# Patient Record
Sex: FEMALE | Race: WHITE | Employment: UNEMPLOYED | URBAN - METROPOLITAN AREA
[De-identification: names, ages, dates, MRNs, and addresses within clinical notes are randomized per-mention and may not be internally consistent; named-entity substitution may affect disease eponyms.]

---

## 2021-10-18 PROBLEM — R07.9 ACUTE CHEST PAIN: Status: ACTIVE | Noted: 2021-01-01

## 2021-10-18 PROBLEM — R06.02 SHORTNESS OF BREATH: Status: ACTIVE | Noted: 2021-01-01

## 2021-10-18 PROBLEM — I95.9 HYPOTENSION: Status: ACTIVE | Noted: 2021-01-01

## 2021-10-18 PROBLEM — I50.9 NEW ONSET OF CONGESTIVE HEART FAILURE (HCC): Status: ACTIVE | Noted: 2021-01-01

## 2021-10-19 PROBLEM — K76.9 DISORDER OF LIVER: Status: ACTIVE | Noted: 2021-01-01

## 2021-10-19 PROBLEM — N84.2 VAGINAL POLYP: Status: ACTIVE | Noted: 2021-01-01

## 2021-10-19 PROBLEM — H52.10 MYOPIA: Status: ACTIVE | Noted: 2021-01-01

## 2021-10-19 PROBLEM — G47.33 OBSTRUCTIVE SLEEP APNEA SYNDROME: Status: RESOLVED | Noted: 2021-01-01 | Resolved: 2021-01-01

## 2021-10-19 PROBLEM — F32.A DEPRESSION: Status: RESOLVED | Noted: 2021-01-01 | Resolved: 2021-01-01

## 2021-10-19 PROBLEM — K76.9 DISORDER OF LIVER: Status: RESOLVED | Noted: 2021-01-01 | Resolved: 2021-01-01

## 2021-10-19 PROBLEM — H52.4 PRESBYOPIA: Status: ACTIVE | Noted: 2021-01-01

## 2021-10-19 PROBLEM — R60.1 ANASARCA: Status: ACTIVE | Noted: 2021-01-01

## 2021-10-19 PROBLEM — C44.310 BASAL CELL CARCINOMA (BCC) OF FACE: Status: ACTIVE | Noted: 2021-01-01

## 2021-10-19 PROBLEM — R74.01 HIGH ASPARTATE AMINOTRANSFERASE LEVEL: Status: ACTIVE | Noted: 2021-01-01

## 2021-10-19 PROBLEM — N84.2 VAGINAL POLYP: Status: RESOLVED | Noted: 2021-01-01 | Resolved: 2021-01-01

## 2021-10-19 PROBLEM — F41.9 ANXIETY: Status: RESOLVED | Noted: 2021-01-01 | Resolved: 2021-01-01

## 2021-10-19 PROBLEM — J30.9 ALLERGIC RHINITIS: Status: RESOLVED | Noted: 2021-01-01 | Resolved: 2021-01-01

## 2021-10-19 PROBLEM — D69.6 THROMBOCYTOPENIA (HCC): Status: ACTIVE | Noted: 2021-01-01

## 2021-10-19 PROBLEM — F41.9 ANXIETY: Status: ACTIVE | Noted: 2021-01-01

## 2021-10-19 PROBLEM — J30.9 ALLERGIC RHINITIS: Status: ACTIVE | Noted: 2021-01-01

## 2021-10-19 PROBLEM — R79.1 ELEVATED INR: Status: ACTIVE | Noted: 2021-01-01

## 2021-10-19 PROBLEM — I10 ESSENTIAL HYPERTENSION: Status: ACTIVE | Noted: 2021-01-01

## 2021-10-19 PROBLEM — R74.01 HIGH ASPARTATE AMINOTRANSFERASE LEVEL: Status: RESOLVED | Noted: 2021-01-01 | Resolved: 2021-01-01

## 2021-10-19 PROBLEM — D50.9 MICROCYTIC ANEMIA: Status: ACTIVE | Noted: 2021-01-01

## 2021-10-19 PROBLEM — H52.4 PRESBYOPIA: Status: RESOLVED | Noted: 2021-01-01 | Resolved: 2021-01-01

## 2021-10-19 PROBLEM — C44.310 BASAL CELL CARCINOMA (BCC) OF FACE: Status: RESOLVED | Noted: 2021-01-01 | Resolved: 2021-01-01

## 2021-10-19 PROBLEM — H52.209 ASTIGMATISM: Status: ACTIVE | Noted: 2021-01-01

## 2021-10-19 PROBLEM — D48.5 NEOPLASM OF UNCERTAIN BEHAVIOR OF SKIN: Status: RESOLVED | Noted: 2021-01-01 | Resolved: 2021-01-01

## 2021-10-19 PROBLEM — G47.33 OBSTRUCTIVE SLEEP APNEA SYNDROME: Status: ACTIVE | Noted: 2021-01-01

## 2021-10-19 PROBLEM — H52.10 MYOPIA: Status: RESOLVED | Noted: 2021-01-01 | Resolved: 2021-01-01

## 2021-10-19 PROBLEM — R74.01 HIGH ALANINE AMINOTRANSFERASE (ALT) LEVEL: Status: RESOLVED | Noted: 2021-01-01 | Resolved: 2021-01-01

## 2021-10-19 PROBLEM — D48.5 NEOPLASM OF UNCERTAIN BEHAVIOR OF SKIN: Status: ACTIVE | Noted: 2021-01-01

## 2021-10-19 PROBLEM — R74.01 HIGH ALANINE AMINOTRANSFERASE (ALT) LEVEL: Status: ACTIVE | Noted: 2021-01-01

## 2021-10-19 PROBLEM — H52.209 ASTIGMATISM: Status: RESOLVED | Noted: 2021-01-01 | Resolved: 2021-01-01

## 2021-10-19 PROBLEM — F32.A DEPRESSION: Status: ACTIVE | Noted: 2021-01-01

## 2021-11-06 PROBLEM — I85.00 ESOPHAGEAL VARICES (HCC): Status: ACTIVE | Noted: 2021-01-01

## 2021-11-06 PROBLEM — K92.2 ACUTE UPPER GI BLEED: Status: ACTIVE | Noted: 2021-01-01

## 2021-11-06 PROBLEM — K92.2 UGIB (UPPER GASTROINTESTINAL BLEED): Status: ACTIVE | Noted: 2021-01-01

## 2021-11-06 PROBLEM — D64.9 ANEMIA: Status: ACTIVE | Noted: 2021-01-01

## 2021-11-06 PROBLEM — R57.8 HEMORRHAGIC SHOCK (HCC): Status: ACTIVE | Noted: 2021-01-01

## 2021-11-10 PROBLEM — J96.01 ACUTE RESPIRATORY FAILURE WITH HYPOXIA (HCC): Status: ACTIVE | Noted: 2021-01-01

## 2021-11-10 PROBLEM — R00.1 SINUS BRADYCARDIA BY ELECTROCARDIOGRAM: Status: ACTIVE | Noted: 2021-01-01

## 2021-11-10 PROBLEM — D69.59 THROMBOCYTOPENIA CONCURRENT WITH AND DUE TO ALCOHOLISM (HCC): Status: ACTIVE | Noted: 2021-01-01

## 2021-11-10 PROBLEM — N17.9 AKI (ACUTE KIDNEY INJURY) (HCC): Status: ACTIVE | Noted: 2021-01-01

## 2021-11-10 PROBLEM — F10.20 THROMBOCYTOPENIA CONCURRENT WITH AND DUE TO ALCOHOLISM (HCC): Status: ACTIVE | Noted: 2021-01-01

## 2021-11-10 PROBLEM — E03.4 HYPOTHYROIDISM DUE TO ACQUIRED ATROPHY OF THYROID: Status: ACTIVE | Noted: 2021-01-01

## 2021-11-10 PROBLEM — D50.0 IRON DEFICIENCY ANEMIA DUE TO CHRONIC BLOOD LOSS: Chronic | Status: ACTIVE | Noted: 2021-01-01

## 2022-01-01 ENCOUNTER — DOCUMENTATION ONLY (OUTPATIENT)
Dept: HEMATOLOGY | Age: 62
End: 2022-01-01

## 2022-01-01 ENCOUNTER — HOSPITAL ENCOUNTER (INPATIENT)
Age: 62
LOS: 49 days | Discharge: HOSPICE | DRG: 432 | End: 2022-04-23
Attending: FAMILY MEDICINE | Admitting: HOSPITALIST
Payer: OTHER GOVERNMENT

## 2022-01-01 ENCOUNTER — TELEPHONE (OUTPATIENT)
Dept: HEMATOLOGY | Age: 62
End: 2022-01-01

## 2022-01-01 ENCOUNTER — APPOINTMENT (OUTPATIENT)
Dept: GENERAL RADIOLOGY | Age: 62
DRG: 432 | End: 2022-01-01
Attending: STUDENT IN AN ORGANIZED HEALTH CARE EDUCATION/TRAINING PROGRAM
Payer: OTHER GOVERNMENT

## 2022-01-01 ENCOUNTER — TRANSCRIBE ORDER (OUTPATIENT)
Dept: SCHEDULING | Age: 62
End: 2022-01-01

## 2022-01-01 ENCOUNTER — APPOINTMENT (OUTPATIENT)
Dept: GENERAL RADIOLOGY | Age: 62
DRG: 432 | End: 2022-01-01
Attending: INTERNAL MEDICINE
Payer: OTHER GOVERNMENT

## 2022-01-01 ENCOUNTER — APPOINTMENT (OUTPATIENT)
Dept: GENERAL RADIOLOGY | Age: 62
DRG: 432 | End: 2022-01-01
Attending: HOSPITALIST
Payer: OTHER GOVERNMENT

## 2022-01-01 ENCOUNTER — PATIENT OUTREACH (OUTPATIENT)
Dept: CASE MANAGEMENT | Age: 62
End: 2022-01-01

## 2022-01-01 ENCOUNTER — APPOINTMENT (OUTPATIENT)
Dept: ULTRASOUND IMAGING | Age: 62
DRG: 432 | End: 2022-01-01
Attending: HOSPITALIST
Payer: OTHER GOVERNMENT

## 2022-01-01 ENCOUNTER — HOSPITAL ENCOUNTER (OUTPATIENT)
Dept: PREADMISSION TESTING | Age: 62
Discharge: HOME | End: 2022-02-10
Payer: OTHER GOVERNMENT

## 2022-01-01 ENCOUNTER — OFFICE VISIT (OUTPATIENT)
Dept: HEMATOLOGY | Age: 62
End: 2022-01-01

## 2022-01-01 ENCOUNTER — APPOINTMENT (OUTPATIENT)
Dept: ULTRASOUND IMAGING | Age: 62
DRG: 432 | End: 2022-01-01
Attending: INTERNAL MEDICINE
Payer: OTHER GOVERNMENT

## 2022-01-01 ENCOUNTER — HOSPITAL ENCOUNTER (OUTPATIENT)
Dept: LAB | Age: 62
Discharge: HOME | End: 2022-01-25
Payer: OTHER GOVERNMENT

## 2022-01-01 ENCOUNTER — HOSPICE ADMISSION (OUTPATIENT)
Dept: HOSPICE | Facility: HOSPICE | Age: 62
End: 2022-01-01

## 2022-01-01 ENCOUNTER — HOSPITAL ENCOUNTER (OUTPATIENT)
Dept: MAMMOGRAPHY | Age: 62
Discharge: HOME | End: 2022-02-08
Attending: INTERNAL MEDICINE
Payer: OTHER GOVERNMENT

## 2022-01-01 VITALS
OXYGEN SATURATION: 97 % | BODY MASS INDEX: 21.12 KG/M2 | HEIGHT: 64 IN | RESPIRATION RATE: 18 BRPM | SYSTOLIC BLOOD PRESSURE: 87 MMHG | DIASTOLIC BLOOD PRESSURE: 47 MMHG | HEART RATE: 102 BPM | WEIGHT: 123.7 LBS | TEMPERATURE: 97.9 F

## 2022-01-01 VITALS
BODY MASS INDEX: 26.26 KG/M2 | SYSTOLIC BLOOD PRESSURE: 136 MMHG | HEIGHT: 64 IN | OXYGEN SATURATION: 96 % | DIASTOLIC BLOOD PRESSURE: 93 MMHG | TEMPERATURE: 97.7 F | HEART RATE: 96 BPM

## 2022-01-01 DIAGNOSIS — Z51.5 END OF LIFE CARE: ICD-10-CM

## 2022-01-01 DIAGNOSIS — K70.30 ALCOHOLIC CIRRHOSIS OF LIVER WITHOUT ASCITES (HCC): ICD-10-CM

## 2022-01-01 DIAGNOSIS — D69.6 THROMBOCYTOPENIA (HCC): ICD-10-CM

## 2022-01-01 DIAGNOSIS — K76.7 HEPATORENAL SYNDROME (HCC): ICD-10-CM

## 2022-01-01 DIAGNOSIS — L89.303 PRESSURE INJURY OF BUTTOCK, STAGE 3, UNSPECIFIED LATERALITY (HCC): ICD-10-CM

## 2022-01-01 DIAGNOSIS — K76.6 PORTAL HYPERTENSIVE GASTROPATHY (HCC): ICD-10-CM

## 2022-01-01 DIAGNOSIS — N18.4 STAGE 4 CHRONIC KIDNEY DISEASE (HCC): ICD-10-CM

## 2022-01-01 DIAGNOSIS — K76.82 HEPATIC ENCEPHALOPATHY: ICD-10-CM

## 2022-01-01 DIAGNOSIS — K70.31 ASCITES DUE TO ALCOHOLIC CIRRHOSIS (HCC): ICD-10-CM

## 2022-01-01 DIAGNOSIS — I85.10 SECONDARY ESOPHAGEAL VARICES WITHOUT BLEEDING (HCC): ICD-10-CM

## 2022-01-01 DIAGNOSIS — K31.89 PORTAL HYPERTENSIVE GASTROPATHY (HCC): ICD-10-CM

## 2022-01-01 DIAGNOSIS — K70.31 ALCOHOLIC CIRRHOSIS OF LIVER WITH ASCITES (HCC): Primary | ICD-10-CM

## 2022-01-01 DIAGNOSIS — R53.81 DEBILITY: ICD-10-CM

## 2022-01-01 DIAGNOSIS — R18.8 CIRRHOSIS OF LIVER WITH ASCITES, UNSPECIFIED HEPATIC CIRRHOSIS TYPE (HCC): Primary | ICD-10-CM

## 2022-01-01 DIAGNOSIS — K76.7 HEPATORENAL FAILURE (HCC): ICD-10-CM

## 2022-01-01 DIAGNOSIS — D64.9 ANEMIA, UNSPECIFIED TYPE: ICD-10-CM

## 2022-01-01 DIAGNOSIS — D50.0 IRON DEFICIENCY ANEMIA DUE TO CHRONIC BLOOD LOSS: Chronic | ICD-10-CM

## 2022-01-01 DIAGNOSIS — Z12.31 VISIT FOR SCREENING MAMMOGRAM: Primary | ICD-10-CM

## 2022-01-01 DIAGNOSIS — K92.2 UPPER GI BLEEDING: ICD-10-CM

## 2022-01-01 DIAGNOSIS — Z01.810 PRE-OPERATIVE CARDIOVASCULAR EXAMINATION: ICD-10-CM

## 2022-01-01 DIAGNOSIS — K70.31 ALCOHOLIC CIRRHOSIS OF LIVER WITH ASCITES (HCC): ICD-10-CM

## 2022-01-01 DIAGNOSIS — Z71.89 ADVANCE CARE PLANNING: ICD-10-CM

## 2022-01-01 DIAGNOSIS — K74.3 PRIMARY BILIARY CHOLANGITIS (HCC): ICD-10-CM

## 2022-01-01 DIAGNOSIS — N17.9 AKI (ACUTE KIDNEY INJURY) (HCC): ICD-10-CM

## 2022-01-01 DIAGNOSIS — E43 SEVERE PROTEIN-CALORIE MALNUTRITION (HCC): ICD-10-CM

## 2022-01-01 DIAGNOSIS — Z51.5 PALLIATIVE CARE BY SPECIALIST: ICD-10-CM

## 2022-01-01 DIAGNOSIS — N17.9 ACUTE RENAL FAILURE, UNSPECIFIED ACUTE RENAL FAILURE TYPE (HCC): ICD-10-CM

## 2022-01-01 DIAGNOSIS — Z71.89 GOALS OF CARE, COUNSELING/DISCUSSION: ICD-10-CM

## 2022-01-01 DIAGNOSIS — I85.11 SECONDARY ESOPHAGEAL VARICES WITH BLEEDING (HCC): ICD-10-CM

## 2022-01-01 DIAGNOSIS — R18.8 OTHER ASCITES: ICD-10-CM

## 2022-01-01 DIAGNOSIS — K74.3 HEPATIC CIRRHOSIS DUE TO PRIMARY BILIARY CHOLANGITIS (HCC): Primary | ICD-10-CM

## 2022-01-01 DIAGNOSIS — K74.60 CIRRHOSIS OF LIVER WITH ASCITES, UNSPECIFIED HEPATIC CIRRHOSIS TYPE (HCC): Primary | ICD-10-CM

## 2022-01-01 DIAGNOSIS — K74.5 BILIARY CIRRHOSIS (HCC): ICD-10-CM

## 2022-01-01 DIAGNOSIS — Z51.5 HOSPICE CARE: Primary | ICD-10-CM

## 2022-01-01 DIAGNOSIS — Z12.31 VISIT FOR SCREENING MAMMOGRAM: ICD-10-CM

## 2022-01-01 DIAGNOSIS — R54 FRAILTY: ICD-10-CM

## 2022-01-01 DIAGNOSIS — M62.59 ATROPHY OF MUSCLE OF MULTIPLE SITES: ICD-10-CM

## 2022-01-01 DIAGNOSIS — D68.9 COAGULOPATHY (HCC): ICD-10-CM

## 2022-01-01 LAB
A1AT SERPL-MCNC: 159 MG/DL (ref 101–187)
ABO + RH BLD: NORMAL
ACTIN IGG SERPL-ACNC: 20 UNITS (ref 0–19)
AFP L3 MFR SERPL: 10.8 % (ref 0–9.9)
AFP SERPL-MCNC: 4.3 NG/ML (ref 0–8)
ALBUMIN FLD-MCNC: 1 G/DL
ALBUMIN SERPL-MCNC: 2.7 G/DL (ref 3.4–5)
ALBUMIN SERPL-MCNC: 2.8 G/DL (ref 3.5–5)
ALBUMIN SERPL-MCNC: 3 G/DL (ref 3.5–5)
ALBUMIN SERPL-MCNC: 3 G/DL (ref 3.5–5)
ALBUMIN SERPL-MCNC: 3.1 G/DL (ref 3.5–5)
ALBUMIN SERPL-MCNC: 3.2 G/DL (ref 3.5–5)
ALBUMIN SERPL-MCNC: 3.3 G/DL (ref 3.5–5)
ALBUMIN SERPL-MCNC: 3.4 G/DL (ref 3.5–5)
ALBUMIN SERPL-MCNC: 3.5 G/DL (ref 3.5–5)
ALBUMIN SERPL-MCNC: 3.6 G/DL (ref 3.5–5)
ALBUMIN SERPL-MCNC: 3.8 G/DL (ref 3.5–5)
ALBUMIN SERPL-MCNC: 3.8 G/DL (ref 3.5–5)
ALBUMIN SERPL-MCNC: 3.9 G/DL (ref 3.5–5)
ALBUMIN SERPL-MCNC: 4 G/DL (ref 3.5–5)
ALBUMIN SERPL-MCNC: 4.2 G/DL (ref 3.5–5)
ALBUMIN SERPL-MCNC: 4.4 G/DL (ref 3.5–5)
ALBUMIN SERPL-MCNC: 4.6 G/DL (ref 3.5–5)
ALBUMIN/GLOB SERPL: 0.7 {RATIO} (ref 0.8–1.7)
ALBUMIN/GLOB SERPL: 1.5 {RATIO} (ref 1.1–2.2)
ALBUMIN/GLOB SERPL: 1.7 {RATIO} (ref 1.1–2.2)
ALBUMIN/GLOB SERPL: 1.8 {RATIO} (ref 1.1–2.2)
ALBUMIN/GLOB SERPL: 1.9 {RATIO} (ref 1.1–2.2)
ALBUMIN/GLOB SERPL: 1.9 {RATIO} (ref 1.1–2.2)
ALBUMIN/GLOB SERPL: 2.3 {RATIO} (ref 1.1–2.2)
ALBUMIN/GLOB SERPL: 2.8 {RATIO} (ref 1.1–2.2)
ALP SERPL-CCNC: 105 U/L (ref 45–117)
ALP SERPL-CCNC: 107 U/L (ref 45–117)
ALP SERPL-CCNC: 117 U/L (ref 45–117)
ALP SERPL-CCNC: 129 U/L (ref 45–117)
ALP SERPL-CCNC: 143 U/L (ref 45–117)
ALP SERPL-CCNC: 144 U/L (ref 45–117)
ALP SERPL-CCNC: 204 U/L (ref 45–117)
ALP SERPL-CCNC: 89 U/L (ref 45–117)
ALP SERPL-CCNC: 94 U/L (ref 45–117)
ALP SERPL-CCNC: 94 U/L (ref 45–117)
ALT SERPL-CCNC: 16 U/L (ref 12–78)
ALT SERPL-CCNC: 18 U/L (ref 12–78)
ALT SERPL-CCNC: 20 U/L (ref 12–78)
ALT SERPL-CCNC: 21 U/L (ref 12–78)
ALT SERPL-CCNC: 21 U/L (ref 12–78)
ALT SERPL-CCNC: 23 U/L (ref 12–78)
ALT SERPL-CCNC: 24 U/L (ref 12–78)
ALT SERPL-CCNC: 24 U/L (ref 12–78)
ALT SERPL-CCNC: 26 U/L (ref 12–78)
ALT SERPL-CCNC: 38 U/L (ref 13–56)
AMMONIA PLAS-SCNC: 28 UMOL/L
AMMONIA PLAS-SCNC: 43 UMOL/L
AMMONIA PLAS-SCNC: 43 UMOL/L
AMMONIA PLAS-SCNC: 44 UMOL/L
AMMONIA PLAS-SCNC: 46 UMOL/L
AMMONIA PLAS-SCNC: 57 UMOL/L
AMPHETAMINES SERPL QL SCN: NEGATIVE NG/ML
ANA TITR SER IF: NEGATIVE {TITER}
ANION GAP SERPL CALC-SCNC: 10 MMOL/L (ref 5–15)
ANION GAP SERPL CALC-SCNC: 11 MMOL/L (ref 5–15)
ANION GAP SERPL CALC-SCNC: 12 MMOL/L (ref 5–15)
ANION GAP SERPL CALC-SCNC: 13 MMOL/L (ref 5–15)
ANION GAP SERPL CALC-SCNC: 14 MMOL/L (ref 5–15)
ANION GAP SERPL CALC-SCNC: 15 MMOL/L (ref 5–15)
ANION GAP SERPL CALC-SCNC: 16 MMOL/L (ref 5–15)
ANION GAP SERPL CALC-SCNC: 17 MMOL/L (ref 5–15)
ANION GAP SERPL CALC-SCNC: 17 MMOL/L (ref 5–15)
ANION GAP SERPL CALC-SCNC: 18 MMOL/L (ref 5–15)
ANION GAP SERPL CALC-SCNC: 19 MMOL/L (ref 5–15)
ANION GAP SERPL CALC-SCNC: 19 MMOL/L (ref 5–15)
ANION GAP SERPL CALC-SCNC: 20 MMOL/L (ref 5–15)
ANION GAP SERPL CALC-SCNC: 20 MMOL/L (ref 5–15)
ANION GAP SERPL CALC-SCNC: 7 MMOL/L (ref 3–18)
ANION GAP SERPL CALC-SCNC: 8 MMOL/L (ref 5–15)
APPEARANCE FLD: ABNORMAL
APPEARANCE FLD: CLEAR
APPEARANCE UR: ABNORMAL
ARTERIAL PATENCY WRIST A: POSITIVE
AST SERPL-CCNC: 30 U/L (ref 15–37)
AST SERPL-CCNC: 32 U/L (ref 15–37)
AST SERPL-CCNC: 33 U/L (ref 15–37)
AST SERPL-CCNC: 35 U/L (ref 15–37)
AST SERPL-CCNC: 42 U/L (ref 15–37)
AST SERPL-CCNC: 43 U/L (ref 15–37)
AST SERPL-CCNC: 46 U/L (ref 15–37)
AST SERPL-CCNC: 46 U/L (ref 15–37)
AST SERPL-CCNC: 49 U/L (ref 15–37)
AST SERPL-CCNC: 80 U/L (ref 10–38)
BACTERIA SPEC CULT: ABNORMAL
BACTERIA SPEC CULT: NORMAL
BACTERIA SPEC CULT: NORMAL
BACTERIA URNS QL MICRO: ABNORMAL /HPF
BARBITURATES SERPL QL SCN: NEGATIVE UG/ML
BASE DEFICIT BLD-SCNC: 6.1 MMOL/L
BASOPHILS # BLD: 0 K/UL (ref 0–0.1)
BASOPHILS # BLD: 0.1 K/UL (ref 0–0.1)
BASOPHILS # BLD: 0.2 K/UL (ref 0–0.1)
BASOPHILS NFR BLD: 0 % (ref 0–1)
BASOPHILS NFR BLD: 1 % (ref 0–1)
BASOPHILS NFR BLD: 1 % (ref 0–2)
BASOPHILS NFR BLD: 2 % (ref 0–1)
BDY SITE: ABNORMAL
BILIRUB DIRECT SERPL-MCNC: 1.6 MG/DL (ref 0–0.2)
BILIRUB DIRECT SERPL-MCNC: 1.7 MG/DL (ref 0–0.2)
BILIRUB DIRECT SERPL-MCNC: 1.7 MG/DL (ref 0–0.2)
BILIRUB DIRECT SERPL-MCNC: 2.9 MG/DL (ref 0–0.2)
BILIRUB DIRECT SERPL-MCNC: 3 MG/DL (ref 0–0.2)
BILIRUB SERPL-MCNC: 3.1 MG/DL (ref 0.2–1)
BILIRUB SERPL-MCNC: 3.3 MG/DL (ref 0.2–1)
BILIRUB SERPL-MCNC: 3.9 MG/DL (ref 0.2–1)
BILIRUB SERPL-MCNC: 4.2 MG/DL (ref 0.2–1)
BILIRUB SERPL-MCNC: 4.4 MG/DL (ref 0.2–1)
BILIRUB SERPL-MCNC: 4.6 MG/DL (ref 0.2–1)
BILIRUB SERPL-MCNC: 5.2 MG/DL (ref 0.2–1)
BILIRUB SERPL-MCNC: 5.3 MG/DL (ref 0.2–1)
BILIRUB SERPL-MCNC: 5.4 MG/DL (ref 0.2–1)
BILIRUB SERPL-MCNC: 5.4 MG/DL (ref 0.2–1)
BILIRUB UR QL CFM: POSITIVE
BLD PROD TYP BPU: NORMAL
BLOOD GROUP ANTIBODIES SERPL: NORMAL
BPU ID: NORMAL
BUN SERPL-MCNC: 101 MG/DL (ref 6–20)
BUN SERPL-MCNC: 102 MG/DL (ref 6–20)
BUN SERPL-MCNC: 102 MG/DL (ref 6–20)
BUN SERPL-MCNC: 106 MG/DL (ref 6–20)
BUN SERPL-MCNC: 110 MG/DL (ref 6–20)
BUN SERPL-MCNC: 112 MG/DL (ref 6–20)
BUN SERPL-MCNC: 113 MG/DL (ref 6–20)
BUN SERPL-MCNC: 114 MG/DL (ref 6–20)
BUN SERPL-MCNC: 116 MG/DL (ref 6–20)
BUN SERPL-MCNC: 117 MG/DL (ref 6–20)
BUN SERPL-MCNC: 117 MG/DL (ref 6–20)
BUN SERPL-MCNC: 118 MG/DL (ref 6–20)
BUN SERPL-MCNC: 120 MG/DL (ref 6–20)
BUN SERPL-MCNC: 120 MG/DL (ref 6–20)
BUN SERPL-MCNC: 121 MG/DL (ref 6–20)
BUN SERPL-MCNC: 122 MG/DL (ref 6–20)
BUN SERPL-MCNC: 123 MG/DL (ref 6–20)
BUN SERPL-MCNC: 123 MG/DL (ref 6–20)
BUN SERPL-MCNC: 128 MG/DL (ref 6–20)
BUN SERPL-MCNC: 129 MG/DL (ref 6–20)
BUN SERPL-MCNC: 130 MG/DL (ref 6–20)
BUN SERPL-MCNC: 131 MG/DL (ref 6–20)
BUN SERPL-MCNC: 134 MG/DL (ref 6–20)
BUN SERPL-MCNC: 135 MG/DL (ref 6–20)
BUN SERPL-MCNC: 135 MG/DL (ref 6–20)
BUN SERPL-MCNC: 139 MG/DL (ref 6–20)
BUN SERPL-MCNC: 141 MG/DL (ref 6–20)
BUN SERPL-MCNC: 141 MG/DL (ref 6–20)
BUN SERPL-MCNC: 142 MG/DL (ref 6–20)
BUN SERPL-MCNC: 144 MG/DL (ref 6–20)
BUN SERPL-MCNC: 145 MG/DL (ref 6–20)
BUN SERPL-MCNC: 146 MG/DL (ref 6–20)
BUN SERPL-MCNC: 149 MG/DL (ref 6–20)
BUN SERPL-MCNC: 152 MG/DL (ref 6–20)
BUN SERPL-MCNC: 155 MG/DL (ref 6–20)
BUN SERPL-MCNC: 159 MG/DL (ref 6–20)
BUN SERPL-MCNC: 161 MG/DL (ref 6–20)
BUN SERPL-MCNC: 162 MG/DL (ref 6–20)
BUN SERPL-MCNC: 162 MG/DL (ref 6–20)
BUN SERPL-MCNC: 165 MG/DL (ref 6–20)
BUN SERPL-MCNC: 166 MG/DL (ref 6–20)
BUN SERPL-MCNC: 167 MG/DL (ref 6–20)
BUN SERPL-MCNC: 170 MG/DL (ref 6–20)
BUN SERPL-MCNC: 172 MG/DL (ref 6–20)
BUN SERPL-MCNC: 179 MG/DL (ref 6–20)
BUN SERPL-MCNC: 27 MG/DL (ref 7–18)
BUN SERPL-MCNC: 98 MG/DL (ref 6–20)
BUN/CREAT SERPL: 20 (ref 12–20)
BUN/CREAT SERPL: 21 (ref 12–20)
BUN/CREAT SERPL: 24 (ref 12–20)
BUN/CREAT SERPL: 24 (ref 12–20)
BUN/CREAT SERPL: 25 (ref 12–20)
BUN/CREAT SERPL: 26 (ref 12–20)
BUN/CREAT SERPL: 26 (ref 12–20)
BUN/CREAT SERPL: 27 (ref 12–20)
BUN/CREAT SERPL: 28 (ref 12–20)
BUN/CREAT SERPL: 29 (ref 12–20)
BUN/CREAT SERPL: 30 (ref 12–20)
BUN/CREAT SERPL: 31 (ref 12–20)
BUN/CREAT SERPL: 32 (ref 12–20)
BUN/CREAT SERPL: 33 (ref 12–20)
BUN/CREAT SERPL: 37 (ref 12–20)
BUN/CREAT SERPL: 39 (ref 12–20)
BUN/CREAT SERPL: 40 (ref 12–20)
BUN/CREAT SERPL: 40 (ref 12–20)
BUN/CREAT SERPL: 41 (ref 12–20)
BUN/CREAT SERPL: 42 (ref 12–20)
BUN/CREAT SERPL: 45 (ref 12–20)
BUN/CREAT SERPL: 46 (ref 12–20)
BUN/CREAT SERPL: 46 (ref 12–20)
BUN/CREAT SERPL: 47 (ref 12–20)
BUN/CREAT SERPL: 47 (ref 12–20)
BUN/CREAT SERPL: 48 (ref 12–20)
BUN/CREAT SERPL: 48 (ref 12–20)
BUN/CREAT SERPL: 50 (ref 12–20)
CALCIUM SERPL-MCNC: 7.7 MG/DL (ref 8.5–10.1)
CALCIUM SERPL-MCNC: 7.8 MG/DL (ref 8.5–10.1)
CALCIUM SERPL-MCNC: 8.2 MG/DL (ref 8.5–10.1)
CALCIUM SERPL-MCNC: 8.3 MG/DL (ref 8.5–10.1)
CALCIUM SERPL-MCNC: 8.4 MG/DL (ref 8.5–10.1)
CALCIUM SERPL-MCNC: 8.5 MG/DL (ref 8.5–10.1)
CALCIUM SERPL-MCNC: 8.6 MG/DL (ref 8.5–10.1)
CALCIUM SERPL-MCNC: 8.7 MG/DL (ref 8.5–10.1)
CALCIUM SERPL-MCNC: 8.8 MG/DL (ref 8.5–10.1)
CALCIUM SERPL-MCNC: 8.9 MG/DL (ref 8.5–10.1)
CALCIUM SERPL-MCNC: 9 MG/DL (ref 8.5–10.1)
CALCIUM SERPL-MCNC: 9 MG/DL (ref 8.5–10.1)
CALCIUM SERPL-MCNC: 9.1 MG/DL (ref 8.5–10.1)
CALCIUM SERPL-MCNC: 9.2 MG/DL (ref 8.5–10.1)
CALCIUM SERPL-MCNC: 9.3 MG/DL (ref 8.5–10.1)
CALCIUM SERPL-MCNC: 9.4 MG/DL (ref 8.5–10.1)
CALCIUM SERPL-MCNC: 9.7 MG/DL (ref 8.5–10.1)
CANNABINOIDS SERPL QL SCN: NEGATIVE NG/ML
CC UR VC: ABNORMAL
CHLORIDE SERPL-SCNC: 100 MMOL/L (ref 97–108)
CHLORIDE SERPL-SCNC: 101 MMOL/L (ref 97–108)
CHLORIDE SERPL-SCNC: 102 MMOL/L (ref 97–108)
CHLORIDE SERPL-SCNC: 102 MMOL/L (ref 97–108)
CHLORIDE SERPL-SCNC: 103 MMOL/L (ref 97–108)
CHLORIDE SERPL-SCNC: 104 MMOL/L (ref 100–111)
CHLORIDE SERPL-SCNC: 104 MMOL/L (ref 97–108)
CHLORIDE SERPL-SCNC: 105 MMOL/L (ref 97–108)
CHLORIDE SERPL-SCNC: 108 MMOL/L (ref 97–108)
CHLORIDE SERPL-SCNC: 110 MMOL/L (ref 97–108)
CHLORIDE SERPL-SCNC: 110 MMOL/L (ref 97–108)
CHLORIDE SERPL-SCNC: 82 MMOL/L (ref 97–108)
CHLORIDE SERPL-SCNC: 83 MMOL/L (ref 97–108)
CHLORIDE SERPL-SCNC: 83 MMOL/L (ref 97–108)
CHLORIDE SERPL-SCNC: 84 MMOL/L (ref 97–108)
CHLORIDE SERPL-SCNC: 85 MMOL/L (ref 97–108)
CHLORIDE SERPL-SCNC: 85 MMOL/L (ref 97–108)
CHLORIDE SERPL-SCNC: 87 MMOL/L (ref 97–108)
CHLORIDE SERPL-SCNC: 88 MMOL/L (ref 97–108)
CHLORIDE SERPL-SCNC: 90 MMOL/L (ref 97–108)
CHLORIDE SERPL-SCNC: 91 MMOL/L (ref 97–108)
CHLORIDE SERPL-SCNC: 92 MMOL/L (ref 97–108)
CHLORIDE SERPL-SCNC: 93 MMOL/L (ref 97–108)
CHLORIDE SERPL-SCNC: 94 MMOL/L (ref 97–108)
CHLORIDE SERPL-SCNC: 96 MMOL/L (ref 97–108)
CHLORIDE SERPL-SCNC: 97 MMOL/L (ref 97–108)
CHLORIDE SERPL-SCNC: 98 MMOL/L (ref 97–108)
CHLORIDE SERPL-SCNC: 99 MMOL/L (ref 97–108)
CHLORIDE UR-SCNC: <10 MMOL/L
CHLORIDE UR-SCNC: <10 MMOL/L
CHOLEST SERPL-MCNC: 122 MG/DL
CMV IGG SERPL IA-ACNC: <0.6 U/ML (ref 0–0.59)
CMV IGM SERPL IA-ACNC: <30 AU/ML (ref 0–29.9)
CO2 SERPL-SCNC: 13 MMOL/L (ref 21–32)
CO2 SERPL-SCNC: 15 MMOL/L (ref 21–32)
CO2 SERPL-SCNC: 16 MMOL/L (ref 21–32)
CO2 SERPL-SCNC: 16 MMOL/L (ref 21–32)
CO2 SERPL-SCNC: 17 MMOL/L (ref 21–32)
CO2 SERPL-SCNC: 17 MMOL/L (ref 21–32)
CO2 SERPL-SCNC: 18 MMOL/L (ref 21–32)
CO2 SERPL-SCNC: 19 MMOL/L (ref 21–32)
CO2 SERPL-SCNC: 20 MMOL/L (ref 21–32)
CO2 SERPL-SCNC: 21 MMOL/L (ref 21–32)
CO2 SERPL-SCNC: 22 MMOL/L (ref 21–32)
CO2 SERPL-SCNC: 22 MMOL/L (ref 21–32)
CO2 SERPL-SCNC: 23 MMOL/L (ref 21–32)
CO2 SERPL-SCNC: 24 MMOL/L (ref 21–32)
CO2 SERPL-SCNC: 25 MMOL/L (ref 21–32)
COCAINE+BZE SERPL QL SCN: NEGATIVE NG/ML
COLOR FLD: YELLOW
COLOR FLD: YELLOW
COLOR UR: ABNORMAL
COMMENT, HOLDF: NORMAL
COVID-19 RAPID TEST, COVR: DETECTED
CREAT SERPL-MCNC: 1.38 MG/DL (ref 0.6–1.3)
CREAT SERPL-MCNC: 3.04 MG/DL (ref 0.55–1.02)
CREAT SERPL-MCNC: 3.09 MG/DL (ref 0.55–1.02)
CREAT SERPL-MCNC: 3.31 MG/DL (ref 0.55–1.02)
CREAT SERPL-MCNC: 3.4 MG/DL (ref 0.55–1.02)
CREAT SERPL-MCNC: 3.4 MG/DL (ref 0.55–1.02)
CREAT SERPL-MCNC: 3.43 MG/DL (ref 0.55–1.02)
CREAT SERPL-MCNC: 3.43 MG/DL (ref 0.55–1.02)
CREAT SERPL-MCNC: 3.44 MG/DL (ref 0.55–1.02)
CREAT SERPL-MCNC: 3.48 MG/DL (ref 0.55–1.02)
CREAT SERPL-MCNC: 3.49 MG/DL (ref 0.55–1.02)
CREAT SERPL-MCNC: 3.53 MG/DL (ref 0.55–1.02)
CREAT SERPL-MCNC: 3.54 MG/DL (ref 0.55–1.02)
CREAT SERPL-MCNC: 3.56 MG/DL (ref 0.55–1.02)
CREAT SERPL-MCNC: 3.57 MG/DL (ref 0.55–1.02)
CREAT SERPL-MCNC: 3.57 MG/DL (ref 0.55–1.02)
CREAT SERPL-MCNC: 3.59 MG/DL (ref 0.55–1.02)
CREAT SERPL-MCNC: 3.61 MG/DL (ref 0.55–1.02)
CREAT SERPL-MCNC: 3.62 MG/DL (ref 0.55–1.02)
CREAT SERPL-MCNC: 3.84 MG/DL (ref 0.55–1.02)
CREAT SERPL-MCNC: 3.93 MG/DL (ref 0.55–1.02)
CREAT SERPL-MCNC: 3.95 MG/DL (ref 0.55–1.02)
CREAT SERPL-MCNC: 3.99 MG/DL (ref 0.55–1.02)
CREAT SERPL-MCNC: 3.99 MG/DL (ref 0.55–1.02)
CREAT SERPL-MCNC: 4 MG/DL (ref 0.55–1.02)
CREAT SERPL-MCNC: 4.04 MG/DL (ref 0.55–1.02)
CREAT SERPL-MCNC: 4.04 MG/DL (ref 0.55–1.02)
CREAT SERPL-MCNC: 4.08 MG/DL (ref 0.55–1.02)
CREAT SERPL-MCNC: 4.13 MG/DL (ref 0.55–1.02)
CREAT SERPL-MCNC: 4.15 MG/DL (ref 0.55–1.02)
CREAT SERPL-MCNC: 4.16 MG/DL (ref 0.55–1.02)
CREAT SERPL-MCNC: 4.21 MG/DL (ref 0.55–1.02)
CREAT SERPL-MCNC: 4.21 MG/DL (ref 0.55–1.02)
CREAT SERPL-MCNC: 4.25 MG/DL (ref 0.55–1.02)
CREAT SERPL-MCNC: 4.31 MG/DL (ref 0.55–1.02)
CREAT SERPL-MCNC: 4.32 MG/DL (ref 0.55–1.02)
CREAT SERPL-MCNC: 4.45 MG/DL (ref 0.55–1.02)
CREAT SERPL-MCNC: 4.5 MG/DL (ref 0.55–1.02)
CREAT SERPL-MCNC: 4.5 MG/DL (ref 0.55–1.02)
CREAT SERPL-MCNC: 4.54 MG/DL (ref 0.55–1.02)
CREAT SERPL-MCNC: 4.56 MG/DL (ref 0.55–1.02)
CREAT SERPL-MCNC: 4.58 MG/DL (ref 0.55–1.02)
CREAT SERPL-MCNC: 4.68 MG/DL (ref 0.55–1.02)
CREAT SERPL-MCNC: 4.73 MG/DL (ref 0.55–1.02)
CREAT SERPL-MCNC: 4.76 MG/DL (ref 0.55–1.02)
CREAT SERPL-MCNC: 4.76 MG/DL (ref 0.55–1.02)
CREAT SERPL-MCNC: 4.79 MG/DL (ref 0.55–1.02)
CREAT SERPL-MCNC: 4.8 MG/DL (ref 0.55–1.02)
CREAT SERPL-MCNC: 4.85 MG/DL (ref 0.55–1.02)
CREAT SERPL-MCNC: 4.9 MG/DL (ref 0.55–1.02)
CREAT SERPL-MCNC: 4.99 MG/DL (ref 0.55–1.02)
CREAT SERPL-MCNC: 5.01 MG/DL (ref 0.55–1.02)
CREAT SERPL-MCNC: 5.03 MG/DL (ref 0.55–1.02)
CREAT SERPL-MCNC: 5.06 MG/DL (ref 0.55–1.02)
CREAT SERPL-MCNC: 5.65 MG/DL (ref 0.55–1.02)
CREAT SERPL-MCNC: 6.71 MG/DL (ref 0.55–1.02)
CREAT UR-MCNC: 102 MG/DL
CREAT UR-MCNC: 110 MG/DL
CROSSMATCH RESULT,%XM: NORMAL
DIFFERENTIAL METHOD BLD: ABNORMAL
EBV NA IGG SER-ACNC: 267 U/ML (ref 0–17.9)
EBV VCA IGG SER-ACNC: >600 U/ML (ref 0–17.9)
EBV VCA IGM SER-ACNC: <36 U/ML (ref 0–35.9)
EOSINOPHIL # BLD: 0 K/UL (ref 0–0.4)
EOSINOPHIL # BLD: 0.2 K/UL (ref 0–0.4)
EOSINOPHIL # BLD: 0.4 K/UL (ref 0–0.4)
EOSINOPHIL # BLD: 0.5 K/UL (ref 0–0.4)
EOSINOPHIL # BLD: 0.7 K/UL (ref 0–0.4)
EOSINOPHIL NFR BLD: 0 % (ref 0–7)
EOSINOPHIL NFR BLD: 3 % (ref 0–5)
EOSINOPHIL NFR BLD: 4 % (ref 0–7)
EOSINOPHIL NFR BLD: 5 % (ref 0–7)
EOSINOPHIL NFR BLD: 5 % (ref 0–7)
EOSINOPHIL NFR BLD: 6 % (ref 0–7)
EPITH CASTS URNS QL MICRO: ABNORMAL /LPF
ERYTHROCYTE [DISTWIDTH] IN BLOOD BY AUTOMATED COUNT: 19 % (ref 11.6–14.5)
ERYTHROCYTE [DISTWIDTH] IN BLOOD BY AUTOMATED COUNT: 19.5 % (ref 11.5–14.5)
ERYTHROCYTE [DISTWIDTH] IN BLOOD BY AUTOMATED COUNT: 19.6 % (ref 11.5–14.5)
ERYTHROCYTE [DISTWIDTH] IN BLOOD BY AUTOMATED COUNT: 19.7 % (ref 11.5–14.5)
ERYTHROCYTE [DISTWIDTH] IN BLOOD BY AUTOMATED COUNT: 19.8 % (ref 11.5–14.5)
ERYTHROCYTE [DISTWIDTH] IN BLOOD BY AUTOMATED COUNT: 19.8 % (ref 11.5–14.5)
ERYTHROCYTE [DISTWIDTH] IN BLOOD BY AUTOMATED COUNT: 20.2 % (ref 11.5–14.5)
ERYTHROCYTE [DISTWIDTH] IN BLOOD BY AUTOMATED COUNT: 20.2 % (ref 11.5–14.5)
ERYTHROCYTE [DISTWIDTH] IN BLOOD BY AUTOMATED COUNT: 20.3 % (ref 11.5–14.5)
ERYTHROCYTE [DISTWIDTH] IN BLOOD BY AUTOMATED COUNT: 20.4 % (ref 11.5–14.5)
ERYTHROCYTE [DISTWIDTH] IN BLOOD BY AUTOMATED COUNT: 20.5 % (ref 11.5–14.5)
ERYTHROCYTE [DISTWIDTH] IN BLOOD BY AUTOMATED COUNT: 21 % (ref 11.5–14.5)
ERYTHROCYTE [DISTWIDTH] IN BLOOD BY AUTOMATED COUNT: 21.4 % (ref 11.5–14.5)
ERYTHROCYTE [DISTWIDTH] IN BLOOD BY AUTOMATED COUNT: 21.7 % (ref 11.5–14.5)
ERYTHROCYTE [DISTWIDTH] IN BLOOD BY AUTOMATED COUNT: 22 % (ref 11.5–14.5)
ERYTHROCYTE [DISTWIDTH] IN BLOOD BY AUTOMATED COUNT: 22.1 % (ref 11.5–14.5)
ERYTHROCYTE [DISTWIDTH] IN BLOOD BY AUTOMATED COUNT: 22.3 % (ref 11.5–14.5)
ERYTHROCYTE [DISTWIDTH] IN BLOOD BY AUTOMATED COUNT: 22.5 % (ref 11.5–14.5)
ERYTHROCYTE [DISTWIDTH] IN BLOOD BY AUTOMATED COUNT: 22.5 % (ref 11.5–14.5)
ERYTHROCYTE [DISTWIDTH] IN BLOOD BY AUTOMATED COUNT: 22.7 % (ref 11.5–14.5)
ERYTHROCYTE [DISTWIDTH] IN BLOOD BY AUTOMATED COUNT: 22.7 % (ref 11.5–14.5)
ERYTHROCYTE [DISTWIDTH] IN BLOOD BY AUTOMATED COUNT: 22.8 % (ref 11.5–14.5)
ERYTHROCYTE [DISTWIDTH] IN BLOOD BY AUTOMATED COUNT: 23.2 % (ref 11.5–14.5)
ERYTHROCYTE [DISTWIDTH] IN BLOOD BY AUTOMATED COUNT: 23.5 % (ref 11.5–14.5)
ERYTHROCYTE [DISTWIDTH] IN BLOOD BY AUTOMATED COUNT: 23.6 % (ref 11.5–14.5)
ERYTHROCYTE [DISTWIDTH] IN BLOOD BY AUTOMATED COUNT: 23.6 % (ref 11.5–14.5)
ERYTHROCYTE [DISTWIDTH] IN BLOOD BY AUTOMATED COUNT: 23.8 % (ref 11.5–14.5)
ERYTHROCYTE [DISTWIDTH] IN BLOOD BY AUTOMATED COUNT: 23.9 % (ref 11.5–14.5)
ERYTHROCYTE [DISTWIDTH] IN BLOOD BY AUTOMATED COUNT: 24 % (ref 11.5–14.5)
ERYTHROCYTE [DISTWIDTH] IN BLOOD BY AUTOMATED COUNT: 24.1 % (ref 11.5–14.5)
ERYTHROCYTE [DISTWIDTH] IN BLOOD BY AUTOMATED COUNT: 24.3 % (ref 11.5–14.5)
ERYTHROCYTE [DISTWIDTH] IN BLOOD BY AUTOMATED COUNT: 24.3 % (ref 11.5–14.5)
ERYTHROCYTE [DISTWIDTH] IN BLOOD BY AUTOMATED COUNT: 24.5 % (ref 11.5–14.5)
ERYTHROCYTE [DISTWIDTH] IN BLOOD BY AUTOMATED COUNT: 24.6 % (ref 11.5–14.5)
ERYTHROCYTE [DISTWIDTH] IN BLOOD BY AUTOMATED COUNT: 24.8 % (ref 11.5–14.5)
ERYTHROCYTE [DISTWIDTH] IN BLOOD BY AUTOMATED COUNT: 24.9 % (ref 11.5–14.5)
ERYTHROCYTE [DISTWIDTH] IN BLOOD BY AUTOMATED COUNT: 25 % (ref 11.5–14.5)
ERYTHROCYTE [DISTWIDTH] IN BLOOD BY AUTOMATED COUNT: 25 % (ref 11.5–14.5)
ERYTHROCYTE [DISTWIDTH] IN BLOOD BY AUTOMATED COUNT: 25.6 % (ref 11.5–14.5)
EST. AVERAGE GLUCOSE BLD GHB EST-MCNC: 91 MG/DL
ETHANOL SERPL-MCNC: <3 MG/DL (ref 0–3)
FERRITIN SERPL-MCNC: 106 NG/ML (ref 26–388)
FERRITIN SERPL-MCNC: 87 NG/ML (ref 8–388)
FLUAV RNA SPEC QL NAA+PROBE: NOT DETECTED
FLUBV RNA SPEC QL NAA+PROBE: NOT DETECTED
GAS FLOW.O2 O2 DELIVERY SYS: ABNORMAL L/MIN
GAS FLOW.O2 SETTING OXYMISER: 16 BPM
GLOBULIN SER CALC-MCNC: 1.4 G/DL (ref 2–4)
GLOBULIN SER CALC-MCNC: 1.4 G/DL (ref 2–4)
GLOBULIN SER CALC-MCNC: 1.8 G/DL (ref 2–4)
GLOBULIN SER CALC-MCNC: 1.9 G/DL (ref 2–4)
GLOBULIN SER CALC-MCNC: 2.1 G/DL (ref 2–4)
GLOBULIN SER CALC-MCNC: 2.1 G/DL (ref 2–4)
GLOBULIN SER CALC-MCNC: 2.2 G/DL (ref 2–4)
GLOBULIN SER CALC-MCNC: 3.8 G/DL (ref 2–4)
GLUCOSE BLD STRIP.AUTO-MCNC: 123 MG/DL (ref 65–117)
GLUCOSE BLD STRIP.AUTO-MCNC: 128 MG/DL (ref 65–117)
GLUCOSE BLD STRIP.AUTO-MCNC: 73 MG/DL (ref 65–117)
GLUCOSE SERPL-MCNC: 101 MG/DL (ref 65–100)
GLUCOSE SERPL-MCNC: 103 MG/DL (ref 65–100)
GLUCOSE SERPL-MCNC: 106 MG/DL (ref 65–100)
GLUCOSE SERPL-MCNC: 108 MG/DL (ref 65–100)
GLUCOSE SERPL-MCNC: 110 MG/DL (ref 65–100)
GLUCOSE SERPL-MCNC: 110 MG/DL (ref 65–100)
GLUCOSE SERPL-MCNC: 113 MG/DL (ref 65–100)
GLUCOSE SERPL-MCNC: 115 MG/DL (ref 65–100)
GLUCOSE SERPL-MCNC: 115 MG/DL (ref 65–100)
GLUCOSE SERPL-MCNC: 116 MG/DL (ref 65–100)
GLUCOSE SERPL-MCNC: 119 MG/DL (ref 65–100)
GLUCOSE SERPL-MCNC: 121 MG/DL (ref 65–100)
GLUCOSE SERPL-MCNC: 125 MG/DL (ref 65–100)
GLUCOSE SERPL-MCNC: 128 MG/DL (ref 65–100)
GLUCOSE SERPL-MCNC: 129 MG/DL (ref 65–100)
GLUCOSE SERPL-MCNC: 129 MG/DL (ref 65–100)
GLUCOSE SERPL-MCNC: 130 MG/DL (ref 65–100)
GLUCOSE SERPL-MCNC: 130 MG/DL (ref 65–100)
GLUCOSE SERPL-MCNC: 134 MG/DL (ref 65–100)
GLUCOSE SERPL-MCNC: 135 MG/DL (ref 65–100)
GLUCOSE SERPL-MCNC: 135 MG/DL (ref 65–100)
GLUCOSE SERPL-MCNC: 136 MG/DL (ref 65–100)
GLUCOSE SERPL-MCNC: 137 MG/DL (ref 65–100)
GLUCOSE SERPL-MCNC: 137 MG/DL (ref 65–100)
GLUCOSE SERPL-MCNC: 140 MG/DL (ref 65–100)
GLUCOSE SERPL-MCNC: 142 MG/DL (ref 65–100)
GLUCOSE SERPL-MCNC: 142 MG/DL (ref 65–100)
GLUCOSE SERPL-MCNC: 144 MG/DL (ref 65–100)
GLUCOSE SERPL-MCNC: 145 MG/DL (ref 65–100)
GLUCOSE SERPL-MCNC: 146 MG/DL (ref 65–100)
GLUCOSE SERPL-MCNC: 149 MG/DL (ref 65–100)
GLUCOSE SERPL-MCNC: 152 MG/DL (ref 65–100)
GLUCOSE SERPL-MCNC: 154 MG/DL (ref 65–100)
GLUCOSE SERPL-MCNC: 173 MG/DL (ref 65–100)
GLUCOSE SERPL-MCNC: 174 MG/DL (ref 65–100)
GLUCOSE SERPL-MCNC: 49 MG/DL (ref 65–100)
GLUCOSE SERPL-MCNC: 80 MG/DL (ref 65–100)
GLUCOSE SERPL-MCNC: 90 MG/DL (ref 65–100)
GLUCOSE SERPL-MCNC: 92 MG/DL (ref 65–100)
GLUCOSE SERPL-MCNC: 98 MG/DL (ref 65–100)
GLUCOSE SERPL-MCNC: 98 MG/DL (ref 65–100)
GLUCOSE SERPL-MCNC: 99 MG/DL (ref 65–100)
GLUCOSE SERPL-MCNC: 99 MG/DL (ref 65–100)
GLUCOSE SERPL-MCNC: 99 MG/DL (ref 74–99)
GLUCOSE UR STRIP.AUTO-MCNC: NEGATIVE MG/DL
GRAM STN SPEC: NORMAL
HAV AB SER QL IA: POSITIVE
HBA1C MFR BLD: 4.8 % (ref 4.2–5.6)
HBV CORE AB SERPL QL IA: NEGATIVE
HBV SURFACE AB SER QL IA: NEGATIVE
HBV SURFACE AB SERPL IA-ACNC: <3.1 MIU/ML
HBV SURFACE AG SER QL: <0.1 INDEX
HBV SURFACE AG SER QL: NEGATIVE
HCO3 BLD-SCNC: 19.3 MMOL/L (ref 22–26)
HCT VFR BLD AUTO: 19.7 % (ref 35–47)
HCT VFR BLD AUTO: 20.1 % (ref 35–47)
HCT VFR BLD AUTO: 20.2 % (ref 35–47)
HCT VFR BLD AUTO: 20.3 % (ref 35–47)
HCT VFR BLD AUTO: 21.2 % (ref 35–47)
HCT VFR BLD AUTO: 22.1 % (ref 35–47)
HCT VFR BLD AUTO: 22.5 % (ref 35–47)
HCT VFR BLD AUTO: 22.7 % (ref 35–47)
HCT VFR BLD AUTO: 22.8 % (ref 35–47)
HCT VFR BLD AUTO: 22.8 % (ref 35–47)
HCT VFR BLD AUTO: 22.9 % (ref 35–47)
HCT VFR BLD AUTO: 23 % (ref 35–47)
HCT VFR BLD AUTO: 23.6 % (ref 35–47)
HCT VFR BLD AUTO: 23.8 % (ref 35–47)
HCT VFR BLD AUTO: 23.8 % (ref 35–47)
HCT VFR BLD AUTO: 24 % (ref 35–47)
HCT VFR BLD AUTO: 24 % (ref 35–47)
HCT VFR BLD AUTO: 24.2 % (ref 35–47)
HCT VFR BLD AUTO: 24.4 % (ref 35–47)
HCT VFR BLD AUTO: 24.6 % (ref 35–47)
HCT VFR BLD AUTO: 24.8 % (ref 35–47)
HCT VFR BLD AUTO: 24.8 % (ref 35–47)
HCT VFR BLD AUTO: 25.2 % (ref 35–47)
HCT VFR BLD AUTO: 25.3 % (ref 35–47)
HCT VFR BLD AUTO: 25.4 % (ref 35–47)
HCT VFR BLD AUTO: 25.5 % (ref 35–47)
HCT VFR BLD AUTO: 25.6 % (ref 35–47)
HCT VFR BLD AUTO: 25.7 % (ref 35–47)
HCT VFR BLD AUTO: 25.8 % (ref 35–47)
HCT VFR BLD AUTO: 26.1 % (ref 35–47)
HCT VFR BLD AUTO: 26.2 % (ref 35–47)
HCT VFR BLD AUTO: 26.3 % (ref 35–47)
HCT VFR BLD AUTO: 26.8 % (ref 35–47)
HCT VFR BLD AUTO: 26.9 % (ref 35–47)
HCT VFR BLD AUTO: 27.4 % (ref 35–47)
HCT VFR BLD AUTO: 27.4 % (ref 35–47)
HCT VFR BLD AUTO: 27.6 % (ref 35–47)
HCT VFR BLD AUTO: 28 % (ref 35–47)
HCT VFR BLD AUTO: 28.4 % (ref 35–47)
HCT VFR BLD AUTO: 28.4 % (ref 35–47)
HCT VFR BLD AUTO: 29.1 % (ref 35–47)
HCT VFR BLD AUTO: 29.7 % (ref 35–47)
HCT VFR BLD AUTO: 31.3 % (ref 35–47)
HCT VFR BLD AUTO: 35.2 % (ref 35–45)
HCV AB S/CO SERPL IA: <0.1 S/CO RATIO (ref 0–0.9)
HCV AB SERPL QL IA: NORMAL
HDLC SERPL-MCNC: 40 MG/DL (ref 40–60)
HDLC SERPL: 3.1 {RATIO} (ref 0–5)
HEP BS AB COMMENT,HBSAC: ABNORMAL
HGB BLD-MCNC: 10.1 G/DL (ref 11.5–16)
HGB BLD-MCNC: 11.5 G/DL (ref 12–16)
HGB BLD-MCNC: 6.6 G/DL (ref 11.5–16)
HGB BLD-MCNC: 6.7 G/DL (ref 11.5–16)
HGB BLD-MCNC: 6.9 G/DL (ref 11.5–16)
HGB BLD-MCNC: 6.9 G/DL (ref 11.5–16)
HGB BLD-MCNC: 7.2 G/DL (ref 11.5–16)
HGB BLD-MCNC: 7.4 G/DL (ref 11.5–16)
HGB BLD-MCNC: 7.5 G/DL (ref 11.5–16)
HGB BLD-MCNC: 7.6 G/DL (ref 11.5–16)
HGB BLD-MCNC: 7.7 G/DL (ref 11.5–16)
HGB BLD-MCNC: 7.8 G/DL (ref 11.5–16)
HGB BLD-MCNC: 7.8 G/DL (ref 11.5–16)
HGB BLD-MCNC: 7.9 G/DL (ref 11.5–16)
HGB BLD-MCNC: 8 G/DL (ref 11.5–16)
HGB BLD-MCNC: 8.1 G/DL (ref 11.5–16)
HGB BLD-MCNC: 8.1 G/DL (ref 11.5–16)
HGB BLD-MCNC: 8.2 G/DL (ref 11.5–16)
HGB BLD-MCNC: 8.2 G/DL (ref 11.5–16)
HGB BLD-MCNC: 8.3 G/DL (ref 11.5–16)
HGB BLD-MCNC: 8.4 G/DL (ref 11.5–16)
HGB BLD-MCNC: 8.4 G/DL (ref 11.5–16)
HGB BLD-MCNC: 8.5 G/DL (ref 11.5–16)
HGB BLD-MCNC: 8.5 G/DL (ref 11.5–16)
HGB BLD-MCNC: 8.7 G/DL (ref 11.5–16)
HGB BLD-MCNC: 8.7 G/DL (ref 11.5–16)
HGB BLD-MCNC: 8.8 G/DL (ref 11.5–16)
HGB BLD-MCNC: 8.9 G/DL (ref 11.5–16)
HGB BLD-MCNC: 8.9 G/DL (ref 11.5–16)
HGB BLD-MCNC: 9 G/DL (ref 11.5–16)
HGB BLD-MCNC: 9.1 G/DL (ref 11.5–16)
HGB BLD-MCNC: 9.1 G/DL (ref 11.5–16)
HGB BLD-MCNC: 9.2 G/DL (ref 11.5–16)
HGB BLD-MCNC: 9.2 G/DL (ref 11.5–16)
HGB BLD-MCNC: 9.3 G/DL (ref 11.5–16)
HGB BLD-MCNC: 9.6 G/DL (ref 11.5–16)
HGB BLD-MCNC: 9.6 G/DL (ref 11.5–16)
HGB UR QL STRIP: ABNORMAL
HISTORY CHECKED?,CKHIST: NORMAL
HIV 1+2 AB+HIV1 P24 AG SERPL QL IA: NONREACTIVE
HIV12 RESULT COMMENT, HHIVC: NORMAL
HSV-2 IGG SUPPLEMENTAL TEST: POSITIVE
HSV1 IGG SER IA-ACNC: >62.2 INDEX (ref 0–0.9)
HSV2 IGG SER IA-ACNC: 3.7 INDEX (ref 0–0.9)
IMM GRANULOCYTES # BLD AUTO: 0 K/UL
IMM GRANULOCYTES # BLD AUTO: 0 K/UL
IMM GRANULOCYTES # BLD AUTO: 0 K/UL (ref 0–0.04)
IMM GRANULOCYTES # BLD AUTO: 0.1 K/UL (ref 0–0.04)
IMM GRANULOCYTES NFR BLD AUTO: 0 %
IMM GRANULOCYTES NFR BLD AUTO: 0 %
IMM GRANULOCYTES NFR BLD AUTO: 0 % (ref 0–0.5)
IMM GRANULOCYTES NFR BLD AUTO: 1 % (ref 0–0.5)
INR PPP: 1.5 (ref 0.8–1.2)
INR PPP: 1.6 (ref 0.9–1.1)
INR PPP: 1.8 (ref 0.9–1.1)
INR PPP: 1.9 (ref 0.9–1.1)
INR PPP: 2.3 (ref 0.9–1.1)
INTERPRETATION, 169995: ABNORMAL
IRON SATN MFR SERPL: 100 % (ref 20–50)
IRON SATN MFR SERPL: 27 % (ref 20–50)
IRON SATN MFR SERPL: 74 % (ref 20–50)
IRON SERPL-MCNC: 140 UG/DL (ref 35–150)
IRON SERPL-MCNC: 185 UG/DL (ref 50–175)
IRON SERPL-MCNC: 30 UG/DL (ref 35–150)
KETONES UR QL STRIP.AUTO: NEGATIVE MG/DL
LACTATE SERPL-SCNC: 3.3 MMOL/L (ref 0.4–2)
LDH FLD L TO P-CCNC: 63 U/L
LDLC SERPL CALC-MCNC: 67.2 MG/DL (ref 0–100)
LEUKOCYTE ESTERASE UR QL STRIP.AUTO: ABNORMAL
LIPASE SERPL-CCNC: 423 U/L (ref 73–393)
LIPID PROFILE,FLP: NORMAL
LYMPHOCYTES # BLD: 0.4 K/UL (ref 0.8–3.5)
LYMPHOCYTES # BLD: 0.6 K/UL (ref 0.8–3.5)
LYMPHOCYTES # BLD: 0.7 K/UL (ref 0.8–3.5)
LYMPHOCYTES # BLD: 0.8 K/UL (ref 0.8–3.5)
LYMPHOCYTES # BLD: 0.9 K/UL (ref 0.8–3.5)
LYMPHOCYTES # BLD: 1.4 K/UL (ref 0.8–3.5)
LYMPHOCYTES # BLD: 1.5 K/UL (ref 0.8–3.5)
LYMPHOCYTES # BLD: 1.8 K/UL (ref 0.9–3.6)
LYMPHOCYTES NFR BLD: 10 % (ref 12–49)
LYMPHOCYTES NFR BLD: 10 % (ref 12–49)
LYMPHOCYTES NFR BLD: 11 % (ref 12–49)
LYMPHOCYTES NFR BLD: 11 % (ref 12–49)
LYMPHOCYTES NFR BLD: 13 % (ref 12–49)
LYMPHOCYTES NFR BLD: 17 % (ref 12–49)
LYMPHOCYTES NFR BLD: 22 % (ref 21–52)
LYMPHOCYTES NFR BLD: 4 % (ref 12–49)
LYMPHOCYTES NFR BLD: 6 % (ref 12–49)
LYMPHOCYTES NFR BLD: 8 % (ref 12–49)
LYMPHOCYTES NFR BLD: 9 % (ref 12–49)
LYMPHOCYTES NFR FLD: 23 %
LYMPHOCYTES NFR FLD: 45 %
M TB IFN-G BLD-IMP: NEGATIVE
MAGNESIUM SERPL-MCNC: 1.3 MG/DL (ref 1.6–2.6)
MAGNESIUM SERPL-MCNC: 2.4 MG/DL (ref 1.6–2.4)
MAGNESIUM SERPL-MCNC: 2.6 MG/DL (ref 1.6–2.4)
MAGNESIUM SERPL-MCNC: 2.8 MG/DL (ref 1.6–2.4)
MAGNESIUM SERPL-MCNC: 2.8 MG/DL (ref 1.6–2.4)
MCH RBC QN AUTO: 28 PG (ref 24–34)
MCH RBC QN AUTO: 30.3 PG (ref 26–34)
MCH RBC QN AUTO: 30.4 PG (ref 26–34)
MCH RBC QN AUTO: 30.5 PG (ref 26–34)
MCH RBC QN AUTO: 30.6 PG (ref 26–34)
MCH RBC QN AUTO: 30.7 PG (ref 26–34)
MCH RBC QN AUTO: 30.8 PG (ref 26–34)
MCH RBC QN AUTO: 30.9 PG (ref 26–34)
MCH RBC QN AUTO: 31 PG (ref 26–34)
MCH RBC QN AUTO: 31.1 PG (ref 26–34)
MCH RBC QN AUTO: 31.2 PG (ref 26–34)
MCH RBC QN AUTO: 31.3 PG (ref 26–34)
MCH RBC QN AUTO: 31.4 PG (ref 26–34)
MCH RBC QN AUTO: 31.5 PG (ref 26–34)
MCH RBC QN AUTO: 31.6 PG (ref 26–34)
MCH RBC QN AUTO: 31.7 PG (ref 26–34)
MCH RBC QN AUTO: 31.8 PG (ref 26–34)
MCH RBC QN AUTO: 31.8 PG (ref 26–34)
MCH RBC QN AUTO: 31.9 PG (ref 26–34)
MCH RBC QN AUTO: 32.3 PG (ref 26–34)
MCHC RBC AUTO-ENTMCNC: 31.3 G/DL (ref 30–36.5)
MCHC RBC AUTO-ENTMCNC: 31.6 G/DL (ref 30–36.5)
MCHC RBC AUTO-ENTMCNC: 32.3 G/DL (ref 30–36.5)
MCHC RBC AUTO-ENTMCNC: 32.7 G/DL (ref 30–36.5)
MCHC RBC AUTO-ENTMCNC: 32.7 G/DL (ref 31–37)
MCHC RBC AUTO-ENTMCNC: 32.8 G/DL (ref 30–36.5)
MCHC RBC AUTO-ENTMCNC: 32.8 G/DL (ref 30–36.5)
MCHC RBC AUTO-ENTMCNC: 32.9 G/DL (ref 30–36.5)
MCHC RBC AUTO-ENTMCNC: 33 G/DL (ref 30–36.5)
MCHC RBC AUTO-ENTMCNC: 33.1 G/DL (ref 30–36.5)
MCHC RBC AUTO-ENTMCNC: 33.2 G/DL (ref 30–36.5)
MCHC RBC AUTO-ENTMCNC: 33.3 G/DL (ref 30–36.5)
MCHC RBC AUTO-ENTMCNC: 33.5 G/DL (ref 30–36.5)
MCHC RBC AUTO-ENTMCNC: 33.6 G/DL (ref 30–36.5)
MCHC RBC AUTO-ENTMCNC: 33.6 G/DL (ref 30–36.5)
MCHC RBC AUTO-ENTMCNC: 33.7 G/DL (ref 30–36.5)
MCHC RBC AUTO-ENTMCNC: 33.9 G/DL (ref 30–36.5)
MCHC RBC AUTO-ENTMCNC: 33.9 G/DL (ref 30–36.5)
MCHC RBC AUTO-ENTMCNC: 34 G/DL (ref 30–36.5)
MCHC RBC AUTO-ENTMCNC: 34 G/DL (ref 30–36.5)
MCHC RBC AUTO-ENTMCNC: 34.2 G/DL (ref 30–36.5)
MCHC RBC AUTO-ENTMCNC: 34.3 G/DL (ref 30–36.5)
MCHC RBC AUTO-ENTMCNC: 34.3 G/DL (ref 30–36.5)
MCHC RBC AUTO-ENTMCNC: 34.4 G/DL (ref 30–36.5)
MCHC RBC AUTO-ENTMCNC: 34.5 G/DL (ref 30–36.5)
MCHC RBC AUTO-ENTMCNC: 34.5 G/DL (ref 30–36.5)
MCV RBC AUTO: 85.9 FL (ref 78–100)
MCV RBC AUTO: 89.8 FL (ref 80–99)
MCV RBC AUTO: 90.6 FL (ref 80–99)
MCV RBC AUTO: 90.7 FL (ref 80–99)
MCV RBC AUTO: 90.8 FL (ref 80–99)
MCV RBC AUTO: 91.4 FL (ref 80–99)
MCV RBC AUTO: 91.8 FL (ref 80–99)
MCV RBC AUTO: 91.9 FL (ref 80–99)
MCV RBC AUTO: 92 FL (ref 80–99)
MCV RBC AUTO: 92.1 FL (ref 80–99)
MCV RBC AUTO: 92.3 FL (ref 80–99)
MCV RBC AUTO: 92.4 FL (ref 80–99)
MCV RBC AUTO: 92.6 FL (ref 80–99)
MCV RBC AUTO: 92.7 FL (ref 80–99)
MCV RBC AUTO: 93 FL (ref 80–99)
MCV RBC AUTO: 93 FL (ref 80–99)
MCV RBC AUTO: 93.4 FL (ref 80–99)
MCV RBC AUTO: 93.4 FL (ref 80–99)
MCV RBC AUTO: 93.7 FL (ref 80–99)
MCV RBC AUTO: 93.8 FL (ref 80–99)
MCV RBC AUTO: 93.8 FL (ref 80–99)
MCV RBC AUTO: 94.1 FL (ref 80–99)
MCV RBC AUTO: 94.2 FL (ref 80–99)
MCV RBC AUTO: 94.3 FL (ref 80–99)
MCV RBC AUTO: 94.3 FL (ref 80–99)
MCV RBC AUTO: 94.4 FL (ref 80–99)
MCV RBC AUTO: 94.7 FL (ref 80–99)
MCV RBC AUTO: 94.8 FL (ref 80–99)
MCV RBC AUTO: 94.9 FL (ref 80–99)
MCV RBC AUTO: 95 FL (ref 80–99)
MCV RBC AUTO: 95.1 FL (ref 80–99)
MCV RBC AUTO: 95.1 FL (ref 80–99)
MCV RBC AUTO: 95.2 FL (ref 80–99)
MCV RBC AUTO: 95.3 FL (ref 80–99)
MCV RBC AUTO: 95.4 FL (ref 80–99)
MCV RBC AUTO: 95.8 FL (ref 80–99)
MCV RBC AUTO: 97.3 FL (ref 80–99)
MCV RBC AUTO: 98.1 FL (ref 80–99)
MESOTHL CELL NFR FLD: 1 %
MITOCHONDRIA M2 IGG SER-ACNC: 28.8 UNITS (ref 0–20)
MONOCYTES # BLD: 0.2 K/UL (ref 0–1)
MONOCYTES # BLD: 0.4 K/UL (ref 0–1)
MONOCYTES # BLD: 0.5 K/UL (ref 0–1)
MONOCYTES # BLD: 0.6 K/UL (ref 0.05–1.2)
MONOCYTES # BLD: 0.6 K/UL (ref 0–1)
MONOCYTES # BLD: 0.7 K/UL (ref 0–1)
MONOCYTES # BLD: 0.8 K/UL (ref 0–1)
MONOCYTES # BLD: 0.9 K/UL (ref 0–1)
MONOCYTES # BLD: 1.2 K/UL (ref 0–1)
MONOCYTES # BLD: 1.2 K/UL (ref 0–1)
MONOCYTES # BLD: 1.3 K/UL (ref 0–1)
MONOCYTES NFR BLD: 10 % (ref 5–13)
MONOCYTES NFR BLD: 10 % (ref 5–13)
MONOCYTES NFR BLD: 11 % (ref 5–13)
MONOCYTES NFR BLD: 12 % (ref 5–13)
MONOCYTES NFR BLD: 15 % (ref 5–13)
MONOCYTES NFR BLD: 2 % (ref 5–13)
MONOCYTES NFR BLD: 4 % (ref 5–13)
MONOCYTES NFR BLD: 5 % (ref 5–13)
MONOCYTES NFR BLD: 5 % (ref 5–13)
MONOCYTES NFR BLD: 7 % (ref 3–10)
MONOCYTES NFR BLD: 7 % (ref 5–13)
MONOCYTES NFR BLD: 8 % (ref 5–13)
MONOCYTES NFR BLD: 9 % (ref 5–13)
MONOS+MACROS NFR FLD: 45 %
MONOS+MACROS NFR FLD: 66 %
NEUTROPHILS NFR FLD: 10 %
NEUTROPHILS NFR FLD: 10 %
NEUTS SEG # BLD: 10.9 K/UL (ref 1.8–8)
NEUTS SEG # BLD: 4.1 K/UL (ref 1.8–8)
NEUTS SEG # BLD: 4.1 K/UL (ref 1.8–8)
NEUTS SEG # BLD: 4.2 K/UL (ref 1.8–8)
NEUTS SEG # BLD: 5.3 K/UL (ref 1.8–8)
NEUTS SEG # BLD: 5.4 K/UL (ref 1.8–8)
NEUTS SEG # BLD: 5.6 K/UL (ref 1.8–8)
NEUTS SEG # BLD: 5.7 K/UL (ref 1.8–8)
NEUTS SEG # BLD: 5.9 K/UL (ref 1.8–8)
NEUTS SEG # BLD: 6 K/UL (ref 1.8–8)
NEUTS SEG # BLD: 6.1 K/UL (ref 1.8–8)
NEUTS SEG # BLD: 6.2 K/UL (ref 1.8–8)
NEUTS SEG # BLD: 6.5 K/UL (ref 1.8–8)
NEUTS SEG # BLD: 7 K/UL (ref 1.8–8)
NEUTS SEG # BLD: 7.4 K/UL (ref 1.8–8)
NEUTS SEG # BLD: 7.7 K/UL (ref 1.8–8)
NEUTS SEG # BLD: 7.9 K/UL (ref 1.8–8)
NEUTS SEG # BLD: 8.3 K/UL (ref 1.8–8)
NEUTS SEG # BLD: 8.4 K/UL (ref 1.8–8)
NEUTS SEG # BLD: 8.9 K/UL (ref 1.8–8)
NEUTS SEG # BLD: 8.9 K/UL (ref 1.8–8)
NEUTS SEG # BLD: 9 K/UL (ref 1.8–8)
NEUTS SEG # BLD: 9.2 K/UL (ref 1.8–8)
NEUTS SEG # BLD: 9.7 K/UL (ref 1.8–8)
NEUTS SEG NFR BLD: 67 % (ref 40–73)
NEUTS SEG NFR BLD: 68 % (ref 32–75)
NEUTS SEG NFR BLD: 74 % (ref 32–75)
NEUTS SEG NFR BLD: 75 % (ref 32–75)
NEUTS SEG NFR BLD: 76 % (ref 32–75)
NEUTS SEG NFR BLD: 77 % (ref 32–75)
NEUTS SEG NFR BLD: 77 % (ref 32–75)
NEUTS SEG NFR BLD: 78 % (ref 32–75)
NEUTS SEG NFR BLD: 79 % (ref 32–75)
NEUTS SEG NFR BLD: 80 % (ref 32–75)
NEUTS SEG NFR BLD: 81 % (ref 32–75)
NEUTS SEG NFR BLD: 82 % (ref 32–75)
NEUTS SEG NFR BLD: 83 % (ref 32–75)
NEUTS SEG NFR BLD: 84 % (ref 32–75)
NEUTS SEG NFR BLD: 86 % (ref 32–75)
NEUTS SEG NFR BLD: 88 % (ref 32–75)
NITRITE UR QL STRIP.AUTO: NEGATIVE
NRBC # BLD: 0 K/UL (ref 0–0.01)
NRBC # BLD: 0.02 K/UL (ref 0–0.01)
NRBC # BLD: 0.03 K/UL (ref 0–0.01)
NRBC BLD-RTO: 0 PER 100 WBC
NRBC BLD-RTO: 0.2 PER 100 WBC
NRBC BLD-RTO: 0.2 PER 100 WBC
NUC CELL # FLD: 33 /CU MM
NUC CELL # FLD: 57 /CU MM
O2/TOTAL GAS SETTING VFR VENT: 30 %
OPIATES SERPL QL SCN: NEGATIVE NG/ML
OXYCODONE, 790407: NEGATIVE NG/ML
PATH REV BLD -IMP: ABNORMAL
PAW @ MEAN EXP FLOW ON VENT: 7.9 CMH2O
PCO2 BLD: 37.1 MMHG (ref 35–45)
PCP SERPL QL SCN: NEGATIVE NG/ML
PEEP RESPIRATORY: 5 CMH2O
PH BLD: 7.33 [PH] (ref 7.35–7.45)
PH UR STRIP: 5.5 [PH] (ref 5–8)
PHOSPHATE SERPL-MCNC: 10.7 MG/DL (ref 2.6–4.7)
PHOSPHATE SERPL-MCNC: 2.8 MG/DL (ref 2.5–4.9)
PHOSPHATE SERPL-MCNC: 2.9 MG/DL (ref 2.6–4.7)
PHOSPHATE SERPL-MCNC: 3.7 MG/DL (ref 2.6–4.7)
PHOSPHATE SERPL-MCNC: 3.9 MG/DL (ref 2.6–4.7)
PHOSPHATE SERPL-MCNC: 4 MG/DL (ref 2.6–4.7)
PHOSPHATE SERPL-MCNC: 4.4 MG/DL (ref 2.6–4.7)
PHOSPHATE SERPL-MCNC: 5 MG/DL (ref 2.6–4.7)
PHOSPHATE SERPL-MCNC: 5.4 MG/DL (ref 2.6–4.7)
PHOSPHATE SERPL-MCNC: 5.4 MG/DL (ref 2.6–4.7)
PHOSPHATE SERPL-MCNC: 5.5 MG/DL (ref 2.6–4.7)
PHOSPHATE SERPL-MCNC: 5.7 MG/DL (ref 2.6–4.7)
PHOSPHATE SERPL-MCNC: 5.8 MG/DL (ref 2.6–4.7)
PHOSPHATE SERPL-MCNC: 5.9 MG/DL (ref 2.6–4.7)
PHOSPHATE SERPL-MCNC: 5.9 MG/DL (ref 2.6–4.7)
PHOSPHATE SERPL-MCNC: 6.2 MG/DL (ref 2.6–4.7)
PHOSPHATE SERPL-MCNC: 6.3 MG/DL (ref 2.6–4.7)
PHOSPHATE SERPL-MCNC: 6.4 MG/DL (ref 2.6–4.7)
PHOSPHATE SERPL-MCNC: 6.5 MG/DL (ref 2.6–4.7)
PHOSPHATE SERPL-MCNC: 6.6 MG/DL (ref 2.6–4.7)
PHOSPHATE SERPL-MCNC: 6.9 MG/DL (ref 2.6–4.7)
PHOSPHATE SERPL-MCNC: 7 MG/DL (ref 2.6–4.7)
PHOSPHATE SERPL-MCNC: 7 MG/DL (ref 2.6–4.7)
PHOSPHATE SERPL-MCNC: 7.1 MG/DL (ref 2.6–4.7)
PHOSPHATE SERPL-MCNC: 7.1 MG/DL (ref 2.6–4.7)
PHOSPHATE SERPL-MCNC: 7.8 MG/DL (ref 2.6–4.7)
PHOSPHATE SERPL-MCNC: 7.8 MG/DL (ref 2.6–4.7)
PHOSPHATE SERPL-MCNC: 9.2 MG/DL (ref 2.6–4.7)
PLATELET # BLD AUTO: 121 K/UL (ref 135–420)
PLATELET # BLD AUTO: 26 K/UL (ref 150–400)
PLATELET # BLD AUTO: 27 K/UL (ref 150–400)
PLATELET # BLD AUTO: 32 K/UL (ref 150–400)
PLATELET # BLD AUTO: 32 K/UL (ref 150–400)
PLATELET # BLD AUTO: 34 K/UL (ref 150–400)
PLATELET # BLD AUTO: 35 K/UL (ref 150–400)
PLATELET # BLD AUTO: 35 K/UL (ref 150–400)
PLATELET # BLD AUTO: 36 K/UL (ref 150–400)
PLATELET # BLD AUTO: 36 K/UL (ref 150–400)
PLATELET # BLD AUTO: 37 K/UL (ref 150–400)
PLATELET # BLD AUTO: 37 K/UL (ref 150–400)
PLATELET # BLD AUTO: 38 K/UL (ref 150–400)
PLATELET # BLD AUTO: 38 K/UL (ref 150–400)
PLATELET # BLD AUTO: 39 K/UL (ref 150–400)
PLATELET # BLD AUTO: 40 K/UL (ref 150–400)
PLATELET # BLD AUTO: 41 K/UL (ref 150–400)
PLATELET # BLD AUTO: 42 K/UL (ref 150–400)
PLATELET # BLD AUTO: 45 K/UL (ref 150–400)
PLATELET # BLD AUTO: 46 K/UL (ref 150–400)
PLATELET # BLD AUTO: 46 K/UL (ref 150–400)
PLATELET # BLD AUTO: 47 K/UL (ref 150–400)
PLATELET # BLD AUTO: 48 K/UL (ref 150–400)
PLATELET # BLD AUTO: 50 K/UL (ref 150–400)
PLATELET # BLD AUTO: 53 K/UL (ref 150–400)
PLATELET # BLD AUTO: 56 K/UL (ref 150–400)
PLATELET # BLD AUTO: 59 K/UL (ref 150–400)
PLATELET # BLD AUTO: 59 K/UL (ref 150–400)
PLATELET # BLD AUTO: 84 K/UL (ref 150–400)
PMV BLD AUTO: 10 FL (ref 8.9–12.9)
PMV BLD AUTO: 10.2 FL (ref 8.9–12.9)
PMV BLD AUTO: 10.3 FL (ref 8.9–12.9)
PMV BLD AUTO: 10.4 FL (ref 8.9–12.9)
PMV BLD AUTO: 10.5 FL (ref 8.9–12.9)
PMV BLD AUTO: 10.6 FL (ref 8.9–12.9)
PMV BLD AUTO: 10.7 FL (ref 8.9–12.9)
PMV BLD AUTO: 10.8 FL (ref 8.9–12.9)
PMV BLD AUTO: 10.8 FL (ref 9.2–11.8)
PMV BLD AUTO: 10.9 FL (ref 8.9–12.9)
PMV BLD AUTO: 11 FL (ref 8.9–12.9)
PMV BLD AUTO: 11 FL (ref 8.9–12.9)
PMV BLD AUTO: 11.4 FL (ref 8.9–12.9)
PMV BLD AUTO: 11.4 FL (ref 8.9–12.9)
PMV BLD AUTO: 11.5 FL (ref 8.9–12.9)
PMV BLD AUTO: 12.1 FL (ref 8.9–12.9)
PMV BLD AUTO: 12.1 FL (ref 8.9–12.9)
PMV BLD AUTO: 12.6 FL (ref 8.9–12.9)
PMV BLD AUTO: 9 FL (ref 8.9–12.9)
PMV BLD AUTO: 9.2 FL (ref 8.9–12.9)
PMV BLD AUTO: 9.7 FL (ref 8.9–12.9)
PMV BLD AUTO: 9.9 FL (ref 8.9–12.9)
PMV BLD AUTO: ABNORMAL FL (ref 8.9–12.9)
PO2 BLD: 133 MMHG (ref 80–100)
POTASSIUM SERPL-SCNC: 2.9 MMOL/L (ref 3.5–5.1)
POTASSIUM SERPL-SCNC: 3 MMOL/L (ref 3.5–5.1)
POTASSIUM SERPL-SCNC: 3.1 MMOL/L (ref 3.5–5.1)
POTASSIUM SERPL-SCNC: 3.2 MMOL/L (ref 3.5–5.1)
POTASSIUM SERPL-SCNC: 3.3 MMOL/L (ref 3.5–5.1)
POTASSIUM SERPL-SCNC: 3.4 MMOL/L (ref 3.5–5.1)
POTASSIUM SERPL-SCNC: 3.5 MMOL/L (ref 3.5–5.1)
POTASSIUM SERPL-SCNC: 3.6 MMOL/L (ref 3.5–5.1)
POTASSIUM SERPL-SCNC: 3.7 MMOL/L (ref 3.5–5.1)
POTASSIUM SERPL-SCNC: 3.8 MMOL/L (ref 3.5–5.1)
POTASSIUM SERPL-SCNC: 3.9 MMOL/L (ref 3.5–5.1)
POTASSIUM SERPL-SCNC: 4 MMOL/L (ref 3.5–5.1)
POTASSIUM SERPL-SCNC: 4 MMOL/L (ref 3.5–5.5)
POTASSIUM SERPL-SCNC: 4.1 MMOL/L (ref 3.5–5.1)
POTASSIUM SERPL-SCNC: 4.2 MMOL/L (ref 3.5–5.1)
POTASSIUM SERPL-SCNC: 4.3 MMOL/L (ref 3.5–5.1)
POTASSIUM SERPL-SCNC: 4.4 MMOL/L (ref 3.5–5.1)
POTASSIUM SERPL-SCNC: 4.7 MMOL/L (ref 3.5–5.1)
PROT FLD-MCNC: 1.5 G/DL
PROT SERPL-MCNC: 4.6 G/DL (ref 6.4–8.2)
PROT SERPL-MCNC: 4.8 G/DL (ref 6.4–8.2)
PROT SERPL-MCNC: 5.2 G/DL (ref 6.4–8.2)
PROT SERPL-MCNC: 5.2 G/DL (ref 6.4–8.2)
PROT SERPL-MCNC: 5.3 G/DL (ref 6.4–8.2)
PROT SERPL-MCNC: 5.4 G/DL (ref 6.4–8.2)
PROT SERPL-MCNC: 6.5 G/DL (ref 6.4–8.2)
PROT UR STRIP-MCNC: 100 MG/DL
PROT UR-MCNC: 105 MG/DL (ref 0–11.9)
PROT UR-MCNC: 89 MG/DL (ref 0–11.9)
PROT/CREAT UR-RTO: 0.9
PROT/CREAT UR-RTO: 1
PROTHROMBIN TIME: 16.7 SEC (ref 9–11.1)
PROTHROMBIN TIME: 17.1 SEC (ref 11.5–15.2)
PROTHROMBIN TIME: 18.4 SEC (ref 9–11.1)
PROTHROMBIN TIME: 18.6 SEC (ref 9–11.1)
PROTHROMBIN TIME: 18.8 SEC (ref 9–11.1)
PROTHROMBIN TIME: 19.1 SEC (ref 9–11.1)
PROTHROMBIN TIME: 23.2 SEC (ref 9–11.1)
QUANTIFERON CRITERIA, QFI1T: NORMAL
QUANTIFERON INCUBATION, QF1T: NORMAL
QUANTIFERON MITOGEN VALUE: 3.28 IU/ML
QUANTIFERON NIL VALUE: 0 IU/ML
QUANTIFERON TB1 AG: 0 IU/ML
QUANTIFERON TB2 AG: 0 IU/ML
RBC # BLD AUTO: 2.07 M/UL (ref 3.8–5.2)
RBC # BLD AUTO: 2.12 M/UL (ref 3.8–5.2)
RBC # BLD AUTO: 2.21 M/UL (ref 3.8–5.2)
RBC # BLD AUTO: 2.29 M/UL (ref 3.8–5.2)
RBC # BLD AUTO: 2.41 M/UL (ref 3.8–5.2)
RBC # BLD AUTO: 2.42 M/UL (ref 3.8–5.2)
RBC # BLD AUTO: 2.42 M/UL (ref 3.8–5.2)
RBC # BLD AUTO: 2.44 M/UL (ref 3.8–5.2)
RBC # BLD AUTO: 2.44 M/UL (ref 3.8–5.2)
RBC # BLD AUTO: 2.45 M/UL (ref 3.8–5.2)
RBC # BLD AUTO: 2.5 M/UL (ref 3.8–5.2)
RBC # BLD AUTO: 2.5 M/UL (ref 3.8–5.2)
RBC # BLD AUTO: 2.51 M/UL (ref 3.8–5.2)
RBC # BLD AUTO: 2.54 M/UL (ref 3.8–5.2)
RBC # BLD AUTO: 2.56 M/UL (ref 3.8–5.2)
RBC # BLD AUTO: 2.57 M/UL (ref 3.8–5.2)
RBC # BLD AUTO: 2.58 M/UL (ref 3.8–5.2)
RBC # BLD AUTO: 2.63 M/UL (ref 3.8–5.2)
RBC # BLD AUTO: 2.66 M/UL (ref 3.8–5.2)
RBC # BLD AUTO: 2.67 M/UL (ref 3.8–5.2)
RBC # BLD AUTO: 2.68 M/UL (ref 3.8–5.2)
RBC # BLD AUTO: 2.7 M/UL (ref 3.8–5.2)
RBC # BLD AUTO: 2.72 M/UL (ref 3.8–5.2)
RBC # BLD AUTO: 2.72 M/UL (ref 3.8–5.2)
RBC # BLD AUTO: 2.73 M/UL (ref 3.8–5.2)
RBC # BLD AUTO: 2.75 M/UL (ref 3.8–5.2)
RBC # BLD AUTO: 2.76 M/UL (ref 3.8–5.2)
RBC # BLD AUTO: 2.78 M/UL (ref 3.8–5.2)
RBC # BLD AUTO: 2.78 M/UL (ref 3.8–5.2)
RBC # BLD AUTO: 2.82 M/UL (ref 3.8–5.2)
RBC # BLD AUTO: 2.83 M/UL (ref 3.8–5.2)
RBC # BLD AUTO: 2.84 M/UL (ref 3.8–5.2)
RBC # BLD AUTO: 2.88 M/UL (ref 3.8–5.2)
RBC # BLD AUTO: 2.88 M/UL (ref 3.8–5.2)
RBC # BLD AUTO: 2.92 M/UL (ref 3.8–5.2)
RBC # BLD AUTO: 3.02 M/UL (ref 3.8–5.2)
RBC # BLD AUTO: 3.03 M/UL (ref 3.8–5.2)
RBC # BLD AUTO: 3.03 M/UL (ref 3.8–5.2)
RBC # BLD AUTO: 3.15 M/UL (ref 3.8–5.2)
RBC # BLD AUTO: 3.17 M/UL (ref 3.8–5.2)
RBC # BLD AUTO: 3.3 M/UL (ref 3.8–5.2)
RBC # BLD AUTO: 4.1 M/UL (ref 4.2–5.3)
RBC # FLD: 31 /CU MM
RBC # FLD: >100 /CU MM
RBC #/AREA URNS HPF: ABNORMAL /HPF (ref 0–5)
RBC MORPH BLD: ABNORMAL
SAMPLES BEING HELD,HOLD: NORMAL
SAO2 % BLD: 98.8 % (ref 92–97)
SARS-COV-2, COV2: DETECTED
SARS-COV-2, NAA: NOT DETECTED
SERVICE CMNT-IMP: ABNORMAL
SERVICE CMNT-IMP: NORMAL
SODIUM SERPL-SCNC: 120 MMOL/L (ref 136–145)
SODIUM SERPL-SCNC: 121 MMOL/L (ref 136–145)
SODIUM SERPL-SCNC: 122 MMOL/L (ref 136–145)
SODIUM SERPL-SCNC: 122 MMOL/L (ref 136–145)
SODIUM SERPL-SCNC: 123 MMOL/L (ref 136–145)
SODIUM SERPL-SCNC: 125 MMOL/L (ref 136–145)
SODIUM SERPL-SCNC: 125 MMOL/L (ref 136–145)
SODIUM SERPL-SCNC: 126 MMOL/L (ref 136–145)
SODIUM SERPL-SCNC: 126 MMOL/L (ref 136–145)
SODIUM SERPL-SCNC: 127 MMOL/L (ref 136–145)
SODIUM SERPL-SCNC: 128 MMOL/L (ref 136–145)
SODIUM SERPL-SCNC: 129 MMOL/L (ref 136–145)
SODIUM SERPL-SCNC: 130 MMOL/L (ref 136–145)
SODIUM SERPL-SCNC: 131 MMOL/L (ref 136–145)
SODIUM SERPL-SCNC: 132 MMOL/L (ref 136–145)
SODIUM SERPL-SCNC: 133 MMOL/L (ref 136–145)
SODIUM SERPL-SCNC: 134 MMOL/L (ref 136–145)
SODIUM SERPL-SCNC: 135 MMOL/L (ref 136–145)
SODIUM SERPL-SCNC: 135 MMOL/L (ref 136–145)
SODIUM SERPL-SCNC: 137 MMOL/L (ref 136–145)
SODIUM SERPL-SCNC: 138 MMOL/L (ref 136–145)
SODIUM SERPL-SCNC: 142 MMOL/L (ref 136–145)
SODIUM SERPL-SCNC: 142 MMOL/L (ref 136–145)
SODIUM UR-SCNC: 6 MMOL/L
SODIUM UR-SCNC: 9 MMOL/L
SOURCE, COVRS: ABNORMAL
SP GR UR REFRACTOMETRY: 1.01 (ref 1–1.03)
SPECIMEN EXP DATE BLD: NORMAL
SPECIMEN SOURCE FLD: ABNORMAL
SPECIMEN SOURCE FLD: ABNORMAL
SPECIMEN SOURCE FLD: NORMAL
SPECIMEN TYPE: ABNORMAL
STATUS OF UNIT,%ST: NORMAL
T PALLIDUM AB SER QL IA: NONREACTIVE
T3FREE SERPL-MCNC: 2.4 PG/ML (ref 2.18–3.98)
T4 FREE SERPL-MCNC: 1.2 NG/DL (ref 0.7–1.5)
TIBC SERPL-MCNC: 110 UG/DL (ref 250–450)
TIBC SERPL-MCNC: 140 UG/DL (ref 250–450)
TIBC SERPL-MCNC: 249 UG/DL (ref 250–450)
TOTAL CELLS COUNTED SPEC: 50
TRIGL SERPL-MCNC: 74 MG/DL (ref ?–150)
TSH SERPL DL<=0.05 MIU/L-ACNC: 1.58 UIU/ML (ref 0.36–3.74)
TSH SERPL DL<=0.05 MIU/L-ACNC: 8.46 UIU/ML (ref 0.36–3.74)
UA: UC IF INDICATED,UAUC: ABNORMAL
UNIT DIVISION, %UDIV: 0
UROBILINOGEN UR QL STRIP.AUTO: 0.2 EU/DL (ref 0.2–1)
VENTILATION MODE VENT: ABNORMAL
VLDLC SERPL CALC-MCNC: 14.8 MG/DL
VT SETTING VENT: 350 ML
VZV IGG SER IA-ACNC: 938 INDEX
WBC # BLD AUTO: 10.1 K/UL (ref 3.6–11)
WBC # BLD AUTO: 10.1 K/UL (ref 3.6–11)
WBC # BLD AUTO: 10.2 K/UL (ref 3.6–11)
WBC # BLD AUTO: 10.3 K/UL (ref 3.6–11)
WBC # BLD AUTO: 10.3 K/UL (ref 3.6–11)
WBC # BLD AUTO: 10.4 K/UL (ref 3.6–11)
WBC # BLD AUTO: 10.6 K/UL (ref 3.6–11)
WBC # BLD AUTO: 10.8 K/UL (ref 3.6–11)
WBC # BLD AUTO: 11 K/UL (ref 3.6–11)
WBC # BLD AUTO: 11 K/UL (ref 3.6–11)
WBC # BLD AUTO: 11.2 K/UL (ref 3.6–11)
WBC # BLD AUTO: 11.3 K/UL (ref 3.6–11)
WBC # BLD AUTO: 11.3 K/UL (ref 3.6–11)
WBC # BLD AUTO: 11.4 K/UL (ref 3.6–11)
WBC # BLD AUTO: 11.5 K/UL (ref 3.6–11)
WBC # BLD AUTO: 11.6 K/UL (ref 3.6–11)
WBC # BLD AUTO: 11.7 K/UL (ref 3.6–11)
WBC # BLD AUTO: 13.7 K/UL (ref 3.6–11)
WBC # BLD AUTO: 14.3 K/UL (ref 3.6–11)
WBC # BLD AUTO: 14.6 K/UL (ref 3.6–11)
WBC # BLD AUTO: 5.4 K/UL (ref 3.6–11)
WBC # BLD AUTO: 5.4 K/UL (ref 3.6–11)
WBC # BLD AUTO: 5.5 K/UL (ref 3.6–11)
WBC # BLD AUTO: 6.9 K/UL (ref 3.6–11)
WBC # BLD AUTO: 6.9 K/UL (ref 3.6–11)
WBC # BLD AUTO: 7.2 K/UL (ref 3.6–11)
WBC # BLD AUTO: 7.3 K/UL (ref 3.6–11)
WBC # BLD AUTO: 7.5 K/UL (ref 3.6–11)
WBC # BLD AUTO: 8 K/UL (ref 3.6–11)
WBC # BLD AUTO: 8.1 K/UL (ref 3.6–11)
WBC # BLD AUTO: 8.1 K/UL (ref 4.6–13.2)
WBC # BLD AUTO: 8.2 K/UL (ref 3.6–11)
WBC # BLD AUTO: 8.2 K/UL (ref 3.6–11)
WBC # BLD AUTO: 8.3 K/UL (ref 3.6–11)
WBC # BLD AUTO: 8.4 K/UL (ref 3.6–11)
WBC # BLD AUTO: 9.1 K/UL (ref 3.6–11)
WBC # BLD AUTO: 9.4 K/UL (ref 3.6–11)
WBC # BLD AUTO: 9.4 K/UL (ref 3.6–11)
WBC # BLD AUTO: 9.6 K/UL (ref 3.6–11)
WBC # BLD AUTO: 9.6 K/UL (ref 3.6–11)
WBC # BLD AUTO: 9.7 K/UL (ref 3.6–11)
WBC # BLD AUTO: 9.8 K/UL (ref 3.6–11)
WBC URNS QL MICRO: >100 /HPF (ref 0–4)
YEAST URNS QL MICRO: PRESENT

## 2022-01-01 PROCEDURE — 84156 ASSAY OF PROTEIN URINE: CPT

## 2022-01-01 PROCEDURE — 80069 RENAL FUNCTION PANEL: CPT

## 2022-01-01 PROCEDURE — 65660000000 HC RM CCU STEPDOWN

## 2022-01-01 PROCEDURE — 74011000250 HC RX REV CODE- 250: Performed by: HOSPITALIST

## 2022-01-01 PROCEDURE — 86696 HERPES SIMPLEX TYPE 2 TEST: CPT

## 2022-01-01 PROCEDURE — 74011636637 HC RX REV CODE- 636/637: Performed by: HOSPITALIST

## 2022-01-01 PROCEDURE — 97161 PT EVAL LOW COMPLEX 20 MIN: CPT

## 2022-01-01 PROCEDURE — C9113 INJ PANTOPRAZOLE SODIUM, VIA: HCPCS | Performed by: INTERNAL MEDICINE

## 2022-01-01 PROCEDURE — 51798 US URINE CAPACITY MEASURE: CPT

## 2022-01-01 PROCEDURE — 76700 US EXAM ABDOM COMPLETE: CPT

## 2022-01-01 PROCEDURE — 74011250637 HC RX REV CODE- 250/637: Performed by: HOSPITALIST

## 2022-01-01 PROCEDURE — 74011000250 HC RX REV CODE- 250: Performed by: INTERNAL MEDICINE

## 2022-01-01 PROCEDURE — 74011250637 HC RX REV CODE- 250/637: Performed by: INTERNAL MEDICINE

## 2022-01-01 PROCEDURE — 74011250636 HC RX REV CODE- 250/636: Performed by: INTERNAL MEDICINE

## 2022-01-01 PROCEDURE — 65270000029 HC RM PRIVATE

## 2022-01-01 PROCEDURE — 97535 SELF CARE MNGMENT TRAINING: CPT

## 2022-01-01 PROCEDURE — 85025 COMPLETE CBC W/AUTO DIFF WBC: CPT

## 2022-01-01 PROCEDURE — 36415 COLL VENOUS BLD VENIPUNCTURE: CPT

## 2022-01-01 PROCEDURE — 83735 ASSAY OF MAGNESIUM: CPT

## 2022-01-01 PROCEDURE — 86695 HERPES SIMPLEX TYPE 1 TEST: CPT

## 2022-01-01 PROCEDURE — P9016 RBC LEUKOCYTES REDUCED: HCPCS

## 2022-01-01 PROCEDURE — 80048 BASIC METABOLIC PNL TOTAL CA: CPT

## 2022-01-01 PROCEDURE — 80076 HEPATIC FUNCTION PANEL: CPT

## 2022-01-01 PROCEDURE — 99232 SBSQ HOSP IP/OBS MODERATE 35: CPT | Performed by: INTERNAL MEDICINE

## 2022-01-01 PROCEDURE — 84300 ASSAY OF URINE SODIUM: CPT

## 2022-01-01 PROCEDURE — 31500 INSERT EMERGENCY AIRWAY: CPT

## 2022-01-01 PROCEDURE — 97530 THERAPEUTIC ACTIVITIES: CPT

## 2022-01-01 PROCEDURE — 74011250636 HC RX REV CODE- 250/636: Performed by: NURSE PRACTITIONER

## 2022-01-01 PROCEDURE — P9045 ALBUMIN (HUMAN), 5%, 250 ML: HCPCS | Performed by: INTERNAL MEDICINE

## 2022-01-01 PROCEDURE — 86923 COMPATIBILITY TEST ELECTRIC: CPT

## 2022-01-01 PROCEDURE — 85610 PROTHROMBIN TIME: CPT

## 2022-01-01 PROCEDURE — 5A1935Z RESPIRATORY VENTILATION, LESS THAN 24 CONSECUTIVE HOURS: ICD-10-PCS | Performed by: NURSE PRACTITIONER

## 2022-01-01 PROCEDURE — 77030040831 HC BAG URINE DRNG MDII -A

## 2022-01-01 PROCEDURE — 80053 COMPREHEN METABOLIC PANEL: CPT

## 2022-01-01 PROCEDURE — 2709999900 HC NON-CHARGEABLE SUPPLY

## 2022-01-01 PROCEDURE — 86038 ANTINUCLEAR ANTIBODIES: CPT

## 2022-01-01 PROCEDURE — 74011250636 HC RX REV CODE- 250/636: Performed by: HOSPITALIST

## 2022-01-01 PROCEDURE — 82140 ASSAY OF AMMONIA: CPT

## 2022-01-01 PROCEDURE — 99233 SBSQ HOSP IP/OBS HIGH 50: CPT | Performed by: INTERNAL MEDICINE

## 2022-01-01 PROCEDURE — 43235 EGD DIAGNOSTIC BRUSH WASH: CPT | Performed by: INTERNAL MEDICINE

## 2022-01-01 PROCEDURE — 83036 HEMOGLOBIN GLYCOSYLATED A1C: CPT

## 2022-01-01 PROCEDURE — 85018 HEMOGLOBIN: CPT

## 2022-01-01 PROCEDURE — 65610000006 HC RM INTENSIVE CARE

## 2022-01-01 PROCEDURE — 85027 COMPLETE CBC AUTOMATED: CPT

## 2022-01-01 PROCEDURE — 94760 N-INVAS EAR/PLS OXIMETRY 1: CPT

## 2022-01-01 PROCEDURE — 84100 ASSAY OF PHOSPHORUS: CPT

## 2022-01-01 PROCEDURE — 86708 HEPATITIS A ANTIBODY: CPT

## 2022-01-01 PROCEDURE — 86704 HEP B CORE ANTIBODY TOTAL: CPT

## 2022-01-01 PROCEDURE — 82436 ASSAY OF URINE CHLORIDE: CPT

## 2022-01-01 PROCEDURE — 82803 BLOOD GASES ANY COMBINATION: CPT

## 2022-01-01 PROCEDURE — P9047 ALBUMIN (HUMAN), 25%, 50ML: HCPCS | Performed by: NURSE PRACTITIONER

## 2022-01-01 PROCEDURE — P9047 ALBUMIN (HUMAN), 25%, 50ML: HCPCS | Performed by: HOSPITALIST

## 2022-01-01 PROCEDURE — 81001 URINALYSIS AUTO W/SCOPE: CPT

## 2022-01-01 PROCEDURE — 77010033678 HC OXYGEN DAILY

## 2022-01-01 PROCEDURE — 30233N1 TRANSFUSION OF NONAUTOLOGOUS RED BLOOD CELLS INTO PERIPHERAL VEIN, PERCUTANEOUS APPROACH: ICD-10-PCS | Performed by: NURSE PRACTITIONER

## 2022-01-01 PROCEDURE — 97164 PT RE-EVAL EST PLAN CARE: CPT

## 2022-01-01 PROCEDURE — 86900 BLOOD TYPING SEROLOGIC ABO: CPT

## 2022-01-01 PROCEDURE — 0BH17EZ INSERTION OF ENDOTRACHEAL AIRWAY INTO TRACHEA, VIA NATURAL OR ARTIFICIAL OPENING: ICD-10-PCS | Performed by: NURSE PRACTITIONER

## 2022-01-01 PROCEDURE — 36430 TRANSFUSION BLD/BLD COMPNT: CPT

## 2022-01-01 PROCEDURE — P9047 ALBUMIN (HUMAN), 25%, 50ML: HCPCS | Performed by: INTERNAL MEDICINE

## 2022-01-01 PROCEDURE — 74011250637 HC RX REV CODE- 250/637: Performed by: FAMILY MEDICINE

## 2022-01-01 PROCEDURE — 83540 ASSAY OF IRON: CPT

## 2022-01-01 PROCEDURE — 84481 FREE ASSAY (FT-3): CPT

## 2022-01-01 PROCEDURE — 84157 ASSAY OF PROTEIN OTHER: CPT

## 2022-01-01 PROCEDURE — 97168 OT RE-EVAL EST PLAN CARE: CPT

## 2022-01-01 PROCEDURE — 99223 1ST HOSP IP/OBS HIGH 75: CPT | Performed by: PHYSICAL MEDICINE & REHABILITATION

## 2022-01-01 PROCEDURE — 80307 DRUG TEST PRSMV CHEM ANLYZR: CPT

## 2022-01-01 PROCEDURE — 77030019905 HC CATH URETH INTMIT MDII -A

## 2022-01-01 PROCEDURE — 77030018798 HC PMP KT ENTRL FED COVD -A

## 2022-01-01 PROCEDURE — 74011000250 HC RX REV CODE- 250

## 2022-01-01 PROCEDURE — 77030005513 HC CATH URETH FOL11 MDII -B

## 2022-01-01 PROCEDURE — 89050 BODY FLUID CELL COUNT: CPT

## 2022-01-01 PROCEDURE — 77030037877 HC DRSG MEPILEX >48IN BORD MOLN -A

## 2022-01-01 PROCEDURE — 87205 SMEAR GRAM STAIN: CPT

## 2022-01-01 PROCEDURE — 74011000258 HC RX REV CODE- 258: Performed by: INTERNAL MEDICINE

## 2022-01-01 PROCEDURE — 0W9G30Z DRAINAGE OF PERITONEAL CAVITY WITH DRAINAGE DEVICE, PERCUTANEOUS APPROACH: ICD-10-PCS | Performed by: RADIOLOGY

## 2022-01-01 PROCEDURE — 94003 VENT MGMT INPAT SUBQ DAY: CPT

## 2022-01-01 PROCEDURE — 86480 TB TEST CELL IMMUN MEASURE: CPT

## 2022-01-01 PROCEDURE — 87389 HIV-1 AG W/HIV-1&-2 AB AG IA: CPT

## 2022-01-01 PROCEDURE — 74011000250 HC RX REV CODE- 250: Performed by: NURSE PRACTITIONER

## 2022-01-01 PROCEDURE — 74011636637 HC RX REV CODE- 636/637: Performed by: INTERNAL MEDICINE

## 2022-01-01 PROCEDURE — 74018 RADEX ABDOMEN 1 VIEW: CPT

## 2022-01-01 PROCEDURE — 97165 OT EVAL LOW COMPLEX 30 MIN: CPT

## 2022-01-01 PROCEDURE — 65270000032 HC RM SEMIPRIVATE

## 2022-01-01 PROCEDURE — 86706 HEP B SURFACE ANTIBODY: CPT

## 2022-01-01 PROCEDURE — U0003 INFECTIOUS AGENT DETECTION BY NUCLEIC ACID (DNA OR RNA); SEVERE ACUTE RESPIRATORY SYNDROME CORONAVIRUS 2 (SARS-COV-2) (CORONAVIRUS DISEASE [COVID-19]), AMPLIFIED PROBE TECHNIQUE, MAKING USE OF HIGH THROUGHPUT TECHNOLOGIES AS DESCRIBED BY CMS-2020-01-R: HCPCS

## 2022-01-01 PROCEDURE — 71045 X-RAY EXAM CHEST 1 VIEW: CPT

## 2022-01-01 PROCEDURE — 77030002986 HC SUT PROL J&J -A

## 2022-01-01 PROCEDURE — 74011636637 HC RX REV CODE- 636/637: Performed by: NURSE PRACTITIONER

## 2022-01-01 PROCEDURE — 82042 OTHER SOURCE ALBUMIN QUAN EA: CPT

## 2022-01-01 PROCEDURE — 87086 URINE CULTURE/COLONY COUNT: CPT

## 2022-01-01 PROCEDURE — 76040000019: Performed by: INTERNAL MEDICINE

## 2022-01-01 PROCEDURE — 86015 ACTIN ANTIBODY EACH: CPT

## 2022-01-01 PROCEDURE — 87015 SPECIMEN INFECT AGNT CONCNTJ: CPT

## 2022-01-01 PROCEDURE — 99223 1ST HOSP IP/OBS HIGH 75: CPT | Performed by: INTERNAL MEDICINE

## 2022-01-01 PROCEDURE — 77030038269 HC DRN EXT URIN PURWCK BARD -A

## 2022-01-01 PROCEDURE — 84439 ASSAY OF FREE THYROXINE: CPT

## 2022-01-01 PROCEDURE — 86803 HEPATITIS C AB TEST: CPT

## 2022-01-01 PROCEDURE — 76705 ECHO EXAM OF ABDOMEN: CPT

## 2022-01-01 PROCEDURE — 49083 ABD PARACENTESIS W/IMAGING: CPT

## 2022-01-01 PROCEDURE — 74011250636 HC RX REV CODE- 250/636

## 2022-01-01 PROCEDURE — 0DJ08ZZ INSPECTION OF UPPER INTESTINAL TRACT, VIA NATURAL OR ARTIFICIAL OPENING ENDOSCOPIC: ICD-10-PCS | Performed by: INTERNAL MEDICINE

## 2022-01-01 PROCEDURE — 87106 FUNGI IDENTIFICATION YEAST: CPT

## 2022-01-01 PROCEDURE — 77030040922 HC BLNKT HYPOTHRM STRY -A

## 2022-01-01 PROCEDURE — 83615 LACTATE (LD) (LDH) ENZYME: CPT

## 2022-01-01 PROCEDURE — 86381 MITOCHONDRIAL ANTIBODY EACH: CPT

## 2022-01-01 PROCEDURE — 36600 WITHDRAWAL OF ARTERIAL BLOOD: CPT

## 2022-01-01 PROCEDURE — 94002 VENT MGMT INPAT INIT DAY: CPT

## 2022-01-01 PROCEDURE — 92610 EVALUATE SWALLOWING FUNCTION: CPT

## 2022-01-01 PROCEDURE — 86664 EPSTEIN-BARR NUCLEAR ANTIGEN: CPT

## 2022-01-01 PROCEDURE — 83605 ASSAY OF LACTIC ACID: CPT

## 2022-01-01 PROCEDURE — 83690 ASSAY OF LIPASE: CPT

## 2022-01-01 PROCEDURE — 84443 ASSAY THYROID STIM HORMONE: CPT

## 2022-01-01 PROCEDURE — 87635 SARS-COV-2 COVID-19 AMP PRB: CPT

## 2022-01-01 PROCEDURE — 82077 ASSAY SPEC XCP UR&BREATH IA: CPT

## 2022-01-01 PROCEDURE — 80061 LIPID PANEL: CPT

## 2022-01-01 PROCEDURE — 3E0G76Z INTRODUCTION OF NUTRITIONAL SUBSTANCE INTO UPPER GI, VIA NATURAL OR ARTIFICIAL OPENING: ICD-10-PCS | Performed by: INTERNAL MEDICINE

## 2022-01-01 PROCEDURE — 87636 SARSCOV2 & INF A&B AMP PRB: CPT

## 2022-01-01 PROCEDURE — 97110 THERAPEUTIC EXERCISES: CPT

## 2022-01-01 PROCEDURE — 86780 TREPONEMA PALLIDUM: CPT

## 2022-01-01 PROCEDURE — 99205 OFFICE O/P NEW HI 60 MIN: CPT | Performed by: INTERNAL MEDICINE

## 2022-01-01 PROCEDURE — 82728 ASSAY OF FERRITIN: CPT

## 2022-01-01 PROCEDURE — 86644 CMV ANTIBODY: CPT

## 2022-01-01 PROCEDURE — 82107 ALPHA-FETOPROTEIN L3: CPT

## 2022-01-01 PROCEDURE — 87340 HEPATITIS B SURFACE AG IA: CPT

## 2022-01-01 PROCEDURE — 77030018870 HC TY PARCNT BD -B

## 2022-01-01 PROCEDURE — 77063 BREAST TOMOSYNTHESIS BI: CPT

## 2022-01-01 PROCEDURE — 77067 SCR MAMMO BI INCL CAD: CPT

## 2022-01-01 PROCEDURE — 2709999900 HC NON-CHARGEABLE SUPPLY: Performed by: INTERNAL MEDICINE

## 2022-01-01 PROCEDURE — 74011000250 HC RX REV CODE- 250: Performed by: PHYSICIAN ASSISTANT

## 2022-01-01 PROCEDURE — 82103 ALPHA-1-ANTITRYPSIN TOTAL: CPT

## 2022-01-01 PROCEDURE — 97530 THERAPEUTIC ACTIVITIES: CPT | Performed by: PHYSICAL THERAPIST

## 2022-01-01 PROCEDURE — 82962 GLUCOSE BLOOD TEST: CPT

## 2022-01-01 PROCEDURE — 86787 VARICELLA-ZOSTER ANTIBODY: CPT

## 2022-01-01 RX ORDER — ONDANSETRON 4 MG/1
4 TABLET, ORALLY DISINTEGRATING ORAL
Status: DISCONTINUED | OUTPATIENT
Start: 2022-01-01 | End: 2022-01-01 | Stop reason: HOSPADM

## 2022-01-01 RX ORDER — KETAMINE HYDROCHLORIDE 10 MG/ML
75 INJECTION, SOLUTION INTRAMUSCULAR; INTRAVENOUS ONCE
Status: COMPLETED | OUTPATIENT
Start: 2022-01-01 | End: 2022-01-01

## 2022-01-01 RX ORDER — OCTREOTIDE ACETATE 50 UG/ML
50 INJECTION, SOLUTION INTRAVENOUS; SUBCUTANEOUS ONCE
Status: COMPLETED | OUTPATIENT
Start: 2022-01-01 | End: 2022-01-01

## 2022-01-01 RX ORDER — DEXTROMETHORPHAN/PSEUDOEPHED 2.5-7.5/.8
1.2 DROPS ORAL
Status: DISCONTINUED | OUTPATIENT
Start: 2022-01-01 | End: 2022-01-01 | Stop reason: HOSPADM

## 2022-01-01 RX ORDER — SODIUM CHLORIDE 9 MG/ML
50 INJECTION, SOLUTION INTRAVENOUS AS NEEDED
Status: DISPENSED | OUTPATIENT
Start: 2022-01-01 | End: 2022-01-01

## 2022-01-01 RX ORDER — MIDAZOLAM HYDROCHLORIDE 1 MG/ML
1 INJECTION, SOLUTION INTRAMUSCULAR; INTRAVENOUS AS NEEDED
Status: ACTIVE | OUTPATIENT
Start: 2022-01-01 | End: 2022-01-01

## 2022-01-01 RX ORDER — SODIUM CHLORIDE 0.9 % (FLUSH) 0.9 %
5-40 SYRINGE (ML) INJECTION EVERY 8 HOURS
Status: DISCONTINUED | OUTPATIENT
Start: 2022-01-01 | End: 2022-01-01

## 2022-01-01 RX ORDER — PROPOFOL 10 MG/ML
INJECTION, EMULSION INTRAVENOUS
Status: COMPLETED
Start: 2022-01-01 | End: 2022-01-01

## 2022-01-01 RX ORDER — PROMETHAZINE HYDROCHLORIDE 25 MG/1
12.5 TABLET ORAL
Status: DISCONTINUED | OUTPATIENT
Start: 2022-01-01 | End: 2022-01-01 | Stop reason: HOSPADM

## 2022-01-01 RX ORDER — LANOLIN ALCOHOL/MO/W.PET/CERES
1 CREAM (GRAM) TOPICAL
Status: DISCONTINUED | OUTPATIENT
Start: 2022-01-01 | End: 2022-01-01

## 2022-01-01 RX ORDER — BALSAM PERU/CASTOR OIL
OINTMENT (GRAM) TOPICAL 2 TIMES DAILY
Status: DISCONTINUED | OUTPATIENT
Start: 2022-01-01 | End: 2022-01-01 | Stop reason: HOSPADM

## 2022-01-01 RX ORDER — ALBUMIN HUMAN 50 G/1000ML
25 SOLUTION INTRAVENOUS EVERY 6 HOURS
Status: COMPLETED | OUTPATIENT
Start: 2022-01-01 | End: 2022-01-01

## 2022-01-01 RX ORDER — POTASSIUM CHLORIDE 750 MG/1
20 CAPSULE, EXTENDED RELEASE ORAL 2 TIMES DAILY
COMMUNITY
End: 2022-01-01 | Stop reason: ALTCHOICE

## 2022-01-01 RX ORDER — SODIUM BICARBONATE 650 MG/1
650 TABLET ORAL 3 TIMES DAILY
Status: DISCONTINUED | OUTPATIENT
Start: 2022-01-01 | End: 2022-01-01 | Stop reason: HOSPADM

## 2022-01-01 RX ORDER — ALBUMIN HUMAN 250 G/1000ML
25 SOLUTION INTRAVENOUS EVERY 6 HOURS
Status: COMPLETED | OUTPATIENT
Start: 2022-01-01 | End: 2022-01-01

## 2022-01-01 RX ORDER — HYDROMORPHONE HYDROCHLORIDE 1 MG/ML
0.2 INJECTION, SOLUTION INTRAMUSCULAR; INTRAVENOUS; SUBCUTANEOUS
Status: DISCONTINUED | OUTPATIENT
Start: 2022-01-01 | End: 2022-01-01

## 2022-01-01 RX ORDER — POTASSIUM CHLORIDE 7.45 MG/ML
10 INJECTION INTRAVENOUS
Status: COMPLETED | OUTPATIENT
Start: 2022-01-01 | End: 2022-01-01

## 2022-01-01 RX ORDER — LIDOCAINE HYDROCHLORIDE 10 MG/ML
10 INJECTION, SOLUTION EPIDURAL; INFILTRATION; INTRACAUDAL; PERINEURAL
Status: COMPLETED | OUTPATIENT
Start: 2022-01-01 | End: 2022-01-01

## 2022-01-01 RX ORDER — SODIUM CHLORIDE 9 MG/ML
50 INJECTION, SOLUTION INTRAVENOUS CONTINUOUS
Status: DISPENSED | OUTPATIENT
Start: 2022-01-01 | End: 2022-01-01

## 2022-01-01 RX ORDER — ALBUMIN HUMAN 250 G/1000ML
25 SOLUTION INTRAVENOUS EVERY 6 HOURS
Status: DISCONTINUED | OUTPATIENT
Start: 2022-01-01 | End: 2022-01-01

## 2022-01-01 RX ORDER — LANOLIN ALCOHOL/MO/W.PET/CERES
100 CREAM (GRAM) TOPICAL DAILY
Status: DISCONTINUED | OUTPATIENT
Start: 2022-01-01 | End: 2022-01-01 | Stop reason: HOSPADM

## 2022-01-01 RX ORDER — SODIUM CHLORIDE 9 MG/ML
75 INJECTION, SOLUTION INTRAVENOUS CONTINUOUS
Status: DISPENSED | OUTPATIENT
Start: 2022-01-01 | End: 2022-01-01

## 2022-01-01 RX ORDER — NYSTATIN 100000 U/G
CREAM TOPICAL 3 TIMES DAILY
Status: DISCONTINUED | OUTPATIENT
Start: 2022-01-01 | End: 2022-01-01 | Stop reason: HOSPADM

## 2022-01-01 RX ORDER — ACETAMINOPHEN 325 MG/1
650 TABLET ORAL
Status: DISCONTINUED | OUTPATIENT
Start: 2022-01-01 | End: 2022-01-01 | Stop reason: HOSPADM

## 2022-01-01 RX ORDER — NALOXONE HYDROCHLORIDE 0.4 MG/ML
0.1 INJECTION, SOLUTION INTRAMUSCULAR; INTRAVENOUS; SUBCUTANEOUS AS NEEDED
Status: DISCONTINUED | OUTPATIENT
Start: 2022-01-01 | End: 2022-01-01 | Stop reason: HOSPADM

## 2022-01-01 RX ORDER — MIDAZOLAM HYDROCHLORIDE 1 MG/ML
5-10 INJECTION, SOLUTION INTRAMUSCULAR; INTRAVENOUS
Status: ACTIVE | OUTPATIENT
Start: 2022-01-01 | End: 2022-01-01

## 2022-01-01 RX ORDER — HYDROMORPHONE HYDROCHLORIDE 1 MG/ML
0.2 INJECTION, SOLUTION INTRAMUSCULAR; INTRAVENOUS; SUBCUTANEOUS
Status: DISCONTINUED | OUTPATIENT
Start: 2022-01-01 | End: 2022-01-01 | Stop reason: HOSPADM

## 2022-01-01 RX ORDER — SODIUM CHLORIDE 9 MG/ML
250 INJECTION, SOLUTION INTRAVENOUS AS NEEDED
Status: DISCONTINUED | OUTPATIENT
Start: 2022-01-01 | End: 2022-01-01

## 2022-01-01 RX ORDER — LIDOCAINE HYDROCHLORIDE 10 MG/ML
INJECTION INFILTRATION; PERINEURAL
Status: COMPLETED
Start: 2022-01-01 | End: 2022-01-01

## 2022-01-01 RX ORDER — ONDANSETRON 2 MG/ML
4 INJECTION INTRAMUSCULAR; INTRAVENOUS
Status: DISCONTINUED | OUTPATIENT
Start: 2022-01-01 | End: 2022-01-01 | Stop reason: HOSPADM

## 2022-01-01 RX ORDER — PANTOPRAZOLE SODIUM 40 MG/10ML
80 INJECTION, POWDER, LYOPHILIZED, FOR SOLUTION INTRAVENOUS ONCE
Status: COMPLETED | OUTPATIENT
Start: 2022-01-01 | End: 2022-01-01

## 2022-01-01 RX ORDER — ROCURONIUM BROMIDE 10 MG/ML
100 INJECTION, SOLUTION INTRAVENOUS
Status: COMPLETED | OUTPATIENT
Start: 2022-01-01 | End: 2022-01-01

## 2022-01-01 RX ORDER — SODIUM CHLORIDE 9 MG/ML
50 INJECTION, SOLUTION INTRAVENOUS CONTINUOUS
Status: DISCONTINUED | OUTPATIENT
Start: 2022-01-01 | End: 2022-01-01

## 2022-01-01 RX ORDER — HYDROMORPHONE HYDROCHLORIDE 1 MG/ML
0.2 INJECTION, SOLUTION INTRAMUSCULAR; INTRAVENOUS; SUBCUTANEOUS ONCE
Status: COMPLETED | OUTPATIENT
Start: 2022-01-01 | End: 2022-01-01

## 2022-01-01 RX ORDER — ATROPINE SULFATE 0.1 MG/ML
0.5 INJECTION INTRAVENOUS
Status: ACTIVE | OUTPATIENT
Start: 2022-01-01 | End: 2022-01-01

## 2022-01-01 RX ORDER — PROPOFOL 10 MG/ML
0-50 VIAL (ML) INTRAVENOUS
Status: DISCONTINUED | OUTPATIENT
Start: 2022-01-01 | End: 2022-01-01

## 2022-01-01 RX ORDER — ACETAMINOPHEN 650 MG/1
650 SUPPOSITORY RECTAL
Status: DISCONTINUED | OUTPATIENT
Start: 2022-01-01 | End: 2022-01-01 | Stop reason: HOSPADM

## 2022-01-01 RX ORDER — FENTANYL CITRATE 50 UG/ML
50-100 INJECTION, SOLUTION INTRAMUSCULAR; INTRAVENOUS AS NEEDED
Status: ACTIVE | OUTPATIENT
Start: 2022-01-01 | End: 2022-01-01

## 2022-01-01 RX ORDER — SODIUM BICARBONATE 42 MG/ML
2 INJECTION, SOLUTION INTRAVENOUS
Status: ACTIVE | OUTPATIENT
Start: 2022-01-01 | End: 2022-01-01

## 2022-01-01 RX ORDER — 3% SODIUM CHLORIDE 3 G/100ML
30 INJECTION, SOLUTION INTRAVENOUS CONTINUOUS
Status: DISCONTINUED | OUTPATIENT
Start: 2022-01-01 | End: 2022-01-01 | Stop reason: ALTCHOICE

## 2022-01-01 RX ORDER — PROPOFOL 10 MG/ML
0-50 INJECTION, EMULSION INTRAVENOUS
Status: DISCONTINUED | OUTPATIENT
Start: 2022-01-01 | End: 2022-01-01

## 2022-01-01 RX ORDER — URSODIOL 500 MG/1
500 TABLET, FILM COATED ORAL 2 TIMES DAILY
Qty: 60 TABLET | Refills: 3 | Status: SHIPPED | OUTPATIENT
Start: 2022-01-01

## 2022-01-01 RX ORDER — ONDANSETRON 8 MG/1
4 TABLET, ORALLY DISINTEGRATING ORAL
Qty: 45 TABLET | Refills: 0 | Status: SHIPPED | OUTPATIENT
Start: 2022-01-01 | End: 2022-01-01

## 2022-01-01 RX ORDER — SPIRONOLACTONE 100 MG/1
100 TABLET, FILM COATED ORAL DAILY
COMMUNITY
End: 2022-01-01 | Stop reason: SDUPTHER

## 2022-01-01 RX ORDER — SERTRALINE HYDROCHLORIDE 50 MG/1
100 TABLET, FILM COATED ORAL DAILY
Status: DISCONTINUED | OUTPATIENT
Start: 2022-01-01 | End: 2022-01-01 | Stop reason: HOSPADM

## 2022-01-01 RX ORDER — POTASSIUM CHLORIDE 750 MG/1
40 TABLET, FILM COATED, EXTENDED RELEASE ORAL DAILY
Status: ACTIVE | OUTPATIENT
Start: 2022-01-01 | End: 2022-01-01

## 2022-01-01 RX ORDER — FENTANYL CITRATE 50 UG/ML
50-200 INJECTION, SOLUTION INTRAMUSCULAR; INTRAVENOUS
Status: ACTIVE | OUTPATIENT
Start: 2022-01-01 | End: 2022-01-01

## 2022-01-01 RX ORDER — SODIUM CHLORIDE 0.9 % (FLUSH) 0.9 %
5-40 SYRINGE (ML) INJECTION AS NEEDED
Status: DISCONTINUED | OUTPATIENT
Start: 2022-01-01 | End: 2022-01-01

## 2022-01-01 RX ORDER — MORPHINE SULFATE 20 MG/ML
5 SOLUTION ORAL
Qty: 30 ML | Refills: 0 | Status: SHIPPED | OUTPATIENT
Start: 2022-01-01 | End: 2022-04-28

## 2022-01-01 RX ORDER — BUMETANIDE 1 MG/1
TABLET ORAL 2 TIMES DAILY
COMMUNITY
End: 2022-01-01 | Stop reason: SDUPTHER

## 2022-01-01 RX ORDER — ENOXAPARIN SODIUM 100 MG/ML
40 INJECTION SUBCUTANEOUS DAILY
Status: CANCELLED | OUTPATIENT
Start: 2022-01-01

## 2022-01-01 RX ORDER — SODIUM CHLORIDE 0.9 % (FLUSH) 0.9 %
20 SYRINGE (ML) INJECTION ONCE
Status: COMPLETED | OUTPATIENT
Start: 2022-01-01 | End: 2022-01-01

## 2022-01-01 RX ORDER — LEVOTHYROXINE SODIUM 50 UG/1
50 TABLET ORAL
Status: DISCONTINUED | OUTPATIENT
Start: 2022-01-01 | End: 2022-01-01 | Stop reason: HOSPADM

## 2022-01-01 RX ORDER — FLUMAZENIL 0.1 MG/ML
0.2 INJECTION INTRAVENOUS
Status: ACTIVE | OUTPATIENT
Start: 2022-01-01 | End: 2022-01-01

## 2022-01-01 RX ORDER — OCTREOTIDE ACETATE 100 UG/ML
100 INJECTION, SOLUTION INTRAVENOUS; SUBCUTANEOUS 3 TIMES DAILY
Status: DISCONTINUED | OUTPATIENT
Start: 2022-01-01 | End: 2022-01-01 | Stop reason: HOSPADM

## 2022-01-01 RX ORDER — PHENYLEPHRINE HCL IN 0.9% NACL 0.4MG/10ML
100-400 SYRINGE (ML) INTRAVENOUS ONCE
Status: DISPENSED | OUTPATIENT
Start: 2022-01-01 | End: 2022-01-01

## 2022-01-01 RX ORDER — FOLIC ACID 1 MG/1
1 TABLET ORAL DAILY
Status: DISCONTINUED | OUTPATIENT
Start: 2022-01-01 | End: 2022-01-01 | Stop reason: HOSPADM

## 2022-01-01 RX ORDER — MIDODRINE HYDROCHLORIDE 5 MG/1
15 TABLET ORAL
Status: DISCONTINUED | OUTPATIENT
Start: 2022-01-01 | End: 2022-01-01 | Stop reason: HOSPADM

## 2022-01-01 RX ORDER — SODIUM CHLORIDE 9 MG/ML
75 INJECTION, SOLUTION INTRAVENOUS CONTINUOUS
Status: DISCONTINUED | OUTPATIENT
Start: 2022-01-01 | End: 2022-01-01

## 2022-01-01 RX ORDER — MORPHINE SULFATE 20 MG/ML
10 SOLUTION ORAL
Qty: 30 ML | Refills: 0 | Status: SHIPPED
Start: 2022-01-01 | End: 2022-01-01 | Stop reason: SDUPTHER

## 2022-01-01 RX ORDER — EPINEPHRINE 0.1 MG/ML
1 INJECTION INTRACARDIAC; INTRAVENOUS
Status: ACTIVE | OUTPATIENT
Start: 2022-01-01 | End: 2022-01-01

## 2022-01-01 RX ORDER — FENTANYL CITRATE 50 UG/ML
50 INJECTION, SOLUTION INTRAMUSCULAR; INTRAVENOUS ONCE
Status: COMPLETED | OUTPATIENT
Start: 2022-01-01 | End: 2022-01-01

## 2022-01-01 RX ORDER — SODIUM BICARBONATE 650 MG/1
325 TABLET ORAL 2 TIMES DAILY
Status: DISCONTINUED | OUTPATIENT
Start: 2022-01-01 | End: 2022-01-01

## 2022-01-01 RX ORDER — LEVOTHYROXINE SODIUM 50 UG/1
TABLET ORAL
Qty: 90 TABLET | Refills: 3 | Status: SHIPPED | OUTPATIENT
Start: 2022-01-01

## 2022-01-01 RX ORDER — NALOXONE HYDROCHLORIDE 0.4 MG/ML
0.4 INJECTION, SOLUTION INTRAMUSCULAR; INTRAVENOUS; SUBCUTANEOUS
Status: ACTIVE | OUTPATIENT
Start: 2022-01-01 | End: 2022-01-01

## 2022-01-01 RX ORDER — POTASSIUM CHLORIDE 750 MG/1
40 TABLET, FILM COATED, EXTENDED RELEASE ORAL
Status: COMPLETED | OUTPATIENT
Start: 2022-01-01 | End: 2022-01-01

## 2022-01-01 RX ORDER — PANTOPRAZOLE SODIUM 40 MG/1
40 TABLET, DELAYED RELEASE ORAL DAILY
Status: DISCONTINUED | OUTPATIENT
Start: 2022-01-01 | End: 2022-01-01

## 2022-01-01 RX ORDER — URSODIOL 500 MG/1
500 TABLET, FILM COATED ORAL EVERY 12 HOURS
Status: DISCONTINUED | OUTPATIENT
Start: 2022-01-01 | End: 2022-01-01 | Stop reason: HOSPADM

## 2022-01-01 RX ORDER — SODIUM CHLORIDE 9 MG/ML
100 INJECTION, SOLUTION INTRAVENOUS CONTINUOUS
Status: DISPENSED | OUTPATIENT
Start: 2022-01-01 | End: 2022-01-01

## 2022-01-01 RX ORDER — LEVOTHYROXINE SODIUM 50 UG/1
TABLET ORAL
Qty: 90 TABLET | Refills: 3 | Status: ON HOLD | OUTPATIENT
Start: 2022-01-01 | End: 2022-01-01

## 2022-01-01 RX ORDER — SODIUM BICARBONATE 650 MG/1
650 TABLET ORAL 2 TIMES DAILY
Status: DISCONTINUED | OUTPATIENT
Start: 2022-01-01 | End: 2022-01-01

## 2022-01-01 RX ORDER — POTASSIUM CHLORIDE 750 MG/1
40 TABLET, FILM COATED, EXTENDED RELEASE ORAL EVERY 6 HOURS
Status: COMPLETED | OUTPATIENT
Start: 2022-01-01 | End: 2022-01-01

## 2022-01-01 RX ORDER — POLYETHYLENE GLYCOL 3350 17 G/17G
17 POWDER, FOR SOLUTION ORAL DAILY PRN
Status: DISCONTINUED | OUTPATIENT
Start: 2022-01-01 | End: 2022-01-01 | Stop reason: HOSPADM

## 2022-01-01 RX ORDER — MIDODRINE HYDROCHLORIDE 10 MG/1
10 TABLET ORAL
Qty: 90 TABLET | Refills: 0
Start: 2022-01-01 | End: 2022-01-01

## 2022-01-01 RX ADMIN — FOLIC ACID 1 MG: 1 TABLET ORAL at 08:29

## 2022-01-01 RX ADMIN — HYDROMORPHONE HYDROCHLORIDE 0.2 MG: 1 INJECTION, SOLUTION INTRAMUSCULAR; INTRAVENOUS; SUBCUTANEOUS at 10:48

## 2022-01-01 RX ADMIN — Medication 100 MG: at 09:23

## 2022-01-01 RX ADMIN — Medication 100 MG: at 09:37

## 2022-01-01 RX ADMIN — NYSTATIN: 100000 CREAM TOPICAL at 21:05

## 2022-01-01 RX ADMIN — SERTRALINE 100 MG: 50 TABLET, FILM COATED ORAL at 09:40

## 2022-01-01 RX ADMIN — FOLIC ACID 1 MG: 1 TABLET ORAL at 08:37

## 2022-01-01 RX ADMIN — FOLIC ACID 1 MG: 1 TABLET ORAL at 10:00

## 2022-01-01 RX ADMIN — OCTREOTIDE ACETATE 100 MCG: 100 INJECTION, SOLUTION INTRAVENOUS; SUBCUTANEOUS at 22:47

## 2022-01-01 RX ADMIN — SODIUM BICARBONATE 650 MG: 650 TABLET ORAL at 21:03

## 2022-01-01 RX ADMIN — RIFAXIMIN 550 MG: 550 TABLET ORAL at 13:40

## 2022-01-01 RX ADMIN — URSODIOL 500 MG: 500 TABLET, FILM COATED ORAL at 21:43

## 2022-01-01 RX ADMIN — ALBUMIN (HUMAN) 25 G: 0.25 INJECTION, SOLUTION INTRAVENOUS at 13:00

## 2022-01-01 RX ADMIN — NYSTATIN: 100000 CREAM TOPICAL at 17:59

## 2022-01-01 RX ADMIN — ALBUMIN (HUMAN) 25 G: 12.5 INJECTION, SOLUTION INTRAVENOUS at 01:00

## 2022-01-01 RX ADMIN — Medication: at 08:12

## 2022-01-01 RX ADMIN — PANTOPRAZOLE SODIUM 40 MG: 40 TABLET, DELAYED RELEASE ORAL at 08:58

## 2022-01-01 RX ADMIN — SODIUM CHLORIDE, PRESERVATIVE FREE 10 ML: 5 INJECTION INTRAVENOUS at 17:05

## 2022-01-01 RX ADMIN — MIDODRINE HYDROCHLORIDE 15 MG: 5 TABLET ORAL at 14:58

## 2022-01-01 RX ADMIN — Medication: at 12:08

## 2022-01-01 RX ADMIN — FOLIC ACID 1 MG: 1 TABLET ORAL at 10:06

## 2022-01-01 RX ADMIN — LEVOTHYROXINE SODIUM 50 MCG: 0.05 TABLET ORAL at 06:06

## 2022-01-01 RX ADMIN — Medication 5 ML: at 17:36

## 2022-01-01 RX ADMIN — SODIUM CHLORIDE, PRESERVATIVE FREE 10 ML: 5 INJECTION INTRAVENOUS at 22:00

## 2022-01-01 RX ADMIN — MIDODRINE HYDROCHLORIDE 15 MG: 5 TABLET ORAL at 09:23

## 2022-01-01 RX ADMIN — Medication 5 ML: at 17:17

## 2022-01-01 RX ADMIN — RIFAXIMIN 550 MG: 550 TABLET ORAL at 17:56

## 2022-01-01 RX ADMIN — SODIUM BICARBONATE 650 MG: 650 TABLET ORAL at 09:37

## 2022-01-01 RX ADMIN — POTASSIUM BICARBONATE 40 MEQ: 782 TABLET, EFFERVESCENT ORAL at 11:39

## 2022-01-01 RX ADMIN — OCTREOTIDE ACETATE 100 MCG: 100 INJECTION, SOLUTION INTRAVENOUS; SUBCUTANEOUS at 17:54

## 2022-01-01 RX ADMIN — Medication: at 18:08

## 2022-01-01 RX ADMIN — OCTREOTIDE ACETATE 100 MCG: 100 INJECTION, SOLUTION INTRAVENOUS; SUBCUTANEOUS at 10:20

## 2022-01-01 RX ADMIN — IRON SUCROSE 200 MG: 20 INJECTION, SOLUTION INTRAVENOUS at 18:58

## 2022-01-01 RX ADMIN — URSODIOL 500 MG: 500 TABLET, FILM COATED ORAL at 09:38

## 2022-01-01 RX ADMIN — SODIUM CHLORIDE, PRESERVATIVE FREE 10 ML: 5 INJECTION INTRAVENOUS at 21:05

## 2022-01-01 RX ADMIN — NYSTATIN: 100000 CREAM TOPICAL at 21:44

## 2022-01-01 RX ADMIN — ACETAMINOPHEN 650 MG: 325 TABLET ORAL at 12:26

## 2022-01-01 RX ADMIN — OCTREOTIDE ACETATE 100 MCG: 100 INJECTION, SOLUTION INTRAVENOUS; SUBCUTANEOUS at 16:49

## 2022-01-01 RX ADMIN — FOLIC ACID 1 MG: 1 TABLET ORAL at 08:54

## 2022-01-01 RX ADMIN — LACTULOSE 30 ML: 20 SOLUTION ORAL at 07:17

## 2022-01-01 RX ADMIN — ALBUMIN (HUMAN) 25 G: 0.25 INJECTION, SOLUTION INTRAVENOUS at 17:20

## 2022-01-01 RX ADMIN — EPOETIN ALFA-EPBX 10000 UNITS: 10000 INJECTION, SOLUTION INTRAVENOUS; SUBCUTANEOUS at 20:56

## 2022-01-01 RX ADMIN — Medication: at 17:55

## 2022-01-01 RX ADMIN — LACTULOSE 30 ML: 20 SOLUTION ORAL at 13:42

## 2022-01-01 RX ADMIN — Medication 5 ML: at 17:47

## 2022-01-01 RX ADMIN — Medication 5 ML: at 17:59

## 2022-01-01 RX ADMIN — SODIUM BICARBONATE 650 MG: 650 TABLET ORAL at 21:44

## 2022-01-01 RX ADMIN — LEVOTHYROXINE SODIUM 50 MCG: 0.05 TABLET ORAL at 06:26

## 2022-01-01 RX ADMIN — NYSTATIN: 100000 CREAM TOPICAL at 08:38

## 2022-01-01 RX ADMIN — OCTREOTIDE ACETATE 100 MCG: 100 INJECTION, SOLUTION INTRAVENOUS; SUBCUTANEOUS at 08:38

## 2022-01-01 RX ADMIN — Medication 5 ML: at 11:06

## 2022-01-01 RX ADMIN — OCTREOTIDE ACETATE 100 MCG: 100 INJECTION, SOLUTION INTRAVENOUS; SUBCUTANEOUS at 17:06

## 2022-01-01 RX ADMIN — EPOETIN ALFA-EPBX 10000 UNITS: 10000 INJECTION, SOLUTION INTRAVENOUS; SUBCUTANEOUS at 22:26

## 2022-01-01 RX ADMIN — URSODIOL 500 MG: 500 TABLET, FILM COATED ORAL at 09:15

## 2022-01-01 RX ADMIN — MIDODRINE HYDROCHLORIDE 15 MG: 5 TABLET ORAL at 18:15

## 2022-01-01 RX ADMIN — URSODIOL 500 MG: 500 TABLET, FILM COATED ORAL at 21:18

## 2022-01-01 RX ADMIN — HYDROMORPHONE HYDROCHLORIDE 0.2 MG: 1 INJECTION, SOLUTION INTRAMUSCULAR; INTRAVENOUS; SUBCUTANEOUS at 15:55

## 2022-01-01 RX ADMIN — MIDODRINE HYDROCHLORIDE 15 MG: 5 TABLET ORAL at 18:16

## 2022-01-01 RX ADMIN — Medication 100 MG: at 13:41

## 2022-01-01 RX ADMIN — OCTREOTIDE ACETATE 100 MCG: 100 INJECTION, SOLUTION INTRAVENOUS; SUBCUTANEOUS at 21:33

## 2022-01-01 RX ADMIN — MIDODRINE HYDROCHLORIDE 15 MG: 5 TABLET ORAL at 11:23

## 2022-01-01 RX ADMIN — SODIUM CHLORIDE, PRESERVATIVE FREE 10 ML: 5 INJECTION INTRAVENOUS at 05:51

## 2022-01-01 RX ADMIN — SODIUM CHLORIDE, PRESERVATIVE FREE 40 MG: 5 INJECTION INTRAVENOUS at 09:00

## 2022-01-01 RX ADMIN — LACTULOSE 30 ML: 20 SOLUTION ORAL at 12:41

## 2022-01-01 RX ADMIN — Medication 5 ML: at 07:02

## 2022-01-01 RX ADMIN — Medication: at 10:09

## 2022-01-01 RX ADMIN — SERTRALINE 100 MG: 50 TABLET, FILM COATED ORAL at 09:35

## 2022-01-01 RX ADMIN — Medication 5 ML: at 17:24

## 2022-01-01 RX ADMIN — LACTULOSE 30 ML: 20 SOLUTION ORAL at 23:25

## 2022-01-01 RX ADMIN — Medication 1 TABLET: at 09:00

## 2022-01-01 RX ADMIN — SODIUM CHLORIDE, PRESERVATIVE FREE 10 ML: 5 INJECTION INTRAVENOUS at 17:21

## 2022-01-01 RX ADMIN — MIDODRINE HYDROCHLORIDE 15 MG: 5 TABLET ORAL at 07:16

## 2022-01-01 RX ADMIN — LIDOCAINE HYDROCHLORIDE 10 ML: 10 INJECTION, SOLUTION INFILTRATION; PERINEURAL at 15:55

## 2022-01-01 RX ADMIN — URSODIOL 500 MG: 500 TABLET, FILM COATED ORAL at 11:38

## 2022-01-01 RX ADMIN — Medication: at 18:00

## 2022-01-01 RX ADMIN — SODIUM CHLORIDE, PRESERVATIVE FREE 10 ML: 5 INJECTION INTRAVENOUS at 18:01

## 2022-01-01 RX ADMIN — ALBUMIN (HUMAN) 25 G: 0.25 INJECTION, SOLUTION INTRAVENOUS at 18:19

## 2022-01-01 RX ADMIN — Medication 1 TABLET: at 09:12

## 2022-01-01 RX ADMIN — Medication 5 ML: at 17:34

## 2022-01-01 RX ADMIN — OCTREOTIDE ACETATE 100 MCG: 100 INJECTION, SOLUTION INTRAVENOUS; SUBCUTANEOUS at 17:02

## 2022-01-01 RX ADMIN — SODIUM BICARBONATE 650 MG: 650 TABLET ORAL at 21:24

## 2022-01-01 RX ADMIN — Medication 100 MG: at 08:37

## 2022-01-01 RX ADMIN — SODIUM CHLORIDE, PRESERVATIVE FREE 40 MG: 5 INJECTION INTRAVENOUS at 08:43

## 2022-01-01 RX ADMIN — Medication 1 TABLET: at 09:36

## 2022-01-01 RX ADMIN — SODIUM CHLORIDE, PRESERVATIVE FREE 40 MG: 5 INJECTION INTRAVENOUS at 21:04

## 2022-01-01 RX ADMIN — SODIUM CHLORIDE 75 ML/HR: 9 INJECTION, SOLUTION INTRAVENOUS at 19:37

## 2022-01-01 RX ADMIN — MIDODRINE HYDROCHLORIDE 15 MG: 5 TABLET ORAL at 16:13

## 2022-01-01 RX ADMIN — URSODIOL 500 MG: 500 TABLET, FILM COATED ORAL at 23:23

## 2022-01-01 RX ADMIN — FOLIC ACID 1 MG: 1 TABLET ORAL at 08:12

## 2022-01-01 RX ADMIN — SODIUM BICARBONATE 650 MG: 650 TABLET ORAL at 21:35

## 2022-01-01 RX ADMIN — Medication: at 09:40

## 2022-01-01 RX ADMIN — MIDODRINE HYDROCHLORIDE 15 MG: 5 TABLET ORAL at 07:24

## 2022-01-01 RX ADMIN — NYSTATIN: 100000 CREAM TOPICAL at 21:40

## 2022-01-01 RX ADMIN — Medication: at 17:54

## 2022-01-01 RX ADMIN — MIDODRINE HYDROCHLORIDE 15 MG: 5 TABLET ORAL at 09:08

## 2022-01-01 RX ADMIN — Medication: at 08:38

## 2022-01-01 RX ADMIN — FOLIC ACID 1 MG: 1 TABLET ORAL at 09:34

## 2022-01-01 RX ADMIN — OCTREOTIDE ACETATE 100 MCG: 100 INJECTION, SOLUTION INTRAVENOUS; SUBCUTANEOUS at 20:40

## 2022-01-01 RX ADMIN — Medication: at 09:00

## 2022-01-01 RX ADMIN — LEVOTHYROXINE SODIUM 50 MCG: 0.05 TABLET ORAL at 05:51

## 2022-01-01 RX ADMIN — SODIUM CHLORIDE, PRESERVATIVE FREE 10 ML: 5 INJECTION INTRAVENOUS at 05:12

## 2022-01-01 RX ADMIN — FOLIC ACID 1 MG: 1 TABLET ORAL at 09:36

## 2022-01-01 RX ADMIN — Medication 1 TABLET: at 09:34

## 2022-01-01 RX ADMIN — Medication: at 09:15

## 2022-01-01 RX ADMIN — SODIUM BICARBONATE 650 MG: 650 TABLET ORAL at 10:51

## 2022-01-01 RX ADMIN — Medication: at 08:45

## 2022-01-01 RX ADMIN — ALBUMIN (HUMAN) 25 G: 0.25 INJECTION, SOLUTION INTRAVENOUS at 23:22

## 2022-01-01 RX ADMIN — LACTULOSE 30 ML: 20 SOLUTION ORAL at 23:41

## 2022-01-01 RX ADMIN — SODIUM BICARBONATE 650 MG: 650 TABLET ORAL at 20:56

## 2022-01-01 RX ADMIN — NYSTATIN: 100000 CREAM TOPICAL at 21:37

## 2022-01-01 RX ADMIN — LACTULOSE 30 ML: 20 SOLUTION ORAL at 06:09

## 2022-01-01 RX ADMIN — Medication 1 TABLET: at 09:57

## 2022-01-01 RX ADMIN — Medication: at 09:35

## 2022-01-01 RX ADMIN — RIFAXIMIN 550 MG: 550 TABLET ORAL at 17:36

## 2022-01-01 RX ADMIN — RIFAXIMIN 550 MG: 550 TABLET ORAL at 09:41

## 2022-01-01 RX ADMIN — SODIUM CHLORIDE, PRESERVATIVE FREE 5 ML: 5 INJECTION INTRAVENOUS at 14:00

## 2022-01-01 RX ADMIN — ROCURONIUM BROMIDE 60 MG: 10 INJECTION INTRAVENOUS at 21:55

## 2022-01-01 RX ADMIN — SODIUM CHLORIDE, PRESERVATIVE FREE 5 ML: 5 INJECTION INTRAVENOUS at 06:00

## 2022-01-01 RX ADMIN — URSODIOL 500 MG: 500 TABLET, FILM COATED ORAL at 09:00

## 2022-01-01 RX ADMIN — NYSTATIN: 100000 CREAM TOPICAL at 09:46

## 2022-01-01 RX ADMIN — EPOETIN ALFA-EPBX 10000 UNITS: 10000 INJECTION, SOLUTION INTRAVENOUS; SUBCUTANEOUS at 20:48

## 2022-01-01 RX ADMIN — OCTREOTIDE ACETATE 100 MCG: 100 INJECTION, SOLUTION INTRAVENOUS; SUBCUTANEOUS at 16:20

## 2022-01-01 RX ADMIN — SODIUM BICARBONATE 650 MG: 650 TABLET ORAL at 17:04

## 2022-01-01 RX ADMIN — OCTREOTIDE ACETATE 100 MCG: 100 INJECTION, SOLUTION INTRAVENOUS; SUBCUTANEOUS at 23:15

## 2022-01-01 RX ADMIN — NYSTATIN: 100000 CREAM TOPICAL at 21:26

## 2022-01-01 RX ADMIN — SODIUM CHLORIDE, PRESERVATIVE FREE 10 ML: 5 INJECTION INTRAVENOUS at 21:27

## 2022-01-01 RX ADMIN — SODIUM CHLORIDE, PRESERVATIVE FREE 10 ML: 5 INJECTION INTRAVENOUS at 06:53

## 2022-01-01 RX ADMIN — FOLIC ACID 1 MG: 1 TABLET ORAL at 09:00

## 2022-01-01 RX ADMIN — POTASSIUM CHLORIDE 10 MEQ: 7.46 INJECTION, SOLUTION INTRAVENOUS at 13:44

## 2022-01-01 RX ADMIN — Medication: at 17:51

## 2022-01-01 RX ADMIN — POTASSIUM CHLORIDE 10 MEQ: 7.46 INJECTION, SOLUTION INTRAVENOUS at 11:00

## 2022-01-01 RX ADMIN — ALBUMIN (HUMAN) 25 G: 0.25 INJECTION, SOLUTION INTRAVENOUS at 03:38

## 2022-01-01 RX ADMIN — SODIUM CHLORIDE, PRESERVATIVE FREE 40 MG: 5 INJECTION INTRAVENOUS at 20:56

## 2022-01-01 RX ADMIN — LACTULOSE 30 ML: 20 SOLUTION ORAL at 13:20

## 2022-01-01 RX ADMIN — SODIUM BICARBONATE 650 MG: 650 TABLET ORAL at 18:05

## 2022-01-01 RX ADMIN — Medication 1 TABLET: at 10:51

## 2022-01-01 RX ADMIN — SODIUM CHLORIDE, PRESERVATIVE FREE 10 ML: 5 INJECTION INTRAVENOUS at 12:50

## 2022-01-01 RX ADMIN — NYSTATIN: 100000 CREAM TOPICAL at 16:27

## 2022-01-01 RX ADMIN — SERTRALINE 100 MG: 50 TABLET, FILM COATED ORAL at 09:56

## 2022-01-01 RX ADMIN — URSODIOL 500 MG: 500 TABLET, FILM COATED ORAL at 08:14

## 2022-01-01 RX ADMIN — SODIUM BICARBONATE 325 MG: 650 TABLET ORAL at 09:09

## 2022-01-01 RX ADMIN — MIDODRINE HYDROCHLORIDE 15 MG: 5 TABLET ORAL at 17:50

## 2022-01-01 RX ADMIN — Medication 100 MG: at 09:32

## 2022-01-01 RX ADMIN — MIDODRINE HYDROCHLORIDE 15 MG: 5 TABLET ORAL at 13:28

## 2022-01-01 RX ADMIN — Medication 5 ML: at 16:12

## 2022-01-01 RX ADMIN — Medication 1 TABLET: at 09:56

## 2022-01-01 RX ADMIN — Medication 1 TABLET: at 08:43

## 2022-01-01 RX ADMIN — FENTANYL CITRATE 50 MCG: 50 INJECTION, SOLUTION INTRAMUSCULAR; INTRAVENOUS at 21:45

## 2022-01-01 RX ADMIN — MIDODRINE HYDROCHLORIDE 15 MG: 5 TABLET ORAL at 12:14

## 2022-01-01 RX ADMIN — SODIUM CHLORIDE, PRESERVATIVE FREE 10 ML: 5 INJECTION INTRAVENOUS at 06:00

## 2022-01-01 RX ADMIN — Medication: at 17:59

## 2022-01-01 RX ADMIN — PANTOPRAZOLE SODIUM 40 MG: 40 TABLET, DELAYED RELEASE ORAL at 09:57

## 2022-01-01 RX ADMIN — Medication 100 MG: at 09:36

## 2022-01-01 RX ADMIN — POTASSIUM CHLORIDE 40 MEQ: 750 TABLET, EXTENDED RELEASE ORAL at 09:50

## 2022-01-01 RX ADMIN — SODIUM CHLORIDE 50 ML/HR: 9 INJECTION, SOLUTION INTRAVENOUS at 13:02

## 2022-01-01 RX ADMIN — OCTREOTIDE ACETATE 100 MCG: 100 INJECTION, SOLUTION INTRAVENOUS; SUBCUTANEOUS at 21:15

## 2022-01-01 RX ADMIN — Medication: at 18:02

## 2022-01-01 RX ADMIN — Medication 5 ML: at 11:26

## 2022-01-01 RX ADMIN — SODIUM BICARBONATE 650 MG: 650 TABLET ORAL at 17:33

## 2022-01-01 RX ADMIN — MIDODRINE HYDROCHLORIDE 15 MG: 5 TABLET ORAL at 16:20

## 2022-01-01 RX ADMIN — HYDROMORPHONE HYDROCHLORIDE 0.2 MG: 1 INJECTION, SOLUTION INTRAMUSCULAR; INTRAVENOUS; SUBCUTANEOUS at 17:51

## 2022-01-01 RX ADMIN — SODIUM CHLORIDE, PRESERVATIVE FREE 10 ML: 5 INJECTION INTRAVENOUS at 14:00

## 2022-01-01 RX ADMIN — NYSTATIN: 100000 CREAM TOPICAL at 09:45

## 2022-01-01 RX ADMIN — FOLIC ACID 1 MG: 1 TABLET ORAL at 09:06

## 2022-01-01 RX ADMIN — SODIUM CHLORIDE 100 ML/HR: 9 INJECTION, SOLUTION INTRAVENOUS at 12:17

## 2022-01-01 RX ADMIN — Medication: at 17:06

## 2022-01-01 RX ADMIN — URSODIOL 500 MG: 500 TABLET, FILM COATED ORAL at 00:00

## 2022-01-01 RX ADMIN — MIDODRINE HYDROCHLORIDE 15 MG: 5 TABLET ORAL at 09:36

## 2022-01-01 RX ADMIN — Medication 100 MG: at 11:39

## 2022-01-01 RX ADMIN — LEVOTHYROXINE SODIUM 50 MCG: 0.05 TABLET ORAL at 06:37

## 2022-01-01 RX ADMIN — Medication: at 09:57

## 2022-01-01 RX ADMIN — SODIUM BICARBONATE 650 MG: 650 TABLET ORAL at 17:46

## 2022-01-01 RX ADMIN — OCTREOTIDE ACETATE 100 MCG: 100 INJECTION, SOLUTION INTRAVENOUS; SUBCUTANEOUS at 15:52

## 2022-01-01 RX ADMIN — SODIUM CHLORIDE, PRESERVATIVE FREE 40 MG: 5 INJECTION INTRAVENOUS at 09:15

## 2022-01-01 RX ADMIN — Medication 5 ML: at 06:53

## 2022-01-01 RX ADMIN — RIFAXIMIN 550 MG: 550 TABLET ORAL at 18:05

## 2022-01-01 RX ADMIN — MIDODRINE HYDROCHLORIDE 15 MG: 5 TABLET ORAL at 09:05

## 2022-01-01 RX ADMIN — ALBUMIN (HUMAN) 25 G: 0.25 INJECTION, SOLUTION INTRAVENOUS at 15:17

## 2022-01-01 RX ADMIN — Medication: at 09:16

## 2022-01-01 RX ADMIN — ALBUMIN (HUMAN) 25 G: 0.25 INJECTION, SOLUTION INTRAVENOUS at 05:00

## 2022-01-01 RX ADMIN — RIFAXIMIN 550 MG: 550 TABLET ORAL at 10:51

## 2022-01-01 RX ADMIN — MIDODRINE HYDROCHLORIDE 15 MG: 5 TABLET ORAL at 12:26

## 2022-01-01 RX ADMIN — SODIUM CHLORIDE, PRESERVATIVE FREE 1 G: 5 INJECTION INTRAVENOUS at 19:14

## 2022-01-01 RX ADMIN — OCTREOTIDE ACETATE 100 MCG: 100 INJECTION, SOLUTION INTRAVENOUS; SUBCUTANEOUS at 10:11

## 2022-01-01 RX ADMIN — Medication: at 08:32

## 2022-01-01 RX ADMIN — SODIUM BICARBONATE 650 MG: 650 TABLET ORAL at 10:49

## 2022-01-01 RX ADMIN — URSODIOL 500 MG: 500 TABLET, FILM COATED ORAL at 21:04

## 2022-01-01 RX ADMIN — URSODIOL 500 MG: 500 TABLET, FILM COATED ORAL at 20:45

## 2022-01-01 RX ADMIN — POTASSIUM CHLORIDE 10 MEQ: 7.46 INJECTION, SOLUTION INTRAVENOUS at 09:34

## 2022-01-01 RX ADMIN — MIDODRINE HYDROCHLORIDE 15 MG: 5 TABLET ORAL at 12:36

## 2022-01-01 RX ADMIN — Medication: at 20:45

## 2022-01-01 RX ADMIN — ACETAMINOPHEN 650 MG: 325 TABLET ORAL at 06:19

## 2022-01-01 RX ADMIN — OCTREOTIDE ACETATE 100 MCG: 100 INJECTION, SOLUTION INTRAVENOUS; SUBCUTANEOUS at 09:20

## 2022-01-01 RX ADMIN — SERTRALINE 100 MG: 50 TABLET, FILM COATED ORAL at 09:00

## 2022-01-01 RX ADMIN — OCTREOTIDE ACETATE 100 MCG: 100 INJECTION, SOLUTION INTRAVENOUS; SUBCUTANEOUS at 10:38

## 2022-01-01 RX ADMIN — SODIUM CHLORIDE, PRESERVATIVE FREE 40 MG: 5 INJECTION INTRAVENOUS at 21:05

## 2022-01-01 RX ADMIN — Medication: at 17:23

## 2022-01-01 RX ADMIN — MIDODRINE HYDROCHLORIDE 15 MG: 5 TABLET ORAL at 17:06

## 2022-01-01 RX ADMIN — Medication: at 10:15

## 2022-01-01 RX ADMIN — SERTRALINE 100 MG: 50 TABLET, FILM COATED ORAL at 08:44

## 2022-01-01 RX ADMIN — IRON SUCROSE 200 MG: 20 INJECTION, SOLUTION INTRAVENOUS at 17:50

## 2022-01-01 RX ADMIN — SERTRALINE 100 MG: 50 TABLET, FILM COATED ORAL at 08:06

## 2022-01-01 RX ADMIN — SODIUM CHLORIDE, PRESERVATIVE FREE 10 ML: 5 INJECTION INTRAVENOUS at 09:00

## 2022-01-01 RX ADMIN — SERTRALINE 100 MG: 50 TABLET, FILM COATED ORAL at 09:05

## 2022-01-01 RX ADMIN — OCTREOTIDE ACETATE 100 MCG: 100 INJECTION, SOLUTION INTRAVENOUS; SUBCUTANEOUS at 22:33

## 2022-01-01 RX ADMIN — NYSTATIN: 100000 CREAM TOPICAL at 17:36

## 2022-01-01 RX ADMIN — MIDODRINE HYDROCHLORIDE 15 MG: 5 TABLET ORAL at 09:32

## 2022-01-01 RX ADMIN — MIDODRINE HYDROCHLORIDE 15 MG: 5 TABLET ORAL at 18:09

## 2022-01-01 RX ADMIN — Medication 5 ML: at 06:25

## 2022-01-01 RX ADMIN — ONDANSETRON 4 MG: 2 INJECTION INTRAMUSCULAR; INTRAVENOUS at 09:15

## 2022-01-01 RX ADMIN — NYSTATIN: 100000 CREAM TOPICAL at 17:27

## 2022-01-01 RX ADMIN — Medication 100 MG: at 10:51

## 2022-01-01 RX ADMIN — MIDODRINE HYDROCHLORIDE 15 MG: 5 TABLET ORAL at 17:58

## 2022-01-01 RX ADMIN — MIDODRINE HYDROCHLORIDE 15 MG: 5 TABLET ORAL at 06:37

## 2022-01-01 RX ADMIN — SODIUM CHLORIDE 75 ML/HR: 9 INJECTION, SOLUTION INTRAVENOUS at 15:17

## 2022-01-01 RX ADMIN — URSODIOL 500 MG: 500 TABLET, FILM COATED ORAL at 20:48

## 2022-01-01 RX ADMIN — FOLIC ACID 1 MG: 1 TABLET ORAL at 09:23

## 2022-01-01 RX ADMIN — OCTREOTIDE ACETATE 100 MCG: 100 INJECTION, SOLUTION INTRAVENOUS; SUBCUTANEOUS at 08:55

## 2022-01-01 RX ADMIN — RIFAXIMIN 550 MG: 550 TABLET ORAL at 17:29

## 2022-01-01 RX ADMIN — MIDODRINE HYDROCHLORIDE 15 MG: 5 TABLET ORAL at 12:06

## 2022-01-01 RX ADMIN — SODIUM CHLORIDE, PRESERVATIVE FREE 10 ML: 5 INJECTION INTRAVENOUS at 22:05

## 2022-01-01 RX ADMIN — RIFAXIMIN 550 MG: 550 TABLET ORAL at 09:57

## 2022-01-01 RX ADMIN — SODIUM CHLORIDE, PRESERVATIVE FREE 40 MG: 5 INJECTION INTRAVENOUS at 09:05

## 2022-01-01 RX ADMIN — MIDODRINE HYDROCHLORIDE 15 MG: 5 TABLET ORAL at 17:11

## 2022-01-01 RX ADMIN — PANTOPRAZOLE SODIUM 40 MG: 40 TABLET, DELAYED RELEASE ORAL at 09:33

## 2022-01-01 RX ADMIN — Medication: at 18:12

## 2022-01-01 RX ADMIN — PANTOPRAZOLE SODIUM 40 MG: 40 TABLET, DELAYED RELEASE ORAL at 09:41

## 2022-01-01 RX ADMIN — SODIUM CHLORIDE, PRESERVATIVE FREE 10 ML: 5 INJECTION INTRAVENOUS at 09:56

## 2022-01-01 RX ADMIN — Medication 100 MG: at 08:06

## 2022-01-01 RX ADMIN — MIDODRINE HYDROCHLORIDE 15 MG: 5 TABLET ORAL at 11:18

## 2022-01-01 RX ADMIN — SODIUM CHLORIDE, PRESERVATIVE FREE 40 MG: 5 INJECTION INTRAVENOUS at 17:33

## 2022-01-01 RX ADMIN — OCTREOTIDE ACETATE 100 MCG: 100 INJECTION, SOLUTION INTRAVENOUS; SUBCUTANEOUS at 09:05

## 2022-01-01 RX ADMIN — NYSTATIN: 100000 CREAM TOPICAL at 11:29

## 2022-01-01 RX ADMIN — Medication 100 MG: at 09:15

## 2022-01-01 RX ADMIN — SODIUM CHLORIDE, PRESERVATIVE FREE 10 ML: 5 INJECTION INTRAVENOUS at 08:56

## 2022-01-01 RX ADMIN — SERTRALINE 100 MG: 50 TABLET, FILM COATED ORAL at 09:13

## 2022-01-01 RX ADMIN — Medication: at 18:50

## 2022-01-01 RX ADMIN — LEVOTHYROXINE SODIUM 50 MCG: 0.05 TABLET ORAL at 07:28

## 2022-01-01 RX ADMIN — Medication: at 09:33

## 2022-01-01 RX ADMIN — Medication: at 17:27

## 2022-01-01 RX ADMIN — SODIUM CHLORIDE, PRESERVATIVE FREE 10 ML: 5 INJECTION INTRAVENOUS at 21:03

## 2022-01-01 RX ADMIN — OCTREOTIDE ACETATE 100 MCG: 100 INJECTION, SOLUTION INTRAVENOUS; SUBCUTANEOUS at 23:02

## 2022-01-01 RX ADMIN — Medication 1 TABLET: at 12:42

## 2022-01-01 RX ADMIN — OCTREOTIDE ACETATE 100 MCG: 100 INJECTION, SOLUTION INTRAVENOUS; SUBCUTANEOUS at 21:27

## 2022-01-01 RX ADMIN — RIFAXIMIN 550 MG: 550 TABLET ORAL at 17:03

## 2022-01-01 RX ADMIN — LACTULOSE 30 ML: 20 SOLUTION ORAL at 23:45

## 2022-01-01 RX ADMIN — SODIUM CHLORIDE, PRESERVATIVE FREE 40 MG: 5 INJECTION INTRAVENOUS at 21:25

## 2022-01-01 RX ADMIN — LEVOTHYROXINE SODIUM 50 MCG: 0.05 TABLET ORAL at 07:21

## 2022-01-01 RX ADMIN — OCTREOTIDE ACETATE 100 MCG: 100 INJECTION, SOLUTION INTRAVENOUS; SUBCUTANEOUS at 10:45

## 2022-01-01 RX ADMIN — Medication 5 ML: at 12:06

## 2022-01-01 RX ADMIN — NYSTATIN: 100000 CREAM TOPICAL at 17:04

## 2022-01-01 RX ADMIN — MIDODRINE HYDROCHLORIDE 15 MG: 5 TABLET ORAL at 12:35

## 2022-01-01 RX ADMIN — OCTREOTIDE ACETATE 100 MCG: 100 INJECTION, SOLUTION INTRAVENOUS; SUBCUTANEOUS at 10:15

## 2022-01-01 RX ADMIN — PANTOPRAZOLE SODIUM 40 MG: 40 TABLET, DELAYED RELEASE ORAL at 13:19

## 2022-01-01 RX ADMIN — RIFAXIMIN 550 MG: 550 TABLET ORAL at 17:54

## 2022-01-01 RX ADMIN — OCTREOTIDE ACETATE 100 MCG: 100 INJECTION, SOLUTION INTRAVENOUS; SUBCUTANEOUS at 18:01

## 2022-01-01 RX ADMIN — SERTRALINE 100 MG: 50 TABLET, FILM COATED ORAL at 09:08

## 2022-01-01 RX ADMIN — OCTREOTIDE ACETATE 100 MCG: 100 INJECTION, SOLUTION INTRAVENOUS; SUBCUTANEOUS at 16:39

## 2022-01-01 RX ADMIN — EPOETIN ALFA-EPBX 10000 UNITS: 10000 INJECTION, SOLUTION INTRAVENOUS; SUBCUTANEOUS at 19:39

## 2022-01-01 RX ADMIN — RIFAXIMIN 550 MG: 550 TABLET ORAL at 17:33

## 2022-01-01 RX ADMIN — Medication 100 MG: at 08:44

## 2022-01-01 RX ADMIN — Medication 100 MG: at 10:52

## 2022-01-01 RX ADMIN — Medication: at 17:36

## 2022-01-01 RX ADMIN — Medication: at 09:32

## 2022-01-01 RX ADMIN — OCTREOTIDE ACETATE 100 MCG: 100 INJECTION, SOLUTION INTRAVENOUS; SUBCUTANEOUS at 08:12

## 2022-01-01 RX ADMIN — ALBUMIN (HUMAN) 25 G: 0.25 INJECTION, SOLUTION INTRAVENOUS at 12:59

## 2022-01-01 RX ADMIN — OCTREOTIDE ACETATE 50 MCG: 50 INJECTION, SOLUTION INTRAVENOUS; SUBCUTANEOUS at 10:37

## 2022-01-01 RX ADMIN — OCTREOTIDE ACETATE 100 MCG: 100 INJECTION, SOLUTION INTRAVENOUS; SUBCUTANEOUS at 21:04

## 2022-01-01 RX ADMIN — ALBUMIN (HUMAN) 25 G: 0.25 INJECTION, SOLUTION INTRAVENOUS at 13:25

## 2022-01-01 RX ADMIN — SODIUM CHLORIDE, PRESERVATIVE FREE 10 ML: 5 INJECTION INTRAVENOUS at 07:32

## 2022-01-01 RX ADMIN — MIDODRINE HYDROCHLORIDE 15 MG: 5 TABLET ORAL at 16:38

## 2022-01-01 RX ADMIN — Medication 5 ML: at 06:52

## 2022-01-01 RX ADMIN — OCTREOTIDE ACETATE 100 MCG: 100 INJECTION, SOLUTION INTRAVENOUS; SUBCUTANEOUS at 17:22

## 2022-01-01 RX ADMIN — SODIUM CHLORIDE, PRESERVATIVE FREE 10 ML: 5 INJECTION INTRAVENOUS at 06:54

## 2022-01-01 RX ADMIN — URSODIOL 500 MG: 500 TABLET, FILM COATED ORAL at 09:10

## 2022-01-01 RX ADMIN — IRON SUCROSE 200 MG: 20 INJECTION, SOLUTION INTRAVENOUS at 05:51

## 2022-01-01 RX ADMIN — MIDODRINE HYDROCHLORIDE 15 MG: 5 TABLET ORAL at 11:39

## 2022-01-01 RX ADMIN — Medication 5 ML: at 06:07

## 2022-01-01 RX ADMIN — OCTREOTIDE ACETATE 100 MCG: 100 INJECTION, SOLUTION INTRAVENOUS; SUBCUTANEOUS at 20:56

## 2022-01-01 RX ADMIN — SODIUM CHLORIDE, PRESERVATIVE FREE 40 MG: 5 INJECTION INTRAVENOUS at 09:32

## 2022-01-01 RX ADMIN — SODIUM CHLORIDE 75 ML/HR: 9 INJECTION, SOLUTION INTRAVENOUS at 08:33

## 2022-01-01 RX ADMIN — Medication 1 TABLET: at 08:11

## 2022-01-01 RX ADMIN — Medication 1 TABLET: at 08:10

## 2022-01-01 RX ADMIN — FERROUS SULFATE TAB 325 MG (65 MG ELEMENTAL FE) 325 MG: 325 (65 FE) TAB at 10:00

## 2022-01-01 RX ADMIN — SODIUM CHLORIDE, PRESERVATIVE FREE 10 ML: 5 INJECTION INTRAVENOUS at 05:17

## 2022-01-01 RX ADMIN — URSODIOL 500 MG: 500 TABLET, FILM COATED ORAL at 19:41

## 2022-01-01 RX ADMIN — Medication: at 08:07

## 2022-01-01 RX ADMIN — SODIUM CHLORIDE, PRESERVATIVE FREE 10 ML: 5 INJECTION INTRAVENOUS at 06:06

## 2022-01-01 RX ADMIN — SODIUM BICARBONATE 650 MG: 650 TABLET ORAL at 09:04

## 2022-01-01 RX ADMIN — MIDODRINE HYDROCHLORIDE 15 MG: 5 TABLET ORAL at 17:36

## 2022-01-01 RX ADMIN — Medication: at 10:26

## 2022-01-01 RX ADMIN — SODIUM CHLORIDE, PRESERVATIVE FREE 10 ML: 5 INJECTION INTRAVENOUS at 17:42

## 2022-01-01 RX ADMIN — RIFAXIMIN 550 MG: 550 TABLET ORAL at 11:39

## 2022-01-01 RX ADMIN — MIDODRINE HYDROCHLORIDE 15 MG: 5 TABLET ORAL at 17:46

## 2022-01-01 RX ADMIN — URSODIOL 500 MG: 500 TABLET, FILM COATED ORAL at 09:34

## 2022-01-01 RX ADMIN — MIDODRINE HYDROCHLORIDE 15 MG: 5 TABLET ORAL at 08:58

## 2022-01-01 RX ADMIN — SODIUM CHLORIDE, PRESERVATIVE FREE 40 MG: 5 INJECTION INTRAVENOUS at 10:09

## 2022-01-01 RX ADMIN — POTASSIUM CHLORIDE 10 MEQ: 7.46 INJECTION, SOLUTION INTRAVENOUS at 18:00

## 2022-01-01 RX ADMIN — OCTREOTIDE ACETATE 100 MCG: 100 INJECTION, SOLUTION INTRAVENOUS; SUBCUTANEOUS at 11:19

## 2022-01-01 RX ADMIN — SODIUM BICARBONATE 325 MG: 650 TABLET ORAL at 08:54

## 2022-01-01 RX ADMIN — NYSTATIN: 100000 CREAM TOPICAL at 17:05

## 2022-01-01 RX ADMIN — OCTREOTIDE ACETATE 100 MCG: 100 INJECTION, SOLUTION INTRAVENOUS; SUBCUTANEOUS at 10:25

## 2022-01-01 RX ADMIN — Medication 5 ML: at 06:37

## 2022-01-01 RX ADMIN — Medication 5 ML: at 05:50

## 2022-01-01 RX ADMIN — OCTREOTIDE ACETATE 100 MCG: 100 INJECTION, SOLUTION INTRAVENOUS; SUBCUTANEOUS at 09:35

## 2022-01-01 RX ADMIN — FOLIC ACID 1 MG: 1 TABLET ORAL at 12:42

## 2022-01-01 RX ADMIN — OCTREOTIDE ACETATE 100 MCG: 100 INJECTION, SOLUTION INTRAVENOUS; SUBCUTANEOUS at 17:35

## 2022-01-01 RX ADMIN — POTASSIUM CHLORIDE 10 MEQ: 7.46 INJECTION, SOLUTION INTRAVENOUS at 16:01

## 2022-01-01 RX ADMIN — Medication: at 17:02

## 2022-01-01 RX ADMIN — LACTULOSE 30 ML: 20 SOLUTION ORAL at 17:37

## 2022-01-01 RX ADMIN — OCTREOTIDE ACETATE 100 MCG: 100 INJECTION, SOLUTION INTRAVENOUS; SUBCUTANEOUS at 13:42

## 2022-01-01 RX ADMIN — HYDROMORPHONE HYDROCHLORIDE 0.2 MG: 1 INJECTION, SOLUTION INTRAMUSCULAR; INTRAVENOUS; SUBCUTANEOUS at 14:03

## 2022-01-01 RX ADMIN — OCTREOTIDE ACETATE 100 MCG: 100 INJECTION, SOLUTION INTRAVENOUS; SUBCUTANEOUS at 21:12

## 2022-01-01 RX ADMIN — OCTREOTIDE ACETATE 100 MCG: 100 INJECTION, SOLUTION INTRAVENOUS; SUBCUTANEOUS at 11:40

## 2022-01-01 RX ADMIN — OCTREOTIDE ACETATE 100 MCG: 100 INJECTION, SOLUTION INTRAVENOUS; SUBCUTANEOUS at 08:14

## 2022-01-01 RX ADMIN — RIFAXIMIN 550 MG: 550 TABLET ORAL at 08:06

## 2022-01-01 RX ADMIN — Medication 5 ML: at 12:08

## 2022-01-01 RX ADMIN — SODIUM CHLORIDE, PRESERVATIVE FREE 10 ML: 5 INJECTION INTRAVENOUS at 21:25

## 2022-01-01 RX ADMIN — Medication 5 ML: at 17:30

## 2022-01-01 RX ADMIN — SODIUM BICARBONATE 650 MG: 650 TABLET ORAL at 17:11

## 2022-01-01 RX ADMIN — EPOETIN ALFA-EPBX 10000 UNITS: 10000 INJECTION, SOLUTION INTRAVENOUS; SUBCUTANEOUS at 22:02

## 2022-01-01 RX ADMIN — MIDODRINE HYDROCHLORIDE 15 MG: 5 TABLET ORAL at 07:15

## 2022-01-01 RX ADMIN — MIDODRINE HYDROCHLORIDE 15 MG: 5 TABLET ORAL at 12:58

## 2022-01-01 RX ADMIN — FOLIC ACID 1 MG: 1 TABLET ORAL at 10:51

## 2022-01-01 RX ADMIN — ONDANSETRON 4 MG: 2 INJECTION INTRAMUSCULAR; INTRAVENOUS at 17:19

## 2022-01-01 RX ADMIN — SODIUM BICARBONATE 325 MG: 650 TABLET ORAL at 17:54

## 2022-01-01 RX ADMIN — FOLIC ACID 1 MG: 1 TABLET ORAL at 09:56

## 2022-01-01 RX ADMIN — MIDODRINE HYDROCHLORIDE 15 MG: 5 TABLET ORAL at 11:06

## 2022-01-01 RX ADMIN — Medication: at 08:54

## 2022-01-01 RX ADMIN — Medication 5 ML: at 17:04

## 2022-01-01 RX ADMIN — HYDROMORPHONE HYDROCHLORIDE 0.2 MG: 1 INJECTION, SOLUTION INTRAMUSCULAR; INTRAVENOUS; SUBCUTANEOUS at 05:56

## 2022-01-01 RX ADMIN — SODIUM BICARBONATE 650 MG: 650 TABLET ORAL at 21:14

## 2022-01-01 RX ADMIN — LACTULOSE 30 ML: 20 SOLUTION ORAL at 12:50

## 2022-01-01 RX ADMIN — NYSTATIN: 100000 CREAM TOPICAL at 12:08

## 2022-01-01 RX ADMIN — LEVOTHYROXINE SODIUM 50 MCG: 0.05 TABLET ORAL at 06:52

## 2022-01-01 RX ADMIN — LEVOTHYROXINE SODIUM 50 MCG: 0.05 TABLET ORAL at 05:56

## 2022-01-01 RX ADMIN — Medication 100 MG: at 09:40

## 2022-01-01 RX ADMIN — SODIUM CHLORIDE, PRESERVATIVE FREE 10 ML: 5 INJECTION INTRAVENOUS at 22:41

## 2022-01-01 RX ADMIN — RIFAXIMIN 550 MG: 550 TABLET ORAL at 18:16

## 2022-01-01 RX ADMIN — MIDODRINE HYDROCHLORIDE 15 MG: 5 TABLET ORAL at 17:16

## 2022-01-01 RX ADMIN — MIDODRINE HYDROCHLORIDE 15 MG: 5 TABLET ORAL at 11:40

## 2022-01-01 RX ADMIN — KETAMINE HYDROCHLORIDE 110 MG: 10 INJECTION, SOLUTION INTRAMUSCULAR; INTRAVENOUS at 21:45

## 2022-01-01 RX ADMIN — MIDODRINE HYDROCHLORIDE 15 MG: 5 TABLET ORAL at 07:35

## 2022-01-01 RX ADMIN — SODIUM CHLORIDE, PRESERVATIVE FREE 40 MG: 5 INJECTION INTRAVENOUS at 21:03

## 2022-01-01 RX ADMIN — SODIUM BICARBONATE 325 MG: 650 TABLET ORAL at 18:01

## 2022-01-01 RX ADMIN — SODIUM CHLORIDE, PRESERVATIVE FREE 40 MG: 5 INJECTION INTRAVENOUS at 08:11

## 2022-01-01 RX ADMIN — LACTULOSE 30 ML: 20 SOLUTION ORAL at 00:32

## 2022-01-01 RX ADMIN — RIFAXIMIN 550 MG: 550 TABLET ORAL at 17:35

## 2022-01-01 RX ADMIN — SODIUM CHLORIDE, PRESERVATIVE FREE 10 ML: 5 INJECTION INTRAVENOUS at 23:26

## 2022-01-01 RX ADMIN — ALBUMIN (HUMAN) 25 G: 0.25 INJECTION, SOLUTION INTRAVENOUS at 20:15

## 2022-01-01 RX ADMIN — Medication 1 TABLET: at 09:32

## 2022-01-01 RX ADMIN — LACTULOSE 30 ML: 20 SOLUTION ORAL at 17:54

## 2022-01-01 RX ADMIN — LACTULOSE 30 ML: 20 SOLUTION ORAL at 07:16

## 2022-01-01 RX ADMIN — URSODIOL 500 MG: 500 TABLET, FILM COATED ORAL at 10:07

## 2022-01-01 RX ADMIN — MIDODRINE HYDROCHLORIDE 15 MG: 5 TABLET ORAL at 16:24

## 2022-01-01 RX ADMIN — FOLIC ACID 1 MG: 1 TABLET ORAL at 09:44

## 2022-01-01 RX ADMIN — RIFAXIMIN 550 MG: 550 TABLET ORAL at 08:11

## 2022-01-01 RX ADMIN — Medication 100 MG: at 09:57

## 2022-01-01 RX ADMIN — FOLIC ACID 1 MG: 1 TABLET ORAL at 09:32

## 2022-01-01 RX ADMIN — FOLIC ACID 1 MG: 1 TABLET ORAL at 09:40

## 2022-01-01 RX ADMIN — Medication: at 08:00

## 2022-01-01 RX ADMIN — Medication 5 ML: at 12:36

## 2022-01-01 RX ADMIN — LACTULOSE 30 ML: 20 SOLUTION ORAL at 06:06

## 2022-01-01 RX ADMIN — NYSTATIN: 100000 CREAM TOPICAL at 21:25

## 2022-01-01 RX ADMIN — NYSTATIN: 100000 CREAM TOPICAL at 16:20

## 2022-01-01 RX ADMIN — Medication 5 ML: at 06:50

## 2022-01-01 RX ADMIN — ALBUMIN (HUMAN) 25 G: 0.25 INJECTION, SOLUTION INTRAVENOUS at 20:45

## 2022-01-01 RX ADMIN — PANTOPRAZOLE SODIUM 40 MG: 40 TABLET, DELAYED RELEASE ORAL at 13:40

## 2022-01-01 RX ADMIN — NYSTATIN: 100000 CREAM TOPICAL at 16:38

## 2022-01-01 RX ADMIN — SODIUM CHLORIDE, PRESERVATIVE FREE 10 ML: 5 INJECTION INTRAVENOUS at 16:10

## 2022-01-01 RX ADMIN — NYSTATIN: 100000 CREAM TOPICAL at 20:49

## 2022-01-01 RX ADMIN — MIDODRINE HYDROCHLORIDE 15 MG: 5 TABLET ORAL at 16:26

## 2022-01-01 RX ADMIN — LACTULOSE 30 ML: 20 SOLUTION ORAL at 00:54

## 2022-01-01 RX ADMIN — Medication: at 17:16

## 2022-01-01 RX ADMIN — Medication 100 MG: at 08:10

## 2022-01-01 RX ADMIN — POTASSIUM CHLORIDE 10 MEQ: 7.46 INJECTION, SOLUTION INTRAVENOUS at 09:33

## 2022-01-01 RX ADMIN — SODIUM BICARBONATE 325 MG: 650 TABLET ORAL at 11:38

## 2022-01-01 RX ADMIN — ALBUMIN (HUMAN) 25 G: 0.25 INJECTION, SOLUTION INTRAVENOUS at 08:00

## 2022-01-01 RX ADMIN — SODIUM CHLORIDE, PRESERVATIVE FREE 10 ML: 5 INJECTION INTRAVENOUS at 22:12

## 2022-01-01 RX ADMIN — URSODIOL 500 MG: 500 TABLET, FILM COATED ORAL at 21:15

## 2022-01-01 RX ADMIN — POTASSIUM CHLORIDE 40 MEQ: 750 TABLET, EXTENDED RELEASE ORAL at 12:58

## 2022-01-01 RX ADMIN — MIDODRINE HYDROCHLORIDE 15 MG: 5 TABLET ORAL at 13:20

## 2022-01-01 RX ADMIN — SODIUM CHLORIDE, PRESERVATIVE FREE 10 ML: 5 INJECTION INTRAVENOUS at 14:04

## 2022-01-01 RX ADMIN — LACTULOSE 30 ML: 20 SOLUTION ORAL at 18:11

## 2022-01-01 RX ADMIN — ALBUMIN (HUMAN) 25 G: 0.25 INJECTION, SOLUTION INTRAVENOUS at 02:50

## 2022-01-01 RX ADMIN — Medication: at 09:23

## 2022-01-01 RX ADMIN — SODIUM CHLORIDE, PRESERVATIVE FREE 40 MG: 5 INJECTION INTRAVENOUS at 08:55

## 2022-01-01 RX ADMIN — NYSTATIN: 100000 CREAM TOPICAL at 21:09

## 2022-01-01 RX ADMIN — SODIUM CHLORIDE, PRESERVATIVE FREE 10 ML: 5 INJECTION INTRAVENOUS at 21:19

## 2022-01-01 RX ADMIN — Medication 100 MG: at 09:00

## 2022-01-01 RX ADMIN — URSODIOL 500 MG: 500 TABLET, FILM COATED ORAL at 09:25

## 2022-01-01 RX ADMIN — RIFAXIMIN 550 MG: 550 TABLET ORAL at 08:37

## 2022-01-01 RX ADMIN — LACTULOSE 30 ML: 20 SOLUTION ORAL at 08:06

## 2022-01-01 RX ADMIN — Medication: at 17:26

## 2022-01-01 RX ADMIN — SODIUM CHLORIDE, PRESERVATIVE FREE 10 ML: 5 INJECTION INTRAVENOUS at 18:06

## 2022-01-01 RX ADMIN — OCTREOTIDE ACETATE 100 MCG: 100 INJECTION, SOLUTION INTRAVENOUS; SUBCUTANEOUS at 15:56

## 2022-01-01 RX ADMIN — SERTRALINE 100 MG: 50 TABLET, FILM COATED ORAL at 09:46

## 2022-01-01 RX ADMIN — Medication: at 19:04

## 2022-01-01 RX ADMIN — FOLIC ACID 1 MG: 1 TABLET ORAL at 09:05

## 2022-01-01 RX ADMIN — Medication 1 TABLET: at 10:07

## 2022-01-01 RX ADMIN — LEVOTHYROXINE SODIUM 50 MCG: 0.05 TABLET ORAL at 05:17

## 2022-01-01 RX ADMIN — FOLIC ACID 1 MG: 1 TABLET ORAL at 09:09

## 2022-01-01 RX ADMIN — RIFAXIMIN 550 MG: 550 TABLET ORAL at 08:58

## 2022-01-01 RX ADMIN — SERTRALINE 100 MG: 50 TABLET, FILM COATED ORAL at 08:54

## 2022-01-01 RX ADMIN — SERTRALINE 100 MG: 50 TABLET, FILM COATED ORAL at 10:51

## 2022-01-01 RX ADMIN — Medication: at 08:59

## 2022-01-01 RX ADMIN — SODIUM CHLORIDE, PRESERVATIVE FREE 10 ML: 5 INJECTION INTRAVENOUS at 23:10

## 2022-01-01 RX ADMIN — Medication: at 09:10

## 2022-01-01 RX ADMIN — NYSTATIN: 100000 CREAM TOPICAL at 09:37

## 2022-01-01 RX ADMIN — NYSTATIN: 100000 CREAM TOPICAL at 21:04

## 2022-01-01 RX ADMIN — SODIUM BICARBONATE: 84 INJECTION, SOLUTION INTRAVENOUS at 14:29

## 2022-01-01 RX ADMIN — ONDANSETRON 4 MG: 2 INJECTION INTRAMUSCULAR; INTRAVENOUS at 20:56

## 2022-01-01 RX ADMIN — OCTREOTIDE ACETATE 100 MCG: 100 INJECTION, SOLUTION INTRAVENOUS; SUBCUTANEOUS at 21:17

## 2022-01-01 RX ADMIN — Medication: at 17:50

## 2022-01-01 RX ADMIN — Medication: at 08:11

## 2022-01-01 RX ADMIN — LEVOTHYROXINE SODIUM 50 MCG: 0.05 TABLET ORAL at 07:17

## 2022-01-01 RX ADMIN — LACTULOSE 30 ML: 20 SOLUTION ORAL at 17:00

## 2022-01-01 RX ADMIN — MIDODRINE HYDROCHLORIDE 15 MG: 5 TABLET ORAL at 08:10

## 2022-01-01 RX ADMIN — OCTREOTIDE ACETATE 100 MCG: 100 INJECTION, SOLUTION INTRAVENOUS; SUBCUTANEOUS at 17:36

## 2022-01-01 RX ADMIN — Medication 5 ML: at 11:30

## 2022-01-01 RX ADMIN — FOLIC ACID 1 MG: 1 TABLET ORAL at 08:06

## 2022-01-01 RX ADMIN — ALBUMIN (HUMAN) 25 G: 0.25 INJECTION, SOLUTION INTRAVENOUS at 17:11

## 2022-01-01 RX ADMIN — MIDODRINE HYDROCHLORIDE 15 MG: 5 TABLET ORAL at 06:05

## 2022-01-01 RX ADMIN — URSODIOL 500 MG: 500 TABLET, FILM COATED ORAL at 09:39

## 2022-01-01 RX ADMIN — OCTREOTIDE ACETATE 100 MCG: 100 INJECTION, SOLUTION INTRAVENOUS; SUBCUTANEOUS at 09:34

## 2022-01-01 RX ADMIN — MIDODRINE HYDROCHLORIDE 15 MG: 5 TABLET ORAL at 08:11

## 2022-01-01 RX ADMIN — Medication: at 18:01

## 2022-01-01 RX ADMIN — SODIUM CHLORIDE, PRESERVATIVE FREE 10 ML: 5 INJECTION INTRAVENOUS at 15:18

## 2022-01-01 RX ADMIN — SODIUM CHLORIDE, PRESERVATIVE FREE 40 MG: 5 INJECTION INTRAVENOUS at 08:28

## 2022-01-01 RX ADMIN — SODIUM CHLORIDE, PRESERVATIVE FREE 10 ML: 5 INJECTION INTRAVENOUS at 20:42

## 2022-01-01 RX ADMIN — RIFAXIMIN 550 MG: 550 TABLET ORAL at 17:11

## 2022-01-01 RX ADMIN — POTASSIUM CHLORIDE 10 MEQ: 7.46 INJECTION, SOLUTION INTRAVENOUS at 20:10

## 2022-01-01 RX ADMIN — MIDODRINE HYDROCHLORIDE 15 MG: 5 TABLET ORAL at 12:37

## 2022-01-01 RX ADMIN — Medication: at 09:38

## 2022-01-01 RX ADMIN — OCTREOTIDE ACETATE 100 MCG: 100 INJECTION, SOLUTION INTRAVENOUS; SUBCUTANEOUS at 09:54

## 2022-01-01 RX ADMIN — OCTREOTIDE ACETATE 100 MCG: 100 INJECTION, SOLUTION INTRAVENOUS; SUBCUTANEOUS at 22:11

## 2022-01-01 RX ADMIN — RIFAXIMIN 550 MG: 550 TABLET ORAL at 09:37

## 2022-01-01 RX ADMIN — Medication 1 TABLET: at 11:30

## 2022-01-01 RX ADMIN — SODIUM BICARBONATE 650 MG: 650 TABLET ORAL at 22:10

## 2022-01-01 RX ADMIN — MIDODRINE HYDROCHLORIDE 15 MG: 5 TABLET ORAL at 08:37

## 2022-01-01 RX ADMIN — SODIUM CHLORIDE, PRESERVATIVE FREE 10 ML: 5 INJECTION INTRAVENOUS at 17:19

## 2022-01-01 RX ADMIN — SODIUM CHLORIDE, PRESERVATIVE FREE 10 ML: 5 INJECTION INTRAVENOUS at 16:25

## 2022-01-01 RX ADMIN — OCTREOTIDE ACETATE 100 MCG: 100 INJECTION, SOLUTION INTRAVENOUS; SUBCUTANEOUS at 10:28

## 2022-01-01 RX ADMIN — MIDODRINE HYDROCHLORIDE 15 MG: 5 TABLET ORAL at 12:12

## 2022-01-01 RX ADMIN — LACTULOSE 30 ML: 20 SOLUTION ORAL at 08:54

## 2022-01-01 RX ADMIN — OCTREOTIDE ACETATE 100 MCG: 100 INJECTION, SOLUTION INTRAVENOUS; SUBCUTANEOUS at 16:18

## 2022-01-01 RX ADMIN — LIDOCAINE HYDROCHLORIDE 10 ML: 10 INJECTION, SOLUTION EPIDURAL; INFILTRATION; INTRACAUDAL; PERINEURAL at 10:00

## 2022-01-01 RX ADMIN — URSODIOL 500 MG: 500 TABLET, FILM COATED ORAL at 20:41

## 2022-01-01 RX ADMIN — MIDODRINE HYDROCHLORIDE 15 MG: 5 TABLET ORAL at 12:07

## 2022-01-01 RX ADMIN — FOLIC ACID 1 MG: 1 TABLET ORAL at 09:12

## 2022-01-01 RX ADMIN — SERTRALINE 100 MG: 50 TABLET, FILM COATED ORAL at 08:10

## 2022-01-01 RX ADMIN — ONDANSETRON 4 MG: 2 INJECTION INTRAMUSCULAR; INTRAVENOUS at 17:51

## 2022-01-01 RX ADMIN — Medication 100 MG: at 09:44

## 2022-01-01 RX ADMIN — ALBUMIN (HUMAN) 25 G: 0.25 INJECTION, SOLUTION INTRAVENOUS at 18:51

## 2022-01-01 RX ADMIN — URSODIOL 500 MG: 500 TABLET, FILM COATED ORAL at 22:33

## 2022-01-01 RX ADMIN — SODIUM CHLORIDE, PRESERVATIVE FREE 40 MG: 5 INJECTION INTRAVENOUS at 19:37

## 2022-01-01 RX ADMIN — Medication 100 MG: at 10:03

## 2022-01-01 RX ADMIN — LACTULOSE 30 ML: 20 SOLUTION ORAL at 11:18

## 2022-01-01 RX ADMIN — MIDODRINE HYDROCHLORIDE 15 MG: 5 TABLET ORAL at 11:42

## 2022-01-01 RX ADMIN — FOLIC ACID 1 MG: 1 TABLET ORAL at 10:03

## 2022-01-01 RX ADMIN — SODIUM CHLORIDE, PRESERVATIVE FREE 40 MG: 5 INJECTION INTRAVENOUS at 08:12

## 2022-01-01 RX ADMIN — Medication 20 MCG/KG/MIN: at 21:54

## 2022-01-01 RX ADMIN — Medication 100 MG: at 09:05

## 2022-01-01 RX ADMIN — MIDODRINE HYDROCHLORIDE 15 MG: 5 TABLET ORAL at 12:41

## 2022-01-01 RX ADMIN — OCTREOTIDE ACETATE 100 MCG: 100 INJECTION, SOLUTION INTRAVENOUS; SUBCUTANEOUS at 09:45

## 2022-01-01 RX ADMIN — RIFAXIMIN 550 MG: 550 TABLET ORAL at 17:22

## 2022-01-01 RX ADMIN — ALBUMIN (HUMAN) 25 G: 0.25 INJECTION, SOLUTION INTRAVENOUS at 23:15

## 2022-01-01 RX ADMIN — MIDODRINE HYDROCHLORIDE 15 MG: 5 TABLET ORAL at 17:33

## 2022-01-01 RX ADMIN — ALBUMIN (HUMAN) 25 G: 0.25 INJECTION, SOLUTION INTRAVENOUS at 17:19

## 2022-01-01 RX ADMIN — NYSTATIN: 100000 CREAM TOPICAL at 21:35

## 2022-01-01 RX ADMIN — RIFAXIMIN 550 MG: 550 TABLET ORAL at 08:29

## 2022-01-01 RX ADMIN — MIDODRINE HYDROCHLORIDE 15 MG: 5 TABLET ORAL at 13:00

## 2022-01-01 RX ADMIN — MIDODRINE HYDROCHLORIDE 15 MG: 5 TABLET ORAL at 08:05

## 2022-01-01 RX ADMIN — Medication 1 TABLET: at 09:44

## 2022-01-01 RX ADMIN — SODIUM CHLORIDE, PRESERVATIVE FREE 10 ML: 5 INJECTION INTRAVENOUS at 05:42

## 2022-01-01 RX ADMIN — Medication: at 19:09

## 2022-01-01 RX ADMIN — Medication: at 08:39

## 2022-01-01 RX ADMIN — LEVOTHYROXINE SODIUM 50 MCG: 0.05 TABLET ORAL at 07:14

## 2022-01-01 RX ADMIN — Medication: at 17:34

## 2022-01-01 RX ADMIN — Medication: at 18:46

## 2022-01-01 RX ADMIN — ALBUMIN (HUMAN) 25 G: 12.5 INJECTION, SOLUTION INTRAVENOUS at 19:09

## 2022-01-01 RX ADMIN — NYSTATIN: 100000 CREAM TOPICAL at 20:59

## 2022-01-01 RX ADMIN — SERTRALINE 100 MG: 50 TABLET, FILM COATED ORAL at 09:44

## 2022-01-01 RX ADMIN — Medication 5 ML: at 11:23

## 2022-01-01 RX ADMIN — SODIUM CHLORIDE, PRESERVATIVE FREE 10 ML: 5 INJECTION INTRAVENOUS at 17:00

## 2022-01-01 RX ADMIN — SODIUM CHLORIDE, PRESERVATIVE FREE 40 MG: 5 INJECTION INTRAVENOUS at 20:15

## 2022-01-01 RX ADMIN — LACTULOSE 30 ML: 20 SOLUTION ORAL at 19:08

## 2022-01-01 RX ADMIN — Medication 5 ML: at 05:43

## 2022-01-01 RX ADMIN — SERTRALINE 100 MG: 50 TABLET, FILM COATED ORAL at 10:03

## 2022-01-01 RX ADMIN — SODIUM BICARBONATE 325 MG: 650 TABLET ORAL at 17:52

## 2022-01-01 RX ADMIN — MIDODRINE HYDROCHLORIDE 15 MG: 5 TABLET ORAL at 17:05

## 2022-01-01 RX ADMIN — URSODIOL 500 MG: 500 TABLET, FILM COATED ORAL at 23:09

## 2022-01-01 RX ADMIN — MIDODRINE HYDROCHLORIDE 15 MG: 5 TABLET ORAL at 16:48

## 2022-01-01 RX ADMIN — SERTRALINE 100 MG: 50 TABLET, FILM COATED ORAL at 09:34

## 2022-01-01 RX ADMIN — ALBUMIN (HUMAN) 25 G: 0.25 INJECTION, SOLUTION INTRAVENOUS at 06:09

## 2022-01-01 RX ADMIN — OCTREOTIDE ACETATE 100 MCG: 100 INJECTION, SOLUTION INTRAVENOUS; SUBCUTANEOUS at 21:03

## 2022-01-01 RX ADMIN — NYSTATIN: 100000 CREAM TOPICAL at 18:06

## 2022-01-01 RX ADMIN — PANTOPRAZOLE SODIUM 40 MG: 40 TABLET, DELAYED RELEASE ORAL at 09:23

## 2022-01-01 RX ADMIN — SODIUM BICARBONATE 650 MG: 650 TABLET ORAL at 09:34

## 2022-01-01 RX ADMIN — Medication 1 TABLET: at 09:23

## 2022-01-01 RX ADMIN — Medication: at 12:43

## 2022-01-01 RX ADMIN — SODIUM CHLORIDE, PRESERVATIVE FREE 40 MG: 5 INJECTION INTRAVENOUS at 22:10

## 2022-01-01 RX ADMIN — RIFAXIMIN 550 MG: 550 TABLET ORAL at 10:49

## 2022-01-01 RX ADMIN — OCTREOTIDE ACETATE 100 MCG: 100 INJECTION, SOLUTION INTRAVENOUS; SUBCUTANEOUS at 16:28

## 2022-01-01 RX ADMIN — LEVOTHYROXINE SODIUM 50 MCG: 0.05 TABLET ORAL at 06:49

## 2022-01-01 RX ADMIN — MIDODRINE HYDROCHLORIDE 15 MG: 5 TABLET ORAL at 17:54

## 2022-01-01 RX ADMIN — ALBUMIN (HUMAN) 25 G: 0.25 INJECTION, SOLUTION INTRAVENOUS at 01:22

## 2022-01-01 RX ADMIN — SODIUM BICARBONATE 650 MG: 650 TABLET ORAL at 09:57

## 2022-01-01 RX ADMIN — NYSTATIN: 100000 CREAM TOPICAL at 09:11

## 2022-01-01 RX ADMIN — Medication 100 MG: at 09:08

## 2022-01-01 RX ADMIN — Medication 5 ML: at 12:11

## 2022-01-01 RX ADMIN — RIFAXIMIN 550 MG: 550 TABLET ORAL at 09:08

## 2022-01-01 RX ADMIN — URSODIOL 500 MG: 500 TABLET, FILM COATED ORAL at 22:25

## 2022-01-01 RX ADMIN — Medication: at 11:41

## 2022-01-01 RX ADMIN — SODIUM BICARBONATE 650 MG: 650 TABLET ORAL at 21:08

## 2022-01-01 RX ADMIN — URSODIOL 500 MG: 500 TABLET, FILM COATED ORAL at 10:08

## 2022-01-01 RX ADMIN — Medication 5 ML: at 07:18

## 2022-01-01 RX ADMIN — SODIUM CHLORIDE, PRESERVATIVE FREE 10 ML: 5 INJECTION INTRAVENOUS at 18:44

## 2022-01-01 RX ADMIN — ALBUMIN (HUMAN) 25 G: 0.25 INJECTION, SOLUTION INTRAVENOUS at 17:05

## 2022-01-01 RX ADMIN — NYSTATIN: 100000 CREAM TOPICAL at 21:02

## 2022-01-01 RX ADMIN — OCTREOTIDE ACETATE 100 MCG: 100 INJECTION, SOLUTION INTRAVENOUS; SUBCUTANEOUS at 12:14

## 2022-01-01 RX ADMIN — ALBUMIN (HUMAN) 25 G: 0.25 INJECTION, SOLUTION INTRAVENOUS at 07:25

## 2022-01-01 RX ADMIN — RIFAXIMIN 550 MG: 550 TABLET ORAL at 09:05

## 2022-01-01 RX ADMIN — LEVOTHYROXINE SODIUM 50 MCG: 0.05 TABLET ORAL at 06:24

## 2022-01-01 RX ADMIN — Medication 5 ML: at 11:49

## 2022-01-01 RX ADMIN — OCTREOTIDE ACETATE 100 MCG: 100 INJECTION, SOLUTION INTRAVENOUS; SUBCUTANEOUS at 15:45

## 2022-01-01 RX ADMIN — SERTRALINE 100 MG: 50 TABLET, FILM COATED ORAL at 09:33

## 2022-01-01 RX ADMIN — PANTOPRAZOLE SODIUM 40 MG: 40 TABLET, DELAYED RELEASE ORAL at 10:03

## 2022-01-01 RX ADMIN — OCTREOTIDE ACETATE 100 MCG: 100 INJECTION, SOLUTION INTRAVENOUS; SUBCUTANEOUS at 21:22

## 2022-01-01 RX ADMIN — SODIUM CHLORIDE, PRESERVATIVE FREE 10 ML: 5 INJECTION INTRAVENOUS at 17:22

## 2022-01-01 RX ADMIN — LEVOTHYROXINE SODIUM 50 MCG: 0.05 TABLET ORAL at 05:42

## 2022-01-01 RX ADMIN — LACTULOSE 30 ML: 20 SOLUTION ORAL at 11:38

## 2022-01-01 RX ADMIN — Medication: at 08:56

## 2022-01-01 RX ADMIN — OCTREOTIDE ACETATE 100 MCG: 100 INJECTION, SOLUTION INTRAVENOUS; SUBCUTANEOUS at 08:47

## 2022-01-01 RX ADMIN — SODIUM BICARBONATE 650 MG: 650 TABLET ORAL at 17:35

## 2022-01-01 RX ADMIN — Medication: at 17:04

## 2022-01-01 RX ADMIN — Medication: at 10:00

## 2022-01-01 RX ADMIN — SODIUM CHLORIDE, PRESERVATIVE FREE 10 ML: 5 INJECTION INTRAVENOUS at 07:01

## 2022-01-01 RX ADMIN — OCTREOTIDE ACETATE 100 MCG: 100 INJECTION, SOLUTION INTRAVENOUS; SUBCUTANEOUS at 23:39

## 2022-01-01 RX ADMIN — OCTREOTIDE ACETATE 100 MCG: 100 INJECTION, SOLUTION INTRAVENOUS; SUBCUTANEOUS at 21:26

## 2022-01-01 RX ADMIN — RIFAXIMIN 550 MG: 550 TABLET ORAL at 20:43

## 2022-01-01 RX ADMIN — MIDODRINE HYDROCHLORIDE 15 MG: 5 TABLET ORAL at 08:40

## 2022-01-01 RX ADMIN — SERTRALINE 100 MG: 50 TABLET, FILM COATED ORAL at 09:37

## 2022-01-01 RX ADMIN — RIFAXIMIN 550 MG: 550 TABLET ORAL at 09:13

## 2022-01-01 RX ADMIN — LACTULOSE 30 ML: 20 SOLUTION ORAL at 08:43

## 2022-01-01 RX ADMIN — LACTULOSE 30 ML: 20 SOLUTION ORAL at 15:54

## 2022-01-01 RX ADMIN — SODIUM CHLORIDE, PRESERVATIVE FREE 40 MG: 5 INJECTION INTRAVENOUS at 09:35

## 2022-01-01 RX ADMIN — Medication: at 18:45

## 2022-01-01 RX ADMIN — SERTRALINE 100 MG: 50 TABLET, FILM COATED ORAL at 12:42

## 2022-01-01 RX ADMIN — RIFAXIMIN 550 MG: 550 TABLET ORAL at 17:50

## 2022-01-01 RX ADMIN — SODIUM CHLORIDE, PRESERVATIVE FREE 40 MG: 5 INJECTION INTRAVENOUS at 22:02

## 2022-01-01 RX ADMIN — NYSTATIN: 100000 CREAM TOPICAL at 15:09

## 2022-01-01 RX ADMIN — MIDODRINE HYDROCHLORIDE 15 MG: 5 TABLET ORAL at 17:02

## 2022-01-01 RX ADMIN — ONDANSETRON 4 MG: 2 INJECTION INTRAMUSCULAR; INTRAVENOUS at 14:45

## 2022-01-01 RX ADMIN — LACTULOSE 30 ML: 20 SOLUTION ORAL at 00:00

## 2022-01-01 RX ADMIN — SODIUM BICARBONATE 325 MG: 650 TABLET ORAL at 08:06

## 2022-01-01 RX ADMIN — POTASSIUM CHLORIDE 10 MEQ: 7.46 INJECTION, SOLUTION INTRAVENOUS at 12:15

## 2022-01-01 RX ADMIN — RIFAXIMIN 550 MG: 550 TABLET ORAL at 09:23

## 2022-01-01 RX ADMIN — RIFAXIMIN 550 MG: 550 TABLET ORAL at 08:43

## 2022-01-01 RX ADMIN — SODIUM BICARBONATE 325 MG: 650 TABLET ORAL at 17:05

## 2022-01-01 RX ADMIN — Medication: at 09:47

## 2022-01-01 RX ADMIN — OCTREOTIDE ACETATE 100 MCG: 100 INJECTION, SOLUTION INTRAVENOUS; SUBCUTANEOUS at 09:17

## 2022-01-01 RX ADMIN — OCTREOTIDE ACETATE 100 MCG: 100 INJECTION, SOLUTION INTRAVENOUS; SUBCUTANEOUS at 10:09

## 2022-01-01 RX ADMIN — RIFAXIMIN 550 MG: 550 TABLET ORAL at 08:12

## 2022-01-01 RX ADMIN — IRON SUCROSE 200 MG: 20 INJECTION, SOLUTION INTRAVENOUS at 07:18

## 2022-01-01 RX ADMIN — ACETAMINOPHEN 650 MG: 325 TABLET ORAL at 14:22

## 2022-01-01 RX ADMIN — SODIUM BICARBONATE 650 MG: 650 TABLET ORAL at 16:28

## 2022-01-01 RX ADMIN — OCTREOTIDE ACETATE 100 MCG: 100 INJECTION, SOLUTION INTRAVENOUS; SUBCUTANEOUS at 09:44

## 2022-01-01 RX ADMIN — FOLIC ACID 1 MG: 1 TABLET ORAL at 08:11

## 2022-01-01 RX ADMIN — OCTREOTIDE ACETATE 100 MCG: 100 INJECTION, SOLUTION INTRAVENOUS; SUBCUTANEOUS at 22:02

## 2022-01-01 RX ADMIN — SERTRALINE 100 MG: 50 TABLET, FILM COATED ORAL at 09:15

## 2022-01-01 RX ADMIN — OCTREOTIDE ACETATE 100 MCG: 100 INJECTION, SOLUTION INTRAVENOUS; SUBCUTANEOUS at 17:09

## 2022-01-01 RX ADMIN — URSODIOL 500 MG: 500 TABLET, FILM COATED ORAL at 22:02

## 2022-01-01 RX ADMIN — OCTREOTIDE ACETATE 100 MCG: 100 INJECTION, SOLUTION INTRAVENOUS; SUBCUTANEOUS at 08:11

## 2022-01-01 RX ADMIN — PANTOPRAZOLE SODIUM 40 MG: 40 TABLET, DELAYED RELEASE ORAL at 11:21

## 2022-01-01 RX ADMIN — POTASSIUM CHLORIDE 10 MEQ: 7.46 INJECTION, SOLUTION INTRAVENOUS at 12:43

## 2022-01-01 RX ADMIN — SODIUM BICARBONATE 650 MG: 650 TABLET ORAL at 08:29

## 2022-01-01 RX ADMIN — OCTREOTIDE ACETATE 100 MCG: 100 INJECTION, SOLUTION INTRAVENOUS; SUBCUTANEOUS at 17:44

## 2022-01-01 RX ADMIN — MIDODRINE HYDROCHLORIDE 15 MG: 5 TABLET ORAL at 11:25

## 2022-01-01 RX ADMIN — LEVOTHYROXINE SODIUM 50 MCG: 0.05 TABLET ORAL at 06:58

## 2022-01-01 RX ADMIN — Medication: at 08:43

## 2022-01-01 RX ADMIN — URSODIOL 500 MG: 500 TABLET, FILM COATED ORAL at 21:14

## 2022-01-01 RX ADMIN — SODIUM CHLORIDE, PRESERVATIVE FREE 10 ML: 5 INJECTION INTRAVENOUS at 13:41

## 2022-01-01 RX ADMIN — SODIUM CHLORIDE, PRESERVATIVE FREE 40 MG: 5 INJECTION INTRAVENOUS at 20:47

## 2022-01-01 RX ADMIN — SODIUM CHLORIDE, PRESERVATIVE FREE 40 MG: 5 INJECTION INTRAVENOUS at 08:06

## 2022-01-01 RX ADMIN — URSODIOL 500 MG: 500 TABLET, FILM COATED ORAL at 21:02

## 2022-01-01 RX ADMIN — SODIUM CHLORIDE, PRESERVATIVE FREE 20 ML: 5 INJECTION INTRAVENOUS at 10:38

## 2022-01-01 RX ADMIN — SODIUM BICARBONATE 650 MG: 650 TABLET ORAL at 20:46

## 2022-01-01 RX ADMIN — MIDODRINE HYDROCHLORIDE 15 MG: 5 TABLET ORAL at 17:28

## 2022-01-01 RX ADMIN — RIFAXIMIN 550 MG: 550 TABLET ORAL at 18:01

## 2022-01-01 RX ADMIN — SODIUM CHLORIDE, PRESERVATIVE FREE 10 ML: 5 INJECTION INTRAVENOUS at 19:11

## 2022-01-01 RX ADMIN — Medication 100 MG: at 09:34

## 2022-01-01 RX ADMIN — OCTREOTIDE ACETATE 100 MCG: 100 INJECTION, SOLUTION INTRAVENOUS; SUBCUTANEOUS at 09:16

## 2022-01-01 RX ADMIN — NYSTATIN: 100000 CREAM TOPICAL at 17:23

## 2022-01-01 RX ADMIN — RIFAXIMIN 550 MG: 550 TABLET ORAL at 18:00

## 2022-01-01 RX ADMIN — MIDODRINE HYDROCHLORIDE 15 MG: 5 TABLET ORAL at 07:00

## 2022-01-01 RX ADMIN — SODIUM BICARBONATE 650 MG: 650 TABLET ORAL at 21:27

## 2022-01-01 RX ADMIN — SERTRALINE 100 MG: 50 TABLET, FILM COATED ORAL at 09:57

## 2022-01-01 RX ADMIN — SODIUM CHLORIDE, PRESERVATIVE FREE 10 ML: 5 INJECTION INTRAVENOUS at 05:56

## 2022-01-01 RX ADMIN — SODIUM BICARBONATE: 84 INJECTION, SOLUTION INTRAVENOUS at 12:00

## 2022-01-01 RX ADMIN — Medication 100 MG: at 10:00

## 2022-01-01 RX ADMIN — PANTOPRAZOLE SODIUM 40 MG: 40 TABLET, DELAYED RELEASE ORAL at 10:00

## 2022-01-01 RX ADMIN — MIDODRINE HYDROCHLORIDE 15 MG: 5 TABLET ORAL at 09:56

## 2022-01-01 RX ADMIN — Medication: at 09:24

## 2022-01-01 RX ADMIN — Medication 5 ML: at 12:58

## 2022-01-01 RX ADMIN — NYSTATIN: 100000 CREAM TOPICAL at 17:24

## 2022-01-01 RX ADMIN — Medication 1 TABLET: at 08:55

## 2022-01-01 RX ADMIN — Medication: at 20:07

## 2022-01-01 RX ADMIN — ALBUMIN (HUMAN) 25 G: 0.25 INJECTION, SOLUTION INTRAVENOUS at 02:00

## 2022-01-01 RX ADMIN — SODIUM CHLORIDE, PRESERVATIVE FREE 40 MG: 5 INJECTION INTRAVENOUS at 09:56

## 2022-01-01 RX ADMIN — MIDODRINE HYDROCHLORIDE 15 MG: 5 TABLET ORAL at 12:56

## 2022-01-01 RX ADMIN — Medication 5 ML: at 12:22

## 2022-01-01 RX ADMIN — Medication: at 09:25

## 2022-01-01 RX ADMIN — LEVOTHYROXINE SODIUM 50 MCG: 0.05 TABLET ORAL at 06:25

## 2022-01-01 RX ADMIN — Medication 5 ML: at 07:14

## 2022-01-01 RX ADMIN — MIDODRINE HYDROCHLORIDE 15 MG: 5 TABLET ORAL at 12:40

## 2022-01-01 RX ADMIN — Medication 100 MG: at 09:12

## 2022-01-01 RX ADMIN — LACTULOSE 30 ML: 20 SOLUTION ORAL at 23:29

## 2022-01-01 RX ADMIN — ONDANSETRON 4 MG: 2 INJECTION INTRAMUSCULAR; INTRAVENOUS at 23:31

## 2022-01-01 RX ADMIN — SODIUM BICARBONATE: 84 INJECTION, SOLUTION INTRAVENOUS at 02:32

## 2022-01-01 RX ADMIN — Medication: at 11:00

## 2022-01-01 RX ADMIN — SODIUM CHLORIDE, PRESERVATIVE FREE 10 ML: 5 INJECTION INTRAVENOUS at 22:03

## 2022-01-01 RX ADMIN — RIFAXIMIN 550 MG: 550 TABLET ORAL at 17:04

## 2022-01-01 RX ADMIN — SODIUM CHLORIDE, PRESERVATIVE FREE 10 ML: 5 INJECTION INTRAVENOUS at 15:11

## 2022-01-01 RX ADMIN — NYSTATIN: 100000 CREAM TOPICAL at 09:15

## 2022-01-01 RX ADMIN — ALBUMIN (HUMAN) 25 G: 0.25 INJECTION, SOLUTION INTRAVENOUS at 17:50

## 2022-01-01 RX ADMIN — URSODIOL 500 MG: 500 TABLET, FILM COATED ORAL at 20:47

## 2022-01-01 RX ADMIN — Medication 1 TABLET: at 09:05

## 2022-01-01 RX ADMIN — SODIUM CHLORIDE, PRESERVATIVE FREE 10 ML: 5 INJECTION INTRAVENOUS at 20:56

## 2022-01-01 RX ADMIN — Medication: at 09:58

## 2022-01-01 RX ADMIN — SODIUM CHLORIDE, PRESERVATIVE FREE 10 ML: 5 INJECTION INTRAVENOUS at 20:50

## 2022-01-01 RX ADMIN — MIDODRINE HYDROCHLORIDE 15 MG: 5 TABLET ORAL at 13:42

## 2022-01-01 RX ADMIN — ALBUMIN (HUMAN) 25 G: 0.25 INJECTION, SOLUTION INTRAVENOUS at 06:24

## 2022-01-01 RX ADMIN — OCTREOTIDE ACETATE 100 MCG: 100 INJECTION, SOLUTION INTRAVENOUS; SUBCUTANEOUS at 09:43

## 2022-01-01 RX ADMIN — MIDODRINE HYDROCHLORIDE 15 MG: 5 TABLET ORAL at 09:12

## 2022-01-01 RX ADMIN — SODIUM CHLORIDE, PRESERVATIVE FREE 40 MG: 5 INJECTION INTRAVENOUS at 20:40

## 2022-01-01 RX ADMIN — MIDODRINE HYDROCHLORIDE 15 MG: 5 TABLET ORAL at 13:07

## 2022-01-01 RX ADMIN — NYSTATIN: 100000 CREAM TOPICAL at 17:34

## 2022-01-01 RX ADMIN — SERTRALINE 100 MG: 50 TABLET, FILM COATED ORAL at 08:11

## 2022-01-01 RX ADMIN — SODIUM CHLORIDE, PRESERVATIVE FREE 10 ML: 5 INJECTION INTRAVENOUS at 07:18

## 2022-01-01 RX ADMIN — SODIUM CHLORIDE, PRESERVATIVE FREE 10 ML: 5 INJECTION INTRAVENOUS at 17:23

## 2022-01-01 RX ADMIN — SODIUM CHLORIDE, PRESERVATIVE FREE 10 ML: 5 INJECTION INTRAVENOUS at 16:00

## 2022-01-01 RX ADMIN — MIDODRINE HYDROCHLORIDE 15 MG: 5 TABLET ORAL at 08:13

## 2022-01-01 RX ADMIN — POTASSIUM CHLORIDE 10 MEQ: 7.46 INJECTION, SOLUTION INTRAVENOUS at 12:00

## 2022-01-01 RX ADMIN — LACTULOSE 30 ML: 20 SOLUTION ORAL at 17:29

## 2022-01-01 RX ADMIN — MIDODRINE HYDROCHLORIDE 15 MG: 5 TABLET ORAL at 17:21

## 2022-01-01 RX ADMIN — Medication 5 ML: at 13:26

## 2022-01-01 RX ADMIN — SODIUM CHLORIDE, PRESERVATIVE FREE 40 MG: 5 INJECTION INTRAVENOUS at 21:34

## 2022-01-01 RX ADMIN — NYSTATIN: 100000 CREAM TOPICAL at 08:30

## 2022-01-01 RX ADMIN — URSODIOL 500 MG: 500 TABLET, FILM COATED ORAL at 08:15

## 2022-01-01 RX ADMIN — OCTREOTIDE ACETATE 100 MCG: 100 INJECTION, SOLUTION INTRAVENOUS; SUBCUTANEOUS at 22:00

## 2022-01-01 RX ADMIN — SERTRALINE 100 MG: 50 TABLET, FILM COATED ORAL at 08:37

## 2022-01-01 RX ADMIN — OCTREOTIDE ACETATE 100 MCG: 100 INJECTION, SOLUTION INTRAVENOUS; SUBCUTANEOUS at 18:05

## 2022-01-01 RX ADMIN — SODIUM CHLORIDE, PRESERVATIVE FREE 10 ML: 5 INJECTION INTRAVENOUS at 21:02

## 2022-01-01 RX ADMIN — Medication: at 19:16

## 2022-01-01 RX ADMIN — Medication 5 ML: at 11:42

## 2022-01-01 RX ADMIN — Medication: at 06:12

## 2022-01-01 RX ADMIN — SERTRALINE 100 MG: 50 TABLET, FILM COATED ORAL at 11:21

## 2022-01-01 RX ADMIN — Medication: at 08:30

## 2022-01-01 RX ADMIN — OCTREOTIDE ACETATE 100 MCG: 100 INJECTION, SOLUTION INTRAVENOUS; SUBCUTANEOUS at 17:05

## 2022-01-01 RX ADMIN — MIDODRINE HYDROCHLORIDE 15 MG: 5 TABLET ORAL at 09:57

## 2022-01-01 RX ADMIN — OCTREOTIDE ACETATE 100 MCG: 100 INJECTION, SOLUTION INTRAVENOUS; SUBCUTANEOUS at 18:12

## 2022-01-01 RX ADMIN — Medication: at 17:58

## 2022-01-01 RX ADMIN — Medication: at 14:47

## 2022-01-01 RX ADMIN — SODIUM CHLORIDE, PRESERVATIVE FREE 10 ML: 5 INJECTION INTRAVENOUS at 06:26

## 2022-01-01 RX ADMIN — RIFAXIMIN 550 MG: 550 TABLET ORAL at 09:56

## 2022-01-01 RX ADMIN — MIDODRINE HYDROCHLORIDE 15 MG: 5 TABLET ORAL at 06:48

## 2022-01-01 RX ADMIN — ALBUMIN (HUMAN) 25 G: 0.25 INJECTION, SOLUTION INTRAVENOUS at 11:23

## 2022-01-01 RX ADMIN — SERTRALINE 100 MG: 50 TABLET, FILM COATED ORAL at 09:32

## 2022-01-01 RX ADMIN — Medication: at 09:02

## 2022-01-01 RX ADMIN — SERTRALINE 100 MG: 50 TABLET, FILM COATED ORAL at 11:41

## 2022-01-01 RX ADMIN — NYSTATIN: 100000 CREAM TOPICAL at 08:12

## 2022-01-01 RX ADMIN — Medication 5 ML: at 09:15

## 2022-01-01 RX ADMIN — RIFAXIMIN 550 MG: 550 TABLET ORAL at 17:52

## 2022-01-01 RX ADMIN — OCTREOTIDE ACETATE 100 MCG: 100 INJECTION, SOLUTION INTRAVENOUS; SUBCUTANEOUS at 11:24

## 2022-01-01 RX ADMIN — FOLIC ACID 1 MG: 1 TABLET ORAL at 09:57

## 2022-01-01 RX ADMIN — OCTREOTIDE ACETATE 100 MCG: 100 INJECTION, SOLUTION INTRAVENOUS; SUBCUTANEOUS at 20:47

## 2022-01-01 RX ADMIN — Medication 100 MG: at 08:58

## 2022-01-01 RX ADMIN — PANTOPRAZOLE SODIUM 40 MG: 40 TABLET, DELAYED RELEASE ORAL at 12:42

## 2022-01-01 RX ADMIN — SODIUM CHLORIDE, PRESERVATIVE FREE 40 MG: 5 INJECTION INTRAVENOUS at 09:09

## 2022-01-01 RX ADMIN — Medication: at 18:05

## 2022-01-01 RX ADMIN — MIDODRINE HYDROCHLORIDE 15 MG: 5 TABLET ORAL at 09:17

## 2022-01-01 RX ADMIN — URSODIOL 500 MG: 500 TABLET, FILM COATED ORAL at 09:55

## 2022-01-01 RX ADMIN — LACTULOSE 30 ML: 20 SOLUTION ORAL at 06:02

## 2022-01-01 RX ADMIN — ALBUMIN (HUMAN) 25 G: 0.25 INJECTION, SOLUTION INTRAVENOUS at 00:37

## 2022-01-01 RX ADMIN — LACTULOSE 30 ML: 20 SOLUTION ORAL at 11:30

## 2022-01-01 RX ADMIN — OCTREOTIDE ACETATE 100 MCG: 100 INJECTION, SOLUTION INTRAVENOUS; SUBCUTANEOUS at 21:43

## 2022-01-01 RX ADMIN — LEVOTHYROXINE SODIUM 50 MCG: 0.05 TABLET ORAL at 06:10

## 2022-01-01 RX ADMIN — URSODIOL 500 MG: 500 TABLET, FILM COATED ORAL at 09:22

## 2022-01-01 RX ADMIN — SODIUM CHLORIDE, PRESERVATIVE FREE 40 MG: 5 INJECTION INTRAVENOUS at 20:58

## 2022-01-01 RX ADMIN — MIDODRINE HYDROCHLORIDE 15 MG: 5 TABLET ORAL at 17:29

## 2022-01-01 RX ADMIN — OCTREOTIDE ACETATE 100 MCG: 100 INJECTION, SOLUTION INTRAVENOUS; SUBCUTANEOUS at 17:12

## 2022-01-01 RX ADMIN — LACTULOSE 30 ML: 20 SOLUTION ORAL at 00:28

## 2022-01-01 RX ADMIN — MIDODRINE HYDROCHLORIDE 15 MG: 5 TABLET ORAL at 08:55

## 2022-01-01 RX ADMIN — SERTRALINE 100 MG: 50 TABLET, FILM COATED ORAL at 08:58

## 2022-01-01 RX ADMIN — Medication: at 19:06

## 2022-01-01 RX ADMIN — Medication: at 18:21

## 2022-01-01 RX ADMIN — FOLIC ACID 1 MG: 1 TABLET ORAL at 09:37

## 2022-01-01 RX ADMIN — OCTREOTIDE ACETATE 50 MCG/HR: 500 INJECTION, SOLUTION INTRAVENOUS; SUBCUTANEOUS at 10:56

## 2022-01-01 RX ADMIN — EPOETIN ALFA-EPBX 10000 UNITS: 10000 INJECTION, SOLUTION INTRAVENOUS; SUBCUTANEOUS at 21:14

## 2022-01-01 RX ADMIN — URSODIOL 500 MG: 500 TABLET, FILM COATED ORAL at 10:50

## 2022-01-01 RX ADMIN — MIDODRINE HYDROCHLORIDE 15 MG: 5 TABLET ORAL at 13:55

## 2022-01-01 RX ADMIN — SODIUM BICARBONATE 325 MG: 650 TABLET ORAL at 17:36

## 2022-01-01 RX ADMIN — Medication 5 ML: at 16:31

## 2022-01-01 RX ADMIN — MIDODRINE HYDROCHLORIDE 15 MG: 5 TABLET ORAL at 13:01

## 2022-01-01 RX ADMIN — SODIUM BICARBONATE 325 MG: 650 TABLET ORAL at 08:13

## 2022-01-01 RX ADMIN — Medication: at 17:43

## 2022-01-01 RX ADMIN — OCTREOTIDE ACETATE 100 MCG: 100 INJECTION, SOLUTION INTRAVENOUS; SUBCUTANEOUS at 17:11

## 2022-01-01 RX ADMIN — RIFAXIMIN 550 MG: 550 TABLET ORAL at 09:33

## 2022-01-01 RX ADMIN — SODIUM CHLORIDE, PRESERVATIVE FREE 10 ML: 5 INJECTION INTRAVENOUS at 21:06

## 2022-01-01 RX ADMIN — MIDODRINE HYDROCHLORIDE 15 MG: 5 TABLET ORAL at 08:44

## 2022-01-01 RX ADMIN — Medication: at 19:12

## 2022-01-01 RX ADMIN — SODIUM BICARBONATE 650 MG: 650 TABLET ORAL at 16:38

## 2022-01-01 RX ADMIN — Medication: at 10:06

## 2022-01-01 RX ADMIN — ONDANSETRON 4 MG: 2 INJECTION INTRAMUSCULAR; INTRAVENOUS at 17:38

## 2022-01-01 RX ADMIN — URSODIOL 500 MG: 500 TABLET, FILM COATED ORAL at 13:41

## 2022-01-01 RX ADMIN — ALBUMIN (HUMAN) 25 G: 0.25 INJECTION, SOLUTION INTRAVENOUS at 07:05

## 2022-01-01 RX ADMIN — ALBUMIN (HUMAN) 25 G: 0.25 INJECTION, SOLUTION INTRAVENOUS at 11:46

## 2022-01-01 RX ADMIN — SODIUM CHLORIDE, PRESERVATIVE FREE 10 ML: 5 INJECTION INTRAVENOUS at 21:17

## 2022-01-01 RX ADMIN — SODIUM CHLORIDE, PRESERVATIVE FREE 10 ML: 5 INJECTION INTRAVENOUS at 20:49

## 2022-01-01 RX ADMIN — NYSTATIN: 100000 CREAM TOPICAL at 23:39

## 2022-01-01 RX ADMIN — Medication 5 ML: at 12:35

## 2022-01-01 RX ADMIN — MIDODRINE HYDROCHLORIDE 15 MG: 5 TABLET ORAL at 13:25

## 2022-01-01 RX ADMIN — SODIUM CHLORIDE, PRESERVATIVE FREE 10 ML: 5 INJECTION INTRAVENOUS at 06:52

## 2022-01-01 RX ADMIN — Medication: at 18:20

## 2022-01-01 RX ADMIN — SERTRALINE 100 MG: 50 TABLET, FILM COATED ORAL at 10:06

## 2022-01-01 RX ADMIN — SODIUM CHLORIDE, PRESERVATIVE FREE 40 MG: 5 INJECTION INTRAVENOUS at 11:41

## 2022-01-01 RX ADMIN — SODIUM CHLORIDE, PRESERVATIVE FREE 10 ML: 5 INJECTION INTRAVENOUS at 21:37

## 2022-01-01 RX ADMIN — Medication 100 MG: at 09:56

## 2022-01-01 RX ADMIN — SODIUM BICARBONATE 650 MG: 650 TABLET ORAL at 08:37

## 2022-01-01 RX ADMIN — SODIUM BICARBONATE 325 MG: 650 TABLET ORAL at 08:43

## 2022-01-01 RX ADMIN — URSODIOL 500 MG: 500 TABLET, FILM COATED ORAL at 23:26

## 2022-01-01 RX ADMIN — MIDODRINE HYDROCHLORIDE 15 MG: 5 TABLET ORAL at 07:21

## 2022-01-01 RX ADMIN — PROPOFOL 20 MCG/KG/MIN: 10 INJECTION, EMULSION INTRAVENOUS at 21:54

## 2022-01-01 RX ADMIN — URSODIOL 500 MG: 500 TABLET, FILM COATED ORAL at 10:00

## 2022-01-01 RX ADMIN — RIFAXIMIN 550 MG: 550 TABLET ORAL at 08:55

## 2022-01-01 RX ADMIN — Medication 5 ML: at 16:24

## 2022-01-01 RX ADMIN — SODIUM BICARBONATE 325 MG: 650 TABLET ORAL at 17:50

## 2022-01-01 RX ADMIN — ONDANSETRON 4 MG: 2 INJECTION INTRAMUSCULAR; INTRAVENOUS at 15:14

## 2022-01-01 RX ADMIN — Medication 100 MG: at 08:13

## 2022-01-01 RX ADMIN — MIDODRINE HYDROCHLORIDE 15 MG: 5 TABLET ORAL at 07:18

## 2022-01-01 RX ADMIN — Medication 1 TABLET: at 09:08

## 2022-01-01 RX ADMIN — Medication 5 ML: at 06:44

## 2022-01-01 RX ADMIN — OCTREOTIDE ACETATE 100 MCG: 100 INJECTION, SOLUTION INTRAVENOUS; SUBCUTANEOUS at 17:28

## 2022-01-01 RX ADMIN — NYSTATIN: 100000 CREAM TOPICAL at 18:21

## 2022-01-01 RX ADMIN — Medication: at 09:59

## 2022-01-01 RX ADMIN — EPOETIN ALFA-EPBX 10000 UNITS: 10000 INJECTION, SOLUTION INTRAVENOUS; SUBCUTANEOUS at 21:32

## 2022-01-01 RX ADMIN — URSODIOL 500 MG: 500 TABLET, FILM COATED ORAL at 21:00

## 2022-01-01 RX ADMIN — Medication: at 11:37

## 2022-01-01 RX ADMIN — EPOETIN ALFA-EPBX 10000 UNITS: 10000 INJECTION, SOLUTION INTRAVENOUS; SUBCUTANEOUS at 20:58

## 2022-01-01 RX ADMIN — LEVOTHYROXINE SODIUM 50 MCG: 0.05 TABLET ORAL at 06:07

## 2022-01-01 RX ADMIN — SERTRALINE 100 MG: 50 TABLET, FILM COATED ORAL at 09:23

## 2022-01-01 RX ADMIN — NYSTATIN: 100000 CREAM TOPICAL at 20:40

## 2022-01-01 RX ADMIN — MIDODRINE HYDROCHLORIDE 15 MG: 5 TABLET ORAL at 12:50

## 2022-01-01 RX ADMIN — OCTREOTIDE ACETATE 100 MCG: 100 INJECTION, SOLUTION INTRAVENOUS; SUBCUTANEOUS at 17:21

## 2022-01-01 RX ADMIN — OCTREOTIDE ACETATE 100 MCG: 100 INJECTION, SOLUTION INTRAVENOUS; SUBCUTANEOUS at 17:34

## 2022-01-01 RX ADMIN — URSODIOL 500 MG: 500 TABLET, FILM COATED ORAL at 08:37

## 2022-01-01 RX ADMIN — Medication 100 MG: at 10:49

## 2022-01-01 RX ADMIN — SODIUM CHLORIDE, PRESERVATIVE FREE 10 ML: 5 INJECTION INTRAVENOUS at 06:13

## 2022-01-01 RX ADMIN — NYSTATIN: 100000 CREAM TOPICAL at 21:15

## 2022-01-01 RX ADMIN — SODIUM CHLORIDE, PRESERVATIVE FREE 10 ML: 5 INJECTION INTRAVENOUS at 06:48

## 2022-01-01 RX ADMIN — URSODIOL 500 MG: 500 TABLET, FILM COATED ORAL at 08:56

## 2022-01-01 RX ADMIN — RIFAXIMIN 550 MG: 550 TABLET ORAL at 18:09

## 2022-01-01 RX ADMIN — SODIUM CHLORIDE, PRESERVATIVE FREE 10 ML: 5 INJECTION INTRAVENOUS at 21:04

## 2022-01-01 RX ADMIN — NYSTATIN: 100000 CREAM TOPICAL at 09:02

## 2022-01-01 RX ADMIN — URSODIOL 500 MG: 500 TABLET, FILM COATED ORAL at 21:35

## 2022-01-01 RX ADMIN — Medication: at 11:42

## 2022-01-01 RX ADMIN — Medication: at 19:13

## 2022-01-01 RX ADMIN — SODIUM CHLORIDE, PRESERVATIVE FREE 40 MG: 5 INJECTION INTRAVENOUS at 13:04

## 2022-01-01 RX ADMIN — LEVOTHYROXINE SODIUM 50 MCG: 0.05 TABLET ORAL at 06:35

## 2022-01-01 RX ADMIN — SODIUM BICARBONATE 650 MG: 650 TABLET ORAL at 09:00

## 2022-01-01 RX ADMIN — RIFAXIMIN 550 MG: 550 TABLET ORAL at 17:24

## 2022-01-01 RX ADMIN — SODIUM BICARBONATE 650 MG: 650 TABLET ORAL at 09:44

## 2022-01-01 RX ADMIN — SODIUM BICARBONATE 650 MG: 650 TABLET ORAL at 08:11

## 2022-01-01 RX ADMIN — Medication 5 ML: at 18:06

## 2022-01-01 RX ADMIN — Medication 5 ML: at 16:37

## 2022-01-01 RX ADMIN — SODIUM BICARBONATE 650 MG: 650 TABLET ORAL at 23:41

## 2022-01-01 RX ADMIN — SERTRALINE 100 MG: 50 TABLET, FILM COATED ORAL at 08:29

## 2022-01-01 RX ADMIN — SERTRALINE 100 MG: 50 TABLET, FILM COATED ORAL at 13:40

## 2022-01-01 RX ADMIN — SODIUM CHLORIDE, PRESERVATIVE FREE 10 ML: 5 INJECTION INTRAVENOUS at 07:28

## 2022-01-01 RX ADMIN — Medication 1 TABLET: at 08:29

## 2022-01-01 RX ADMIN — RIFAXIMIN 550 MG: 550 TABLET ORAL at 10:06

## 2022-01-01 RX ADMIN — MIDODRINE HYDROCHLORIDE 15 MG: 5 TABLET ORAL at 13:39

## 2022-01-01 RX ADMIN — PANTOPRAZOLE SODIUM 40 MG: 40 TABLET, DELAYED RELEASE ORAL at 08:37

## 2022-01-01 RX ADMIN — SODIUM CHLORIDE, PRESERVATIVE FREE 10 ML: 5 INJECTION INTRAVENOUS at 00:46

## 2022-01-01 RX ADMIN — SODIUM BICARBONATE 650 MG: 650 TABLET ORAL at 09:13

## 2022-01-01 RX ADMIN — OCTREOTIDE ACETATE 100 MCG: 100 INJECTION, SOLUTION INTRAVENOUS; SUBCUTANEOUS at 09:38

## 2022-01-01 RX ADMIN — LACTULOSE 30 ML: 20 SOLUTION ORAL at 17:38

## 2022-01-01 RX ADMIN — MIDODRINE HYDROCHLORIDE 15 MG: 5 TABLET ORAL at 13:11

## 2022-01-01 RX ADMIN — SODIUM BICARBONATE 650 MG: 650 TABLET ORAL at 17:22

## 2022-01-01 RX ADMIN — URSODIOL 500 MG: 500 TABLET, FILM COATED ORAL at 09:23

## 2022-01-01 RX ADMIN — ALBUMIN (HUMAN) 25 G: 0.25 INJECTION, SOLUTION INTRAVENOUS at 02:31

## 2022-01-01 RX ADMIN — Medication 5 ML: at 17:08

## 2022-01-01 RX ADMIN — Medication 5 ML: at 08:13

## 2022-01-01 RX ADMIN — MIDODRINE HYDROCHLORIDE 15 MG: 5 TABLET ORAL at 18:34

## 2022-01-01 RX ADMIN — SODIUM BICARBONATE 650 MG: 650 TABLET ORAL at 09:32

## 2022-01-01 RX ADMIN — Medication 5 ML: at 06:57

## 2022-01-01 RX ADMIN — IRON SUCROSE 200 MG: 20 INJECTION, SOLUTION INTRAVENOUS at 17:06

## 2022-01-01 RX ADMIN — Medication 1 TABLET: at 11:39

## 2022-01-01 RX ADMIN — SODIUM BICARBONATE 325 MG: 650 TABLET ORAL at 09:15

## 2022-01-01 RX ADMIN — OCTREOTIDE ACETATE 100 MCG: 100 INJECTION, SOLUTION INTRAVENOUS; SUBCUTANEOUS at 21:01

## 2022-01-01 RX ADMIN — SODIUM CHLORIDE, PRESERVATIVE FREE 10 ML: 5 INJECTION INTRAVENOUS at 16:24

## 2022-01-01 RX ADMIN — RIFAXIMIN 550 MG: 550 TABLET ORAL at 09:34

## 2022-01-01 RX ADMIN — OCTREOTIDE ACETATE 100 MCG: 100 INJECTION, SOLUTION INTRAVENOUS; SUBCUTANEOUS at 09:36

## 2022-01-01 RX ADMIN — SODIUM CHLORIDE 100 ML/HR: 9 INJECTION, SOLUTION INTRAVENOUS at 08:56

## 2022-01-01 RX ADMIN — SODIUM CHLORIDE, PRESERVATIVE FREE 10 ML: 5 INJECTION INTRAVENOUS at 17:36

## 2022-01-01 RX ADMIN — SODIUM CHLORIDE, PRESERVATIVE FREE 40 MG: 5 INJECTION INTRAVENOUS at 21:12

## 2022-01-01 RX ADMIN — OCTREOTIDE ACETATE 100 MCG: 100 INJECTION, SOLUTION INTRAVENOUS; SUBCUTANEOUS at 08:30

## 2022-01-01 RX ADMIN — Medication 100 MG: at 09:33

## 2022-01-01 RX ADMIN — URSODIOL 500 MG: 500 TABLET, FILM COATED ORAL at 09:58

## 2022-01-01 RX ADMIN — SODIUM BICARBONATE 650 MG: 650 TABLET ORAL at 21:01

## 2022-01-01 RX ADMIN — Medication: at 20:09

## 2022-01-01 RX ADMIN — ALBUMIN (HUMAN) 25 G: 0.25 INJECTION, SOLUTION INTRAVENOUS at 16:01

## 2022-01-01 RX ADMIN — SODIUM CHLORIDE, PRESERVATIVE FREE 40 MG: 5 INJECTION INTRAVENOUS at 21:06

## 2022-01-01 RX ADMIN — MIDODRINE HYDROCHLORIDE 15 MG: 5 TABLET ORAL at 07:05

## 2022-01-01 RX ADMIN — Medication: at 17:24

## 2022-01-01 RX ADMIN — SODIUM BICARBONATE 650 MG: 650 TABLET ORAL at 08:10

## 2022-01-01 RX ADMIN — ALBUMIN (HUMAN) 25 G: 0.25 INJECTION, SOLUTION INTRAVENOUS at 07:12

## 2022-01-01 RX ADMIN — LACTULOSE 30 ML: 20 SOLUTION ORAL at 06:24

## 2022-01-01 RX ADMIN — SODIUM CHLORIDE, PRESERVATIVE FREE 10 ML: 5 INJECTION INTRAVENOUS at 13:21

## 2022-01-01 RX ADMIN — LEVOTHYROXINE SODIUM 50 MCG: 0.05 TABLET ORAL at 06:41

## 2022-01-01 RX ADMIN — MIDODRINE HYDROCHLORIDE 15 MG: 5 TABLET ORAL at 20:41

## 2022-01-01 RX ADMIN — EPOETIN ALFA-EPBX 10000 UNITS: 10000 INJECTION, SOLUTION INTRAVENOUS; SUBCUTANEOUS at 21:26

## 2022-01-01 RX ADMIN — SODIUM CHLORIDE, PRESERVATIVE FREE 40 MG: 5 INJECTION INTRAVENOUS at 21:14

## 2022-01-01 RX ADMIN — LEVOTHYROXINE SODIUM 50 MCG: 0.05 TABLET ORAL at 06:08

## 2022-01-01 RX ADMIN — SODIUM CHLORIDE, PRESERVATIVE FREE 10 ML: 5 INJECTION INTRAVENOUS at 15:35

## 2022-01-01 RX ADMIN — SODIUM CHLORIDE, PRESERVATIVE FREE 10 ML: 5 INJECTION INTRAVENOUS at 21:57

## 2022-01-01 RX ADMIN — Medication: at 17:37

## 2022-01-01 RX ADMIN — PANTOPRAZOLE SODIUM 40 MG: 40 TABLET, DELAYED RELEASE ORAL at 09:56

## 2022-01-01 RX ADMIN — Medication 5 ML: at 17:02

## 2022-01-01 RX ADMIN — FOLIC ACID 1 MG: 1 TABLET ORAL at 11:39

## 2022-01-01 RX ADMIN — URSODIOL 500 MG: 500 TABLET, FILM COATED ORAL at 10:11

## 2022-01-01 RX ADMIN — SODIUM BICARBONATE: 84 INJECTION, SOLUTION INTRAVENOUS at 22:17

## 2022-01-01 RX ADMIN — SERTRALINE 100 MG: 50 TABLET, FILM COATED ORAL at 10:00

## 2022-01-01 RX ADMIN — MIDODRINE HYDROCHLORIDE 15 MG: 5 TABLET ORAL at 07:07

## 2022-01-01 RX ADMIN — LACTULOSE 30 ML: 20 SOLUTION ORAL at 13:55

## 2022-01-01 RX ADMIN — Medication 1 TABLET: at 09:14

## 2022-01-01 RX ADMIN — SODIUM CHLORIDE, PRESERVATIVE FREE 10 ML: 5 INJECTION INTRAVENOUS at 18:07

## 2022-01-01 RX ADMIN — URSODIOL 500 MG: 500 TABLET, FILM COATED ORAL at 21:10

## 2022-01-01 RX ADMIN — OCTREOTIDE ACETATE 100 MCG: 100 INJECTION, SOLUTION INTRAVENOUS; SUBCUTANEOUS at 16:25

## 2022-01-01 RX ADMIN — Medication 5 ML: at 06:35

## 2022-01-01 RX ADMIN — LACTULOSE 30 ML: 20 SOLUTION ORAL at 23:59

## 2022-01-01 RX ADMIN — SODIUM CHLORIDE, PRESERVATIVE FREE 10 ML: 5 INJECTION INTRAVENOUS at 06:22

## 2022-01-01 RX ADMIN — LACTULOSE 30 ML: 20 SOLUTION ORAL at 17:23

## 2022-01-01 RX ADMIN — ALBUMIN (HUMAN) 25 G: 0.25 INJECTION, SOLUTION INTRAVENOUS at 06:08

## 2022-01-01 RX ADMIN — SODIUM BICARBONATE 650 MG: 650 TABLET ORAL at 21:41

## 2022-01-01 RX ADMIN — SODIUM CHLORIDE, PRESERVATIVE FREE 40 MG: 5 INJECTION INTRAVENOUS at 21:00

## 2022-01-01 RX ADMIN — ACETAMINOPHEN 650 MG: 325 TABLET ORAL at 16:46

## 2022-01-01 RX ADMIN — OCTREOTIDE ACETATE 100 MCG: 100 INJECTION, SOLUTION INTRAVENOUS; SUBCUTANEOUS at 21:35

## 2022-01-01 RX ADMIN — Medication 1 TABLET: at 08:37

## 2022-01-01 RX ADMIN — LEVOTHYROXINE SODIUM 50 MCG: 0.05 TABLET ORAL at 05:50

## 2022-01-01 RX ADMIN — MIDODRINE HYDROCHLORIDE 15 MG: 5 TABLET ORAL at 06:22

## 2022-01-01 RX ADMIN — MIDODRINE HYDROCHLORIDE 15 MG: 5 TABLET ORAL at 17:04

## 2022-01-01 RX ADMIN — FOLIC ACID 1 MG: 1 TABLET ORAL at 09:33

## 2022-01-01 RX ADMIN — SODIUM CHLORIDE, PRESERVATIVE FREE 40 MG: 5 INJECTION INTRAVENOUS at 21:08

## 2022-01-01 RX ADMIN — MIDODRINE HYDROCHLORIDE 15 MG: 5 TABLET ORAL at 12:21

## 2022-01-01 RX ADMIN — SERTRALINE 100 MG: 50 TABLET, FILM COATED ORAL at 08:12

## 2022-01-01 RX ADMIN — Medication 1 TABLET: at 09:37

## 2022-01-01 RX ADMIN — SODIUM CHLORIDE, PRESERVATIVE FREE 10 ML: 5 INJECTION INTRAVENOUS at 07:17

## 2022-01-01 RX ADMIN — POTASSIUM CHLORIDE 40 MEQ: 750 TABLET, EXTENDED RELEASE ORAL at 17:46

## 2022-01-01 RX ADMIN — OCTREOTIDE ACETATE 100 MCG: 100 INJECTION, SOLUTION INTRAVENOUS; SUBCUTANEOUS at 17:29

## 2022-01-01 RX ADMIN — ALBUMIN (HUMAN) 25 G: 0.25 INJECTION, SOLUTION INTRAVENOUS at 00:40

## 2022-01-01 RX ADMIN — POTASSIUM BICARBONATE 20 MEQ: 782 TABLET, EFFERVESCENT ORAL at 09:32

## 2022-01-01 RX ADMIN — SODIUM CHLORIDE, PRESERVATIVE FREE 40 MG: 5 INJECTION INTRAVENOUS at 09:14

## 2022-01-01 RX ADMIN — MIDODRINE HYDROCHLORIDE 15 MG: 5 TABLET ORAL at 11:22

## 2022-01-01 RX ADMIN — Medication: at 17:53

## 2022-01-01 RX ADMIN — ONDANSETRON 4 MG: 2 INJECTION INTRAMUSCULAR; INTRAVENOUS at 14:09

## 2022-01-01 RX ADMIN — RIFAXIMIN 550 MG: 550 TABLET ORAL at 12:43

## 2022-01-01 RX ADMIN — NYSTATIN: 100000 CREAM TOPICAL at 09:23

## 2022-01-01 RX ADMIN — SODIUM CHLORIDE, PRESERVATIVE FREE 40 MG: 5 INJECTION INTRAVENOUS at 21:24

## 2022-01-01 RX ADMIN — RIFAXIMIN 550 MG: 550 TABLET ORAL at 09:15

## 2022-01-01 RX ADMIN — FOLIC ACID 1 MG: 1 TABLET ORAL at 09:15

## 2022-01-01 RX ADMIN — SODIUM CHLORIDE, PRESERVATIVE FREE 40 MG: 5 INJECTION INTRAVENOUS at 22:33

## 2022-01-01 RX ADMIN — URSODIOL 500 MG: 500 TABLET, FILM COATED ORAL at 09:01

## 2022-01-01 RX ADMIN — SODIUM CHLORIDE, PRESERVATIVE FREE 40 MG: 5 INJECTION INTRAVENOUS at 09:34

## 2022-01-01 RX ADMIN — PANTOPRAZOLE SODIUM 40 MG: 40 TABLET, DELAYED RELEASE ORAL at 10:07

## 2022-01-01 RX ADMIN — OCTREOTIDE ACETATE 100 MCG: 100 INJECTION, SOLUTION INTRAVENOUS; SUBCUTANEOUS at 21:51

## 2022-01-01 RX ADMIN — RIFAXIMIN 550 MG: 550 TABLET ORAL at 09:36

## 2022-01-01 RX ADMIN — Medication: at 09:44

## 2022-01-01 RX ADMIN — EPOETIN ALFA-EPBX 10000 UNITS: 10000 INJECTION, SOLUTION INTRAVENOUS; SUBCUTANEOUS at 21:03

## 2022-01-01 RX ADMIN — LEVOTHYROXINE SODIUM 50 MCG: 0.05 TABLET ORAL at 08:32

## 2022-01-01 RX ADMIN — LEVOTHYROXINE SODIUM 50 MCG: 0.05 TABLET ORAL at 06:02

## 2022-01-01 RX ADMIN — MIDODRINE HYDROCHLORIDE 15 MG: 5 TABLET ORAL at 12:08

## 2022-01-01 RX ADMIN — Medication: at 18:41

## 2022-01-01 RX ADMIN — Medication 5 ML: at 16:00

## 2022-01-01 RX ADMIN — ONDANSETRON 4 MG: 2 INJECTION INTRAMUSCULAR; INTRAVENOUS at 21:03

## 2022-01-01 RX ADMIN — SODIUM CHLORIDE, PRESERVATIVE FREE 10 ML: 5 INJECTION INTRAVENOUS at 22:42

## 2022-01-01 RX ADMIN — LEVOTHYROXINE SODIUM 50 MCG: 0.05 TABLET ORAL at 06:22

## 2022-01-01 RX ADMIN — SODIUM BICARBONATE: 84 INJECTION, SOLUTION INTRAVENOUS at 11:06

## 2022-01-01 RX ADMIN — LIDOCAINE HYDROCHLORIDE 10 ML: 10 INJECTION, SOLUTION EPIDURAL; INFILTRATION; INTRACAUDAL; PERINEURAL at 15:00

## 2022-01-01 RX ADMIN — SODIUM CHLORIDE, PRESERVATIVE FREE 10 ML: 5 INJECTION INTRAVENOUS at 06:08

## 2022-01-01 RX ADMIN — URSODIOL 500 MG: 500 TABLET, FILM COATED ORAL at 20:56

## 2022-01-01 RX ADMIN — Medication 100 MG: at 08:29

## 2022-01-01 RX ADMIN — Medication 5 ML: at 13:29

## 2022-01-01 RX ADMIN — ONDANSETRON 4 MG: 2 INJECTION INTRAMUSCULAR; INTRAVENOUS at 04:25

## 2022-01-01 RX ADMIN — OCTREOTIDE ACETATE 100 MCG: 100 INJECTION, SOLUTION INTRAVENOUS; SUBCUTANEOUS at 12:16

## 2022-01-01 RX ADMIN — Medication: at 09:14

## 2022-01-01 RX ADMIN — SODIUM CHLORIDE 500 ML: 9 INJECTION, SOLUTION INTRAVENOUS at 10:15

## 2022-01-01 RX ADMIN — OCTREOTIDE ACETATE 100 MCG: 100 INJECTION, SOLUTION INTRAVENOUS; SUBCUTANEOUS at 09:46

## 2022-01-01 RX ADMIN — FOLIC ACID 1 MG: 1 TABLET ORAL at 08:58

## 2022-01-01 RX ADMIN — Medication: at 10:49

## 2022-01-01 RX ADMIN — ALBUMIN (HUMAN) 25 G: 0.25 INJECTION, SOLUTION INTRAVENOUS at 10:57

## 2022-01-01 RX ADMIN — SODIUM BICARBONATE 650 MG: 650 TABLET ORAL at 11:25

## 2022-01-01 RX ADMIN — OCTREOTIDE ACETATE 100 MCG: 100 INJECTION, SOLUTION INTRAVENOUS; SUBCUTANEOUS at 17:51

## 2022-01-01 RX ADMIN — OCTREOTIDE ACETATE 100 MCG: 100 INJECTION, SOLUTION INTRAVENOUS; SUBCUTANEOUS at 17:50

## 2022-01-01 RX ADMIN — OCTREOTIDE ACETATE 100 MCG: 100 INJECTION, SOLUTION INTRAVENOUS; SUBCUTANEOUS at 21:18

## 2022-01-01 RX ADMIN — SODIUM CHLORIDE, PRESERVATIVE FREE 10 ML: 5 INJECTION INTRAVENOUS at 10:11

## 2022-01-01 RX ADMIN — SODIUM CHLORIDE, PRESERVATIVE FREE 40 MG: 5 INJECTION INTRAVENOUS at 21:35

## 2022-01-01 RX ADMIN — RIFAXIMIN 550 MG: 550 TABLET ORAL at 17:46

## 2022-01-01 RX ADMIN — NYSTATIN: 100000 CREAM TOPICAL at 09:34

## 2022-01-01 RX ADMIN — Medication: at 19:10

## 2022-01-01 RX ADMIN — LEVOTHYROXINE SODIUM 50 MCG: 0.05 TABLET ORAL at 06:19

## 2022-01-01 RX ADMIN — Medication 5 ML: at 17:35

## 2022-01-01 RX ADMIN — NYSTATIN: 100000 CREAM TOPICAL at 22:11

## 2022-01-01 RX ADMIN — SODIUM CHLORIDE, PRESERVATIVE FREE 10 ML: 5 INJECTION INTRAVENOUS at 21:36

## 2022-01-01 RX ADMIN — EPOETIN ALFA-EPBX 10000 UNITS: 10000 INJECTION, SOLUTION INTRAVENOUS; SUBCUTANEOUS at 21:00

## 2022-01-01 RX ADMIN — OCTREOTIDE ACETATE 100 MCG: 100 INJECTION, SOLUTION INTRAVENOUS; SUBCUTANEOUS at 18:42

## 2022-01-01 RX ADMIN — Medication 100 MG: at 08:55

## 2022-01-01 RX ADMIN — Medication 5 ML: at 06:08

## 2022-01-01 RX ADMIN — HYDROMORPHONE HYDROCHLORIDE 0.2 MG: 1 INJECTION, SOLUTION INTRAMUSCULAR; INTRAVENOUS; SUBCUTANEOUS at 15:14

## 2022-01-01 RX ADMIN — NYSTATIN: 100000 CREAM TOPICAL at 10:50

## 2022-01-01 RX ADMIN — NYSTATIN: 100000 CREAM TOPICAL at 10:14

## 2022-01-01 RX ADMIN — Medication 100 MG: at 10:07

## 2022-01-01 RX ADMIN — SODIUM CHLORIDE, PRESERVATIVE FREE 10 ML: 5 INJECTION INTRAVENOUS at 21:08

## 2022-01-01 RX ADMIN — SERTRALINE 100 MG: 50 TABLET, FILM COATED ORAL at 12:26

## 2022-01-01 RX ADMIN — FOLIC ACID 1 MG: 1 TABLET ORAL at 08:43

## 2022-01-01 RX ADMIN — MIDODRINE HYDROCHLORIDE 15 MG: 5 TABLET ORAL at 17:23

## 2022-01-01 RX ADMIN — Medication: at 17:47

## 2022-01-01 RX ADMIN — NYSTATIN: 100000 CREAM TOPICAL at 17:47

## 2022-01-01 RX ADMIN — SODIUM BICARBONATE 325 MG: 650 TABLET ORAL at 17:55

## 2022-01-01 RX ADMIN — ALBUMIN (HUMAN) 25 G: 0.25 INJECTION, SOLUTION INTRAVENOUS at 10:00

## 2022-01-01 RX ADMIN — OCTREOTIDE ACETATE 100 MCG: 100 INJECTION, SOLUTION INTRAVENOUS; SUBCUTANEOUS at 17:42

## 2022-01-01 RX ADMIN — SODIUM CHLORIDE, PRESERVATIVE FREE 10 ML: 5 INJECTION INTRAVENOUS at 06:37

## 2022-01-01 RX ADMIN — Medication: at 11:26

## 2022-01-01 RX ADMIN — Medication: at 17:31

## 2022-01-01 RX ADMIN — RIFAXIMIN 550 MG: 550 TABLET ORAL at 17:02

## 2022-01-01 RX ADMIN — Medication 100 MG: at 08:11

## 2022-01-01 RX ADMIN — SODIUM BICARBONATE 650 MG: 650 TABLET ORAL at 20:40

## 2022-01-01 RX ADMIN — PANTOPRAZOLE SODIUM 40 MG: 40 TABLET, DELAYED RELEASE ORAL at 11:14

## 2022-01-01 RX ADMIN — LEVOTHYROXINE SODIUM 50 MCG: 0.05 TABLET ORAL at 06:09

## 2022-01-01 RX ADMIN — Medication: at 20:14

## 2022-01-01 RX ADMIN — Medication 5 ML: at 12:12

## 2022-01-01 RX ADMIN — Medication: at 20:10

## 2022-01-01 RX ADMIN — FOLIC ACID 1 MG: 1 TABLET ORAL at 11:25

## 2022-01-01 RX ADMIN — LACTULOSE 30 ML: 20 SOLUTION ORAL at 07:38

## 2022-01-01 RX ADMIN — Medication 1 TABLET: at 08:06

## 2022-01-01 RX ADMIN — Medication: at 09:36

## 2022-01-01 RX ADMIN — NYSTATIN: 100000 CREAM TOPICAL at 09:40

## 2022-01-01 RX ADMIN — OCTREOTIDE ACETATE 100 MCG: 100 INJECTION, SOLUTION INTRAVENOUS; SUBCUTANEOUS at 16:37

## 2022-01-01 RX ADMIN — Medication 5 ML: at 13:07

## 2022-01-01 RX ADMIN — Medication 5 ML: at 12:00

## 2022-01-01 RX ADMIN — EPOETIN ALFA-EPBX 10000 UNITS: 10000 INJECTION, SOLUTION INTRAVENOUS; SUBCUTANEOUS at 21:04

## 2022-01-01 RX ADMIN — Medication 5 ML: at 06:23

## 2022-01-01 RX ADMIN — Medication 5 ML: at 17:23

## 2022-01-01 RX ADMIN — Medication: at 18:49

## 2022-01-01 RX ADMIN — Medication 5 ML: at 11:40

## 2022-01-01 RX ADMIN — RIFAXIMIN 550 MG: 550 TABLET ORAL at 09:00

## 2022-01-01 RX ADMIN — FOLIC ACID 1 MG: 1 TABLET ORAL at 10:49

## 2022-01-01 RX ADMIN — PANTOPRAZOLE SODIUM 80 MG: 40 INJECTION, POWDER, FOR SOLUTION INTRAVENOUS at 10:35

## 2022-01-01 RX ADMIN — RIFAXIMIN 550 MG: 550 TABLET ORAL at 17:05

## 2022-01-01 RX ADMIN — ONDANSETRON 4 MG: 2 INJECTION INTRAMUSCULAR; INTRAVENOUS at 18:13

## 2022-01-01 RX ADMIN — OCTREOTIDE ACETATE 100 MCG: 100 INJECTION, SOLUTION INTRAVENOUS; SUBCUTANEOUS at 17:19

## 2022-01-01 RX ADMIN — MIDODRINE HYDROCHLORIDE 15 MG: 5 TABLET ORAL at 18:05

## 2022-01-01 RX ADMIN — OCTREOTIDE ACETATE 50 MCG/HR: 500 INJECTION, SOLUTION INTRAVENOUS; SUBCUTANEOUS at 22:40

## 2022-01-01 RX ADMIN — MIDODRINE HYDROCHLORIDE 15 MG: 5 TABLET ORAL at 13:03

## 2022-01-01 RX ADMIN — OCTREOTIDE ACETATE 100 MCG: 100 INJECTION, SOLUTION INTRAVENOUS; SUBCUTANEOUS at 16:24

## 2022-01-01 RX ADMIN — SODIUM BICARBONATE 650 MG: 650 TABLET ORAL at 17:02

## 2022-01-01 RX ADMIN — Medication 1 TABLET: at 08:12

## 2022-01-01 RX ADMIN — NYSTATIN: 100000 CREAM TOPICAL at 17:20

## 2022-01-01 RX ADMIN — EPOETIN ALFA-EPBX 10000 UNITS: 10000 INJECTION, SOLUTION INTRAVENOUS; SUBCUTANEOUS at 21:44

## 2022-01-01 RX ADMIN — OCTREOTIDE ACETATE 100 MCG: 100 INJECTION, SOLUTION INTRAVENOUS; SUBCUTANEOUS at 22:06

## 2022-01-01 RX ADMIN — OCTREOTIDE ACETATE 100 MCG: 100 INJECTION, SOLUTION INTRAVENOUS; SUBCUTANEOUS at 09:08

## 2022-01-25 PROBLEM — R18.8 ASCITES: Status: ACTIVE | Noted: 2022-01-01

## 2022-01-25 PROBLEM — K92.2 UGIB (UPPER GASTROINTESTINAL BLEED): Status: RESOLVED | Noted: 2021-01-01 | Resolved: 2022-01-01

## 2022-01-25 PROBLEM — K70.31 ASCITES DUE TO ALCOHOLIC CIRRHOSIS (HCC): Status: ACTIVE | Noted: 2022-01-01

## 2022-01-25 NOTE — PROGRESS NOTES
[facility] [place], [place], [address], [place], [place]      ISAAC Ball, ACNP-BC     Michelle Lopez, [facility]   DEANNA RussellP-JAKE Allison, [facility]       Namita Montemayor [facility] 136    at [address]    217 [facility], [address] [place] [address], [facility] 22. [phone]    FAX: [address] [place] [address] Avenue    [address], [address]    [phone]    FAX: [phone]       Patient Care Team:  Nicolas Richter MD as PCP - General (Family Medicine)      Problem List  Date Reviewed: 2/8/2022          Codes Class Noted    Iron deficiency anemia due to chronic blood loss (Chronic) ICD-10-CM: D50.0  ICD-9-CM: 280.0  11/10/2021    Overview Signed 11/10/2021  3:00 PM by Farzaneh Olmos MD     10/20/21 F3 26 TIBC 312  8% sat             Hypothyroidism ICD-10-CM: E03.9  ICD-9-CM: 244.9  11/10/2021    Overview Signed 11/10/2021  3:52 PM by Farzaneh Olmos MD     10/20/21 TSH 5.27             Cirrhosis ([facility] ICD-10-CM: K74.60  ICD-9-CM: 571.5  2/8/2022        Ascites ICD-10-CM: R18.8  ICD-9-CM: 789.59  1/25/2022        Esophageal varices ([facility] [address] ICD-10-CM: I85.00  ICD-9-CM: 456.1  11/6/2021    Overview Signed 11/14/2021  8:02 AM by Farzaneh Olmos MD     MELD 22, DF 34             Anxiety ICD-10-CM: F41.9  ICD-9-CM: 300.00  10/19/2021        Basal cell carcinoma (BCC) of face ICD-10-CM: C44.310  ICD-9-CM: 173.31  10/19/2021        Depression ICD-10-CM: F32. A  ICD-9-CM: 318  10/19/2021        Anasarca ICD-10-CM: R60.1  ICD-9-CM: 782.3  10/19/2021        Thrombocytopenia (HCC) ICD-10-CM: D69.6  ICD-9-CM: 287.5  10/19/2021        Essential hypertension ICD-10-CM: I10  ICD-9-CM: 401.9  10/18/2021    Overview Signed 10/19/2021  1:34 AM by ISAAC Langston dose of Toprolol to BID for better BP control. Follow up in three months with LIYAH Gallegos for bp check. The clinicians listed above have asked me to see Angel Lopez in consultation regarding management of cirrhosis that is presumed secondary to alcohol. All medical records sent by the referring physicians were reviewed including imaging studies     The patient is a 65 y.o.  female who was found to have chronic liver disease in 2020. The patient was found to have cirrhosis in 7/2021 when she developed ascites. She had variceal bleeding in 11/2021    An assessment of liver fibrosis with biopsy or elastography has not been performed. Serologic evaluation for markers of chronic liver disease has either not been performed or the results are not available. The patient has developed the following complications of cirrhosis:   esophageal varices,   variceal bleeding,   ascites,   edema,   hepatic encephalopathy,     The patient has the following symptoms which could be attributed to the liver disorder:    fatigue,   diffuse abdominal pain,   nausea,   vomiting,   problems concentrating,   swelling of the abdomen,   swelling of the lower extremities,     The patient is not experiencing the following symptoms which are commonly seen in this liver disorder:   hematemesis,   hematochezia. The patient has limitations in functional activities which can be attributed to the liver disease         ASSESSMENT AND PLAN:  Cirrhosis  The diagnosis of cirrhosis is based upon imaging, laboratory studies, complications of cirrhosis. Cirrhosis is secondary to [facility]. Not alcohol as she was initially told. Serologic testing for causes of chronic liver disease was ordered. Results were positive for AMA, ASMA    The CTP is 9. Child class B. The MELD score is 21. The patient has a MELD score greater than 15 and has developed complications of cirrhosis.   Will initiate liver transplant evaluation testing. We have discussed the liver transplant process, how patients are listed for liver transplant, the MELD system and various liver transplant centers. The patient would like to be seen at [facility] [address], [facility]    Primary Biliary Cholangitis  The diagnosis is based upon serology and an elevation in ALP. A liver biopsy has not been performed. Have performed laboratory testing to monitor liver function and degree of liver injury. This included BMP, hepatic panel, CBC with platelet count, INR. AST is elevated. ALT is normal.  ALP is elevated. Total bilirubin is elevated. Serum albumin is depressed. INR is prolonged. The platelet count is depressed. The patient will be started on ANTONIETA at a dose of 1000 mg every day. Vitamin malabsorption  Patients with PBC do not efficiently absorb fat soluble vitamins A,D,E, K. It has been recommended that the patient take multivitamins with excess fat soluble vitamins. Breast cancer screeing   Women with PBC have an increased risk of breast cancer and should undergo regular mammography screenings. This will be part of liver transplant evaluation testing. Ascites   Ascites developed for the first time in 7/2021. Ascites is is refractory to the current doses of diuretics because of kidney disease and Hyponatremia. Will continue the current dose of diuretics at step 1. Will reduce Bumex to every day. The patient was counseled regarding the need to maintain sodium restriction and the types of foods containing high amounts of sodium to be avoided. Lower extremity edema  Edema is refractory to the current doses of diuretics because of kidney disease and Hyponatremia. Will continue the current dose of diuretics at step 1. Screening for Esophageal varices   The patient has not had an EGD to screen for varices.   Will schedule for EGD to assess for varices     Hepatic encephalopathy   Overt HE has not developed to date. There is no reason for treatment with lactulose of xifaxan  There is no need to restrict dietary protein at this time. Thrombocytopenia   This is secondary to cirrhosis. There is no evidence of overt bleeding. No treatment is required. The platelet count is adequate for the patient to undergo procedures without the need for platelet transfusion or platelet growth factors. Anemia   This is due to multifactorial causes including portal hypertension with chronic GI blood loss    The most recent FE studies were from 1/2022. The serum ferritin is 87  The FE saturation is 74  FE panel is within the normal range but this is low a patient with chronic liver disease and cirrhosis   Will administer intravenous iron. Will schedule for EGD to assess for UGI blood loss. Screening for Hepatocellular Carcinoma  HCC screening has not been not been performed   Will perform dynamic MRI     Treatment of other medical problems in patients with chronic liver disease  There are no contraindications for the patient to take most medications that are necessary for treatment of other medical issues. The patient has cirrhrosis and should avoid taking NSAIDs which are associated with a higher rate of developing GREG. The patient can take any medications utilized for treatment of DM, statins to treat hypercholesterolemia  Normal doses of acetaminophen, as recommended on the label of the bottle, are not hepatotoxic except in the setting of daily alcohol use, even in patients with cirrhosis and can be utilized for pain. Counseling for alcohol in patients with chronic liver disease  The patient was counseled regarding alcohol consumption and the effect of alcohol on chronic liver disease. The patient has not consumed alcohol since 5/2021. Osteoporosis  The risk of osteoporosis is increased in patients with cirrhosis. DEXA bone density to assess for osteoporosis has not been performed. This will be scheduled as part of liver transplant evaluation. Vaccinations   Vaccination for viral hepatitis A is not needed. The patient has serologic evidence of prior exposure or vaccination with immunity. Routine vaccinations against other bacterial and viral agents can be performed as indicated. Annual flu vaccination should be administered if indicated. ALLERGIES  Allergies   Allergen Reactions    Morphine Other (comments)     Severe HA. ALL narcotics! !!       MEDICATIONS  Current Outpatient Medications   Medication Sig    potassium chloride SA (MICRO-K) 10 mEq capsule Take 20 mEq by mouth two (2) times a day.  midodrine (PROAMATINE) 10 mg tablet Take 1 Tablet by mouth three (3) times daily (with meals) for 30 days.  rifAXIMin (XIFAXAN) 550 mg tablet Take 1 Tablet by mouth two (2) times a day for 30 days. Indications: impaired brain function due to liver disease    levothyroxine (SYNTHROID) 50 mcg tablet TAKE 1 TABLET BY MOUTH DAILY BEFORE AND BREAKFAST    ascorbic acid, vitamin C, (VITAMIN C) 250 mg tablet Take 1 Tablet by mouth two (2) times a day. Take with iron pill    bumetanide (BUMEX) 1 mg tablet Take 1 Tablet by mouth two (2) times a day. Indications: accumulation of fluid caused by cirrhosis of the liver    ferrous gluconate 324 mg (38 mg iron) tablet Take 1 Tablet by mouth two (2) times a day.  folic acid (FOLVITE) 1 mg tablet Take 1 Tablet by mouth daily.  magnesium oxide (MAG-OX) 400 mg tablet Take 1 Tablet by mouth daily.  sertraline (Zoloft) 100 mg tablet Take 1 Tablet by mouth daily.  thiamine HCL (B-1) 100 mg tablet Take 1 Tablet by mouth daily.  spironolactone (ALDACTONE) 100 mg tablet Take 1 Tablet by mouth daily. Indications: accumulation of fluid caused by cirrhosis of the liver     No current facility-administered medications for this visit.        SYSTEM REVIEW NOT RELATED TO LIVER DISEASE OR REVIEWED ABOVE:  Constitution systems: Negative for fever, chills, weight gain, weight loss. Eyes: Negative for visual changes. ENT: Negative for sore throat, painful swallowing. Respiratory: Negative for cough, hemoptysis, SOB. Cardiology: Negative for chest pain, palpitations. GI:  She believes she has seen worms in her stool. : Negative for urinary frequency, dysuria, hematuria, nocturia. Skin: Negative for rash. Hematology: Negative for easy bruising, blood clots. Musculo-skelatal: Negative for back pain, muscle pain, weakness. Neurologic: Negative for headaches, dizziness, vertigo, memory problems not related to HE. Psychology: Negative for anxiety, depression. FAMILY HISTORY:  The father  of COVID at age 80 years. The mother Has/had the following chronic disease(s): None. There is no family history of liver disease. SOCIAL HISTORY:  The patient is . The patient has 2 children,   The patient stopped using tobacco products in . The patient consumes 2-3 alcoholic beverages per day over many years. The patient has been abstinent from alcohol since 2021. The patient does not work outside the home. PHYSICAL EXAMINATION:  Visit Vitals  BP (!) 136/93   Pulse 96   Temp 97.7 °F (36.5 °C) (Tympanic)   Ht 5' 4\" (1.626 m)   SpO2 96%   BMI 26.26 kg/m²     General: No acute distress. Eyes: Sclera anicteric. ENT: No oral lesions. Thyroid normal.  Nodes: No adenopathy. Skin: No spider angiomata. No jaundice. No palmar erythema. Respiratory: Lungs clear to auscultation. Cardiovascular: Regular heart rate. No murmurs. No JVD. Abdomen: Soft non-tender. Liver size normal to percussion/palpation. Spleen not palpable. No obvious ascites. Extremities: No edema. No muscle wasting. No gross arthritic changes. Neurologic: Alert and oriented. Cranial nerves grossly intact. No asterixis.     LABORATORY STUDIES:  Liver [place] of [address] Units 2021   WBC 4.6 - 13.2 K/uL 8.1 8.2   ANC 1.8 - 8.0 K/UL 5.4    HGB 12.0 - 16.0 g/dL 11.5 (L) 8.5 (L)    - 420 K/uL 121 (L) 89 (L)   INR 0.8 - 1.2   1.5 (H)    AST 10 - 38 U/L 80 (H) 74 (H)   ALT 13 - 56 U/L 38 33   Alk Phos 45 - 117 U/L 204 (H) 162 (H)   Bili, Total 0.2 - 1.0 MG/DL 3.9 (H) 2.1 (H)   Bili, Direct 0.0 - 0.2 MG/DL 1.7 (H)    Albumin 3.4 - 5.0 g/dL 2.7 (L) 2.3 (L)   BUN 7.0 - 18 MG/DL 27 (H) 17   Creat 0.6 - 1.3 MG/DL 1.38 (H) 1.4 (H)   Na 136 - 145 mmol/L 134 (L) 133 (L)   K 3.5 - 5.5 mmol/L 4.0 3.6   Cl 100 - 111 mmol/L 104 102   CO2 21 - 32 mmol/L 23 24   Glucose 74 - 99 mg/dL 99 112 (H)   Magnesium 1.6 - 2.6 mg/dL 1.3 (L)      Cancer Screening Latest Ref Rng & Units 1/25/2022   AFP, Serum 0.0 - 8.0 ng/mL 4.3   AFP-L3% 0.0 - 9.9 % 10.8 (H)     SEROLOGIES:  Serologies Latest Ref Rng & Units 1/25/2022   Hep A Ab, Total Negative   Positive (A)   Hep B Surface Ag <1.00 Index <0.10   Hep B Surface Ag Interp NEG   Negative   Hep B Core Ab, Total Negative   Negative   Hep B Surface Ab >10.0 mIU/mL <3.10 (L)   Hep B Surface Ab Interp POS   Negative (A)   Hep C Ab 0.0 - 0.9 s/co ratio <0.1   Ferritin 8 - 388 NG/ML 87   Iron % Saturation 20 - 50 % 74 (H)   ZAINAB, IFA  Negative   ASMCA 0 - 19 Units 20 (H)   M2 Ab 0.0 - 20.0 Units 28.8 (H)   Alpha-1 antitrypsin level 101 - 187 mg/dL 159     LIVER HISTOLOGY:  Not available or performed    ENDOSCOPIC PROCEDURES:  Not available or performed    RADIOLOGY:  10/2021. CT scan abdomen with IV contrast.  Changes consistent with cirrhosis. No liver mass lesions. No dilated bile ducts. Moderate ascites. OTHER TESTING:  Not available or performed    FOLLOW-UP:  All of the issues listed above in the Assessment and Plan were discussed with the patient. All questions were answered. The patient expressed a clear understanding of the above. [address] [id number] in 4 weeks to review all data and determine the treatment plan.       Lizbet Jefferson MD  [facility] [address] [facility] [address]  4 [facility] [address], [address] [facility]  Yisel Friday, [address]  12 [facility]

## 2022-01-25 NOTE — Clinical Note
2/8/2022    Patient: Cristóbal Cordoba   YOB: 1960   Date of Visit: 1/25/2022     Sunil Vega MD  [address] [place] [id number]  Via Fax: [phone]    Dear Sunil Vega MD,      Thank you for referring Ms. Shashi Gore to [address] [facility] [facility] for evaluation. My notes for this consultation are attached. If you have questions, please do not hesitate to call me. I look forward to following your patient along with you.       Sincerely,    Castillo Calixto MD

## 2022-01-26 NOTE — TELEPHONE ENCOUNTER
Francisco Dianas, patients son, [phone] to inform him of pts INR: 1.5 and PT: 17.1. Ambrosio verbally confirmed understanding and will  place these numbers on patients dental form.

## 2022-02-03 NOTE — TELEPHONE ENCOUNTER
Calling about insurance being accepted at [facility]. She needs a colonoscopy and needs to get this all set up for her. If you can give a call back to schedule for her, she does not need a referral. Thank you.

## 2022-02-03 NOTE — TELEPHONE ENCOUNTER
Scheduled GI appointment and gave son the details:     Date: 2/10/2022  Time: 1:45 pm  Location: [facility] (Dr. Myranda Connolly)

## 2022-02-03 NOTE — TELEPHONE ENCOUNTER
Returned call. Scheduled appointment for the patient and relayed details to her son. Faxed labs to [phone] - I told Heidy Ferguson I couldn't fax the last office note because it's not closed.

## 2022-02-03 NOTE — TELEPHONE ENCOUNTER
Spoke with patient's son, Lulu Calero, and reviewed testing required for liver transplant evaluation. I sent him an encrypted e-mail with detailed information. Son reports patient is scheduled for a pap smear on 2/7/2022 at [facility]). She's also scheduled to see the dentist on 2/9/2022. I asked him to have the dental form faxed to our office and/or bring it with him to the next appointment with Dr. Enmanuel Rivers. The patient has never had a colonoscopy and needs referral to GI. I told son I'd ask Dr. Enmanuel Rivers who he'd like to refer patient to and call him back with an update.

## 2022-02-08 PROBLEM — E03.9 HYPOTHYROIDISM: Status: ACTIVE | Noted: 2021-01-01

## 2022-02-08 PROBLEM — K74.60 CIRRHOSIS (HCC): Status: ACTIVE | Noted: 2022-01-01

## 2022-02-08 PROBLEM — R07.9 ACUTE CHEST PAIN: Status: RESOLVED | Noted: 2021-01-01 | Resolved: 2022-01-01

## 2022-02-08 PROBLEM — J96.01 ACUTE RESPIRATORY FAILURE WITH HYPOXIA (HCC): Status: RESOLVED | Noted: 2021-01-01 | Resolved: 2022-01-01

## 2022-02-08 PROBLEM — N17.9 AKI (ACUTE KIDNEY INJURY) (HCC): Status: RESOLVED | Noted: 2021-01-01 | Resolved: 2022-01-01

## 2022-02-08 PROBLEM — K74.3 PRIMARY BILIARY CHOLANGITIS (HCC): Status: ACTIVE | Noted: 2022-01-01

## 2022-02-11 PROBLEM — R79.89 INCREASED AMMONIA LEVEL: Status: ACTIVE | Noted: 2022-01-01

## 2022-02-11 PROBLEM — N17.9 ACUTE RENAL FAILURE (ARF) (HCC): Status: ACTIVE | Noted: 2022-01-01

## 2022-02-11 PROBLEM — K76.82 HEPATIC ENCEPHALOPATHY: Status: ACTIVE | Noted: 2022-01-01

## 2022-02-11 PROBLEM — R41.82 ALTERED MENTAL STATUS, UNSPECIFIED: Status: ACTIVE | Noted: 2022-01-01

## 2022-02-11 PROBLEM — E87.5 ACUTE HYPERKALEMIA: Status: ACTIVE | Noted: 2022-01-01

## 2022-02-11 NOTE — TELEPHONE ENCOUNTER
Patient had pre-op colonoscopy visit yesterday. Dr. Kusum Ricketts doesn't feel patient can tolerate colonoscopy prep. GI office will arrange for patient to have a cologuard instead, per Dr. Luis Fernando Morrissey conversation with Dr. Kusum Ricketts. Patient son, Janelle Campa, left a message letting us know patient is hospitalized at [facility].     Return call placed to Janelle Camap. He said patient was scheduled for an OP paracentesis today. When he went to wake her up, she was lethargic, confused, and he couldn't get her out of bed. He called EMS and she was transported to the hospital. Son reports ammonia level is elevated and he thinks she'll be hospitalized for a couple days. I told son I'd cancel the OP EGD scheduled with Dr. Jamey Dawn on Monday. If there's no indication to perform while hospitalized, we'll reschedule procedure with Dr. Jamey Dawn. I asked him to call me when she's discharged and we'll decide if she should keep the follow up appointment with Dr. Jamey Dawn on 2/14/2022, or reschedule that appointment too. Dr. Jamey Dawn was notified of patient's hospitalization.

## 2022-03-04 NOTE — TELEPHONE ENCOUNTER
Received VM from Enrrique, patient's son, this morning. He wanted to let me know that he gave my phone number to the GI NP and she'd be reaching out. I called Enrrique and told him I've been in contact with the NP. We discussed plan to have patient transferred to [facility] in [place]. I told him Dr. Esdras Pena needs to see and evaluate patient, to determine likelihood of her being considered for liver transplant, now or in the future.  Son verbalized understanding

## 2022-03-04 NOTE — TELEPHONE ENCOUNTER
Received call from [facility], NP regarding prolonged hospitalization and liver transplant candidacy. Patient has been in the hospital for 21 days and completed the majority of liver transplant evaluation testing. Her creatinine is up to 5 and renal is considering starting dialysis. Care team is asking for Dr. Nydia Lambert guidance in determining next steps. Reviewed chart records with Dr. Gabriela Sanchez. He recommended transfer to [facility]. Patient's MELD score is 32 but he's concerned she may not be a transplant candidate due to debility and malnutrition. I called Susan to relay Dr. Nydia Lambert recommendation. I left her a message and am waiting for a call back. Update:    Spoke with Susan and reviewed Dr. Nydia Lambert concerns and recommendation. She will initiate transfer. I provided her with [facility] [employer] number ([phone]). I sent Dr. Gabriela Sanchez a message to let him know.

## 2022-03-05 NOTE — ROUTINE PROCESS
Patient was brought to the unit by transport team via stretcher. She was moved over to the bed and at that time noted that she was sitting in stool, unknown for how long. I hooked the patient up to tele, her vitals were stable. When cleaning her up she was noted to have severe skin irritation from where she had been sitting in the stool. Her bottom was bright red and had a raised red rash. She was noted to have multiple wounds to her buttocks, sacrum, groin, arms, heels and back in varying degrees. The patient was given a CHG bath, steve care done, and gown and linen changed. I placed mepilex dressings on her sacrum and spine. Her steve was noted to have put out some dark jessica cloudy urine which was foul smelling. The patient then received a bed assignment on the 5th floor. Report was call to the 5th floor nurse. Transport came to get the patient. All vitals were stable and son was at the bedside.

## 2022-03-05 NOTE — PROGRESS NOTES
1011: TRANSFER - IN REPORT:    Verbal report received from [facility]) on Sheron Lejessie  being received from [address] [place] [address](unit) for routine progression of care      Report consisted of patients Situation, Background, Assessment and   Recommendations(SBAR). Information from the following report(s) SBAR, Intake/Output, MAR, Med Rec Status and Cardiac Rhythm nsr was reviewed with the receiving nurse. Opportunity for questions and clarification was provided. Assessment completed upon patients arrival to unit and care assumed.

## 2022-03-05 NOTE — H&P
History and Physical   HOSPITALIST    Primary Care Provider: Caprice Eisenmenger, MD    Subjective:     Davied Severs is a 65 y.o. female with history of liver cirrhosis with portal hypertension,recently diagnosed primary biliary cholangitis, recurrent ascites requiring paracentesis approximately every 7 to 10 days who presents here transferred from [address] [place] [address] secondary to decompensated liver cirrhosis, worsening renal insufficiency, progressive weakness currently undergoing work-up for possible liver transplant. The patient of note initially presented to [address] [place] [address] on 2/11/2022 secondary to hepatic encephalopathy and progressive weakness. Patient on admission was found at that time to have an ammonia level of 111 and did subsequently require paracentesis in the ED. The patient has had a prolonged and complicated hospital course since that time and has undergone multiple paracentesis including 2/11 with 11.8 L removal, 2/18 with 9/2 L removed, and 2/28 with 9.2 L removed. The patient also did have episodes of transient hypotension requiring a brief ICU admission for pressor support and has periodically received albumin with daily midodrine use. She was followed both by GI and nephrology services. Patient did undergo an EGD on 2/17 noted for distal esophagitis and portal hypertensive gastropathy, no evidence of varices. According to the son Laureen Garza at bedside, the patient does have a history of esophageal varices back in November 2021 status post banding at that time. Per GI at outlying facility, patient was deemed not to be a candidate for colonoscopy and recommended Cologuard. Part of her liver transplant evaluation at [facility] also included an MRCP was done on 2/18 which was noted for cirrhosis, portal hypertension, and large ascites. She has also undergone a cardiac stress test which was negative, EF of 69%.   Patient has been maintained on lactulose, rifaximin, octreotide, and Protonix at the [facility]. Patient has had progressive worsening of her renal function at the [facility] with creatinine peak at 5.2 on 3/3 and associated metabolic acidosis requiring management with bicarb drip that was maintained as of this morning at [facility] THE [facility]. Per medical records, there was concern that patient may be moving towards initiation of possible hemodialysis. According to son, patient does not have any previous establish care with nephrology and was not aware of any kidney issues prior to current hospitalization. Patient also was treated with with a 7-day course of antibiotics for E. coli UTI, which were completed as of . The patient had has been seen by Dr. Bisi Ramsey hepatologist for an initial visit in January to start the process of liver transplant work-up. Given lack of progression of patient's care prolonged hospitalization at [facility], request was made for patient to be transferred to [facility] to continue further liver transplant work-up and accepted by Dr. Bisi Ramsey for transfer under the hospitalist service. Patient of note is currently lethargic and poorhistorian therefore history is obtained via discussion with her son Eliana Mcnamara at the bedside and review of medical records. Review of Systems:  Further 10 point review of systems is benign. As per HPI, otherwise limited given patient's current lethargic state     Past Medical History:   Diagnosis Date    Anxiety 10/19/2021    Basal cell carcinoma (BCC) of face 10/19/2021    Chronic kidney disease     \"end stage liver disease\" per son    Cirrhosis of liver not due to alcohol ([facility] [address]     Depression 10/19/2021      Past Surgical History:   Procedure Laterality Date    HX  SECTION      x2    HX ORTHOPAEDIC Right     x2     Prior to Admission medications    Medication Sig Start Date End Date Taking?  Authorizing Provider   midodrine (PROAMATINE) 5 mg tablet Take 3 Tablets by mouth three (3) times daily (with meals) for 30 days. 3/4/22 4/3/22  Francia Ron MD   rifAXIMin (XIFAXAN) 550 mg tablet Take 1 Tablet by mouth two (2) times a day for 30 days. Indications: impaired brain function due to liver disease 3/4/22 4/3/22  Francia Ron MD   lactulose (CHRONULAC) 10 gram/15 mL solution Take 30 mL by mouth every six (6) hours. 3/4/22   Francia Ron MD   octreotide (SANDOSTATIN) 100 mcg/mL injection 1 mL by IntraVENous route three (3) times daily. 3/4/22   Farncia Ron MD   pantoprazole (PROTONIX) 40 mg tablet Take 1 Tablet by mouth daily for 30 days. 3/5/22 4/4/22  Francia Ron MD   potassium chloride (KAON 10%) 20 mEq/15 mL solution Take 15 mL by mouth two (2) times daily (with meals) for 4 doses. 3/4/22 3/6/22  Francia Ron MD   sodium bicarbonate infusion Patient is currently on bicarb drip on dextrose 3/4/22   Francia Ron MD   levothyroxine (SYNTHROID) 50 mcg tablet TAKE 1 TABLET BY MOUTH DAILY BEFORE AND BREAKFAST 2/23/22   Tiffanie Dow MD   ursodioL (ANTONIETA Forte) 500 mg tablet Take 1 Tablet by mouth two (2) times a day. 2/8/22   Tiffanie Dow MD   ondansetron (ZOFRAN ODT) 8 mg disintegrating tablet Take 0.5 Tablets by mouth every eight (8) hours as needed for Nausea or Nausea or Vomiting for up to 30 days. 2/8/22 3/10/22  Tiffanie Dow MD   ferrous gluconate 324 mg (38 mg iron) tablet Take 1 Tablet by mouth two (2) times a day. 1/25/22   Tiffanie Dow MD   folic acid (FOLVITE) 1 mg tablet Take 1 Tablet by mouth daily. 1/25/22   Tiffanie Dow MD   magnesium oxide (MAG-OX) 400 mg tablet Take 1 Tablet by mouth daily. 1/25/22   Tiffanie Dow MD   sertraline (Zoloft) 100 mg tablet Take 1 Tablet by mouth daily. 1/25/22   Tiffanie Dow MD   thiamine HCL (B-1) 100 mg tablet Take 1 Tablet by mouth daily.  1/25/22   Tiffanie Dow MD     Allergies   Allergen Reactions    Morphine Other (comments) Severe HA. ALL narcotics!!! Family History   Problem Relation Age of Onset    Melanoma Brother         SOCIAL HISTORY:  Patient resides at Home. Has 2 sons, Bam Denson currently present at bedside  Patient ambulates with self. Smoking history: never  Alcohol history: denies    Objective:       Physical Exam:   Visit Vitals  BP (!) 110/56 (BP 1 Location: Right arm, BP Patient Position: At rest;Lying;Lying right side)   Pulse 74   Temp 97.8 °F (36.6 °C)   Resp 16   SpO2 100%     General appearance: fatigued, cooperative, no distress, appears stated age, patient does awaken but mumbles before falling asleep. Cachectic  Head: Normocephalic, without obvious abnormality, atraumatic  Eyes: conjunctivae/corneas clear. PERRL, EOM's intact. Fundi benign  Lungs: diminished breath sounds diffusely  Heart: regular rate and rhythm, S1, S2 normal, no murmur, click, rub or gallop  Abdomen: Soft, nontender. Very distended. No guarding  Extremities: extremities normal, atraumatic, no cyanosis or edema. Noted resting tremors in bilateral upper extremities  Skin: Mildly jaundiced. Noted ecchymosis right upper extremity. Bilateral heel pressure ulcers  Neurologic: Patient is lethargic but does awaken and minimally follows commands before falling asleep. Unable to adequately assess due to lethargy    ECG: No EKG available at this time    Data Review: All diagnostic labs and studies have been reviewed. Assessment:     Active Problems:    Cirrhosis ([address] (2/8/2022)        Plan:     Decompensated liver cirrhosis, with portal hypertension. Felt to be secondary toPBC  -Transferred from [facility] with prolonged hospitalization.   Transferred for further work-up for liver transplant eval  -EGD on 2/17 [place] noted for distal esophagitis and portal hypertensive gastropathy, no evidence of varices.    -history of esophageal varices back in November 2021 status post banding at that time.    -Per GI at [facility] facility, patient was deemed not to be a candidate for colonoscopy and recommended Cologuard.    -s/p MRCP was done on 2/18 which was noted for cirrhosis, portal hypertension, and large ascites. -s/p cardiac stress test which was negative for ischemia, EF of 69%  -Hepatology c/s Dr. Dianelys Jason. Notified by [facility] with recommendation to transfer here for further work-up. Briefly D/W Dr. Dianelys Jason today for formal consult request.  Naldo Host to continue with GI meds including octreotide for now and will reassess with further recommendations tomorrow  -Continue lactulose, rifaximin, octreotide, Protonix p.o.  -Closely monitor stool output frequency  -Continue ursodiol    History of hepatic encephalopathy with refractory ascites  -multiple paracentesis done at [facility] including 2/11 with 11.8 L removal, 2/18 with 9/2 L removed, and 2/28 with 9.2 L removed. -Currently with distended abdomen. Order abdominal US. Anticipate likely need for paracentesis within the next couple days    GREG with metabolic acidosis. -Per medical records, concern for ATN or possible hepatorenal syndrome. Concern also raised the patient may need eventual HD. At this time, bicarb and K are stable  -Patient was being maintained on bicarb drip up until this morning at [facility]. Hold off for now as current bicarb is improved at 20  -Cr 4.7, peaked at 5.2 on 3/3  - NS IV fluids ordered,gentle given recurrent ascites  -Renal consult on call. Per son, no prior establishment with nephrology outpatient  -Avoidance of nephrotoxic agents    Transaminitis/hyperbilirubinemia/coagulopathy/thrombocytopenia  -Secondary to decompensated liver cirrhosis. Monitor with repeat labs in a.m. check NH3    Chronic normocytic anemia  -Possible anemia of CKD  -H&H stable. No active bleeding. Continue to monitor    Hyponatremia  -Suspect secondary to underlying liver cirrhosis. Monitor for now with repeat labs in a.m.     Recent E. coli UTI  -Treated with Vanco and Zosyn then transitioned to Rocephin, completed 7-day antibiotic course as of 2/17  -Currently with Beckford and cloudy urine.  -Replace Beckford with new catheter  -Check UA, urine culture  -Currently afebrile and improved leukocytosis. Hold off on antibiotics for now until urine studies back    Hypothyroid  -Continue levothyroxine    Generalized weakness/chronic wounds. pta  -PT, OT consult  -Wound care consulted    DVT prophylaxis SCDs for now.   No pharmacological prophylaxis due to thrombocytopenia  Code full      Signed By: Jaylan Caban MD     March 5, 2022

## 2022-03-06 NOTE — PROGRESS NOTES
Pt drank 20ml from the 30ml vial of lactulose. Pt stated she was done after that amount. RN reeducated on importance of lactulose, pt asked appropriate questions and appeared to understand. Pt still declined to drink more at this time.

## 2022-03-06 NOTE — PROGRESS NOTES
Bedside shift change report given to Devan Ellis RN (oncoming nurse) by Gamal Farooq RN (offgoing nurse). Report included the following information SBAR, Kardex, Intake/Output, MAR and Recent Results.

## 2022-03-06 NOTE — CONSULTS
[address] hospitals, MD, [address], [place], [place]      Yani Singer, ISAAC Metz, [facility]     Michelle Lopez, [facility]   Colin [place], MANISHA Moralez, [facility]       Namita Montemayor [address]    at [address], [facility] Katlyn Pizano  22.    [phone]    FAX: [address] [place] [address], [address]    [phone]    FAX: [phone]       HEPATOLOGY CONSULT NOTE  The patient is well known to me from a previous visit to my office at THE [facility] in [facility]. She is a 64 yo  female who was found to have chronic liver disease in 2020.       The patient was found to have cirrhosis in 7/2021 when she developed ascites.     She had variceal bleeding in 11/2021     Serologic evaluation for markers of chronic liver disease was positive for AMA     The patient has developed the following complications of cirrhosis: esophageal varices, variceal bleeding, ascites, edema,   hepatic encephalopathy,     I saw the patient for the first time in 1/2022. She had CTP score 9 and MELD score 21 at that time. We started liver transplant evaluation testing. She developed confusion and could not be aroused and was hospitalized 3 weeks ago at [facility] in 2/202. We were finally contacted about this hospitalization a few days ago and we made arrangements to transfer her to [facility]. She now has far more advanced liver and kidney disease than when I last saw her about 4-6 weeks ago. Labs are significant for   WBC 10.6, HB 7.8 gms, PLT 50, Sna 128, Scr 4.85 mg, TBILI 5.2 mg, INR 1.6, Sammonia 28,     Imaging of the liver with Ultrasound shows cirrhosis, and large amount of ascites.     On exam she has severe muslce wasting, severe ecchymosis on arms, and is so frail she cannot stand. Mental status is clear.       ASSESSMENT AND PLAN:  Cirrhosis  The diagnosis of cirrhosis is based upon imaging, laboratory studies, complications of cirrhosis. Cirrhosis is secondary to [facility]. Not alcohol as she was initially told.     Serologic testing was positive for AMA, ASMA     The CTP is 10. Child class C. The MELD score is 34.     Based upon the MELD and CTP scores the patient has a mortality of about 50% within the next 90 days until we turn her fraily and weakness around. Right now is is too weak to survive liver transplantation. She needs aggressive nutritional support and both she and her son are in favor of this with the hopes her situation will improve and she could survive liver transplantation.       Primary Biliary Cholangitis  The diagnosis is based upon serology and an elevation in ALP and positive AMA. A liver biopsy has not been performed. PBC is being treated with ANTONIETA 500 mg BID. Malnutrition  The patient has severe protein-calorie malnutrtion and muscle wasting. This is due to advanced liver disease and refractory ascites. The patient a more calories than she can possible eat to get into positive nutritional balance. Without aggressive nutritional support she will not survive. She is alert, oriented and understands the need for NG dobhoff feeding tube placement. Both she and her son agree with this. Place Dobhoff feeding tube today. Start tube feeds. Formula to be managed by nutritioinal service. Frailty/muscle wasting  The patient is very frail and cannot sit or stand on her own. She needs aggressive support by OT/PT. She is very motivated to survive and willing to do whatever excercies she needs to get stronger.       If she does not improve her muscle wasting and body strength she will never survive the liver transplant process.     Vitamin malabsorption  Patients with PBC do not efficiently absorb fat soluble vitamins A,D,E, K. She needs multi-vitamins containing fat soluble vitamins A, E, D, K. Chronic kidney disease-HRS  This is probably from cirrhosis and use of diuretics. The serum creatinine is 4.7 mg  He is on IV midodrine and SQ octreotide buut this has not really helped. Can stop SQ octreottide because of coagulopathy, ecchymosis. Continue midodrine  Will give IV albumin for a few days to see if this helps. NSAIDs should not be utilized as this may worsen CKD.     Ascites   Ascites developed for the first time in 7/2021. Ascites is is refractory to the current doses of diuretics because of kidney disease and Hyponatremia. No diuretics. Hyponatremia  This is secondary cirrhosis and diuretics  This appears to be stable in the 128 range. No diuretics. Will give IV albumin 25 gm Q6H for a few days to see if this helps     Lower extremity edema  Edema is refractory to the current doses of diuretics because of kidney disease and Hyponatremia. Will continue the current dose of diuretics at step 1.     Screening for Esophageal varices   The patient has esophageal varices with prior bleeding. Varices were banded in 11/2021 and 12/2021. The last EGD to assess for varices was performed in 12/2021. No repeat EGD needed at this time.     Hepatic encephalopathy   Overt HE is well controlled on lactulose and xifaxan. Serum ammonia is low and in the 40s.      Coagulopathy  INR is in the 1.6 range and stable. Thrombocytopenia   This is secondary to cirrhosis. There is no evidence of overt bleeding. No treatment is required.   The platelet count is adequate for the patient to undergo procedures without the need for platelet transfusion or platelet growth factors.     Anemia   This is due to multifactorial causes including portal hypertension with chronic GI blood loss and bone marrow suppression due to severe malnutrition.     The most recent FE studies were from 2022. The serum ferritin is 87  The FE saturation is 74  FE panel is within the normal range but this is low a patient with chronic liver disease and cirrhosis   Will administer intravenous iron. No EGD needed at this time. Thrombocytopenia   This is secondary to cirrhosis. There is no evidence of overt bleeding. No treatment is required. The platelet count is adequate for the patient to undergo procedures without the need for platelet transfusion or platelet growth factors. Screening for Hepatocellular Carcinoma  [facility] [address] screening was performed in 2022 and does not suggest [address]         SYSTEM REVIEW:  Constitution systems: Severe weakness. Eyes: Negative for visual changes. ENT: Negative for sore throat, painful swallowing. Respiratory: Negative for cough, hemoptysis, SOB. Cardiology: Negative for chest pain, palpitations. GI:  Fluid in abdomen. : Negative for urinary frequency, dysuria, hematuria, nocturia. Skin: Black and blue all over arms. Hematology: Negative for easy bruising, blood clots. Musculo-skelatal: Negative for back pain, muscle pain, weakness. Neurologic: Negative for headaches, dizziness, vertigo, memory problems not related to HE. Psychology: Negative for anxiety, depression. FAMILY HISTORY:  The father  of COVID at age 80 years. The mother Has/had the following chronic disease(s): None. There is no family history of liver disease.       SOCIAL HISTORY:  The patient is . The patient has 2 children,   The patient stopped using tobacco products in . The patient consumes 2-3 alcoholic beverages per day over many years. The patient has been abstinent from alcohol since 2021. The patient does not work outside the home.         PHYSICAL EXAMINATION:  VS: per nursing note  General:  Ill appearing  Eyes:  Sclera icteric. ENT:  No oral lesions. Thyroid normal.  Nodes:  No adenopathy. Skin:  Spider angiomata.   Severe ecchymosis all over arms. Respiratory:  Lungs clear to auscultation. Cardiovascular:  Regular heart rate. Abdomen:  Soft non-tender, Distended with obvious ascites. Extremities:  No lower extremity edema. Severe muscle wasting of upper and lower extremities. Neurologic:  Alert and oriented. Cranial nerves grossly intact. No asterixis. LABORATORY:  Results for Malathi Macias (MRN [id number]) as of 3/6/2022 14:35   Ref. Range 3/5/2022 08:08 3/6/2022 02:22   WBC Latest Ref Range: 3.6 - 11.0 K/uL 12.2 (H) 10.6   HGB Latest Ref Range: 11.5 - 16.0 g/dL 8.6 (L) 7.8 (L)   PLATELET Latest Ref Range: 150 - 400 K/uL 60 (L) 50 (L)   INR Latest Ref Range: 0.9 - 1.1    1.6 (H)   Sodium Latest Ref Range: 136 - 145 mmol/L 129 (L) 128 (L)   Potassium Latest Ref Range: 3.5 - 5.1 mmol/L 4.1 4.3   Chloride Latest Ref Range: 97 - 108 mmol/L 97 (L) 97   CO2 Latest Ref Range: 21 - 32 mmol/L 20 (L) 21   Glucose Latest Ref Range: 65 - 100 mg/dL 119 (H) 98   BUN Latest Ref Range: 6 - 20 MG/DL 98 (H) 98 (H)   Creatinine Latest Ref Range: 0.55 - 1.02 MG/DL 4.7 (H) 4.85 (H)   Bilirubin, total Latest Ref Range: 0.2 - 1.0 MG/DL 5.8 (H) 5.2 (H)   Albumin Latest Ref Range: 3.5 - 5.0 g/dL 2.7 (L) 3.2 (L)   ALT Latest Ref Range: 12 - 78 U/L 23 23   AST Latest Ref Range: 15 - 37 U/L 49 (H) 46 (H)   Alk. phosphatase Latest Ref Range: 45 - 117 U/L 140 (H) 143 (H)   Ammonia Latest Ref Range: <32 UMOL/L  28     RADIOLOGY:  Ultrasound of liver. Echogenic consistent with cirrhosis. No liver mass lesions. No dilated bile ducts. Moderate ascites.         Chaz Robles MD  [facility] [address] of [address] [place] [address] A, [address] [place] [address] 22.  [phone]  [address] [facility]

## 2022-03-06 NOTE — PROGRESS NOTES
[facility]                                                                                          Hospitalist Progress Note  Bibi Bravo MD  Answering service: [phone] OR 36 from in house phone        Date of Service:  3/6/2022  NAME:  Mariel Alvarez  :  1960  MRN:  [id number]      Admission Summary:   Copied form admit: \" Mariel Alvarez is a 65 y.o. female with history of liver cirrhosis with portal hypertension,recently diagnosed primary biliary cholangitis, recurrent ascites requiring paracentesis approximately every 7 to 10 days who presents here transferred from [address] [place] [address] secondary to decompensated liver cirrhosis, worsening renal insufficiency, progressive weakness currently undergoing work-up for possible liver transplant. The patient of note initially presented to [address] [place] [address] on 2022 secondary to hepatic encephalopathy and progressive weakness. Patient on admission was found at that time to have an ammonia level of 111 and did subsequently require paracentesis in the ED. The patient has had a prolonged and complicated hospital course since that time and has undergone multiple paracentesis including  with 11.8 L removal,  with 9/2 L removed, and  with 9.2 L removed. The patient also did have episodes of transient hypotension requiring a brief ICU admission for pressor support and has periodically received albumin with daily midodrine use. She was followed both by GI and nephrology services. Patient did undergo an EGD on  noted for distal esophagitis and portal hypertensive gastropathy, no evidence of varices. According to the son Jun Eddy at bedside, the patient does have a history of esophageal varices back in 2021 status post banding at that time. Per GI at outlying facility, patient was deemed not to be a candidate for colonoscopy and recommended Cologuard.   Part of her liver transplant evaluation at [facility] also included an MRCP was done on 2/18 which was noted for cirrhosis, portal hypertension, and large ascites. She has also undergone a cardiac stress test which was negative, EF of 69%. Patient has been maintained on lactulose, rifaximin, octreotide, and Protonix at the [facility]. Patient has had progressive worsening of her renal function at the [facility] with creatinine peak at 5.2 on 3/3 and associated metabolic acidosis requiring management with bicarb drip that was maintained as of this morning at [facility]. Per medical records, there was concern that patient may be moving towards initiation of possible hemodialysis. According to son, patient does not have any previous establish care with nephrology and was not aware of any kidney issues prior to current hospitalization. Patient also was treated with with a 7-day course of antibiotics for E. coli UTI, which were completed as of 2/17. The patient had has been seen by Dr. Jerod Goss hepatologist for an initial visit in January to start the process of liver transplant work-up. Given lack of progression of patient's care prolonged hospitalization at [facility], request was made for patient to be transferred to [facility] to continue further liver transplant work-up and accepted by Dr. Jerod Goss for transfer under the hospitalist service. Patient of note is currently lethargic and poorhistorian therefore history is obtained via discussion with her son Oswaldo Oh at the bedside and review of medical records.  \"    Interval history / Subjective:     3/6/2022 :    Pleasant, in some denial, very jovial as an escape mechanism   Lives alone   Mother from [place] visits   Pt to be seen by hepatology soon  [address] as one would expect mod/large ascites collection    Pt notes some abd discomfort and some sob but other than ascites benign chest/abd exam    Pt w generalized muscle wasting   Girtha Kos as below        Assessment & Plan:    Decompensated liver cirrhosis, with portal hypertension. Felt to be secondary toPBC  -Transferred from [facility] with prolonged hospitalization. Transferred for further work-up for liver transplant eval  -EGD on 2/17 [place] noted for distal esophagitis and portal hypertensive gastropathy, no evidence of varices.    -history of esophageal varices back in November 2021 status post banding at that time.    -Per GI at outlying facility, patient was deemed not to be a candidate for colonoscopy and recommended Cologuard.    -s/p MRCP was done on 2/18 which was noted for cirrhosis, portal hypertension, and large ascites. -s/p cardiac stress test which was negative for ischemia, EF of 69%  -Hepatology c/s Dr. George Veronica. Notified by [facility] with recommendation to transfer here for further work-up. Briefly D/W Dr. George Veronica today for formal consult request.  Mcgee Spurling to continue with GI meds including octreotide for now and will reassess with further recommendations tomorrow  -Continue lactulose, rifaximin, octreotide, Protonix p.o.  -Closely monitor stool output frequency  -Continue ursodiol  3/6:   Pleasant, in some denial, very jovial as an escape mechanism   Lives alone   Mother from [place] visits   Pt to be seen by hepatology soon  [address] as one would expect mod/large ascites collection    Pt notes some abd discomfort and some sob but other than ascites benign chest/abd exam    Pt w generalized muscle wasting      History of hepatic encephalopathy with refractory ascites  -multiple paracentesis done at [facility] including 2/11 with 11.8 L removal, 2/18 with 9/2 L removed, and 2/28 with 9.2 L removed. -Currently with distended abdomen. Order abdominal US. Anticipate likely need for paracentesis within the next couple days     GREG with metabolic acidosis. -Per medical records, concern for ATN or possible hepatorenal syndrome. Concern also raised the patient may need eventual HD.   At this time, bicarb and K are stable  -Patient was being maintained on bicarb drip up until this morning at [facility]. Hold off for now as current bicarb is improved at 20  -Cr 4.7, peaked at 5.2 on 3/3  - NS IV fluids ordered,gentle given recurrent ascites  -Renal consult on call. Per son, no prior establishment with nephrology outpatient  -Avoidance of nephrotoxic agents  - flat S Cr curve at 4. 8 range 3/6/2022 ( Jan 2022 1.34)       Transaminitis/hyperbilirubinemia/coagulopathy/thrombocytopenia  -Secondary to decompensated liver cirrhosis. Monitor with repeat labs in a.m. check NH3  Lab Results   Component Value Date/Time    ALT (SGPT) 23 03/06/2022 02:22 AM    AST (SGOT) 46 (H) 03/06/2022 02:22 AM    Alk. phosphatase 143 (H) 03/06/2022 02:22 AM    Bilirubin, direct 3.8 (H) 03/05/2022 08:08 AM    Bilirubin, total 5.2 (H) 03/06/2022 02:22 AM    3/6 ammonia 28      Chronic normocytic anemia  -Possible anemia of CKD  -H&H stable. No active bleeding. Continue to monitor  Lab Results   Component Value Date/Time    Iron 96 02/14/2022 01:27 AM    TIBC 123 (L) 02/14/2022 01:27 AM    Iron % saturation 78 (H) 02/14/2022 01:27 AM    Ferritin 87 01/25/2022 09:50 AM      Lab Results   Component Value Date/Time    HGB 7.8 (L) 03/06/2022 02:22 AM         Hyponatremia  -Suspect secondary to underlying liver cirrhosis. Monitor for now with repeat labs in a.m.  - 128 on 3/6 am lab     Recent E. coli UTI  -Treated with Vanco and Zosyn then transitioned to Rocephin, completed 7-day antibiotic course as of 2/17  -Currently with Beckford and cloudy urine.  -Replace Beckford with new catheter 3/5   -Check UA, urine culture  -Currently afebrile and improved leukocytosis.   Hold off on antibiotics for now until urine studies back  Lab Results   Component Value Date/Time    WBC 10.6 03/06/2022 02:22 AM      Temp Readings from Last 1 Encounters:   03/06/22 97.8 °F (36.6 °C)         Hypothyroid  -Continue levothyroxine     Lab Results   Component Value Date/Time    TSH 5.470 (H) 02/11/2022 09:25 AM    Triiodothyronine (T3), free 2.4 01/25/2022 09:50 AM    T4, Free 1.2 01/25/2022 09:50 AM    Free T4 1.14 02/11/2022 09:25 AM          Generalized weakness/chronic wounds. pta  -PT, OT consult  -Wound care consulted     DVT prophylaxis SCDs for now. No pharmacological prophylaxis due to thrombocytopenia  Code full     Hospital Problems  Date Reviewed: 2/17/2022          Codes Class Noted POA    Cirrhosis [facility]) ICD-10-CM: K74.60  ICD-9-CM: 571.5  2/8/2022 Unknown                  After personally interviewing pt at bedside the following is noted on 3/6/2022 :    Review of Systems   Constitutional:        Pt is in denial , chooses to joke over discuss her issues,    Respiratory: Positive for shortness of breath. Cardiovascular: Negative for chest pain. Gastrointestinal: Positive for abdominal pain. Negative for nausea and vomiting. All other systems reviewed and are negative. I had a face to face encounter with patient on 3/6/2022 at bedside for the following physical exam:     PHYSICAL EXAM:    Visit Vitals  BP (!) 93/43   Pulse 75   Temp 97.8 °F (36.6 °C)   Resp 18   SpO2 95%          Physical Exam  Constitutional:       General: She is not in acute distress. Appearance: She is ill-appearing. She is not toxic-appearing or diaphoretic. Comments: Under wt, muscle wasting    HENT:      Head: Normocephalic and atraumatic. Right Ear: External ear normal.      Left Ear: External ear normal.      Nose: Nose normal.      Mouth/Throat:      Mouth: Mucous membranes are dry. Pharynx: Oropharynx is clear. Eyes:      General: Scleral icterus present. Right eye: No discharge. Left eye: No discharge. Cardiovascular:      Rate and Rhythm: Normal rate and regular rhythm. Heart sounds: Normal heart sounds. Pulmonary:      Effort: Pulmonary effort is normal.      Breath sounds: Normal breath sounds.    Abdominal: General: There is distension. Tenderness: There is no abdominal tenderness. There is no guarding. Musculoskeletal:         General: No deformity. Cervical back: Normal range of motion and neck supple. Skin:     General: Skin is warm and dry. Coloration: Skin is jaundiced. Neurological:      General: No focal deficit present. Mental Status: She is alert and oriented to person, place, and time. Mental status is at baseline. Psychiatric:         Mood and Affect: Mood normal.         Behavior: Behavior normal.                     Data Review:    Review and/or order of clinical lab test      Labs:     Recent Labs     03/06/22 0222 03/05/22  0808   WBC 10.6 12.2*   HGB 7.8* 8.6*   HCT 22.8* 24.8*   PLT 50* 60*     Recent Labs     03/06/22 0222 03/05/22  0808 03/04/22  1512 03/03/22  1637 03/03/22  1637   * 129* 128*   < > 130*   K 4.3 4.1 3.7   < > 3.2*   CL 97 97* 99   < > 100   CO2 21 20* 16*   < > 16*   BUN 98* 98* 98*   < > 107*   CREA 4.85* 4.7* 4.9*   < > 5.2*   GLU 98 119* 132*   < > 106   CA 9.0 8.8 8.8   < > 9.5   MG  --  2.4  --   --  2.7*   PHOS  --  3.8  --   --  5.5*    < > = values in this interval not displayed. Recent Labs     03/06/22 0222 03/05/22  0808 03/04/22  1512   ALT 23 23 22   * 140* 148*   TBILI 5.2* 5.8* 5.9*   TP 5.4* 5.2* 5.6*   ALB 3.2* 2.7*  2.7* 3.1*   GLOB 2.2  --   --      Recent Labs     03/06/22 0222 03/04/22  1512   INR 1.6* 1.6*   PTP 16.7* 19.1*      No results for input(s): FE, TIBC, PSAT, FERR in the last 72 hours. Lab Results   Component Value Date/Time    Folate 38.3 (H) 10/20/2021 07:53 AM    RBC FOLATE 878 02/14/2022 01:27 AM      No results for input(s): PH, PCO2, PO2 in the last 72 hours. No results for input(s): CPK, CKNDX, TROIQ in the last 72 hours.     No lab exists for component: CPKMB  Lab Results   Component Value Date/Time    Cholesterol, total 122 01/25/2022 09:50 AM    HDL Cholesterol 40 01/25/2022 09:50 AM    LDL, calculated 67.2 01/25/2022 09:50 AM    Triglyceride 74 01/25/2022 09:50 AM    CHOL/HDL Ratio 3.1 01/25/2022 09:50 AM     Lab Results   Component Value Date/Time    Glucose (POC) 114 (H) 02/23/2022 05:42 PM    Glucose (POC) 127 (H) 02/23/2022 12:04 PM    Glucose (POC) 122 (H) 02/23/2022 08:18 AM    Glucose (POC) 109 (H) 02/22/2022 09:19 PM    Glucose (POC) 133 (H) 02/22/2022 07:28 AM     Lab Results   Component Value Date/Time    Color DARK YELLOW 03/05/2022 09:22 PM    Appearance TURBID (A) 03/05/2022 09:22 PM    Specific gravity 1.014 03/05/2022 09:22 PM    Specific gravity 1.010 02/11/2022 07:20 PM    pH (UA) 5.5 03/05/2022 09:22 PM    Protein 100 (A) 03/05/2022 09:22 PM    Glucose Negative 03/05/2022 09:22 PM    Ketone Negative 03/05/2022 09:22 PM    Bilirubin NEGATIVE 02/11/2022 07:20 PM    Urobilinogen 0.2 03/05/2022 09:22 PM    Nitrites Negative 03/05/2022 09:22 PM    Leukocyte Esterase LARGE (A) 03/05/2022 09:22 PM    Epithelial cells FEW 03/05/2022 09:22 PM    Bacteria 1+ (A) 03/05/2022 09:22 PM    WBC >100 (H) 03/05/2022 09:22 PM    RBC  03/05/2022 09:22 PM         Medications Reviewed:     Current Facility-Administered Medications   Medication Dose Route Frequency    balsam peru-castor oiL (VENELEX) ointment   Topical BID    zinc oxide-cod liver oil (DESITIN) 40 % paste   Topical PRN    sodium chloride (NS) flush 5-40 mL  5-40 mL IntraVENous Q8H    sodium chloride (NS) flush 5-40 mL  5-40 mL IntraVENous PRN    acetaminophen (TYLENOL) tablet 650 mg  650 mg Oral Q6H PRN    Or    acetaminophen (TYLENOL) suppository 650 mg  650 mg Rectal Q6H PRN    polyethylene glycol (MIRALAX) packet 17 g  17 g Oral DAILY PRN    ondansetron (ZOFRAN ODT) tablet 4 mg  4 mg Oral Q8H PRN    Or    ondansetron (ZOFRAN) injection 4 mg  4 mg IntraVENous B9A PRN    folic acid (FOLVITE) tablet 1 mg  1 mg Oral DAILY    lactulose (CHRONULAC) 10 gram/15 mL solution 30 mL  30 mL Oral Q6H    levothyroxine (SYNTHROID) tablet 50 mcg  50 mcg Oral 6am    midodrine (PROAMATINE) tablet 15 mg  15 mg Oral TID WITH MEALS    octreotide (SANDOSTATIN) injection 100 mcg  100 mcg IntraVENous TID    pantoprazole (PROTONIX) tablet 40 mg  40 mg Oral DAILY    rifAXIMin (XIFAXAN) tablet 550 mg  550 mg Oral BID    sertraline (ZOLOFT) tablet 100 mg  100 mg Oral DAILY    thiamine HCL (B-1) tablet 100 mg  100 mg Oral DAILY    ursodioL (ACTIGALL) tablet 500 mg  500 mg Oral Q12H    glucagon (GLUCAGEN) injection 1 mg  1 mg IntraMUSCular PRN    naloxone (NARCAN) injection 0.1 mg  0.1 mg IntraVENous PRN    promethazine (PHENERGAN) 6.25 mg in 0.9% sodium chloride 50 mL IVPB  6.25 mg IntraVENous Q6H PRN    ferrous sulfate tablet 325 mg  1 Tablet Oral DAILY WITH BREAKFAST    albumin human 25% (BUMINATE) solution 25 g  25 g IntraVENous Q6H     ______________________________________________________________________  EXPECTED LENGTH OF STAY: - - -  ACTUAL LENGTH OF STAY:          1                 Gadiel Ware MD

## 2022-03-06 NOTE — PROGRESS NOTES
Nephrology consult called to [address] [facility] on call Dr. Olivia Felder.

## 2022-03-06 NOTE — PROGRESS NOTES
Problem: Mobility Impaired (Adult and Pediatric)  Goal: *Acute Goals and Plan of Care (Insert Text)  Description: FUNCTIONAL STATUS PRIOR TO ADMISSION: Patient appears to be an inconsistent historian despite being oriented x 4. Originally stated she primarily stays in bed-later she reported ambulating a few times a day and spending most of her day in a recliner. She consistently reports that son assists her \"with everything\" but does not give more details on what exactly or how much assistance. HOME SUPPORT PRIOR TO ADMISSION: The patient lived with son and her mother per report. Unclear how much assistance she required. Physical Therapy Goals  Initiated 3/6/2022  1. Patient will move from supine to sit and sit to supine , scoot up and down, and roll side to side in bed with minimal assistance/contact guard assist within 7 day(s). 2.  Patient will transfer from bed to chair and chair to bed with moderate assistance  using the least restrictive device within 7 day(s). 3.  Patient will perform sit to stand with moderate assistance  within 7 day(s). 4.  Patient will ambulate with moderate assistance  for 20 feet with the least restrictive device within 7 day(s). 5.  Patient will ascend/descend 2 stairs with 2 handrail(s) with moderate assistance  within 7 day(s). Outcome: Progressing Towards Goal     PHYSICAL THERAPY EVALUATION  Patient: Jose Manuel Rojas (69 y.o. female)  Date: 3/6/2022  Primary Diagnosis: Cirrhosis ([facility] [K74.60]        Precautions:   Fall    ASSESSMENT  Based on the objective data described below, the patient presents with generalized weakness, impaired sitting balance with increased L lean, decreased activity tolerance, and mobility below suspected baseline following admission for cirrhosis. Noted patient with hypotension earlier today although at time of session VSS. Agreeable to bed level evaluation only this session. Required total A for repositioning in bed.   Patient with increased L lean in supported sitting-making no efforts to correct to midline. Able to complete some LE exercises as described below. Patient also noted to have bloody nose-RN aware. Recommend SNF. Current Level of Function Impacting Discharge (mobility/balance): total A    Functional Outcome Measure: The patient scored 25/100 on the Barthel Index outcome measure which is indicative of high level of dependence on caregivers for ADL's and mobility. Other factors to consider for discharge:      Patient will benefit from skilled therapy intervention to address the above noted impairments. PLAN :  Recommendations and Planned Interventions: bed mobility training, transfer training, gait training, therapeutic exercises, neuromuscular re-education, patient and family training/education, and therapeutic activities      Frequency/Duration: Patient will be followed by physical therapy:  3 times a week to address goals. Recommendation for discharge: (in order for the patient to meet his/her long term goals)  Therapy up to 5 days/week in SNF setting    This discharge recommendation:  Has not yet been discussed the attending provider and/or case management    IF patient discharges home will need the following DME: to be determined (TBD)         SUBJECTIVE:   Patient stated I guess maybe a walk twice a day, but I also spend my day in bed.     OBJECTIVE DATA SUMMARY:   HISTORY:    Past Medical History:   Diagnosis Date    Anxiety 10/19/2021    Basal cell carcinoma (BCC) of face 10/19/2021    Chronic kidney disease     \"end stage liver disease\" per son    Cirrhosis of liver not due to alcohol ([facility]     Depression 10/19/2021     Past Surgical History:   Procedure Laterality Date    HX  SECTION      x2    HX ORTHOPAEDIC Right     x2       Personal factors and/or comorbidities impacting plan of care:     Home Situation  Home Environment: Private residence  # Steps to Enter: 2  Rails to Enter: Yes  Hand Rails : Bilateral  One/Two Story Residence: Two story, live on 1st floor  Living Alone: No  Support Systems: Child(joe)  Patient Expects to be Discharged to[de-identified] Home  Current DME Used/Available at Home: Juan Paling, rolling,Wheelchair,Commode, bedside    EXAMINATION/PRESENTATION/DECISION MAKING:   Critical Behavior:  Neurologic State: Alert  Orientation Level: Oriented X4  Cognition: Follows commands     Hearing:     Skin:    Edema:   Range Of Motion:  AROM: Generally decreased, functional           PROM: Generally decreased, functional           Strength:    Strength: Generally decreased, functional                    Tone & Sensation:   Tone: Normal              Sensation: Intact               Coordination:  Coordination: Generally decreased, functional  Vision:      Functional Mobility:  Bed Mobility:           Scooting: Total assistance  Transfers:                             Balance:   Sitting: Impaired; With support  Sitting - Static: Fair (occasional)  Sitting - Dynamic: Poor (constant support); Fair (occasional)  Ambulation/Gait Training:                                                         Stairs: Therapeutic Exercises: Ankle pumps, heel slides    Functional Measure:  Barthel Index:    Bathin  Bladder: 0  Bowels: 5  Groomin  Dressin  Feeding: 10  Mobility: 0  Stairs: 0  Toilet Use: 0  Transfer (Bed to Chair and Back): 5  Total: 25/100       The Barthel ADL Index: Guidelines  1. The index should be used as a record of what a patient does, not as a record of what a patient could do. 2. The main aim is to establish degree of independence from any help, physical or verbal, however minor and for whatever reason. 3. The need for supervision renders the patient not independent. 4. A patient's performance should be established using the best available evidence.  Asking the patient, friends/relatives and nurses are the usual sources, but direct observation and common sense are also important. However direct testing is not needed. 5. Usually the patient's performance over the preceding 24-48 hours is important, but occasionally longer periods will be relevant. 6. Middle categories imply that the patient supplies over 50 per cent of the effort. 7. Use of aids to be independent is allowed. Score Interpretation (from [address] [facility])    Independent   60-79 Minimally independent   40-59 Partially dependent   20-39 Very dependent   <20 Totally dependent     -Jesus Roa., Barthel, DKianW. (1965). Functional evaluation: the Barthel Index. [address] ([address] (1997). The Barthel activities of daily living index: self-reporting versus actual performance in the old (> or = 75 years). Journal [address] 45(7), 14 [facility], J.KianKianF, Fabian Courtney., Alanna Noe. (1999). Measuring the change in disability after inpatient rehabilitation; comparison of the responsiveness of the Barthel Index and Functional Alvin Measure. Journal of Neurology, Neurosurgery, and Psychiatry, 66(4), 668-713. Yee Elizabeth, N.J.A, JONEL Almodovar, & Rei Snow, M.A. (2004) Assessment of post-stroke quality of life in cost-effectiveness studies: The usefulness of the Barthel Index and the EuroQoL-5D. Quality of Life Research, 13, 911-06       Activity Tolerance:   Fair    After treatment patient left in no apparent distress:   Supine in bed and Call bell within reach    COMMUNICATION/EDUCATION:   The patients plan of care was discussed with: Registered nurse. Fall prevention education was provided and the patient/caregiver indicated understanding., Patient/family have participated as able in goal setting and plan of care. , and Patient/family agree to work toward stated goals and plan of care.     Thank you for this referral.  Walt Harkins, PT   Time Calculation: 22 mins

## 2022-03-06 NOTE — PROGRESS NOTES
TRANSFER - IN REPORT:    Verbal report received from Leonila(name) on [address]  being received from 4W(unit) for routine progression of care      Report consisted of patients Situation, Background, Assessment and   Recommendations(SBAR). Information from the following report(s) SBAR was reviewed with the receiving nurse. Opportunity for questions and clarification was provided. Assessment completed upon patients arrival to unit and care assumed.

## 2022-03-06 NOTE — PROGRESS NOTES
[facility]   [address] [facility], [facility] [address], [facility]  Phone: [phone]   [phone]       Nephrology Progress Note  Ozzie Dunlap     1960     [id number]  Date of Admission : 3/5/2022  03/06/22    CC:  Follow up for greg    Assessment and Plan   1AKI  -Serum creatinine 4.7 --4.8 today, up from baseline of 1.4 in December of last year  -Likely hepatorenal syndrome given history of decompensated cirrhosis, also likely component of GREG with episodes of hypotension at transferring hospital requiring ICU admission and pressors  -No urgent indication for RRT at this time, potassium stable, mild metabolic acidosis, not profoundly volume overloaded or requiring respiratory support  -Abdominal ultrasound complete pending  -Maintain Beckford catheter, strict I&O  -FeNa 0.2% on 3/1/22. Repeat UA, urine chemistries suggest HRS   -Continue HRS treatment, octreotide and midodrine. + albumin   -continue albumin 25 g every 6 hours  -Renally dose medications, avoid nephrotoxins  -Trend renal indices  -making more urine and hopefully cr will improve     Metabolic acidosis  -Secondary to above, improved serum bicarb 20 today  -Previously on bicarb drip, may need to be started on oral bicarb supplements  -BMP in the morning     Hyponatremia  -Secondary to liver cirrhosis  -Monitor with a.m. labs, check urine lites     Decompensated liver cirrhosis  -With portal hypertension, primary biliary cholangitis  -Requiring multiple paracenteses, most recently 2/28/2022 with 9.2 L removed  -Hepatology following     Hypothyroidism     Care Plan discussed with:  Patient,      Interval History:  Seen in noon. Doing better. making more urine . D/c son at bed side     Review of Systems: A comprehensive review of systems was negative except for that written in the HPI.     Current Medications:   Current Facility-Administered Medications   Medication Dose Route Frequency    balsam peru-castor oiL (VENELEX) ointment   Topical BID    zinc oxide-cod liver oil (DESITIN) 40 % paste   Topical PRN    sodium chloride (NS) flush 5-40 mL  5-40 mL IntraVENous Q8H    sodium chloride (NS) flush 5-40 mL  5-40 mL IntraVENous PRN    acetaminophen (TYLENOL) tablet 650 mg  650 mg Oral Q6H PRN    Or    acetaminophen (TYLENOL) suppository 650 mg  650 mg Rectal Q6H PRN    polyethylene glycol (MIRALAX) packet 17 g  17 g Oral DAILY PRN    ondansetron (ZOFRAN ODT) tablet 4 mg  4 mg Oral Q8H PRN    Or    ondansetron (ZOFRAN) injection 4 mg  4 mg IntraVENous Q8X PRN    folic acid (FOLVITE) tablet 1 mg  1 mg Oral DAILY    lactulose (CHRONULAC) 10 gram/15 mL solution 30 mL  30 mL Oral Q6H    levothyroxine (SYNTHROID) tablet 50 mcg  50 mcg Oral 6am    midodrine (PROAMATINE) tablet 15 mg  15 mg Oral TID WITH MEALS    octreotide (SANDOSTATIN) injection 100 mcg  100 mcg IntraVENous TID    pantoprazole (PROTONIX) tablet 40 mg  40 mg Oral DAILY    rifAXIMin (XIFAXAN) tablet 550 mg  550 mg Oral BID    sertraline (ZOLOFT) tablet 100 mg  100 mg Oral DAILY    thiamine HCL (B-1) tablet 100 mg  100 mg Oral DAILY    ursodioL (ACTIGALL) tablet 500 mg  500 mg Oral Q12H    glucagon (GLUCAGEN) injection 1 mg  1 mg IntraMUSCular PRN    naloxone (NARCAN) injection 0.1 mg  0.1 mg IntraVENous PRN    promethazine (PHENERGAN) 6.25 mg in 0.9% sodium chloride 50 mL IVPB  6.25 mg IntraVENous Q6H PRN    ferrous sulfate tablet 325 mg  1 Tablet Oral DAILY WITH BREAKFAST    albumin human 25% (BUMINATE) solution 25 g  25 g IntraVENous Q6H      Allergies   Allergen Reactions    Morphine Other (comments)     Severe HA. ALL narcotics!!!       Objective:  Vitals:    Vitals:    03/06/22 0900 03/06/22 1200 03/06/22 1400 03/06/22 1459   BP: (!) 114/54   (!) 91/52   Pulse: 76 76 71 69   Resp:    18   Temp:    98.5 °F (36.9 °C)   SpO2:    98%     Intake and Output:  No intake/output data recorded.   03/04 1901 - 03/06 0700  In: 150 [P.O.:150]  Out: 300 [Urine:300]    Physical Examination:  Pt intubated    Yes / No  General: NAD,Conversant   Neck:  Supple, no mass  Resp:  Lungs CTA B/L, no wheezing , normal respiratory effort  CV:  RRR,  no murmur or rub,no  LE edema  GI:  Soft, NT, + Bowel sounds, no hepatosplenomegaly  Neurologic:  Non focal  Psych:             AAO x 3 appropriate affect   Skin:  No Rash  :  Beckford     [x]    High complexity decision making was performed  [x]    Patient is at high-risk of decompensation with multiple organ involvement    Lab Data Personally Reviewed: I have reviewed all the pertinent labs, microbiology data and radiology studies during assessment.     Recent Labs     03/06/22 0222 03/05/22  0808 03/04/22  1512 03/03/22  1637   * 129* 128* 130*   K 4.3 4.1 3.7 3.2*   CL 97 97* 99 100   CO2 21 20* 16* 16*   GLU 98 119* 132* 106   BUN 98* 98* 98* 107*   CREA 4.85* 4.7* 4.9* 5.2*   CA 9.0 8.8 8.8 9.5   MG  --  2.4  --  2.7*   PHOS  --  3.8  --  5.5*   ALB 3.2* 2.7*  2.7* 3.1* 3.2*   ALT 23 23 22  --    INR 1.6*  --  1.6*  --      Recent Labs     03/06/22 0222 03/05/22  0808 03/04/22  1005   WBC 10.6 12.2* 15.6*   HGB 7.8* 8.6* 9.0*   HCT 22.8* 24.8* 26.0*   PLT 50* 60* 76*     No results found for: SDES  No results found for: CULT  Recent Results (from the past 24 hour(s))   URINALYSIS W/ REFLEX CULTURE    Collection Time: 03/05/22  9:22 PM    Specimen: Miscellaneous sample; Urine    Urine specimen   Result Value Ref Range    Color DARK YELLOW      Appearance TURBID (A) CLEAR      Specific gravity 1.014 1.003 - 1.030      pH (UA) 5.5 5.0 - 8.0      Protein 100 (A) NEG mg/dL    Glucose Negative NEG mg/dL    Ketone Negative NEG mg/dL    Blood LARGE (A) NEG      Urobilinogen 0.2 0.2 - 1.0 EU/dL    Nitrites Negative NEG      Leukocyte Esterase LARGE (A) NEG      WBC >100 (H) 0 - 4 /hpf    RBC  0 - 5 /hpf    Epithelial cells FEW FEW /lpf    Bacteria 1+ (A) NEG /hpf    UA:UC IF INDICATED URINE CULTURE ORDERED (A) CNI Yeast PRESENT (A) NEG     SODIUM, UR, RANDOM    Collection Time: 03/05/22  9:22 PM   Result Value Ref Range    Sodium,urine random 9 MMOL/L   PROTEIN/CREATININE RATIO, URINE    Collection Time: 03/05/22  9:22 PM   Result Value Ref Range    Protein, urine random 105 (H) 0.0 - 11.9 mg/dL    Creatinine, urine 110.00 mg/dL    Protein/Creat. urine Ratio 1.0     CHLORIDE, URINE RANDOM    Collection Time: 03/05/22  9:22 PM   Result Value Ref Range    Chloride,urine random <10 MMOL/L   BILIRUBIN, CONFIRM    Collection Time: 03/05/22  9:22 PM   Result Value Ref Range    Bilirubin UA, confirm Positive (A) NEG     METABOLIC PANEL, COMPREHENSIVE    Collection Time: 03/06/22  2:22 AM   Result Value Ref Range    Sodium 128 (L) 136 - 145 mmol/L    Potassium 4.3 3.5 - 5.1 mmol/L    Chloride 97 97 - 108 mmol/L    CO2 21 21 - 32 mmol/L    Anion gap 10 5 - 15 mmol/L    Glucose 98 65 - 100 mg/dL    BUN 98 (H) 6 - 20 MG/DL    Creatinine 4.85 (H) 0.55 - 1.02 MG/DL    BUN/Creatinine ratio 20 12 - 20      GFR est AA 11 (L) >60 ml/min/1.73m2    GFR est non-AA 9 (L) >60 ml/min/1.73m2    Calcium 9.0 8.5 - 10.1 MG/DL    Bilirubin, total 5.2 (H) 0.2 - 1.0 MG/DL    ALT (SGPT) 23 12 - 78 U/L    AST (SGOT) 46 (H) 15 - 37 U/L    Alk.  phosphatase 143 (H) 45 - 117 U/L    Protein, total 5.4 (L) 6.4 - 8.2 g/dL    Albumin 3.2 (L) 3.5 - 5.0 g/dL    Globulin 2.2 2.0 - 4.0 g/dL    A-G Ratio 1.5 1.1 - 2.2     CBC W/O DIFF    Collection Time: 03/06/22  2:22 AM   Result Value Ref Range    WBC 10.6 3.6 - 11.0 K/uL    RBC 2.54 (L) 3.80 - 5.20 M/uL    HGB 7.8 (L) 11.5 - 16.0 g/dL    HCT 22.8 (L) 35.0 - 47.0 %    MCV 89.8 80.0 - 99.0 FL    MCH 30.7 26.0 - 34.0 PG    MCHC 34.2 30.0 - 36.5 g/dL    RDW 23.5 (H) 11.5 - 14.5 %    PLATELET 50 (L) 131 - 400 K/uL    MPV 10.0 8.9 - 12.9 FL    NRBC 0.0 0  WBC    ABSOLUTE NRBC 0.00 0.00 - 0.01 K/uL   AMMONIA    Collection Time: 03/06/22  2:22 AM   Result Value Ref Range    Ammonia 28 <32 UMOL/L   PROTHROMBIN TIME + INR    Collection Time: 03/06/22  2:22 AM   Result Value Ref Range    INR 1.6 (H) 0.9 - 1.1      Prothrombin time 16.7 (H) 9.0 - 11.1 sec   CHLORIDE, URINE RANDOM    Collection Time: 03/06/22  2:58 AM   Result Value Ref Range    Chloride,urine random <10 MMOL/L   PROTEIN/CREATININE RATIO, URINE    Collection Time: 03/06/22  2:58 AM   Result Value Ref Range    Protein, urine random 89 (H) 0.0 - 11.9 mg/dL    Creatinine, urine 102.00 mg/dL    Protein/Creat. urine Ratio 0.9     SODIUM, UR, RANDOM    Collection Time: 03/06/22  2:58 AM   Result Value Ref Range    Sodium,urine random 6 MMOL/L           Total time spent with patient:  33  min. Care Plan discussed with:  Patient     Family      RN      Consulting Physician [facility],         I have reviewed the flowsheets. Chart and Pertinent Notes have been reviewed. No change in PMH ,family and social history from Consult note.       Jonathan Sheldon MD

## 2022-03-06 NOTE — CONSULTS
NEPHROLOGY CONSULT NOTE     Patient: Jessica Pham MRN: [id number]  PCP: Jeanette Chou MD   :     1960  Age:   65 y.o. Sex:  female      Referring physician: Newton Batista MD  Reason for consultation: 65 y.o. female with [facility]rhosis ([facility] [L54.82] complicated by GREG  Admission Date: 3/5/2022  2:14 PM  LOS: 0 days      ASSESSMENT and PLAN :   GREG  -Serum creatinine 4.7 today, up from baseline of 1.4 in December of last year  -Likely hepatorenal syndrome given history of decompensated cirrhosis, also likely component of GREG with episodes of hypotension at transferring hospital requiring ICU admission and pressors  -No urgent indication for RRT at this time, potassium stable, mild metabolic acidosis, not profoundly volume overloaded or requiring respiratory support  -Abdominal ultrasound complete pending for tomorrow. -Maintain Beckford catheter, strict I&O  -FeNa 0.2% on 3/1/22. Repeat UA, urine chemistries ordered now and in AM  -Continue HRS treatment, octreotide and midodrine.  -Start albumin 25 g every 6 hours  -Renally dose medications, avoid nephrotoxins  -Trend renal indices  -Ultimately, patient is nearing need for hemodialysis which is difficult to recommend without liver transplant. Hepatology will be following patient at [facility]    Metabolic acidosis  -Secondary to above, improved serum bicarb 20 today  -Previously on bicarb drip, may need to be started on oral bicarb supplements  -BMP in the morning    Hyponatremia  -Secondary to liver cirrhosis  -Monitor with a.m. labs, check urine lites    Decompensated liver cirrhosis  -With portal hypertension, primary biliary cholangitis  -Requiring multiple paracenteses, most recently 2022 with 9.2 L removed  -Hepatology following    Hypothyroidism    Care Plan discussed with:  Patient, nurse, Dr Korina Angel (Nephrology Attending)        Thank you for consulting [facility] in the care of your patient.       Subjective:   HPI: Gaby Weaver is a 65 y.o.  female with past medical history of liver cirrhosis, portal hypertension, primary biliary cholangitis, ascites, cirrhosis who has been transferred to [facility] from [facility] for further evaluation of decompensated liver cirrhosis and worsening renal insufficiency. Patient initially presented to [facility] 2/11/2022 with complaints of hepatic encephalopathy, progressive weakness. On admission, patient found to have ammonia level of 111 and ascites. Underwent several paracenteses, most recently 2/28 with 9.2 L removed. During admission, patient had several episodes of hypotension requiring ICU admission and pressor support. Over the past several days, renal function had initially improved but now is worsening. Nephrology at [facility] noting it will be difficult to start dialysis without liver transplant being pursued. Patient has been transferred to [facility] to be seen by Dr. Say Aleman for liver transplant evaluation. Nephrology is consulted for management of GREG  -Creatinine 4.7 this morning. 4.5 on initial admission 2/11/2022, peaked at 5.2 on 3/3/2022. Baseline appears to be 1.4 in December of last year. -Denies prior renal care outside of this most recent admission at [facility].  -Denies family history of CKD or ESRD on HD  -Transferring nephrology team suspected hepatorenal syndrome versus GREG. Currently on midodrine and octreotide.  -Beckford catheter is in place, denies history of dysuria, hematuria, urinary retention, foamy urine. No history of FERGUSON  -Contributing history of former tobacco use, cirrhosis.   Denies history of hypertension, diabetes, NSAID use, herbal supplement use    Past Medical History:   Diagnosis Date    Anxiety 10/19/2021    Basal cell carcinoma (BCC) of face 10/19/2021    Chronic kidney disease     \"end stage liver disease\" per son    Cirrhosis of liver not due to alcohol ([facility]     Depression 10/19/2021        Past Surgical History:   Procedure Laterality Date    HX  SECTION      x2    HX ORTHOPAEDIC Right     x2       Allergies   Allergen Reactions    Morphine Other (comments)     Severe HA. ALL narcotics!!! Social Hx:    reports that she quit smoking about 25 years ago. She has never used smokeless tobacco. She reports previous alcohol use. She reports that she does not use drugs. Family History   Problem Relation Age of Onset    Melanoma Brother        Review of Systems   Constitutional: Positive for malaise/fatigue. Negative for chills and fever. HENT: Negative for congestion and sore throat. Respiratory: Negative for cough, shortness of breath and wheezing. Cardiovascular: Negative for chest pain, palpitations and leg swelling. Gastrointestinal: Negative for abdominal pain, blood in stool, constipation, diarrhea, heartburn, melena, nausea and vomiting. Genitourinary: Negative for dysuria, flank pain, frequency, hematuria and urgency. Neurological: Positive for weakness. Negative for dizziness and headaches. Objective:      I&O's:  No intake/output data recorded. Visit Vitals  /65   Pulse 76   Temp 97.5 °F (36.4 °C)   Resp 20   SpO2 98%       Physical Exam  Vitals and nursing note reviewed. Constitutional:       General: She is not in acute distress. Appearance: She is well-developed. She is not diaphoretic. HENT:      Head: Normocephalic. Eyes:      General: Scleral icterus present. Extraocular Movements: Extraocular movements intact. Cardiovascular:      Rate and Rhythm: Normal rate and regular rhythm. Pulses: Normal pulses. Heart sounds: Normal heart sounds. No murmur heard. No friction rub. No gallop. Pulmonary:      Effort: Pulmonary effort is normal. No respiratory distress. Breath sounds: Normal breath sounds. No wheezing or rales. Abdominal:      General: There is distension. Musculoskeletal:      Right lower leg: No edema. Left lower leg: No edema. Skin:     General: Skin is warm and dry. Capillary Refill: Capillary refill takes less than 2 seconds. Coloration: Skin is jaundiced. Skin is not pale. Findings: Bruising present. No erythema or rash. Neurological:      Mental Status: She is alert and oriented to person, place, and time.          Laboratory Results:    Recent Labs     03/05/22  0808 03/04/22  1512 03/03/22  1637   * 128* 130*   K 4.1 3.7 3.2*   CL 97* 99 100   CO2 20* 16* 16*   * 132* 106   BUN 98* 98* 107*   CREA 4.7* 4.9* 5.2*   CA 8.8 8.8 9.5   MG 2.4  --  2.7*   PHOS 3.8  --  5.5*   ALB 2.7*  2.7* 3.1* 3.2*   ALT 23 22  --    INR  --  1.6*  --      Recent Labs     03/05/22  0808 03/04/22  1005   WBC 12.2* 15.6*   HGB 8.6* 9.0*   HCT 24.8* 26.0*   PLT 60* 76*     Lab Results   Component Value Date/Time    Color PALE YELLOW (A) 02/11/2022 07:20 PM    Appearance CLOUDY (A) 02/11/2022 07:20 PM    Specific gravity 1.010 02/11/2022 07:20 PM    pH (UA) 5.5 02/11/2022 07:20 PM    Protein NEGATIVE 02/11/2022 07:20 PM    Glucose NEGATIVE 02/11/2022 07:20 PM    Ketone NEGATIVE 02/11/2022 07:20 PM    Bilirubin NEGATIVE 02/11/2022 07:20 PM    Urobilinogen 0.2 02/11/2022 07:20 PM    Nitrites NEGATIVE 02/11/2022 07:20 PM    Leukocyte Esterase LARGE (A) 02/11/2022 07:20 PM    Bacteria 4+ 02/11/2022 07:20 PM    WBC TNTC 02/11/2022 07:20 PM    RBC 5-9 02/11/2022 07:20 PM     Recent Results (from the past 24 hour(s))   CBC WITH AUTOMATED DIFF    Collection Time: 03/05/22  8:08 AM   Result Value Ref Range    WBC 12.2 (H) 4.0 - 11.0 1000/mm3    RBC 2.80 (L) 3.60 - 5.20 M/uL    HGB 8.6 (L) 13.0 - 17.2 gm/dl    HCT 24.8 (L) 37.0 - 50.0 %    MCV 88.6 80.0 - 98.0 fL    MCH 30.7 25.4 - 34.6 pg    MCHC 34.7 30.0 - 36.0 gm/dl    PLATELET 60 (L) 998 - 450 1000/mm3    MPV 9.3 6.0 - 10.0 fL    RDW-SD 74.2 (H) 36.4 - 46.3      NRBC 0 0 - 0      IMMATURE GRANULOCYTES 1.3 0.0 - 3.0 %    NEUTROPHILS 73.5 (H) 34 - 64 %    LYMPHOCYTES 12.5 (L) 28 - 48 %    MONOCYTES 7.6 1 - 13 %    EOSINOPHILS 4.7 0 - 5 %    BASOPHILS 0.4 0 - 3 %   MAGNESIUM    Collection Time: 03/05/22  8:08 AM   Result Value Ref Range    Magnesium 2.4 1.6 - 2.6 mg/dl   RENAL FUNCTION PANEL    Collection Time: 03/05/22  8:08 AM   Result Value Ref Range    Sodium 129 (L) 136 - 145 mEq/L    Potassium 4.1 3.5 - 5.1 mEq/L    Chloride 97 (L) 98 - 107 mEq/L    CO2 20 (L) 21 - 32 mEq/L    Glucose 119 (H) 74 - 106 mg/dl    BUN 98 (H) 7 - 25 mg/dl    Creatinine 4.7 (H) 0.6 - 1.3 mg/dl    GFR est AA 12.0      GFR est non-AA 10      Calcium 8.8 8.5 - 10.1 mg/dl    Albumin 2.7 (L) 3.4 - 5.0 gm/dl    Phosphorus 3.8 2.5 - 4.9 mg/dl    Anion gap 12 5 - 15 mmol/L   HEPATIC FUNCTION PANEL    Collection Time: 03/05/22  8:08 AM   Result Value Ref Range    AST (SGOT) 49 (H) 15 - 37 U/L    ALT (SGPT) 23 12 - 78 U/L    Alk. phosphatase 140 (H) 45 - 117 U/L    Bilirubin, total 5.8 (H) 0.2 - 1.0 mg/dl    Protein, total 5.2 (L) 6.4 - 8.2 gm/dl    Albumin 2.7 (L) 3.4 - 5.0 gm/dl    Bilirubin, direct 3.8 (H) 0.0 - 0.2 mg/dl       I have reviewed the following all pertinent labs, microbiology data, radiology imaging, and home medications for my assessment     We will follow patient. Please dont hesitate to call with any questions.     Jen Gray NP, [facility]      [address]  Phone: [phone]     [phone]   [facility]  [address]  [address]  [place], [address]  Phone: [phone]     [phone]   [facility]  [address] [place], [address] [facility],[address]  [place], [address] [place] [address]  Phone: [phone]     [phone]   JUAN HUDSON [address], [address]  Phone: [phone]     [phone]

## 2022-03-07 NOTE — ROUTINE PROCESS
Bedside and Verbal shift change report given to Isiah Fuller RN (oncoming nurse) by LYNNETTE Woodson (offgoing nurse). Report included the following information SBAR, Kardex, Intake/Output, MAR and Recent Results.

## 2022-03-07 NOTE — PROGRESS NOTES
[facility]   [address] [facility], [facility] [address], [facility]  Phone: [phone]   [phone]       Nephrology Progress Note  Sanjeev Noe     1960     [id number]  Date of Admission : 3/5/2022  03/07/22    CC:  Follow up for wander    Assessment and Plan   1AKI  -Serum creatinine 4.7 --4.8 today, up from baseline of 1.4 in December of last year  -Likely hepatorenal syndrome given history of decompensated cirrhosis, also likely component of WANDER with episodes of hypotension at transferring hospital requiring ICU admission and pressors  - U/S neg for obstruction  -Maintain Beckford catheter, strict I&O  -FeNa 0.2% on 3/1/22. Repeat UA, urine chemistries suggest HRS   -Continue HRS treatment, octreotide and midodrine. + albumin   - daily labs  - no urgent need for RRt at this time     Metabolic acidosis  - improving    Hyponatremia  -Secondary to liver cirrhosis  - Na stable     Decompensated liver cirrhosis  -With portal hypertension, primary biliary cholangitis  -Requiring multiple paracenteses, most recently 2/28/2022 with 9.2 L removed  -Hepatology following     Hypothyroidism     Interval History:  Seen and examined. Feeling ok. DHT in place. Cr essentially unchanged. On albumin, midodrine. Refusing midodrine. Getting PRBCs now and about to start TF. No cp, sob reported    Review of Systems: A comprehensive review of systems was negative except for that written in the HPI.     Current Medications:   Current Facility-Administered Medications   Medication Dose Route Frequency    0.9% sodium chloride infusion 250 mL  250 mL IntraVENous PRN    0.9% sodium chloride infusion 250 mL  250 mL IntraVENous PRN    iron sucrose (VENOFER) 200 mg in 0.9% sodium chloride 100 mL IVPB  200 mg IntraVENous Q24H    balsam peru-castor oiL (VENELEX) ointment   Topical BID    zinc oxide-cod liver oil (DESITIN) 40 % paste   Topical PRN    sodium chloride (NS) flush 5-40 mL  5-40 mL IntraVENous Q8H  sodium chloride (NS) flush 5-40 mL  5-40 mL IntraVENous PRN    acetaminophen (TYLENOL) tablet 650 mg  650 mg Oral Q6H PRN    Or    acetaminophen (TYLENOL) suppository 650 mg  650 mg Rectal Q6H PRN    polyethylene glycol (MIRALAX) packet 17 g  17 g Oral DAILY PRN    ondansetron (ZOFRAN ODT) tablet 4 mg  4 mg Oral Q8H PRN    Or    ondansetron (ZOFRAN) injection 4 mg  4 mg IntraVENous O8J PRN    folic acid (FOLVITE) tablet 1 mg  1 mg Oral DAILY    lactulose (CHRONULAC) 10 gram/15 mL solution 30 mL  30 mL Oral Q6H    levothyroxine (SYNTHROID) tablet 50 mcg  50 mcg Oral 6am    midodrine (PROAMATINE) tablet 15 mg  15 mg Oral TID WITH MEALS    octreotide (SANDOSTATIN) injection 100 mcg  100 mcg IntraVENous TID    pantoprazole (PROTONIX) tablet 40 mg  40 mg Oral DAILY    rifAXIMin (XIFAXAN) tablet 550 mg  550 mg Oral BID    sertraline (ZOLOFT) tablet 100 mg  100 mg Oral DAILY    thiamine HCL (B-1) tablet 100 mg  100 mg Oral DAILY    ursodioL (ACTIGALL) tablet 500 mg  500 mg Oral Q12H    glucagon (GLUCAGEN) injection 1 mg  1 mg IntraMUSCular PRN    naloxone (NARCAN) injection 0.1 mg  0.1 mg IntraVENous PRN    promethazine (PHENERGAN) 6.25 mg in 0.9% sodium chloride 50 mL IVPB  6.25 mg IntraVENous Q6H PRN      Allergies   Allergen Reactions    Morphine Other (comments)     Severe HA. ALL narcotics!!!       Objective:  Vitals:    Vitals:    03/07/22 0712 03/07/22 0950 03/07/22 1145 03/07/22 1210   BP: (!) 103/40  (!) 111/50 (!) 115/53   Pulse: 77  78 74   Resp: 18  18 18   Temp: 98.5 °F (36.9 °C)  97.8 °F (36.6 °C) 97.8 °F (36.6 °C)   SpO2: 97%  99% 98%   Height:  5' 4\" (1.626 m)       Intake and Output:  No intake/output data recorded.   03/05 1901 - 03/07 0700  In: 150 [P.O.:150]  Out: 65 [Urine:725]    Physical Examination:  Pt intubated    Yes / No  General: NAD,Conversant   Neck:  Supple, no mass  Resp:  Lungs CTA B/L, no wheezing , normal respiratory effort  CV:  RRR,  no murmur or rub,no  LE edema  GI:  Soft, NT, + Bowel sounds, no hepatosplenomegaly  Neurologic:  Non focal  Psych:             AAO x 3 appropriate affect   Skin:  No Rash  :  Beckford     [x]    High complexity decision making was performed  [x]    Patient is at high-risk of decompensation with multiple organ involvement    Lab Data Personally Reviewed: I have reviewed all the pertinent labs, microbiology data and radiology studies during assessment. Recent Labs     03/07/22 0422 03/06/22 0222 03/05/22  0808 03/04/22  1512   * 128* 129* 128*   K 3.6 4.3 4.1 3.7   CL 97 97 97* 99   CO2 21 21 20* 16*   GLU 99 98 119* 132*   * 98* 98* 98*   CREA 5.06* 4.85* 4.7* 4.9*   CA 9.1 9.0 8.8 8.8   MG  --   --  2.4  --    PHOS  --   --  3.8  --    ALB 3.9 3.2* 2.7*  2.7* 3.1*   ALT 16 23 23 22   INR  --  1.6*  --  1.6*     Recent Labs     03/07/22 0422 03/06/22 0222 03/05/22  0808   WBC 8.3 10.6 12.2*   HGB 6.9* 7.8* 8.6*   HCT 20.2* 22.8* 24.8*   PLT 40* 50* 60*     No results found for: SDES  Lab Results   Component Value Date/Time    Culture result: YEAST IDENTIFICATION TO FOLLOW (A) 03/05/2022 09:22 PM     Recent Results (from the past 24 hour(s))   METABOLIC PANEL, COMPREHENSIVE    Collection Time: 03/07/22  4:22 AM   Result Value Ref Range    Sodium 128 (L) 136 - 145 mmol/L    Potassium 3.6 3.5 - 5.1 mmol/L    Chloride 97 97 - 108 mmol/L    CO2 21 21 - 32 mmol/L    Anion gap 10 5 - 15 mmol/L    Glucose 99 65 - 100 mg/dL     (H) 6 - 20 MG/DL    Creatinine 5.06 (H) 0.55 - 1.02 MG/DL    BUN/Creatinine ratio 21 (H) 12 - 20      GFR est AA 11 (L) >60 ml/min/1.73m2    GFR est non-AA 9 (L) >60 ml/min/1.73m2    Calcium 9.1 8.5 - 10.1 MG/DL    Bilirubin, total 4.6 (H) 0.2 - 1.0 MG/DL    ALT (SGPT) 16 12 - 78 U/L    AST (SGOT) 32 15 - 37 U/L    Alk.  phosphatase 129 (H) 45 - 117 U/L    Protein, total 5.3 (L) 6.4 - 8.2 g/dL    Albumin 3.9 3.5 - 5.0 g/dL    Globulin 1.4 (L) 2.0 - 4.0 g/dL    A-G Ratio 2.8 (H) 1.1 - 2.2     IRON PROFILE    Collection Time: 03/07/22  4:22 AM   Result Value Ref Range    Iron 140 35 - 150 ug/dL    TIBC 140 (L) 250 - 450 ug/dL    Iron % saturation 100 (H) 20 - 50 %   CBC WITH AUTOMATED DIFF    Collection Time: 03/07/22  4:22 AM   Result Value Ref Range    WBC 8.3 3.6 - 11.0 K/uL    RBC 2.21 (L) 3.80 - 5.20 M/uL    HGB 6.9 (L) 11.5 - 16.0 g/dL    HCT 20.2 (L) 35.0 - 47.0 %    MCV 91.4 80.0 - 99.0 FL    MCH 31.2 26.0 - 34.0 PG    MCHC 34.2 30.0 - 36.5 g/dL    RDW 23.6 (H) 11.5 - 14.5 %    PLATELET 40 (LL) 151 - 400 K/uL    MPV 9.2 8.9 - 12.9 FL    NRBC 0.0 0  WBC    ABSOLUTE NRBC 0.00 0.00 - 0.01 K/uL    NEUTROPHILS 68 32 - 75 %    LYMPHOCYTES 17 12 - 49 %    MONOCYTES 7 5 - 13 %    EOSINOPHILS 6 0 - 7 %    BASOPHILS 1 0 - 1 %    IMMATURE GRANULOCYTES 1 (H) 0.0 - 0.5 %    ABS. NEUTROPHILS 5.6 1.8 - 8.0 K/UL    ABS. LYMPHOCYTES 1.4 0.8 - 3.5 K/UL    ABS. MONOCYTES 0.6 0.0 - 1.0 K/UL    ABS. EOSINOPHILS 0.5 (H) 0.0 - 0.4 K/UL    ABS. BASOPHILS 0.1 0.0 - 0.1 K/UL    ABS. IMM. GRANS. 0.1 (H) 0.00 - 0.04 K/UL    DF SMEAR SCANNED      RBC COMMENTS ANISOCYTOSIS  2+        RBC COMMENTS OVALOCYTES  1+        RBC COMMENTS AIRAM CELLS  1+        RBC COMMENTS SCHISTOCYTES  1+        RBC COMMENTS TARGET CELLS  PRESENT       AMMONIA    Collection Time: 03/07/22  4:22 AM   Result Value Ref Range    Ammonia 44 (H) <32 UMOL/L   RBC, ALLOCATE    Collection Time: 03/07/22  5:15 AM   Result Value Ref Range    HISTORY CHECKED? Historical check performed    TYPE & SCREEN    Collection Time: 03/07/22  5:49 AM   Result Value Ref Range    Crossmatch Expiration 03/10/2022,2359     ABO/Rh(D) O NEGATIVE     Antibody screen NEG     Unit number [id number]     Blood component type RC LR     Unit division 00     Status of unit ISSUED     Crossmatch result Compatible            Total time spent with patient:  33  min.                                Care Plan discussed with:  Patient     Family      RN      Consulting Physician /Specialist        I have reviewed the flowsheets. Chart and Pertinent Notes have been reviewed. No change in PMH ,family and social history from Consult note.       Glory Fontenot MD

## 2022-03-07 NOTE — PROGRESS NOTES
Bedside shift change report given to Maureen Saleem (oncoming nurse) by Genesis Morfin RN (offgoing nurse). Report included the following information SBAR, Kardex, Intake/Output, MAR and Recent Results.

## 2022-03-07 NOTE — PROGRESS NOTES
Occupational Therapy  3/7/2022     Orders received and chart reviewed in preparation for OT evaluation. Spoke with nursing. Pt with HGB level at 6.4 and to receive a unit of blood. Will follow up for eval as able.       Thank you,   Douglas Root OTR/L

## 2022-03-07 NOTE — WOUND CARE
WOCN Note:     New consult for multiple wounds. Chart shows:  Admitted 3/5/22 for cirrhosis    Assessment:   Appropriately conversational and family (mother & son) at bedside. Reports belly pain but tolerates an assisted turn onto left side. Wearing briefs. Surface: betsey gel mattress  Yellow sclera     Bilateral heels intact and with blanching erythema. Heels offloaded with pillows. 1. POA skin tear to left wrist  1 x 1 x 0.1 cm  100% bleeding & red    Tx: applied petrolatum and foam dressing     2. POA central back deep tissue injury  5 x 5 x 0 cm  100% non-blanching purple  No induration or skin sliding   Tx: venelex and foam dressing applied    3. POA 2 skip areas to sacrum and right of sacrum; stage 3 pressure injuries  Each = 1.3 x 1 x 0.1 cm  Pink with yellow glaze  Widespread red rash to periwound, buttocks, perineum, labia, and groin consistent with candidiasis. Wound Recommendations:    Buttocks & groin: antifungal cream twice daily  Skin tear: clean with saline, apply petrolatum and foam cover dressing. Change every 3 days. Heels and central back: venelex twice daily    PI Prevention:  Turn/reposition approximately every 2 hours  Offload heels with heels hanging off end of pillow at all times while in bed. Dr. Carlos Ceja aware of wounds.      Transition of Care: Plan to follow weekly and as needed while admitted to hospital.     Nan Razo, JENNAN, RN, [facility]  Certified Wound, Ostomy, Continence Nurse  office [phone]  Available via [address] [place] [address]

## 2022-03-07 NOTE — PROGRESS NOTES
[facility] [facility]  Hospitalist Group                                                                                          Hospitalist Progress Note  Maggy Jennings MD  Answering service: [phone] OR [phone] from in house phone        Date of Service:  3/7/2022  NAME:  Glenna Merino  :  1960  MRN:  [id number]      Admission Summary:   Copied form admit: \" Glenna Merino is a 65 y.o. female with history of liver cirrhosis with portal hypertension,recently diagnosed primary biliary cholangitis, recurrent ascites requiring paracentesis approximately every 7 to 10 days who presents here transferred from [address] [place] [address] secondary to decompensated liver cirrhosis, worsening renal insufficiency, progressive weakness currently undergoing work-up for possible liver transplant. The patient of note initially presented to [address] [place] [address] on 2022 secondary to hepatic encephalopathy and progressive weakness. Patient on admission was found at that time to have an ammonia level of 111 and did subsequently require paracentesis in the ED. The patient has had a prolonged and complicated hospital course since that time and has undergone multiple paracentesis including  with 11.8 L removal,  with 9/2 L removed, and  with 9.2 L removed. The patient also did have episodes of transient hypotension requiring a brief ICU admission for pressor support and has periodically received albumin with daily midodrine use. She was followed both by GI and nephrology services. Patient did undergo an EGD on  noted for distal esophagitis and portal hypertensive gastropathy, no evidence of varices. According to the son Marjorie Wise at bedside, the patient does have a history of esophageal varices back in 2021 status post banding at that time. Per GI at outlying facility, patient was deemed not to be a candidate for colonoscopy and recommended Cologuard.   Part of her liver transplant evaluation at [facility] also included an MRCP was done on 2/18 which was noted for cirrhosis, portal hypertension, and large ascites. She has also undergone a cardiac stress test which was negative, EF of 69%. Patient has been maintained on lactulose, rifaximin, octreotide, and Protonix at the [facility]. Patient has had progressive worsening of her renal function at the [facility] facility with creatinine peak at 5.2 on 3/3 and associated metabolic acidosis requiring management with bicarb drip that was maintained as of this morning at [facility]. Per medical records, there was concern that patient may be moving towards initiation of possible hemodialysis. According to son, patient does not have any previous establish care with nephrology and was not aware of any kidney issues prior to current hospitalization. Patient also was treated with with a 7-day course of antibiotics for E. coli UTI, which were completed as of 2/17. The patient had has been seen by Dr. Laurie Liu hepatologist for an initial visit in January to start the process of liver transplant work-up. Given lack of progression of patient's care prolonged hospitalization at [facility], request was made for patient to be transferred to [facility] to continue further liver transplant work-up and accepted by Dr. Laurie Liu for transfer under the hospitalist service. Patient of note is currently lethargic and poorhistorian therefore history is obtained via discussion with her son Ashley Julio at the bedside and review of medical records. \"    Interval history / Subjective:     3/7/2022 :    NGt in place   Nutrition to write orders   Allow diet as well po   Case discussed w hepatology   Chalo James:     Discussed w son/pt, both wish DNR , status adjusted on CC        Assessment & Plan:        Decompensated liver cirrhosis, with portal hypertension. Felt to be secondary toPBC  -Transferred from [facility] with prolonged hospitalization.   Transferred for further work-up for liver transplant eval  -EGD on 2/17 [place] noted for distal esophagitis and portal hypertensive gastropathy, no evidence of varices.    -history of esophageal varices back in November 2021 status post banding at that time.    -Per GI at outlying facility, patient was deemed not to be a candidate for colonoscopy and recommended Cologuard.    -s/p MRCP was done on 2/18 which was noted for cirrhosis, portal hypertension, and large ascites. -s/p cardiac stress test which was negative for ischemia, EF of 69%  -Hepatology c/s Dr. Flor Willson. Notified by [facility] with recommendation to transfer here for further work-up. Briefly D/W Dr. Flor Willson today for formal consult request.  Gordan Snellen to continue with GI meds including octreotide for now and will reassess with further recommendations tomorrow  -Continue lactulose, rifaximin, octreotide, Protonix p.o.  -Closely monitor stool output frequency  -Continue ursodiol  3/6:   Pleasant, in some denial, very jovial as an escape mechanism   Lives alone   Mother from [place] visits   Pt to be seen by hepatology soon  [address] abd as one would expect mod/large ascites collection    Pt notes some abd discomfort and some sob but other than ascites benign chest/abd exam    Pt w generalized muscle wasting   3/7:   NGt in place   Nutrition to write orders   Allow diet as well po   Case discussed w hepatology      History of hepatic encephalopathy with refractory ascites  -multiple paracentesis done at [facility] including 2/11 with 11.8 L removal, 2/18 with 9/2 L removed, and 2/28 with 9.2 L removed. -Currently with distended abdomen. Order abdominal US. Anticipate likely need for paracentesis within the next couple days  - daily re-eval      GREG with metabolic acidosis. -Per medical records, concern for ATN or possible hepatorenal syndrome. Concern also raised the patient may need eventual HD.   At this time, bicarb and K are stable  -Patient was being maintained on bicarb drip up until this morning at [facility]. Hold off for now as current bicarb is improved at 20  -Cr 4.7, peaked at 5.2 on 3/3  - NS IV fluids ordered,gentle given recurrent ascites  -Renal consult on call. Per son, no prior establishment with nephrology outpatient  -Avoidance of nephrotoxic agents  - flat S Cr curve at 4. 8 range 3/7/2022 ( Jan 2022 1.34)    Lab Results   Component Value Date/Time    Creatinine 5.06 (H) 03/07/2022 04:22 AM   - Per nephrology( 3/5)   \"-Ultimately, patient is nearing need for hemodialysis which is difficult to recommend without liver transplant. Hepatology will be following patient at [facility]"    Transaminitis/hyperbilirubinemia/coagulopathy/thrombocytopenia  -Secondary to decompensated liver cirrhosis. Monitor with repeat labs in a.m. check NH3  Lab Results   Component Value Date/Time    ALT (SGPT) 16 03/07/2022 04:22 AM    AST (SGOT) 32 03/07/2022 04:22 AM    Alk. phosphatase 129 (H) 03/07/2022 04:22 AM    Bilirubin, direct 3.8 (H) 03/05/2022 08:08 AM    Bilirubin, total 4.6 (H) 03/07/2022 04:22 AM    3/6 ammonia 28      Chronic normocytic anemia  -Possible anemia of CKD  -H&H stable. No active bleeding. Continue to monitor  Lab Results   Component Value Date/Time    Iron 140 03/07/2022 04:22 AM    TIBC 140 (L) 03/07/2022 04:22 AM    Iron % saturation 100 (H) 03/07/2022 04:22 AM    Ferritin 87 01/25/2022 09:50 AM      Lab Results   Component Value Date/Time    HGB 6.9 (L) 03/07/2022 04:22 AM    transfuse 1 pack RBC's 3/7      Hyponatremia  -Suspect secondary to underlying liver cirrhosis. Monitor for now with repeat labs in a.m.  - 128 on 3/6 am lab     Recent E. coli UTI  -Treated with Vanco and Zosyn then transitioned to Rocephin, completed 7-day antibiotic course as of 2/17  -Currently with Beckford and cloudy urine.  -Replace Beckford with new catheter 3/5   -Check UA, urine culture  -Currently afebrile and improved leukocytosis.   Hold off on antibiotics for now until urine studies back  Lab Results   Component Value Date/Time    WBC 8.3 03/07/2022 04:22 AM      Temp Readings from Last 1 Encounters:   03/07/22 98.5 °F (36.9 °C)         Hypothyroid  -Continue levothyroxine     Lab Results   Component Value Date/Time    TSH 5.470 (H) 02/11/2022 09:25 AM    Triiodothyronine (T3), free 2.4 01/25/2022 09:50 AM    T4, Free 1.2 01/25/2022 09:50 AM    Free T4 1.14 02/11/2022 09:25 AM          Generalized weakness/chronic wounds. pta  -PT, OT consult  -Wound care consulted     DVT prophylaxis SCDs for now. No pharmacological prophylaxis due to thrombocytopenia  DNR      Hospital Problems  Date Reviewed: 2/17/2022          Codes Class Noted POA    Cirrhosis [facility]) ICD-10-CM: K74.60  ICD-9-CM: 571.5  2/8/2022 Unknown                  After personally interviewing pt at bedside the following is noted on 3/7/2022 :    Review of Systems   Constitutional:        Pt is in denial , chooses to joke over discuss her issues,    Respiratory: Positive for shortness of breath. Cardiovascular: Negative for chest pain. Gastrointestinal: Positive for abdominal pain. Negative for nausea and vomiting. All other systems reviewed and are negative. I had a face to face encounter with patient on 3/7/2022 at bedside for the following physical exam:     PHYSICAL EXAM:    Visit Vitals  BP (!) 103/40   Pulse 77   Temp 98.5 °F (36.9 °C)   Resp 18   Ht 5' 4\" (1.626 m)   SpO2 97%   BMI 25.73 kg/m²          Physical Exam  Constitutional:       General: She is not in acute distress. Appearance: She is ill-appearing. She is not toxic-appearing or diaphoretic. Comments: Under wt, muscle wasting    HENT:      Head: Normocephalic and atraumatic. Right Ear: External ear normal.      Left Ear: External ear normal.      Nose: Nose normal.      Mouth/Throat:      Mouth: Mucous membranes are dry. Pharynx: Oropharynx is clear. Eyes:      General: Scleral icterus present.          Right eye: No discharge. Left eye: No discharge. Cardiovascular:      Rate and Rhythm: Normal rate and regular rhythm. Heart sounds: Normal heart sounds. Pulmonary:      Effort: Pulmonary effort is normal.      Breath sounds: Normal breath sounds. Abdominal:      General: There is distension. Tenderness: There is no abdominal tenderness. There is no guarding. Comments: Ng tube in place    Musculoskeletal:         General: No deformity. Cervical back: Normal range of motion and neck supple. Skin:     General: Skin is warm and dry. Coloration: Skin is jaundiced. Neurological:      General: No focal deficit present. Mental Status: She is alert and oriented to person, place, and time. Mental status is at baseline. Psychiatric:         Mood and Affect: Mood normal.         Behavior: Behavior normal.                     Data Review:    Review and/or order of clinical lab test      Labs:     Recent Labs     03/07/22 0422 03/06/22 0222   WBC 8.3 10.6   HGB 6.9* 7.8*   HCT 20.2* 22.8*   PLT 40* 50*     Recent Labs     03/07/22 0422 03/06/22 0222 03/05/22  0808   * 128* 129*   K 3.6 4.3 4.1   CL 97 97 97*   CO2 21 21 20*   * 98* 98*   CREA 5.06* 4.85* 4.7*   GLU 99 98 119*   CA 9.1 9.0 8.8   MG  --   --  2.4   PHOS  --   --  3.8     Recent Labs     03/07/22 0422 03/06/22 0222 03/05/22  0808   ALT 16 23 23   * 143* 140*   TBILI 4.6* 5.2* 5.8*   TP 5.3* 5.4* 5.2*   ALB 3.9 3.2* 2.7*  2.7*   GLOB 1.4* 2.2  --      Recent Labs     03/06/22 0222 03/04/22  1512   INR 1.6* 1.6*   PTP 16.7* 19.1*      Recent Labs     03/07/22 0422   TIBC 140*   PSAT 100*      Lab Results   Component Value Date/Time    Folate 38.3 (H) 10/20/2021 07:53 AM    RBC FOLATE 878 02/14/2022 01:27 AM      No results for input(s): PH, PCO2, PO2 in the last 72 hours. No results for input(s): CPK, CKNDX, TROIQ in the last 72 hours.     No lab exists for component: CPKMB  Lab Results Component Value Date/Time    Cholesterol, total 122 01/25/2022 09:50 AM    HDL Cholesterol 40 01/25/2022 09:50 AM    LDL, calculated 67.2 01/25/2022 09:50 AM    Triglyceride 74 01/25/2022 09:50 AM    CHOL/HDL Ratio 3.1 01/25/2022 09:50 AM     Lab Results   Component Value Date/Time    Glucose (POC) 114 (H) 02/23/2022 05:42 PM    Glucose (POC) 127 (H) 02/23/2022 12:04 PM    Glucose (POC) 122 (H) 02/23/2022 08:18 AM    Glucose (POC) 109 (H) 02/22/2022 09:19 PM    Glucose (POC) 133 (H) 02/22/2022 07:28 AM     Lab Results   Component Value Date/Time    Color DARK YELLOW 03/05/2022 09:22 PM    Appearance TURBID (A) 03/05/2022 09:22 PM    Specific gravity 1.014 03/05/2022 09:22 PM    Specific gravity 1.010 02/11/2022 07:20 PM    pH (UA) 5.5 03/05/2022 09:22 PM    Protein 100 (A) 03/05/2022 09:22 PM    Glucose Negative 03/05/2022 09:22 PM    Ketone Negative 03/05/2022 09:22 PM    Bilirubin NEGATIVE 02/11/2022 07:20 PM    Urobilinogen 0.2 03/05/2022 09:22 PM    Nitrites Negative 03/05/2022 09:22 PM    Leukocyte Esterase LARGE (A) 03/05/2022 09:22 PM    Epithelial cells FEW 03/05/2022 09:22 PM    Bacteria 1+ (A) 03/05/2022 09:22 PM    WBC >100 (H) 03/05/2022 09:22 PM    RBC  03/05/2022 09:22 PM         Medications Reviewed:     Current Facility-Administered Medications   Medication Dose Route Frequency    0.9% sodium chloride infusion 250 mL  250 mL IntraVENous PRN    iron sucrose (VENOFER) 200 mg in 0.9% sodium chloride 100 mL IVPB  200 mg IntraVENous Q24H    balsam peru-castor oiL (VENELEX) ointment   Topical BID    zinc oxide-cod liver oil (DESITIN) 40 % paste   Topical PRN    sodium chloride (NS) flush 5-40 mL  5-40 mL IntraVENous Q8H    sodium chloride (NS) flush 5-40 mL  5-40 mL IntraVENous PRN    acetaminophen (TYLENOL) tablet 650 mg  650 mg Oral Q6H PRN    Or    acetaminophen (TYLENOL) suppository 650 mg  650 mg Rectal Q6H PRN    polyethylene glycol (MIRALAX) packet 17 g  17 g Oral DAILY PRN    ondansetron (ZOFRAN ODT) tablet 4 mg  4 mg Oral Q8H PRN    Or    ondansetron (ZOFRAN) injection 4 mg  4 mg IntraVENous A2J PRN    folic acid (FOLVITE) tablet 1 mg  1 mg Oral DAILY    lactulose (CHRONULAC) 10 gram/15 mL solution 30 mL  30 mL Oral Q6H    levothyroxine (SYNTHROID) tablet 50 mcg  50 mcg Oral 6am    midodrine (PROAMATINE) tablet 15 mg  15 mg Oral TID WITH MEALS    octreotide (SANDOSTATIN) injection 100 mcg  100 mcg IntraVENous TID    pantoprazole (PROTONIX) tablet 40 mg  40 mg Oral DAILY    rifAXIMin (XIFAXAN) tablet 550 mg  550 mg Oral BID    sertraline (ZOLOFT) tablet 100 mg  100 mg Oral DAILY    thiamine HCL (B-1) tablet 100 mg  100 mg Oral DAILY    ursodioL (ACTIGALL) tablet 500 mg  500 mg Oral Q12H    glucagon (GLUCAGEN) injection 1 mg  1 mg IntraMUSCular PRN    naloxone (NARCAN) injection 0.1 mg  0.1 mg IntraVENous PRN    promethazine (PHENERGAN) 6.25 mg in 0.9% sodium chloride 50 mL IVPB  6.25 mg IntraVENous Q6H PRN     ______________________________________________________________________  EXPECTED LENGTH OF STAY: - - -  ACTUAL LENGTH OF STAY:          2                 Natasha Ibarra MD

## 2022-03-07 NOTE — PROGRESS NOTES
Physical Therapy    PT treatment deferred due to pt pending blood transfusion. Will con't to follow.     Tracy Vyas, PT, MPT

## 2022-03-07 NOTE — PROGRESS NOTES
RUR:  26% High    SELMA: TBD; OT/PT evals. Pending; awaiting disposition recommendations. Family versus BLS transport once medically stable. Follow-up with PCP/specialist.    Primary Contact: Forrest Ko, [phone] or genesis Rowan, [phone]    Care Management Interventions  PCP Verified by CM: Yes (Dr. Casey Ramos, unable to recall last visit )  Mode of Transport at Discharge: Other (see comment) (Likely family )  Transition of Care Consult (CM Consult): Discharge Planning  Physical Therapy Consult: Yes  Occupational Therapy Consult: Yes  Speech Therapy Consult: No  Support Systems: Spouse/Significant Other,Parent(s)  Confirm Follow Up Transport: Family  Discharge Location  Patient Expects to be Discharged to[de-identified] Home with family assistance    Reason for Admission:  Cirrhosis                    RUR Score:   26% High                  Plan for utilizing home health:    TBD; OT/PT evals pending. PCP: First and Last name:  Yariel Ross MD     Name of Practice:    Are you a current patient: Yes/No: Yes   Approximate date of last visit: Unable to recall   Can you participate in a virtual visit with your PCP: Yes                    Current Advanced Directive/Advance Care Plan: Full Code    Healthcare Decision Maker:   Click here to complete [address] including selection of the Healthcare Decision Maker Relationship (ie \"Primary\")             Primary Decision MakerGinger Thelma Spouse - [phone]    Primary Decision Maker: Stacy Paige - Son - [phone]                  Transition of Care Plan:    TBD     CM met wit patient at bedside to introduce self and explain role. Patient lives with her mother in a 2 story home with 3 steps to enter. Patient has about 12 steps to enter 2nd floor. Per patient, she does not access the 2nd floor; she has 1st floor living set up. Patient reported her mother provided assistance with ADL care due to increased weakness.  Mother is responsible for IADL's. Patient was ambulating with the use of a FWW and reported \"walking very slow\". Patient also owns a wheelchair. CM verified patient's PCP, insurance and demographics. Preferred pharmacy is [facility] on [address] in [place] with no barriers obtaining needed prescriptions. CM to continue to follow as needed.     Kirill Martinez, MSW   [phone]

## 2022-03-07 NOTE — CONSULTS
Comprehensive Nutrition Assessment    Type and Reason for Visit: Initial,Consult    Nutrition Recommendations/Plan:      1. RD to order TF via DHT:  Jevity 1.5 at goal rate of 50 ml/hr + 1 pack ProSource added daily + 130 ml h20 flush q6h     2. Recommend 2 gm Na+ diet order     3. RD will order Ensure Compact BID    4. Recommend ordering daily MVI      Nutrition Assessment:    64 yo female admitted for cirrhosis, AMS. PMhx: Cirrhosis with ascites s/p paracentesis every week, CKD, esophageal varices s/p banding. Weight hx in EMR indicates 17% loss over last 5 months which is significant for time frame. Pt slightly confused, RN states she is A&O x 2 at baseline. Pt states she was eating well PTA and that her mom prepares her meals (unsure accuracy of this). RD notes from recent admission to different hospital indicates very poor to fair PO intakes. Had multiple ONS ordered. MD ordered pt to be NPO with DHT placed. Consult received for TF order. Spoke with RN about ordering PO diet along with TF. Will add ONS as well and monitor intakes. Will order Jevity 1.5 at goal rate of 50 ml/hr + 1 pack ProSource added daily + 130 ml h20 flush q6h to provide 1100 ml, 1710 kcals (100%), 85 gm protein (89%), 836 + 780 = 1616 ml water.     Labs: Na+ 128, Bun/Cr elevated, NH3 44      Malnutrition Assessment:  Malnutrition Status:  Severe malnutrition    Context:  Chronic illness     Findings of the 6 clinical characteristics of malnutrition:   Energy Intake:  7 - 75% or less est energy requirements for 1 month or longer  Weight Loss:  7.00 - Greater than 10% over 6 months     Body Fat Loss:  7 - Severe body fat loss, Triceps   Muscle Mass Loss:  7 - Severe muscle mass loss, Thigh (quadriceps)  Fluid Accumulation:  1 - Mild, Ascites,Extremities   Strength:  Not performed       Nutritionally Significant Medications: folic acid, lactulose, thiamine    Estimated Daily Nutrient Needs:  Energy (kcal): 1600 (MSJ x AF 1.3); Weight Used for Energy Requirements: Current  Protein (g):  (1.4-1.5 gm/kg); Weight Used for Protein Requirements: Current  Fluid (ml/day): 1600; Method Used for Fluid Requirements: 1 ml/kcal    Nutrition Related Findings:       BM: loose 3/5  Edema: ascites, 1+ BLE, trace BUE  Wounds:  Deep tissue injury (DTI bilateral heels; excoriation and rash to buttock)       Current Nutrition Therapies:   Diet: NPO  Supplements: none  Additional Caloric Sources: none    Meal intake: No data found. Supplement Intake: No data found. Anthropometric Measures:  · Height:  5' 4\" (162.6 cm)  · Current Body Wt:  68 kg (149 lb 14.6 oz)   · Admission Body Wt:       · Usual Body Wt:        · Ideal Body Wt:  120:  124.9 %   · Adjusted Body Weight:   ; Weight Adjustment for: No adjustment   · Adjusted BMI:       · BMI Categories:  Overweight (BMI 25.0-29. 9)     Wt Readings from Last 10 Encounters:   02/17/22 68 kg (149 lb 14.6 oz)   12/13/21 69.4 kg (153 lb)   10/19/21 82 kg (180 lb 12.4 oz)   10/14/17 81.6 kg (180 lb)       Nutrition Diagnosis:   · Inadequate oral intake related to inadequate protein-energy intake as evidenced by NPO or clear liquid status due to medical condition    · Unintended weight loss related to inadequate protein-energy intake as evidenced by weight loss greater than or equal to 10% in 6 months      Nutrition Interventions:   Food and/or Nutrient Delivery: Start tube feeding  Nutrition Education and Counseling: No recommendations at this time  Coordination of Nutrition Care: Continue to monitor while inpatient    Goals:  Meet > 75% EEN's with EN while NPO within next 5-7 days       Nutrition Monitoring and Evaluation:   Behavioral-Environmental Outcomes: None identified  Food/Nutrient Intake Outcomes: Enteral nutrition intake/tolerance  Physical Signs/Symptoms Outcomes: Biochemical data,GI status,Weight    Discharge Planning:     Too soon to determine     Oral Achilles, RD  Contact via [employer]

## 2022-03-08 NOTE — PROGRESS NOTES
Spoke with Nephrology this morning about oliguria. No new orders. Patient was NPO until today and has had very little oral intake since I began tube feeds this evening. Will continue to monitor Beckford output.

## 2022-03-08 NOTE — PROGRESS NOTES
RUR:  25% High     SELMA: SNF recommendation. BLS transport once medically stable. Follow-up with PCP/specialist.     Primary Contact: Chela Palomino, [phone]    1:00PM - CM reviewed chart. Per review and ID rounds, tube feedings started yesterday, 3/7. CM noted SNF discharge disposition. CM met with patient and son at bedside to discuss preferences. Patient was sleeping soundly upon entering room. CM and patient's son discussed SNF recommendation. Per son, he expressed the conversation held with Dr. Linda Null reported that patient should remain in hospital for aggressive nutritional treatment and therapy for 2 weeks, with the hopes of becoming a candidate for a liver transplant at [facility]. CM sent [employer] message to Dr. Linda Null to confirm and obtain recommendations. 1:50PM - CM received confirmation from Dr. Linda Null that patient's son is correct that patient will remain for aggressive nuturional support with the hope she will turn turn around malnutrition and become a liver transplant candidate. Discharge pending medical progress and final recommendations. CM to continue to follow as needed.      Aliya Seymour, YIFAN   [phone]

## 2022-03-08 NOTE — PROGRESS NOTES
[address] [facility], [place], [address], [facility], [place]      ISAAC Thomas, [facility]     Michelle CALLOWAY John, [facility]   MANISHA Montalvo, [facility]       [email or web address] 136    at [address], [address] Katlyn Pizano  22. [phone]    FAX: [address] [place] [address] Avenue    [address], [address]    [phone]    FAX: [phone]       HEPATOLOGY PROGRESS NOTE  The patient is well known to me from a previous visit to my office at [facility] in [address] [place] [address]. She is a 64 yo  female who was found to have chronic liver disease in 2020.       The patient was found to have cirrhosis in 7/2021 when she developed ascites.     She had variceal bleeding in 11/2021     Serologic evaluation for markers of chronic liver disease was positive for AMA     The patient has developed the following complications of cirrhosis: esophageal varices, variceal bleeding, ascites, edema,   hepatic encephalopathy,     I saw the patient for the first time in 1/2022. She had CTP score 9 and MELD score 21 at that time. We started liver transplant evaluation testing. She developed confusion and could not be aroused and was hospitalized 3 weeks ago at [facility] in 2/202. We were finally contacted about this hospitalization a few days ago and we made arrangements to transfer her to [facility]. She now has far more advanced liver and kidney disease than when I last saw her about 4-6 weeks ago. Labs are significant for   WBC 10.6, HB 7.8 gms, PLT 50, Sna 128, Scr 4.85 mg, TBILI 5.2 mg, INR 1.6, Sammonia 28,     Imaging of the liver with Ultrasound shows cirrhosis, and large amount of ascites.     On exam she has severe muslce wasting, severe ecchymosis on arms, and is so frail she cannot stand. Mental status is clear. Hospital Course:  Dobhoff feeding tube was inserted and she has started tube feedings. She has started OT/PT    Hepatology Plan:  2 weeks of tube feeds to improve nutritional balance   Intensice OT/PT to improve strength and ambulation  If she works hard and achives these goals she will become acceptable candidate for liver transplant       ASSESSMENT AND PLAN:  Cirrhosis  The diagnosis of cirrhosis is based upon imaging, laboratory studies, complications of cirrhosis. Cirrhosis is secondary to [facility]. Not alcohol as she was initially told.     Serologic testing was positive for AMA, ASMA     The CTP is 10. Child class C. The MELD score is 34.     Based upon the MELD and CTP scores the patient has a mortality of about 50% within the next 90 days until we turn her fraily and weakness around. Right now is is too weak to survive liver transplantation. She needs aggressive nutritional support and both she and her son are in favor of this with the hopes her situation will improve and she could survive liver transplantation.       Primary Biliary Cholangitis  The diagnosis is based upon serology and an elevation in ALP and positive AMA. A liver biopsy has not been performed. PBC is being treated with ANTONIETA 500 mg BID. Malnutrition  The patient has severe protein-calorie malnutrtion and muscle wasting. This is due to advanced liver disease and refractory ascites. The patient a more calories than she can possible eat to get into positive nutritional balance. Without aggressive nutritional support she will not survive. She is alert, oriented and understands the need for NG dobhoff feeding tube placement. Both she and her son agree with this. Place Dobhoff feeding tube today. Start tube feeds. Formula to be managed by nutritioinal service.     Frailty/muscle wasting  The patient is very frail and cannot sit or stand on her own. She needs aggressive support by OT/PT. She is very motivated to survive and willing to do whatever excercies she needs to get stronger. If she does not improve her muscle wasting and body strength she will never survive the liver transplant process.     Vitamin malabsorption  Patients with PBC do not efficiently absorb fat soluble vitamins A,D,E, K. She needs multi-vitamins containing fat soluble vitamins A, E, D, K. Chronic kidney disease-HRS  This is probably from cirrhosis and use of diuretics. The serum creatinine is 4.7 mg  He is on IV midodrine and SQ octreotide buut this has not really helped. Can stop SQ octreottide because of coagulopathy, ecchymosis. Continue midodrine  Will give IV albumin for a few days to see if this helps. NSAIDs should not be utilized as this may worsen CKD.     Ascites   Ascites developed for the first time in 7/2021. Ascites is is refractory to the current doses of diuretics because of kidney disease and Hyponatremia. No diuretics. Hyponatremia  This is secondary cirrhosis and diuretics  This appears to be stable in the 128 range. No diuretics. Will give IV albumin 25 gm Q6H for a few days to see if this helps     Lower extremity edema  Edema is refractory to the current doses of diuretics because of kidney disease and Hyponatremia. Will continue the current dose of diuretics at step 1.     Screening for Esophageal varices   The patient has esophageal varices with prior bleeding. Varices were banded in 11/2021 and 12/2021. The last EGD to assess for varices was performed in 12/2021. No repeat EGD needed at this time.     Hepatic encephalopathy   Overt HE is well controlled on lactulose and xifaxan. Serum ammonia is low and in the 40s.      Coagulopathy  INR is in the 1.6 range and stable. Thrombocytopenia   This is secondary to cirrhosis. There is no evidence of overt bleeding. No treatment is required.   The platelet count is adequate for the patient to undergo procedures without the need for platelet transfusion or platelet growth factors.     Anemia   This is due to multifactorial causes including portal hypertension with chronic GI blood loss and bone marrow suppression due to severe malnutrition.     The most recent FE studies were from 1/2022. The serum ferritin is 87  The FE saturation is 74  FE panel is within the normal range but this is low a patient with chronic liver disease and cirrhosis   Will administer intravenous iron. No EGD needed at this time. Thrombocytopenia   This is secondary to cirrhosis. There is no evidence of overt bleeding. No treatment is required. The platelet count is adequate for the patient to undergo procedures without the need for platelet transfusion or platelet growth factors. PHYSICAL EXAMINATION:  VS: per nursing note  General:  Ill appearing  Eyes:  Sclera icteric. ENT:  No oral lesions. Thyroid normal.  Nodes:  No adenopathy. Skin:  Spider angiomata. Severe ecchymosis all over arms. Respiratory:  Lungs clear to auscultation. Cardiovascular:  Regular heart rate. Abdomen:  Soft non-tender, Distended with obvious ascites. Extremities:  No lower extremity edema. Severe muscle wasting of upper and lower extremities. Neurologic:  Alert and oriented. Cranial nerves grossly intact. No asterixis. LABORATORY:  Results for Lorenzo Powell (MRN [id number]) as of 3/8/2022 05:25   Ref.  Range 3/5/2022 08:08 3/6/2022 02:22 3/7/2022 04:22   WBC Latest Ref Range: 3.6 - 11.0 K/uL 12.2 (H) 10.6 8.3   HGB Latest Ref Range: 11.5 - 16.0 g/dL 8.6 (L) 7.8 (L) 6.9 (L)   PLATELET Latest Ref Range: 150 - 400 K/uL 60 (L) 50 (L) 40 (LL)   INR Latest Ref Range: 0.9 - 1.1    1.6 (H)    Sodium Latest Ref Range: 136 - 145 mmol/L 129 (L) 128 (L) 128 (L)   Potassium Latest Ref Range: 3.5 - 5.1 mmol/L 4.1 4.3 3.6   Chloride Latest Ref Range: 97 - 108 mmol/L 97 (L) 97 97   CO2 Latest Ref Range: 21 - 32 mmol/L 20 (L) 21 21   Glucose Latest Ref Range: 65 - 100 mg/dL 119 (H) 98 99   BUN Latest Ref Range: 6 - 20 MG/DL 98 (H) 98 (H) 106 (H)   Creatinine Latest Ref Range: 0.55 - 1.02 MG/DL 4.7 (H) 4.85 (H) 5.06 (H)   Bilirubin, total Latest Ref Range: 0.2 - 1.0 MG/DL 5.8 (H) 5.2 (H) 4.6 (H)   Albumin Latest Ref Range: 3.5 - 5.0 g/dL 2.7 (L) 3.2 (L) 3.9   ALT Latest Ref Range: 12 - 78 U/L 23 23 16   AST Latest Ref Range: 15 - 37 U/L 49 (H) 46 (H) 32   Alk. phosphatase Latest Ref Range: 45 - 117 U/L 140 (H) 143 (H) 129 (H)   Ammonia, plasma Latest Ref Range: <32 UMOL/L  28 44 (H)     RADIOLOGY:  Ultrasound of liver. Echogenic consistent with cirrhosis. No liver mass lesions. No dilated bile ducts. Moderate ascites.         MD Francisco [facility] [address], [address] [place] [address].  [phone]  [address] [facility]

## 2022-03-08 NOTE — PROGRESS NOTES
Spiritual Care Partner Volunteer visited patient in [address] [place] [address] on 3.8.22    Documented by Igor Romero, Staff , on 3.8.22  Please call LUMA [phone]) to page  if needed

## 2022-03-08 NOTE — PROGRESS NOTES
Physician Progress Note      Zabrina Bhakta  [facility] #:                  [id number]  :                       1960  ADMIT DATE:       3/5/2022 2:14 PM  DISCH DATE:  RESPONDING  PROVIDER #:        Ernestine Cardoza MD        QUERY TEXT:    Stage of Chronic Kidney Disease: Please provide further specificity, if known. Clinical indicators include: scr, chronic kidney disease, creatinine, bun  Options provided:  -- Chronic kidney disease stage 1  -- Chronic kidney disease stage 2  -- Chronic kidney disease stage 3  -- Chronic kidney disease stage 3a  -- Chronic kidney disease stage 3b  -- Chronic kidney disease stage 4  -- Chronic kidney disease stage 5  -- Chronic kidney disease stage 5, requiring dialysis  -- End stage renal disease  -- Other - I will add my own diagnosis  -- Disagree - Not applicable / Not valid  -- Disagree - Clinically Unable to determine / Unknown        PROVIDER RESPONSE TEXT:    The patient has chronic kidney disease stage 4.       Electronically signed by:  Ernestine Cardoza MD 3/8/2022 10:25 AM

## 2022-03-08 NOTE — PROGRESS NOTES
[facility] Adult  Hospitalist Group                                                                                          Hospitalist Progress Note  Arline Henry MD  Answering service: [phone] OR [phone] from in house phone        Date of Service:  3/8/2022  NAME:  Tenzin Jeffery  :  1960  MRN:  [id number]      Admission Summary:   Copied form admit: \" Tenzin Jeffery is a 65 y.o. female with history of liver cirrhosis with portal hypertension,recently diagnosed primary biliary cholangitis, recurrent ascites requiring paracentesis approximately every 7 to 10 days who presents here transferred from [address] [place] [address] secondary to decompensated liver cirrhosis, worsening renal insufficiency, progressive weakness currently undergoing work-up for possible liver transplant. The patient of note initially presented to [address] [place] [address] on 2022 secondary to hepatic encephalopathy and progressive weakness. Patient on admission was found at that time to have an ammonia level of 111 and did subsequently require paracentesis in the ED. The patient has had a prolonged and complicated hospital course since that time and has undergone multiple paracentesis including  with 11.8 L removal,  with 9/2 L removed, and  with 9.2 L removed. The patient also did have episodes of transient hypotension requiring a brief ICU admission for pressor support and has periodically received albumin with daily midodrine use. She was followed both by GI and nephrology services. Patient did undergo an EGD on  noted for distal esophagitis and portal hypertensive gastropathy, no evidence of varices. According to the son Arlet Covington at bedside, the patient does have a history of esophageal varices back in 2021 status post banding at that time. Per GI at outlying facility, patient was deemed not to be a candidate for colonoscopy and recommended Cologuard.   Part of her liver transplant evaluation at [facility] also included an MRCP was done on 2/18 which was noted for cirrhosis, portal hypertension, and large ascites. She has also undergone a cardiac stress test which was negative, EF of 69%. Patient has been maintained on lactulose, rifaximin, octreotide, and Protonix at the [facility]. Patient has had progressive worsening of her renal function at the [facility] facility with creatinine peak at 5.2 on 3/3 and associated metabolic acidosis requiring management with bicarb drip that was maintained as of this morning at [facility]. Per medical records, there was concern that patient may be moving towards initiation of possible hemodialysis. According to son, patient does not have any previous establish care with nephrology and was not aware of any kidney issues prior to current hospitalization. Patient also was treated with with a 7-day course of antibiotics for E. coli UTI, which were completed as of 2/17. The patient had has been seen by Dr. Sheela Grewal hepatologist for an initial visit in January to start the process of liver transplant work-up. Given lack of progression of patient's care prolonged hospitalization at [facility], request was made for patient to be transferred to [facility] to continue further liver transplant work-up and accepted by Dr. Sheela Grewal for transfer under the hospitalist service. Patient of note is currently lethargic and poorhistorian therefore history is obtained via discussion with her son Enrrique at the bedside and review of medical records. \"    Interval history / Subjective:     3/8/2022 :    NGt in place   Nutrition to write orders   Allow diet as well po   Case discussed w hepatology   [facility]:     Discussed w son/pt, both wish DNR , status adjusted on CC        Assessment & Plan:        Decompensated liver cirrhosis, with portal hypertension. Felt to be secondary toPBC  -Transferred from [facility] with prolonged hospitalization.   Transferred for further work-up for liver transplant eval  -EGD on 2/17 [place] noted for distal esophagitis and portal hypertensive gastropathy, no evidence of varices.    -history of esophageal varices back in November 2021 status post banding at that time.    -Per GI at outlying facility, patient was deemed not to be a candidate for colonoscopy and recommended Cologuard.    -s/p MRCP was done on 2/18 which was noted for cirrhosis, portal hypertension, and large ascites. -s/p cardiac stress test which was negative for ischemia, EF of 69%  -Hepatology c/s Dr. Jayce Santos. Notified by [facility] with recommendation to transfer here for further work-up. Briefly D/W Dr. Jayce Santos today for formal consult request.  Jaimeon Lennox to continue with GI meds including octreotide for now and will reassess with further recommendations tomorrow  -Continue lactulose, rifaximin, octreotide, Protonix p.o.  -Closely monitor stool output frequency  -Continue ursodiol  3/6:   Pleasant, in some denial, very jovial as an escape mechanism   Lives alone   Mother from [place] visits   Pt to be seen by hepatology soon  [address] abd as one would expect mod/large ascites collection    Pt notes some abd discomfort and some sob but other than ascites benign chest/abd exam    Pt w generalized muscle wasting   3/7:   NGt in place   Nutrition to write orders   Allow diet as well po   Case discussed w hepatology      History of hepatic encephalopathy with refractory ascites  -multiple paracentesis done at [facility] including 2/11 with 11.8 L removal, 2/18 with 9/2 L removed, and 2/28 with 9.2 L removed. -Currently with distended abdomen. Order abdominal US. Anticipate likely need for paracentesis within the next couple days  - daily re-eval      GREG with metabolic acidosis. -Per medical records, concern for ATN or possible hepatorenal syndrome. Concern also raised the patient may need eventual HD.   At this time, bicarb and K are stable  -Patient was being maintained on bicarb drip up until this morning at [facility]. Hold off for now as current bicarb is improved at 20  -Cr 4.7, peaked at 5.2 on 3/3  - NS IV fluids ordered,gentle given recurrent ascites  -Renal consult on call. Per son, no prior establishment with nephrology outpatient  -Avoidance of nephrotoxic agents  - flat S Cr curve at 4. 8 range 3/8/2022 ( Jan 2022 1.34)    Lab Results   Component Value Date/Time    Creatinine 4.99 (H) 03/08/2022 05:17 AM   - Per nephrology( 3/5)   \"-Ultimately, patient is nearing need for hemodialysis which is difficult to recommend without liver transplant. Hepatology will be following patient at [facility]"    Transaminitis/hyperbilirubinemia/coagulopathy/thrombocytopenia  -Secondary to decompensated liver cirrhosis. Monitor with repeat labs in a.m. check NH3  Lab Results   Component Value Date/Time    ALT (SGPT) 16 03/07/2022 04:22 AM    AST (SGOT) 32 03/07/2022 04:22 AM    Alk. phosphatase 129 (H) 03/07/2022 04:22 AM    Bilirubin, direct 3.8 (H) 03/05/2022 08:08 AM    Bilirubin, total 4.6 (H) 03/07/2022 04:22 AM    3/6 ammonia 28      Chronic normocytic anemia  -Possible anemia of CKD  -H&H stable. No active bleeding. Continue to monitor  Lab Results   Component Value Date/Time    Iron 140 03/07/2022 04:22 AM    TIBC 140 (L) 03/07/2022 04:22 AM    Iron % saturation 100 (H) 03/07/2022 04:22 AM    Ferritin 87 01/25/2022 09:50 AM      Lab Results   Component Value Date/Time    HGB 6.9 (L) 03/07/2022 04:22 AM    transfuse 1 pack RBC's 3/7      Hyponatremia  -Suspect secondary to underlying liver cirrhosis. Monitor for now with repeat labs in a.m.  - 128 on 3/6 am lab     Recent E. coli UTI  -Treated with Vanco and Zosyn then transitioned to Rocephin, completed 7-day antibiotic course as of 2/17  -Currently with Beckford and cloudy urine.  -Replace Beckford with new catheter 3/5   -Check UA, urine culture  -Currently afebrile and improved leukocytosis.   Hold off on antibiotics for now until urine studies back  Lab Results   Component Value Date/Time    WBC 8.3 03/07/2022 04:22 AM      Temp Readings from Last 1 Encounters:   03/08/22 97.7 °F (36.5 °C)         Hypothyroid  -Continue levothyroxine     Lab Results   Component Value Date/Time    TSH 5.470 (H) 02/11/2022 09:25 AM    Triiodothyronine (T3), free 2.4 01/25/2022 09:50 AM    T4, Free 1.2 01/25/2022 09:50 AM    Free T4 1.14 02/11/2022 09:25 AM          Generalized weakness/chronic wounds. pta  -PT, OT consult  -Wound care consulted     DVT prophylaxis SCDs for now. No pharmacological prophylaxis due to thrombocytopenia  DNR      Hospital Problems  Date Reviewed: 2/17/2022          Codes Class Noted POA    Cirrhosis ([facility] [address] ICD-10-CM: K74.60  ICD-9-CM: 571.5  2/8/2022 Unknown                  After personally interviewing pt at bedside the following is noted on 3/8/2022 :    Review of Systems   Reason unable to perform ROS: pt not wanting to discuss her medical condition, but pleasenat and else minimal conversation    Constitutional: Positive for malaise/fatigue. I had a face to face encounter with patient on 3/8/2022 at bedside for the following physical exam:     PHYSICAL EXAM:    Visit Vitals  BP (!) 113/57   Pulse 74   Temp 97.7 °F (36.5 °C)   Resp 18   Ht 5' 4\" (1.626 m)   Wt 60 kg (132 lb 4.4 oz)   SpO2 97%   BMI 22.71 kg/m²          Physical Exam  Constitutional:       General: She is not in acute distress. Appearance: She is ill-appearing. She is not toxic-appearing or diaphoretic. Comments: Pleasant very sicklly, mal nurished cirrhotic w abd fullness, marked muscle wasting. Is markedly chronically physcially depleted as per above w jaundice skin, easily fatigues with any extremity use    HENT:      Head: Normocephalic and atraumatic. Right Ear: External ear normal.      Left Ear: External ear normal.      Mouth/Throat:      Mouth: Mucous membranes are dry. Pharynx: Oropharynx is clear.    Eyes: General: Scleral icterus present. Cardiovascular:      Rate and Rhythm: Normal rate and regular rhythm. Pulmonary:      Effort: Pulmonary effort is normal.      Breath sounds: Normal breath sounds. Comments: Diminished but not in resp distress. Abdominal:      General: There is distension. Palpations: Abdomen is soft. Comments: Non tender ascitic collection    Skin:     Coloration: Skin is jaundiced and pale. Findings: Bruising present. Neurological:      General: No focal deficit present. Mental Status: She is alert. Mental status is at baseline. Psychiatric:         Mood and Affect: Mood normal.         Behavior: Behavior normal.                     Data Review:    Review and/or order of clinical lab test      Labs:     Recent Labs     03/07/22 0422 03/06/22 0222   WBC 8.3 10.6   HGB 6.9* 7.8*   HCT 20.2* 22.8*   PLT 40* 50*     Recent Labs     03/08/22 0517 03/07/22 0422 03/06/22 0222   * 128* 128*   K 3.4* 3.6 4.3   CL 98 97 97   CO2 19* 21 21   * 106* 98*   CREA 4.99* 5.06* 4.85*   * 99 98   CA 9.3 9.1 9.0   PHOS 3.9  --   --      Recent Labs     03/08/22  0517 03/07/22 0422 03/06/22 0222   ALT  --  16 23   AP  --  129* 143*   TBILI  --  4.6* 5.2*   TP  --  5.3* 5.4*   ALB 3.8 3.9 3.2*   GLOB  --  1.4* 2.2     Recent Labs     03/06/22 0222   INR 1.6*   PTP 16.7*      Recent Labs     03/07/22 0422   TIBC 140*   PSAT 100*      Lab Results   Component Value Date/Time    Folate 38.3 (H) 10/20/2021 07:53 AM    RBC FOLATE 878 02/14/2022 01:27 AM      No results for input(s): PH, PCO2, PO2 in the last 72 hours. No results for input(s): CPK, CKNDX, TROIQ in the last 72 hours.     No lab exists for component: CPKMB  Lab Results   Component Value Date/Time    Cholesterol, total 122 01/25/2022 09:50 AM    HDL Cholesterol 40 01/25/2022 09:50 AM    LDL, calculated 67.2 01/25/2022 09:50 AM    Triglyceride 74 01/25/2022 09:50 AM    CHOL/HDL Ratio 3.1 01/25/2022 09:50 AM     Lab Results   Component Value Date/Time    Glucose (POC) 114 (H) 02/23/2022 05:42 PM    Glucose (POC) 127 (H) 02/23/2022 12:04 PM    Glucose (POC) 122 (H) 02/23/2022 08:18 AM    Glucose (POC) 109 (H) 02/22/2022 09:19 PM    Glucose (POC) 133 (H) 02/22/2022 07:28 AM     Lab Results   Component Value Date/Time    Color DARK YELLOW 03/05/2022 09:22 PM    Appearance TURBID (A) 03/05/2022 09:22 PM    Specific gravity 1.014 03/05/2022 09:22 PM    Specific gravity 1.010 02/11/2022 07:20 PM    pH (UA) 5.5 03/05/2022 09:22 PM    Protein 100 (A) 03/05/2022 09:22 PM    Glucose Negative 03/05/2022 09:22 PM    Ketone Negative 03/05/2022 09:22 PM    Bilirubin NEGATIVE 02/11/2022 07:20 PM    Urobilinogen 0.2 03/05/2022 09:22 PM    Nitrites Negative 03/05/2022 09:22 PM    Leukocyte Esterase LARGE (A) 03/05/2022 09:22 PM    Epithelial cells FEW 03/05/2022 09:22 PM    Bacteria 1+ (A) 03/05/2022 09:22 PM    WBC >100 (H) 03/05/2022 09:22 PM    RBC  03/05/2022 09:22 PM         Medications Reviewed:     Current Facility-Administered Medications   Medication Dose Route Frequency    0.9% sodium chloride infusion  75 mL/hr IntraVENous CONTINUOUS    0.9% sodium chloride infusion 250 mL  250 mL IntraVENous PRN    0.9% sodium chloride infusion 250 mL  250 mL IntraVENous PRN    albumin human 25% (BUMINATE) solution 25 g  25 g IntraVENous Q6H    miconazole (MICONTIN) 2 % ointment   Topical BID    iron sucrose (VENOFER) 200 mg in 0.9% sodium chloride 100 mL IVPB  200 mg IntraVENous Q24H    balsam peru-castor oiL (VENELEX) ointment   Topical BID    zinc oxide-cod liver oil (DESITIN) 40 % paste   Topical PRN    sodium chloride (NS) flush 5-40 mL  5-40 mL IntraVENous Q8H    sodium chloride (NS) flush 5-40 mL  5-40 mL IntraVENous PRN    acetaminophen (TYLENOL) tablet 650 mg  650 mg Oral Q6H PRN    Or    acetaminophen (TYLENOL) suppository 650 mg  650 mg Rectal Q6H PRN    polyethylene glycol (MIRALAX) packet 17 g  17 g Oral DAILY PRN    ondansetron (ZOFRAN ODT) tablet 4 mg  4 mg Oral Q8H PRN    Or    ondansetron (ZOFRAN) injection 4 mg  4 mg IntraVENous K0T PRN    folic acid (FOLVITE) tablet 1 mg  1 mg Oral DAILY    lactulose (CHRONULAC) 10 gram/15 mL solution 30 mL  30 mL Oral Q6H    levothyroxine (SYNTHROID) tablet 50 mcg  50 mcg Oral 6am    midodrine (PROAMATINE) tablet 15 mg  15 mg Oral TID WITH MEALS    octreotide (SANDOSTATIN) injection 100 mcg  100 mcg IntraVENous TID    pantoprazole (PROTONIX) tablet 40 mg  40 mg Oral DAILY    rifAXIMin (XIFAXAN) tablet 550 mg  550 mg Oral BID    sertraline (ZOLOFT) tablet 100 mg  100 mg Oral DAILY    thiamine HCL (B-1) tablet 100 mg  100 mg Oral DAILY    ursodioL (ACTIGALL) tablet 500 mg (Patient Supplied)  500 mg Oral Q12H    glucagon (GLUCAGEN) injection 1 mg  1 mg IntraMUSCular PRN    naloxone (NARCAN) injection 0.1 mg  0.1 mg IntraVENous PRN    promethazine (PHENERGAN) 6.25 mg in 0.9% sodium chloride 50 mL IVPB  6.25 mg IntraVENous Q6H PRN     ______________________________________________________________________  EXPECTED LENGTH OF STAY: 4d 16h  ACTUAL LENGTH OF STAY:          3                 Alma Romo MD

## 2022-03-08 NOTE — PROGRESS NOTES
Problem: Self Care Deficits Care Plan (Adult)  Goal: *Acute Goals and Plan of Care (Insert Text)  Description: FUNCTIONAL STATUS PRIOR TO ADMISSION: Pt a poor historian. Spoke with pt's son Katherine Velarde) who reports patient was modified independent using a rolling walker for functional mobility. Family provided SBA/CGA with functional mobility and sit<>stand, but was able to bath, dress, and toilet with Mod I. Son reports pt tangential at times during conversation, but aware and able to be redirected. HOME SUPPORT: The patient lived with mother who provided assistance as needed and son lives nearby. Occupational Therapy Goals  Initiated 3/8/2022  1. Patient will perform grooming with minimal assistance/contact guard assist within 7 day(s). 2.  Patient will perform bathing from anterior neck to thigh with minimal assistance/contact guard assist within 7 day(s). 3.  Patient will perform upper body dressing with minimal assistance/contact guard assist within 7 day(s). 4.  Patient will perform toilet transfers with moderate assistance  within 7 day(s). 5.  Patient will perform all aspects of toileting with moderate assistance  within 7 day(s). 6.  Patient will participate in upper extremity therapeutic exercise/activities with minimal assistance/contact guard assist for 10 minutes within 7 day(s). 7.  Patient will utilize energy conservation techniques during functional activities with verbal cues within 7 day(s). Outcome: Not Met     OCCUPATIONAL THERAPY EVALUATION  Patient: Glenna Merino (17 y.o. female)  Date: 3/8/2022  Primary Diagnosis: Cirrhosis ([facility] [K74.60]       Precautions:  Fall    ASSESSMENT  Based on the objective data described below, the patient presents with generalized weakness, impaired functional mobility, and cognitive impairments following admission for cirrhosis.  Pt cleared for therapy by nursing and received supine in bed with care technician in room for hygiene and brief management. Pt demo'd ability to roll to L and R with Min A, extra time, and verbal cues. Pt A&Ox4, however, reports her birthday is [birth year] and that her listed birthday is not correct. Confirmed with son that correct birthday is [birth year] and that pt has been perseverating on incorrect birthday. Pt with good BUE ROM and demo'd ability to self-feed with spoon. Currently recommend SNF at discharge as pt is functioning below her baseline. Current Level of Function Impacting Discharge (ADLs/self-care): Max A for LB ADLs, Min-Mod A for UB ADLs, Intermittent confusion     Functional Outcome Measure: The patient scored Total: 20/100 on the Barthel Index outcome measure which is indicative of being dependent in basic self-care. Other factors to consider for discharge: Mother is primary caretaker      Patient will benefit from skilled therapy intervention to address the above noted impairments. PLAN :  Recommendations and Planned Interventions: self care training, functional mobility training, therapeutic exercise, balance training, therapeutic activities, cognitive retraining, endurance activities, patient education, home safety training and family training/education    Frequency/Duration: Patient will be followed by occupational therapy 3 times a week to address goals. Recommendation for discharge: (in order for the patient to meet his/her long term goals)  Therapy up to 5 days/week in SNF setting    This discharge recommendation:  Has not yet been discussed the attending provider and/or case management    IF patient discharges home will need the following DME: patient owns DME required for discharge       SUBJECTIVE:   Patient stated I don't know why it's listed like that (her birthday in her chart).     OBJECTIVE DATA SUMMARY:   HISTORY:   Past Medical History:   Diagnosis Date    Anxiety 10/19/2021    Basal cell carcinoma (BCC) of face 10/19/2021    Chronic kidney disease     \"end stage liver disease\" per son    Cirrhosis of liver not due to alcohol ([facility]     Depression 10/19/2021     Past Surgical History:   Procedure Laterality Date    HX  SECTION      x2    HX ORTHOPAEDIC Right     x2       Expanded or extensive additional review of patient history:     Home Situation  Home Environment: Private residence  # Steps to Enter: 2  Rails to Enter: Yes  Hand Rails : Bilateral  One/Two Story Residence: Two story  Living Alone: No  Support Systems: Parent(s),Child(joe)  Patient Expects to be Discharged to[de-identified] Skilled nursing facility  Current DME Used/Available at Home: [address], bedside  Tub or Shower Type: Shower    Hand dominance: Right    EXAMINATION OF PERFORMANCE DEFICITS:  Cognitive/Behavioral Status:  Neurologic State: Alert  Orientation Level: Oriented to person;Oriented to place;Oriented to situation;Oriented to time (Knows name, states wrong birthday)  Cognition: Follows commands  Perception: Appears intact  Perseveration:  (Perseveration on birthday-states )  Safety/Judgement: Decreased insight into deficits    Skin: bruising BUE     Edema: none noted     Hearing: Auditory  Auditory Impairment: None    Vision/Perceptual:       Acuity: Within Defined Limits         Range of Motion:    AROM: Generally decreased, functional  PROM: Generally decreased, functional        Strength:    Strength: Generally decreased, functional        Coordination:  Coordination: Generally decreased, functional  Fine Motor Skills-Upper: Left Intact; Right Intact    Gross Motor Skills-Upper: Left Intact; Right Intact    Tone & Sensation:    Tone: Normal  Sensation: Intact     Balance:   Session at bed level     Functional Mobility and Transfers for ADLs:  Bed Mobility:  Rolling: Minimum assistance (extra time)  Scooting: Maximum assistance    ADL Assessment:  Feeding: Stand-by assistance    Oral Facial Hygiene/Grooming:  Moderate assistance    Bathing: Maximum assistance    Upper Body Dressing: Moderate assistance    Lower Body Dressing: Maximum assistance    Toileting: Maximum assistance        ADL Intervention and task modifications:   Cognitive Retraining  Safety/Judgement: Decreased insight into deficits       Functional Measure:    Barthel Index:  Bathin  Bladder: 0  Bowels: 5  Groomin  Dressin  Feedin  Mobility: 0  Stairs: 0  Toilet Use: 0  Transfer (Bed to Chair and Back): 5  Total: 20/100      The Barthel ADL Index: Guidelines  1. The index should be used as a record of what a patient does, not as a record of what a patient could do. 2. The main aim is to establish degree of independence from any help, physical or verbal, however minor and for whatever reason. 3. The need for supervision renders the patient not independent. 4. A patient's performance should be established using the best available evidence. Asking the patient, friends/relatives and nurses are the usual sources, but direct observation and common sense are also important. However direct testing is not needed. 5. Usually the patient's performance over the preceding 24-48 hours is important, but occasionally longer periods will be relevant. 6. Middle categories imply that the patient supplies over 50 per cent of the effort. 7. Use of aids to be independent is allowed. Score Interpretation (from [address] [address])    Independent   60-79 Minimally independent   40-59 Partially dependent   20-39 Very dependent   <20 Totally dependent     -Jesus Roa., Barthel, D.W. (1965). Functional evaluation: the Barthel Index. [facility] ([address] (). The Barthel activities of daily living index: self-reporting versus actual performance in the old (> or = 75 years). Journal of [address] [place] [address] 45(7), [facility], J.J.M.F, Sandra Dillon., Luis Alfredo Rubio. (1999).  Measuring the change in disability after inpatient rehabilitation; comparison of the responsiveness of the Barthel Index and Functional Winifred Measure. Journal of Neurology, Neurosurgery, and Psychiatry, 66(4), 171-887. PHANI Tai.BULL, JONEL Almodovar, & Sylwia Weiner M.A. (2004) Assessment of post-stroke quality of life in cost-effectiveness studies: The usefulness of the Barthel Index and the EuroQoL-5D. Quality of Life Research, 15, 204-67     Occupational Therapy Evaluation Charge Determination   History Examination Decision-Making   MEDIUM Complexity : Expanded review of history including physical, cognitive and psychosocial  history  MEDIUM Complexity : 3-5 performance deficits relating to physical, cognitive , or psychosocial skils that result in activity limitations and / or participation restrictions MEDIUM Complexity : Patient may present with comorbidities that affect occupational performnce. Miniml to moderate modification of tasks or assistance (eg, physical or verbal ) with assesment(s) is necessary to enable patient to complete evaluation       Based on the above components, the patient evaluation is determined to be of the following complexity level: MEDIUM  Pain Rating:  None reported    Activity Tolerance:   Poor    After treatment patient left in no apparent distress:    Supine in bed, Call bell within reach and Side rails x 3    COMMUNICATION/EDUCATION:   The patients plan of care was discussed with: Registered nurse. Home safety education was provided and the patient/caregiver indicated understanding. This patients plan of care is appropriate for delegation to [facility].     Thank you for this referral.  Estella Tsai OT  Time Calculation: 25 mins

## 2022-03-08 NOTE — PROGRESS NOTES
Physician Progress Note      Chava Yang  CSN #:                  [id number]  :                       1960  ADMIT DATE:       3/5/2022 2:14 PM  [address] [place] DATE:  RESPONDING  PROVIDER #:        Dalila Malik MD          QUERY TEXT:    Dear attending,    Per the 3/7/22 wound care notes, the patient was noted to have a POA central back deep tissue injury If possible, please document in progress notes and discharge summary the location and present on admission status of the pressure injury:      The medical record reflects the following:  Risk Factors:at OSH since   Clinical Indicators: 3/7- Per wound care- POA central back deep tissue injury  5 x 5 x 0 cm  100% non-blanching purple  No induration or skin sliding  Treatment: wound care consult, venelex and foam dressing  Skin Care & &Pressure Prevention:  Minimize layers of linen/pads under patient to optimize support surface. Turn/reposition approximately every 2 hours and offload heels. Manage incontinence / promote continence      Stage 1:  Non-blanchable erythema of intact skin  Stage 2:  Abrasion, Blister, Partial-thickness skin loss, with exposed dermis  Stage 3:  Full-thickness skin loss with damage or necrosis of subcutaneous tissue  Stage 4:  Full-thickness skin & soft tissue loss through to underlying muscle, tendon or bone  Unstageable: Obscured full-thickness skin & tissue loss    Thank you,  Monica Flores RN, BSN  Clinical documentation Improvement  [phone]  Options provided:  -- POA central back deep tissue injury  -- central back deep tissue injury, not POA  -- Other - I will add my own diagnosis  -- Disagree - Not applicable / Not valid  -- Disagree - Clinically unable to determine / Unknown  -- Refer to Clinical Documentation Reviewer    PROVIDER RESPONSE TEXT:    This patient has a POA central back deep tissue injury.     Query created by: Yolande Bright on 3/8/2022 8:37 AM      QUERY TEXT:    Dear attending,    Per the 3/7/22 wound care notes, the patient was noted to have documentation of POA 2 skip areas to sacrum and right of sacrum; stage 3 pressure injuries If possible, please document in progress notes and discharge summary the location and present on admission status of the pressure injury:      The medical record reflects the following:  Risk Factors:at OSH since 2/21  Clinical Indicators: 3/7- Per wound care- POA 2 skip areas to sacrum and right of sacrum; stage 3 pressure injuries  Each = 1.3 x 1 x 0.1 cm  Pink with yellow glaze  Widespread red rash to periwound, buttocks, perineum, labia, and groin consistent with candidiasis. Treatment: wound care consult, antifungal cream twice daily  Skin Care & &Pressure Prevention:  Minimize layers of linen/pads under patient to optimize support surface. Turn/reposition approximately every 2 hours and offload heels. Manage incontinence / promote continence      Stage 1:  Non-blanchable erythema of intact skin  Stage 2:  Abrasion, Blister, Partial-thickness skin loss, with exposed dermis  Stage 3:  Full-thickness skin loss with damage or necrosis of subcutaneous tissue  Stage 4:  Full-thickness skin & soft tissue loss through to underlying muscle, tendon or bone  Unstageable: Obscured full-thickness skin & tissue loss    Thank you,  Mary Kennedy RN, BSN  Clinical documentation Improvement  [phone]  Options provided:  -- POA stage 3 pressure injuries to sacrum and right of sacrum  -- POA stage 3 pressure injuries to sacrum and right of sacrum, not poa  -- Other - I will add my own diagnosis  -- Disagree - Not applicable / Not valid  -- Disagree - Clinically unable to determine / Unknown  -- Refer to Clinical Documentation Reviewer    PROVIDER RESPONSE TEXT:    This patient has a POA stage 3 pressure injuries to sacrum and right of sacrum.     Query created by: Michelle Holloway on 3/8/2022 8:40 AM      Electronically signed by:  Arjun Mosquera MD 3/8/2022 10:35 AM

## 2022-03-08 NOTE — PROGRESS NOTES
[facility]   [address], [address], [facility]  Phone: [phone]   [phone]       Nephrology Progress Note  Nael Cox     1960     [id number]  Date of Admission : 3/5/2022  03/08/22    CC:  Follow up for wander    Assessment and Plan   WANDER:  - suspect HRS  - U/S neg for obstruction  - continue HRS treatment, octreotide and midodrine. + albumin   - add IV NS today  - daily labs  - no urgent need for RRT at this time     Metabolic acidosis  - improving    Hyponatremia  -Secondary to liver cirrhosis  - Na stable    Severe anemia:  - 1 unit of PRBCs yesterday  - repeat CBC pending     Decompensated liver cirrhosis  -With portal hypertension, primary biliary cholangitis  -Requiring multiple paracenteses, most recently 2/28/2022 with 9.2 L removed  -Hepatology following     Hypothyroidism     Interval History:  Seen and examined. Pulling at [facility]. Getting TF now. Cr unchanged. UOP 250cc this AM.  Improving since yesterday. No cp, sob. Review of Systems: A comprehensive review of systems was negative except for that written in the HPI.     Current Medications:   Current Facility-Administered Medications   Medication Dose Route Frequency    0.9% sodium chloride infusion 250 mL  250 mL IntraVENous PRN    0.9% sodium chloride infusion 250 mL  250 mL IntraVENous PRN    albumin human 25% (BUMINATE) solution 25 g  25 g IntraVENous Q6H    miconazole (MICONTIN) 2 % ointment   Topical BID    iron sucrose (VENOFER) 200 mg in 0.9% sodium chloride 100 mL IVPB  200 mg IntraVENous Q24H    balsam peru-castor oiL (VENELEX) ointment   Topical BID    zinc oxide-cod liver oil (DESITIN) 40 % paste   Topical PRN    sodium chloride (NS) flush 5-40 mL  5-40 mL IntraVENous Q8H    sodium chloride (NS) flush 5-40 mL  5-40 mL IntraVENous PRN    acetaminophen (TYLENOL) tablet 650 mg  650 mg Oral Q6H PRN    Or    acetaminophen (TYLENOL) suppository 650 mg  650 mg Rectal Q6H PRN    polyethylene glycol (MIRALAX) packet 17 g  17 g Oral DAILY PRN    ondansetron (ZOFRAN ODT) tablet 4 mg  4 mg Oral Q8H PRN    Or    ondansetron (ZOFRAN) injection 4 mg  4 mg IntraVENous K7Q PRN    folic acid (FOLVITE) tablet 1 mg  1 mg Oral DAILY    lactulose (CHRONULAC) 10 gram/15 mL solution 30 mL  30 mL Oral Q6H    levothyroxine (SYNTHROID) tablet 50 mcg  50 mcg Oral 6am    midodrine (PROAMATINE) tablet 15 mg  15 mg Oral TID WITH MEALS    octreotide (SANDOSTATIN) injection 100 mcg  100 mcg IntraVENous TID    pantoprazole (PROTONIX) tablet 40 mg  40 mg Oral DAILY    rifAXIMin (XIFAXAN) tablet 550 mg  550 mg Oral BID    sertraline (ZOLOFT) tablet 100 mg  100 mg Oral DAILY    thiamine HCL (B-1) tablet 100 mg  100 mg Oral DAILY    ursodioL (ACTIGALL) tablet 500 mg (Patient Supplied)  500 mg Oral Q12H    glucagon (GLUCAGEN) injection 1 mg  1 mg IntraMUSCular PRN    naloxone (NARCAN) injection 0.1 mg  0.1 mg IntraVENous PRN    promethazine (PHENERGAN) 6.25 mg in 0.9% sodium chloride 50 mL IVPB  6.25 mg IntraVENous Q6H PRN      Allergies   Allergen Reactions    Morphine Other (comments)     Severe HA. ALL narcotics!!!       Objective:  Vitals:    Vitals:    03/08/22 0405 03/08/22 0508 03/08/22 0602 03/08/22 0715   BP:    (!) 105/45   Pulse: 75  69 73   Resp:    18   Temp:    97.8 °F (36.6 °C)   SpO2:    96%   Weight:  60 kg (132 lb 4.4 oz)     Height:         Intake and Output:  03/08 0701 - 03/08 1900  In: -   Out: 250 [Urine:250]  03/06 1901 - 03/08 0700  In: 459.6   Out: 685 [Urine:685]    Physical Examination:  General: NAD,chronically ill appearing  HEENT:           + scleral icterus.  DHT in place  Neck:  Supple, no mass  Resp:  Lungs CTA B/L, no wheezing , normal respiratory effort  CV:  RRR,  no murmur or rub,no  LE edema  GI:  Soft, NT, + Bowel sounds, no hepatosplenomegaly  Neurologic:  Non focal  Skin:  No Rash  :  Beckford     [x]    High complexity decision making was performed  [x]    Patient is at high-risk of decompensation with multiple organ involvement    Lab Data Personally Reviewed: I have reviewed all the pertinent labs, microbiology data and radiology studies during assessment. Recent Labs     03/08/22 0517 03/07/22 0422 03/06/22 0222   * 128* 128*   K 3.4* 3.6 4.3   CL 98 97 97   CO2 19* 21 21   * 99 98   * 106* 98*   CREA 4.99* 5.06* 4.85*   CA 9.3 9.1 9.0   PHOS 3.9  --   --    ALB 3.8 3.9 3.2*   ALT  --  16 23   INR  --   --  1.6*     Recent Labs     03/07/22 0422 03/06/22 0222   WBC 8.3 10.6   HGB 6.9* 7.8*   HCT 20.2* 22.8*   PLT 40* 50*     No results found for: SDES  Lab Results   Component Value Date/Time    Culture result: CANDIDA ALBICANS (A) 03/05/2022 09:22 PM     Recent Results (from the past 24 hour(s))   RENAL FUNCTION PANEL    Collection Time: 03/08/22  5:17 AM   Result Value Ref Range    Sodium 128 (L) 136 - 145 mmol/L    Potassium 3.4 (L) 3.5 - 5.1 mmol/L    Chloride 98 97 - 108 mmol/L    CO2 19 (L) 21 - 32 mmol/L    Anion gap 11 5 - 15 mmol/L    Glucose 135 (H) 65 - 100 mg/dL     (H) 6 - 20 MG/DL    Creatinine 4.99 (H) 0.55 - 1.02 MG/DL    BUN/Creatinine ratio 20 12 - 20      GFR est AA 11 (L) >60 ml/min/1.73m2    GFR est non-AA 9 (L) >60 ml/min/1.73m2    Calcium 9.3 8.5 - 10.1 MG/DL    Phosphorus 3.9 2.6 - 4.7 MG/DL    Albumin 3.8 3.5 - 5.0 g/dL   SAMPLES BEING HELD    Collection Time: 03/08/22  5:17 AM   Result Value Ref Range    SAMPLES BEING HELD 1lav     COMMENT        Add-on orders for these samples will be processed based on acceptable specimen integrity and analyte stability, which may vary by analyte. Total time spent with patient:  33  min. Care Plan discussed with:  Patient     Family      RN      Consulting Physician [address],         I have reviewed the flowsheets. Chart and Pertinent Notes have been reviewed. No change in PMH ,family and social history from Consult note.       Nila Rosenbaum Liyah Coley MD

## 2022-03-08 NOTE — PROGRESS NOTES
Physical Therapy  3/8/2022    Chart reviewed. Noted Hgb of 6.9 g/dL this morning. Will defer and follow up at later time as able and appropriate.      Jesika Means, PTA

## 2022-03-08 NOTE — PROGRESS NOTES
Pt is doing ok. Spirits were better this morning but pt started to get a little frustrated because she states its too many pills. Son at the bedside and was able to make patient understand why the medications are necessary. Encouraged pt to eat. Pt did not touch tray but did have two chocolate ice creams.

## 2022-03-08 NOTE — PROGRESS NOTES
Bedside shift change report given to [address] (oncoming nurse) by Tank Huerta RN (offgoing nurse). Report included the following information SBAR, Kardex, Intake/Output, MAR and Recent Results.

## 2022-03-09 NOTE — PROGRESS NOTES
Problem: Self Care Deficits Care Plan (Adult)  Goal: *Acute Goals and Plan of Care (Insert Text)  Description: FUNCTIONAL STATUS PRIOR TO ADMISSION: Pt a poor historian. Spoke with pt's son Richarda Dubin) who reports patient was modified independent using a rolling walker for functional mobility. Family provided SBA/CGA with functional mobility and sit<>stand, but was able to bath, dress, and toilet with Mod I. Son reports pt tangential at times during conversation, but aware and able to be redirected. HOME SUPPORT: The patient lived with mother who provided assistance as needed and son lives nearby. Occupational Therapy Goals  Re-assessed to increase frequency of services on 3/9/2022 due to significant improvement in patient's performance . All goals remain appropriate  Initiated 3/8/2022  1. Patient will perform grooming with minimal assistance/contact guard assist within 7 day(s). 2.  Patient will perform bathing from anterior neck to thigh with minimal assistance/contact guard assist within 7 day(s). 3.  Patient will perform upper body dressing with minimal assistance/contact guard assist within 7 day(s). 4.  Patient will perform toilet transfers with moderate assistance  within 7 day(s). 5.  Patient will perform all aspects of toileting with moderate assistance  within 7 day(s). 6.  Patient will participate in upper extremity therapeutic exercise/activities with minimal assistance/contact guard assist for 10 minutes within 7 day(s). 7.  Patient will utilize energy conservation techniques during functional activities with verbal cues within 7 day(s). Outcome: Progressing Towards Goal   OCCUPATIONAL THERAPY TREATMENT/WEEKLY RE-ASSESSMENT  Patient: Dalila Bautista (9 y.o. female)  Date: 3/9/2022  Diagnosis: Cirrhosis ([facility] [address] [K74.60] <principal problem not specified>       Precautions: Fall  Chart, occupational therapy assessment, plan of care, and goals were reviewed.     ASSESSMENT  Patient continues with skilled OT services and is progressing towards goals. Re-assessed today to increase frequency of services due to significant improvement in patient's performance. Patient was alert, followed commands (though with increased cues), demonstrated good balance at EOB, was able to stand (x2) and tale small side steps with Max assist. Patient had symptoms of orthostatic hypotension though BP unable to be obtained. Rehab services (SNF vs IPR - working toward 3 hr tolerance) recommended to maximize patient ADL/mobility independence at discharge      Current Level of Function Impacting Discharge (ADLs): generally mod to max assist for UB ADL, max to total assist for LB ADL and toileting. Max A for transfers    Other factors to consider for discharge: strong family support. Son present during today's session          PLAN :  Goals have been updated based on progression since last assessment. Patient continues to benefit from skilled intervention to address the above impairments. Continue to follow patient 5 times a week to address goals. Recommend with staff: active ADL participation     Recommend next OT session: EOB ADL (bathing, grooming), sit to stand transfer training/standing tolerance (assess BP). 2 person assist for transfer to chair     Recommendation for discharge: (in order for the patient to meet his/her long term goals)  Therapy 3 hours per day 5-7 days per week vs SNF- working toward 3 hr tolerance    This discharge recommendation:  Has been made in collaboration with the attending provider and/or case management    IF patient discharges home will need the following DME: TBD- owns RW       SUBJECTIVE:   Patient pleasant and cooperative    OBJECTIVE DATA SUMMARY:   Cognitive/Behavioral Status:  Neurologic State: Alert  Orientation Level: Oriented X4 (periods of confusion)  Cognition: Follows commands             Functional Mobility and Transfers for ADLs:  Bed Mobility:  Rolling:  Moderate assistance; Adaptive equipment;Assist x1  Supine to Sit: Moderate assistance; Adaptive equipment; Additional time;Assist x1;Bed Modified  Sit to Supine: Moderate assistance; Adaptive equipment; Additional time;Assist x1  Scooting: Minimum assistance    EOB activity- good static balance noted. Sitting tolerance >5 min   Transfers:             Balance:  Sitting: Impaired; Without support  Sitting - Static: Good (unsupported)  Sitting - Dynamic: Fair (occasional)  Standing: Impaired; Without support  Standing - Static: Constant support; Fair  Standing - Dynamic : Constant support;Poor  Sit to stand x 2- no AD used, close transfer with knee blocking ready tough no buckling noted. Small side steps toward R side- 4-5 steps taken. Initially required assistance to weightshift to allow for lateral foot movement, then patient able to initiate on her own    ADL Intervention:        Lower Body [address]: Total assistance (dependent)    Toileting  Toileting Assistance: Total assistance(dependent)         Pain:  Abdominal discomfort reported- significant distension noted    Activity Tolerance:   Fair and signs and symptoms of orthostatic hypotension    After treatment patient left in no apparent distress:   Supine in bed, Call bell within reach, Bed / chair alarm activated, Caregiver / family present, and Side rails x 3    COMMUNICATION/COLLABORATION:   The patients plan of care was discussed with: Registered nurse.      Idris Moon OT  Time Calculation: 39 mins

## 2022-03-09 NOTE — PROGRESS NOTES
[facility], [place], [address] [address], [facility], [place]      Fletcher Kym, ISAAC Wyman, DICKP-SHAILA Lopez, [employer]   MANISHA Law [employer]       [address]    at [address], [address] Katlyn Pizano  22.    [phone]    FAX: [address] [place] [address] Avenue    [address], [address]    [phone]    FAX: [phone]       HEPATOLOGY PROGRESS NOTE  The patient is well known to me from a previous visit to my office at [facility] in [facility]. She is a 66 yo  female who was found to have chronic liver disease in 2020.       The patient was found to have cirrhosis in 7/2021 when she developed ascites.     She had variceal bleeding in 11/2021     Serologic evaluation for markers of chronic liver disease was positive for AMA     The patient has developed the following complications of cirrhosis: esophageal varices, variceal bleeding, ascites, edema,   hepatic encephalopathy,     I saw the patient for the first time in 1/2022. She had CTP score 9 and MELD score 21 at that time. We started liver transplant evaluation testing. She developed confusion and could not be aroused and was hospitalized 3 weeks ago at [facility] in 2/202. We were finally contacted about this hospitalization a few days ago and we made arrangements to transfer her to [facility] [place]. She now has far more advanced liver and kidney disease than when I last saw her about 4-6 weeks ago. Labs are significant for   WBC 10.6, HB 7.8 gms, PLT 50, Sna 128, Scr 4.85 mg, TBILI 5.2 mg, INR 1.6, Sammonia 28,     Imaging of the liver with Ultrasound shows cirrhosis, and large amount of ascites.     On exam she has severe muslce wasting, severe ecchymosis on arms, and is so frail she cannot stand. Mental status is clear. Hospital Course:  IS getting tube feeds through Dobhoff feeding tube. Tolerating this well. Working with OT/PT to improve muscle strength  Liver function stable  She has CKD-HRS, to make urine and does not need dialysis. Scr stable in the 5s. Hepatology Plan:  2 weeks of tube feeds to improve nutritional balance   Intensice OT/PT to improve strength and ambulation  If she works hard and achives these goals she will become acceptable candidate for liver and kidney transplant       ASSESSMENT AND PLAN:  Cirrhosis  The diagnosis of cirrhosis is based upon imaging, laboratory studies, complications of cirrhosis. Cirrhosis is secondary to [facility]. Not alcohol as she was initially told.     Serologic testing was positive for AMA, ASMA     The CTP is 10. Child class C. The MELD score is 33.     Based upon the MELD and CTP scores the patient has a mortality of about 50% within the next 90 days until we turn her fraily and weakness around. Right now is is too weak to survive liver transplantation. She needs aggressive nutritional support and both she and her son are in favor of this with the hopes her situation will improve and she could survive liver transplantation.       Primary Biliary Cholangitis  The diagnosis is based upon serology and an elevation in ALP and positive AMA. A liver biopsy has not been performed. PBC is being treated with ANTONIETA 500 mg BID. 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 She is still making urine and does not need dialysis. Continue midodrine, octreotide injections   Serum albumin up to 4.4 gm. Will need to stop IV albumin before she gets fluid overloaded    Malnutrition  The patient has severe protein-calorie malnutrtion and muscle wasting. This is due to advanced liver disease and refractory ascites.      The patient a more calories than she can possible eat to get into positive nutritional balance. Without aggressive nutritional support she will not survive. She is alert, oriented and understands the need for NG dobhoff feeding tube placement. Both she and her son agree with this. Getting tube feeds via Dobhoff feeding tube. Tolerating this well. Formula to be managed by nutritioinal service. Frailty/muscle wasting  The patient is very frail and cannot sit or stand on her own. She is getting aggressive OT/PT. She is very motivated to survive and willing to do whatever excercies she needs to get stronger. If she does not improve her muscle wasting and body strength she will never survive the liver transplant process.     Vitamin malabsorption  Patients with PBC do not efficiently absorb fat soluble vitamins A,D,E, K. She needs multi-vitamins containing fat soluble vitamins A, E, D, K. Ascites   Ascites developed for the first time in 7/2021. Ascites is is refractory to the current doses of diuretics because of kidney disease and Hyponatremia. No diuretics. Hyponatremia  This is secondary cirrhosis and diuretics  This appears to be stable in the 128 range. No diuretics. Will give IV albumin 25 gm Q6H for a few days to see if this helps     Lower extremity edema  Edema is refractory to the current doses of diuretics because of kidney disease and Hyponatremia. Will continue the current dose of diuretics at step 1.     Screening for Esophageal varices   The patient has esophageal varices with prior bleeding. Varices were banded in 11/2021 and 12/2021. The last EGD to assess for varices was performed in 12/2021. No repeat EGD needed at this time.     Hepatic encephalopathy   Overt HE is well controlled on lactulose and xifaxan. Serum ammonia is low and in the 40s.      Coagulopathy  INR is in the 1.6 range and stable. Thrombocytopenia   This is secondary to cirrhosis. There is no evidence of overt bleeding. No treatment is required.   The platelet count is adequate for the patient to undergo procedures without the need for platelet transfusion or platelet growth factors.     Anemia   This is due to multifactorial causes including portal hypertension with chronic GI blood loss and bone marrow suppression due to severe malnutrition.     The most recent FE studies were from 1/2022. The serum ferritin is 87  The FE saturation is 74  FE panel is within the normal range but this is low a patient with chronic liver disease and cirrhosis   Will administer intravenous iron. No EGD needed at this time. Thrombocytopenia   This is secondary to cirrhosis. There is no evidence of overt bleeding. No treatment is required. The platelet count is adequate for the patient to undergo procedures without the need for platelet transfusion or platelet growth factors. PHYSICAL EXAMINATION:  VS: per nursing note  General:  Ill appearing  Eyes:  Sclera icteric. ENT:  No oral lesions. Thyroid normal.  Nodes:  No adenopathy. Skin:  Spider angiomata. Severe ecchymosis all over arms. Respiratory:  Lungs clear to auscultation. Cardiovascular:  Regular heart rate. Abdomen:  Soft non-tender, Distended with obvious ascites. Extremities:  No lower extremity edema. Severe muscle wasting of upper and lower extremities. Neurologic:  Alert and oriented. Cranial nerves grossly intact. No asterixis. LABORATORY:  Results for Shania Clay (MRN [id number]) as of 3/9/2022 08:15   Ref.  Range 3/7/2022 04:22 3/8/2022 05:17 3/9/2022 05:11   WBC Latest Ref Range: 3.6 - 11.0 K/uL 8.3  10.8   HGB Latest Ref Range: 11.5 - 16.0 g/dL 6.9 (L)  8.9 (L)   PLATELET Latest Ref Range: 150 - 400 K/uL 40 (LL)  47 (LL)   Sodium Latest Ref Range: 136 - 145 mmol/L 128 (L) 128 (L) 129 (L)   Potassium Latest Ref Range: 3.5 - 5.1 mmol/L 3.6 3.4 (L) 3.7   Chloride Latest Ref Range: 97 - 108 mmol/L 97 98 99   CO2 Latest Ref Range: 21 - 32 mmol/L 21 19 (L) 19 (L)   Glucose Latest Ref Range: 65 - 100 mg/dL 99 135 (H) 129 (H)   BUN Latest Ref Range: 6 - 20 MG/ (H) 102 (H) 102 (H)   Creatinine Latest Ref Range: 0.55 - 1.02 MG/DL 5.06 (H) 4.99 (H) 5.01 (H)   Bilirubin, total Latest Ref Range: 0.2 - 1.0 MG/DL 4.6 (H)     Albumin Latest Ref Range: 3.5 - 5.0 g/dL 3.9 3.8 4.4   ALT Latest Ref Range: 12 - 78 U/L 16     AST Latest Ref Range: 15 - 37 U/L 32     Alk. phosphatase Latest Ref Range: 45 - 117 U/L 129 (H)     Ammonia, plasma Latest Ref Range: <32 UMOL/L 44 (H)       RADIOLOGY:  Ultrasound of liver. Echogenic consistent with cirrhosis. No liver mass lesions. No dilated bile ducts. Moderate ascites.         Charu Jones MD  [facility] [address], [address] [place] [address] 22.  [phone]  [address] [place] [address]

## 2022-03-09 NOTE — PROGRESS NOTES
[address] [facility]  Hospitalist Group                                                                                          Hospitalist Progress Note  Maicol Wisdom MD  Answering service: [phone] from in house phone        Date of Service:  3/9/2022  NAME:  Dalila Bautista  :  1960  MRN:  [id number]      Admission Summary:   Copied form admit: \" Dalila Bautista is a 65 y.o. female with history of liver cirrhosis with portal hypertension,recently diagnosed primary biliary cholangitis, recurrent ascites requiring paracentesis approximately every 7 to 10 days who presents here transferred from [address] [place] [address] secondary to decompensated liver cirrhosis, worsening renal insufficiency, progressive weakness currently undergoing work-up for possible liver transplant. The patient of note initially presented to [address] [place] [address] on 2022 secondary to hepatic encephalopathy and progressive weakness. Patient on admission was found at that time to have an ammonia level of 111 and did subsequently require paracentesis in the ED. The patient has had a prolonged and complicated hospital course since that time and has undergone multiple paracentesis including  with 11.8 L removal,  with 9/2 L removed, and  with 9.2 L removed. The patient also did have episodes of transient hypotension requiring a brief ICU admission for pressor support and has periodically received albumin with daily midodrine use. She was followed both by GI and nephrology services. Patient did undergo an EGD on  noted for distal esophagitis and portal hypertensive gastropathy, no evidence of varices. According to the son Harjeet Torres at bedside, the patient does have a history of esophageal varices back in 2021 status post banding at that time. Per GI at outlying facility, patient was deemed not to be a candidate for colonoscopy and recommended Cologuard.   Part of her liver transplant evaluation at [facility] also included an MRCP was done on 2/18 which was noted for cirrhosis, portal hypertension, and large ascites. She has also undergone a cardiac stress test which was negative, EF of 69%. Patient has been maintained on lactulose, rifaximin, octreotide, and Protonix at the [facility]. Patient has had progressive worsening of her renal function at the [facility] facility with creatinine peak at 5.2 on 3/3 and associated metabolic acidosis requiring management with bicarb drip that was maintained as of this morning at [facility]. Per medical records, there was concern that patient may be moving towards initiation of possible hemodialysis. According to son, patient does not have any previous establish care with nephrology and was not aware of any kidney issues prior to current hospitalization. Patient also was treated with with a 7-day course of antibiotics for E. coli UTI, which were completed as of 2/17. The patient had has been seen by Dr. Zoe Christian hepatologist for an initial visit in January to start the process of liver transplant work-up. Given lack of progression of patient's care prolonged hospitalization at [facility], request was made for patient to be transferred to [facility] to continue further liver transplant work-up and accepted by Dr. Zoe Christian for transfer under the hospitalist service. Patient of note is currently lethargic and poorhistorian therefore history is obtained via discussion with her son Rhaul Oliver at the bedside and review of medical records. \"    Interval history / Subjective:     3/9/2022 :   · Tearfull  · A little confused  · Out look grim   · This writer favors Hospice directly ; Assessment & Plan:        Decompensated liver cirrhosis, with portal hypertension. Felt to be secondary toPBC  -Transferred from [facility] with prolonged hospitalization.   Transferred for further work-up for liver transplant eval  -EGD on 2/17 [place] noted for distal esophagitis and portal hypertensive gastropathy, no evidence of varices.    -history of esophageal varices back in November 2021 status post banding at that time.    -Per GI at outlying facility, patient was deemed not to be a candidate for colonoscopy and recommended Cologuard.    -s/p MRCP was done on 2/18 which was noted for cirrhosis, portal hypertension, and large ascites. -s/p cardiac stress test which was negative for ischemia, EF of 69%  -Hepatology c/s Dr. Layo Carroll. Notified by [facility] with recommendation to transfer here for further work-up. Briefly D/W Dr. Layo Carroll today for formal consult request.  Fayne Finger to continue with GI meds including octreotide for now and will reassess with further recommendations tomorrow  -Continue lactulose, rifaximin, octreotide, Protonix p.o.  -Closely monitor stool output frequency  -Continue ursodiol  3/6:   Pleasant, in some denial, very jovial as an escape mechanism   Lives alone   Mother from [place] visits   Pt to be seen by hepatology soon  [address] abd as one would expect mod/large ascites collection    Pt notes some abd discomfort and some sob but other than ascites benign chest/abd exam    Pt w generalized muscle wasting   3/7:   NGt in place   Nutrition to write orders   Allow diet as well po   Case discussed w hepatology   3/9  · Tearfull  · A little confused  · Out look grim   · This writer favors Hospice directly ;       History of hepatic encephalopathy with refractory ascites  -multiple paracentesis done at [facility] including 2/11 with 11.8 L removal, 2/18 with 9/2 L removed, and 2/28 with 9.2 L removed. -Currently with distended abdomen. Order abdominal US. Anticipate likely need for paracentesis within the next couple days  - daily re-eval   - as above      GREG with metabolic acidosis. -Per medical records, concern for ATN or possible hepatorenal syndrome. Concern also raised the patient may need eventual HD.   At this time, bicarb and K are stable  -Patient was being maintained on bicarb drip up until this morning at [facility]. Hold off for now as current bicarb is improved at 20  -Cr 4.7, peaked at 5.2 on 3/3  - NS IV fluids ordered,gentle given recurrent ascites  -Renal consult on call. Per son, no prior establishment with nephrology outpatient  -Avoidance of nephrotoxic agents  - flat S Cr curve at 4. 8 range 3/9/2022 ( Jan 2022 1.34)    Lab Results   Component Value Date/Time    Creatinine 5.01 (H) 03/09/2022 05:11 AM   - Per nephrology( 3/5)   \"-Ultimately, patient is nearing need for hemodialysis which is difficult to recommend without liver transplant. Hepatology will be following patient at [facility]"    Transaminitis/hyperbilirubinemia/coagulopathy/thrombocytopenia  -Secondary to decompensated liver cirrhosis. Monitor with repeat labs in a.m. check NH3  Lab Results   Component Value Date/Time    ALT (SGPT) 16 03/07/2022 04:22 AM    AST (SGOT) 32 03/07/2022 04:22 AM    Alk. phosphatase 129 (H) 03/07/2022 04:22 AM    Bilirubin, direct 3.8 (H) 03/05/2022 08:08 AM    Bilirubin, total 4.6 (H) 03/07/2022 04:22 AM    3/6 ammonia 28 - f/u as indicated - was 44 on 3/7      Chronic normocytic anemia  -Possible anemia of CKD  -H&H stable. No active bleeding. Continue to monitor  Lab Results   Component Value Date/Time    Iron 140 03/07/2022 04:22 AM    TIBC 140 (L) 03/07/2022 04:22 AM    Iron % saturation 100 (H) 03/07/2022 04:22 AM    Ferritin 87 01/25/2022 09:50 AM      Lab Results   Component Value Date/Time    HGB 8.9 (L) 03/09/2022 05:11 AM    transfuse 1 pack RBC's 3/7      Hyponatremia  -Suspect secondary to underlying liver cirrhosis.   Monitor for now with repeat labs in a.m.  - 128 on 3/6 am lab; 129 on 3/9      Recent E. coli UTI  -Treated with Vanco and Zosyn then transitioned to Rocephin, completed 7-day antibiotic course as of 2/17  -Currently with Beckford and cloudy urine.  -Replace Beckford with new catheter 3/5   -Check UA, urine culture  -Currently afebrile and improved leukocytosis. Hold off on antibiotics for now until urine studies back  Lab Results   Component Value Date/Time    WBC 10.8 03/09/2022 05:11 AM      Temp Readings from Last 1 Encounters:   03/09/22 98.1 °F (36.7 °C)         Hypothyroid  -Continue levothyroxine     Lab Results   Component Value Date/Time    TSH 5.470 (H) 02/11/2022 09:25 AM    Triiodothyronine (T3), free 2.4 01/25/2022 09:50 AM    T4, Free 1.2 01/25/2022 09:50 AM    Free T4 1.14 02/11/2022 09:25 AM          Generalized weakness/chronic wounds. pta  -PT, OT consult  -Wound care consulted     DVT prophylaxis SCDs for now. No pharmacological prophylaxis due to thrombocytopenia  DNR      Hospital Problems  Date Reviewed: 2/17/2022          Codes Class Noted POA    Cirrhosis ([facility] [address] ICD-10-CM: K74.60  ICD-9-CM: 571.5  2/8/2022 Unknown                  After personally interviewing pt at bedside the following is noted on 3/9/2022 :    Review of Systems   Reason unable to perform ROS: pt not wanting to discuss her medical condition, but pleasenat and else minimal conversation    Constitutional: Positive for malaise/fatigue. I had a face to face encounter with patient on 3/9/2022 at bedside for the following physical exam:     PHYSICAL EXAM:    Visit Vitals  BP (!) 113/56   Pulse 75   Temp 98.1 °F (36.7 °C)   Resp 16   Ht 5' 4\" (1.626 m)   Wt 60 kg (132 lb 4.4 oz)   SpO2 97%   BMI 22.71 kg/m²          Physical Exam  Constitutional:       General: She is not in acute distress. Appearance: She is ill-appearing. She is not toxic-appearing or diaphoretic. Comments: Pleasant very sicklly, mal nurished cirrhotic w abd fullness, marked muscle wasting. Is markedly chronically physcially depleted as per above w jaundice skin, easily fatigues with any extremity use    HENT:      Head: Normocephalic and atraumatic.       Right Ear: External ear normal.      Left Ear: External ear normal.      Mouth/Throat: Mouth: Mucous membranes are dry. Pharynx: Oropharynx is clear. Eyes:      General: Scleral icterus present. Cardiovascular:      Rate and Rhythm: Normal rate and regular rhythm. Pulmonary:      Effort: Pulmonary effort is normal.      Breath sounds: Normal breath sounds. Comments: Diminished but not in resp distress. Abdominal:      General: There is distension. Palpations: Abdomen is soft. Comments: Non tender ascitic collection    Skin:     Coloration: Skin is jaundiced and pale. Findings: Bruising present. Neurological:      General: No focal deficit present. Mental Status: She is alert. Mental status is at baseline. Psychiatric:         Behavior: Behavior normal.      Comments: Tearfull,                      Data Review:    Review and/or order of clinical lab test      Labs:     Recent Labs     03/09/22 0511 03/07/22 0422   WBC 10.8 8.3   HGB 8.9* 6.9*   HCT 25.8* 20.2*   PLT 47* 40*     Recent Labs     03/09/22 0511 03/08/22 0517 03/07/22 0422   * 128* 128*   K 3.7 3.4* 3.6   CL 99 98 97   CO2 19* 19* 21   * 102* 106*   CREA 5.01* 4.99* 5.06*   * 135* 99   CA 9.1 9.3 9.1   PHOS 3.7 3.9  --      Recent Labs     03/09/22 0511 03/08/22 0517 03/07/22 0422   ALT  --   --  16   AP  --   --  129*   TBILI  --   --  4.6*   TP  --   --  5.3*   ALB 4.4 3.8 3.9   GLOB  --   --  1.4*     No results for input(s): INR, PTP, APTT, INREXT, INREXT in the last 72 hours. Recent Labs     03/07/22 0422   TIBC 140*   PSAT 100*      Lab Results   Component Value Date/Time    Folate 38.3 (H) 10/20/2021 07:53 AM    RBC FOLATE 878 02/14/2022 01:27 AM      No results for input(s): PH, PCO2, PO2 in the last 72 hours. No results for input(s): CPK, CKNDX, TROIQ in the last 72 hours.     No lab exists for component: CPKMB  Lab Results   Component Value Date/Time    Cholesterol, total 122 01/25/2022 09:50 AM    HDL Cholesterol 40 01/25/2022 09:50 AM    LDL, calculated 67.2 01/25/2022 09:50 AM    Triglyceride 74 01/25/2022 09:50 AM    CHOL/HDL Ratio 3.1 01/25/2022 09:50 AM     Lab Results   Component Value Date/Time    Glucose (POC) 114 (H) 02/23/2022 05:42 PM    Glucose (POC) 127 (H) 02/23/2022 12:04 PM    Glucose (POC) 122 (H) 02/23/2022 08:18 AM    Glucose (POC) 109 (H) 02/22/2022 09:19 PM    Glucose (POC) 133 (H) 02/22/2022 07:28 AM     Lab Results   Component Value Date/Time    Color DARK YELLOW 03/05/2022 09:22 PM    Appearance TURBID (A) 03/05/2022 09:22 PM    Specific gravity 1.014 03/05/2022 09:22 PM    Specific gravity 1.010 02/11/2022 07:20 PM    pH (UA) 5.5 03/05/2022 09:22 PM    Protein 100 (A) 03/05/2022 09:22 PM    Glucose Negative 03/05/2022 09:22 PM    Ketone Negative 03/05/2022 09:22 PM    Bilirubin NEGATIVE 02/11/2022 07:20 PM    Urobilinogen 0.2 03/05/2022 09:22 PM    Nitrites Negative 03/05/2022 09:22 PM    Leukocyte Esterase LARGE (A) 03/05/2022 09:22 PM    Epithelial cells FEW 03/05/2022 09:22 PM    Bacteria 1+ (A) 03/05/2022 09:22 PM    WBC >100 (H) 03/05/2022 09:22 PM    RBC  03/05/2022 09:22 PM         Medications Reviewed:     Current Facility-Administered Medications   Medication Dose Route Frequency    0.9% sodium chloride infusion  75 mL/hr IntraVENous CONTINUOUS    0.9% sodium chloride infusion 250 mL  250 mL IntraVENous PRN    0.9% sodium chloride infusion 250 mL  250 mL IntraVENous PRN    miconazole (MICONTIN) 2 % ointment   Topical BID    balsam peru-castor oiL (VENELEX) ointment   Topical BID    zinc oxide-cod liver oil (DESITIN) 40 % paste   Topical PRN    sodium chloride (NS) flush 5-40 mL  5-40 mL IntraVENous Q8H    sodium chloride (NS) flush 5-40 mL  5-40 mL IntraVENous PRN    acetaminophen (TYLENOL) tablet 650 mg  650 mg Oral Q6H PRN    Or    acetaminophen (TYLENOL) suppository 650 mg  650 mg Rectal Q6H PRN    polyethylene glycol (MIRALAX) packet 17 g  17 g Oral DAILY PRN    ondansetron (ZOFRAN ODT) tablet 4 mg 4 mg Oral Q8H PRN    Or    ondansetron (ZOFRAN) injection 4 mg  4 mg IntraVENous B4C PRN    folic acid (FOLVITE) tablet 1 mg  1 mg Oral DAILY    lactulose (CHRONULAC) 10 gram/15 mL solution 30 mL  30 mL Oral Q6H    levothyroxine (SYNTHROID) tablet 50 mcg  50 mcg Oral 6am    midodrine (PROAMATINE) tablet 15 mg  15 mg Oral TID WITH MEALS    octreotide (SANDOSTATIN) injection 100 mcg  100 mcg IntraVENous TID    pantoprazole (PROTONIX) tablet 40 mg  40 mg Oral DAILY    rifAXIMin (XIFAXAN) tablet 550 mg  550 mg Oral BID    sertraline (ZOLOFT) tablet 100 mg  100 mg Oral DAILY    thiamine HCL (B-1) tablet 100 mg  100 mg Oral DAILY    ursodioL (ACTIGALL) tablet 500 mg (Patient Supplied)  500 mg Oral Q12H    glucagon (GLUCAGEN) injection 1 mg  1 mg IntraMUSCular PRN    naloxone (NARCAN) injection 0.1 mg  0.1 mg IntraVENous PRN    promethazine (PHENERGAN) 6.25 mg in 0.9% sodium chloride 50 mL IVPB  6.25 mg IntraVENous Q6H PRN     ______________________________________________________________________  EXPECTED LENGTH OF STAY: 4d 16h  ACTUAL LENGTH OF STAY:          4                 Bibi Bravo MD

## 2022-03-09 NOTE — PROGRESS NOTES
[facility]   [address] [facility]  Phone: [phone]   [phone]       Nephrology Progress Note  Paige Camacho     1960     [id number]  Date of Admission : 3/5/2022  03/09/22    CC:  Follow up for greg    Assessment and Plan   GREG:  - suspect HRS  - U/S neg for obstruction  - UOP improving, Cr stable  - cont albumin, IVF, midodrine and octreotide  - daily labs  - no urgent need for RRT     Metabolic acidosis  - improving    Hyponatremia  -Secondary to liver cirrhosis  - Na stable    Severe anemia:  - 1 unit of PRBCs yesterday  - repeat CBC pending     Decompensated liver cirrhosis  -With portal hypertension, primary biliary cholangitis  -Requiring multiple paracenteses, most recently 2/28/2022 with 9.2 L removed  -Hepatology following     Hypothyroidism     Interval History:  Seen and examined. Appears to be feeling better. Bothered by her DHT. Cr unchanged, UOP 1.1 L over the past 24 hrs. Denies cp or sob. .      Review of Systems: A comprehensive review of systems was negative except for that written in the HPI.     Current Medications:   Current Facility-Administered Medications   Medication Dose Route Frequency    0.9% sodium chloride infusion  75 mL/hr IntraVENous CONTINUOUS    0.9% sodium chloride infusion 250 mL  250 mL IntraVENous PRN    0.9% sodium chloride infusion 250 mL  250 mL IntraVENous PRN    miconazole (MICONTIN) 2 % ointment   Topical BID    balsam peru-castor oiL (VENELEX) ointment   Topical BID    zinc oxide-cod liver oil (DESITIN) 40 % paste   Topical PRN    sodium chloride (NS) flush 5-40 mL  5-40 mL IntraVENous Q8H    sodium chloride (NS) flush 5-40 mL  5-40 mL IntraVENous PRN    acetaminophen (TYLENOL) tablet 650 mg  650 mg Oral Q6H PRN    Or    acetaminophen (TYLENOL) suppository 650 mg  650 mg Rectal Q6H PRN    polyethylene glycol (MIRALAX) packet 17 g  17 g Oral DAILY PRN    ondansetron (ZOFRAN ODT) tablet 4 mg  4 mg Oral Q8H PRN    Or    ondansetron (ZOFRAN) injection 4 mg  4 mg IntraVENous Y7H PRN    folic acid (FOLVITE) tablet 1 mg  1 mg Oral DAILY    lactulose (CHRONULAC) 10 gram/15 mL solution 30 mL  30 mL Oral Q6H    levothyroxine (SYNTHROID) tablet 50 mcg  50 mcg Oral 6am    midodrine (PROAMATINE) tablet 15 mg  15 mg Oral TID WITH MEALS    octreotide (SANDOSTATIN) injection 100 mcg  100 mcg IntraVENous TID    pantoprazole (PROTONIX) tablet 40 mg  40 mg Oral DAILY    rifAXIMin (XIFAXAN) tablet 550 mg  550 mg Oral BID    sertraline (ZOLOFT) tablet 100 mg  100 mg Oral DAILY    thiamine HCL (B-1) tablet 100 mg  100 mg Oral DAILY    ursodioL (ACTIGALL) tablet 500 mg (Patient Supplied)  500 mg Oral Q12H    glucagon (GLUCAGEN) injection 1 mg  1 mg IntraMUSCular PRN    naloxone (NARCAN) injection 0.1 mg  0.1 mg IntraVENous PRN    promethazine (PHENERGAN) 6.25 mg in 0.9% sodium chloride 50 mL IVPB  6.25 mg IntraVENous Q6H PRN      Allergies   Allergen Reactions    Morphine Other (comments)     Severe HA. ALL narcotics!!!       Objective:  Vitals:    Vitals:    03/09/22 0200 03/09/22 0400 03/09/22 0600 03/09/22 0735   BP:    (!) 113/56   Pulse: 86 77 69 75   Resp:    16   Temp:    98.1 °F (36.7 °C)   SpO2:    97%   Weight:       Height:         Intake and Output:  No intake/output data recorded. 03/07 1901 - 03/09 0700  In: 2110   Out: 1100 [Urine:1100]    Physical Examination:  General: NAD,chronically ill appearing  HEENT:           + scleral icterus.  DHT in place  Neck:  Supple, no mass  Resp:  Lungs CTA B/L, no wheezing , normal respiratory effort  CV:  RRR,  no murmur or rub,no  LE edema  GI:  Soft, NT, + Bowel sounds, no hepatosplenomegaly  Neurologic:  Non focal  Skin:  No Rash  :  Beckford     [x]    High complexity decision making was performed  [x]    Patient is at high-risk of decompensation with multiple organ involvement    Lab Data Personally Reviewed: I have reviewed all the pertinent labs, microbiology data and radiology studies during assessment. Recent Labs     03/09/22  0511 03/08/22  0517 03/07/22 0422   * 128* 128*   K 3.7 3.4* 3.6   CL 99 98 97   CO2 19* 19* 21   * 135* 99   * 102* 106*   CREA 5.01* 4.99* 5.06*   CA 9.1 9.3 9.1   PHOS 3.7 3.9  --    ALB 4.4 3.8 3.9   ALT  --   --  16     Recent Labs     03/09/22 0511 03/07/22 0422   WBC 10.8 8.3   HGB 8.9* 6.9*   HCT 25.8* 20.2*   PLT 47* 40*     No results found for: SDES  Lab Results   Component Value Date/Time    Culture result: CANDIDA ALBICANS (A) 03/05/2022 09:22 PM     Recent Results (from the past 24 hour(s))   RENAL FUNCTION PANEL    Collection Time: 03/09/22  5:11 AM   Result Value Ref Range    Sodium 129 (L) 136 - 145 mmol/L    Potassium 3.7 3.5 - 5.1 mmol/L    Chloride 99 97 - 108 mmol/L    CO2 19 (L) 21 - 32 mmol/L    Anion gap 11 5 - 15 mmol/L    Glucose 129 (H) 65 - 100 mg/dL     (H) 6 - 20 MG/DL    Creatinine 5.01 (H) 0.55 - 1.02 MG/DL    BUN/Creatinine ratio 20 12 - 20      GFR est AA 11 (L) >60 ml/min/1.73m2    GFR est non-AA 9 (L) >60 ml/min/1.73m2    Calcium 9.1 8.5 - 10.1 MG/DL    Phosphorus 3.7 2.6 - 4.7 MG/DL    Albumin 4.4 3.5 - 5.0 g/dL   CBC W/O DIFF    Collection Time: 03/09/22  5:11 AM   Result Value Ref Range    WBC 10.8 3.6 - 11.0 K/uL    RBC 2.84 (L) 3.80 - 5.20 M/uL    HGB 8.9 (L) 11.5 - 16.0 g/dL    HCT 25.8 (L) 35.0 - 47.0 %    MCV 90.8 80.0 - 99.0 FL    MCH 31.3 26.0 - 34.0 PG    MCHC 34.5 30.0 - 36.5 g/dL    RDW 22.8 (H) 11.5 - 14.5 %    PLATELET 47 (LL) 604 - 400 K/uL    NRBC 0.0 0  WBC    ABSOLUTE NRBC 0.00 0.00 - 0.01 K/uL           Total time spent with patient:  33  min. Care Plan discussed with:  Patient     Family      RN      Consulting Physician [facility],         I have reviewed the flowsheets. Chart and Pertinent Notes have been reviewed. No change in PMH ,family and social history from Consult note.       Harmeet Pak, MD

## 2022-03-10 NOTE — PROGRESS NOTES
RUR:  25% High     SELMA: TBD. BLS transport once medically stable. Follow-up with PCP/specialist.     Primary Contact: SonMata, [phone]     2:00PM - CM reviewed chart. Per review and ID rounds, patient is not medically stable for discharge. At this time, patient will remain in hospital for aggressive nutritional support; patient currently receiving tube feeds through [facility] feeding tube. Patient continues to work with PT & OT. Patient and son are hopeful that with nutritional treatment and therapy she may become a candidate for a liver and kidney transplant at [facility]. Discharge pending medical progress and final recommendations. CM to continue to follow as needed.     YIFAN Leach   [phone]

## 2022-03-10 NOTE — PROGRESS NOTES
Problem: Mobility Impaired (Adult and Pediatric)  Goal: *Acute Goals and Plan of Care (Insert Text)  Description: FUNCTIONAL STATUS PRIOR TO ADMISSION: Patient appears to be an inconsistent historian despite being oriented x 4. Originally stated she primarily stays in bed-later she reported ambulating a few times a day and spending most of her day in a recliner. She consistently reports that son assists her \"with everything\" but does not give more details on what exactly or how much assistance. HOME SUPPORT PRIOR TO ADMISSION: The patient lived with son and her mother per report. Unclear how much assistance she required. Physical Therapy Goals  Initiated 3/6/2022  1. Patient will move from supine to sit and sit to supine , scoot up and down, and roll side to side in bed with minimal assistance/contact guard assist within 7 day(s). 2.  Patient will transfer from bed to chair and chair to bed with moderate assistance  using the least restrictive device within 7 day(s). 3.  Patient will perform sit to stand with moderate assistance  within 7 day(s). 4.  Patient will ambulate with moderate assistance  for 20 feet with the least restrictive device within 7 day(s). 5.  Patient will ascend/descend 2 stairs with 2 handrail(s) with moderate assistance  within 7 day(s). Outcome: Progressing Towards Goal    PHYSICAL THERAPY TREATMENT  Patient: Gaby Weaver (82 y.o. female)  Date: 3/10/2022  Diagnosis: [facility] [K74.60] <principal problem not specified>       Precautions: Fall  Chart, physical therapy assessment, plan of care and goals were reviewed. ASSESSMENT  Patient continues with skilled PT services and is slowly progressing towards goals. Pt received supine in bed, willing to work with therapy. Pt presents with some confusion, not able to recall  year, pt only willing to perform LE ex in bed this session reporting that she had just sat EOB with someone prior to this session.  Pt able to tolerate LE ex with VC for form and no reports of pain with any of the ex. Attempted to encourage pt for EOB activity with continued decline. Pt completed session supine in bed, with call bell within reach. Rn notified of session. Current Level of Function Impacting Discharge (mobility/balance): N/A    Other factors to consider for discharge: pressure injury, and fall risk         PLAN :  Patient continues to benefit from skilled intervention to address the above impairments. Continue treatment per established plan of care. to address goals. Recommendation for discharge: (in order for the patient to meet his/her long term goals)  Therapy up to 5 days/week in SNF setting    This discharge recommendation:  Has not yet been discussed the attending provider and/or case management    IF patient discharges home will need the following DME: to be determined (TBD)       SUBJECTIVE:   Patient stated I am from [place].     OBJECTIVE DATA SUMMARY:   Critical Behavior:  Neurologic State: Alert  Orientation Level: Oriented to situation,Oriented to place,Oriented to person,Disoriented to time  Cognition: Follows commands  Safety/Judgement: Awareness of environment  Therapeutic Exercises:        Supine LE ex- AP, knee press, heel slides, SLR x10 ea    Pain Rating:  Reports stomach pain with EOB activity    Activity Tolerance:   Fair and Poor    After treatment patient left in no apparent distress:   Supine in bed and Call bell within reach    COMMUNICATION/COLLABORATION:   The patients plan of care was discussed with: Registered nurse.      Mahsa Hill PTA   Time Calculation: 30 mins

## 2022-03-10 NOTE — PROGRESS NOTES
[facility]   [address], [address], [facility]  Phone: [phone]   [phone]       Nephrology Progress Note  Dalila Bautista     1960     [id number]  Date of Admission : 3/5/2022  03/10/22    CC:  Follow up for wander    Assessment and Plan   WANDER:  - suspect HRS  - U/S neg for obstruction  - Cr improved some today  - cont IVF, midodrine and octreotide  - daily labs  - no urgent need for RRT - would not be a candidate for this unless she was able to be transplanted      Metabolic acidosis  - improving    Hypokalemia:  - IV repletion ordered    Hyponatremia  -Secondary to liver cirrhosis  - Na stable    Severe anemia:  - 1 unit of PRBCs 3/8  - hgb stable     Decompensated liver cirrhosis  -With portal hypertension, primary biliary cholangitis  -Requiring multiple paracenteses, most recently 2/28/2022 with 9.2 L removed  -Hepatology following     Hypothyroidism        Prognosis grim unless pt able to be transplanted. Pt DNR now. May need to involve palliative care. Interval History:  Seen and examined. Resting in bed today. Cr better today. Remains oliguric.  K low. Tired. Denies cp or sob     Review of Systems: A comprehensive review of systems was negative except for that written in the HPI.     Current Medications:   Current Facility-Administered Medications   Medication Dose Route Frequency    potassium chloride SR (KLOR-CON 10) tablet 40 mEq  40 mEq Oral DAILY    0.9% sodium chloride infusion  75 mL/hr IntraVENous CONTINUOUS    0.9% sodium chloride infusion 250 mL  250 mL IntraVENous PRN    0.9% sodium chloride infusion 250 mL  250 mL IntraVENous PRN    miconazole (MICONTIN) 2 % ointment   Topical BID    balsam peru-castor oiL (VENELEX) ointment   Topical BID    zinc oxide-cod liver oil (DESITIN) 40 % paste   Topical PRN    sodium chloride (NS) flush 5-40 mL  5-40 mL IntraVENous Q8H    sodium chloride (NS) flush 5-40 mL  5-40 mL IntraVENous PRN  acetaminophen (TYLENOL) tablet 650 mg  650 mg Oral Q6H PRN    Or    acetaminophen (TYLENOL) suppository 650 mg  650 mg Rectal Q6H PRN    polyethylene glycol (MIRALAX) packet 17 g  17 g Oral DAILY PRN    ondansetron (ZOFRAN ODT) tablet 4 mg  4 mg Oral Q8H PRN    Or    ondansetron (ZOFRAN) injection 4 mg  4 mg IntraVENous R3H PRN    folic acid (FOLVITE) tablet 1 mg  1 mg Oral DAILY    lactulose (CHRONULAC) 10 gram/15 mL solution 30 mL  30 mL Oral Q6H    levothyroxine (SYNTHROID) tablet 50 mcg  50 mcg Oral 6am    midodrine (PROAMATINE) tablet 15 mg  15 mg Oral TID WITH MEALS    octreotide (SANDOSTATIN) injection 100 mcg  100 mcg IntraVENous TID    pantoprazole (PROTONIX) tablet 40 mg  40 mg Oral DAILY    rifAXIMin (XIFAXAN) tablet 550 mg  550 mg Oral BID    sertraline (ZOLOFT) tablet 100 mg  100 mg Oral DAILY    thiamine HCL (B-1) tablet 100 mg  100 mg Oral DAILY    ursodioL (ACTIGALL) tablet 500 mg (Patient Supplied)  500 mg Oral Q12H    glucagon (GLUCAGEN) injection 1 mg  1 mg IntraMUSCular PRN    naloxone (NARCAN) injection 0.1 mg  0.1 mg IntraVENous PRN    promethazine (PHENERGAN) 6.25 mg in 0.9% sodium chloride 50 mL IVPB  6.25 mg IntraVENous Q6H PRN      Allergies   Allergen Reactions    Morphine Other (comments)     Severe HA. ALL narcotics!!!       Objective:  Vitals:    Vitals:    03/10/22 0600 03/10/22 0800 03/10/22 0801 03/10/22 1000   BP:   (!) 105/45    Pulse: 65 66 68 63   Resp:   16    Temp:   97.4 °F (36.3 °C)    SpO2:   99%    Weight:       Height:         Intake and Output:  No intake/output data recorded. 03/08 1901 - 03/10 0700  In: 3200   Out: 1100 [Urine:1100]    Physical Examination:  General: NAD,chronically ill appearing  HEENT:           + scleral icterus.  DHT in place  Neck:  Supple, no mass  Resp:  Lungs CTA B/L, no wheezing , normal respiratory effort  CV:  RRR,  no murmur or rub,no  LE edema  GI:  Soft, NT, + Bowel sounds, no hepatosplenomegaly  Neurologic:  Non focal  Skin:  No Rash  :  Beckford     [x]    High complexity decision making was performed  [x]    Patient is at high-risk of decompensation with multiple organ involvement    Lab Data Personally Reviewed: I have reviewed all the pertinent labs, microbiology data and radiology studies during assessment. Recent Labs     03/10/22  0615 03/09/22  0511 03/08/22  0517   * 129* 128*   K 3.0* 3.7 3.4*    99 98   CO2 20* 19* 19*   * 129* 135*   * 102* 102*   CREA 4.21* 5.01* 4.99*   CA 8.3* 9.1 9.3   PHOS 2.9 3.7 3.9   ALB 3.6 4.4 3.8     Recent Labs     03/09/22 0511   WBC 10.8   HGB 8.9*   HCT 25.8*   PLT 47*     No results found for: SDES  Lab Results   Component Value Date/Time    Culture result: CANDIDA ALBICANS (A) 03/05/2022 09:22 PM     Recent Results (from the past 24 hour(s))   RENAL FUNCTION PANEL    Collection Time: 03/10/22  6:15 AM   Result Value Ref Range    Sodium 133 (L) 136 - 145 mmol/L    Potassium 3.0 (L) 3.5 - 5.1 mmol/L    Chloride 103 97 - 108 mmol/L    CO2 20 (L) 21 - 32 mmol/L    Anion gap 10 5 - 15 mmol/L    Glucose 137 (H) 65 - 100 mg/dL     (H) 6 - 20 MG/DL    Creatinine 4.21 (H) 0.55 - 1.02 MG/DL    BUN/Creatinine ratio 24 (H) 12 - 20      GFR est AA 13 (L) >60 ml/min/1.73m2    GFR est non-AA 11 (L) >60 ml/min/1.73m2    Calcium 8.3 (L) 8.5 - 10.1 MG/DL    Phosphorus 2.9 2.6 - 4.7 MG/DL    Albumin 3.6 3.5 - 5.0 g/dL   SAMPLES BEING HELD    Collection Time: 03/10/22  6:15 AM   Result Value Ref Range    SAMPLES BEING HELD 1lav     COMMENT        Add-on orders for these samples will be processed based on acceptable specimen integrity and analyte stability, which may vary by analyte. Total time spent with patient:  33  min. Care Plan discussed with:  Patient     Family      RN      Consulting Physician [id number] [facility],         I have reviewed the flowsheets. Chart and Pertinent Notes have been reviewed.    No change in PMH ,family and social history from Consult note.       Harmeet Pak MD

## 2022-03-10 NOTE — PROGRESS NOTES
Problem: Self Care Deficits Care Plan (Adult)  Goal: *Acute Goals and Plan of Care (Insert Text)  Description: FUNCTIONAL STATUS PRIOR TO ADMISSION: Pt a poor historian. Spoke with pt's son Jony Mena) who reports patient was modified independent using a rolling walker for functional mobility. Family provided SBA/CGA with functional mobility and sit<>stand, but was able to bath, dress, and toilet with Mod I. Son reports pt tangential at times during conversation, but aware and able to be redirected. HOME SUPPORT: The patient lived with mother who provided assistance as needed and son lives nearby. Occupational Therapy Goals  Re-assessed to increase frequency of services on 3/9/2022 due to significant improvement in patient's performance . All goals remain appropriate  Initiated 3/8/2022  1. Patient will perform grooming with minimal assistance/contact guard assist within 7 day(s). 2.  Patient will perform bathing from anterior neck to thigh with minimal assistance/contact guard assist within 7 day(s). 3.  Patient will perform upper body dressing with minimal assistance/contact guard assist within 7 day(s). 4.  Patient will perform toilet transfers with moderate assistance  within 7 day(s). 5.  Patient will perform all aspects of toileting with moderate assistance  within 7 day(s). 6.  Patient will participate in upper extremity therapeutic exercise/activities with minimal assistance/contact guard assist for 10 minutes within 7 day(s). 7.  Patient will utilize energy conservation techniques during functional activities with verbal cues within 7 day(s). Outcome: Progressing Towards Goal    OCCUPATIONAL THERAPY TREATMENT  Patient: Ramin Godinez (15 y.o. female)  Date: 3/10/2022  Diagn[facility]: [facility] [K74.60] <principal problem not specified>       Precautions: Fall  Chart, occupational therapy assessment, plan of care, and goals were reviewed.     ASSESSMENT  Patient continues with skilled OT services and is progressing towards goals. Pt noted with good seated balance at EOB, as well as, progressive activity tolerance, functionally evidenced by completing EOB oral hygiene with Min A and no noted LOB or c/o pain. Pt with high c/o fatigue and required extended time for task completion. Pt continues to benefit from skilled OT to address functional deficits during acute hospitalization, with pt working towards tolerating 3 hours of therapy and reporting therapist currently believing pt would benefit from SNF rehab upon discharge. Current Level of Function Impacting Discharge (ADLs): Max A overall ADL completion    Other factors to consider for discharge: Low activity tolerance         PLAN :  Patient continues to benefit from skilled intervention to address the above impairments. Continue treatment per established plan of care to address goals. Recommend with staff: Frequent positional changes, bed level ADL engagement    Recommend next OT session: POC progression    Recommendation for discharge: (in order for the patient to meet his/her long term goals)  Therapy up to 5 days/week in SNF setting (currently working towards tolerating 3 hours of therapy a day)    This discharge recommendation:  Has been made in collaboration with the attending provider and/or case management    IF patient discharges home will need the following DME: TBD       SUBJECTIVE:   Patient stated I just so tired.     OBJECTIVE DATA SUMMARY:   Cognitive/Behavioral Status:  Neurologic State: Alert  Orientation Level: Oriented to situation;Oriented to place;Oriented to person;Disoriented to time  Cognition: Follows commands  Perception: Appears intact  Perseveration: No perseveration noted  Safety/Judgement: Awareness of environment    Functional Mobility and Transfers for ADLs:  Bed Mobility:  Supine to Sit: Moderate assistance;Bed Modified; Additional time  Sit to Supine: Moderate assistance; Additional time; Bed Modified    Balance:  Sitting: Impaired; With support  Sitting - Static: Good (unsupported)  Sitting - Dynamic: Fair (occasional)    ADL Intervention:  Grooming  Grooming Assistance: Minimum assistance  Position Performed: Seated edge of bed  Brushing Teeth: Minimum assistance    Cognitive Retraining  Safety/Judgement: Awareness of environment    Pain:  No c/o pain    Activity Tolerance:   Fair, Poor, and requires frequent rest breaks    After treatment patient left in no apparent distress:   Supine in bed, Call bell within reach, and Side rails x 3    COMMUNICATION/COLLABORATION:   The patients plan of care was discussed with: Physical therapy assistant, Registered nurse, and Case management.      Naty Mckee OT  Time Calculation: 26 mins

## 2022-03-10 NOTE — PROGRESS NOTES
[address] [facility] Adult  Hospitalist Group                                                                                          Hospitalist Progress Note  Star Matthews MD  Answering service: [phone] or [phone] from in house phone        Date of Service:  3/10/2022  NAME:  Sheron Zaraet  :  1960  MRN:  [id number]      Admission Summary:   Copied form admit: \" Sheron Zarate is a 65 y.o. female with history of liver cirrhosis with portal hypertension,recently diagnosed primary biliary cholangitis, recurrent ascites requiring paracentesis approximately every 7 to 10 days who presents here transferred from [address] [place] [address] secondary to decompensated liver cirrhosis, worsening renal insufficiency, progressive weakness currently undergoing work-up for possible liver transplant. The patient of note initially presented to [address] [place] [address] on 2022 secondary to hepatic encephalopathy and progressive weakness. Patient on admission was found at that time to have an ammonia level of 111 and did subsequently require paracentesis in the ED. The patient has had a prolonged and complicated hospital course since that time and has undergone multiple paracentesis including  with 11.8 L removal,  with 9/2 L removed, and  with 9.2 L removed. The patient also did have episodes of transient hypotension requiring a brief ICU admission for pressor support and has periodically received albumin with daily midodrine use. She was followed both by GI and nephrology services. Patient did undergo an EGD on  noted for distal esophagitis and portal hypertensive gastropathy, no evidence of varices. According to the son Emiliano Sesay at bedside, the patient does have a history of esophageal varices back in 2021 status post banding at that time. Per GI at outlying facility, patient was deemed not to be a candidate for colonoscopy and recommended Cologuard.   Part of her liver transplant evaluation at [facility] also included an MRCP was done on 2/18 which was noted for cirrhosis, portal hypertension, and large ascites. She has also undergone a cardiac stress test which was negative, EF of 69%. Patient has been maintained on lactulose, rifaximin, octreotide, and Protonix at the [facility]. Patient has had progressive worsening of her renal function at the [facility] facility with creatinine peak at 5.2 on 3/3 and associated metabolic acidosis requiring management with bicarb drip that was maintained as of this morning at [facility]. Per medical records, there was concern that patient may be moving towards initiation of possible hemodialysis. According to son, patient does not have any previous establish care with nephrology and was not aware of any kidney issues prior to current hospitalization. Patient also was treated with with a 7-day course of antibiotics for E. coli UTI, which were completed as of 2/17. The patient had has been seen by Dr. Pratik Vargas hepatologist for an initial visit in January to start the process of liver transplant work-up. Given lack of progression of patient's care prolonged hospitalization at [facility], request was made for patient to be transferred to [facility] to continue further liver transplant work-up and accepted by Dr. Pratik Vargas for transfer under the hospitalist service. Patient of note is currently lethargic and poorhistorian therefore history is obtained via discussion with her son Ese Murrieta at the bedside and review of medical records. \"    Interval history / Subjective:     3/10/2022 :   · Appears very weak,opens eyes and answers with single words. Denied pain or other complaints. Assessment & Plan:      Decompensated liver cirrhosis, with portal hypertension. Felt to be secondary to [facility]  Transaminitis/hyperbilirubinemia/coagulopathy/thrombocytopenia  -Transferred from [facility] with prolonged hospitalization.   Transferred for further work-up for liver transplant eval  -EGD on 2/17 [place] noted for distal esophagitis and portal hypertensive gastropathy, no evidence of varices.    -history of esophageal varices back in November 2021 status post banding at that time.    -Per GI at outlying facility, patient was deemed not to be a candidate for colonoscopy and recommended Cologuard.    -s/p MRCP on 2/18 which was noted for cirrhosis, portal hypertension, and large ascites. -s/p cardiac stress test which was negative for ischemia, EF of 69%  -Dr Salvatore Swanson following  -Continue lactulose, rifaximin, octreotide, Protonix p.o.  -Closely monitor stool output frequency  -Continue ursodiol    History of hepatic encephalopathy with refractory ascites  -multiple paracentesis done at [facility] including 2/11 with 11.8 L removal, 2/18 with 9/2 L removed, and 2/28 with 9.2 L removed. -Currently with distended abdomen  -Moderate large ascites on US 3/6. Ascites said to be refractoyr to diuretics,HRS limiting use of diuretics        GREG with metabolic acidosis.  -Likely due to HRS  -creatinine improving  -Nephrology following,recommendaitons appreciated.           Chronic normocytic anemia  -Possible anemia of CKD  -S/P one unit prbc on 3/7     Hyponatremia  -Suspect secondary to underlying liver cirrhosis.   -Na stable 128-130     Recent E. coli UTI  -Treated with Vanco and Zosyn then transitioned to Rocephin, completed 7-day antibiotic course as of 2/17  -Replace Beckford with new catheter 3/5 . Urine culture on 3/5 grew Candida albicans 75,000 CFU. -     Hypothyroid  -Continue levothyroxine    Severe malnutrition  --Tube feeding via NGT. Lines/catheters/drainage  --Bcekford catheter in place, replaced on 3/5  --NGT in place          Generalized weakness/chronic wounds. pta  -PT, OT consult  -Wound care consulted     DVT prophylaxis SCDs for now.   No pharmacological prophylaxis due to thrombocytopenia  DNR   Prognosis: .S. [facility] MEDICAL [facility] Problems  Date Reviewed: 2/17/2022          Codes Class Noted POA    Cirrhosis ([facility] [address] ICD-10-CM: K74.60  ICD-9-CM: 571.5  2/8/2022 Unknown                      Data Review:    Review and/or order of clinical lab test    GENERAL:  Very weak, jaundiced. Interactive, not in distress. HEENT:  Deeply icteric. NGT in place. NECK: Supple, trachea midline, no adenopathy, no thyromegally or tenderness, no carotid bruit and no JVD. LUNGS:   Vesicular breath sounds bilaterally, no added sounds. HEART:   S1 and S2 well heard,RRR,  no murmur, click, rub or gallop. ABDOMEB:   Distended, nontender, normoactive. Beckford catheter draining deep yellow (jaundice) urine  EXTREMETIES: No peripheral edema. PULSES: 2+ and symmetric all extremities. SKIN: Scattered ecchymosis, a large 1 on her upper chest  NEUROLOGY: Awake, lethargic but arousable and communicative. Nonfocal exam.      Labs:     Recent Labs     03/09/22  0511   WBC 10.8   HGB 8.9*   HCT 25.8*   PLT 47*     Recent Labs     03/10/22  0615 03/09/22  0511 03/08/22  0517   * 129* 128*   K 3.0* 3.7 3.4*    99 98   CO2 20* 19* 19*   * 102* 102*   CREA 4.21* 5.01* 4.99*   * 129* 135*   CA 8.3* 9.1 9.3   PHOS 2.9 3.7 3.9     Recent Labs     03/10/22  0615 03/09/22  0511 03/08/22  0517   ALB 3.6 4.4 3.8     No results for input(s): INR, PTP, APTT, INREXT, INREXT in the last 72 hours. No results for input(s): FE, TIBC, PSAT, FERR in the last 72 hours. Lab Results   Component Value Date/Time    Folate 38.3 (H) 10/20/2021 07:53 AM    RBC FOLATE 878 02/14/2022 01:27 AM      No results for input(s): PH, PCO2, PO2 in the last 72 hours. No results for input(s): CPK, CKNDX, TROIQ in the last 72 hours.     No lab exists for component: CPKMB  Lab Results   Component Value Date/Time    Cholesterol, total 122 01/25/2022 09:50 AM    HDL Cholesterol 40 01/25/2022 09:50 AM    LDL, calculated 67.2 01/25/2022 09:50 AM    Triglyceride 74 01/25/2022 09:50 AM    CHOL/HDL Ratio 3.1 01/25/2022 09:50 AM     Lab Results   Component Value Date/Time    Glucose (POC) 114 (H) 02/23/2022 05:42 PM    Glucose (POC) 127 (H) 02/23/2022 12:04 PM    Glucose (POC) 122 (H) 02/23/2022 08:18 AM    Glucose (POC) 109 (H) 02/22/2022 09:19 PM    Glucose (POC) 133 (H) 02/22/2022 07:28 AM     Lab Results   Component Value Date/Time    Color DARK YELLOW 03/05/2022 09:22 PM    Appearance TURBID (A) 03/05/2022 09:22 PM    Specific gravity 1.014 03/05/2022 09:22 PM    Specific gravity 1.010 02/11/2022 07:20 PM    pH (UA) 5.5 03/05/2022 09:22 PM    Protein 100 (A) 03/05/2022 09:22 PM    Glucose Negative 03/05/2022 09:22 PM    Ketone Negative 03/05/2022 09:22 PM    Bilirubin NEGATIVE 02/11/2022 07:20 PM    Urobilinogen 0.2 03/05/2022 09:22 PM    Nitrites Negative 03/05/2022 09:22 PM    Leukocyte Esterase LARGE (A) 03/05/2022 09:22 PM    Epithelial cells FEW 03/05/2022 09:22 PM    Bacteria 1+ (A) 03/05/2022 09:22 PM    WBC >100 (H) 03/05/2022 09:22 PM    RBC  03/05/2022 09:22 PM         Medications Reviewed:     Current Facility-Administered Medications   Medication Dose Route Frequency    potassium chloride SR (KLOR-CON 10) tablet 40 mEq  40 mEq Oral DAILY    potassium chloride 10 mEq in 100 ml IVPB  10 mEq IntraVENous Q1H    0.9% sodium chloride infusion  75 mL/hr IntraVENous CONTINUOUS    0.9% sodium chloride infusion 250 mL  250 mL IntraVENous PRN    0.9% sodium chloride infusion 250 mL  250 mL IntraVENous PRN    miconazole (MICONTIN) 2 % ointment   Topical BID    balsam peru-castor oiL (VENELEX) ointment   Topical BID    zinc oxide-cod liver oil (DESITIN) 40 % paste   Topical PRN    sodium chloride (NS) flush 5-40 mL  5-40 mL IntraVENous Q8H    sodium chloride (NS) flush 5-40 mL  5-40 mL IntraVENous PRN    acetaminophen (TYLENOL) tablet 650 mg  650 mg Oral Q6H PRN    Or    acetaminophen (TYLENOL) suppository 650 mg  650 mg Rectal Q6H PRN    polyethylene glycol (MIRALAX) packet 17 g  17 g Oral DAILY PRN    ondansetron (ZOFRAN ODT) tablet 4 mg  4 mg Oral Q8H PRN    Or    ondansetron (ZOFRAN) injection 4 mg  4 mg IntraVENous V2O PRN    folic acid (FOLVITE) tablet 1 mg  1 mg Oral DAILY    lactulose (CHRONULAC) 10 gram/15 mL solution 30 mL  30 mL Oral Q6H    levothyroxine (SYNTHROID) tablet 50 mcg  50 mcg Oral 6am    midodrine (PROAMATINE) tablet 15 mg  15 mg Oral TID WITH MEALS    octreotide (SANDOSTATIN) injection 100 mcg  100 mcg IntraVENous TID    pantoprazole (PROTONIX) tablet 40 mg  40 mg Oral DAILY    rifAXIMin (XIFAXAN) tablet 550 mg  550 mg Oral BID    sertraline (ZOLOFT) tablet 100 mg  100 mg Oral DAILY    thiamine HCL (B-1) tablet 100 mg  100 mg Oral DAILY    ursodioL (ACTIGALL) tablet 500 mg (Patient Supplied)  500 mg Oral Q12H    glucagon (GLUCAGEN) injection 1 mg  1 mg IntraMUSCular PRN    naloxone (NARCAN) injection 0.1 mg  0.1 mg IntraVENous PRN    promethazine (PHENERGAN) 6.25 mg in 0.9% sodium chloride 50 mL IVPB  6.25 mg IntraVENous Q6H PRN     ______________________________________________________________________  EXPECTED LENGTH OF STAY: 4d 16h  ACTUAL LENGTH OF STAY:          5                 Micky Gonzalez MD

## 2022-03-11 NOTE — PROGRESS NOTES
[address] [facility] Adult  Hospitalist Group                                                                                          Hospitalist Progress Note  Layo Orta MD  Answering service: [phone] from in house phone        Date of Service:  3/11/2022  NAME:  Jose Manuel Rojas  :  1960  MRN:  [id number]      Admission Summary:   Copied form admit: \" Jose Manuel Rojas is a 65 y.o. female with history of liver cirrhosis with portal hypertension,recently diagnosed primary biliary cholangitis, recurrent ascites requiring paracentesis approximately every 7 to 10 days who presents here transferred from [address] [place] [address] secondary to decompensated liver cirrhosis, worsening renal insufficiency, progressive weakness currently undergoing work-up for possible liver transplant. The patient of note initially presented to [address] [place] [address] on 2022 secondary to hepatic encephalopathy and progressive weakness. Patient on admission was found at that time to have an ammonia level of 111 and did subsequently require paracentesis in the ED. The patient has had a prolonged and complicated hospital course since that time and has undergone multiple paracentesis including  with 11.8 L removal,  with 9/2 L removed, and  with 9.2 L removed. The patient also did have episodes of transient hypotension requiring a brief ICU admission for pressor support and has periodically received albumin with daily midodrine use. She was followed both by GI and nephrology services. Patient did undergo an EGD on  noted for distal esophagitis and portal hypertensive gastropathy, no evidence of varices. According to the son Ese Glenny at bedside, the patient does have a history of esophageal varices back in 2021 status post banding at that time. Per GI at outlying facility, patient was deemed not to be a candidate for colonoscopy and recommended Cologuard.   Part of her liver transplant evaluation at [facility] also included an MRCP was done on 2/18 which was noted for cirrhosis, portal hypertension, and large ascites. She has also undergone a cardiac stress test which was negative, EF of 69%. Patient has been maintained on lactulose, rifaximin, octreotide, and Protonix at the [facility]. Patient has had progressive worsening of her renal function at the [facility] with creatinine peak at 5.2 on 3/3 and associated metabolic acidosis requiring management with bicarb drip that was maintained as of this morning at [facility]. Per medical records, there was concern that patient may be moving towards initiation of possible hemodialysis. According to son, patient does not have any previous establish care with nephrology and was not aware of any kidney issues prior to current hospitalization. Patient also was treated with with a 7-day course of antibiotics for E. coli UTI, which were completed as of 2/17. The patient had has been seen by Dr. Pratik Vargas hepatologist for an initial visit in January to start the process of liver transplant work-up. Given lack of progression of patient's care prolonged hospitalization at [facility], request was made for patient to be transferred to [facility] to continue further liver transplant work-up and accepted by Dr. Pratik Vargas for transfer under the hospitalist service. Patient of note is currently lethargic and poorhistorian therefore history is obtained via discussion with her son Ese Murrieta at the bedside and review of medical records. \"    Interval history / Subjective:     3/11/2022 :   · Much more awake and alert this afternoon compared to how she looked yesterday morning. Her son Ese Murrieta present in the room. He hopes she gets strong enough to get to [facility] for transplant. Assessment & Plan:      Decompensated liver cirrhosis, with portal hypertension.   Felt to be secondary to PBC  Transaminitis/hyperbilirubinemia/coagulopathy/thrombocytopenia  -Transferred from [address] [place] [address] with prolonged hospitalization. Transferred for further work-up for liver transplant eval  -EGD on 2/17 [place] noted for distal esophagitis and portal hypertensive gastropathy, no evidence of varices.    -history of esophageal varices back in November 2021 status post banding at that time.    -Per GI at outlying facility, patient was deemed not to be a candidate for colonoscopy and recommended Cologuard.    -s/p MRCP on 2/18 which was noted for cirrhosis, portal hypertension, and large ascites. -s/p cardiac stress test which was negative for ischemia, EF of 69%  -Dr Monet Denton following  -Continue lactulose, rifaximin, octreotide, Protonix p.o.  -Closely monitor stool output frequency  -Continue ursodiol    History of hepatic encephalopathy with refractory ascites  -multiple paracentesis done at [facility] including 2/11 with 11.8 L removal, 2/18 with 9/2 L removed, and 2/28 with 9.2 L removed. -Currently with distended abdomen  -Moderate large ascites on US 3/6. Ascites said to be refractoyr to diuretics,HRS limiting use of diuretics        GREG with metabolic acidosis. Creatinine worsened from 0.9 on 11/26/2021 up to 4-5 now  -Likely due to HRS  -creatinine improving  -Nephrology following,recommendaitons appreciated.           Chronic normocytic anemia  -Possible anemia of CKD  -S/P one unit prbc on 3/7     Hyponatremia  -Suspect secondary to underlying liver cirrhosis.   -Na stable 128-130     Recent E. coli UTI  -Treated with Vanco and Zosyn then transitioned to Rocephin, completed 7-day antibiotic course as of 2/17  -Replace Beckford with new catheter 3/5 . Urine culture on 3/5 grew Candida albicans 75,000 CFU. -     Hypothyroid  -Continue levothyroxine    Severe malnutrition  --Supplemented with tube feeding via NGT.  --On regular diet.     Lines/catheters/drainage  --Beckford catheter in place, replaced on 3/5  --NGT in place          Generalized weakness/chronic wounds. pta  -PT, OT consult  -Wound care consulted     DVT prophylaxis SCDs for now. No pharmacological prophylaxis due to thrombocytopenia  DNR   Prognosis: Guarded  Her son, Oswaldo Oh updated at the bedside, 3/11     Hospital Problems  Date Reviewed: 2/17/2022          Codes Class Noted POA    Cirrhosis [facility]) ICD-10-CM: K74.60  ICD-9-CM: 571.5  2/8/2022 Unknown                      Data Review:    Review and/or order of clinical lab test    GENERAL:  Chronically sick looking, alert and awake, not in distress. HEENT:  Deeply icteric. NGT in place. NECK: Supple, trachea midline, no adenopathy, no thyromegally or tenderness, no carotid bruit and no JVD. LUNGS:   Vesicular breath sounds bilaterally, no added sounds. HEART:   S1 and S2 well heard,RRR,  no murmur, click, rub or gallop. ABDOMEB:   Distended, nontender, normoactive. Beckford catheter draining deep yellow (jaundice) urine  EXTREMETIES: No peripheral edema. PULSES: 2+ and symmetric all extremities. SKIN: Scattered ecchymosis, a large 1 on her upper chest  NEUROLOGY: Awake, lethargic but arousable and communicative. Nonfocal exam.      Labs:     Recent Labs     03/11/22  0331 03/09/22  0511   WBC 11.3* 10.8   HGB 8.4* 8.9*   HCT 25.2* 25.8*   PLT 46* 47*     Recent Labs     03/11/22  0331 03/10/22  0615 03/09/22  0511   * 133* 129*   K 3.2* 3.0* 3.7    103 99   CO2 19* 20* 19*   * 101* 102*   CREA 4.50* 4.21* 5.01*   * 137* 129*   CA 9.0 8.3* 9.1   PHOS  --  2.9 3.7     Recent Labs     03/10/22  0615 03/09/22  0511   ALB 3.6 4.4     No results for input(s): INR, PTP, APTT, INREXT, INREXT in the last 72 hours. No results for input(s): FE, TIBC, PSAT, FERR in the last 72 hours. Lab Results   Component Value Date/Time    Folate 38.3 (H) 10/20/2021 07:53 AM    RBC FOLATE 878 02/14/2022 01:27 AM      No results for input(s): PH, PCO2, PO2 in the last 72 hours.   No results for input(s): CPK, CKNDX, TROIQ in the last 72 hours.     No lab exists for component: CPKMB  Lab Results   Component Value Date/Time    Cholesterol, total 122 01/25/2022 09:50 AM    HDL Cholesterol 40 01/25/2022 09:50 AM    LDL, calculated 67.2 01/25/2022 09:50 AM    Triglyceride 74 01/25/2022 09:50 AM    CHOL/HDL Ratio 3.1 01/25/2022 09:50 AM     Lab Results   Component Value Date/Time    Glucose (POC) 114 (H) 02/23/2022 05:42 PM    Glucose (POC) 127 (H) 02/23/2022 12:04 PM    Glucose (POC) 122 (H) 02/23/2022 08:18 AM    Glucose (POC) 109 (H) 02/22/2022 09:19 PM    Glucose (POC) 133 (H) 02/22/2022 07:28 AM     Lab Results   Component Value Date/Time    Color DARK YELLOW 03/05/2022 09:22 PM    Appearance TURBID (A) 03/05/2022 09:22 PM    Specific gravity 1.014 03/05/2022 09:22 PM    Specific gravity 1.010 02/11/2022 07:20 PM    pH (UA) 5.5 03/05/2022 09:22 PM    Protein 100 (A) 03/05/2022 09:22 PM    Glucose Negative 03/05/2022 09:22 PM    Ketone Negative 03/05/2022 09:22 PM    Bilirubin NEGATIVE 02/11/2022 07:20 PM    Urobilinogen 0.2 03/05/2022 09:22 PM    Nitrites Negative 03/05/2022 09:22 PM    Leukocyte Esterase LARGE (A) 03/05/2022 09:22 PM    Epithelial cells FEW 03/05/2022 09:22 PM    Bacteria 1+ (A) 03/05/2022 09:22 PM    WBC >100 (H) 03/05/2022 09:22 PM    RBC  03/05/2022 09:22 PM         Medications Reviewed:     Current Facility-Administered Medications   Medication Dose Route Frequency    potassium chloride SR (KLOR-CON 10) tablet 40 mEq  40 mEq Oral DAILY    0.9% sodium chloride infusion  75 mL/hr IntraVENous CONTINUOUS    0.9% sodium chloride infusion 250 mL  250 mL IntraVENous PRN    0.9% sodium chloride infusion 250 mL  250 mL IntraVENous PRN    miconazole (MICONTIN) 2 % ointment   Topical BID    balsam peru-castor oiL (VENELEX) ointment   Topical BID    zinc oxide-cod liver oil (DESITIN) 40 % paste   Topical PRN    sodium chloride (NS) flush 5-40 mL  5-40 mL IntraVENous Q8H    sodium chloride (NS) flush 5-40 mL  5-40 mL IntraVENous PRN    acetaminophen (TYLENOL) tablet 650 mg  650 mg Oral Q6H PRN    Or    acetaminophen (TYLENOL) suppository 650 mg  650 mg Rectal Q6H PRN    polyethylene glycol (MIRALAX) packet 17 g  17 g Oral DAILY PRN    ondansetron (ZOFRAN ODT) tablet 4 mg  4 mg Oral Q8H PRN    Or    ondansetron (ZOFRAN) injection 4 mg  4 mg IntraVENous X9R PRN    folic acid (FOLVITE) tablet 1 mg  1 mg Oral DAILY    lactulose (CHRONULAC) 10 gram/15 mL solution 30 mL  30 mL Oral Q6H    levothyroxine (SYNTHROID) tablet 50 mcg  50 mcg Oral 6am    midodrine (PROAMATINE) tablet 15 mg  15 mg Oral TID WITH MEALS    octreotide (SANDOSTATIN) injection 100 mcg  100 mcg IntraVENous TID    pantoprazole (PROTONIX) tablet 40 mg  40 mg Oral DAILY    rifAXIMin (XIFAXAN) tablet 550 mg  550 mg Oral BID    sertraline (ZOLOFT) tablet 100 mg  100 mg Oral DAILY    thiamine HCL (B-1) tablet 100 mg  100 mg Oral DAILY    ursodioL (ACTIGALL) tablet 500 mg (Patient Supplied)  500 mg Oral Q12H    glucagon (GLUCAGEN) injection 1 mg  1 mg IntraMUSCular PRN    naloxone (NARCAN) injection 0.1 mg  0.1 mg IntraVENous PRN    promethazine (PHENERGAN) 6.25 mg in 0.9% sodium chloride 50 mL IVPB  6.25 mg IntraVENous Q6H PRN     ______________________________________________________________________  EXPECTED LENGTH OF STAY: 4d 16h  ACTUAL LENGTH OF STAY:          6                 Vito Starr MD

## 2022-03-11 NOTE — PROGRESS NOTES
Bedside shift change report given to LYNNETTE Sheridan (oncoming nurse) by Parveen Bourgeois RN (offgoing nurse). Report included the following information SBAR, Kardex and Intake/Output.

## 2022-03-11 NOTE — PROGRESS NOTES
Physician Progress Note      Chava Yang  CSN #:                  [id number]  :                       1960  ADMIT DATE:       3/5/2022 2:14 PM  [address] [place] DATE:  RESPONDING  PROVIDER #:        Hanane Mcclure MD          QUERY TEXT:    Dear Attending physician,    There is documentation of both GREG and CKD 4 in the medical record. The patient does not meet KDIGO criteria for GREG  during this hospital stay if the patient has CKD stage 4 as well. If possible, please confirm in the progress notes if you were evaluating and /or treating any of the following: The medical record reflects the following:  Risk Factors: CKD 4  Clinical Indicators: 3/5-Cr 4.7, 3/6-Cr 4.85, 3/7-Cr 5.06, 3/8- Cr 4.99  3/5-Per nephrology-GREG  Serum creatinine 4.7 today, up from baseline of 1.4 in December of last year  Likely hepatorenal syndrome given history of decompensated cirrhosis, also likely component of GREG with episodes of hypotension at transferring hospital requiring ICU admission and pressors  3/8-per nephrology-GREG:  suspect HRS  U/S neg for obstruction  continue HRS treatment, octreotide and midodrine. + albumin  3/8-Per PN-Patient has had progressive worsening of her renal function at the outlying facility with creatinine peak at 5.2 on 3/3 and associated metabolic acidosis requiring management with bicarb drip that was maintained as of this morning at [facility] THE [facility]. Per medical records, there was concern that patient may be moving towards initiation of possible hemodialysis. According to son, patient does not have any previous establish care with nephrology and was not aware of any kidney issues prior to current hospitalization  GREG with metabolic acidosis. Cr 4.7, peaked at 5.2 on 3/3  flat S Cr curve at 4. 8 range 3/6/2022 ( 2022 1.34)  3/8-Per PN-The patient has chronic kidney disease stage 4.   Treatment: Monitor labwork, vital signs, imaging, I&O, nephrology consult, IV NS @ 75 cc/hour    Thank you,  Marge Lopez RN, BSN  Clinical documentation Improvement  [phone]  Options provided:  -- Acute kidney injury as evidenced by, Please document evidence.   -- Acute kidney injury ruled out after study, CKD 4 only  -- Acute kidney injury  on CKD 4  -- Other - I will add my own diagnosis  -- Disagree - Not applicable / Not valid  -- Disagree - Clinically unable to determine / Unknown  -- Refer to Clinical Documentation Reviewer    PROVIDER RESPONSE TEXT:    This patient has an acute kidney injury as evidenced by Worsening of creatinine from normal(0.9) in 11/2021- to upto 4-5 this admission    Query created by: Michele Padilla on 3/9/2022 6:12 AM      Electronically signed by:  Dia Rogel MD 3/11/2022 7:27 AM

## 2022-03-11 NOTE — PROGRESS NOTES
[facility]   [address], [facility] [address], [facility]  Phone: [phone]   [phone]       Nephrology Progress Note  Cata Boland     1960     [id number]  Date of Admission : 3/5/2022  03/11/22    CC:  Follow up for greg    Assessment and Plan   GREG:  - likely 2/2 HRS  - U/S neg for obstruction  - Cr essentially unchanged since arrival  - cont midodrine + octreotide  - d/c fluids   - daily labs while here  - not HD candidate unless she is able to get a liver tx     Metabolic acidosis  - improving    Hypokalemia:  - IV repletion ordered    Hyponatremia  -Secondary to liver cirrhosis  - Na stable    Severe anemia:  - 1 unit of PRBCs 3/8  - hgb stable     Decompensated liver cirrhosis  -With portal hypertension, primary biliary cholangitis  -Requiring multiple paracenteses, most recently 2/28/2022 with 9.2 L removed  -Hepatology following     Hypothyroidism        Prognosis grim unless pt able to be transplanted. Pt DNR now. May need to involve palliative care. Interval History:  Seen and examined. Resting in bed today. Cr unchanged. UOP ok. Denies cp or sob. More abd distention. Review of Systems: A comprehensive review of systems was negative except for that written in the HPI.     Current Medications:   Current Facility-Administered Medications   Medication Dose Route Frequency    potassium chloride SR (KLOR-CON 10) tablet 40 mEq  40 mEq Oral DAILY    0.9% sodium chloride infusion  75 mL/hr IntraVENous CONTINUOUS    0.9% sodium chloride infusion 250 mL  250 mL IntraVENous PRN    0.9% sodium chloride infusion 250 mL  250 mL IntraVENous PRN    miconazole (MICONTIN) 2 % ointment   Topical BID    balsam peru-castor oiL (VENELEX) ointment   Topical BID    zinc oxide-cod liver oil (DESITIN) 40 % paste   Topical PRN    sodium chloride (NS) flush 5-40 mL  5-40 mL IntraVENous Q8H    sodium chloride (NS) flush 5-40 mL  5-40 mL IntraVENous PRN    acetaminophen (TYLENOL) tablet 650 mg  650 mg Oral Q6H PRN    Or    acetaminophen (TYLENOL) suppository 650 mg  650 mg Rectal Q6H PRN    polyethylene glycol (MIRALAX) packet 17 g  17 g Oral DAILY PRN    ondansetron (ZOFRAN ODT) tablet 4 mg  4 mg Oral Q8H PRN    Or    ondansetron (ZOFRAN) injection 4 mg  4 mg IntraVENous J1U PRN    folic acid (FOLVITE) tablet 1 mg  1 mg Oral DAILY    lactulose (CHRONULAC) 10 gram/15 mL solution 30 mL  30 mL Oral Q6H    levothyroxine (SYNTHROID) tablet 50 mcg  50 mcg Oral 6am    midodrine (PROAMATINE) tablet 15 mg  15 mg Oral TID WITH MEALS    octreotide (SANDOSTATIN) injection 100 mcg  100 mcg IntraVENous TID    pantoprazole (PROTONIX) tablet 40 mg  40 mg Oral DAILY    rifAXIMin (XIFAXAN) tablet 550 mg  550 mg Oral BID    sertraline (ZOLOFT) tablet 100 mg  100 mg Oral DAILY    thiamine HCL (B-1) tablet 100 mg  100 mg Oral DAILY    ursodioL (ACTIGALL) tablet 500 mg (Patient Supplied)  500 mg Oral Q12H    glucagon (GLUCAGEN) injection 1 mg  1 mg IntraMUSCular PRN    naloxone (NARCAN) injection 0.1 mg  0.1 mg IntraVENous PRN    promethazine (PHENERGAN) 6.25 mg in 0.9% sodium chloride 50 mL IVPB  6.25 mg IntraVENous Q6H PRN      Allergies   Allergen Reactions    Morphine Other (comments)     Severe HA. ALL narcotics!!!       Objective:  Vitals:    Vitals:    03/11/22 0311 03/11/22 0400 03/11/22 0600 03/11/22 0752   BP: (!) 102/39   (!) 97/49   Pulse: 90 94 80 82   Resp: 18   18   Temp: 98.8 °F (37.1 °C)   98.7 °F (37.1 °C)   SpO2: 96%   96%   Weight:       Height:         Intake and Output:  No intake/output data recorded. 03/09 1901 - 03/11 0700  In: 690   Out: 275 [Urine:275]    Physical Examination:  General: NAD,chronically ill appearing  HEENT:           + scleral icterus.  DHT in place  Neck:  Supple, no mass  Resp:  Lungs CTA B/L, no wheezing , normal respiratory effort  CV:  RRR,  no murmur or rub,no  LE edema  GI:  Soft, NT, + Bowel sounds, no hepatosplenomegaly  Neurologic:  Non focal  Skin:  No Rash  :  Beckford     [x]    High complexity decision making was performed  [x]    Patient is at high-risk of decompensation with multiple organ involvement    Lab Data Personally Reviewed: I have reviewed all the pertinent labs, microbiology data and radiology studies during assessment.     Recent Labs     03/11/22  0331 03/10/22  0615 03/09/22  0511   * 133* 129*   K 3.2* 3.0* 3.7    103 99   CO2 19* 20* 19*   * 137* 129*   * 101* 102*   CREA 4.50* 4.21* 5.01*   CA 9.0 8.3* 9.1   PHOS  --  2.9 3.7   ALB  --  3.6 4.4     Recent Labs     03/11/22 0331 03/09/22  0511   WBC 11.3* 10.8   HGB 8.4* 8.9*   HCT 25.2* 25.8*   PLT 46* 47*     No results found for: [facility]  Lab Results   Component Value Date/Time    Culture result: CANDIDA ALBICANS (A) 03/05/2022 09:22 PM     Recent Results (from the past 24 hour(s))   METABOLIC PANEL, BASIC    Collection Time: 03/11/22  3:31 AM   Result Value Ref Range    Sodium 133 (L) 136 - 145 mmol/L    Potassium 3.2 (L) 3.5 - 5.1 mmol/L    Chloride 103 97 - 108 mmol/L    CO2 19 (L) 21 - 32 mmol/L    Anion gap 11 5 - 15 mmol/L    Glucose 140 (H) 65 - 100 mg/dL     (H) 6 - 20 MG/DL    Creatinine 4.50 (H) 0.55 - 1.02 MG/DL    BUN/Creatinine ratio 25 (H) 12 - 20      GFR est AA 12 (L) >60 ml/min/1.73m2    GFR est non-AA 10 (L) >60 ml/min/1.73m2    Calcium 9.0 8.5 - 10.1 MG/DL   CBC W/O DIFF    Collection Time: 03/11/22  3:31 AM   Result Value Ref Range    WBC 11.3 (H) 3.6 - 11.0 K/uL    RBC 2.73 (L) 3.80 - 5.20 M/uL    HGB 8.4 (L) 11.5 - 16.0 g/dL    HCT 25.2 (L) 35.0 - 47.0 %    MCV 92.3 80.0 - 99.0 FL    MCH 30.8 26.0 - 34.0 PG    MCHC 33.3 30.0 - 36.5 g/dL    RDW 23.2 (H) 11.5 - 14.5 %    PLATELET 46 (LL) 418 - 400 K/uL    MPV 10.3 8.9 - 12.9 FL    NRBC 0.0 0  WBC    ABSOLUTE NRBC 0.00 0.00 - 0.01 K/uL                                       Care Plan discussed with:  Patient     Family      RN Consulting Physician [facility]        I have reviewed the flowsheets. Chart and Pertinent Notes have been reviewed. No change in PMH ,family and social history from Consult note.       Robin Quintana MD

## 2022-03-11 NOTE — PROGRESS NOTES
Physical Therapy  3/11/2022    Chart reviewed. Attempted to see pt this afternoon, however pt prefers to rest this afternoon. Encouraged pt to continue to perform LE exercises 3x/day (x10 each/as tolerated) throughout weekend. PT to follow up after weekend.      Jesika Means, PTA

## 2022-03-12 NOTE — PROGRESS NOTES
[address] [facility]  Hospitalist Group                                                                                          Hospitalist Progress Note  Irene Samaniego MD  Answering service: [phone] or [phone] from in house phone        Date of Service:  3/12/2022  NAME:  Dayne Marquez  :  1960  MRN:  [id number]      Admission Summary:   Copied form admit: \" Dayne Marquez is a 65 y.o. female with history of liver cirrhosis with portal hypertension,recently diagnosed primary biliary cholangitis, recurrent ascites requiring paracentesis approximately every 7 to 10 days who presents here transferred from [address] [place] [address] secondary to decompensated liver cirrhosis, worsening renal insufficiency, progressive weakness currently undergoing work-up for possible liver transplant. The patient of note initially presented to [address] [place] [address] on 2022 secondary to hepatic encephalopathy and progressive weakness. Patient on admission was found at that time to have an ammonia level of 111 and did subsequently require paracentesis in the ED. The patient has had a prolonged and complicated hospital course since that time and has undergone multiple paracentesis including  with 11.8 L removal,  with 9/2 L removed, and  with 9.2 L removed. The patient also did have episodes of transient hypotension requiring a brief ICU admission for pressor support and has periodically received albumin with daily midodrine use. She was followed both by GI and nephrology services. Patient did undergo an EGD on  noted for distal esophagitis and portal hypertensive gastropathy, no evidence of varices. According to the son Florence Beaver at bedside, the patient does have a history of esophageal varices back in 2021 status post banding at that time. Per GI at outlying facility, patient was deemed not to be a candidate for colonoscopy and recommended Cologuard.   Part of her liver transplant evaluation at [facility] also included an MRCP was done on 2/18 which was noted for cirrhosis, portal hypertension, and large ascites. She has also undergone a cardiac stress test which was negative, EF of 69%. Patient has been maintained on lactulose, rifaximin, octreotide, and Protonix at the [facility]. Patient has had progressive worsening of her renal function at the [facility] with creatinine peak at 5.2 on 3/3 and associated metabolic acidosis requiring management with bicarb drip that was maintained as of this morning at [facility]. Per medical records, there was concern that patient may be moving towards initiation of possible hemodialysis. According to son, patient does not have any previous establish care with nephrology and was not aware of any kidney issues prior to current hospitalization. Patient also was treated with with a 7-day course of antibiotics for E. coli UTI, which were completed as of 2/17. The patient had has been seen by Dr. Arnulfo Oneal hepatologist for an initial visit in January to start the process of liver transplant work-up. Given lack of progression of patient's care prolonged hospitalization at [facility], request was made for patient to be transferred to [facility] to continue further liver transplant work-up and accepted by Dr. Arnulfo Oneal for transfer under the hospitalist service. Patient of note is currently lethargic and poorhistorian therefore history is obtained via discussion with her son Adenike Sampson at the bedside and review of medical records. \"    Interval history / Subjective:     3/12/2022 :   · She is alert. Noted increasing abdominal distention. Her son present in the room. Assessment & Plan:      Decompensated liver cirrhosis, with portal hypertension. Felt to be secondary to [facility]  Transaminitis/hyperbilirubinemia/coagulopathy/thrombocytopenia  -Transferred from [facility] with prolonged hospitalization.   Transferred for further work-up for liver transplant eval  -EGD on 2/17 [place] noted for distal esophagitis and portal hypertensive gastropathy, no evidence of varices.    -history of esophageal varices back in November 2021 status post banding at that time.    -Per GI at outlying facility, patient was deemed not to be a candidate for colonoscopy and recommended Cologuard.    -s/p MRCP on 2/18 which was noted for cirrhosis, portal hypertension, and large ascites. -s/p cardiac stress test which was negative for ischemia, EF of 69%  -Dr Jose Segundo following  -Continue lactulose, rifaximin, octreotide, Protonix p.o.  -Closely monitor stool output frequency  -Continue ursodiol    History of hepatic encephalopathy with refractory ascites  -multiple paracentesis done at [facility] THE [facility] including 2/11 with 11.8 L removal, 2/18 with 9/2 L removed, and 2/28 with 9.2 L removed. -Currently with distended abdomen  -Moderate large ascites on US 3/6. Ascites said to be refractoyr to diuretics,HRS limiting use of diuretics  -Increasing abdominal distention, request placed for therapeutic US guided paracentesis        GREG with metabolic acidosis. Creatinine worsened from 0.9 on 11/26/2021 up to 4-5 now  -Likely due to HRS  -creatinine improving  -Nephrology following,recommendaitons appreciated.           Chronic normocytic anemia  -Possible anemia of CKD  -S/P one unit prbc on 3/7     Hyponatremia  -Suspect secondary to underlying liver cirrhosis.   -Na stable 128-130     Recent E. coli UTI  -Treated with Vanco and Zosyn then transitioned to Rocephin, completed 7-day antibiotic course as of 2/17  -Replace Beckford with new catheter 3/5 . Urine culture on 3/5 grew Candida albicans 75,000 CFU. -     Hypothyroid  -Continue levothyroxine    Severe malnutrition  --Supplemented with tube feeding via NGT.  --On regular diet. Lines/catheters/drainage  --Beckford catheter in place, replaced on 3/5  --NGT in place          Generalized weakness/chronic wounds. pta  -PT, OT consult  -Wound care consulted     DVT prophylaxis SCDs for now. No pharmacological prophylaxis due to thrombocytopenia  DNR   Prognosis: Guarded  Her son, Nirmal Edge updated at the bedside, 3/11     Hospital Problems  Date Reviewed: 2/17/2022          Codes Class Noted POA    Cirrhosis [facility]) ICD-10-CM: K74.60  ICD-9-CM: 571.5  2/8/2022 Unknown                      Data Review:    Review and/or order of clinical lab test    GENERAL:  Chronically sick looking, alert and awake, not in distress. HEENT:  Deeply icteric. NGT in place. NECK: Supple, trachea midline, no adenopathy, no thyromegally or tenderness, no carotid bruit and no JVD. LUNGS:   Vesicular breath sounds bilaterally, no added sounds. HEART:   S1 and S2 well heard,RRR,  no murmur, click, rub or gallop. ABDOMEB:   Distended, nontender, normoactive. Beckford catheter draining deep yellow (jaundice) urine  EXTREMETIES: No peripheral edema. PULSES: 2+ and symmetric all extremities. SKIN: Scattered ecchymosis, a large 1 on her upper chest  NEUROLOGY: Awake, lethargic but arousable and communicative. Nonfocal exam.      Labs:     Recent Labs     03/12/22  0100 03/11/22  0331   WBC 11.7* 11.3*   HGB 8.3* 8.4*   HCT 25.3* 25.2*   PLT 48* 46*     Recent Labs     03/12/22  0100 03/11/22  0331 03/10/22  0615   * 133* 133*   K 3.8 3.2* 3.0*    103 103   CO2 19* 19* 20*   * 112* 101*   CREA 4.58* 4.50* 4.21*   * 140* 137*   CA 8.7 9.0 8.3*   PHOS  --   --  2.9     Recent Labs     03/10/22  0615   ALB 3.6     No results for input(s): INR, PTP, APTT, INREXT, INREXT in the last 72 hours. No results for input(s): FE, TIBC, PSAT, FERR in the last 72 hours. Lab Results   Component Value Date/Time    Folate 38.3 (H) 10/20/2021 07:53 AM    RBC FOLATE 878 02/14/2022 01:27 AM      No results for input(s): PH, PCO2, PO2 in the last 72 hours. No results for input(s): CPK, CKNDX, TROIQ in the last 72 hours.     No lab exists for component: CPKMB  Lab Results   Component Value Date/Time    Cholesterol, total 122 01/25/2022 09:50 AM    HDL Cholesterol 40 01/25/2022 09:50 AM    LDL, calculated 67.2 01/25/2022 09:50 AM    Triglyceride 74 01/25/2022 09:50 AM    CHOL/HDL Ratio 3.1 01/25/2022 09:50 AM     Lab Results   Component Value Date/Time    Glucose (POC) 114 (H) 02/23/2022 05:42 PM    Glucose (POC) 127 (H) 02/23/2022 12:04 PM    Glucose (POC) 122 (H) 02/23/2022 08:18 AM    Glucose (POC) 109 (H) 02/22/2022 09:19 PM    Glucose (POC) 133 (H) 02/22/2022 07:28 AM     Lab Results   Component Value Date/Time    Color DARK YELLOW 03/05/2022 09:22 PM    Appearance TURBID (A) 03/05/2022 09:22 PM    Specific gravity 1.014 03/05/2022 09:22 PM    Specific gravity 1.010 02/11/2022 07:20 PM    pH (UA) 5.5 03/05/2022 09:22 PM    Protein 100 (A) 03/05/2022 09:22 PM    Glucose Negative 03/05/2022 09:22 PM    Ketone Negative 03/05/2022 09:22 PM    Bilirubin NEGATIVE 02/11/2022 07:20 PM    Urobilinogen 0.2 03/05/2022 09:22 PM    Nitrites Negative 03/05/2022 09:22 PM    Leukocyte Esterase LARGE (A) 03/05/2022 09:22 PM    Epithelial cells FEW 03/05/2022 09:22 PM    Bacteria 1+ (A) 03/05/2022 09:22 PM    WBC >100 (H) 03/05/2022 09:22 PM    RBC  03/05/2022 09:22 PM         Medications Reviewed:     Current Facility-Administered Medications   Medication Dose Route Frequency    0.9% sodium chloride infusion 250 mL  250 mL IntraVENous PRN    0.9% sodium chloride infusion 250 mL  250 mL IntraVENous PRN    miconazole (MICONTIN) 2 % ointment   Topical BID    balsam peru-castor oiL (VENELEX) ointment   Topical BID    zinc oxide-cod liver oil (DESITIN) 40 % paste   Topical PRN    sodium chloride (NS) flush 5-40 mL  5-40 mL IntraVENous Q8H    sodium chloride (NS) flush 5-40 mL  5-40 mL IntraVENous PRN    acetaminophen (TYLENOL) tablet 650 mg  650 mg Oral Q6H PRN    Or    acetaminophen (TYLENOL) suppository 650 mg  650 mg Rectal Q6H PRN    polyethylene glycol (MIRALAX) packet 17 g  17 g Oral DAILY PRN    ondansetron (ZOFRAN ODT) tablet 4 mg  4 mg Oral Q8H PRN    Or    ondansetron (ZOFRAN) injection 4 mg  4 mg IntraVENous F0C PRN    folic acid (FOLVITE) tablet 1 mg  1 mg Oral DAILY    lactulose (CHRONULAC) 10 gram/15 mL solution 30 mL  30 mL Oral Q6H    levothyroxine (SYNTHROID) tablet 50 mcg  50 mcg Oral 6am    midodrine (PROAMATINE) tablet 15 mg  15 mg Oral TID WITH MEALS    octreotide (SANDOSTATIN) injection 100 mcg  100 mcg IntraVENous TID    pantoprazole (PROTONIX) tablet 40 mg  40 mg Oral DAILY    rifAXIMin (XIFAXAN) tablet 550 mg  550 mg Oral BID    sertraline (ZOLOFT) tablet 100 mg  100 mg Oral DAILY    thiamine HCL (B-1) tablet 100 mg  100 mg Oral DAILY    ursodioL (ACTIGALL) tablet 500 mg (Patient Supplied)  500 mg Oral Q12H    glucagon (GLUCAGEN) injection 1 mg  1 mg IntraMUSCular PRN    naloxone (NARCAN) injection 0.1 mg  0.1 mg IntraVENous PRN    promethazine (PHENERGAN) 6.25 mg in 0.9% sodium chloride 50 mL IVPB  6.25 mg IntraVENous Q6H PRN     ______________________________________________________________________  EXPECTED LENGTH OF STAY: 4d 16h  ACTUAL LENGTH OF STAY:          7                 Erin Quintero MD

## 2022-03-12 NOTE — PROGRESS NOTES
Patient had issues swallowing, RN crushed pills and put in apple sauce. Patient spit out meds and apple sauce on all 3 attempts to feed.

## 2022-03-12 NOTE — PROGRESS NOTES
Bedside and Verbal shift change report given to Ignacio (oncoming nurse) by Patricia Guzman (offgoing nurse). Report included the following information SBAR, Kardex, Intake/Output, MAR and Recent Results.

## 2022-03-12 NOTE — PROGRESS NOTES
Pt given AM meds via [place]. Bedside shift change report given to Katerine Hugo RN (oncoming nurse) by Georgina Gray RN (offgoing nurse). Report included the following information SBAR, Kardex and Intake/Output.

## 2022-03-12 NOTE — PROGRESS NOTES
Renal function stable  Cont present care  Will f/u on Monday  Call with any issues over the weekend      Cm Blair MD  [facility]   [address] [address]  Phone - [phone]   Fax - [phone]  www. [facility].[employer]

## 2022-03-12 NOTE — PROGRESS NOTES
Pt demonstrates strange pocketing behavior with food and drink, and seems to have difficulty swallowing. Pt denies difficulty swallowing but often has to spit up tea or other liquids drank.

## 2022-03-13 NOTE — PROGRESS NOTES
[address] [facility] Adult  Hospitalist Group                                                                                          Hospitalist Progress Note  Eddie Swanson MD  Answering service: [phone] from in house phone        Date of Service:  3/13/2022  NAME:  Fe Fuller  :  1960  MRN:  [id number]      Admission Summary:   Copied form admit: \" Fe Fuller is a 65 y.o. female with history of liver cirrhosis with portal hypertension,recently diagnosed primary biliary cholangitis, recurrent ascites requiring paracentesis approximately every 7 to 10 days who presents here transferred from [address] [place] [address] secondary to decompensated liver cirrhosis, worsening renal insufficiency, progressive weakness currently undergoing work-up for possible liver transplant. The patient of note initially presented to [address] [place] [address] on 2022 secondary to hepatic encephalopathy and progressive weakness. Patient on admission was found at that time to have an ammonia level of 111 and did subsequently require paracentesis in the ED. The patient has had a prolonged and complicated hospital course since that time and has undergone multiple paracentesis including  with 11.8 L removal,  with 9/2 L removed, and  with 9.2 L removed. The patient also did have episodes of transient hypotension requiring a brief ICU admission for pressor support and has periodically received albumin with daily midodrine use. She was followed both by GI and nephrology services. Patient did undergo an EGD on  noted for distal esophagitis and portal hypertensive gastropathy, no evidence of varices. According to the son [address] at bedside, the patient does have a history of esophageal varices back in 2021 status post banding at that time. Per GI at outlying facility, patient was deemed not to be a candidate for colonoscopy and recommended Cologuard.   Part of her liver transplant evaluation at [facility] also included an MRCP was done on 2/18 which was noted for cirrhosis, portal hypertension, and large ascites. She has also undergone a cardiac stress test which was negative, EF of 69%. Patient has been maintained on lactulose, rifaximin, octreotide, and Protonix at the [facility]. Patient has had progressive worsening of her renal function at the [facility] with creatinine peak at 5.2 on 3/3 and associated metabolic acidosis requiring management with bicarb drip that was maintained as of this morning at [facility]. Per medical records, there was concern that patient may be moving towards initiation of possible hemodialysis. According to son, patient does not have any previous establish care with nephrology and was not aware of any kidney issues prior to current hospitalization. Patient also was treated with with a 7-day course of antibiotics for E. coli UTI, which were completed as of 2/17. The patient had has been seen by Dr. Lakshmi Camacho hepatologist for an initial visit in January to start the process of liver transplant work-up. Given lack of progression of patient's care prolonged hospitalization at [facility], request was made for patient to be transferred to [facility] to continue further liver transplant work-up and accepted by Dr. Lakshmi Camacho for transfer under the hospitalist service. Patient of note is currently lethargic and poorhistorian therefore history is obtained via discussion with her son Priscila Benjamin at the bedside and review of medical records. \"    Interval history / Subjective:     3/13/2022 :   · She appears weak and tired. She did not air any complaints. Assessment & Plan:      Decompensated liver cirrhosis, with portal hypertension. Felt to be secondary to [facility]  Transaminitis/hyperbilirubinemia/coagulopathy/thrombocytopenia  -Transferred from [facility] with prolonged hospitalization.   Transferred for further work-up for liver transplant eval  -EGD on 2/17 [place] noted for distal esophagitis and portal hypertensive gastropathy, no evidence of varices.    -history of esophageal varices back in November 2021 status post banding at that time.    -Per GI at outlying facility, patient was deemed not to be a candidate for colonoscopy and recommended Cologuard.    -s/p MRCP on 2/18 which was noted for cirrhosis, portal hypertension, and large ascites. -s/p cardiac stress test which was negative for ischemia, EF of 69%  -Dr Toribio Blend following  -Continue lactulose, rifaximin, octreotide, Protonix p.o.  -Closely monitor stool output frequency  -Continue ursodiol    History of hepatic encephalopathy with refractory ascites  -multiple paracentesis done at [facility] including 2/11 with 11.8 L removal, 2/18 with 9/2 L removed, and 2/28 with 9.2 L removed. -Currently with distended abdomen  -Moderate large ascites on US 3/6. Ascites said to be refractoyr to diuretics,HRS limiting use of diuretics  -Increasing abdominal distention, paracentesis performed and peritoneal drainage catheter left in place, 3/12        GREG with metabolic acidosis. Creatinine worsened from 0.9 on 11/26/2021 up to 4-5 now  -Likely due to HRS  -creatinine improving  -Nephrology following,recommendaitons appreciated.           Chronic normocytic anemia  -Possible anemia of CKD  -S/P one unit prbc on 3/7     Hyponatremia  -Suspect secondary to underlying liver cirrhosis.   -Na stable 128-130     Recent E. coli UTI  -Treated with Vanco and Zosyn then transitioned to Rocephin, completed 7-day antibiotic course as of 2/17  -Replace Beckford with new catheter 3/5 . Urine culture on 3/5 grew Candida albicans 75,000 CFU. -     Hypothyroid  -Continue levothyroxine    Severe malnutrition  --Supplemented with tube feeding via NGT.  --On regular diet. Lines/catheters/drainage  --Beckford catheter in place, replaced on 3/5  --NGT in place          Generalized weakness/chronic wounds. pta  -PT, OT consult  -Wound care consulted     DVT prophylaxis SCDs for now. No pharmacological prophylaxis due to thrombocytopenia  DNR   Prognosis: Guarded  Her son, Oswaldo Oh updated at the bedside, 3/11     Hospital Problems  Date Reviewed: 2/17/2022          Codes Class Noted POA    Cirrhosis [facility]) ICD-10-CM: K74.60  ICD-9-CM: 571.5  2/8/2022 Unknown                      Data Review:    Review and/or order of clinical lab test    GENERAL:  Chronically sick looking, alert and awake, not in distress. HEENT:  Deeply icteric. NGT in place. NECK: Supple, trachea midline, no adenopathy, no thyromegally or tenderness, no carotid bruit and no JVD. LUNGS:   Vesicular breath sounds bilaterally, no added sounds. HEART:   S1 and S2 well heard,RRR,  no murmur, click, rub or gallop. ABDOMEB:   Distended, nontender, normoactive. Status post paracentesis  Beckford catheter draining deep yellow (jaundice) urine  EXTREMETIES: No peripheral edema. PULSES: 2+ and symmetric all extremities. SKIN: Scattered ecchymosis, a large 1 on her upper chest  NEUROLOGY: Awake, lethargic but arousable and communicative. Nonfocal exam.      Labs:     Recent Labs     03/13/22  0416 03/12/22  0100   WBC 11.3* 11.7*   HGB 8.3* 8.3*   HCT 24.6* 25.3*   PLT 48* 48*     Recent Labs     03/13/22  0416 03/12/22  0100 03/11/22  0331   * 133* 133*   K 3.8 3.8 3.2*    104 103   CO2 20* 19* 19*   * 113* 112*   CREA 4.56* 4.58* 4.50*   * 113* 140*   CA 8.8 8.7 9.0     No results for input(s): ALT, AP, TBIL, TBILI, TP, ALB, GLOB, GGT, AML, LPSE in the last 72 hours. No lab exists for component: SGOT, GPT, AMYP, HLPSE  No results for input(s): INR, PTP, APTT, INREXT, INREXT in the last 72 hours. No results for input(s): FE, TIBC, PSAT, FERR in the last 72 hours.    Lab Results   Component Value Date/Time    Folate 38.3 (H) 10/20/2021 07:53 AM    RBC FOLATE 878 02/14/2022 01:27 AM      No results for input(s): PH, PCO2, PO2 in the last 72 hours. No results for input(s): CPK, CKNDX, TROIQ in the last 72 hours.     No lab exists for component: CPKMB  Lab Results   Component Value Date/Time    Cholesterol, total 122 01/25/2022 09:50 AM    HDL Cholesterol 40 01/25/2022 09:50 AM    LDL, calculated 67.2 01/25/2022 09:50 AM    Triglyceride 74 01/25/2022 09:50 AM    CHOL/HDL Ratio 3.1 01/25/2022 09:50 AM     Lab Results   Component Value Date/Time    Glucose (POC) 114 (H) 02/23/2022 05:42 PM    Glucose (POC) 127 (H) 02/23/2022 12:04 PM    Glucose (POC) 122 (H) 02/23/2022 08:18 AM    Glucose (POC) 109 (H) 02/22/2022 09:19 PM    Glucose (POC) 133 (H) 02/22/2022 07:28 AM     Lab Results   Component Value Date/Time    Color DARK YELLOW 03/05/2022 09:22 PM    Appearance TURBID (A) 03/05/2022 09:22 PM    Specific gravity 1.014 03/05/2022 09:22 PM    Specific gravity 1.010 02/11/2022 07:20 PM    pH (UA) 5.5 03/05/2022 09:22 PM    Protein 100 (A) 03/05/2022 09:22 PM    Glucose Negative 03/05/2022 09:22 PM    Ketone Negative 03/05/2022 09:22 PM    Bilirubin NEGATIVE 02/11/2022 07:20 PM    Urobilinogen 0.2 03/05/2022 09:22 PM    Nitrites Negative 03/05/2022 09:22 PM    Leukocyte Esterase LARGE (A) 03/05/2022 09:22 PM    Epithelial cells FEW 03/05/2022 09:22 PM    Bacteria 1+ (A) 03/05/2022 09:22 PM    WBC >100 (H) 03/05/2022 09:22 PM    RBC  03/05/2022 09:22 PM         Medications Reviewed:     Current Facility-Administered Medications   Medication Dose Route Frequency    0.9% sodium chloride infusion 250 mL  250 mL IntraVENous PRN    0.9% sodium chloride infusion 250 mL  250 mL IntraVENous PRN    miconazole (MICONTIN) 2 % ointment   Topical BID    balsam peru-castor oiL (VENELEX) ointment   Topical BID    zinc oxide-cod liver oil (DESITIN) 40 % paste   Topical PRN    sodium chloride (NS) flush 5-40 mL  5-40 mL IntraVENous Q8H    sodium chloride (NS) flush 5-40 mL  5-40 mL IntraVENous PRN    acetaminophen (TYLENOL) tablet 650 mg  650 mg Oral Q6H PRN Or    acetaminophen (TYLENOL) suppository 650 mg  650 mg Rectal Q6H PRN    polyethylene glycol (MIRALAX) packet 17 g  17 g Oral DAILY PRN    ondansetron (ZOFRAN ODT) tablet 4 mg  4 mg Oral Q8H PRN    Or    ondansetron (ZOFRAN) injection 4 mg  4 mg IntraVENous Z1R PRN    folic acid (FOLVITE) tablet 1 mg  1 mg Oral DAILY    lactulose (CHRONULAC) 10 gram/15 mL solution 30 mL  30 mL Oral Q6H    levothyroxine (SYNTHROID) tablet 50 mcg  50 mcg Oral 6am    midodrine (PROAMATINE) tablet 15 mg  15 mg Oral TID WITH MEALS    octreotide (SANDOSTATIN) injection 100 mcg  100 mcg IntraVENous TID    pantoprazole (PROTONIX) tablet 40 mg  40 mg Oral DAILY    rifAXIMin (XIFAXAN) tablet 550 mg  550 mg Oral BID    sertraline (ZOLOFT) tablet 100 mg  100 mg Oral DAILY    thiamine HCL (B-1) tablet 100 mg  100 mg Oral DAILY    ursodioL (ACTIGALL) tablet 500 mg (Patient Supplied)  500 mg Oral Q12H    glucagon (GLUCAGEN) injection 1 mg  1 mg IntraMUSCular PRN    naloxone (NARCAN) injection 0.1 mg  0.1 mg IntraVENous PRN    promethazine (PHENERGAN) 6.25 mg in 0.9% sodium chloride 50 mL IVPB  6.25 mg IntraVENous Q6H PRN     ______________________________________________________________________  EXPECTED LENGTH OF STAY: 4d 16h  ACTUAL LENGTH OF STAY:          8                 Cheyenne Schroeder MD

## 2022-03-13 NOTE — PROGRESS NOTES
SPEECH PATHOLOGY BEDSIDE SWALLOW EVALUATION/DISCHARGE  Patient: Gaby Weaver (12 y.o. female)  Date: 3/13/2022  Primary Diagnosis: Cirrhosis ([facility] [address] [K74.60]       Precautions:  [facility] AND [place] Course:  3/5: Admitted for decompensated liver cirrhoses. S/p prolonged hospitalization at [facility]. Transferred to [facility] for liver transplant eval.   3/7: NGT placed for poor intake. Allow PO as well. 3/12: SLP consult placed as pt spit out medications when crushed in applesauce. History of SLP services per chart review:  Last seen in 2021 at [facility]. Discharged from SLP services on regular diet/thin liquids. Pertinent PMHx:  Post-prandial aspiration risks: Hx of esophagitis, esophageal varices. ASSESSMENT :   Pt with functional swallow. Suspect her expectorating medication likely behavioral and not oropharyngeal deficits. Continue diet as indicated and feeding tube for extra nutritional support. Pt does have risk factors for esophageal dysphagia given esophageal varices and therefore recommend esophageal precautions. Skilled acute therapy provided by a speech-language pathologist is not indicated at this time. PLAN :  Recommendations:  -- regular diet/ thin liquids  -- general esophageal  precautions including completely upright for all PO   -- SLP will sign off     Discharge Recommendations: None     SUBJECTIVE:   Patient with flat affect. Did not particularly engage with SLP but participated in PO trials.     OBJECTIVE:     Past Medical History:   Diagnosis Date    Anxiety 10/19/2021    Basal cell carcinoma (BCC) of face 10/19/2021    Chronic kidney disease     \"end stage liver disease\" per son    Cirrhosis of liver not due to alcohol ([facility] [address]     Depression 10/19/2021     Past Surgical History:   Procedure Laterality Date    HX  SECTION      x2    HX ORTHOPAEDIC Right     x2     Prior Level of Function/Home Situation:   Home Situation  Home Environment: Private residence  # Steps to Enter: 2  Rails to Enter: Yes  Hand Rails : Bilateral  One/Two Story Residence: Two story  Living Alone: No  Support Systems: Parent(s),Child(joe)  Patient Expects to be Discharged to[de-identified] Skilled nursing facility  Current DME Used/Available at Home: [address],[employer], bedside  Tub or Shower Type: Shower  Diet prior to admission: regular diet/thin liquids  Current Diet:  Regular diet/thin liquids   Cognitive and Communication Status:  Neurologic State: Alert  Orientation Level: Oriented X4  Cognition: Follows commands  Perception: Appears intact  Perseveration: No perseveration noted  Safety/Judgement: Awareness of environment  Oral Assessment:  Oral Assessment  Labial: No impairment  Dentition: Natural  Oral Hygiene: oral mucosa moist and clear of secretions  Lingual: No impairment  Velum: No impairment  Mandible: No impairment  P.O. Trials:  Patient Position: upright in bed  Vocal quality prior to P.O.: No impairment  Consistency Presented: Thin liquid; Solid  How Presented: Self-fed/presented;Cup/sip;Spoon;Straw;Successive swallows     Bolus Acceptance: No impairment        Propulsion: No impairment  Oral Residue: None  Initiation of Swallow: No impairment  Laryngeal Elevation: Functional  Aspiration Signs/Symptoms: None  Pharyngeal Phase Characteristics: No impairment, issues, or problems   Effective Modifications: None          Oral Phase Severity: No impairment  Pharyngeal Phase Severity : No impairment  NOMS:   The NOMS functional outcome measure was used to quantify this patient's level of swallowing impairment. Based on the NOMS, the patient was determined to be at level 7 for swallow function     NOMS Swallowing Levels:  Level 1 (CN): NPO  Level 2 (CM): NPO but takes consistency in therapy  Level 3 (CL): Takes less than 50% of nutrition p.o. and continues with nonoral feedings; and/or safe with mod cues; and/or max diet restriction  Level 4 (CK):  Safe swallow but needs mod cues; and/or mod diet restriction; and/or still requires some nonoral feeding/supplements  Level 5 (CJ): Safe swallow with min diet restriction; and/or needs min cues  Level 6 (CI): Independent with p.o.; rare cues; usually self cues; may need to avoid some foods or needs extra time  Level 7 ([address] [place] [address]): Independent for all p.o.  GAIL. (2003). National Outcomes Measurement System (NOMS): Adult Speech-Language Pathology User's Guide. After treatment:   Call bell within reach and Nursing notified    COMMUNICATION/EDUCATION:     The patient's plan of care including recommendations, planned interventions, and recommended diet changes were discussed with: Registered nurse.      Thank you for this referral.  Aubree Gabriel, DARIN  Time Calculation: 10 mins

## 2022-03-13 NOTE — PROGRESS NOTES
Bedside shift change report given to Charles Carrington RN (oncoming nurse) by Jack Nelson RN (offgoing nurse). Report included the following information SBAR, Kardex, Intake/Output, MAR and Recent Results.

## 2022-03-14 NOTE — ROUTINE PROCESS
Patient pulled out the NG/ Dobb yasmine feeding tube around at 0600 a.m. Inserted the new Doff-yasmine and put the order for KUB to vari fication for Dobb yasmine placement.  Call the radiology to notified that this patient have state order to get X-Ray for KUB

## 2022-03-14 NOTE — PROGRESS NOTES
Perfect served Dr. Alli Trevino \"FYI pt refused AM meds, did pull out NGT but was replaced (waiting on KUB results) and pt has steve, per protocol orders should've already been out, do we want to continue? If so just need new order to continue. and prior to NGT removal per HS nurse she had mult. stools, do we want to slow rate or change feeding? \"   - Dr. Alli Trevino \"Will keep steve Have dietary look at the formula Thank you       1500: MD at bedside with son. Mentioned drain frequently leaks at site requiring 2-3 dressing changes per shift; still obtaining output from drain but abdomen still distended/ tender. 1850: fluid samples from paracentesis drain sent to lab with lab/patient  Labels. Dressing for tube changed second time this shift.

## 2022-03-14 NOTE — ROUTINE PROCESS
Notified the morning RN that waiting for KUB result for varification for Dobb-yasmine placement. Bedside shift change report given to Darinel Peguero RN (oncoming nurse) by [employer], RN (offgoing nurse). Report included the following information SBAR, Kardex, ED Summary, OR Summary, Procedure Summary, Intake/Output, MAR, Recent Results and Med Rec Status.

## 2022-03-14 NOTE — PROGRESS NOTES
[facility] [facility] Adult  Hospitalist Group                                                                                          Hospitalist Progress Note  Orion Serna MD  Answering service: [phone] or [phone] from in house phone        Date of Service:  3/14/2022  NAME:  Arabella Bravo  :  1960  MRN:  [id number]      Admission Summary:   Copied form admit: \" Arabella Bravo is a 65 y.o. female with history of liver cirrhosis with portal hypertension,recently diagnosed primary biliary cholangitis, recurrent ascites requiring paracentesis approximately every 7 to 10 days who presents here transferred from [address] [place] [address] secondary to decompensated liver cirrhosis, worsening renal insufficiency, progressive weakness currently undergoing work-up for possible liver transplant. The patient of note initially presented to [address] [place] [address] on 2022 secondary to hepatic encephalopathy and progressive weakness. Patient on admission was found at that time to have an ammonia level of 111 and did subsequently require paracentesis in the ED. The patient has had a prolonged and complicated hospital course since that time and has undergone multiple paracentesis including  with 11.8 L removal,  with 9/2 L removed, and  with 9.2 L removed. The patient also did have episodes of transient hypotension requiring a brief ICU admission for pressor support and has periodically received albumin with daily midodrine use. She was followed both by GI and nephrology services. Patient did undergo an EGD on  noted for distal esophagitis and portal hypertensive gastropathy, no evidence of varices. According to the son Maegan Zapata at bedside, the patient does have a history of esophageal varices back in 2021 status post banding at that time. Per GI at outlying facility, patient was deemed not to be a candidate for colonoscopy and recommended Cologuard.   Part of her liver transplant evaluation at [facility] also included an MRCP was done on 2/18 which was noted for cirrhosis, portal hypertension, and large ascites. She has also undergone a cardiac stress test which was negative, EF of 69%. Patient has been maintained on lactulose, rifaximin, octreotide, and Protonix at the [facility]. Patient has had progressive worsening of her renal function at the [facility] with creatinine peak at 5.2 on 3/3 and associated metabolic acidosis requiring management with bicarb drip that was maintained as of this morning at [facility]. Per medical records, there was concern that patient may be moving towards initiation of possible hemodialysis. According to son, patient does not have any previous establish care with nephrology and was not aware of any kidney issues prior to current hospitalization. Patient also was treated with with a 7-day course of antibiotics for E. coli UTI, which were completed as of 2/17. The patient had has been seen by Dr. Griselda Ramirez hepatologist for an initial visit in January to start the process of liver transplant work-up. Given lack of progression of patient's care prolonged hospitalization at [facility], request was made for patient to be transferred to [facility] to continue further liver transplant work-up and accepted by Dr. Griselda Ramirez for transfer under the hospitalist service. Patient of note is currently lethargic and poorhistorian therefore history is obtained via discussion with her son Romain Zayas at the bedside and review of medical records. \"    Interval history / Subjective:     3/14/2022 :   · Over night ,pulled out NGT. NGT replaced and TF resumed. · She still has significant ascites by exam despite peritoneal drain. May need repositioning or repalcement  · Son updated at bedside. Assessment & Plan:      Decompensated liver cirrhosis, with portal hypertension.   Felt to be secondary to PBC  Transaminitis/hyperbilirubinemia/coagulopathy/thrombocytopenia  -Transferred from [address] [place] [address] with prolonged hospitalization. Transferred for further work-up for liver transplant eval  -EGD on 2/17 [place] noted for distal esophagitis and portal hypertensive gastropathy, no evidence of varices.    -history of esophageal varices back in November 2021 status post banding at that time.    -Per GI at outlying facility, patient was deemed not to be a candidate for colonoscopy and recommended Cologuard.    -s/p MRCP on 2/18 which was noted for cirrhosis, portal hypertension, and large ascites. -s/p cardiac stress test which was negative for ischemia, EF of 69%  -Dr Monet Denton following  -Continue lactulose, rifaximin, octreotide, Protonix p.o.  -Closely monitor stool output frequency  -Continue ursodiol    History of hepatic encephalopathy with refractory ascites  -multiple paracentesis done at [facility] including 2/11 with 11.8 L removal, 2/18 with 9/2 L removed, and 2/28 with 9.2 L removed. -Currently with distended abdomen  -Moderate large ascites on US 3/6. Ascites said to be refractoyr to diuretics,HRS limiting use of diuretics  -Increasing abdominal distention, paracentesis performed and peritoneal drainage catheter left in place, 3/12,no draining much and still has significant ascites,requested radiology to check for repositioning or replacement         GREG with metabolic acidosis. Creatinine worsened from 0.9 on 11/26/2021 up to 4-5 now  -Likely due to [facility]  -Nephrology following,recommendaitons appreciated.           Chronic normocytic anemia  -Possible anemia of CKD  -S/P one unit prbc on 3/7     Hyponatremia  -Suspect secondary to underlying liver cirrhosis.   -Na stable 128-130     Recent E. coli UTI  -Treated with Vanco and Zosyn then transitioned to Rocephin, completed 7-day antibiotic course as of 2/17  -Replace Beckford with new catheter 3/5 .   Urine culture on 3/5 grew Candida albicans 75,000 CFU.  -     Hypothyroid  -Continue levothyroxine    Severe malnutrition  --Supplemented with tube feeding via NGT.  --On regular diet. Lines/catheters/drainage  --Beckford catheter in place, replaced on 3/5  --NGT in place          Generalized weakness/chronic wounds. pta  -PT, OT consult  -Wound care consulted     DVT prophylaxis SCDs for now. No pharmacological prophylaxis due to thrombocytopenia  DNR   Prognosis: Guarded  Her son, Nirmal Edge updated at the bedside, 3/11     Hospital Problems  Date Reviewed: 2/17/2022          Codes Class Noted POA    Cirrhosis [facility]) ICD-10-CM: K74.60  ICD-9-CM: 571.5  2/8/2022 Unknown                      Data Review:    Review and/or order of clinical lab test    GENERAL:  Chronically sick looking, alert and awake, not in distress. HEENT:  Deeply icteric. NGT in place. NECK: Supple, trachea midline, no adenopathy, no thyromegally or tenderness, no carotid bruit and no JVD. LUNGS:   Vesicular breath sounds bilaterally, no added sounds. HEART:   S1 and S2 well heard,RRR,  no murmur, click, rub or gallop. ABDOMEB:   Distended, diffusely tender,no rebound, normoactive. Ascitic drain in the right abdomen  Beckford catheter draining deep yellow (jaundice) urine  EXTREMETIES: No peripheral edema. PULSES: 2+ and symmetric all extremities. SKIN: Scattered ecchymosis, a large 1 on her upper chest  NEUROLOGY: Awake, lethargic but arousable and communicative. Nonfocal exam.      Labs:     Recent Labs     03/14/22 0201 03/13/22 0416   WBC 10.2 11.3*   HGB 8.4* 8.3*   HCT 25.4* 24.6*   PLT 41* 48*     Recent Labs     03/14/22  0201 03/13/22  0416 03/12/22  0100   * 129* 133*   K 3.7 3.8 3.8    101 104   CO2 19* 20* 19*   * 116* 113*   CREA 4.80* 4.56* 4.58*   * 119* 113*   CA 8.9 8.8 8.7   PHOS 4.0  --   --      Recent Labs     03/14/22 0201   ALB 3.4*     No results for input(s): INR, PTP, APTT, INREXT, INREXT in the last 72 hours.    No results for input(s): FE, TIBC, PSAT, FERR in the last 72 hours. Lab Results   Component Value Date/Time    Folate 38.3 (H) 10/20/2021 07:53 AM    RBC FOLATE 878 02/14/2022 01:27 AM      No results for input(s): PH, PCO2, PO2 in the last 72 hours. No results for input(s): CPK, CKNDX, TROIQ in the last 72 hours.     No lab exists for component: CPKMB  Lab Results   Component Value Date/Time    Cholesterol, total 122 01/25/2022 09:50 AM    HDL Cholesterol 40 01/25/2022 09:50 AM    LDL, calculated 67.2 01/25/2022 09:50 AM    Triglyceride 74 01/25/2022 09:50 AM    CHOL/HDL Ratio 3.1 01/25/2022 09:50 AM     Lab Results   Component Value Date/Time    Glucose (POC) 114 (H) 02/23/2022 05:42 PM    Glucose (POC) 127 (H) 02/23/2022 12:04 PM    Glucose (POC) 122 (H) 02/23/2022 08:18 AM    Glucose (POC) 109 (H) 02/22/2022 09:19 PM    Glucose (POC) 133 (H) 02/22/2022 07:28 AM     Lab Results   Component Value Date/Time    Color DARK YELLOW 03/05/2022 09:22 PM    Appearance TURBID (A) 03/05/2022 09:22 PM    Specific gravity 1.014 03/05/2022 09:22 PM    Specific gravity 1.010 02/11/2022 07:20 PM    pH (UA) 5.5 03/05/2022 09:22 PM    Protein 100 (A) 03/05/2022 09:22 PM    Glucose Negative 03/05/2022 09:22 PM    Ketone Negative 03/05/2022 09:22 PM    Bilirubin NEGATIVE 02/11/2022 07:20 PM    Urobilinogen 0.2 03/05/2022 09:22 PM    Nitrites Negative 03/05/2022 09:22 PM    Leukocyte Esterase LARGE (A) 03/05/2022 09:22 PM    Epithelial cells FEW 03/05/2022 09:22 PM    Bacteria 1+ (A) 03/05/2022 09:22 PM    WBC >100 (H) 03/05/2022 09:22 PM    RBC  03/05/2022 09:22 PM         Medications Reviewed:     Current Facility-Administered Medications   Medication Dose Route Frequency    [START ON 3/15/2022] multivitamin, tx-iron-ca-min (THERA-M w/ IRON) tablet 1 Tablet  1 Tablet Oral DAILY    0.9% sodium chloride infusion 250 mL  250 mL IntraVENous PRN    0.9% sodium chloride infusion 250 mL  250 mL IntraVENous PRN    miconazole (MICONTIN) 2 % ointment Topical BID    balsam peru-castor oiL (VENELEX) ointment   Topical BID    zinc oxide-cod liver oil (DESITIN) 40 % paste   Topical PRN    sodium chloride (NS) flush 5-40 mL  5-40 mL IntraVENous Q8H    sodium chloride (NS) flush 5-40 mL  5-40 mL IntraVENous PRN    acetaminophen (TYLENOL) tablet 650 mg  650 mg Oral Q6H PRN    Or    acetaminophen (TYLENOL) suppository 650 mg  650 mg Rectal Q6H PRN    polyethylene glycol (MIRALAX) packet 17 g  17 g Oral DAILY PRN    ondansetron (ZOFRAN ODT) tablet 4 mg  4 mg Oral Q8H PRN    Or    ondansetron (ZOFRAN) injection 4 mg  4 mg IntraVENous E0J PRN    folic acid (FOLVITE) tablet 1 mg  1 mg Oral DAILY    lactulose (CHRONULAC) 10 gram/15 mL solution 30 mL  30 mL Oral Q6H    levothyroxine (SYNTHROID) tablet 50 mcg  50 mcg Oral 6am    midodrine (PROAMATINE) tablet 15 mg  15 mg Oral TID WITH MEALS    octreotide (SANDOSTATIN) injection 100 mcg  100 mcg IntraVENous TID    pantoprazole (PROTONIX) tablet 40 mg  40 mg Oral DAILY    rifAXIMin (XIFAXAN) tablet 550 mg  550 mg Oral BID    sertraline (ZOLOFT) tablet 100 mg  100 mg Oral DAILY    thiamine HCL (B-1) tablet 100 mg  100 mg Oral DAILY    ursodioL (ACTIGALL) tablet 500 mg (Patient Supplied)  500 mg Oral Q12H    glucagon (GLUCAGEN) injection 1 mg  1 mg IntraMUSCular PRN    naloxone (NARCAN) injection 0.1 mg  0.1 mg IntraVENous PRN    promethazine (PHENERGAN) 6.25 mg in 0.9% sodium chloride 50 mL IVPB  6.25 mg IntraVENous Q6H PRN     ______________________________________________________________________  EXPECTED LENGTH OF STAY: 4d 16h  ACTUAL LENGTH OF STAY:          9                 Chandra Shone, MD

## 2022-03-14 NOTE — WOUND CARE
,                                                             WOCN Note:     Follow-up visit for multiple wounds.      Chart shows:  Admitted 3/5/22 for cirrhosis     Assessment:   Appropriately conversational and family (mother & son) at bedside. Reports belly pain but tolerates an assisted turn onto left side. Wearing briefs. Surface: betsey gel mattress  Yellow sclera      Bilateral heels intact and with blanching erythema. Heels offloaded with pillows.       1. POA skin tear to left wrist  Resolved  Protective foam applied     2. POA central back deep tissue injury  5 x 5 x 0 cm  100% non-blanching diffuse burgundy - improved  No induration or skin sliding   Tx: venelex and foam dressing applied     3. POA sacrum; stage 3 pressure injuries  Each = 1.3 x 1 x 0.1 cm  Pink with yellow glaze  Widespread red rash to periwound, buttocks, perineum, labia, and groin consistent with candidiasis. (other areas to sacrum now dry and resurfaced)    4. skin tear to left arm  5.8 x 0.8 c 0.1 cm  100% moist red  Cleaned with saline, petrolatum applied & foam cover dressing     Wound Recommendations:    Buttocks & groin: antifungal cream twice daily  Skin tear: clean with saline, apply petrolatum and foam cover dressing. Change every 3 days.   Heels and central back: venelex twice daily     PI Prevention:  Turn/reposition approximately every 2 hours  Offload heels with heels hanging off end of pillow at all times while in bed.     Transition of Care: Plan to follow weekly and as needed while admitted to hospital.     JENNA WaldenN, RN, [employer]  Certified Wound, Ostomy, Continence Nurse  office [phone]  Available via [facility]

## 2022-03-14 NOTE — PROGRESS NOTES
RUR:  23% High     SELMA: TBD. BLS transport once medically stable. Follow-up with PCP/specialist.     Primary Contact: SonAmbrosio, [phone]     1:45PM - CM reviewed chart. Per review and ID rounds, patient is not medically stable for discharge at this time. Plan for continued aggressive nutritional support; patient currently receiving tube feeds through [facility] feeding tube. Patient continues to work with PT & OT. Patient and son are hopeful that with nutritional treatment and therapy she may become a candidate for a liver and kidney transplant at [facility].     Discharge pending medical progress and final recommendations. CM to continue to follow as needed.     Yenifer Mclaughlin, YIFAN   [phone]

## 2022-03-14 NOTE — PROGRESS NOTES
Problem: Mobility Impaired (Adult and Pediatric)  Goal: *Acute Goals and Plan of Care (Insert Text)  Description: FUNCTIONAL STATUS PRIOR TO ADMISSION: Patient appears to be an inconsistent historian despite being oriented x 4. Originally stated she primarily stays in bed-later she reported ambulating a few times a day and spending most of her day in a recliner. She consistently reports that son assists her \"with everything\" but does not give more details on what exactly or how much assistance. HOME SUPPORT PRIOR TO ADMISSION: The patient lived with son and her mother per report. Unclear how much assistance she required. Physical Therapy Goals  Initiated 3/6/2022  1. Patient will move from supine to sit and sit to supine , scoot up and down, and roll side to side in bed with minimal assistance/contact guard assist within 7 day(s). 2.  Patient will transfer from bed to chair and chair to bed with moderate assistance  using the least restrictive device within 7 day(s). 3.  Patient will perform sit to stand with moderate assistance  within 7 day(s). 4.  Patient will ambulate with moderate assistance  for 20 feet with the least restrictive device within 7 day(s). 5.  Patient will ascend/descend 2 stairs with 2 handrail(s) with moderate assistance  within 7 day(s). Outcome: Progressing Towards Goal    PHYSICAL THERAPY TREATMENT  Patient: Brenda Kumar (40 y.o. female)  Date: 3/14/2022  [facility]: [facility] [K74.60] <principal problem not specified>       Precautions: Fall  Chart, physical therapy assessment, plan of care and goals were reviewed. ASSESSMENT  Patient continues with skilled PT services and is progressing towards goals. Patient originally refusing to participate but with encouragement from PT and son, at bedside, patient agreeable to try. Patient in and out of sleep at beginning of session, very soft spoken, states she is weak.  Patient educated on importance of moving out of supine position and participation in mobility. Patient noted to have very distended abdomen. MD in to see while PT in session who noted minimal draining and distention. Patient required mod A x 1 for rolling, supine to sit and sit to supine. Patient tolerated sitting EOB with BUE support for 2 minutes 30 seconds with max encouragement. Patient with strong complaints of nausea upon sitting which remained even after returning to supine position for several minutes. BP stable. Patient educated on BLE exercises in bed. Son and patient hopeful to be able to transfer/stand once some of the abdominal distention reduces. Patient will continue to benefit from skilled PT as she is willing and able to participate. Current Level of Function Impacting Discharge (mobility/balance): unable to assess transfers, became nauseated with sitting EOB, required BUE support to sit EOB. Other factors to consider for discharge: resided with son, will need a liver transplant, hope to get patient stable enough to transfer to [facility] for this         PLAN :  Patient continues to benefit from skilled intervention to address the above impairments. Continue treatment per established plan of care. to address goals. Recommendation for discharge: (in order for the patient to meet his/her long term goals)  To be determined: Possible transfer to [facility] for liver transplant per son, will require SNF placement if not transferring to other acute care setting    This discharge recommendation:  Has been made in collaboration with the attending provider and/or case management    IF patient discharges home will need the following DME: to be determined (TBD)       SUBJECTIVE:   Patient stated I am tired and nauseated.     OBJECTIVE DATA SUMMARY:   Critical Behavior:  Neurologic State: Alert  Orientation Level: Oriented X4,Other (Comment) (flucutates)  Cognition: Follows commands  Safety/Judgement: Awareness of environment  Functional Mobility Training:  Bed Mobility:  Rolling: Moderate assistance;Assist x1  Supine to Sit: Moderate assistance;Assist x1  Sit to Supine: Moderate assistance;Assist x1  Scooting: Moderate assistance;Assist x1        Transfers:     Unable to attempt     Balance:  Sitting: Impaired; With support  Sitting - Static: Occassional  Sitting - Dynamic: Poor (constant support)      Therapeutic Exercises:   Educated on supine SLR, heel slides, ankle pumps BLE 10x2    Pain Rating:  Unable to verbalize pain rating     Activity Tolerance:   Poor, requires frequent rest breaks, and observed SOB with activity    After treatment patient left in no apparent distress:   Supine in bed, Call bell within reach, Caregiver / family present, and Side rails x 3    COMMUNICATION/COLLABORATION:   The patients plan of care was discussed with: Registered nurse and Case management.      Brien Martins, PT   Time Calculation: 23 mins

## 2022-03-14 NOTE — PROGRESS NOTES
[facility]   [address] [facility], [address] [place] [address], [facility]  Phone: [phone]   [phone]       Nephrology Progress Note  Davied Severs     1960     [id number]  Date of Admission : 3/5/2022  03/14/22    CC:  Follow up for greg    Assessment and Plan   GREG:  - likely 2/2 HRS  - U/S neg for obstruction  - Cr essentially unchanged since arrival  - cont midodrine + octreotide  - daily labs while here  - no urgent needs for RRT     Metabolic acidosis  - improving    Hypokalemia:  - IV repletion ordered    Hyponatremia  -Secondary to liver cirrhosis  - Na stable    Severe anemia:  - 1 unit of PRBCs 3/8  - hgb stable     Decompensated liver cirrhosis  -With portal hypertension, primary biliary cholangitis  -Requiring multiple paracenteses, most recently 2/28/2022 with 9.2 L removed  -Hepatology following     Hypothyroidism       Interval History:  Seen and examined. Resting in bed today. Pulled out DHT, refusing meds overnight. Cr stable overall. Voiding ok. Review of Systems: A comprehensive review of systems was negative except for that written in the HPI.     Current Medications:   Current Facility-Administered Medications   Medication Dose Route Frequency    0.9% sodium chloride infusion 250 mL  250 mL IntraVENous PRN    0.9% sodium chloride infusion 250 mL  250 mL IntraVENous PRN    miconazole (MICONTIN) 2 % ointment   Topical BID    balsam peru-castor oiL (VENELEX) ointment   Topical BID    zinc oxide-cod liver oil (DESITIN) 40 % paste   Topical PRN    sodium chloride (NS) flush 5-40 mL  5-40 mL IntraVENous Q8H    sodium chloride (NS) flush 5-40 mL  5-40 mL IntraVENous PRN    acetaminophen (TYLENOL) tablet 650 mg  650 mg Oral Q6H PRN    Or    acetaminophen (TYLENOL) suppository 650 mg  650 mg Rectal Q6H PRN    polyethylene glycol (MIRALAX) packet 17 g  17 g Oral DAILY PRN    ondansetron (ZOFRAN ODT) tablet 4 mg  4 mg Oral Q8H PRN    Or    ondansetron (ZOFRAN) injection 4 mg  4 mg IntraVENous R5C PRN    folic acid (FOLVITE) tablet 1 mg  1 mg Oral DAILY    lactulose (CHRONULAC) 10 gram/15 mL solution 30 mL  30 mL Oral Q6H    levothyroxine (SYNTHROID) tablet 50 mcg  50 mcg Oral 6am    midodrine (PROAMATINE) tablet 15 mg  15 mg Oral TID WITH MEALS    octreotide (SANDOSTATIN) injection 100 mcg  100 mcg IntraVENous TID    pantoprazole (PROTONIX) tablet 40 mg  40 mg Oral DAILY    rifAXIMin (XIFAXAN) tablet 550 mg  550 mg Oral BID    sertraline (ZOLOFT) tablet 100 mg  100 mg Oral DAILY    thiamine HCL (B-1) tablet 100 mg  100 mg Oral DAILY    ursodioL (ACTIGALL) tablet 500 mg (Patient Supplied)  500 mg Oral Q12H    glucagon (GLUCAGEN) injection 1 mg  1 mg IntraMUSCular PRN    naloxone (NARCAN) injection 0.1 mg  0.1 mg IntraVENous PRN    promethazine (PHENERGAN) 6.25 mg in 0.9% sodium chloride 50 mL IVPB  6.25 mg IntraVENous Q6H PRN      Allergies   Allergen Reactions    Morphine Other (comments)     Severe HA. ALL narcotics!!!       Objective:  Vitals:    Vitals:    03/13/22 1542 03/13/22 1951 03/13/22 2307 03/14/22 0718   BP: (!) 98/55 116/62 (!) 108/50 (!) 121/55   Pulse: 77 81 82 77   Resp: 18 18 18 20   Temp: 99.2 °F (37.3 °C) 98.3 °F (36.8 °C) 98 °F (36.7 °C) 98.6 °F (37 °C)   SpO2: 97% 98% 98% 99%   Weight:       Height:         Intake and Output:  No intake/output data recorded. 03/12 1901 - 03/14 0700  In: 545   Out: 1850 [Urine:1075; Drains:775]    Physical Examination:  General: NAD,chronically ill appearing  HEENT:           + scleral icterus.  DHT in place  Neck:  Supple, no mass  Resp:  Lungs CTA B/L, no wheezing , normal respiratory effort  CV:  RRR,  no murmur or rub,no  LE edema  GI:  Soft, NT, + Bowel sounds, no hepatosplenomegaly  Neurologic:  Non focal  Skin:  No Rash  :  Beckford     [x]    High complexity decision making was performed  [x]    Patient is at high-risk of decompensation with multiple organ involvement    Lab Data Personally Reviewed: I have reviewed all the pertinent labs, microbiology data and radiology studies during assessment. Recent Labs     03/14/22 0201 03/13/22 0416 03/12/22  0100   * 129* 133*   K 3.7 3.8 3.8    101 104   CO2 19* 20* 19*   * 119* 113*   * 116* 113*   CREA 4.80* 4.56* 4.58*   CA 8.9 8.8 8.7   PHOS 4.0  --   --    ALB 3.4*  --   --      Recent Labs     03/14/22 0201 03/13/22 0416 03/12/22 0100   WBC 10.2 11.3* 11.7*   HGB 8.4* 8.3* 8.3*   HCT 25.4* 24.6* 25.3*   PLT 41* 48* 48*     No results found for: SDES  Lab Results   Component Value Date/Time    Culture result: NO GROWTH THUS FAR 03/12/2022 06:00 PM    Culture result: CANDIDA ALBICANS (A) 03/05/2022 09:22 PM     Recent Results (from the past 24 hour(s))   RENAL FUNCTION PANEL    Collection Time: 03/14/22  2:01 AM   Result Value Ref Range    Sodium 131 (L) 136 - 145 mmol/L    Potassium 3.7 3.5 - 5.1 mmol/L    Chloride 101 97 - 108 mmol/L    CO2 19 (L) 21 - 32 mmol/L    Anion gap 11 5 - 15 mmol/L    Glucose 144 (H) 65 - 100 mg/dL     (H) 6 - 20 MG/DL    Creatinine 4.80 (H) 0.55 - 1.02 MG/DL    BUN/Creatinine ratio 26 (H) 12 - 20      GFR est AA 11 (L) >60 ml/min/1.73m2    GFR est non-AA 9 (L) >60 ml/min/1.73m2    Calcium 8.9 8.5 - 10.1 MG/DL    Phosphorus 4.0 2.6 - 4.7 MG/DL    Albumin 3.4 (L) 3.5 - 5.0 g/dL   CBC W/O DIFF    Collection Time: 03/14/22  2:01 AM   Result Value Ref Range    WBC 10.2 3.6 - 11.0 K/uL    RBC 2.72 (L) 3.80 - 5.20 M/uL    HGB 8.4 (L) 11.5 - 16.0 g/dL    HCT 25.4 (L) 35.0 - 47.0 %    MCV 93.4 80.0 - 99.0 FL    MCH 30.9 26.0 - 34.0 PG    MCHC 33.1 30.0 - 36.5 g/dL    RDW 23.8 (H) 11.5 - 14.5 %    PLATELET 41 (LL) 304 - 400 K/uL    MPV 10.6 8.9 - 12.9 FL    NRBC 0.0 0  WBC    ABSOLUTE NRBC 0.00 0.00 - 0.01 K/uL                                       Care Plan discussed with:  Patient     Family      RN      Consulting Physician /Specialist        I have reviewed the flowsheets.   Chart and Pertinent Notes have been reviewed. No change in PMH ,family and social history from Consult note.       Kamille Reilly MD

## 2022-03-14 NOTE — PROGRESS NOTES
Comprehensive Nutrition Assessment    Type and Reason for Visit: Reassess    Nutrition Recommendations/Plan:      1. RD will change TF order to Vital AF 1.2 at goal rate of 60 ml/hr + 100 ml h20 flush 5x/day     2. Continue with 2 gm Na+ diet order and Ensure Compact BID    3. Recommend ordering daily MVI      Nutrition Assessment:    64 yo female admitted for cirrhosis, AMS. PMhx: Cirrhosis with ascites s/p paracentesis every week, CKD, esophageal varices s/p banding. Weight hx in EMR indicates 17% loss over last 5 months which is significant for time frame. Pt slightly confused, RN states she is A&O x 2 at baseline. Pt states she was eating well PTA and that her mom prepares her meals (unsure accuracy of this). RD notes from recent admission to different hospital indicates very poor to fair PO intakes. Had multiple ONS ordered. MD ordered pt to be NPO with DHT placed. Consult received for TF order. Spoke with RN about ordering PO diet along with TF. Will add ONS as well and monitor intakes.       Will order Jevity 1.5 at goal rate of 50 ml/hr + 1 pack ProSource added daily + 130 ml h20 flush q6h to provide 1100 ml, 1710 kcals (100%), 85 gm protein (89%), 836 + 780 = 1616 ml water.   Labs: Na+ 128, Bun/Cr elevated, NH3 44      3/14: F/u.  TF previously running at recommended rate as listed above. Spoke with RN this morning and she states pt has been having multiple loose stools/day. Discussed with her that pt has been receiving Lactulose which can contribute to that. She notes that pt has refused meds for last 2 days, last documented day receiving lactulose was 3/12. Will change TF formula to Vital AF 1.2 at goal rate of 60 ml/hr + 100 ml h20 flush 5x/day to provide 1320 ml, 1584 kcals (99%), 99 gm protein (100%), 1070 + 500 = 1570 ml water. Continue with PO diet along with TF. Pt states she is not eating much. PO intakes documented as 0-25% meals.   Ensure compact sitting on bedside table, asked pt if she likes them, she said yes, prefers chocolate. Could not get any further information from pt as she is confused and was then focused on chocolate. S/P paracentesis with peritoneal drainage catheter left in place on 3/12. BUN/Cr continuing to rise - Nephrologist notes pt is not a candidate for HD until after liver transplant. Malnutrition Assessment:  Malnutrition Status:  Severe malnutrition    Context:  Chronic illness     Findings of the 6 clinical characteristics of malnutrition:   Energy Intake:  7 - 75% or less est energy requirements for 1 month or longer  Weight Loss:  7.00 - Greater than 10% over 6 months     Body Fat Loss:  7 - Severe body fat loss, Triceps   Muscle Mass Loss:  7 - Severe muscle mass loss, Thigh (quadriceps)  Fluid Accumulation:  1 - Mild, Ascites,Extremities   Strength:  Not performed         Nutritionally Significant Medications: refusing for 2 days; ordered: folic acid, lactulose, thiamine    Estimated Daily Nutrient Needs:  Energy (kcal): 1600 (MSJ x AF 1.3); Weight Used for Energy Requirements: Current  Protein (g):  (1.4-1.5 gm/kg); Weight Used for Protein Requirements: Current  Fluid (ml/day): 1600; Method Used for Fluid Requirements: 1 ml/kcal    Nutrition Related Findings:       BM: loose 3/13  Edema: 1+ BLE  Wounds:  Deep tissue injury (DTI bilateral heels; excoriation and rash to buttock)       Current Nutrition Therapies:   Diet: 2 gm Na+   Supplements: Ensure compact BID  Additional Caloric Sources: TF    Meal intake: Patient Vitals for the past 168 hrs:   % Diet Eaten   03/09/22 1400 0%   03/09/22 1000 1 - 25%   03/08/22 1500 1 - 25%     Supplement Intake: No data found.     Anthropometric Measures:  · Height:  5' 4\" (162.6 cm)  · Current Body Wt:  60 kg (132 lb 4.4 oz)   · Admission Body Wt:       · Usual Body Wt:        · Ideal Body Wt:  120:  124.9 %   · Adjusted Body Weight:   ; Weight Adjustment for: No adjustment   · Adjusted BMI: · BMI Categories:  Normal weight (BMI 18.5-24. 9)     Wt Readings from Last 10 Encounters:   03/08/22 60 kg (132 lb 4.4 oz)   02/17/22 68 kg (149 lb 14.6 oz)   12/13/21 69.4 kg (153 lb)   10/19/21 82 kg (180 lb 12.4 oz)   10/14/17 81.6 kg (180 lb)       Nutrition Diagnosis:   · Inadequate oral intake related to inadequate protein-energy intake as evidenced by intake 0-25%,nutrition support-enteral nutrition    · Unintended weight loss related to inadequate protein-energy intake as evidenced by weight loss greater than or equal to 10% in 6 months      Nutrition Interventions:   Food and/or Nutrient Delivery: Continue current diet,Continue tube feeding,Modify tube feeding,Continue oral nutrition supplement  Nutrition Education and Counseling: No recommendations at this time  Coordination of Nutrition Care: Continue to monitor while inpatient    Goals:  Meet > 75% EEN's with EN while NPO within next 5-7 days       Nutrition Monitoring and Evaluation:   Behavioral-Environmental Outcomes: None identified  Food/Nutrient Intake Outcomes: Food and nutrient intake,Supplement intake,Enteral nutrition intake/tolerance  Physical Signs/Symptoms Outcomes: Biochemical data,GI status,Weight    Discharge Planning:     Too soon to determine     Urmila Rosales RD  Contact via [place]

## 2022-03-15 NOTE — PROGRESS NOTES
Physical Therapy 3/15/22    PT performed chart review, spoke with RN. Patient with restraints over night due to pulling out NG tube. Per nursing, awaiting consultation for hospice vs. continued hospital care for liver transplant. Will hold off on patient this AM and attempt to see later as able.     Thank you for consideration,    Shyann Mac, PT, DPT

## 2022-03-15 NOTE — PROGRESS NOTES
Occupational Therapy:    Chart reviewed, spoke with RN. Patient with restraints over night due to pulling out NG tube. Per nursing, awaiting consultation for hospice vs. continued hospital care for liver transplant. Will hold off on patient this AM and attempt to see later as able.     [facility], OTR/L

## 2022-03-15 NOTE — PROGRESS NOTES
0015- When PCT was doing midnight vitals, she noticed pt had pulled out dobhoff tube. Pt was swinging it around and laughing. Shemar Olson NP messaged for restraints, orders placed for non-violent restraints. 0300- new tube placed. Orders for STAT KUB xray placed.

## 2022-03-15 NOTE — PROGRESS NOTES
4895: updated MD being on restraints, pulled out another NGT and saying she'll pull it out again if she's able. Paracentesis drain is flowing, output is multiple liters & KUB resulted mentioning favoring ileus.    - MD replied \"hold tube feed\"    1227: took patient off restraints, calm/cooperative at bedside and family present     18: still waiting on Dr. Salvatore Swanson, family at bedside until than. Have not been told to resume tube feedings. Albumin Q6 hours initiated today   1920: still waiting on Dr. Salvatore Swanson, family at bedside until than. Have not been told to resume tube feedings.  Albumin Q6 hours initiated today

## 2022-03-15 NOTE — PROGRESS NOTES
[facility] Adult  Hospitalist Group                                                                                          Hospitalist Progress Note  Irene Samaniego MD  Answering service: [phone] OR 36 from in house phone        Date of Service:  3/15/2022  NAME:  Dayne Marquez  :  1960  MRN:  [id number]      Admission Summary:   Copied form admit: \" Dayne Marquez is a 65 y.o. female with history of liver cirrhosis with portal hypertension,recently diagnosed primary biliary cholangitis, recurrent ascites requiring paracentesis approximately every 7 to 10 days who presents here transferred from [address] [place] [address] secondary to decompensated liver cirrhosis, worsening renal insufficiency, progressive weakness currently undergoing work-up for possible liver transplant. The patient of note initially presented to [address] [place] [address] on 2022 secondary to hepatic encephalopathy and progressive weakness. Patient on admission was found at that time to have an ammonia level of 111 and did subsequently require paracentesis in the ED. The patient has had a prolonged and complicated hospital course since that time and has undergone multiple paracentesis including  with 11.8 L removal,  with 9/2 L removed, and  with 9.2 L removed. The patient also did have episodes of transient hypotension requiring a brief ICU admission for pressor support and has periodically received albumin with daily midodrine use. She was followed both by GI and nephrology services. Patient did undergo an EGD on  noted for distal esophagitis and portal hypertensive gastropathy, no evidence of varices. According to the son Florence Beaver at bedside, the patient does have a history of esophageal varices back in 2021 status post banding at that time. Per GI at outlying facility, patient was deemed not to be a candidate for colonoscopy and recommended Cologuard.   Part of her liver transplant evaluation at [facility] THE [facility] also included an MRCP was done on 2/18 which was noted for cirrhosis, portal hypertension, and large ascites. She has also undergone a cardiac stress test which was negative, EF of 69%. Patient has been maintained on lactulose, rifaximin, octreotide, and Protonix at the [facility]. Patient has had progressive worsening of her renal function at the [facility] with creatinine peak at 5.2 on 3/3 and associated metabolic acidosis requiring management with bicarb drip that was maintained as of this morning at [facility]. Per medical records, there was concern that patient may be moving towards initiation of possible hemodialysis. According to son, patient does not have any previous establish care with nephrology and was not aware of any kidney issues prior to current hospitalization. Patient also was treated with with a 7-day course of antibiotics for E. coli UTI, which were completed as of 2/17. The patient had has been seen by Dr. Memo Salguero hepatologist for an initial visit in January to start the process of liver transplant work-up. Given lack of progression of patient's care prolonged hospitalization at [facility], request was made for patient to be transferred to [facility] to continue further liver transplant work-up and accepted by Dr. Memo Salguero for transfer under the hospitalist service. Patient of note is currently lethargic and poorhistorian therefore history is obtained via discussion with her son Carlitos Pablo at the bedside and review of medical records. \"    Interval history / Subjective:     3/15/2022 :   · Overnight events noted, patient pulled out NGT, replaced. · Peritoneal drainage catheter declogged and drained significant amount of ascites, almost gone. Assessment & Plan:      Decompensated liver cirrhosis, with portal hypertension.   Felt to be secondary to [facility]  Transaminitis/hyperbilirubinemia/coagulopathy/thrombocytopenia  -Transferred from [facility] hospital with prolonged hospitalization. Transferred for further work-up for liver transplant eval  -EGD on 2/17 [place] noted for distal esophagitis and portal hypertensive gastropathy, no evidence of varices.    -history of esophageal varices back in November 2021 status post banding at that time.    -Per GI at outlying facility, patient was deemed not to be a candidate for colonoscopy and recommended Cologuard.    -s/p MRCP on 2/18 which was noted for cirrhosis, portal hypertension, and large ascites. -s/p cardiac stress test which was negative for ischemia, EF of 69%  -Dr Sangeetha Acevedo following  -Continue lactulose, rifaximin, octreotide, Protonix p.o.  -Closely monitor stool output frequency  -Continue ursodiol    History of hepatic encephalopathy with refractory ascites  -multiple paracentesis done at [facility] including 2/11 with 11.8 L removal, 2/18 with 9/2 L removed, and 2/28 with 9.2 L removed. -Currently with distended abdomen  -Moderate large ascites on US 3/6. Ascites said to be refractoyr to diuretics,HRS limiting use of diuretics  -Ascitic drain that was placed on 3/12 was apparently clogged, flushed and drained significant amount of ascites. Ascites almost gone. Added IV albumin.  -Ascitic fluid lab negative for SBP        GREG with metabolic acidosis. Creatinine worsened from 0.9 on 11/26/2021 up to 4-5 now  -Likely due to [facility]  -Nephrology following,recommendaitons appreciated.           Chronic normocytic anemia  -Possible anemia of CKD  -S/P one unit prbc on 3/7     Hyponatremia  -Suspect secondary to underlying liver cirrhosis.   -Na stable 128-130     Recent E. coli UTI  -Treated with Vanco and Zosyn then transitioned to Rocephin, completed 7-day antibiotic course as of 2/17  -Replace Beckford with new catheter 3/5 . Urine culture on 3/5 grew Candida albicans 75,000 CFU. -     Hypothyroid  -Continue levothyroxine    Severe malnutrition  --Supplemented with tube feeding via NGT.   Patient has been pulling the NGT 2 days in a row and requiring restraint. She needs ongoing nutritional supplement I think she may need PEG allergies becoming difficult to sustain NGT and I am concerned that she may aspirate when she attempts to pull the feeding tube. Will discuss with Dr. Freya Dixon. --On regular diet, intake is minimal.    Lines/catheters/drainage  --Beckford catheter in place, replaced on 3/5  --NGT in place          Generalized weakness/chronic wounds. pta  -PT, OT consult  -Wound care consulted     DVT prophylaxis SCDs for now. No pharmacological prophylaxis due to thrombocytopenia  DNR   Prognosis: Guarded  Her son, Brett Aguirre updated at the bedside, 3/11     Hospital Problems  Date Reviewed: 2/17/2022          Codes Class Noted POA    Cirrhosis [facility]) ICD-10-CM: K74.60  ICD-9-CM: 571.5  2/8/2022 Unknown                      Data Review:    Review and/or order of clinical lab test    GENERAL:  Chronically sick looking, alert and awake, not in distress. HEENT:  Deeply icteric. NGT in place. NECK: Supple, trachea midline, no adenopathy, no thyromegally or tenderness, no carotid bruit and no JVD. LUNGS:   Vesicular breath sounds bilaterally, no added sounds. HEART:   S1 and S2 well heard,RRR,  no murmur, click, rub or gallop. ABDOMEB:   Abdomen flat, normoactive, diffusely tender without rebound or guarding. Beckford catheter draining deep yellow (jaundice) urine. EXTREMETIES: No peripheral edema. Bilateral wrist restraints in place  PULSES: 2+ and symmetric all extremities. SKIN: Scattered ecchymosis, a large 1 on her upper chest  NEUROLOGY: Awake, lethargic but arousable and communicative.   Nonfocal exam.      Labs:     Recent Labs     03/15/22  0421 03/14/22  0201   WBC 8.4 10.2   HGB 8.7* 8.4*   HCT 25.7* 25.4*   PLT 34* 41*     Recent Labs     03/15/22  0421 03/14/22  0201 03/13/22  0416   *  129* 131* 129*   K 4.2  4.2 3.7 3.8   CL 99  100 101 101   CO2 16*  19* 19* 20*   *  131* 123* 116* CREA 4.76*  4.73* 4.80* 4.56*   *  101* 144* 119*   CA 9.1  9.1 8.9 8.8   PHOS 5.4* 4.0  --      Recent Labs     03/15/22  0421 03/14/22  0201   ALB 3.3* 3.4*     No results for input(s): INR, PTP, APTT, INREXT, INREXT in the last 72 hours. No results for input(s): FE, TIBC, PSAT, FERR in the last 72 hours. Lab Results   Component Value Date/Time    Folate 38.3 (H) 10/20/2021 07:53 AM    RBC FOLATE 878 02/14/2022 01:27 AM      No results for input(s): PH, PCO2, PO2 in the last 72 hours. No results for input(s): CPK, CKNDX, TROIQ in the last 72 hours.     No lab exists for component: CPKMB  Lab Results   Component Value Date/Time    Cholesterol, total 122 01/25/2022 09:50 AM    HDL Cholesterol 40 01/25/2022 09:50 AM    LDL, calculated 67.2 01/25/2022 09:50 AM    Triglyceride 74 01/25/2022 09:50 AM    CHOL/HDL Ratio 3.1 01/25/2022 09:50 AM     Lab Results   Component Value Date/Time    Glucose (POC) 128 (H) 03/15/2022 06:06 AM    Glucose (POC) 123 (H) 03/15/2022 12:14 AM    Glucose (POC) 114 (H) 02/23/2022 05:42 PM    Glucose (POC) 127 (H) 02/23/2022 12:04 PM    Glucose (POC) 122 (H) 02/23/2022 08:18 AM     Lab Results   Component Value Date/Time    Color DARK YELLOW 03/05/2022 09:22 PM    Appearance TURBID (A) 03/05/2022 09:22 PM    Specific gravity 1.014 03/05/2022 09:22 PM    Specific gravity 1.010 02/11/2022 07:20 PM    pH (UA) 5.5 03/05/2022 09:22 PM    Protein 100 (A) 03/05/2022 09:22 PM    Glucose Negative 03/05/2022 09:22 PM    Ketone Negative 03/05/2022 09:22 PM    Bilirubin NEGATIVE 02/11/2022 07:20 PM    Urobilinogen 0.2 03/05/2022 09:22 PM    Nitrites Negative 03/05/2022 09:22 PM    Leukocyte Esterase LARGE (A) 03/05/2022 09:22 PM    Epithelial cells FEW 03/05/2022 09:22 PM    Bacteria 1+ (A) 03/05/2022 09:22 PM    WBC >100 (H) 03/05/2022 09:22 PM    RBC  03/05/2022 09:22 PM         Medications Reviewed:     Current Facility-Administered Medications   Medication Dose Route Frequency  albumin human 25% (BUMINATE) solution 25 g  25 g IntraVENous Q6H    multivitamin, tx-iron-ca-min (THERA-M w/ IRON) tablet 1 Tablet  1 Tablet Oral DAILY    0.9% sodium chloride infusion 250 mL  250 mL IntraVENous PRN    0.9% sodium chloride infusion 250 mL  250 mL IntraVENous PRN    miconazole (MICONTIN) 2 % ointment   Topical BID    balsam peru-castor oiL (VENELEX) ointment   Topical BID    zinc oxide-cod liver oil (DESITIN) 40 % paste   Topical PRN    sodium chloride (NS) flush 5-40 mL  5-40 mL IntraVENous Q8H    sodium chloride (NS) flush 5-40 mL  5-40 mL IntraVENous PRN    acetaminophen (TYLENOL) tablet 650 mg  650 mg Oral Q6H PRN    Or    acetaminophen (TYLENOL) suppository 650 mg  650 mg Rectal Q6H PRN    polyethylene glycol (MIRALAX) packet 17 g  17 g Oral DAILY PRN    ondansetron (ZOFRAN ODT) tablet 4 mg  4 mg Oral Q8H PRN    Or    ondansetron (ZOFRAN) injection 4 mg  4 mg IntraVENous V7D PRN    folic acid (FOLVITE) tablet 1 mg  1 mg Oral DAILY    lactulose (CHRONULAC) 10 gram/15 mL solution 30 mL  30 mL Oral Q6H    levothyroxine (SYNTHROID) tablet 50 mcg  50 mcg Oral 6am    midodrine (PROAMATINE) tablet 15 mg  15 mg Oral TID WITH MEALS    octreotide (SANDOSTATIN) injection 100 mcg  100 mcg IntraVENous TID    pantoprazole (PROTONIX) tablet 40 mg  40 mg Oral DAILY    rifAXIMin (XIFAXAN) tablet 550 mg  550 mg Oral BID    sertraline (ZOLOFT) tablet 100 mg  100 mg Oral DAILY    thiamine HCL (B-1) tablet 100 mg  100 mg Oral DAILY    ursodioL (ACTIGALL) tablet 500 mg (Patient Supplied)  500 mg Oral Q12H    glucagon (GLUCAGEN) injection 1 mg  1 mg IntraMUSCular PRN    naloxone (NARCAN) injection 0.1 mg  0.1 mg IntraVENous PRN    promethazine (PHENERGAN) 6.25 mg in 0.9% sodium chloride 50 mL IVPB  6.25 mg IntraVENous Q6H PRN     ______________________________________________________________________  EXPECTED LENGTH OF STAY: 4d 16h  ACTUAL LENGTH OF STAY:          10 Steven Blocker, MD

## 2022-03-15 NOTE — PROGRESS NOTES
[facility]   [address] [facility], [address] [place] [address], [facility]  Phone: [phone]   [phone]       Nephrology Progress Note  Kostas Martins     1960     [id number]  Date of Admission : 3/5/2022  03/15/22    CC:  Follow up for greg    Assessment and Plan   GREG:  - likely 2/2 HRS  - U/S neg for obstruction  - Cr essentially unchanged since arrival  - cont midodrine + octreotide  - add back albumin today  - daily labs  - keep steve for now     Metabolic acidosis  - start oral bicarb    Hyponatremia  -Secondary to liver cirrhosis  - Na stable    Severe anemia:  - 1 unit of PRBCs 3/8  - hgb stable     Decompensated liver cirrhosis  -With portal hypertension, primary biliary cholangitis  -Requiring multiple paracenteses, most recently 2/28/2022 with 9.2 L removed  -Hepatology following    Ascites:  - drainage slowed down today, had sig amout of output the past 48 hrs  - drain to be pulled today     Hypothyroidism       Interval History:  Seen and examined. Resting in bed today. No ascites on bedside U/S. No drainage from peritoneal catheter. Denies cp or sob. C/o soreness around paracentesis cath site. Review of Systems: A comprehensive review of systems was negative except for that written in the HPI.     Current Medications:   Current Facility-Administered Medications   Medication Dose Route Frequency    albumin human 25% (BUMINATE) solution 25 g  25 g IntraVENous Q6H    multivitamin, tx-iron-ca-min (THERA-M w/ IRON) tablet 1 Tablet  1 Tablet Oral DAILY    0.9% sodium chloride infusion 250 mL  250 mL IntraVENous PRN    0.9% sodium chloride infusion 250 mL  250 mL IntraVENous PRN    miconazole (MICONTIN) 2 % ointment   Topical BID    balsam peru-castor oiL (VENELEX) ointment   Topical BID    zinc oxide-cod liver oil (DESITIN) 40 % paste   Topical PRN    sodium chloride (NS) flush 5-40 mL  5-40 mL IntraVENous Q8H    sodium chloride (NS) flush 5-40 mL  5-40 mL IntraVENous PRN    acetaminophen (TYLENOL) tablet 650 mg  650 mg Oral Q6H PRN    Or    acetaminophen (TYLENOL) suppository 650 mg  650 mg Rectal Q6H PRN    polyethylene glycol (MIRALAX) packet 17 g  17 g Oral DAILY PRN    ondansetron (ZOFRAN ODT) tablet 4 mg  4 mg Oral Q8H PRN    Or    ondansetron (ZOFRAN) injection 4 mg  4 mg IntraVENous K4Q PRN    folic acid (FOLVITE) tablet 1 mg  1 mg Oral DAILY    lactulose (CHRONULAC) 10 gram/15 mL solution 30 mL  30 mL Oral Q6H    levothyroxine (SYNTHROID) tablet 50 mcg  50 mcg Oral 6am    midodrine (PROAMATINE) tablet 15 mg  15 mg Oral TID WITH MEALS    octreotide (SANDOSTATIN) injection 100 mcg  100 mcg IntraVENous TID    pantoprazole (PROTONIX) tablet 40 mg  40 mg Oral DAILY    rifAXIMin (XIFAXAN) tablet 550 mg  550 mg Oral BID    sertraline (ZOLOFT) tablet 100 mg  100 mg Oral DAILY    thiamine HCL (B-1) tablet 100 mg  100 mg Oral DAILY    ursodioL (ACTIGALL) tablet 500 mg (Patient Supplied)  500 mg Oral Q12H    glucagon (GLUCAGEN) injection 1 mg  1 mg IntraMUSCular PRN    naloxone (NARCAN) injection 0.1 mg  0.1 mg IntraVENous PRN    promethazine (PHENERGAN) 6.25 mg in 0.9% sodium chloride 50 mL IVPB  6.25 mg IntraVENous Q6H PRN      Allergies   Allergen Reactions    Morphine Other (comments)     Severe HA. ALL narcotics!!!       Objective:  Vitals:    Vitals:    03/14/22 1526 03/14/22 2045 03/15/22 0028 03/15/22 0700   BP: (!) 115/48 (!) 103/45 (!) 112/45 (!) 108/53   Pulse: 73 80 78 73   Resp: 20 18 18 20   Temp: 97.8 °F (36.6 °C) 98.3 °F (36.8 °C) 98.4 °F (36.9 °C) 98.4 °F (36.9 °C)   SpO2: 99% 98% 98% 98%   Weight:       Height:         Intake and Output:  03/15 0701 - 03/15 1900  In: -   Out: 4200 [Urine:350; Drains:3850]  03/13 1901 - 03/15 0700  In: 565 [P.O.:50]  Out: 8675 [Urine:1000; Drains:7675]    Physical Examination:  General: NAD,chronically ill appearing  HEENT:           + scleral icterus.  DHT in place  Neck:  Supple, no mass  Resp:  Lungs CTA B/L, no wheezing , normal respiratory effort  CV:  RRR,  no murmur or rub,no  LE edema  GI:  Soft, NT, + Bowel sounds, no hepatosplenomegaly  Neurologic:  Non focal  Skin:  No Rash  :  Beckford     [x]    High complexity decision making was performed  [x]    Patient is at high-risk of decompensation with multiple organ involvement    Lab Data Personally Reviewed: I have reviewed all the pertinent labs, microbiology data and radiology studies during assessment. Recent Labs     03/15/22  0421 03/14/22  0201 03/13/22  0416   *  129* 131* 129*   K 4.2  4.2 3.7 3.8   CL 99  100 101 101   CO2 16*  19* 19* 20*   *  101* 144* 119*   *  131* 123* 116*   CREA 4.76*  4.73* 4.80* 4.56*   CA 9.1  9.1 8.9 8.8   PHOS 5.4* 4.0  --    ALB 3.3* 3.4*  --      Recent Labs     03/15/22  0421 03/14/22  0201 03/13/22  0416   WBC 8.4 10.2 11.3*   HGB 8.7* 8.4* 8.3*   HCT 25.7* 25.4* 24.6*   PLT 34* 41* 48*     No results found for: SDES  Lab Results   Component Value Date/Time    Culture result: PENDING 03/14/2022 06:48 PM    Culture result: NO GROWTH THUS FAR 03/12/2022 06:00 PM    Culture result: CANDIDA ALBICANS (A) 03/05/2022 09:22 PM     Recent Results (from the past 24 hour(s))   ALBUMIN, FLUID    Collection Time: 03/14/22  6:48 PM   Result Value Ref Range    Fluid Type: ASCITIC FLUID       Albumin, body fld. 1.0 g/dL   PROTEIN TOTAL, FLUID    Collection Time: 03/14/22  6:48 PM   Result Value Ref Range    Fluid Type: ASCITIC FLUID       Protein total, body fld. 1.5 g/dL   LDH, BODY FLUID    Collection Time: 03/14/22  6:48 PM   Result Value Ref Range    Fluid Type: ASCITIC FLUID       LD, body fld.  63 U/L   CELL COUNT, BODY FLUID    Collection Time: 03/14/22  6:48 PM   Result Value Ref Range    BODY FLUID TYPE ASCITIC FLUID       FLUID COLOR YELLOW      FLUID APPEARANCE CLEAR      FLUID RBC CT. 31 (H) 0 /cu mm    FLUID NUCLEATED CELLS 33 /cu mm    FLD NEUTROPHILS 10 (A) NRRE %    FLD LYMPHS 23 (A) NRRE % FLD MONO/MACROPHAGES 66 (A) NRRE %    FLUID MESOTHELIAL 1 (A) NRRE %   CULTURE, BODY FLUID W GRAM STAIN    Collection Time: 03/14/22  6:48 PM    Specimen: Ascitic Fluid   Result Value Ref Range    Special Requests: CULTURE OF PLEURAL FLUID AFTER THOROCENTESIS     GRAM STAIN RARE WBCS SEEN      GRAM STAIN NO ORGANISMS SEEN      Culture result: PENDING    GLUCOSE, POC    Collection Time: 03/15/22 12:14 AM   Result Value Ref Range    Glucose (POC) 123 (H) 65 - 117 mg/dL    Performed by Larisa Gonzalez    RENAL FUNCTION PANEL    Collection Time: 03/15/22  4:21 AM   Result Value Ref Range    Sodium 129 (L) 136 - 145 mmol/L    Potassium 4.2 3.5 - 5.1 mmol/L    Chloride 99 97 - 108 mmol/L    CO2 16 (L) 21 - 32 mmol/L    Anion gap 14 5 - 15 mmol/L    Glucose 103 (H) 65 - 100 mg/dL     (H) 6 - 20 MG/DL    Creatinine 4.76 (H) 0.55 - 1.02 MG/DL    BUN/Creatinine ratio 26 (H) 12 - 20      GFR est AA 11 (L) >60 ml/min/1.73m2    GFR est non-AA 9 (L) >60 ml/min/1.73m2    Calcium 9.1 8.5 - 10.1 MG/DL    Phosphorus 5.4 (H) 2.6 - 4.7 MG/DL    Albumin 3.3 (L) 3.5 - 5.0 g/dL   METABOLIC PANEL, BASIC    Collection Time: 03/15/22  4:21 AM   Result Value Ref Range    Sodium 129 (L) 136 - 145 mmol/L    Potassium 4.2 3.5 - 5.1 mmol/L    Chloride 100 97 - 108 mmol/L    CO2 19 (L) 21 - 32 mmol/L    Anion gap 10 5 - 15 mmol/L    Glucose 101 (H) 65 - 100 mg/dL     (H) 6 - 20 MG/DL    Creatinine 4.73 (H) 0.55 - 1.02 MG/DL    BUN/Creatinine ratio 28 (H) 12 - 20      GFR est AA 11 (L) >60 ml/min/1.73m2    GFR est non-AA 9 (L) >60 ml/min/1.73m2    Calcium 9.1 8.5 - 10.1 MG/DL   CBC W/O DIFF    Collection Time: 03/15/22  4:21 AM   Result Value Ref Range    WBC 8.4 3.6 - 11.0 K/uL    RBC 2.78 (L) 3.80 - 5.20 M/uL    HGB 8.7 (L) 11.5 - 16.0 g/dL    HCT 25.7 (L) 35.0 - 47.0 %    MCV 92.4 80.0 - 99.0 FL    MCH 31.3 26.0 - 34.0 PG    MCHC 33.9 30.0 - 36.5 g/dL    RDW 23.9 (H) 11.5 - 14.5 %    PLATELET 34 (LL) 863 - 400 K/uL    MPV 9.0 8.9 - 12.9 FL    NRBC 0.0 0  WBC    ABSOLUTE NRBC 0.00 0.00 - 0.01 K/uL   GLUCOSE, POC    Collection Time: 03/15/22  6:06 AM   Result Value Ref Range    Glucose (POC) 128 (H) 65 - 117 mg/dL    Performed by Akash 68 discussed with:  Patient     Family      RN      Consulting Physician Schuyler Jose I have reviewed the flowsheets. Chart and Pertinent Notes have been reviewed. No change in PMH ,family and social history from Consult note.       Judy Zhang MD

## 2022-03-16 NOTE — PROGRESS NOTES
Problem: Mobility Impaired (Adult and Pediatric)  Goal: *Acute Goals and Plan of Care (Insert Text)  Description: FUNCTIONAL STATUS PRIOR TO ADMISSION: Patient appears to be an inconsistent historian despite being oriented x 4. Originally stated she primarily stays in bed-later she reported ambulating a few times a day and spending most of her day in a recliner. She consistently reports that son assists her \"with everything\" but does not give more details on what exactly or how much assistance. HOME SUPPORT PRIOR TO ADMISSION: The patient lived with son and her mother per report. Unclear how much assistance she required. Physical Therapy Goals  Initiated 3/6/2022  1. Patient will move from supine to sit and sit to supine , scoot up and down, and roll side to side in bed with minimal assistance/contact guard assist within 7 day(s). 2.  Patient will transfer from bed to chair and chair to bed with moderate assistance  using the least restrictive device within 7 day(s). 3.  Patient will perform sit to stand with moderate assistance  within 7 day(s). 4.  Patient will ambulate with moderate assistance  for 20 feet with the least restrictive device within 7 day(s). 5.  Patient will ascend/descend 2 stairs with 2 handrail(s) with moderate assistance  within 7 day(s). Outcome: Progressing Towards Goal     PHYSICAL THERAPY TREATMENT  Patient: Yuly Rodriguez (53 y.o. female)  Date: 3/16/2022  Diagnosis: [facility] ([facility] [K74.60] <principal problem not specified>       Precautions: Fall  Chart, physical therapy assessment, plan of care and goals were reviewed. ASSESSMENT  Patient continues with skilled PT services and is progressing towards goals. Patient originally refused to participate however mother was in room and encouraged her and she agreed to sit EOB. Patient required mod A x 1 for rolling, mod A x 1 for supine to sit.  Sat EOB for 2 minutes then patient began to complain of light headedness and nausea. BP actually improved with sitting, no orthostatic response noted. Patient began to attempt to lay back down. Patient required max A sit to supine. Patient answers yes or no questions with nods and shaking head. Does not engage in much conversation. Unable to state whether she wants to participate in further therapy or not. Last night patient had significant bloody nose with unclear etiology. Per family and chart, will meet with MD this evening. Unclear discharge plan at this time due to very little progress with therapy and complicated medical stay. Current Level of Function Impacting Discharge (mobility/balance): has refused to attempt transfers this week. Sitting balance occasional statically, poor dynamically. Other factors to consider for discharge: lives with son         PLAN :  Patient continues to benefit from skilled intervention to address the above impairments. Continue treatment per established plan of care. to address goals. Recommendation for discharge: (in order for the patient to meet his/her long term goals)  To be determined: Goal to transfer to another acute care facility for liver transplant    This discharge recommendation:  A follow-up discussion with the attending provider and/or case management is planned    IF patient discharges home will need the following DME: to be determined (TBD)       SUBJECTIVE:   Patient stated I am light headed.     OBJECTIVE DATA SUMMARY:   Critical Behavior:  Neurologic State: Confused,Alert  Orientation Level: Disoriented to situation (makes comments inappropriate for situation)  Cognition: Follows commands  Safety/Judgement: Awareness of environment  Functional Mobility Training:  Bed Mobility:  Rolling: Minimum assistance;Assist x1  Supine to Sit: Moderate assistance;Assist x1  Sit to Supine: Maximum assistance;Assist x1  Scooting: Maximum assistance;Assist x1        Transfers:     Unable to assess, patient refused to stand Balance:  Sitting: Impaired; With support  Sitting - Static: Occassional;Supported sitting  Sitting - Dynamic: Poor (constant support)  Ambulation/Gait Training:     N/a      Activity Tolerance:   Poor and requires frequent rest breaks    After treatment patient left in no apparent distress:   Supine in bed, Call bell within reach, Caregiver / family present, and Side rails x 3    COMMUNICATION/COLLABORATION:   The patients plan of care was discussed with: Occupational therapist and Registered nurse.      Chuck Verdugo, PT   Time Calculation: 23 mins

## 2022-03-16 NOTE — PROGRESS NOTES
RUR:  23% High     SELMA: TBD. BLS transport once medically stable. Follow-up with PCP/specialist.     Primary Contact: Son, Ambrosio Peters, [phone]     1:45PM - CM reviewed chart. Per review and ID rounds, patient is not medically stable for discharge at this time. Plan for continued aggressive nutritional support; patient currently receiving tube feeds through [facility] feeding tube. Patient continues to work with PT & OT. Patient and son are hopeful that with nutritional treatment and therapy she may become a candidate for a liver and kidney transplant at [facility]. Dr. Arpita Morel to have discussion with family this evening, 3/16 to discuss plan of care. Discharge pending medical progress and final recommendations.  CM to continue to follow as needed.     Peter Bentley, YIFAN   [phone]

## 2022-03-16 NOTE — PROGRESS NOTES
[facility]   [address] [facility], [address] [place] [address] [address], [facility] [place]  Phone: [phone]   [phone]       Nephrology Progress Note  Ramin Godinez     1960     [id number]  Date of Admission : 3/5/2022  03/16/22    CC:  Follow up for greg    Assessment and Plan   GREG:  - likely 2/2 HRS  - U/S neg for obstruction  - Cr essentially unchanged since arrival  - cont midodrine + octreotide  - continue albumin infusions  - daily labs  - keep steve for now     Metabolic acidosis  - start oral bicarb    Hyponatremia  -Secondary to liver cirrhosis  - Na stable    Severe anemia:  - 1 unit of PRBCs 3/8  - hgb stable     Decompensated liver cirrhosis  -With portal hypertension, primary biliary cholangitis  -Requiring multiple paracenteses, most recently 2/28/2022 with 9.2 L removed  -Hepatology following    Ascites:  - drainage slowed down today, had sig amout of output the past 48 hrs  - drain to be pulled today     Hypothyroidism    Malnutrition:  - getting TF via DHT     Interval History:  Seen and examined. Resting in bed today. Tired, weak. Labs unchanged,  Na down some. No cp or sob reported. Review of Systems: A comprehensive review of systems was negative except for that written in the HPI.     Current Medications:   Current Facility-Administered Medications   Medication Dose Route Frequency    albumin human 25% (BUMINATE) solution 25 g  25 g IntraVENous Q6H    multivitamin, tx-iron-ca-min (THERA-M w/ IRON) tablet 1 Tablet  1 Tablet Oral DAILY    0.9% sodium chloride infusion 250 mL  250 mL IntraVENous PRN    0.9% sodium chloride infusion 250 mL  250 mL IntraVENous PRN    miconazole (MICONTIN) 2 % ointment   Topical BID    balsam peru-castor oiL (VENELEX) ointment   Topical BID    zinc oxide-cod liver oil (DESITIN) 40 % paste   Topical PRN    sodium chloride (NS) flush 5-40 mL  5-40 mL IntraVENous Q8H    sodium chloride (NS) flush 5-40 mL  5-40 mL IntraVENous PRN    acetaminophen (TYLENOL) tablet 650 mg  650 mg Oral Q6H PRN    Or    acetaminophen (TYLENOL) suppository 650 mg  650 mg Rectal Q6H PRN    polyethylene glycol (MIRALAX) packet 17 g  17 g Oral DAILY PRN    ondansetron (ZOFRAN ODT) tablet 4 mg  4 mg Oral Q8H PRN    Or    ondansetron (ZOFRAN) injection 4 mg  4 mg IntraVENous F6J PRN    folic acid (FOLVITE) tablet 1 mg  1 mg Oral DAILY    lactulose (CHRONULAC) 10 gram/15 mL solution 30 mL  30 mL Oral Q6H    levothyroxine (SYNTHROID) tablet 50 mcg  50 mcg Oral 6am    midodrine (PROAMATINE) tablet 15 mg  15 mg Oral TID WITH MEALS    octreotide (SANDOSTATIN) injection 100 mcg  100 mcg IntraVENous TID    pantoprazole (PROTONIX) tablet 40 mg  40 mg Oral DAILY    rifAXIMin (XIFAXAN) tablet 550 mg  550 mg Oral BID    sertraline (ZOLOFT) tablet 100 mg  100 mg Oral DAILY    thiamine HCL (B-1) tablet 100 mg  100 mg Oral DAILY    ursodioL (ACTIGALL) tablet 500 mg (Patient Supplied)  500 mg Oral Q12H    glucagon (GLUCAGEN) injection 1 mg  1 mg IntraMUSCular PRN    naloxone (NARCAN) injection 0.1 mg  0.1 mg IntraVENous PRN    promethazine (PHENERGAN) 6.25 mg in 0.9% sodium chloride 50 mL IVPB  6.25 mg IntraVENous Q6H PRN      Allergies   Allergen Reactions    Morphine Other (comments)     Severe HA. ALL narcotics!!!       Objective:  Vitals:    Vitals:    03/15/22 2158 03/15/22 2352 03/16/22 0007 03/16/22 0806   BP: (!) 105/40 (!) 100/39 (!) 100/39 (!) 83/50   Pulse:   76 75   Resp:   18 18   Temp:   98.3 °F (36.8 °C) 97.8 °F (36.6 °C)   SpO2:   97% 98%   Weight:       Height:         Intake and Output:  03/16 0701 - 03/16 1900  In: -   Out: 250 [Urine:250]  03/14 1901 - 03/16 0700  In: 175 [P.O.:175]  Out: 48470 [Urine:500; Drains:74698]    Physical Examination:  General: NAD,chronically ill appearing  HEENT:           + scleral icterus.  DHT in place  Neck:  Supple, no mass  Resp:  Lungs CTA B/L, no wheezing , normal respiratory effort  CV:  RRR,  no murmur or rub,no  LE edema  GI:  Soft, NT, + Bowel sounds, no hepatosplenomegaly  Neurologic:  Non focal  Skin:  No Rash  :  Beckford     [x]    High complexity decision making was performed  [x]    Patient is at high-risk of decompensation with multiple organ involvement    Lab Data Personally Reviewed: I have reviewed all the pertinent labs, microbiology data and radiology studies during assessment.     Recent Labs     03/16/22  0642 03/15/22  0421 03/14/22  0201   * 129*  129* 131*   K 4.2 4.2  4.2 3.7   CL 99 99  100 101   CO2 18* 16*  19* 19*   * 103*  101* 144*   * 122*  131* 123*   CREA 4.76* 4.76*  4.73* 4.80*   CA 8.9 9.1  9.1 8.9   PHOS 6.5* 5.4* 4.0   ALB 3.3* 3.3* 3.4*     Recent Labs     03/16/22  0642 03/15/22  0421 03/14/22  0201   WBC 8.0 8.4 10.2   HGB 7.6* 8.7* 8.4*   HCT 22.7* 25.7* 25.4*   PLT 42* 34* 41*     No results found for: SDES  Lab Results   Component Value Date/Time    Culture result: NO GROWTH THUS FAR 03/14/2022 06:48 PM    Culture result: NO GROWTH THUS FAR 03/12/2022 06:00 PM    Culture result: CANDIDA ALBICANS (A) 03/05/2022 09:22 PM     Recent Results (from the past 24 hour(s))   RENAL FUNCTION PANEL    Collection Time: 03/16/22  6:42 AM   Result Value Ref Range    Sodium 127 (L) 136 - 145 mmol/L    Potassium 4.2 3.5 - 5.1 mmol/L    Chloride 99 97 - 108 mmol/L    CO2 18 (L) 21 - 32 mmol/L    Anion gap 10 5 - 15 mmol/L    Glucose 134 (H) 65 - 100 mg/dL     (H) 6 - 20 MG/DL    Creatinine 4.76 (H) 0.55 - 1.02 MG/DL    BUN/Creatinine ratio 28 (H) 12 - 20      GFR est AA 11 (L) >60 ml/min/1.73m2    GFR est non-AA 9 (L) >60 ml/min/1.73m2    Calcium 8.9 8.5 - 10.1 MG/DL    Phosphorus 6.5 (H) 2.6 - 4.7 MG/DL    Albumin 3.3 (L) 3.5 - 5.0 g/dL   CBC W/O DIFF    Collection Time: 03/16/22  6:42 AM   Result Value Ref Range    WBC 8.0 3.6 - 11.0 K/uL    RBC 2.44 (L) 3.80 - 5.20 M/uL    HGB 7.6 (L) 11.5 - 16.0 g/dL    HCT 22.7 (L) 35.0 - 47.0 %    MCV 93.0 80.0 - 99.0 FL MCH 31.1 26.0 - 34.0 PG    MCHC 33.5 30.0 - 36.5 g/dL    RDW 23.9 (H) 11.5 - 14.5 %    PLATELET 42 (LL) 523 - 400 K/uL    MPV 10.8 8.9 - 12.9 FL    NRBC 0.0 0  WBC    ABSOLUTE NRBC 0.00 0.00 - 0.01 K/uL                                       Care Plan discussed with:  Patient     Family      RN      Consulting Physician [facility],         I have reviewed the flowsheets. Chart and Pertinent Notes have been reviewed. No change in PMH ,family and social history from Consult note.       Cherelle Tavares MD

## 2022-03-16 NOTE — PROGRESS NOTES
Physical Therapy 3/16/22    Chart reviewed, patient last BP 83/50 at rest. Not appropriate for treatment at this time. Will attempt to see later as able.     Thank you for consideration,    Chioma Manzanares, PT, DPT

## 2022-03-16 NOTE — PROGRESS NOTES
Messaged Dr. Dannie Colunga via [employer]: Good evening. Just wanted to check in and see if you were coming today to round on Ms. Peters. Her family members are at bedside and were hoping to talk to you today. If you don't plan to come, please let me know, as they would like to try and have a phone conversation with you so that they are able to proceed with a plan tomorrow. Feel free to call our unit phone at any time, [phone]. Thank you! He replied: Sorry I did not see your earlier message. Tell them I will be seeing her about 5pm tomorrow evening and can talk to them then. 0937- RN attempted to obtain labs from pt and she was seen covered in dried blood on her face, chest, and in her hair. It is unclear if the blood is a result of her picking around her nose or epistaxis. This RN and PCT attempted to get pt cleaned up but she was wincing and cold.

## 2022-03-16 NOTE — PROGRESS NOTES
Occupational Therapy    Completed chart review and patient hypotensive with morning vitals. Chart review indicates nose bleeding overnight. Will hold for OT and continue to follow as appropriate.       Percemonika Dominguez, OT

## 2022-03-16 NOTE — PROGRESS NOTES
[facility] [facility]                                                                                          Hospitalist Progress Note  Marcie Jj MD  Answering service: [phone] OR 36 from in house phone        Date of Service:  3/16/2022  NAME:  Emanuel Cornejo  :  1960  MRN:  [id number]      Admission Summary:   Copied form admit: \" Emanuel Cornejo is a 65 y.o. female with history of liver cirrhosis with portal hypertension,recently diagnosed primary biliary cholangitis, recurrent ascites requiring paracentesis approximately every 7 to 10 days who presents here transferred from [address] [place] [address] secondary to decompensated liver cirrhosis, worsening renal insufficiency, progressive weakness currently undergoing work-up for possible liver transplant. The patient of note initially presented to [address] [place] [address] on 2022 secondary to hepatic encephalopathy and progressive weakness. Patient on admission was found at that time to have an ammonia level of 111 and did subsequently require paracentesis in the ED. The patient has had a prolonged and complicated hospital course since that time and has undergone multiple paracentesis including  with 11.8 L removal,  with 9/2 L removed, and  with 9.2 L removed. The patient also did have episodes of transient hypotension requiring a brief ICU admission for pressor support and has periodically received albumin with daily midodrine use. She was followed both by GI and nephrology services. Patient did undergo an EGD on  noted for distal esophagitis and portal hypertensive gastropathy, no evidence of varices. According to the son Analisa Kennedy at bedside, the patient does have a history of esophageal varices back in 2021 status post banding at that time. Per GI at outlying facility, patient was deemed not to be a candidate for colonoscopy and recommended Cologuard.   Part of her liver transplant evaluation at [facility] THE [facility] also included an MRCP was done on 2/18 which was noted for cirrhosis, portal hypertension, and large ascites. She has also undergone a cardiac stress test which was negative, EF of 69%. Patient has been maintained on lactulose, rifaximin, octreotide, and Protonix at the [facility]. Patient has had progressive worsening of her renal function at the [facility] with creatinine peak at 5.2 on 3/3 and associated metabolic acidosis requiring management with bicarb drip that was maintained as of this morning at [facility] THE [facility]. Per medical records, there was concern that patient may be moving towards initiation of possible hemodialysis. According to son, patient does not have any previous establish care with nephrology and was not aware of any kidney issues prior to current hospitalization. Patient also was treated with with a 7-day course of antibiotics for E. coli UTI, which were completed as of 2/17. The patient had has been seen by Dr. Sande Hatchet hepatologist for an initial visit in January to start the process of liver transplant work-up. Given lack of progression of patient's care prolonged hospitalization at [facility], request was made for patient to be transferred to [facility] to continue further liver transplant work-up and accepted by Dr. Sande Hatchet for transfer under the hospitalist service. Patient of note is currently lethargic and poorhistorian therefore history is obtained via discussion with her son Maegan Zapata at the bedside and review of medical records. \"    Interval history / Subjective:     3/16/2022 :   · Her mother at the bedside feeding when I saw her early this afternoon. Patient alert. She had episode of epistaxis early this morning which was stopped, there is some dried blood on the right nostril. Assessment & Plan:      Decompensated liver cirrhosis, with portal hypertension.   Felt to be secondary to PBC  Transaminitis/hyperbilirubinemia/coagulopathy/thrombocytopenia  -Transferred from [address] [place] [address] with prolonged hospitalization. Transferred for further work-up for liver transplant eval  -EGD on 2/17 [place] noted for distal esophagitis and portal hypertensive gastropathy, no evidence of varices.    -history of esophageal varices back in November 2021 status post banding at that time.    -Per GI at outlying facility, patient was deemed not to be a candidate for colonoscopy and recommended Cologuard.    -s/p MRCP on 2/18 which was noted for cirrhosis, portal hypertension, and large ascites. -s/p cardiac stress test which was negative for ischemia, EF of 69%  -Dr Nika Nascimento following  -Continue lactulose, rifaximin, octreotide, Protonix p.o.  -Closely monitor stool output frequency  -Continue ursodiol    History of hepatic encephalopathy with refractory ascites  -multiple paracentesis done at [facility] THE [facility] including 2/11 with 11.8 L removal, 2/18 with 9/2 L removed, and 2/28 with 9.2 L removed. -Currently with distended abdomen  -Moderate large ascites on US 3/6. Ascites said to be refractoyr to diuretics,HRS limiting use of diuretics  -Ascitic drain that was placed on 3/12 was apparently clogged, flushed and drained significant amount of ascites. Ascites almost gone. Added IV albumin.  -Ascitic fluid lab negative for SBP        GREG with metabolic acidosis. Creatinine worsened from 0.9 on 11/26/2021 up to 4-5 now  -Likely due to [facility]  -Nephrology following,recommendaitons appreciated.           Chronic normocytic anemia  -Possible anemia of CKD  -S/P one unit prbc on 3/7     Hyponatremia  -Suspect secondary to underlying liver cirrhosis.   -Na stable 128-130     Recent E. coli UTI  -Treated with Vanco and Zosyn then transitioned to Rocephin, completed 7-day antibiotic course as of 2/17  -Replace Beckford with new catheter 3/5 . Urine culture on 3/5 grew Candida albicans 75,000 CFU.   -     Hypothyroid  -Continue levothyroxine    Severe malnutrition  --Supplemented with tube feeding via NGT. Patient has been pulling the NGT 2 days in a row and requiring restraint. She needs ongoing nutritional supplement I think she may need PEG allergies becoming difficult to sustain NGT and I am concerned that she may aspirate when she attempts to pull the feeding tube. Discussed with Dr. Memo Salguero. --On regular diet, intake is minimal.    Lines/catheters/drainage  --Beckford catheter in place, replaced on 3/5  --NGT in place          Generalized weakness/chronic wounds. pta  -PT, OT consult  -Wound care consulted         DVT prophylaxis SCDs for now. No pharmacological prophylaxis due to thrombocytopenia  DNR   Prognosis: Guarded  Her son, Carlitos Pablo updated at the bedside, 3/11 and her mother on 3/16     Hospital Problems  Date Reviewed: 2/17/2022          Codes Class Noted POA    Cirrhosis [facility]) ICD-10-CM: K74.60  ICD-9-CM: 571.5  2/8/2022 Unknown                      Data Review:    Review and/or order of clinical lab test    GENERAL:  Chronically sick looking, alert and awake, not in distress. HEENT:  Deeply icteric. NGT in place. Dried blood on the right nostril, no active bleeding  NECK: Supple, trachea midline, no adenopathy, no thyromegally or tenderness, no carotid bruit and no JVD. LUNGS:   Vesicular breath sounds bilaterally, no added sounds. HEART:   S1 and S2 well heard,RRR,  no murmur, click, rub or gallop. ABDOMEB:   Abdomen flat, normoactive, diffusely tender without rebound or guarding. Beckford catheter draining deep yellow (jaundice) urine. EXTREMETIES: No peripheral edema. Bilateral wrist restraints in place  PULSES: 2+ and symmetric all extremities. SKIN: Scattered ecchymosis, a large 1 on her upper chest  NEUROLOGY: Awake, alert today.  Nonfocal exam.      Labs:     Recent Labs     03/16/22  0642 03/15/22  0421   WBC 8.0 8.4   HGB 7.6* 8.7*   HCT 22.7* 25.7*   PLT 42* 34*     Recent Labs     03/16/22  0785 03/15/22  0421 03/14/22  0201   * 129*  129* 131*   K 4.2 4.2  4.2 3.7   CL 99 99  100 101   CO2 18* 16*  19* 19*   * 122*  131* 123*   CREA 4.76* 4.76*  4.73* 4.80*   * 103*  101* 144*   CA 8.9 9.1  9.1 8.9   PHOS 6.5* 5.4* 4.0     Recent Labs     03/16/22  0642 03/15/22  0421 03/14/22  0201   ALB 3.3* 3.3* 3.4*     No results for input(s): INR, PTP, APTT, INREXT, INREXT in the last 72 hours. No results for input(s): FE, TIBC, PSAT, FERR in the last 72 hours. Lab Results   Component Value Date/Time    Folate 38.3 (H) 10/20/2021 07:53 AM    RBC FOLATE 878 02/14/2022 01:27 AM      No results for input(s): PH, PCO2, PO2 in the last 72 hours. No results for input(s): CPK, CKNDX, TROIQ in the last 72 hours.     No lab exists for component: CPKMB  Lab Results   Component Value Date/Time    Cholesterol, total 122 01/25/2022 09:50 AM    HDL Cholesterol 40 01/25/2022 09:50 AM    LDL, calculated 67.2 01/25/2022 09:50 AM    Triglyceride 74 01/25/2022 09:50 AM    CHOL/HDL Ratio 3.1 01/25/2022 09:50 AM     Lab Results   Component Value Date/Time    Glucose (POC) 128 (H) 03/15/2022 06:06 AM    Glucose (POC) 123 (H) 03/15/2022 12:14 AM    Glucose (POC) 114 (H) 02/23/2022 05:42 PM    Glucose (POC) 127 (H) 02/23/2022 12:04 PM    Glucose (POC) 122 (H) 02/23/2022 08:18 AM     Lab Results   Component Value Date/Time    Color DARK YELLOW 03/05/2022 09:22 PM    Appearance TURBID (A) 03/05/2022 09:22 PM    Specific gravity 1.014 03/05/2022 09:22 PM    Specific gravity 1.010 02/11/2022 07:20 PM    pH (UA) 5.5 03/05/2022 09:22 PM    Protein 100 (A) 03/05/2022 09:22 PM    Glucose Negative 03/05/2022 09:22 PM    Ketone Negative 03/05/2022 09:22 PM    Bilirubin NEGATIVE 02/11/2022 07:20 PM    Urobilinogen 0.2 03/05/2022 09:22 PM    Nitrites Negative 03/05/2022 09:22 PM    Leukocyte Esterase LARGE (A) 03/05/2022 09:22 PM    Epithelial cells FEW 03/05/2022 09:22 PM    Bacteria 1+ (A) 03/05/2022 09:22 PM    WBC >100 (H) 03/05/2022 09:22 PM    RBC  03/05/2022 09:22 PM         Medications Reviewed:     Current Facility-Administered Medications   Medication Dose Route Frequency    sodium bicarbonate tablet 650 mg  650 mg Oral BID    albumin human 25% (BUMINATE) solution 25 g  25 g IntraVENous Q6H    multivitamin, tx-iron-ca-min (THERA-M w/ IRON) tablet 1 Tablet  1 Tablet Oral DAILY    0.9% sodium chloride infusion 250 mL  250 mL IntraVENous PRN    0.9% sodium chloride infusion 250 mL  250 mL IntraVENous PRN    miconazole (MICONTIN) 2 % ointment   Topical BID    balsam peru-castor oiL (VENELEX) ointment   Topical BID    zinc oxide-cod liver oil (DESITIN) 40 % paste   Topical PRN    sodium chloride (NS) flush 5-40 mL  5-40 mL IntraVENous Q8H    sodium chloride (NS) flush 5-40 mL  5-40 mL IntraVENous PRN    acetaminophen (TYLENOL) tablet 650 mg  650 mg Oral Q6H PRN    Or    acetaminophen (TYLENOL) suppository 650 mg  650 mg Rectal Q6H PRN    polyethylene glycol (MIRALAX) packet 17 g  17 g Oral DAILY PRN    ondansetron (ZOFRAN ODT) tablet 4 mg  4 mg Oral Q8H PRN    Or    ondansetron (ZOFRAN) injection 4 mg  4 mg IntraVENous U5P PRN    folic acid (FOLVITE) tablet 1 mg  1 mg Oral DAILY    lactulose (CHRONULAC) 10 gram/15 mL solution 30 mL  30 mL Oral Q6H    levothyroxine (SYNTHROID) tablet 50 mcg  50 mcg Oral 6am    midodrine (PROAMATINE) tablet 15 mg  15 mg Oral TID WITH MEALS    octreotide (SANDOSTATIN) injection 100 mcg  100 mcg IntraVENous TID    pantoprazole (PROTONIX) tablet 40 mg  40 mg Oral DAILY    rifAXIMin (XIFAXAN) tablet 550 mg  550 mg Oral BID    sertraline (ZOLOFT) tablet 100 mg  100 mg Oral DAILY    thiamine HCL (B-1) tablet 100 mg  100 mg Oral DAILY    ursodioL (ACTIGALL) tablet 500 mg (Patient Supplied)  500 mg Oral Q12H    glucagon (GLUCAGEN) injection 1 mg  1 mg IntraMUSCular PRN    naloxone (NARCAN) injection 0.1 mg  0.1 mg IntraVENous PRN    promethazine (PHENERGAN) 6.25 mg in 0.9% sodium chloride 50 mL IVPB  6.25 mg IntraVENous Q6H PRN     ______________________________________________________________________  EXPECTED LENGTH OF STAY: 4d 16h  ACTUAL LENGTH OF STAY:          11                 Eric Lee MD

## 2022-03-16 NOTE — PROGRESS NOTES
[address] \[facility]\"", MD, [address], [place], [place]      ISAAC Garza [facility]     Michelle Lopez, [facility]   MANISHA Conroy, [facility]       Namita Sarkar [address]    at [address], [facility] Katlyn Pizano  22.    [phone]    FAX: [address] [place] [address]    at [address], [address]    [phone]    FAX: [phone]       HEPATOLOGY PROGRESS NOTE  The patient is well known to me from a previous visit to my office at [facility]. She is a 64 yo  female who was found to have chronic liver disease in 2020.       The patient was found to have cirrhosis in 7/2021 when she developed ascites.     She had variceal bleeding in 11/2021     Serologic evaluation for markers of chronic liver disease was positive for AMA     The patient has developed the following complications of cirrhosis: esophageal varices, variceal bleeding, ascites, edema,   hepatic encephalopathy,     I saw the patient for the first time in 1/2022. She had CTP score 9 and MELD score 21 at that time. We started liver transplant evaluation testing. She developed confusion and could not be aroused and was hospitalized 3 weeks ago at [facility] in 2/202. We were finally contacted about this hospitalization a few days ago and we made arrangements to transfer her to [facility]. She now has far more advanced liver and kidney disease than when I last saw her about 4-6 weeks ago. Labs are significant for   WBC 10.6, HB 7.8 gms, PLT 50, Sna 128, Scr 4.85 mg, TBILI 5.2 mg, INR 1.6, Sammonia 28,     Hospital Course:  She is getting tube feeds through Dobhoff feeding tube.     She says she accidentally pulled tube out and was agreeable to have this replaced. She was restrained and was not able to participate in OT/PT today. Liver function stable  She has CKD-HRS, is making urine and does not need dialysis. Scr stable in the 5s. Decubitus wounds healing slowly. No new wounds. Hepatology Plan:  I can have discussion with family Wednesday evening about 7PM.  I will be at my other office all day. Otherwise I can do this Thursday at [address] issue to to try to improve and became strong enough for LT vs hospice care.     ASSESSMENT AND PLAN:  Cirrhosis  The diagnosis of cirrhosis is based upon imaging, laboratory studies, complications of cirrhosis. Cirrhosis is secondary to [facility]. Not alcohol as she was initially told.     Serologic testing was positive for AMA, ASMA     The CTP is 10. Child class C. The MELD score is 33.     Based upon the MELD and CTP scores the patient has a mortality of about 50% within the next 90 days until we turn her fraily and weakness around. Right now is is too weak to survive liver transplantation. She needs aggressive nutritional support and both she and her son are in favor of this with the hopes her situation will improve and she could survive liver transplantation.       Primary Biliary Cholangitis  The diagnosis is based upon serology and an elevation in ALP and positive AMA. A liver biopsy has not been performed. PBC is being treated with ANTONIETA 500 mg BID. 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 She is still making urine and does not need dialysis. Continue midodrine, octreotide injections   Serum albumin up to 4.4 gm. Will need to stop IV albumin before she gets fluid overloaded    Malnutrition  The patient has severe protein-calorie malnutrtion and muscle wasting. This is due to advanced liver disease and refractory ascites. The patient a more calories than she can possible eat to get into positive nutritional balance.   Without aggressive nutritional support she will not survive. She is alert, oriented and understands the need for NG dobhoff feeding tube placement. Both she and her son agree with this. Getting tube feeds via Dobhoff feeding tube. Tolerating this well. Formula to be managed by nutritioinal service. Frailty/muscle wasting  The patient is very frail and cannot sit or stand on her own. She is getting aggressive OT/PT. She is very motivated to survive and willing to do whatever excercies she needs to get stronger. If she does not improve her muscle wasting and body strength she will never survive the liver transplant process. Skin wounds  Large 5 x 5 cm area of tissue injury withtout ulceration on her back that is improving with wound care  Stage 3 pressure injury 1.3 x 1 cm pn sacrum  Rash consistent with candida     Vitamin malabsorption  Patients with PBC do not efficiently absorb fat soluble vitamins A,D,E, K. She needs multi-vitamins containing fat soluble vitamins A, E, D, K. Ascites   Ascites developed for the first time in 7/2021. Ascites is is refractory to the current doses of diuretics because of kidney disease and Hyponatremia. No diuretics. Hyponatremia  This is secondary cirrhosis and diuretics  This appears to be stable in the 128 range. No diuretics. Will give IV albumin 25 gm Q6H for a few days to see if this helps     Lower extremity edema  Edema is refractory to the current doses of diuretics because of kidney disease and Hyponatremia. Will continue the current dose of diuretics at step 1.     Screening for Esophageal varices   The patient has esophageal varices with prior bleeding. Varices were banded in 11/2021 and 12/2021. The last EGD to assess for varices was performed in 12/2021. No repeat EGD needed at this time.     Hepatic encephalopathy   Overt HE is well controlled on lactulose and xifaxan.     Serum ammonia is low and in the 40s.      Coagulopathy  INR is in the 1.6 range and stable. Thrombocytopenia   This is secondary to cirrhosis. There is no evidence of overt bleeding. No treatment is required. The platelet count is adequate for the patient to undergo procedures without the need for platelet transfusion or platelet growth factors.     Anemia   This is due to multifactorial causes including portal hypertension with chronic GI blood loss and bone marrow suppression due to severe malnutrition.     The most recent FE studies were from 1/2022. The serum ferritin is 87  The FE saturation is 74  FE panel is within the normal range but this is low a patient with chronic liver disease and cirrhosis   Will administer intravenous iron. No EGD needed at this time. Thrombocytopenia   This is secondary to cirrhosis. There is no evidence of overt bleeding. No treatment is required. The platelet count is adequate for the patient to undergo procedures without the need for platelet transfusion or platelet growth factors. PHYSICAL EXAMINATION:  VS: per nursing note  General:  Ill appearing  Eyes:  Sclera icteric. ENT:  No oral lesions. Thyroid normal.  Nodes:  No adenopathy. Skin:  Spider angiomata. Severe ecchymosis all over arms. Respiratory:  Lungs clear to auscultation. Cardiovascular:  Regular heart rate. Abdomen:  Soft non-tender, Distended with obvious ascites. Extremities:  No lower extremity edema. Severe muscle wasting of upper and lower extremities. Neurologic:  Alert and oriented. Cranial nerves grossly intact. No asterixis. LABORATORY:  Results for Chelsea Baldwin (MRN [id number]) as of 3/16/2022 06:52   Ref.  Range 3/13/2022 04:16 3/14/2022 02:01 3/15/2022 04:21   WBC Latest Ref Range: 3.6 - 11.0 K/uL 11.3 (H) 10.2 8.4   HGB Latest Ref Range: 11.5 - 16.0 g/dL 8.3 (L) 8.4 (L) 8.7 (L)   PLATELET Latest Ref Range: 150 - 400 K/uL 48 (LL) 41 (LL) 34 (LL)   Sodium Latest Ref Range: 136 - 145 mmol/L 129 (L) 131 (L) 129 (L)   Potassium Latest Ref Range: 3.5 - 5.1 mmol/L 3.8 3.7 4.2   Chloride Latest Ref Range: 97 - 108 mmol/L 101 101 100   CO2 Latest Ref Range: 21 - 32 mmol/L 20 (L) 19 (L) 19 (L)   Glucose Latest Ref Range: 65 - 100 mg/dL 119 (H) 144 (H) 101 (H)   BUN Latest Ref Range: 6 - 20 MG/ (H) 123 (H) 131 (H)   Creatinine Latest Ref Range: 0.55 - 1.02 MG/DL 4.56 (H) 4.80 (H) 4.73 (H)   Albumin Latest Ref Range: 3.5 - 5.0 g/dL  3.4 (L)      RADIOLOGY:  Ultrasound of liver. Echogenic consistent with cirrhosis. No liver mass lesions. No dilated bile ducts. Moderate ascites.         Gurjit Julio MD  [facility] [address], [address] [place] [address].  [phone]  [address] [place] [address]

## 2022-03-17 NOTE — PROGRESS NOTES
[facility]   [address] [facility], [address] [facility] [address], [facility]  Phone: [phone]   [phone]       Nephrology Progress Note  Dalila Bautista     1960     [id number]  Date of Admission : 3/5/2022  03/17/22    CC:  Follow up for greg    Assessment and Plan   GREG:  - likely 2/2 HRS  - U/S neg for obstruction  - Cr essentially unchanged since arrival  - cont midodrine + octreotide  - continue albumin infusions  - give 1 L of IVF + bcb  - daily labs  - keep steve for now     Metabolic acidosis  - unable to take pills  - bicarb drip  X 1 L    Hyponatremia  -Secondary to liver cirrhosis  - Na stable    Severe anemia:  - 1 unit of PRBCs 3/8  - hgb stable     Decompensated liver cirrhosis  -With portal hypertension, primary biliary cholangitis  -Requiring multiple paracenteses, most recently 2/28/2022 with 9.2 L removed  -Hepatology following     Hypothyroidism    Malnutrition:  - getting TF via DHT     Interval History:  Seen and examined. Resting in bed today. Appears to be feeling better today. Family meeting with Dr. Tahir Yañez yesterday. Plans to continue current care. Review of Systems: A comprehensive review of systems was negative except for that written in the HPI.     Current Medications:   Current Facility-Administered Medications   Medication Dose Route Frequency    sodium bicarbonate tablet 650 mg  650 mg Oral BID    albumin human 25% (BUMINATE) solution 25 g  25 g IntraVENous Q6H    multivitamin, tx-iron-ca-min (THERA-M w/ IRON) tablet 1 Tablet  1 Tablet Oral DAILY    0.9% sodium chloride infusion 250 mL  250 mL IntraVENous PRN    0.9% sodium chloride infusion 250 mL  250 mL IntraVENous PRN    miconazole (MICONTIN) 2 % ointment   Topical BID    balsam peru-castor oiL (VENELEX) ointment   Topical BID    zinc oxide-cod liver oil (DESITIN) 40 % paste   Topical PRN    sodium chloride (NS) flush 5-40 mL  5-40 mL IntraVENous Q8H    sodium chloride (NS) flush 5-40 mL 5-40 mL IntraVENous PRN    acetaminophen (TYLENOL) tablet 650 mg  650 mg Oral Q6H PRN    Or    acetaminophen (TYLENOL) suppository 650 mg  650 mg Rectal Q6H PRN    polyethylene glycol (MIRALAX) packet 17 g  17 g Oral DAILY PRN    ondansetron (ZOFRAN ODT) tablet 4 mg  4 mg Oral Q8H PRN    Or    ondansetron (ZOFRAN) injection 4 mg  4 mg IntraVENous J7H PRN    folic acid (FOLVITE) tablet 1 mg  1 mg Oral DAILY    lactulose (CHRONULAC) 10 gram/15 mL solution 30 mL  30 mL Oral Q6H    levothyroxine (SYNTHROID) tablet 50 mcg  50 mcg Oral 6am    midodrine (PROAMATINE) tablet 15 mg  15 mg Oral TID WITH MEALS    octreotide (SANDOSTATIN) injection 100 mcg  100 mcg IntraVENous TID    pantoprazole (PROTONIX) tablet 40 mg  40 mg Oral DAILY    rifAXIMin (XIFAXAN) tablet 550 mg  550 mg Oral BID    sertraline (ZOLOFT) tablet 100 mg  100 mg Oral DAILY    thiamine HCL (B-1) tablet 100 mg  100 mg Oral DAILY    ursodioL (ACTIGALL) tablet 500 mg (Patient Supplied)  500 mg Oral Q12H    glucagon (GLUCAGEN) injection 1 mg  1 mg IntraMUSCular PRN    naloxone (NARCAN) injection 0.1 mg  0.1 mg IntraVENous PRN    promethazine (PHENERGAN) 6.25 mg in 0.9% sodium chloride 50 mL IVPB  6.25 mg IntraVENous Q6H PRN      Allergies   Allergen Reactions    Morphine Other (comments)     Severe HA. ALL narcotics!!!       Objective:  Vitals:    Vitals:    03/16/22 2046 03/17/22 0438 03/17/22 0900 03/17/22 1100   BP: (!) 102/47 (!) 105/51 (!) 109/46 (!) 94/57   Pulse: 76 75 71    Resp: 18 16 18    Temp: 98.2 °F (36.8 °C) 98 °F (36.7 °C) 98.1 °F (36.7 °C)    SpO2: 97% 98% 98%    Weight:       Height:         Intake and Output:  No intake/output data recorded. 03/15 1901 - 03/17 0700  In: 250 [P.O.:250]  Out: 550 [Urine:550]    Physical Examination:  General: NAD,chronically ill appearing  HEENT:           + scleral icterus.  DHT in place  Neck:  Supple, no mass  Resp:  Lungs CTA B/L, no wheezing , normal respiratory effort  CV:  RRR,  no murmur or rub,no  LE edema  GI:  Soft, NT, + Bowel sounds, no hepatosplenomegaly  Neurologic:  Non focal  Skin:  No Rash  :  Beckford     [x]    High complexity decision making was performed  [x]    Patient is at high-risk of decompensation with multiple organ involvement    Lab Data Personally Reviewed: I have reviewed all the pertinent labs, microbiology data and radiology studies during assessment.     Recent Labs     03/17/22 0454 03/16/22 0642 03/15/22  0421   *  129* 127* 129*  129*   K 3.9  3.9 4.2 4.2  4.2   CL 99  99 99 99  100   CO2 15*  16* 18* 16*  19*   *  110* 134* 103*  101*   *  155* 135* 122*  131*   CREA 4.90*  4.79* 4.76* 4.76*  4.73*   CA 9.2  9.2 8.9 9.1  9.1   PHOS 7.8* 6.5* 5.4*   ALB 3.9 3.3* 3.3*     Recent Labs     03/17/22 0454 03/16/22 0642 03/15/22  0421   WBC 9.4 8.0 8.4   HGB 7.2* 7.6* 8.7*   HCT 21.2* 22.7* 25.7*   PLT 39* 42* 34*     No results found for: SDES  Lab Results   Component Value Date/Time    Culture result: NO GROWTH THUS FAR 03/14/2022 06:48 PM    Culture result: NO GROWTH 4 DAYS 03/12/2022 06:00 PM    Culture result: CANDIDA ALBICANS (A) 03/05/2022 09:22 PM     Recent Results (from the past 24 hour(s))   RENAL FUNCTION PANEL    Collection Time: 03/17/22  4:54 AM   Result Value Ref Range    Sodium 129 (L) 136 - 145 mmol/L    Potassium 3.9 3.5 - 5.1 mmol/L    Chloride 99 97 - 108 mmol/L    CO2 15 (LL) 21 - 32 mmol/L    Anion gap 15 5 - 15 mmol/L    Glucose 113 (H) 65 - 100 mg/dL     (H) 6 - 20 MG/DL    Creatinine 4.90 (H) 0.55 - 1.02 MG/DL    BUN/Creatinine ratio 30 (H) 12 - 20      GFR est AA 11 (L) >60 ml/min/1.73m2    GFR est non-AA 9 (L) >60 ml/min/1.73m2    Calcium 9.2 8.5 - 10.1 MG/DL    Phosphorus 7.8 (H) 2.6 - 4.7 MG/DL    Albumin 3.9 3.5 - 5.0 g/dL   METABOLIC PANEL, BASIC    Collection Time: 03/17/22  4:54 AM   Result Value Ref Range    Sodium 129 (L) 136 - 145 mmol/L    Potassium 3.9 3.5 - 5.1 mmol/L    Chloride 99 97 - 108 mmol/L    CO2 16 (L) 21 - 32 mmol/L    Anion gap 14 5 - 15 mmol/L    Glucose 110 (H) 65 - 100 mg/dL     (H) 6 - 20 MG/DL    Creatinine 4.79 (H) 0.55 - 1.02 MG/DL    BUN/Creatinine ratio 32 (H) 12 - 20      GFR est AA 11 (L) >60 ml/min/1.73m2    GFR est non-AA 9 (L) >60 ml/min/1.73m2    Calcium 9.2 8.5 - 10.1 MG/DL   CBC W/O DIFF    Collection Time: 03/17/22  4:54 AM   Result Value Ref Range    WBC 9.4 3.6 - 11.0 K/uL    RBC 2.29 (L) 3.80 - 5.20 M/uL    HGB 7.2 (L) 11.5 - 16.0 g/dL    HCT 21.2 (L) 35.0 - 47.0 %    MCV 92.6 80.0 - 99.0 FL    MCH 31.4 26.0 - 34.0 PG    MCHC 34.0 30.0 - 36.5 g/dL    RDW 24.1 (H) 11.5 - 14.5 %    PLATELET 39 (LL) 341 - 400 K/uL    MPV 11.0 8.9 - 12.9 FL    NRBC 0.0 0  WBC    ABSOLUTE NRBC 0.00 0.00 - 0.01 K/uL                                       Care Plan discussed with:  Patient     Family      RN      Consulting Physician [facility],         I have reviewed the flowsheets. Chart and Pertinent Notes have been reviewed. No change in PMH ,family and social history from Consult note.       Francia Damico MD

## 2022-03-17 NOTE — ADT AUTH CERT NOTES
Comments  Comment            Patient Demographics    Patient Name   Karla Rush   [id number] Legal Sex   Female    1960 Address   [place] [address] Phone   [phone] (Home)   [phone] (Mobile) *Preferred*     Patient Demographics    Patient Name   Karla Rush   [id number] Legal Sex   Female    1960 [address]   [facility] [address]   [phone] (Home)   [phone] (Mobile) *Preferred*   CSN:   [id number]     Admit Date: Admit Time Room Bed   Mar 5, 2022  2:14  [12914] 51 [30903]       Attending Providers    Provider Pager From To   Pato Sandhu MD  22   Eliceo Lopez MD  22   Tori Hirsch MD  03/06/22 03/10/22   Sravani Cordova MD  03/10/22 03/17/22   Milton Dougherty, Dyllan Portillo MD  22      Emergency Contact(s)    Name Relation Home Work HanoverSofía mendez Son [phone]   Cortez Peters Spouse [phone]   Wang Haas [phone]     Utilization Reviews         Liver Disease Complications - Care Day 13 (3/17/2022) by Gavi Bell       Review Entered Review Status   3/17/2022 15:40 Completed      Criteria Review      Care Day: 13 Care Date: 3/17/2022 Level of Care: Inpatient Floor    Guideline Day 2    Level Of Care    (X) Floor    3/17/2022 15:28:56 EDT by [place] Sable      IP FLOOR    Clinical Status    (X) * Hypoxemia absent    3/17/2022 15:37:47 EDT by Gavi Bell      RR 16-18, 98% RA    (X) * Hemodynamic stability    3/17/2022 15:37:47 EDT by Gavi Bell      HR 71-75, BP 94//46    ( ) * Ascites absent, stable, or improved    3/17/2022 15:40:38 EDT by Gavi Bell      Abdomen flat, normoactive, diffusely tender without rebound or guarding.    (X) * Mental status at baseline or improved    3/17/2022 15:37:58 EDT by Gavi Bell      Awake, alert today.  Nonfocal exam Activity    (X) Activity as tolerated    3/17/2022 15:38:18 EDT by Carlos Joe      activity as tolerated w/assist    Routes    ( ) * Clear liquid diet    3/17/2022 15:38:34 EDT by Carlos Joe      adult tube feeding    (X) IV fluids and medication    3/17/2022 15:39:12 EDT by Carlos Joe      albumin human 25g iv q6hr, lactulose 30ml po q6hr, midodrine 15 mg po tid, octreotide 100 mcg iv tid, protonix 40 mg po qd, thiamine 100 mg po qd    Interventions    (X) WBC    3/17/2022 15:38:34 EDT by Carlos Joe      WBC: 9.4    Medications    ( ) Possible diuretics and antibiotics    3/17/2022 15:39:19 EDT by Carlos Joe      xifaxan 550 mg po bid    * Milestone   Additional Notes   3/17/22   IP MEDICAL      ATTENDING NOTE   Interval history / Subjective:   3/17/2022 : Patient seen for follow-up of decompensated liver cirrhosis and hepatic encephalopathy.  Patient seen and examined earlier this morning by me. Rosendo Mccormack is responsive and not encephalopathic at this time, though she is slower to respond.  No acute complaints at this time, however she does report that she has been bleeding from the nose.  No blood at this time.       GENERAL:      Chronically sick looking, alert and awake, not in distress. HEENT:           Deeply icteric.  NGT in place. No active bleeding   NECK: Supple, trachea midline, no adenopathy, no thyromegally or tenderness, no carotid bruit and no JVD. LUNGS:           Vesicular breath sounds bilaterally, no added sounds. HEART:            S1 and S2 well heard,RRR,  no murmur, click, rub or gallop. ABDOMEB:     Abdomen flat, normoactive, diffusely tender without rebound or guarding. Beckford catheter draining deep yellow (jaundice) urine. EXTREMETIES: No peripheral edema.  Bilateral wrist restraints in place   PULSES:         2+ and symmetric all extremities. SKIN:   Scattered ecchymosis, a large 1 on her upper chest   NEUROLOGY: Awake, alert today.  Nonfocal exam. Assessment & Plan:   Decompensated liver cirrhosis, with portal hypertension.  Felt to be secondary to [facility]   Transaminitis/hyperbilirubinemia/coagulopathy/thrombocytopenia   -Transferred from [address] [place] [address] with prolonged hospitalization.  Transferred for further work-up for liver transplant eval   -EGD on 2/17 [place] noted for distal esophagitis and portal hypertensive gastropathy, no evidence of varices.     -history of esophageal varices back in November 2021 status post banding at that time.     -Per GI at outlying facility, patient was deemed not to be a candidate for colonoscopy and recommended Cologuard.     -s/p MRCP on 2/18 which was noted for cirrhosis, portal hypertension, and large ascites.    -s/p cardiac stress test which was negative for ischemia, EF of 69%   -Dr Ed Lim following   -Continue lactulose, rifaximin, octreotide, Protonix p.o.   -Closely monitor stool output frequency   -Continue ursodiol   Continue current management at this time       History of hepatic encephalopathy with refractory ascites   -multiple paracentesis done at [facility] including 2/11 with 11.8 L removal, 2/18 with 9/2 L removed, and 2/28 with 9.2 L removed. -Currently with distended abdomen   -Moderate large ascites on US 3/6. Ascites said to be refractoyr to diuretics,HRS limiting use of diuretics   -Ascitic drain that was placed on 3/12 was apparently clogged, flushed and drained significant amount of ascites.  Ascites almost gone.  Added IV albumin.   -Ascitic fluid lab negative for SBP   Improved       GREG with metabolic acidosis. Creatinine worsened from 0.9 on 11/26/2021 up to 4-5 now   -[facility]   -Nephrology following,recommendaitons appreciated.       Chronic normocytic anemia   -Possible anemia of CKD   Patient's hemoglobin came back 6.9 today.  I have ordered 1 unit of packed red blood cells to be transfused.  Repeat H&H posttransfusion.    Continue to monitor daily       Hyponatremia -Suspect secondary to underlying liver cirrhosis.    -Na stable 128-130       Recent E. coli UTI   -Treated with Vanco and Zosyn then transitioned to Rocephin, completed 7-day antibiotic course as of 2/17   -Replace Beckford with new catheter 3/5 .  Urine culture on 3/5 grew Candida albicans 75,000 CFU. Hypothyroid   -Continue levothyroxine       Severe malnutrition   --Supplemented with tube feeding via NGT.  Patient has been pulling the NGT 2 days in a row and requiring restraint.  She needs ongoing nutritional supplement I think she may need PEG allergies becoming difficult to sustain NGT and I am concerned that she may aspirate when she attempts to pull the feeding tube.  Discussed with Dr. Irvin Shoemaker. --On regular diet, intake is minimal.       Lines/catheters/drainage   --Beckford catheter in place, replaced on 3/5   --NGT in place       Generalized weakness/chronic wounds. pta   -PT, OT consult   -Wound care consulted         PHYSICAL THERAPY NOTE   progressing towards goals. Patient more willing to participate today with less encouragement required by PT.  Patient in supine in room without family visiting. Patient required min A x for rolling, max A supine to sit. Sitting EOB patient with mild c/o of lightheadedness however no orthostatic BP noted. Patient able to sit EOB for 3 minutes. Patient eventually agreeable to attempt sit to stand transfer. Required mod A x 1 for transfer today. Able to stand supported for 30 seconds before request to sit. No orthostatic BP noted with standing. This is the first transfer to stand this week.  Patient with improved mood and more talkative today. Patient reported fatigue and requested return to supine with mod A x 1         NEPHROLOGY NOTE   Date of Service:  03/17/22 1155   Seen and examined.  Resting in bed today.  Appears to be feeling better today.  Family meeting with Dr. Tahir Yañez yesterday. Marvin Bey to continue current care.    Assessment and Plan    GREG:   - likely 2/2 HRS   - U/S neg for obstruction   - Cr essentially unchanged since arrival   - cont midodrine + octreotide   - continue albumin infusions   - give 1 L of IVF + bcb   - daily labs   - keep steve for now   Metabolic acidosis   - unable to take pills   - bicarb drip  X 1 L   Hyponatremia   -Secondary to liver cirrhosis   - Na stable   Severe anemia:   - 1 unit of PRBCs 3/8   - hgb stable   Decompensated liver cirrhosis   -With portal hypertension, primary biliary cholangitis   -Requiring multiple paracenteses, most recently 2/28/2022 with 9.2 L removed   -Hepatology following   Hypothyroidism   Malnutrition:   - getting TF via DHT         LABS   WBC: 9.4   HGB: 7.2 (L), 6.9 (L)   HCT: 21.2 (L), 20.3 (L)   PLATELET: 39 (LL)   Sodium: 129 (L)   Potassium: 3.9   Glucose: 110 (H)   BUN: 155 (H)   Creatinine: 4.79 (H)   Calcium: 9.2   NO IMAGING 3/17      TRANSFUSED: 1 unit of PRBC ordered      VS:T 98.1F, HR 71-75, BP 94//46, RR 16-18, 98% RA   adult tube feeding, activity as tolerated w/assist,pt/ot, steve catheter        Liver Disease Complications - Care Day 10 (3/14/2022) by Chioma Khalil, RN       Review Entered Review Status   3/15/2022 14:58 Completed      Criteria Review      Care Day: 10 Care Date: 3/14/2022 Level of Care: Inpatient Floor    Guideline Day 2    Level Of Care    (X) Floor    3/15/2022 14:46:54 EDT by Frank Jolly [id number] bed    Clinical Status    (X) * Hypoxemia absent    3/15/2022 14:46:54 EDT by Chioma Khalil      98-99% ra    ( ) * Hemodynamic stability    3/15/2022 14:46:54 EDT by Chioma Khalil      98.6-77-20, 121/55  BP low 103/45    ( ) * Ascites absent, stable, or improved    3/15/2022 14:46:54 EDT by Pily Bell still has significant ascites    (X) * Mental status at baseline or improved    3/15/2022 14:46:54 EDT by Chioma Khalil      A/O x4    Activity    (X) Activity as tolerated    3/15/2022 14:46:54 EDT by Chioma Khalil      OT/PT Routes    (X) * Clear liquid diet    3/15/2022 14:46:54 EDT by Yadiel Manriquez      Regular Low Na diet plus TF via NGT    ( ) IV fluids and medication    3/15/2022 14:58:16 EDT by Yadiel Manriquez      Octreotide 100mcg IV tid - refused, Zofran 4mg IV x1    Interventions    (X) WBC    * Milestone   Additional Notes   3/13/22        LOC: INPT    MEDICAL BED        H&H 8.4/25.4, Plat 41, Na 131, CO2 19, Glu 144, , Cr 4.80, GFR 9, Alb 3.4        MEDS: Folvite 1mg po qd, Lactulose 30ml po q6, Synthroid 50mcg po qd, Midodrine 15mg po tid,  Protonix 40mg po qd, Xifaxan 550mg po bid, Zoloft 100mg po qd, B1 100mg po qd, Actigall 500mg po q12h- REFUSED ALL        Orders: DNR, CBC/Renal panel/BMO daily, Cardiac monitor, SCDs      NEPHROLOGY      Resting in bed today.   Pulled out DHT, refusing meds overnight.   Cr stable overall.  Voiding ok      Assessment and Plan    GREG:   - likely 2/2 HRS   - U/S neg for obstruction   - Cr essentially unchanged since arrival   - cont midodrine + octreotide   - daily labs while here   - no urgent needs for RRT       Metabolic acidosis   - improving       Hypokalemia:   - IV repletion ordered       Hyponatremia   -Secondary to liver cirrhosis   - Na stable       Severe anemia:   - 1 unit of PRBCs 3/8   - hgb stable       Decompensated liver cirrhosis   -With portal hypertension, primary biliary cholangitis   -Requiring multiple paracenteses, most recently 2/28/2022 with 9.2 L removed   -Hepatology following       Hypothyroidism            HOSPITALIST      · Over night ,pulled out NGT. NGT replaced and TF resumed. · She still has significant ascites by exam despite peritoneal drain. May need repositioning or repalcement      GENERAL:      Chronically sick looking, alert and awake, not in distress. HEENT:           Deeply icteric.  NGT in place. NECK: Supple, trachea midline, no adenopathy, no thyromegally or tenderness, no carotid bruit and no JVD.    LUNGS:           Vesicular breath sounds bilaterally, no added sounds. HEART:            S1 and S2 well heard,RRR,  no murmur, click, rub or gallop. ABDOMEB:     Distended, diffusely tender,no rebound, normoactive. Ascitic drain in the right abdomen   Beckford catheter draining deep yellow (jaundice) urine   EXTREMETIES: No peripheral edema. PULSES:         2+ and symmetric all extremities. SKIN:   Scattered ecchymosis, a large 1 on her upper chest   NEUROLOGY: Awake, lethargic but arousable and communicative      Assessment & Plan:        Decompensated liver cirrhosis, with portal hypertension.  Felt to be secondary to [facility]   Transaminitis/hyperbilirubinemia/coagulopathy/thrombocytopenia   -Transferred from [address] [place] [address] with prolonged hospitalization.  Transferred for further work-up for liver transplant eval   -EGD on 2/17 [place] noted for distal esophagitis and portal hypertensive gastropathy, no evidence of varices.     -history of esophageal varices back in November 2021 status post banding at that time.     -Per GI at outlying facility, patient was deemed not to be a candidate for colonoscopy and recommended Cologuard.     -s/p MRCP on 2/18 which was noted for cirrhosis, portal hypertension, and large ascites.    -s/p cardiac stress test which was negative for ischemia, EF of 69%   -Dr Mitesh Pruitt following   -Continue lactulose, rifaximin, octreotide, Protonix p.o.   -Closely monitor stool output frequency   -Continue ursodiol       History of hepatic encephalopathy with refractory ascites   -multiple paracentesis done at [facility] including 2/11 with 11.8 L removal, 2/18 with 9/2 L removed, and 2/28 with 9.2 L removed. -Currently with distended abdomen   -Moderate large ascites on US 3/6. Ascites said to be refractoyr to diuretics,HRS limiting use of diuretics   -Increasing abdominal distention, paracentesis performed and peritoneal drainage catheter left in place, 3/12,no draining much and still has significant ascites,requested radiology to check for repositioning or replacement            GREG with metabolic acidosis. Creatinine worsened from 0.9 on 11/26/2021 up to 4-5 now   -Likely due to [facility]   -Nephrology following,recommendaitons appreciated.                   Chronic normocytic anemia   -Possible anemia of CKD   -S/P one unit prbc on 3/7       Hyponatremia   -Suspect secondary to underlying liver cirrhosis.    -Na stable 128-130       Recent E. coli UTI   -Treated with Vanco and Zosyn then transitioned to Rocephin, completed 7-day antibiotic course as of 2/17   -Replace Beckford with new catheter 3/5 .  Urine culture on 3/5 grew Candida albicans 75,000 CFU. -       Hypothyroid   -Continue levothyroxine       Severe malnutrition   --Supplemented with tube feeding via NGT.   --On regular diet.       Lines/catheters/drainage   --Beckford catheter in place, replaced on 3/5   --NGT in place               Generalized weakness/chronic wounds. pta   -PT, OT consult   -Wound care consulted       DVT prophylaxis SCDs for now.  No pharmacological prophylaxis due to thrombocytopenia   DNR    Prognosis: Guarded      PT NOTE      ASSESSMENT   Patient continues with skilled PT services and is progressing towards goals. Patient originally refusing to participate but with encouragement from PT and son, at bedside, patient agreeable to try. Bassam Gonsales in and out of sleep at beginning of session, very soft spoken, states she is weak. Patient educated on importance of moving out of supine position and participation in mobility. Patient noted to have very distended abdomen. MD in to see while PT in session who noted minimal draining and distention. Patient required mod A x 1 for rolling, supine to sit and sit to supine. Patient tolerated sitting EOB with BUE support for 2 minutes 30 seconds with max encouragement. Patient with strong complaints of nausea upon sitting which remained even after returning to supine position for several minutes. BP stable.  Patient educated on BLE exercises in bed. Son and patient hopeful to be able to transfer/stand once some of the abdominal distention reduces. Patient will continue to benefit from skilled PT as she is willing and able to participate.

## 2022-03-17 NOTE — PROGRESS NOTES
[facility] Adult  Hospitalist Group                                                                                          Hospitalist Progress Note  Chip Puentes MD  Answering service: [phone] from in house phone        Date of Service:  3/17/2022  NAME:  Sanjeev Noe  :  1960  MRN:  [id number]      Admission Summary:   Copied form admit: \" Sanjeev Noe is a 65 y.o. female with history of liver cirrhosis with portal hypertension,recently diagnosed primary biliary cholangitis, recurrent ascites requiring paracentesis approximately every 7 to 10 days who presents here transferred from [address] [place] [address] secondary to decompensated liver cirrhosis, worsening renal insufficiency, progressive weakness currently undergoing work-up for possible liver transplant. The patient of note initially presented to [address] [place] [address] on 2022 secondary to hepatic encephalopathy and progressive weakness. Patient on admission was found at that time to have an ammonia level of 111 and did subsequently require paracentesis in the ED. The patient has had a prolonged and complicated hospital course since that time and has undergone multiple paracentesis including  with 11.8 L removal,  with 9/2 L removed, and  with 9.2 L removed. The patient also did have episodes of transient hypotension requiring a brief ICU admission for pressor support and has periodically received albumin with daily midodrine use. She was followed both by GI and nephrology services. Patient did undergo an EGD on  noted for distal esophagitis and portal hypertensive gastropathy, no evidence of varices. According to the son Mariella Chirinos at bedside, the patient does have a history of esophageal varices back in 2021 status post banding at that time. Per GI at outlying facility, patient was deemed not to be a candidate for colonoscopy and recommended Cologuard.   Part of her liver transplant evaluation at [facility] also included an MRCP was done on 2/18 which was noted for cirrhosis, portal hypertension, and large ascites. She has also undergone a cardiac stress test which was negative, EF of 69%. Patient has been maintained on lactulose, rifaximin, octreotide, and Protonix at the [facility]. Patient has had progressive worsening of her renal function at the [facility] with creatinine peak at 5.2 on 3/3 and associated metabolic acidosis requiring management with bicarb drip that was maintained as of this morning at [facility]. Per medical records, there was concern that patient may be moving towards initiation of possible hemodialysis. According to son, patient does not have any previous establish care with nephrology and was not aware of any kidney issues prior to current hospitalization. Patient also was treated with with a 7-day course of antibiotics for E. coli UTI, which were completed as of 2/17. The patient had has been seen by Dr. Griselda Ramirez hepatologist for an initial visit in January to start the process of liver transplant work-up. Given lack of progression of patient's care prolonged hospitalization at [facility], request was made for patient to be transferred to [facility] to continue further liver transplant work-up and accepted by Dr. Griselda Ramirez for transfer under the hospitalist service. Patient of note is currently lethargic and poorhistorian therefore history is obtained via discussion with her son Romain Zayas at the bedside and review of medical records. \"    Interval history / Subjective:     3/17/2022 :   · Patient seen for follow-up of decompensated liver cirrhosis and hepatic encephalopathy. Patient seen and examined earlier this morning by me. Patient is responsive and not encephalopathic at this time, though she is slower to respond. No acute complaints at this time, however she does report that she has been bleeding from the nose. No blood at this time. Assessment & Plan:      Decompensated liver cirrhosis, with portal hypertension. Felt to be secondary to [facility]  Transaminitis/hyperbilirubinemia/coagulopathy/thrombocytopenia  -Transferred from [facility] with prolonged hospitalization. Transferred for further work-up for liver transplant eval  -EGD on 2/17 [place] noted for distal esophagitis and portal hypertensive gastropathy, no evidence of varices.    -history of esophageal varices back in November 2021 status post banding at that time.    -Per GI at outlying facility, patient was deemed not to be a candidate for colonoscopy and recommended Cologuard.    -s/p MRCP on 2/18 which was noted for cirrhosis, portal hypertension, and large ascites. -s/p cardiac stress test which was negative for ischemia, EF of 69%  -Dr Antonio Da Silva following  -Continue lactulose, rifaximin, octreotide, Protonix p.o.  -Closely monitor stool output frequency  -Continue ursodiol  Continue current management at this time    History of hepatic encephalopathy with refractory ascites  -multiple paracentesis done at [facility] including 2/11 with 11.8 L removal, 2/18 with 9/2 L removed, and 2/28 with 9.2 L removed. -Currently with distended abdomen  -Moderate large ascites on US 3/6. Ascites said to be refractoyr to diuretics,HRS limiting use of diuretics  -Ascitic drain that was placed on 3/12 was apparently clogged, flushed and drained significant amount of ascites. Ascites almost gone. Added IV albumin.  -Ascitic fluid lab negative for SBP  Improved        GREG with metabolic acidosis. Creatinine worsened from 0.9 on 11/26/2021 up to 4-5 now  -[facility]  -Nephrology following,recommendaitons appreciated.       Chronic normocytic anemia  -Possible anemia of CKD  Patient's hemoglobin came back 6.9 today. I have ordered 1 unit of packed red blood cells to be transfused. Repeat H&H posttransfusion.   Continue to monitor daily     Hyponatremia  -Suspect secondary to underlying liver cirrhosis.   -Na stable 128-130     Recent E. coli UTI  -Treated with Vanco and Zosyn then transitioned to Rocephin, completed 7-day antibiotic course as of 2/17  -Replace Beckford with new catheter 3/5 . Urine culture on 3/5 grew Candida albicans 75,000 CFU. -     Hypothyroid  -Continue levothyroxine    Severe malnutrition  --Supplemented with tube feeding via NGT. Patient has been pulling the NGT 2 days in a row and requiring restraint. She needs ongoing nutritional supplement I think she may need PEG allergies becoming difficult to sustain NGT and I am concerned that she may aspirate when she attempts to pull the feeding tube. Discussed with Dr. Say Aleman. --On regular diet, intake is minimal.    Lines/catheters/drainage  --Beckford catheter in place, replaced on 3/5  --NGT in place          Generalized weakness/chronic wounds. pta  -PT, OT consult  -Wound care consulted    Pathology discuss goals of care with patient and family. Daily reevaluation with patient and family.     DVT prophylaxis SCDs for now. No pharmacological prophylaxis due to thrombocytopenia  DNR   Prognosis: Guarded  Her son, Sorin Pate updated at the bedside, 3/11 and her mother on 3/16     Hospital Problems  Date Reviewed: 2/17/2022          Codes Class Noted POA    Cirrhosis [facility]) ICD-10-CM: K74.60  ICD-9-CM: 571.5  2/8/2022 Unknown                      Data Review:    Review and/or order of clinical lab test    GENERAL:  Chronically sick looking, alert and awake, not in distress. HEENT:  Deeply icteric. NGT in place. No active bleeding  NECK: Supple, trachea midline, no adenopathy, no thyromegally or tenderness, no carotid bruit and no JVD. LUNGS:   Vesicular breath sounds bilaterally, no added sounds. HEART:   S1 and S2 well heard,RRR,  no murmur, click, rub or gallop. ABDOMEB:   Abdomen flat, normoactive, diffusely tender without rebound or guarding. Beckford catheter draining deep yellow (jaundice) urine.   EXTREMETIES: No peripheral edema.  Bilateral wrist restraints in place  PULSES: 2+ and symmetric all extremities. SKIN: Scattered ecchymosis, a large 1 on her upper chest  NEUROLOGY: Awake, alert today. Nonfocal exam.      Labs:     Recent Labs     03/17/22  1202 03/17/22  0454 03/16/22 0642 03/16/22 0642   WBC  --  9.4  --  8.0   HGB 6.9* 7.2*   < > 7.6*   HCT 20.3* 21.2*   < > 22.7*   PLT  --  39*  --  42*    < > = values in this interval not displayed. Recent Labs     03/17/22  0454 03/16/22  0642 03/15/22  0421   *  129* 127* 129*  129*   K 3.9  3.9 4.2 4.2  4.2   CL 99  99 99 99  100   CO2 15*  16* 18* 16*  19*   *  155* 135* 122*  131*   CREA 4.90*  4.79* 4.76* 4.76*  4.73*   *  110* 134* 103*  101*   CA 9.2  9.2 8.9 9.1  9.1   PHOS 7.8* 6.5* 5.4*     Recent Labs     03/17/22  0454 03/16/22 0642 03/15/22  0421   ALB 3.9 3.3* 3.3*     No results for input(s): INR, PTP, APTT, INREXT, INREXT in the last 72 hours. No results for input(s): FE, TIBC, PSAT, FERR in the last 72 hours. Lab Results   Component Value Date/Time    Folate 38.3 (H) 10/20/2021 07:53 AM    RBC FOLATE 878 02/14/2022 01:27 AM      No results for input(s): PH, PCO2, PO2 in the last 72 hours. No results for input(s): CPK, CKNDX, TROIQ in the last 72 hours.     No lab exists for component: CPKMB  Lab Results   Component Value Date/Time    Cholesterol, total 122 01/25/2022 09:50 AM    HDL Cholesterol 40 01/25/2022 09:50 AM    LDL, calculated 67.2 01/25/2022 09:50 AM    Triglyceride 74 01/25/2022 09:50 AM    CHOL/HDL Ratio 3.1 01/25/2022 09:50 AM     Lab Results   Component Value Date/Time    Glucose (POC) 128 (H) 03/15/2022 06:06 AM    Glucose (POC) 123 (H) 03/15/2022 12:14 AM    Glucose (POC) 114 (H) 02/23/2022 05:42 PM    Glucose (POC) 127 (H) 02/23/2022 12:04 PM    Glucose (POC) 122 (H) 02/23/2022 08:18 AM     Lab Results   Component Value Date/Time    Color DARK YELLOW 03/05/2022 09:22 PM    Appearance TURBID (A) 03/05/2022 09:22 PM    Specific gravity 1.014 03/05/2022 09:22 PM    Specific gravity 1.010 02/11/2022 07:20 PM    pH (UA) 5.5 03/05/2022 09:22 PM    Protein 100 (A) 03/05/2022 09:22 PM    Glucose Negative 03/05/2022 09:22 PM    Ketone Negative 03/05/2022 09:22 PM    Bilirubin NEGATIVE 02/11/2022 07:20 PM    Urobilinogen 0.2 03/05/2022 09:22 PM    Nitrites Negative 03/05/2022 09:22 PM    Leukocyte Esterase LARGE (A) 03/05/2022 09:22 PM    Epithelial cells FEW 03/05/2022 09:22 PM    Bacteria 1+ (A) 03/05/2022 09:22 PM    WBC >100 (H) 03/05/2022 09:22 PM    RBC  03/05/2022 09:22 PM         Medications Reviewed:     Current Facility-Administered Medications   Medication Dose Route Frequency    sodium bicarbonate (8.4%) 75 mEq in 0.45% sodium chloride 1,000 mL infusion   IntraVENous CONTINUOUS    0.9% sodium chloride infusion 250 mL  250 mL IntraVENous PRN    albumin human 25% (BUMINATE) solution 25 g  25 g IntraVENous Q6H    multivitamin, tx-iron-ca-min (THERA-M w/ IRON) tablet 1 Tablet  1 Tablet Oral DAILY    0.9% sodium chloride infusion 250 mL  250 mL IntraVENous PRN    0.9% sodium chloride infusion 250 mL  250 mL IntraVENous PRN    miconazole (MICONTIN) 2 % ointment   Topical BID    balsam peru-castor oiL (VENELEX) ointment   Topical BID    zinc oxide-cod liver oil (DESITIN) 40 % paste   Topical PRN    sodium chloride (NS) flush 5-40 mL  5-40 mL IntraVENous Q8H    sodium chloride (NS) flush 5-40 mL  5-40 mL IntraVENous PRN    acetaminophen (TYLENOL) tablet 650 mg  650 mg Oral Q6H PRN    Or    acetaminophen (TYLENOL) suppository 650 mg  650 mg Rectal Q6H PRN    polyethylene glycol (MIRALAX) packet 17 g  17 g Oral DAILY PRN    ondansetron (ZOFRAN ODT) tablet 4 mg  4 mg Oral Q8H PRN    Or    ondansetron (ZOFRAN) injection 4 mg  4 mg IntraVENous U2M PRN    folic acid (FOLVITE) tablet 1 mg  1 mg Oral DAILY    lactulose (CHRONULAC) 10 gram/15 mL solution 30 mL  30 mL Oral Q6H    levothyroxine (SYNTHROID) tablet 50 mcg  50 mcg Oral 6am    midodrine (PROAMATINE) tablet 15 mg  15 mg Oral TID WITH MEALS    octreotide (SANDOSTATIN) injection 100 mcg  100 mcg IntraVENous TID    pantoprazole (PROTONIX) tablet 40 mg  40 mg Oral DAILY    rifAXIMin (XIFAXAN) tablet 550 mg  550 mg Oral BID    sertraline (ZOLOFT) tablet 100 mg  100 mg Oral DAILY    thiamine HCL (B-1) tablet 100 mg  100 mg Oral DAILY    ursodioL (ACTIGALL) tablet 500 mg (Patient Supplied)  500 mg Oral Q12H    glucagon (GLUCAGEN) injection 1 mg  1 mg IntraMUSCular PRN    naloxone (NARCAN) injection 0.1 mg  0.1 mg IntraVENous PRN    promethazine (PHENERGAN) 6.25 mg in 0.9% sodium chloride 50 mL IVPB  6.25 mg IntraVENous Q6H PRN     ______________________________________________________________________  EXPECTED LENGTH OF STAY: 4d 16h  ACTUAL LENGTH OF STAY:          12                 Claudia Villarreal MD

## 2022-03-17 NOTE — PROGRESS NOTES
Problem: Mobility Impaired (Adult and Pediatric)  Goal: *Acute Goals and Plan of Care (Insert Text)  Description: FUNCTIONAL STATUS PRIOR TO ADMISSION: Patient appears to be an inconsistent historian despite being oriented x 4. Originally stated she primarily stays in bed-later she reported ambulating a few times a day and spending most of her day in a recliner. She consistently reports that son assists her \"with everything\" but does not give more details on what exactly or how much assistance. HOME SUPPORT PRIOR TO ADMISSION: The patient lived with son and her mother per report. Unclear how much assistance she required. Physical Therapy Goals  Initiated 3/6/2022  1. Patient will move from supine to sit and sit to supine , scoot up and down, and roll side to side in bed with minimal assistance/contact guard assist within 7 day(s). 2.  Patient will transfer from bed to chair and chair to bed with moderate assistance  using the least restrictive device within 7 day(s). 3.  Patient will perform sit to stand with moderate assistance  within 7 day(s). 4.  Patient will ambulate with moderate assistance  for 20 feet with the least restrictive device within 7 day(s). 5.  Patient will ascend/descend 2 stairs with 2 handrail(s) with moderate assistance  within 7 day(s). Outcome: Progressing Towards Goal     PHYSICAL THERAPY TREATMENT  Patient: Paige Camacho (38 y.o. female)  Date: 3/17/2022  Diagnosis: [facility] [K74.60] <principal problem not specified>       Precautions: Fall  Chart, physical therapy assessment, plan of care and goals were reviewed. ASSESSMENT  Patient continues with skilled PT services and is progressing towards goals. Patient more willing to participate today with less encouragement required by PT. Patient in supine in room without family visiting. Patient required min A x for rolling, max A supine to sit.  Sitting EOB patient with mild c/o of lightheadedness however no orthostatic BP noted. Patient able to sit EOB for 3 minutes. Patient eventually agreeable to attempt sit to stand transfer. Required mod A x 1 for transfer today. Able to stand supported for 30 seconds before request to sit. No orthostatic BP noted with standing. This is the first transfer to stand this week. Patient with improved mood and more talkative today. Patient reported fatigue and requested return to supine with mod A x 1. Nursing notified of progress today. Patient will continue to benefit from skilled PT as patient is agreeable to participate and motivated to make progress. Current Level of Function Impacting Discharge (mobility/balance): transferred with mod A x 1 today, required BUE support for static standing for 30 seconds    Other factors to consider for discharge: will need continued progress to qualify for transfer for liver transplant         PLAN :  Patient continues to benefit from skilled intervention to address the above impairments. Continue treatment per established plan of care. to address goals. Recommendation for discharge: (in order for the patient to meet his/her long term goals)  To be determined: Patient goal to transfer to [facility] for liver transplant    This discharge recommendation:  A follow-up discussion with the attending provider and/or case management is planned    IF patient discharges home will need the following DME: to be determined (TBD)       SUBJECTIVE:   Patient stated I might try to stand today.     OBJECTIVE DATA SUMMARY:   Critical Behavior:  Neurologic State: Alert  Orientation Level: Oriented to person,Oriented to place  Cognition: Impaired decision making,Decreased attention/concentration  Safety/Judgement: Awareness of environment  Functional Mobility Training:  Bed Mobility:  Rolling: Minimum assistance;Assist x1  Supine to Sit: Maximum assistance;Assist x1  Sit to Supine:  Moderate assistance;Assist x1  Scooting: Minimum assistance;Assist x1 Transfers:  Sit to Stand: Moderate assistance;Assist x1  Stand to Sit: Moderate assistance;Assist x1          Balance:  Sitting: Intact; Without support  Sitting - Static: Fair (occasional)  Sitting - Dynamic: Occassional  Standing - Static: Constant support;Occasional  Ambulation/Gait Training:      N/a      Activity Tolerance:   Fair and requires rest breaks    After treatment patient left in no apparent distress:   Supine in bed, Call bell within reach, and Side rails x 3    COMMUNICATION/COLLABORATION:   The patients plan of care was discussed with: Registered nurse.      Bereket Cervantes, PT   Time Calculation: 24 mins

## 2022-03-17 NOTE — PROGRESS NOTES
5E RN called to request assistance with an IV start on this patient. Procedure explained to patient. A 20 G IV was placed on first attempt in the inner aspect of the patients left wrist. One tube of blood was drawn and handed to the bedside RN, Ivana Acevedo. The IV caridad back blood easily and flushed easily. The IV was secured with OP Site and tape. Thank you for allowing us to provide care for this patient. [address] [facility].

## 2022-03-17 NOTE — PROGRESS NOTES
RUR:  23% High     SELMA: TBD. BLS transport once medically stable. Follow-up with PCP/specialist.     Primary Contact: Son, Ambrosio Peters, [phone]     3:30PM - CM reviewed chart. Per review and ID rounds, patient is not medically stable for discharge at this time. Plan for continued aggressive nutritional support; patient receiving Dobhoff tube feedings. Patient continues to work with PT & OT. Patient and son are hopeful that with nutritional treatment and therapy she may become a candidate for a liver transplant at [facility].     Family conference held with Dr. Dannie Colunga. Plan of care discussed with patient and son. Per note, patient and son wish to continue with current plan of care. Hospice care discussed, but at this time patient and son do not want wish to move forward with hospice. Discharge pending medical progress and final recommendations.  CM to continue to follow as needed.     Ever Grace, YIFAN   [phone]

## 2022-03-17 NOTE — PROGRESS NOTES
Discussed giving patient her oral medications, I listed to her what I had brought, she said she would take them one at a time. I gave her the B1 then the Xifaxin and after that she said \"I'm not taking any more medications\". When asked why, she stated that she was \"just done with it\" and appeared to be a little emotionally exhausted. The therapist was heading in at that time to attempt to work with the patient.

## 2022-03-17 NOTE — PROGRESS NOTES
Problem: Self Care Deficits Care Plan (Adult)  Goal: *Acute Goals and Plan of Care (Insert Text)  Description: FUNCTIONAL STATUS PRIOR TO ADMISSION: Chart review 2/23/2022 patient modified independent with RW. Son states recently increased assistance to [facility] with functional mobility and sit<>stand; tangential at times during conversation, but aware and able to be redirected. HOME SUPPORT: The patient lived with mother who provided assistance as needed and son lives nearby. 1 step to enter, stays on main level, walk-in-shower, DME: Shower chair, RW, BSC, w/c    Occupational Therapy Goals  Initiated 3/8/2022, Re-assessed to increase frequency of services on 3/9/2022 due to significant improvement in patient's performance . Reviewed 3/17/2022  1. Patient will perform grooming with minimal assistance/contact guard assist within 7 day(s). 2.  Patient will perform bathing from anterior neck to thigh with minimal assistance/contact guard assist within 7 day(s). 3.  Patient will perform upper body dressing with minimal assistance/contact guard assist within 7 day(s). 4.  Patient will perform toilet transfers with moderate assistance  within 7 day(s). 5.  Patient will perform all aspects of toileting with moderate assistance  within 7 day(s). 6.  Patient will participate in upper extremity therapeutic exercise/activities with minimal assistance/contact guard assist for 10 minutes within 7 day(s). 7.  Patient will utilize energy conservation techniques during functional activities with verbal cues within 7 day(s). Outcome: Progressing Towards Goal   OCCUPATIONAL THERAPY TREATMENT/WEEKLY RE-ASSESSMENT  Patient: Mariel Alvarez (37 y.o. female)  Date: 3/17/2022  Diagnosis: [facility] ([facility] [K74.60] <principal problem not specified>       Precautions: Fall  Chart, occupational therapy assessment, plan of care, and goals were reviewed.     ASSESSMENT  Patient continues with skilled OT services and is progressing towards goals. ADLs limited by strength, ROM, functional reach, NGT, coordination, cognition (required restraints due to pulling lines at one point, decreased initiation, attention to task, STM loss, processing), sitting tolerance, ability to come to standing. Current Level of Function Impacting Discharge (ADLs): moderate assistance overall upper body ADLs, total assistance lower body ADLs, bed mobility max A, sitting tolerance 4 mins    Other factors to consider for discharge: family         PLAN :  Goals have been updated based on progression since last assessment. Patient continues to benefit from skilled intervention to address the above impairments. Continue to follow patient 5 times a week to address goals. Recommend with staff: encourage patient to complete ADLs    Recommend next OT session: sitting ADLs    Recommendation for discharge: (in order for the patient to meet his/her long term goals)  Therapy up to 5 days/week in SNF setting    This discharge recommendation:  Has not yet been discussed the attending provider and/or case management    IF patient discharges home will need the following DME: TBD       SUBJECTIVE:   Patient stated I can't.     OBJECTIVE DATA SUMMARY:   Cognitive/Behavioral Status:  Neurologic State: Alert  Orientation Level: Oriented to person;Oriented to place;Oriented to situation;Disoriented to time  Cognition: Impaired decision making;Decreased command following;Decreased attention/concentration             Functional Mobility and Transfers for ADLs:  Bed Mobility:  Rolling: Moderate assistance L, instruction and physical A LE flexion, R hand bed rail, physical A roll  Supine to Sit: Maximum assistance physical A LEs and trunk  Sit to Supine: Moderate assistance A LEs  Scooting: moderate A physical A EOB and up in bed    Transfers:         Balance:  Sitting: Intact; Without support  Sitting - Static: Good (unsupported)  Sitting - Dynamic: Good (unsupported)  Standing - Static: Constant support;Occasional    ADL Intervention:  Feeding  Drink to Mouth: Modified independent    Grooming  Brushing/Combing Hair: Moderate assistance posterior knots total A       Patient declining all ADLs. Instruction on benefits hair, can reach it better when side lying, and again sitting up. Patient declining each step of the way, gentle coaxing, meeting patient needs and positive reinforcement. Patient then sitting EOB 4 mins and finished brushing hair with min A setup in hand and encouragement to complete task. Cognitive Retraining  Problem Solving: Identifying the task; Identifying the problem  Attention to Task: Single task    Therapeutic Exercises:   Encouraged to complete daily    Pain:  abdomen    Activity Tolerance:   Fair    After treatment patient left in no apparent distress:   Supine in bed, Call bell within reach, Bed / chair alarm activated, and Side rails x 3    COMMUNICATION/COLLABORATION:   The patients plan of care was discussed with: Registered nurse.      Abrahan Lopez  Time Calculation: 21 mins

## 2022-03-17 NOTE — PROGRESS NOTES
[address] [facility], [place], [address], [facility] [place]      Melvin Siemens, ISAAC Matute, ACN-BC     April ELLI Lopez, [facility]   Jaimerosa elena MosleyMANISHA solitario, [facility]       Namita Montemayor Mello [address]    at [address], [address] [place] [address], [place]  22.    [phone]    FAX: [address] [place] [address] Avenue    [address], [address], [address]    [phone]    FAX: [phone]       HEPATOLOGY PROGRESS NOTE  The patient is well known to me from a previous visit to my office at [facility] in [facility]. She is a 64 yo  female who was found to have chronic liver disease in 2020 and cirrhosis in 7/2021 when she developed ascites. She had variceal bleeding in 11/2021. Serologic evaluation for markers of chronic liver disease was positive for AMA. Cirrhosis is due to [facility].     The patient has developed the following complications of cirrhosis: esophageal varices, variceal bleeding, ascites, edema, hepatic encephalopathy, severe muslce wasting    I saw the patient for the first time in 1/2022. She had CTP score 9 and MELD score 21 at that time. We started liver transplant evaluation testing. She developed confusion and could not be aroused and was hospitalized 3 weeks ago at [facility] in 2/202. During that hospitalization she developed worsening malnutrition, worsening of muscle wasting and a few decubitus ulcers. She has been at [facility] for 9 days. She has been receiving Dobbhoff tube feedings and OT/PT. She has improved somewhat. She is definitely stronger and can move herself around in bed some, she can now hold a glass, and feed herself to a limited degree. However, she is still too weak to stand even with assistance.   Wound care note indicates skin ulcers are stable but not really healing. Family conference:  I had a 1 hour meeting on 3/17/22 with the patient and her son who is POA. Her only option for survival is a liver transplant but she is currently too weak, too malnurished and has too much muscle wasting to survive LT. I discussed the options with the patient and her son. Either continue what we are doing until she improves nutritionally, improves muscle mass, heals the skin ulcers and she can stand and take a few steps on her own. I estimated it would take at least another month of steady progress for there to achieve this goal.    The other option is to enter hospice. If at any time she developed sepsis, GI bleeding or another major setback she enter hospice as she will never able to achieve her goal.      We cannot place PEG because of ascites  She cannot go to SNF/Rehab because of the Dobbhoff tube. For now she and son wish to continue since they see slow limited improvement. The son will discuss this with her daily and if they could opt for hospice care at any time. They both seem to be realistic regarding her ability to improve but are not yet ready to \"give up\" and enter hospice care today. Hospital course:  Tube feeding restarted today  She is very alert and in good spirits today after family meeting yesterday to set clear goals and expectations. She was able to stand today with PT assistance for first time in over 1 month    Hepatology Plan:  Nursing should retape and secure dobbhoff tube daily, preferably each evening before she sleeps since this is when she tends to accidentily hit tube with arms and pull this out. Suggest nursing keep tube under hospital gown to reduce the risk of accidentally getting this caught by hands moving during sleep and pulled out. Continue tube feeding 24-7 and continue to increase calories as tolerated    Dietary service managing Dobbhuff tube feeds.     Continue with OT/PT who are doing a great job. I cannot belive she was standing today!!!       ASSESSMENT AND PLAN:  Cirrhosis  The diagnosis of cirrhosis is based upon imaging, laboratory studies, complications of cirrhosis. Cirrhosis is secondary to [facility]. Not alcohol as she was initially told.     Serologic testing was positive for AMA, ASMA     The CTP is 10. Child class C. The MELD score is 33.     Based upon the MELD and CTP scores the patient has a mortality of about 50% within the next 90 days until we turn her fraily and weakness around. Right now is is too weak to survive liver transplantation. She needs aggressive nutritional support and both she and her son are in favor of this with the hopes her situation will improve and she could survive liver transplantation.       Primary Biliary Cholangitis  The diagnosis is based upon serology and an elevation in ALP and positive AMA. A liver biopsy has not been performed. PBC is being treated with ANTONIETA 500 mg BID. 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 She is still making urine and does not need dialysis. Continue midodrine, octreotide injections   Serum albumin up to 4.4 gm. Will need to stop IV albumin before she gets fluid overloaded    Malnutrition  The patient has severe protein-calorie malnutrtion and muscle wasting. This is due to advanced liver disease and refractory ascites. The patient a more calories than she can possible eat to get into positive nutritional balance. Without aggressive nutritional support she will not survive. She is alert, oriented and understands the need for NG dobhoff feeding tube placement. Both she and her son agree with this. Getting tube feeds via Dobhoff feeding tube. Tolerating this well. Formula to be managed by nutritioinal service. Frailty/muscle wasting  The patient is very frail and cannot sit or stand on her own. She is getting aggressive OT/PT.     She is very motivated to survive and willing to do whatever excercies she needs to get stronger. If she does not improve her muscle wasting and body strength she will never survive the liver transplant process. Skin wounds  Large 5 x 5 cm area of tissue injury withtout ulceration on her back that is improving with wound care  Stage 3 pressure injury 1.3 x 1 cm pn sacrum  Rash consistent with candida     Ascites   Ascites developed for the first time in 7/2021. Ascites is is refractory to the current doses of diuretics because of kidney disease and Hyponatremia. Ascites is stable  No diuretics. Hyponatremia  This is secondary cirrhosis and diuretics  This appears to be stable in the 128 range. No diuretics. Will give IV albumin 25 gm Q6H for a few days to see if this helps     Screening for Esophageal varices   The patient has esophageal varices with prior bleeding. Varices were banded in 11/2021 and 12/2021. The last EGD to assess for varices was performed in 12/2021. No repeat EGD needed at this time.     Hepatic encephalopathy   Overt HE is well controlled on lactulose and xifaxan. Serum ammonia is low and in the 40s.      Coagulopathy  INR is in the 1.6 range and stable. Thrombocytopenia   This is secondary to cirrhosis. There is no evidence of overt bleeding. No treatment is required. The platelet count is adequate for the patient to undergo procedures without the need for platelet transfusion or platelet growth factors.     Anemia   This is due to multifactorial causes including portal hypertension with chronic GI blood loss and bone marrow suppression due to severe malnutrition.     The most recent FE studies were from 1/2022. The serum ferritin is 87  The FE saturation is 74  FE panel is within the normal range but this is low a patient with chronic liver disease and cirrhosis   Will administer intravenous iron. No EGD needed at this time. Thrombocytopenia   This is secondary to cirrhosis.   There is no evidence of overt bleeding. No treatment is required. The platelet count is adequate for the patient to undergo procedures without the need for platelet transfusion or platelet growth factors. PHYSICAL EXAMINATION:  VS: per nursing note  General:  Ill appearing  Eyes:  Sclera icteric. ENT:  No oral lesions. Thyroid normal.  Nodes:  No adenopathy. Skin:  Spider angiomata. Severe ecchymosis all over arms. Respiratory:  Lungs clear to auscultation. Cardiovascular:  Regular heart rate. Abdomen:  Soft non-tender, Distended with obvious ascites. Extremities:  No lower extremity edema. Severe muscle wasting of upper and lower extremities. Neurologic:  Alert and oriented. Cranial nerves grossly intact. No asterixis. LABORATORY:  Results for Chata Araiza (MRN [id number]) as of 3/18/2022 04:43   Ref. Range 3/15/2022 04:21 3/16/2022 06:42 3/17/2022 04:54   WBC Latest Ref Range: 3.6 - 11.0 K/uL  8.0 9.4   HGB Latest Ref Range: 11.5 - 16.0 g/dL  7.6 (L) 7.2 (L)   PLATELET Latest Ref Range: 150 - 400 K/uL  42 (LL) 39 (LL)   Sodium Latest Ref Range: 136 - 145 mmol/L 129 (L) 127 (L) 129 (L)   Potassium Latest Ref Range: 3.5 - 5.1 mmol/L 4.2 4.2 3.9   Chloride Latest Ref Range: 97 - 108 mmol/L 99 99 99   CO2 Latest Ref Range: 21 - 32 mmol/L 16 (L) 18 (L) 16 (L)   Glucose Latest Ref Range: 65 - 100 mg/dL 103 (H) 134 (H) 110 (H)   BUN Latest Ref Range: 6 - 20 MG/ (H) 135 (H) 155 (H)   Creatinine Latest Ref Range: 0.55 - 1.02 MG/DL 4.76 (H) 4.76 (H) 4.79 (H)   Albumin Latest Ref Range: 3.5 - 5.0 g/dL 3.3 (L) 3.3 (L)      RADIOLOGY:  Ultrasound of liver. Echogenic consistent with cirrhosis. No liver mass lesions. No dilated bile ducts. Moderate ascites.         Juana Robertson MD  [facility] [address], [address] [place] [address] 22.  [phone]  [address]

## 2022-03-18 NOTE — PROGRESS NOTES
Perfect-served Dr. Judi Peters! fyi lost PIV, attempted x4 with 2 staff members. Called CCU for assist; for now no PIV until someone places. \"   - Able to get CCU nurse to obtain PIV, 24 G. Not many veins/options left; severely ecchymosis bilat. arms and multiple skin tears and old scabs re-opening immediately once tourniquet is applied.  Did relay this to Dr. Vale Garzon

## 2022-03-18 NOTE — PROGRESS NOTES
[address] [facility] Adult  Hospitalist Group                                                                                          Hospitalist Progress Note  Poonam Bills MD  Answering service: [phone] from in house phone        Date of Service:  3/18/2022  NAME:  Alvaro Wilson  :  1960  MRN:  [id number]      Admission Summary:   Copied form admit: \" Alvaro Wilson is a 65 y.o. female with history of liver cirrhosis with portal hypertension,recently diagnosed primary biliary cholangitis, recurrent ascites requiring paracentesis approximately every 7 to 10 days who presents here transferred from [address] [place] [address] secondary to decompensated liver cirrhosis, worsening renal insufficiency, progressive weakness currently undergoing work-up for possible liver transplant. The patient of note initially presented to [address] [place] [address] on 2022 secondary to hepatic encephalopathy and progressive weakness. Patient on admission was found at that time to have an ammonia level of 111 and did subsequently require paracentesis in the ED. The patient has had a prolonged and complicated hospital course since that time and has undergone multiple paracentesis including  with 11.8 L removal,  with 9/2 L removed, and  with 9.2 L removed. The patient also did have episodes of transient hypotension requiring a brief ICU admission for pressor support and has periodically received albumin with daily midodrine use. She was followed both by GI and nephrology services. Patient did undergo an EGD on  noted for distal esophagitis and portal hypertensive gastropathy, no evidence of varices. According to the son Rahul Oliver at bedside, the patient does have a history of esophageal varices back in 2021 status post banding at that time. Per GI at outlying facility, patient was deemed not to be a candidate for colonoscopy and recommended Cologuard.   Part of her liver transplant evaluation at [facility] THE [facility] also included an MRCP was done on 2/18 which was noted for cirrhosis, portal hypertension, and large ascites. She has also undergone a cardiac stress test which was negative, EF of 69%. Patient has been maintained on lactulose, rifaximin, octreotide, and Protonix at the [facility]. Patient has had progressive worsening of her renal function at the [facility] with creatinine peak at 5.2 on 3/3 and associated metabolic acidosis requiring management with bicarb drip that was maintained as of this morning at [facility]. Per medical records, there was concern that patient may be moving towards initiation of possible hemodialysis. According to son, patient does not have any previous establish care with nephrology and was not aware of any kidney issues prior to current hospitalization. Patient also was treated with with a 7-day course of antibiotics for E. coli UTI, which were completed as of 2/17. The patient had has been seen by Dr. Shanelle Brady hepatologist for an initial visit in January to start the process of liver transplant work-up. Given lack of progression of patient's care prolonged hospitalization at [facility], request was made for patient to be transferred to [address] [place] [address] to continue further liver transplant work-up and accepted by Dr. Shanelle Brady for transfer under the hospitalist service. Patient of note is currently lethargic and poorhistorian therefore history is obtained via discussion with her son Re Rivera at the bedside and review of medical records. \"    Interval history / Subjective:     3/18/2022 :   · Patient seen for follow-up of decompensated liver cirrhosis and hepatic encephalopathy. Patient seen and examined earlier this morning by me. Patient looks slightly better though her lab work looks likely worse. Patient refused her medications this morning. She has no acute complaints at this time.      Assessment & Plan:      Decompensated liver cirrhosis, with portal hypertension. Felt to be secondary to [facility]  Transaminitis/hyperbilirubinemia/coagulopathy/thrombocytopenia  -Transferred from [facility] with prolonged hospitalization. Transferred for further work-up for liver transplant eval  -EGD on 2/17 [place] noted for distal esophagitis and portal hypertensive gastropathy, no evidence of varices.    -history of esophageal varices back in November 2021 status post banding at that time.    -Per GI at outlying facility, patient was deemed not to be a candidate for colonoscopy and recommended Cologuard.    -s/p MRCP on 2/18 which was noted for cirrhosis, portal hypertension, and large ascites. -s/p cardiac stress test which was negative for ischemia, EF of 69%  -Dr Ivana Burrows following  -Continue lactulose, rifaximin, octreotide, Protonix p.o.  -Closely monitor stool output frequency  -Continue ursodiol  Continue current management at this time  No change    History of hepatic encephalopathy with refractory ascites  -multiple paracentesis done at [facility] including 2/11 with 11.8 L removal, 2/18 with 9/2 L removed, and 2/28 with 9.2 L removed. -Currently with distended abdomen  -Moderate large ascites on US 3/6. Ascites said to be refractoyr to diuretics,HRS limiting use of diuretics  -Ascitic drain that was placed on 3/12 was apparently clogged, flushed and drained significant amount of ascites. Ascites almost gone. Added IV albumin.  -Ascitic fluid lab negative for SBP  Improved        GREG with metabolic acidosis. Creatinine worsened from 0.9 on 11/26/2021 up to 4-5 now  -Likely due to [facility]  -Nephrology following,recommendaitons appreciated.       Chronic normocytic anemia  -Possible anemia of CKD  Patient's hemoglobin came back 6.9 today. I have ordered 1 unit of packed red blood cells to be transfused. Repeat H&H posttransfusion.   Continue to monitor daily     Hyponatremia  -Suspect secondary to underlying liver cirrhosis.   -Na stable 128-131     Recent E. coli UTI  -Treated with Vanco and Zosyn then transitioned to Rocephin, completed 7-day antibiotic course as of 2/17  -Replace Beckford with new catheter 3/5 . Urine culture on 3/5 grew Candida albicans 75,000 CFU. -     Hypothyroid  -Continue levothyroxine    Severe malnutrition  --Supplemented with tube feeding via NGT. Patient has been pulling the NGT 2 days in a row and requiring restraint. She needs ongoing nutritional supplement I think she may need PEG allergies becoming difficult to sustain NGT and I am concerned that she may aspirate when she attempts to pull the feeding tube. Discussed with Dr. Ary Tompkins. --On regular diet, intake is minimal.    Lines/catheters/drainage  --Beckford catheter in place, replaced on 3/5  --NGT in place          Generalized weakness/chronic wounds. pta  -PT, OT consult  -Wound care consulted    I spoke to the patient's son today and he refused my offer of consulting palliative care. He wants to continue current management as spoken before with Dr. Sangeetha Acevedo.     DVT prophylaxis SCDs for now. No pharmacological prophylaxis due to thrombocytopenia  DNR   Prognosis: Poor     Hospital Problems  Date Reviewed: 2/17/2022          Codes Class Noted POA    Cirrhosis ([facility] [address] ICD-10-CM: K74.60  ICD-9-CM: 571.5  2/8/2022 Unknown                      Data Review:    Review and/or order of clinical lab test    GENERAL:  Chronically sick looking, alert and awake, not in distress. Jaundiced  HEENT:  Deeply icteric. NGT in place. No active bleeding  NECK: Supple, trachea midline, no adenopathy, no thyromegally or tenderness, no carotid bruit and no JVD. LUNGS:   Vesicular breath sounds bilaterally, no added sounds. HEART:   S1 and S2 well heard,RRR,  no murmur, click, rub or gallop. ABDOMEB:   Abdomen flat, normoactive, diffusely tender without rebound or guarding. Beckford catheter draining deep yellow (jaundice) urine. EXTREMETIES: No peripheral edema.   Bilateral wrist restraints in place  PULSES: 2+ and symmetric all extremities. SKIN: Scattered ecchymosis, a large 1 on her upper chest  NEUROLOGY: Awake, alert today. Nonfocal exam.  Patient is oriented to person and place but not oriented to time      Labs:     Recent Labs     03/18/22  0509 03/17/22  1202 03/17/22  0454 03/17/22  0454   WBC 8.2  --   --  9.4   HGB 7.6* 6.9*   < > 7.2*   HCT 22.1* 20.3*   < > 21.2*   PLT 34*  --   --  39*    < > = values in this interval not displayed. Recent Labs     03/18/22  0509 03/17/22  0454 03/16/22  0642   * 129*  129* 127*   K 3.6 3.9  3.9 4.2   CL 99 99  99 99   CO2 17* 15*  16* 18*   * 146*  155* 135*   CREA 4.54* 4.90*  4.79* 4.76*   * 113*  110* 134*   CA 8.6 9.2  9.2 8.9   PHOS  --  7.8* 6.5*     Recent Labs     03/17/22  0454 03/16/22  0642   ALB 3.9 3.3*     No results for input(s): INR, PTP, APTT, INREXT, INREXT in the last 72 hours. No results for input(s): FE, TIBC, PSAT, FERR in the last 72 hours. Lab Results   Component Value Date/Time    Folate 38.3 (H) 10/20/2021 07:53 AM    RBC FOLATE 878 02/14/2022 01:27 AM      No results for input(s): PH, PCO2, PO2 in the last 72 hours. No results for input(s): CPK, CKNDX, TROIQ in the last 72 hours.     No lab exists for component: CPKMB  Lab Results   Component Value Date/Time    Cholesterol, total 122 01/25/2022 09:50 AM    HDL Cholesterol 40 01/25/2022 09:50 AM    LDL, calculated 67.2 01/25/2022 09:50 AM    Triglyceride 74 01/25/2022 09:50 AM    CHOL/HDL Ratio 3.1 01/25/2022 09:50 AM     Lab Results   Component Value Date/Time    Glucose (POC) 128 (H) 03/15/2022 06:06 AM    Glucose (POC) 123 (H) 03/15/2022 12:14 AM    Glucose (POC) 114 (H) 02/23/2022 05:42 PM    Glucose (POC) 127 (H) 02/23/2022 12:04 PM    Glucose (POC) 122 (H) 02/23/2022 08:18 AM     Lab Results   Component Value Date/Time    Color DARK YELLOW 03/05/2022 09:22 PM    Appearance TURBID (A) 03/05/2022 09:22 PM    Specific gravity 1.014 03/05/2022 09:22 PM    Specific gravity 1.010 02/11/2022 07:20 PM    pH (UA) 5.5 03/05/2022 09:22 PM    Protein 100 (A) 03/05/2022 09:22 PM    Glucose Negative 03/05/2022 09:22 PM    Ketone Negative 03/05/2022 09:22 PM    Bilirubin NEGATIVE 02/11/2022 07:20 PM    Urobilinogen 0.2 03/05/2022 09:22 PM    Nitrites Negative 03/05/2022 09:22 PM    Leukocyte Esterase LARGE (A) 03/05/2022 09:22 PM    Epithelial cells FEW 03/05/2022 09:22 PM    Bacteria 1+ (A) 03/05/2022 09:22 PM    WBC >100 (H) 03/05/2022 09:22 PM    RBC  03/05/2022 09:22 PM         Medications Reviewed:     Current Facility-Administered Medications   Medication Dose Route Frequency    sodium bicarbonate (8.4%) 75 mEq in 0.45% sodium chloride 1,000 mL infusion   IntraVENous CONTINUOUS    0.9% sodium chloride infusion 250 mL  250 mL IntraVENous PRN    multivitamin, tx-iron-ca-min (THERA-M w/ IRON) tablet 1 Tablet  1 Tablet Oral DAILY    0.9% sodium chloride infusion 250 mL  250 mL IntraVENous PRN    0.9% sodium chloride infusion 250 mL  250 mL IntraVENous PRN    miconazole (MICONTIN) 2 % ointment   Topical BID    balsam peru-castor oiL (VENELEX) ointment   Topical BID    zinc oxide-cod liver oil (DESITIN) 40 % paste   Topical PRN    sodium chloride (NS) flush 5-40 mL  5-40 mL IntraVENous Q8H    sodium chloride (NS) flush 5-40 mL  5-40 mL IntraVENous PRN    acetaminophen (TYLENOL) tablet 650 mg  650 mg Oral Q6H PRN    Or    acetaminophen (TYLENOL) suppository 650 mg  650 mg Rectal Q6H PRN    polyethylene glycol (MIRALAX) packet 17 g  17 g Oral DAILY PRN    ondansetron (ZOFRAN ODT) tablet 4 mg  4 mg Oral Q8H PRN    Or    ondansetron (ZOFRAN) injection 4 mg  4 mg IntraVENous N1G PRN    folic acid (FOLVITE) tablet 1 mg  1 mg Oral DAILY    lactulose (CHRONULAC) 10 gram/15 mL solution 30 mL  30 mL Oral Q6H    levothyroxine (SYNTHROID) tablet 50 mcg  50 mcg Oral 6am    midodrine (PROAMATINE) tablet 15 mg  15 mg Oral TID WITH MEALS    octreotide (SANDOSTATIN) injection 100 mcg  100 mcg IntraVENous TID    pantoprazole (PROTONIX) tablet 40 mg  40 mg Oral DAILY    rifAXIMin (XIFAXAN) tablet 550 mg  550 mg Oral BID    sertraline (ZOLOFT) tablet 100 mg  100 mg Oral DAILY    thiamine HCL (B-1) tablet 100 mg  100 mg Oral DAILY    ursodioL (ACTIGALL) tablet 500 mg (Patient Supplied)  500 mg Oral Q12H    glucagon (GLUCAGEN) injection 1 mg  1 mg IntraMUSCular PRN    naloxone (NARCAN) injection 0.1 mg  0.1 mg IntraVENous PRN    promethazine (PHENERGAN) 6.25 mg in 0.9% sodium chloride 50 mL IVPB  6.25 mg IntraVENous Q6H PRN     ______________________________________________________________________  EXPECTED LENGTH OF STAY: 4d 16h  ACTUAL LENGTH OF STAY:          [address] [facility] Ravinder Iraheta MD

## 2022-03-18 NOTE — PROGRESS NOTES
RUR:  23% High     SELMA: TBD. BLS transport once medically stable. Follow-up with PCP/specialist.     Primary Contact: Son, Ambrosio Peters, [phone]     3:30PM - CM reviewed chart. Per review and ID rounds, plan for continued aggressive nutritional support; patient receiving Dobhoff tube feedings. Patient continues to work with PT & OT. Patient and son are hopeful that with nutritional treatment and therapy she may become a potential candidate for a liver transplant at [facility].     Family conference held with Dr. Ed Lim. Plan of care discussed with patient and son. Per note, patient and son wish to continue with current plan of care. Hospice care discussed, but at this time patient and son do not want wish to move forward with hospice.     Discharge pending medical progress and final recommendations.  CM to continue to follow as needed.     YIFAN Hunter   [phone]

## 2022-03-18 NOTE — PROGRESS NOTES
Spiritual Care Assessment/Progress Note  [facility]      NAME: Loralie Mohs      MRN: [id number]  AGE: 65 y.o.  SEX: female  Mormonism Affiliation: No preference   Language: English     3/18/2022     Total Time (in minutes): 20     [facility] [id number] through conversation with:         [x]Patient        [] Family    [] Friend(s)        Reason for Consult: Initial/Spiritual assessment, patient floor     Spiritual beliefs: (Please include comment if needed)     [x] Identifies with a [place] tradition:    Did not specify at this visit     [] Supported by a [place] community:            [] Claims no spiritual orientation:           [] Seeking spiritual identity:                [] Adheres to an individual form of spirituality:           [] Not able to assess:                           Identified resources for coping:      [] Prayer                               [] Music                  [] Guided Imagery     [x] Family/friends                 [] Pet visits     [] Devotional reading                         [] Unknown     [] Other:                                         Interventions offered during this visit: (See comments for more details)    Patient Interventions: Affirmation of [place],Affirmation of emotions/emotional suffering,Iconic (affirming the presence of God/Higher Power),Initial/Spiritual assessment, patient floor           Plan of Care:     [] Support spiritual and/or cultural needs    [] Support AMD and/or advance care planning process      [] Support grieving process   [] Coordinate Rites and/or Rituals    [] Coordination with community clergy   [] No spiritual needs identified at this time   [] Detailed Plan of Care below (See Comments)  [] Make referral to Music Therapy  [] Make referral to Pet Therapy     [] Make referral to Addiction services  [] Make referral to [facility]  [] Make referral to Spiritual Care Partner  [] No future visits requested        [x] Contact Spiritual Care for further referrals     Comments: Initial spiritual assessment in [facility] [address] made good eye contact when I entered the room. She gave me a smile when I introduced myself. She nodded yest to having good family support. She indicated she was cold. Assisted her with her blankets to get her covered to warm a bit. She provided a smile. She indicated she has spiritual beliefs though did not specify at this time. It is very apparent she is a bit sleepy and did not want to talk but nodded yes that I could stay for a bit. Provided gentle spiritual words of comfort. She appeared to relax and begin to doze. Quietly left the room so she could sleep. Contact Spiritual Care for any further referrals.   Art Palomino., MS., [address] [place] ([phone])

## 2022-03-18 NOTE — PROGRESS NOTES
[facility]   [address] [facility], [facility] [address], [facility]  Phone: [phone]   [phone]       Nephrology Progress Note  David Hinkle     1960     [id number]  Date of Admission : 3/5/2022  03/18/22    CC:  Follow up for greg    Assessment and Plan   GREG:  - likely 2/2 HRS  - U/S neg for obstruction  - Cr essentially unchanged since arrival  - cont midodrine + octreotide  - d/c albumin infusions  - give 1 L of IVF + bcb again today  - daily labs  - keep steve for now     Metabolic acidosis  - unable to take pills  - bicarb drip  X 1 L    Hyponatremia  -Secondary to liver cirrhosis  - Na stable    Severe anemia:  - 1 unit of PRBCs 3/8  - hgb stable     Decompensated liver cirrhosis  -With portal hypertension, primary biliary cholangitis  -Requiring multiple paracenteses, most recently 2/28/2022 with 9.2 L removed  - appreciate hepatology   - plans to optimize nutrition then eventual liver/kidney      Hypothyroidism    Malnutrition:  - getting TF via DHT     Interval History:  Seen and examined. Resting in bed today. Working on getting a new IV. BP stable, Cr down  Some, bicarb better. No cp or sob reported. Review of Systems: A comprehensive review of systems was negative except for that written in the HPI.     Current Medications:   Current Facility-Administered Medications   Medication Dose Route Frequency    0.9% sodium chloride infusion 250 mL  250 mL IntraVENous PRN    albumin human 25% (BUMINATE) solution 25 g  25 g IntraVENous Q6H    multivitamin, tx-iron-ca-min (THERA-M w/ IRON) tablet 1 Tablet  1 Tablet Oral DAILY    0.9% sodium chloride infusion 250 mL  250 mL IntraVENous PRN    0.9% sodium chloride infusion 250 mL  250 mL IntraVENous PRN    miconazole (MICONTIN) 2 % ointment   Topical BID    balsam peru-castor oiL (VENELEX) ointment   Topical BID    zinc oxide-cod liver oil (DESITIN) 40 % paste   Topical PRN    sodium chloride (NS) flush 5-40 mL  5-40 mL IntraVENous Q8H    sodium chloride (NS) flush 5-40 mL  5-40 mL IntraVENous PRN    acetaminophen (TYLENOL) tablet 650 mg  650 mg Oral Q6H PRN    Or    acetaminophen (TYLENOL) suppository 650 mg  650 mg Rectal Q6H PRN    polyethylene glycol (MIRALAX) packet 17 g  17 g Oral DAILY PRN    ondansetron (ZOFRAN ODT) tablet 4 mg  4 mg Oral Q8H PRN    Or    ondansetron (ZOFRAN) injection 4 mg  4 mg IntraVENous U0S PRN    folic acid (FOLVITE) tablet 1 mg  1 mg Oral DAILY    lactulose (CHRONULAC) 10 gram/15 mL solution 30 mL  30 mL Oral Q6H    levothyroxine (SYNTHROID) tablet 50 mcg  50 mcg Oral 6am    midodrine (PROAMATINE) tablet 15 mg  15 mg Oral TID WITH MEALS    octreotide (SANDOSTATIN) injection 100 mcg  100 mcg IntraVENous TID    pantoprazole (PROTONIX) tablet 40 mg  40 mg Oral DAILY    rifAXIMin (XIFAXAN) tablet 550 mg  550 mg Oral BID    sertraline (ZOLOFT) tablet 100 mg  100 mg Oral DAILY    thiamine HCL (B-1) tablet 100 mg  100 mg Oral DAILY    ursodioL (ACTIGALL) tablet 500 mg (Patient Supplied)  500 mg Oral Q12H    glucagon (GLUCAGEN) injection 1 mg  1 mg IntraMUSCular PRN    naloxone (NARCAN) injection 0.1 mg  0.1 mg IntraVENous PRN    promethazine (PHENERGAN) 6.25 mg in 0.9% sodium chloride 50 mL IVPB  6.25 mg IntraVENous Q6H PRN      Allergies   Allergen Reactions    Morphine Other (comments)     Severe HA. ALL narcotics!!!       Objective:  Vitals:    Vitals:    03/17/22 2019 03/18/22 0035 03/18/22 0749 03/18/22 0904   BP: (!) 117/50 (!) 115/54 (!) 106/58    Pulse: 72 70 69    Resp: 18 16 16    Temp: 97.8 °F (36.6 °C) 97.6 °F (36.4 °C) 98 °F (36.7 °C)    SpO2: 98% 99% 100%    Weight:    65.4 kg (144 lb 2.9 oz)   Height:    5' 4\" (1.626 m)     Intake and Output:  03/18 0701 - 03/18 1900  In: 130   Out: -   03/16 1901 - 03/18 0700  In: 440   Out: 850 [Urine:850]    Physical Examination:  General: NAD,chronically ill appearing  HEENT:           + scleral icterus.  DHT in place  Neck:  Supple, no mass  Resp:  Lungs CTA B/L, no wheezing , normal respiratory effort  CV:  RRR,  no murmur or rub,no  LE edema  GI:  Soft, NT, + Bowel sounds, no hepatosplenomegaly  Neurologic:  Non focal  Skin:  No Rash  :  Beckford     [x]    High complexity decision making was performed  [x]    Patient is at high-risk of decompensation with multiple organ involvement    Lab Data Personally Reviewed: I have reviewed all the pertinent labs, microbiology data and radiology studies during assessment. Recent Labs     03/18/22  0509 03/17/22  0454 03/16/22  0642   * 129*  129* 127*   K 3.6 3.9  3.9 4.2   CL 99 99  99 99   CO2 17* 15*  16* 18*   * 113*  110* 134*   * 146*  155* 135*   CREA 4.54* 4.90*  4.79* 4.76*   CA 8.6 9.2  9.2 8.9   PHOS  --  7.8* 6.5*   ALB  --  3.9 3.3*     Recent Labs     03/18/22  0509 03/17/22  1202 03/17/22  0454 03/16/22  0642   WBC 8.2  --  9.4 8.0   HGB 7.6* 6.9* 7.2* 7.6*   HCT 22.1* 20.3* 21.2* 22.7*   PLT 34*  --  39* 42*     No results found for: SDES  Lab Results   Component Value Date/Time    Culture result: NO GROWTH THUS FAR 03/14/2022 06:48 PM    Culture result: NO GROWTH 4 DAYS 03/12/2022 06:00 PM    Culture result: CANDIDA ALBICANS (A) 03/05/2022 09:22 PM     Recent Results (from the past 24 hour(s))   HGB & HCT    Collection Time: 03/17/22 12:02 PM   Result Value Ref Range    HGB 6.9 (L) 11.5 - 16.0 g/dL    HCT 20.3 (L) 35.0 - 47.0 %   RBC, ALLOCATE    Collection Time: 03/17/22  2:00 PM   Result Value Ref Range    HISTORY CHECKED?  Historical check performed    TYPE & SCREEN    Collection Time: 03/17/22  2:15 PM   Result Value Ref Range    Crossmatch Expiration 03/20/2022,2359     ABO/Rh(D) O NEGATIVE     Antibody screen NEG     Unit number [id number]     Blood component type RC LR     Unit division 00     Status of unit TRANSFUSED     Crossmatch result Compatible    CBC WITH AUTOMATED DIFF    Collection Time: 03/18/22  5:09 AM   Result Value Ref Range    WBC 8.2 3.6 - 11.0 K/uL    RBC 2.44 (L) 3.80 - 5.20 M/uL    HGB 7.6 (L) 11.5 - 16.0 g/dL    HCT 22.1 (L) 35.0 - 47.0 %    MCV 90.6 80.0 - 99.0 FL    MCH 31.1 26.0 - 34.0 PG    MCHC 34.4 30.0 - 36.5 g/dL    RDW 23.9 (H) 11.5 - 14.5 %    PLATELET 34 (LL) 679 - 400 K/uL    MPV 9.9 8.9 - 12.9 FL    NRBC 0.0 0  WBC    ABSOLUTE NRBC 0.00 0.00 - 0.01 K/uL    NEUTROPHILS 74 32 - 75 %    LYMPHOCYTES 9 (L) 12 - 49 %    MONOCYTES 9 5 - 13 %    EOSINOPHILS 6 0 - 7 %    BASOPHILS 1 0 - 1 %    IMMATURE GRANULOCYTES 1 (H) 0.0 - 0.5 %    ABS. NEUTROPHILS 6.1 1.8 - 8.0 K/UL    ABS. LYMPHOCYTES 0.7 (L) 0.8 - 3.5 K/UL    ABS. MONOCYTES 0.7 0.0 - 1.0 K/UL    ABS. EOSINOPHILS 0.5 (H) 0.0 - 0.4 K/UL    ABS. BASOPHILS 0.1 0.0 - 0.1 K/UL    ABS. IMM. GRANS. 0.1 (H) 0.00 - 0.04 K/UL    DF SMEAR SCANNED      RBC COMMENTS ANISOCYTOSIS  2+        RBC COMMENTS OVALOCYTES  1+        RBC COMMENTS SCHISTOCYTES  1+        RBC COMMENTS POLYCHROMASIA  PRESENT       METABOLIC PANEL, BASIC    Collection Time: 03/18/22  5:09 AM   Result Value Ref Range    Sodium 131 (L) 136 - 145 mmol/L    Potassium 3.6 3.5 - 5.1 mmol/L    Chloride 99 97 - 108 mmol/L    CO2 17 (L) 21 - 32 mmol/L    Anion gap 15 5 - 15 mmol/L    Glucose 125 (H) 65 - 100 mg/dL     (H) 6 - 20 MG/DL    Creatinine 4.54 (H) 0.55 - 1.02 MG/DL    BUN/Creatinine ratio 33 (H) 12 - 20      GFR est AA 12 (L) >60 ml/min/1.73m2    GFR est non-AA 10 (L) >60 ml/min/1.73m2    Calcium 8.6 8.5 - 10.1 MG/DL   SAMPLES BEING HELD    Collection Time: 03/18/22  5:09 AM   Result Value Ref Range    SAMPLES BEING HELD 1PST     COMMENT        Add-on orders for these samples will be processed based on acceptable specimen integrity and analyte stability, which may vary by analyte. Care Plan discussed with:  Patient     Family      RN      Consulting Physician [address] [facility],         I have reviewed the flowsheets. Chart and Pertinent Notes have been reviewed.    No change in [address] ,family and social history from Consult note.       Harmeet Pak MD

## 2022-03-18 NOTE — PROGRESS NOTES
[address] [facility], [place], [address], [facility], [place]      Eddie Nash, ISAAC Torres, [facility]     Michelle CALLOWAY oJhn, [facility]   MANISHA Hunter, [facility]       Namita Montemayor [address]    at [address], [address] Katlyn Pizano  22.    [phone]    FAX: [address] [place] [address] Avenue    [address], [address]    [phone]    FAX: [phone]       HEPATOLOGY PROGRESS NOTE  The patient is well known to me from a previous visit to my office at [facility] in [facility]. She is a 64 yo  female who was found to have chronic liver disease in 2020 and cirrhosis in 7/2021 when she developed ascites. She had variceal bleeding in 11/2021. Serologic evaluation for markers of chronic liver disease was positive for AMA. Cirrhosis is due to [facility].     The patient has developed the following complications of cirrhosis: esophageal varices, variceal bleeding, ascites, edema, hepatic encephalopathy, severe muslce wasting    I saw the patient for the first time in 1/2022. She had CTP score 9 and MELD score 21 at that time. We started liver transplant evaluation testing. She developed confusion and could not be aroused and was hospitalized 3 weeks ago at [facility] in 2/202. During that hospitalization she developed worsening malnutrition, worsening of muscle wasting and a few decubitus ulcers. She has been at [facility] for 9 days. She has been receiving Dobbhoff tube feedings and OT/PT. She has improved somewhat. She is definitely stronger and can move herself around in bed some, she can now hold a glass, and feed herself to a limited degree. However, she is still too weak to stand even with assistance.   Wound care note indicates skin ulcers are stable but not really healing. Family conference:  I had a 1 hour meeting today with the patient and her son who is POA. Her only option for survival is a liver transplant but she is currently too weak, too malnurished and has too much muscle wasting to survive LT. I discussed the options with the patient and her son. Either continue what we are doing until she improves nutritionally, improves muscle mass, heals the skin ulcers and she can stand and take a few steps on her own. I estimated it would take at least another month of steady progress for there to achieve this goal.    The other option is to enter hospice. If at any time she developed sepsis, GI bleeding or another major setback she enter hospice as she will never able to achieve her goal.      We cannot place PEG because of ascites  She cannot go to SNF/Rehab because of the Dobbhoff tube. For now she and son wish to continue since they see slow limited improvement. The son will discuss this with her daily and if they could opt for hospice care at any time. They both seem to be realistic regarding her ability to improve but are not yet ready to \"give up\" and enter hospice care today. Hospital course:  Tube feeding restarted today  She is very alert and in good spirits today after family meeting yesterday to set clear goals and expectations. She was able to stand today with PT assistance for first time in over 1 month    Hepatology Plan:  Nursing should retape and secure dobbhoff tube daily, preferably each evening before she sleeps since this is when she tends to accidentily hit tube with arms and pull this out. Suggest nursing keep tube under hospital gown to reduce the risk of accidentally getting this caught by hands moving during sleep and pulled out.      Restart tube feeding in AM.    Dietary service managing Dobbhuff tube feeds and increase to maximal calories as tolerated  Continue with OT/PT who are doing a great job.      ASSESSMENT AND PLAN:  Cirrhosis  The diagnosis of cirrhosis is based upon imaging, laboratory studies, complications of cirrhosis. Cirrhosis is secondary to [facility]. Not alcohol as she was initially told.     Serologic testing was positive for AMA, ASMA     The CTP is 10. Child class C. The MELD score is 33.     Based upon the MELD and CTP scores the patient has a mortality of about 50% within the next 90 days until we turn her fraily and weakness around. Right now is is too weak to survive liver transplantation. She needs aggressive nutritional support and both she and her son are in favor of this with the hopes her situation will improve and she could survive liver transplantation.       Primary Biliary Cholangitis  The diagnosis is based upon serology and an elevation in ALP and positive AMA. A liver biopsy has not been performed. PBC is being treated with ANTONIETA 500 mg BID. 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 She is still making urine and does not need dialysis. Continue midodrine, octreotide injections   Serum albumin up to 4.4 gm. Will need to stop IV albumin before she gets fluid overloaded    Malnutrition  The patient has severe protein-calorie malnutrtion and muscle wasting. This is due to advanced liver disease and refractory ascites. The patient a more calories than she can possible eat to get into positive nutritional balance. Without aggressive nutritional support she will not survive. She is alert, oriented and understands the need for NG dobhoff feeding tube placement. Both she and her son agree with this. Getting tube feeds via Dobhoff feeding tube. Tolerating this well. Formula to be managed by nutritioinal service. Frailty/muscle wasting  The patient is very frail and cannot sit or stand on her own. She is getting aggressive OT/PT. She is very motivated to survive and willing to do whatever excercies she needs to get stronger. If she does not improve her muscle wasting and body strength she will never survive the liver transplant process. Skin wounds  Large 5 x 5 cm area of tissue injury withtout ulceration on her back that is improving with wound care  Stage 3 pressure injury 1.3 x 1 cm pn sacrum  Rash consistent with candida     Ascites   Ascites developed for the first time in 7/2021. Ascites is is refractory to the current doses of diuretics because of kidney disease and Hyponatremia. Ascites is stable  No diuretics. Hyponatremia  This is secondary cirrhosis and diuretics  This appears to be stable in the 128 range. No diuretics. Will give IV albumin 25 gm Q6H for a few days to see if this helps     Screening for Esophageal varices   The patient has esophageal varices with prior bleeding. Varices were banded in 11/2021 and 12/2021. The last EGD to assess for varices was performed in 12/2021. No repeat EGD needed at this time.     Hepatic encephalopathy   Overt HE is well controlled on lactulose and xifaxan. Serum ammonia is low and in the 40s.      Coagulopathy  INR is in the 1.6 range and stable. Thrombocytopenia   This is secondary to cirrhosis. There is no evidence of overt bleeding. No treatment is required. The platelet count is adequate for the patient to undergo procedures without the need for platelet transfusion or platelet growth factors.     Anemia   This is due to multifactorial causes including portal hypertension with chronic GI blood loss and bone marrow suppression due to severe malnutrition.     The most recent FE studies were from 1/2022. The serum ferritin is 87  The FE saturation is 74  FE panel is within the normal range but this is low a patient with chronic liver disease and cirrhosis   Will administer intravenous iron. No EGD needed at this time. Thrombocytopenia   This is secondary to cirrhosis. There is no evidence of overt bleeding. No treatment is required.   The platelet count is adequate for the patient to undergo procedures without the need for platelet transfusion or platelet growth factors. PHYSICAL EXAMINATION:  VS: per nursing note  General:  Ill appearing  Eyes:  Sclera icteric. ENT:  No oral lesions. Thyroid normal.  Nodes:  No adenopathy. Skin:  Spider angiomata. Severe ecchymosis all over arms. Respiratory:  Lungs clear to auscultation. Cardiovascular:  Regular heart rate. Abdomen:  Soft non-tender, Distended with obvious ascites. Extremities:  No lower extremity edema. Severe muscle wasting of upper and lower extremities. Neurologic:  Alert and oriented. Cranial nerves grossly intact. No asterixis. LABORATORY:  Results for Joel Jason (MRN [id number]) as of 3/16/2022 06:52   Ref. Range 3/13/2022 04:16 3/14/2022 02:01 3/15/2022 04:21   WBC Latest Ref Range: 3.6 - 11.0 K/uL 11.3 (H) 10.2 8.4   HGB Latest Ref Range: 11.5 - 16.0 g/dL 8.3 (L) 8.4 (L) 8.7 (L)   PLATELET Latest Ref Range: 150 - 400 K/uL 48 (LL) 41 (LL) 34 (LL)   Sodium Latest Ref Range: 136 - 145 mmol/L 129 (L) 131 (L) 129 (L)   Potassium Latest Ref Range: 3.5 - 5.1 mmol/L 3.8 3.7 4.2   Chloride Latest Ref Range: 97 - 108 mmol/L 101 101 100   CO2 Latest Ref Range: 21 - 32 mmol/L 20 (L) 19 (L) 19 (L)   Glucose Latest Ref Range: 65 - 100 mg/dL 119 (H) 144 (H) 101 (H)   BUN Latest Ref Range: 6 - 20 MG/ (H) 123 (H) 131 (H)   Creatinine Latest Ref Range: 0.55 - 1.02 MG/DL 4.56 (H) 4.80 (H) 4.73 (H)   Albumin Latest Ref Range: 3.5 - 5.0 g/dL  3.4 (L)      RADIOLOGY:  Ultrasound of liver. Echogenic consistent with cirrhosis. No liver mass lesions. No dilated bile ducts. Moderate ascites.         Yosvany Roger MD  [facility] [address], [address] [place] [address] [address].  [phone]  [address]

## 2022-03-19 NOTE — PROGRESS NOTES
[address] [facility] Adult  Hospitalist Group                                                                                          Hospitalist Progress Note  Alexa Gonzalez MD  Answering service: [phone] OR 36 from in house phone        Date of Service:  3/19/2022  NAME:  Gaby Weaver  :  1960  MRN:  [id number]      Admission Summary:   Copied form admit: \" Gaby Weaver is a 65 y.o. female with history of liver cirrhosis with portal hypertension,recently diagnosed primary biliary cholangitis, recurrent ascites requiring paracentesis approximately every 7 to 10 days who presents here transferred from [address] [place] [address] secondary to decompensated liver cirrhosis, worsening renal insufficiency, progressive weakness currently undergoing work-up for possible liver transplant. The patient of note initially presented to [address] [place] [address] on 2022 secondary to hepatic encephalopathy and progressive weakness. Patient on admission was found at that time to have an ammonia level of 111 and did subsequently require paracentesis in the ED. The patient has had a prolonged and complicated hospital course since that time and has undergone multiple paracentesis including  with 11.8 L removal,  with 9/2 L removed, and  with 9.2 L removed. The patient also did have episodes of transient hypotension requiring a brief ICU admission for pressor support and has periodically received albumin with daily midodrine use. She was followed both by GI and nephrology services. Patient did undergo an EGD on  noted for distal esophagitis and portal hypertensive gastropathy, no evidence of varices. According to the son [address] at bedside, the patient does have a history of esophageal varices back in 2021 status post banding at that time. Per GI at outlying facility, patient was deemed not to be a candidate for colonoscopy and recommended Cologuard.   Part of her liver transplant evaluation at [facility] also included an MRCP was done on 2/18 which was noted for cirrhosis, portal hypertension, and large ascites. She has also undergone a cardiac stress test which was negative, EF of 69%. Patient has been maintained on lactulose, rifaximin, octreotide, and Protonix at the [facility]. Patient has had progressive worsening of her renal function at the [facility] with creatinine peak at 5.2 on 3/3 and associated metabolic acidosis requiring management with bicarb drip that was maintained as of this morning at [facility]. Per medical records, there was concern that patient may be moving towards initiation of possible hemodialysis. According to son, patient does not have any previous establish care with nephrology and was not aware of any kidney issues prior to current hospitalization. Patient also was treated with with a 7-day course of antibiotics for E. coli UTI, which were completed as of 2/17. The patient had has been seen by Dr. Dona Kauffman hepatologist for an initial visit in January to start the process of liver transplant work-up. Given lack of progression of patient's care prolonged hospitalization at [facility], request was made for patient to be transferred to [facility] to continue further liver transplant work-up and accepted by Dr. Dona Kauffman for transfer under the hospitalist service. Patient of note is currently lethargic and poorhistorian therefore history is obtained via discussion with her son Elis Daniel at the bedside and review of medical records. \"    Interval history / Subjective:     3/19/2022 :   · Patient seen for follow-up of decompensated liver cirrhosis and hepatic encephalopathy. Patient seen and examined earlier this morning by me. Patient is awake and answering questions semiappropriately. Patient denies any pain at this time. Patient denies any other acute complaints.      Assessment & Plan:      Decompensated liver cirrhosis, with portal hypertension. Felt to be secondary to [facility]  Transaminitis/hyperbilirubinemia/coagulopathy/thrombocytopenia  -Transferred from [facility] with prolonged hospitalization. Transferred for further work-up for liver transplant eval  -EGD on 2/17 [place] noted for distal esophagitis and portal hypertensive gastropathy, no evidence of varices.    -history of esophageal varices back in November 2021 status post banding at that time.    -Per GI at outlying facility, patient was deemed not to be a candidate for colonoscopy and recommended Cologuard.    -s/p MRCP on 2/18 which was noted for cirrhosis, portal hypertension, and large ascites. -s/p cardiac stress test which was negative for ischemia, EF of 69%  -Dr Mindy Henson following  -Continue lactulose, rifaximin, octreotide, Protonix p.o.  -Closely monitor stool output frequency  -Continue ursodiol  Continue current management at this time  Unable to complete for CBC. Pending a CBC as patient's platelets are very low. No new developments  Continue current management    History of hepatic encephalopathy with refractory ascites  -multiple paracentesis done at [facility] including 2/11 with 11.8 L removal, 2/18 with 9/2 L removed, and 2/28 with 9.2 L removed. -Currently with distended abdomen  -Moderate large ascites on US 3/6. Ascites said to be refractoyr to diuretics,HRS limiting use of diuretics  -Ascitic drain that was placed on 3/12 was apparently clogged, flushed and drained significant amount of ascites. Ascites almost gone. Added IV albumin.  -Ascitic fluid lab negative for SBP  Abdominal girth is increased likely more accumulated ascites        GREG with metabolic acidosis. Creatinine worsened from 0.9 on 11/26/2021 up to 4-5 now  -[facility]  -Nephrology following,recommendaitons appreciated.       Chronic normocytic anemia  -Possible anemia of CKD  Patient's hemoglobin came back 6.9 today.   I have ordered 1 unit of packed red blood cells to be transfused. Repeat H&H posttransfusion. Continue to monitor daily     Hyponatremia  -Suspect secondary to underlying liver cirrhosis.   -Na stable 128-131     Recent E. coli UTI  -Treated with Vanco and Zosyn then transitioned to Rocephin, completed 7-day antibiotic course as of 2/17  -Replace Beckford with new catheter 3/5 . Urine culture on 3/5 grew Candida albicans 75,000 CFU.       Hypothyroid  -Continue levothyroxine    Severe malnutrition  --Supplemented with tube feeding via NGT. Patient has been pulling the NGT 2 days in a row and requiring restraint. She needs ongoing nutritional supplement I think she may need PEG allergies becoming difficult to sustain NGT and I am concerned that she may aspirate when she attempts to pull the feeding tube. Discussed with Dr. Jose Acevedo. --On regular diet, intake is minimal.    Lines/catheters/drainage  --Beckford catheter in place, replaced on 3/5  --NGT in place          Generalized weakness/chronic wounds. pta  -PT, OT consult  -Wound care consulted    No changes at this time.     DVT prophylaxis SCDs for now. No pharmacological prophylaxis due to thrombocytopenia  DNR   Prognosis: Poor     Hospital Problems  Date Reviewed: 2/17/2022          Codes Class Noted POA    Cirrhosis ([facility] [address] ICD-10-CM: K74.60  ICD-9-CM: 571.5  2/8/2022 Unknown                      Data Review:    Review and/or order of clinical lab test    GENERAL:  Chronically sick looking, alert and awake, not in distress. Jaundiced  HEENT:  Deeply icteric. NGT in place. No active bleeding  NECK: Supple, trachea midline, no adenopathy, no thyromegally or tenderness, no carotid bruit and no JVD. LUNGS:   Vesicular breath sounds bilaterally, no added sounds. HEART:   S1 and S2 well heard,RRR,  no murmur, click, rub or gallop. ABDOMEB:   Abdomen flat, normoactive, diffusely tender without rebound or guarding. Beckford catheter draining deep yellow (jaundice) urine. EXTREMETIES: No peripheral edema.   Bilateral wrist restraints in place  PULSES: 2+ and symmetric all extremities. SKIN: Scattered ecchymosis, a large 1 on her upper chest  NEUROLOGY: Awake, alert today. Nonfocal exam.  Patient is oriented to person and place but not oriented to time      Labs:     Recent Labs     03/18/22  0509 03/17/22  1202 03/17/22  0454 03/17/22  0454   WBC 8.2  --   --  9.4   HGB 7.6* 6.9*   < > 7.2*   HCT 22.1* 20.3*   < > 21.2*   PLT 34*  --   --  39*    < > = values in this interval not displayed. Recent Labs     03/19/22  0357 03/18/22  0509 03/17/22  0454   * 131* 129*  129*   K 4.4 3.6 3.9  3.9   CL 99 99 99  99   CO2 19* 17* 15*  16*   * 152* 146*  155*   CREA 4.45* 4.54* 4.90*  4.79*   * 125* 113*  110*   CA 8.4* 8.6 9.2  9.2   PHOS  --   --  7.8*     Recent Labs     03/17/22  0454   ALB 3.9     No results for input(s): INR, PTP, APTT, INREXT, INREXT in the last 72 hours. No results for input(s): FE, TIBC, PSAT, FERR in the last 72 hours. Lab Results   Component Value Date/Time    Folate 38.3 (H) 10/20/2021 07:53 AM    RBC FOLATE 878 02/14/2022 01:27 AM      No results for input(s): PH, PCO2, PO2 in the last 72 hours. No results for input(s): CPK, CKNDX, TROIQ in the last 72 hours.     No lab exists for component: CPKMB  Lab Results   Component Value Date/Time    Cholesterol, total 122 01/25/2022 09:50 AM    HDL Cholesterol 40 01/25/2022 09:50 AM    LDL, calculated 67.2 01/25/2022 09:50 AM    Triglyceride 74 01/25/2022 09:50 AM    CHOL/HDL Ratio 3.1 01/25/2022 09:50 AM     Lab Results   Component Value Date/Time    Glucose (POC) 128 (H) 03/15/2022 06:06 AM    Glucose (POC) 123 (H) 03/15/2022 12:14 AM    Glucose (POC) 114 (H) 02/23/2022 05:42 PM    Glucose (POC) 127 (H) 02/23/2022 12:04 PM    Glucose (POC) 122 (H) 02/23/2022 08:18 AM     Lab Results   Component Value Date/Time    Color DARK YELLOW 03/05/2022 09:22 PM    Appearance TURBID (A) 03/05/2022 09:22 PM    Specific gravity 1.014 03/05/2022 09:22 PM    Specific gravity 1.010 02/11/2022 07:20 PM    pH (UA) 5.5 03/05/2022 09:22 PM    Protein 100 (A) 03/05/2022 09:22 PM    Glucose Negative 03/05/2022 09:22 PM    Ketone Negative 03/05/2022 09:22 PM    Bilirubin NEGATIVE 02/11/2022 07:20 PM    Urobilinogen 0.2 03/05/2022 09:22 PM    Nitrites Negative 03/05/2022 09:22 PM    Leukocyte Esterase LARGE (A) 03/05/2022 09:22 PM    Epithelial cells FEW 03/05/2022 09:22 PM    Bacteria 1+ (A) 03/05/2022 09:22 PM    WBC >100 (H) 03/05/2022 09:22 PM    RBC  03/05/2022 09:22 PM         Medications Reviewed:     Current Facility-Administered Medications   Medication Dose Route Frequency    0.9% sodium chloride infusion 250 mL  250 mL IntraVENous PRN    multivitamin, tx-iron-ca-min (THERA-M w/ IRON) tablet 1 Tablet  1 Tablet Oral DAILY    0.9% sodium chloride infusion 250 mL  250 mL IntraVENous PRN    0.9% sodium chloride infusion 250 mL  250 mL IntraVENous PRN    miconazole (MICONTIN) 2 % ointment   Topical BID    balsam peru-castor oiL (VENELEX) ointment   Topical BID    zinc oxide-cod liver oil (DESITIN) 40 % paste   Topical PRN    sodium chloride (NS) flush 5-40 mL  5-40 mL IntraVENous Q8H    sodium chloride (NS) flush 5-40 mL  5-40 mL IntraVENous PRN    acetaminophen (TYLENOL) tablet 650 mg  650 mg Oral Q6H PRN    Or    acetaminophen (TYLENOL) suppository 650 mg  650 mg Rectal Q6H PRN    polyethylene glycol (MIRALAX) packet 17 g  17 g Oral DAILY PRN    ondansetron (ZOFRAN ODT) tablet 4 mg  4 mg Oral Q8H PRN    Or    ondansetron (ZOFRAN) injection 4 mg  4 mg IntraVENous D5Y PRN    folic acid (FOLVITE) tablet 1 mg  1 mg Oral DAILY    lactulose (CHRONULAC) 10 gram/15 mL solution 30 mL  30 mL Oral Q6H    levothyroxine (SYNTHROID) tablet 50 mcg  50 mcg Oral 6am    midodrine (PROAMATINE) tablet 15 mg  15 mg Oral TID WITH MEALS    octreotide (SANDOSTATIN) injection 100 mcg  100 mcg IntraVENous TID    pantoprazole (PROTONIX) tablet 40 mg 40 mg Oral DAILY    rifAXIMin (XIFAXAN) tablet 550 mg  550 mg Oral BID    sertraline (ZOLOFT) tablet 100 mg  100 mg Oral DAILY    thiamine HCL (B-1) tablet 100 mg  100 mg Oral DAILY    ursodioL (ACTIGALL) tablet 500 mg (Patient Supplied)  500 mg Oral Q12H    glucagon (GLUCAGEN) injection 1 mg  1 mg IntraMUSCular PRN    naloxone (NARCAN) injection 0.1 mg  0.1 mg IntraVENous PRN    promethazine (PHENERGAN) 6.25 mg in 0.9% sodium chloride 50 mL IVPB  6.25 mg IntraVENous Q6H PRN     ______________________________________________________________________  EXPECTED LENGTH OF STAY: 4d 16h  ACTUAL LENGTH OF STAY:          [address] [place], [place]

## 2022-03-19 NOTE — PROGRESS NOTES
Bedside shift change report given to [address] (oncoming nurse) by Fallon Merlos RN (offgoing nurse). Report included the following information SBAR, Kardex, Intake/Output, MAR and Recent Results.

## 2022-03-19 NOTE — PROGRESS NOTES
Pt was up most of the night,she had 3 very large loose formed stool BM s on my shift,Pt was refusing to take her medications,she said im tired no mote meds,I sat with Pt 1/2 hours,she said she is tired and nothing is working,Pt did let me continue her tube feedinfs with waster flushes,Am labs were swnt,lab called stated labs needed to be resent,Pt stated No,pt bleeds even when we tie the torniquet around her arm,pts Platlets are 36,pt remains awake this morning ,stated she needs ambien to sleep and she said the Dr refused the patient remains Clean and dry . report endorsed to AM nurse Christie Champagne RN.

## 2022-03-19 NOTE — PROGRESS NOTES
[address] [facility], [place], [address], [place], [place]      Yani Singer, ISAAC Metz, [facility]     Michelle Lopez, [facility]   MANISHA Zafar, [facility]       Namita [facility] 136    at [address], [facility] Katlyn Pizano  22.    [phone]    FAX: [address] [place] [address]    at [address], [address]    [phone]    FAX: [phone]       HEPATOLOGY PROGRESS NOTE  The patient is well known to me from a previous visit to my office at THE [facility] in [facility]. She is a 66 yo  female who was found to have chronic liver disease in 2020 and cirrhosis in 7/2021 when she developed ascites. She had variceal bleeding in 11/2021. Serologic evaluation for markers of chronic liver disease was positive for AMA. Cirrhosis is due to [facility].     The patient has developed the following complications of cirrhosis: esophageal varices, variceal bleeding, ascites, edema, hepatic encephalopathy, severe muslce wasting    I saw the patient for the first time in 1/2022. She had CTP score 9 and MELD score 21 at that time. We started liver transplant evaluation testing. She developed confusion and could not be aroused and was hospitalized 3 weeks ago at [facility] in 2/202. During that hospitalization she developed worsening malnutrition, worsening of muscle wasting and a few decubitus ulcers. She has been at [facility] for 9 days. She has been receiving Dobbhoff tube feedings and OT/PT. She has improved somewhat. She is definitely stronger and can move herself around in bed some, she can now hold a glass, and feed herself to a limited degree. However, she is still too weak to stand even with assistance.   Wound care note indicates skin ulcers are stable but not really healing. Family conference on 3/17/22. I had a 1 hour meeting with the patient and her son who is POA. Her only option for survival is a liver transplant but she is currently too weak, too malnurished and has too much muscle wasting to survive LT. I discussed the options with the patient and her son. Either continue what we are doing until she improves nutritionally, improves muscle mass, heals the skin ulcers and she can stand and take a few steps on her own. I estimated it would take at least another month of steady progress for there to achieve this goal.    The other option is to enter hospice. If at any time she developed sepsis, GI bleeding or another major setback she enter hospice as she will never able to achieve her goal.      We cannot place PEG because of ascites  She cannot go to SNF/Rehab because of the Dobbhoff tube. For now she and son wish to continue since they see slow limited improvement. The son will discuss this with her daily and if they could opt for hospice care at any time. They both seem to be realistic regarding her ability to improve but are not yet ready to \"give up\" and enter hospice care today. Hospital course: Tolerating tube feedings well. She is very cooperative. Not pulling at tube. Working with OT/PT  Overall looks better. Making progress. Sna up to 131. Scr down down a bit but moving in correct direction. Hepatology Plan:  Nursing should retape and secure dobbhoff tube daily, preferably each evening before she sleeps since this is when she tends to accidentily hit tube with arms and pull this out. Suggest nursing keep tube under hospital gown to reduce the risk of accidentally getting this caught by hands moving during sleep and pulled out. Continue Dobbhuff tube feeding 24-7 and continue to increase calories as tolerated and per Dietician management. Continue with OT/PT who are doing a great job. ASSESSMENT AND PLAN:  Cirrhosis  The diagnosis of cirrhosis is based upon imaging, laboratory studies, complications of cirrhosis. Cirrhosis is secondary to [facility]. Not alcohol as she was initially told.     Serologic testing was positive for AMA, ASMA     The CTP is 10. Child class C. The MELD score is 33.     Based upon the MELD and CTP scores the patient has a mortality of about 50% within the next 90 days until we turn her fraily and weakness around. Right now is is too weak to survive liver transplantation. She needs aggressive nutritional support and both she and her son are in favor of this with the hopes her situation will improve and she could survive liver transplantation.       Primary Biliary Cholangitis  The diagnosis is based upon serology and an elevation in ALP and positive AMA. A liver biopsy has not been performed. PBC is being treated with ANTONIETA 500 mg BID. 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 She is still making urine and does not need dialysis. Continue midodrine, octreotide injections   Serum albumin up to 4.4 gm. Will need to stop IV albumin before she gets fluid overloaded    Malnutrition  The patient has severe protein-calorie malnutrtion and muscle wasting. This is due to advanced liver disease and refractory ascites. The patient a more calories than she can possible eat to get into positive nutritional balance. Without aggressive nutritional support she will not survive. She is alert, oriented and understands the need for NG dobhoff feeding tube placement. Both she and her son agree with this. Getting tube feeds via Dobhoff feeding tube. Tolerating this well. Formula to be managed by nutritioinal service. Frailty/muscle wasting  The patient is very frail and cannot sit or stand on her own. She is getting aggressive OT/PT. She is very motivated to survive and willing to do whatever excercies she needs to get stronger.       If she does not improve her muscle wasting and body strength she will never survive the liver transplant process. Skin wounds  Large 5 x 5 cm area of tissue injury withtout ulceration on her back that is improving with wound care  Stage 3 pressure injury 1.3 x 1 cm pn sacrum  Rash consistent with candida     Ascites   Ascites developed for the first time in 7/2021. Ascites is is refractory to the current doses of diuretics because of kidney disease and Hyponatremia. Ascites is stable  No diuretics. Hyponatremia  This is secondary cirrhosis and diuretics  This appears to be stable in the 128 range. No diuretics. Will give IV albumin 25 gm Q6H for a few days to see if this helps     Screening for Esophageal varices   The patient has esophageal varices with prior bleeding. Varices were banded in 11/2021 and 12/2021. The last EGD to assess for varices was performed in 12/2021. No repeat EGD needed at this time.     Hepatic encephalopathy   Overt HE is well controlled on lactulose and xifaxan. Serum ammonia is low and in the 40s.      Coagulopathy  INR is in the 1.6 range and stable. Thrombocytopenia   This is secondary to cirrhosis. There is no evidence of overt bleeding. No treatment is required. The platelet count is adequate for the patient to undergo procedures without the need for platelet transfusion or platelet growth factors.     Anemia   This is due to multifactorial causes including portal hypertension with chronic GI blood loss and bone marrow suppression due to severe malnutrition.     The most recent FE studies were from 1/2022. The serum ferritin is 87  The FE saturation is 74  FE panel is within the normal range but this is low a patient with chronic liver disease and cirrhosis   Will administer intravenous iron. No EGD needed at this time. Thrombocytopenia   This is secondary to cirrhosis. There is no evidence of overt bleeding. No treatment is required.   The platelet count is adequate for the patient to undergo procedures without the need for platelet transfusion or platelet growth factors. PHYSICAL EXAMINATION:  VS: per nursing note  General:  Ill appearing  Eyes:  Sclera icteric. ENT:  No oral lesions. Thyroid normal.  Nodes:  No adenopathy. Skin:  Spider angiomata. Severe ecchymosis all over arms. Respiratory:  Lungs clear to auscultation. Cardiovascular:  Regular heart rate. Abdomen:  Soft non-tender, Distended with obvious ascites. Extremities:  No lower extremity edema. Severe muscle wasting of upper and lower extremities. Neurologic:  Alert and oriented. Cranial nerves grossly intact. No asterixis. LABORATORY:  Results for Luiz Barroso (MRN [id number]) as of 3/19/2022 04:38   Ref. Range 3/16/2022 06:42 3/17/2022 04:54 3/18/2022 05:09   WBC Latest Ref Range: 3.6 - 11.0 K/uL 8.0 9.4 8.2   HGB Latest Ref Range: 11.5 - 16.0 g/dL 7.6 (L) 7.2 (L) 7.6 (L)   PLATELET Latest Ref Range: 150 - 400 K/uL 42 (LL) 39 (LL) 34 (LL)   Sodium Latest Ref Range: 136 - 145 mmol/L 127 (L) 129 (L) 131 (L)   Potassium Latest Ref Range: 3.5 - 5.1 mmol/L 4.2 3.9 3.6   Chloride Latest Ref Range: 97 - 108 mmol/L 99 99 99   CO2 Latest Ref Range: 21 - 32 mmol/L 18 (L) 16 (L) 17 (L)   Glucose Latest Ref Range: 65 - 100 mg/dL 134 (H) 110 (H) 125 (H)   BUN Latest Ref Range: 6 - 20 MG/ (H) 155 (H) 152 (H)   Creatinine Latest Ref Range: 0.55 - 1.02 MG/DL 4.76 (H) 4.79 (H) 4.54 (H)     RADIOLOGY:  Ultrasound of liver. Echogenic consistent with cirrhosis. No liver mass lesions. No dilated bile ducts. Moderate ascites.         MD Galileo Najera [address], [address] [place] [address] 22.  [phone]  [address] [place] [address]

## 2022-03-20 NOTE — PROGRESS NOTES
[facility] Adult  Hospitalist Group                                                                                          Hospitalist Progress Note  Shade Higginbotham MD  Answering service: [phone] from in house phone        Date of Service:  3/20/2022  NAME:  Ramin Godinez  :  1960  MRN:  [id number]      Admission Summary:   Copied form admit: \" Ramin Godinez is a 65 y.o. female with history of liver cirrhosis with portal hypertension,recently diagnosed primary biliary cholangitis, recurrent ascites requiring paracentesis approximately every 7 to 10 days who presents here transferred from [address] [place] [address] secondary to decompensated liver cirrhosis, worsening renal insufficiency, progressive weakness currently undergoing work-up for possible liver transplant. The patient of note initially presented to [address] [place] [address] on 2022 secondary to hepatic encephalopathy and progressive weakness. Patient on admission was found at that time to have an ammonia level of 111 and did subsequently require paracentesis in the ED. The patient has had a prolonged and complicated hospital course since that time and has undergone multiple paracentesis including  with 11.8 L removal,  with 9/2 L removed, and  with 9.2 L removed. The patient also did have episodes of transient hypotension requiring a brief ICU admission for pressor support and has periodically received albumin with daily midodrine use. She was followed both by GI and nephrology services. Patient did undergo an EGD on  noted for distal esophagitis and portal hypertensive gastropathy, no evidence of varices. According to the son Nirmal Edge at bedside, the patient does have a history of esophageal varices back in 2021 status post banding at that time. Per GI at outlying facility, patient was deemed not to be a candidate for colonoscopy and recommended Cologuard.   Part of her liver transplant evaluation at [facility] THE [facility] also included an MRCP was done on 2/18 which was noted for cirrhosis, portal hypertension, and large ascites. She has also undergone a cardiac stress test which was negative, EF of 69%. Patient has been maintained on lactulose, rifaximin, octreotide, and Protonix at the [facility]. Patient has had progressive worsening of her renal function at the [facility] with creatinine peak at 5.2 on 3/3 and associated metabolic acidosis requiring management with bicarb drip that was maintained as of this morning at [facility] THE [facility]. Per medical records, there was concern that patient may be moving towards initiation of possible hemodialysis. According to son, patient does not have any previous establish care with nephrology and was not aware of any kidney issues prior to current hospitalization. Patient also was treated with with a 7-day course of antibiotics for E. coli UTI, which were completed as of 2/17. The patient had has been seen by Dr. Pratik Vargas hepatologist for an initial visit in January to start the process of liver transplant work-up. Given lack of progression of patient's care prolonged hospitalization at [facility], request was made for patient to be transferred to [address] [place] [address] to continue further liver transplant work-up and accepted by Dr. Pratik Vargas for transfer under the hospitalist service. Patient of note is currently lethargic and poorhistorian therefore history is obtained via discussion with her son Ese Murrieta at the bedside and review of medical records. \"    Interval history / Subjective:     3/20/2022 :   · Patient seen for follow-up of decompensated liver cirrhosis and hepatic encephalopathy. Patient seen and examined earlier this morning by me. Patient is looking a bit better and answering some questions. She is able to lift her own cup feed herself to an extent. Assessment & Plan:      Decompensated liver cirrhosis, with portal hypertension.   Felt to be secondary to [facility]  Transaminitis/hyperbilirubinemia/coagulopathy/thrombocytopenia  -Transferred from [facility] with prolonged hospitalization. Transferred for further work-up for liver transplant eval  -EGD on 2/17 [place] noted for distal esophagitis and portal hypertensive gastropathy, no evidence of varices.    -history of esophageal varices back in November 2021 status post banding at that time.    -Per GI at outlying facility, patient was deemed not to be a candidate for colonoscopy and recommended Cologuard.    -s/p MRCP on 2/18 which was noted for cirrhosis, portal hypertension, and large ascites. -s/p cardiac stress test which was negative for ischemia, EF of 69%  -Dr Bea Christine following  -Continue lactulose, rifaximin, octreotide, Protonix p.o.  -Closely monitor stool output frequency  -Continue ursodiol  Continue current management at this time  Unable to complete for CBC. Pending a CBC as patient's platelets are very low. No new developments  Continue current management    History of hepatic encephalopathy with refractory ascites  -multiple paracentesis done at [facility] including 2/11 with 11.8 L removal, 2/18 with 9/2 L removed, and 2/28 with 9.2 L removed. -Currently with distended abdomen  -Moderate large ascites on US 3/6. Ascites said to be refractoyr to diuretics,HRS limiting use of diuretics  -Ascitic drain that was placed on 3/12 was apparently clogged, flushed and drained significant amount of ascites. Ascites almost gone. Added IV albumin.  -Ascitic fluid lab negative for SBP  Abdominal girth is increased likely more accumulated ascites        GREG with metabolic acidosis. Creatinine worsened from 0.9 on 11/26/2021 up to 4-5 now  [facility]  -Nephrology following,recommendaitons appreciated.       Chronic normocytic anemia  -Possible anemia of CKD  Patient's hemoglobin came back 6.9 today. I have ordered 1 unit of packed red blood cells to be transfused.   Repeat H&H posttransfusion. Continue to monitor daily     Hyponatremia  -Suspect secondary to underlying liver cirrhosis.   -Na stable 128-131     Recent E. coli UTI  -Treated with Vanco and Zosyn then transitioned to Rocephin, completed 7-day antibiotic course as of 2/17  -Replace Beckford with new catheter 3/5 . Urine culture on 3/5 grew Candida albicans 75,000 CFU.       Hypothyroid  -Continue levothyroxine    Severe malnutrition  --Supplemented with tube feeding via NGT. Patient has been pulling the NGT 2 days in a row and requiring restraint. She needs ongoing nutritional supplement I think she may need PEG allergies becoming difficult to sustain NGT and I am concerned that she may aspirate when she attempts to pull the feeding tube. Discussed with Dr. Natalie Qiu. --On regular diet, intake is minimal.    Lines/catheters/drainage  --Beckford catheter in place, replaced on 3/5  --NGT in place          Generalized weakness/chronic wounds. pta  -PT, OT consult  -Wound care consulted    No changes at this time. Platelets continue to be very low but patient has no signs of spontaneous bleeding.     DVT prophylaxis SCDs for now. No pharmacological prophylaxis due to thrombocytopenia  DNR   Prognosis: Poor     Hospital Problems  Date Reviewed: 2/17/2022          Codes Class Noted POA    [facility] ICD-10-CM: K74.60  ICD-9-CM: 571.5  2/8/2022 Unknown                      Data Review:    Review and/or order of clinical lab test    GENERAL:  Chronically sick looking, alert and awake, not in distress. Jaundiced  HEENT:  Deeply icteric. NGT in place. No active bleeding  NECK: Supple, trachea midline, no adenopathy, no thyromegally or tenderness, no carotid bruit and no JVD. LUNGS:   Vesicular breath sounds bilaterally, no added sounds. HEART:   S1 and S2 well heard,RRR,  no murmur, click, rub or gallop. ABDOMEB:   Abdomen flat, normoactive, diffusely tender without rebound or guarding.   Beckford catheter draining deep yellow urine.  EXTREMETIES: No peripheral edema. Bilateral wrist restraints in place  PULSES: 2+ and symmetric all extremities. SKIN: Scattered ecchymosis, a large 1 on her upper chest  NEUROLOGY: Awake, alert today. Nonfocal exam.  Patient is oriented to person and place but not oriented to time      Labs:     Recent Labs     03/20/22  0727 03/19/22  1745   WBC 9.6 10.1   HGB 8.0* 8.5*   HCT 24.0* 24.8*   PLT 32* 37*     Recent Labs     03/20/22  0727 03/19/22  0357 03/18/22  0509   * 130* 131*   K 3.4* 4.4 3.6    99 99   CO2 19* 19* 17*   * 149* 152*   CREA 4.32* 4.45* 4.54*   * 116* 125*   CA 8.3* 8.4* 8.6     No results for input(s): ALT, AP, TBIL, TBILI, TP, ALB, GLOB, GGT, AML, LPSE in the last 72 hours. No lab exists for component: SGOT, GPT, AMYP, HLPSE  No results for input(s): INR, PTP, APTT, INREXT, INREXT in the last 72 hours. No results for input(s): FE, TIBC, PSAT, FERR in the last 72 hours. Lab Results   Component Value Date/Time    Folate 38.3 (H) 10/20/2021 07:53 AM    RBC FOLATE 878 02/14/2022 01:27 AM      No results for input(s): PH, PCO2, PO2 in the last 72 hours. No results for input(s): CPK, CKNDX, TROIQ in the last 72 hours.     No lab exists for component: CPKMB  Lab Results   Component Value Date/Time    Cholesterol, total 122 01/25/2022 09:50 AM    HDL Cholesterol 40 01/25/2022 09:50 AM    LDL, calculated 67.2 01/25/2022 09:50 AM    Triglyceride 74 01/25/2022 09:50 AM    CHOL/HDL Ratio 3.1 01/25/2022 09:50 AM     Lab Results   Component Value Date/Time    Glucose (POC) 128 (H) 03/15/2022 06:06 AM    Glucose (POC) 123 (H) 03/15/2022 12:14 AM    Glucose (POC) 114 (H) 02/23/2022 05:42 PM    Glucose (POC) 127 (H) 02/23/2022 12:04 PM    Glucose (POC) 122 (H) 02/23/2022 08:18 AM     Lab Results   Component Value Date/Time    Color DARK YELLOW 03/05/2022 09:22 PM    Appearance TURBID (A) 03/05/2022 09:22 PM    Specific gravity 1.014 03/05/2022 09:22 PM    Specific gravity 1.010 02/11/2022 07:20 PM    pH (UA) 5.5 03/05/2022 09:22 PM    Protein 100 (A) 03/05/2022 09:22 PM    Glucose Negative 03/05/2022 09:22 PM    Ketone Negative 03/05/2022 09:22 PM    Bilirubin NEGATIVE 02/11/2022 07:20 PM    Urobilinogen 0.2 03/05/2022 09:22 PM    Nitrites Negative 03/05/2022 09:22 PM    Leukocyte Esterase LARGE (A) 03/05/2022 09:22 PM    Epithelial cells FEW 03/05/2022 09:22 PM    Bacteria 1+ (A) 03/05/2022 09:22 PM    WBC >100 (H) 03/05/2022 09:22 PM    RBC  03/05/2022 09:22 PM         Medications Reviewed:     Current Facility-Administered Medications   Medication Dose Route Frequency    0.9% sodium chloride infusion 250 mL  250 mL IntraVENous PRN    multivitamin, tx-iron-ca-min (THERA-M w/ IRON) tablet 1 Tablet  1 Tablet Oral DAILY    0.9% sodium chloride infusion 250 mL  250 mL IntraVENous PRN    0.9% sodium chloride infusion 250 mL  250 mL IntraVENous PRN    miconazole (MICONTIN) 2 % ointment   Topical BID    balsam peru-castor oiL (VENELEX) ointment   Topical BID    zinc oxide-cod liver oil (DESITIN) 40 % paste   Topical PRN    sodium chloride (NS) flush 5-40 mL  5-40 mL IntraVENous Q8H    sodium chloride (NS) flush 5-40 mL  5-40 mL IntraVENous PRN    acetaminophen (TYLENOL) tablet 650 mg  650 mg Oral Q6H PRN    Or    acetaminophen (TYLENOL) suppository 650 mg  650 mg Rectal Q6H PRN    polyethylene glycol (MIRALAX) packet 17 g  17 g Oral DAILY PRN    ondansetron (ZOFRAN ODT) tablet 4 mg  4 mg Oral Q8H PRN    Or    ondansetron (ZOFRAN) injection 4 mg  4 mg IntraVENous H5D PRN    folic acid (FOLVITE) tablet 1 mg  1 mg Oral DAILY    lactulose (CHRONULAC) 10 gram/15 mL solution 30 mL  30 mL Oral Q6H    levothyroxine (SYNTHROID) tablet 50 mcg  50 mcg Oral 6am    midodrine (PROAMATINE) tablet 15 mg  15 mg Oral TID WITH MEALS    octreotide (SANDOSTATIN) injection 100 mcg  100 mcg IntraVENous TID    pantoprazole (PROTONIX) tablet 40 mg  40 mg Oral DAILY    rifAXIMin [employer]) tablet 550 mg  550 mg Oral BID    sertraline (ZOLOFT) tablet 100 mg  100 mg Oral DAILY    thiamine HCL (B-1) tablet 100 mg  100 mg Oral DAILY    ursodioL (ACTIGALL) tablet 500 mg (Patient Supplied)  500 mg Oral Q12H    glucagon (GLUCAGEN) injection 1 mg  1 mg IntraMUSCular PRN    naloxone (NARCAN) injection 0.1 mg  0.1 mg IntraVENous PRN    promethazine (PHENERGAN) 6.25 mg in 0.9% sodium chloride 50 mL IVPB  6.25 mg IntraVENous Q6H PRN     ______________________________________________________________________  EXPECTED LENGTH OF STAY: 4d 16h  ACTUAL LENGTH OF STAY:          [facility] Josie Cortes MD

## 2022-03-20 NOTE — PROGRESS NOTES
Bedside and Verbal shift change report given to Chaparro Garza (oncoming nurse) by iDego Chavez (offgoing nurse). Report included the following information SBAR, Kardex, Intake/Output, MAR and Recent Results.

## 2022-03-21 NOTE — PROGRESS NOTES
Problem: Self Care Deficits Care Plan (Adult)  Goal: *Acute Goals and Plan of Care (Insert Text)  Description: FUNCTIONAL STATUS PRIOR TO ADMISSION: Chart review 2/23/2022 patient modified independent with RW. Son states recently increased assistance to [facility] with functional mobility and sit<>stand; tangential at times during conversation, but aware and able to be redirected. HOME SUPPORT: The patient lived with mother who provided assistance as needed and son lives nearby. 1 step to enter, stays on main level, walk-in-shower, DME: Shower chair, RW, BSC, w/c    Occupational Therapy Goals  Initiated 3/8/2022, Re-assessed to increase frequency of services on 3/9/2022 due to significant improvement in patient's performance . Reviewed 3/17/2022  1. Patient will perform grooming with minimal assistance/contact guard assist within 7 day(s). 2.  Patient will perform bathing from anterior neck to thigh with minimal assistance/contact guard assist within 7 day(s). 3.  Patient will perform upper body dressing with minimal assistance/contact guard assist within 7 day(s). 4.  Patient will perform toilet transfers with moderate assistance  within 7 day(s). 5.  Patient will perform all aspects of toileting with moderate assistance  within 7 day(s). 6.  Patient will participate in upper extremity therapeutic exercise/activities with minimal assistance/contact guard assist for 10 minutes within 7 day(s). 7.  Patient will utilize energy conservation techniques during functional activities with verbal cues within 7 day(s). Outcome: Progressing Towards Goal   OCCUPATIONAL THERAPY TREATMENT  Patient: Dalila Bautista (84 y.o. female)  Date: 3/21/2022  Diagnosis: Cirrhosis ([facility] [K74.60] <principal problem not specified>       Precautions: Fall  Chart, occupational therapy assessment, plan of care, and goals were reviewed. ASSESSMENT  Patient continues with skilled OT services and is progressing towards goals. Patient is limited during ADL tasks by abdominal distention/discomfort- unable to reach distal LE, impaired sitting and standing tolerance due to general weakness and fatigue. Standing tolerance <3 min during ADL. Patient displays cognitive impairment and required increased and multimodal cues for initiating activity and carrying over over instruction. BP stable today and patient was safely left in chair at session's end. Current Level of Function Impacting Discharge (ADLs): max assist for LB bathing and dressing, max assist for toileting. Transfers- Min to Mod A  at [address] [place] [address]    Other factors to consider for discharge:          PLAN :  Patient continues to benefit from skilled intervention to address the above impairments. Continue treatment per established plan of care to address goals. Recommend with staff: in chair daily    Recommend next OT session: BSC transfers, ADL - seated EOB + AE training, standing tolerance, UE HEP     Recommendation for discharge: (in order for the patient to meet his/her long term goals)  Therapy up to 5 days/week in SNF setting    This discharge recommendation:  Has been made in collaboration with the attending provider and/or case management    IF patient discharges home will need the following DME: TBD       SUBJECTIVE:   Patient pleasant and cooperative     OBJECTIVE DATA SUMMARY:   Cognitive/Behavioral Status:  Neurologic State: Alert  Orientation Level: Oriented to person;Oriented to place  Cognition: Decreased attention/concentration; Follows commands             Functional Mobility and Transfers for ADLs:  Bed Mobility:  Supine to Sit: Maximum assistance  Scooting: Minimum assistance    Transfers:  Sit to Stand: Moderate assistance  Functional Transfers  Adaptive Equipment: Walker (comment)  Bed to Chair: Minimum assistance;Assist x2;Adaptive equipment; Additional time    Balance:  Sitting: Intact; Without support  Standing: Impaired; With support  Standing - Static: Fair;Constant support  Standing - Dynamic : Fair;Constant support    ADL Intervention:     Simulated LB ADL while seated at EOB- patient able to reach just below knees. May benefit from training in use of AE for bathing/dressing        Lower Body Dressing Assistance  Dressing Assistance: Maximum assistance    Toileting  Bowel Hygiene: Total assistance (dependent)         Pain:  Abdominal discomfort reported (still quite distended)    Activity Tolerance:   Fair and BP stable while seated in chair     After treatment patient left in no apparent distress:   Sitting in chair, Call bell within reach, and Caregiver / family present    COMMUNICATION/COLLABORATION:   The patients plan of care was discussed with: Physical therapist and Registered nurse.      Kaylyn Arizmendi OT  Time Calculation: 35 mins

## 2022-03-21 NOTE — PROGRESS NOTES
[facility] Adult  Hospitalist Group                                                                                          Hospitalist Progress Note  Alma Mccurdy MD  Answering service: [phone] from in house phone        Date of Service:  3/21/2022  NAME:  Tito Rogers  :  1960  MRN:  [id number]      Admission Summary:   Copied form admit: \" Tito Rogers is a 65 y.o. female with history of liver cirrhosis with portal hypertension,recently diagnosed primary biliary cholangitis, recurrent ascites requiring paracentesis approximately every 7 to 10 days who presents here transferred from [address] [place] [address] secondary to decompensated liver cirrhosis, worsening renal insufficiency, progressive weakness currently undergoing work-up for possible liver transplant. The patient of note initially presented to [address] [place] [address] on 2022 secondary to hepatic encephalopathy and progressive weakness. Patient on admission was found at that time to have an ammonia level of 111 and did subsequently require paracentesis in the ED. The patient has had a prolonged and complicated hospital course since that time and has undergone multiple paracentesis including  with 11.8 L removal,  with 9/2 L removed, and  with 9.2 L removed. The patient also did have episodes of transient hypotension requiring a brief ICU admission for pressor support and has periodically received albumin with daily midodrine use. She was followed both by GI and nephrology services. Patient did undergo an EGD on  noted for distal esophagitis and portal hypertensive gastropathy, no evidence of varices. According to the son Sabino Brock at bedside, the patient does have a history of esophageal varices back in 2021 status post banding at that time. Per GI at outlying facility, patient was deemed not to be a candidate for colonoscopy and recommended Cologuard.   Part of her liver transplant evaluation at [facility] also included an MRCP was done on 2/18 which was noted for cirrhosis, portal hypertension, and large ascites. She has also undergone a cardiac stress test which was negative, EF of 69%. Patient has been maintained on lactulose, rifaximin, octreotide, and Protonix at the [facility]. Patient has had progressive worsening of her renal function at the [facility] with creatinine peak at 5.2 on 3/3 and associated metabolic acidosis requiring management with bicarb drip that was maintained as of this morning at [facility]. Per medical records, there was concern that patient may be moving towards initiation of possible hemodialysis. According to son, patient does not have any previous establish care with nephrology and was not aware of any kidney issues prior to current hospitalization. Patient also was treated with with a 7-day course of antibiotics for E. coli UTI, which were completed as of 2/17. The patient had has been seen by Dr. Luis Miguel Montes De Oca hepatologist for an initial visit in January to start the process of liver transplant work-up. Given lack of progression of patient's care prolonged hospitalization at [facility], request was made for patient to be transferred to [facility] to continue further liver transplant work-up and accepted by Dr. Luis Miguel Montes De Oca for transfer under the hospitalist service. Patient of note is currently lethargic and poorhistorian therefore history is obtained via discussion with her son Garett Cruz at the bedside and review of medical records. \"    Interval history / Subjective:     3/21/2022 :   · Patient seen for follow-up of decompensated liver cirrhosis and hepatic encephalopathy. Patient seen and examined earlier this morning by me. Patient apparently had episode of vomiting overnight so tube feeds were stopped. IV access was also lost.  The patient does look about the same as she did yesterday. No acute complaints at this time.      Assessment & Plan:      Decompensated liver cirrhosis, with portal hypertension. Felt to be secondary to [facility]  Transaminitis/hyperbilirubinemia/coagulopathy/thrombocytopenia  -Transferred from [facility] with prolonged hospitalization. Transferred for further work-up for liver transplant eval  -EGD on 2/17 [facility] noted for distal esophagitis and portal hypertensive gastropathy, no evidence of varices.    -history of esophageal varices back in November 2021 status post banding at that time.    -Per GI at outlying facility, patient was deemed not to be a candidate for colonoscopy and recommended Cologuard.    -s/p MRCP on 2/18 which was noted for cirrhosis, portal hypertension, and large ascites. -s/p cardiac stress test which was negative for ischemia, EF of 69%  -Dr Gia Callahan following  -Continue lactulose, rifaximin, octreotide, Protonix p.o.  -Closely monitor stool output frequency  -Continue ursodiol  Continue current management at this time  Unable to complete for CBC. Pending a CBC as patient's platelets are very low. No new developments  Continue current management    History of hepatic encephalopathy with refractory ascites  -multiple paracentesis done at [facility] including 2/11 with 11.8 L removal, 2/18 with 9/2 L removed, and 2/28 with 9.2 L removed. -Currently with distended abdomen  -Moderate large ascites on US 3/6. Ascites said to be refractoyr to diuretics,HRS limiting use of diuretics  -Ascitic drain that was placed on 3/12 was apparently clogged, flushed and drained significant amount of ascites. Ascites almost gone. Added IV albumin.  -Ascitic fluid lab negative for SBP  Abdominal girth is increased likely more accumulated ascites        GREG with metabolic acidosis. Creatinine worsened from 0.9 on 11/26/2021 up to 4-5 now  -Likely due to [facility]  -Nephrology following,recommendaitons appreciated.       Chronic normocytic anemia  -Possible anemia of CKD  Patient's hemoglobin came back 6.9 today.   I have ordered 1 unit of packed red blood cells to be transfused. Repeat H&H posttransfusion. Continue to monitor daily     Hyponatremia  -Suspect secondary to underlying liver cirrhosis.   -Na stable 128-131     Recent E. coli UTI  -Treated with Vanco and Zosyn then transitioned to Rocephin, completed 7-day antibiotic course as of 2/17  -Replace Beckford with new catheter 3/5 . Urine culture on 3/5 grew Candida albicans 75,000 CFU.       Hypothyroid  -Continue levothyroxine    Severe malnutrition  --Supplemented with tube feeding via NGT. Patient has been pulling the NGT 2 days in a row and requiring restraint. She needs ongoing nutritional supplement I think she may need PEG allergies becoming difficult to sustain NGT and I am concerned that she may aspirate when she attempts to pull the feeding tube. Discussed with Dr. Flor Willson. --On regular diet, intake is minimal.    Lines/catheters/drainage  --Beckford catheter in place, replaced on 3/5  --NGT in place          Generalized weakness/chronic wounds. pta  -PT, OT consult  -Wound care consulted    Restarting tube feeds. Will stop again if patient vomits. Holding octreotide for now as there is no IV access. I spoke to the patient's son who is in the room and gave an update on the patient's status. Platelets continue to be very low but patient has no signs of spontaneous bleeding.     DVT prophylaxis SCDs for now. No pharmacological prophylaxis due to thrombocytopenia  DNR   Prognosis: Poor     Hospital Problems  Date Reviewed: 2/17/2022          Codes Class Noted POA    [facility] ICD-10-CM: K74.60  ICD-9-CM: 571.5  2/8/2022 Unknown                      Data Review:    Review and/or order of clinical lab test    GENERAL:  Chronically sick looking, alert and awake, not in distress. Jaundiced  HEENT:  Deeply icteric. NGT in place.  No active bleeding  NECK: Supple, trachea midline, no adenopathy, no thyromegally or tenderness, no carotid bruit and no JVD.  LUNGS:   Vesicular breath sounds bilaterally, no added sounds. HEART:   S1 and S2 well heard,RRR,  no murmur, click, rub or gallop. ABDOMEB:   Abdomen somewhat distended, normoactive, diffusely tender without rebound or guarding. Beckford catheter draining deep yellow urine. EXTREMETIES: No peripheral edema. Bilateral wrist restraints in place  PULSES: 2+ and symmetric all extremities. SKIN: Scattered ecchymosis, a large 1 on her upper chest  NEUROLOGY: Awake, alert today. Nonfocal exam.  Patient is intermittently oriented to person and place. Labs:     Recent Labs     03/21/22 0400 03/20/22  0727   WBC 11.2* 9.6   HGB 8.5* 8.0*   HCT 25.6* 24.0*   PLT 41* 32*     Recent Labs     03/21/22 0400 03/20/22 0727 03/19/22  0357   * 134* 130*   K 3.2* 3.4* 4.4    102 99   CO2 18* 19* 19*   * 159* 149*   CREA 4.21* 4.32* 4.45*   * 135* 116*   CA 8.5 8.3* 8.4*     No results for input(s): ALT, AP, TBIL, TBILI, TP, ALB, GLOB, GGT, AML, LPSE in the last 72 hours. No lab exists for component: SGOT, GPT, AMYP, HLPSE  No results for input(s): INR, PTP, APTT, INREXT, INREXT in the last 72 hours. No results for input(s): FE, TIBC, PSAT, FERR in the last 72 hours. Lab Results   Component Value Date/Time    Folate 38.3 (H) 10/20/2021 07:53 AM    RBC FOLATE 878 02/14/2022 01:27 AM      No results for input(s): PH, PCO2, PO2 in the last 72 hours. No results for input(s): CPK, CKNDX, TROIQ in the last 72 hours.     No lab exists for component: CPKMB  Lab Results   Component Value Date/Time    Cholesterol, total 122 01/25/2022 09:50 AM    HDL Cholesterol 40 01/25/2022 09:50 AM    LDL, calculated 67.2 01/25/2022 09:50 AM    Triglyceride 74 01/25/2022 09:50 AM    CHOL/HDL Ratio 3.1 01/25/2022 09:50 AM     Lab Results   Component Value Date/Time    Glucose (POC) 128 (H) 03/15/2022 06:06 AM    Glucose (POC) 123 (H) 03/15/2022 12:14 AM    Glucose (POC) 114 (H) 02/23/2022 05:42 PM    Glucose (POC) 127 (H) 02/23/2022 12:04 PM    Glucose (POC) 122 (H) 02/23/2022 08:18 AM     Lab Results   Component Value Date/Time    Color DARK YELLOW 03/05/2022 09:22 PM    Appearance TURBID (A) 03/05/2022 09:22 PM    Specific gravity 1.014 03/05/2022 09:22 PM    Specific gravity 1.010 02/11/2022 07:20 PM    pH (UA) 5.5 03/05/2022 09:22 PM    Protein 100 (A) 03/05/2022 09:22 PM    Glucose Negative 03/05/2022 09:22 PM    Ketone Negative 03/05/2022 09:22 PM    Bilirubin NEGATIVE 02/11/2022 07:20 PM    Urobilinogen 0.2 03/05/2022 09:22 PM    Nitrites Negative 03/05/2022 09:22 PM    Leukocyte Esterase LARGE (A) 03/05/2022 09:22 PM    Epithelial cells FEW 03/05/2022 09:22 PM    Bacteria 1+ (A) 03/05/2022 09:22 PM    WBC >100 (H) 03/05/2022 09:22 PM    RBC  03/05/2022 09:22 PM         Medications Reviewed:     Current Facility-Administered Medications   Medication Dose Route Frequency    0.9% sodium chloride infusion 250 mL  250 mL IntraVENous PRN    multivitamin, tx-iron-ca-min (THERA-M w/ IRON) tablet 1 Tablet  1 Tablet Oral DAILY    0.9% sodium chloride infusion 250 mL  250 mL IntraVENous PRN    0.9% sodium chloride infusion 250 mL  250 mL IntraVENous PRN    miconazole (MICONTIN) 2 % ointment   Topical BID    balsam peru-castor oiL (VENELEX) ointment   Topical BID    zinc oxide-cod liver oil (DESITIN) 40 % paste   Topical PRN    sodium chloride (NS) flush 5-40 mL  5-40 mL IntraVENous Q8H    sodium chloride (NS) flush 5-40 mL  5-40 mL IntraVENous PRN    acetaminophen (TYLENOL) tablet 650 mg  650 mg Oral Q6H PRN    Or    acetaminophen (TYLENOL) suppository 650 mg  650 mg Rectal Q6H PRN    polyethylene glycol (MIRALAX) packet 17 g  17 g Oral DAILY PRN    ondansetron (ZOFRAN ODT) tablet 4 mg  4 mg Oral Q8H PRN    Or    ondansetron (ZOFRAN) injection 4 mg  4 mg IntraVENous Y8D PRN    folic acid (FOLVITE) tablet 1 mg  1 mg Oral DAILY    lactulose (CHRONULAC) 10 gram/15 mL solution 30 mL  30 mL Oral Q6H    levothyroxine (SYNTHROID) tablet 50 mcg  50 mcg Oral 6am    midodrine (PROAMATINE) tablet 15 mg  15 mg Oral TID WITH MEALS    [Held by provider] octreotide (SANDOSTATIN) injection 100 mcg  100 mcg IntraVENous TID    pantoprazole (PROTONIX) tablet 40 mg  40 mg Oral DAILY    rifAXIMin (XIFAXAN) tablet 550 mg  550 mg Oral BID    sertraline (ZOLOFT) tablet 100 mg  100 mg Oral DAILY    thiamine HCL (B-1) tablet 100 mg  100 mg Oral DAILY    ursodioL (ACTIGALL) tablet 500 mg (Patient Supplied)  500 mg Oral Q12H    glucagon (GLUCAGEN) injection 1 mg  1 mg IntraMUSCular PRN    naloxone (NARCAN) injection 0.1 mg  0.1 mg IntraVENous PRN    promethazine (PHENERGAN) 6.25 mg in 0.9% sodium chloride 50 mL IVPB  6.25 mg IntraVENous Q6H PRN     ______________________________________________________________________  EXPECTED LENGTH OF STAY: 4d 16h  ACTUAL LENGTH OF STAY:          [facility] Zhang White MD

## 2022-03-21 NOTE — PROGRESS NOTES
Comprehensive Nutrition Assessment    Type and Reason for Visit: Reassess    Nutrition Recommendations/Plan:      1. Continue with TF as ordered:  Jevity 1.5 at 50 ml/hr with 1 pack ProSource daily and 130 ml h20 flush q6h.    2. Continue with 2gm Na+ diet     3. Continue with Ensure Compact (chocolate) BID    4. Recheck phos      Nutrition Assessment:    66 yo female admitted for cirrhosis, AMS.  PMhx: Cirrhosis with ascites s/p paracentesis every week, CKD, esophageal varices s/p banding.  Weight hx in EMR indicates 17% loss over last 5 months which is significant for time frame.  Pt slightly confused, RN states she is A&O x 2 at baseline.  Pt states she was eating well PTA and that her mom prepares her meals (unsure accuracy of this).  RD notes from recent admission to different hospital indicates very poor to fair PO intakes.  Had multiple ONS ordered. MD ordered pt to be NPO with DHT placed.  Consult received for TF order.  Spoke with RN about ordering PO diet along with TF.  Will add ONS as well and monitor intakes. TF ordered as Jevity 1.5 at 50 ml/hr + 1 pack ProSource daily + 130 ml h2o flush q6h.   Labs: Na+ 128, Bun/Cr elevated, NH3 44    3/14: F/u. Spoke with RN this morning and she states pt has been having multiple loose stools/day. Discussed with her that pt has been receiving Lactulose which can contribute to that. She notes that pt has refused meds for last 2 days, last documented day receiving lactulose was 3/12.       Will change TF formula to Vital AF 1.2 at goal rate of 60 ml/hr + 100 ml h20 flush 5x/day to provide 1320 ml, 1584 kcals (99%), 99 gm protein (100%), 1070 + 500 = 1570 ml water.     Continue with PO diet along with TF. Pt states she is not eating much. PO intakes documented as 0-25% meals. Ensure compact sitting on bedside table, asked pt if she likes them, she said yes, prefers chocolate.   Could not get any further information from pt as she is confused and was then focused on chocolate.       S/P paracentesis with peritoneal drainage catheter left in place on 3/12. BUN/Cr continuing to rise - Nephrologist notes pt is not a candidate for HD until after liver transplant. 3/21: F/u. MD changed TF back to Jevity 1.5 at 50 ml/hr with 1 pack ProSource daily and 130 ml h20 flush q6h. This is providing 1100 ml, 1710 kcals (100% kcal needs), 85 gm protein (89% protein needs), 836 + 780 = 1616 ml water. Pt has been tolerating TF at goal rate. PO intakes have remained poor. Asked pt if she ate any of her meals yesterday and she said she \"cannot remember\". Intakes documented as < 25% all meals. Ensure Compact ordered, pt states she is still drinking these and likes the chocolate flavor best.  RN reports pt vomited this morning so she placed TF on hold until speaking with MD.    Labs: Na+ 133, K+ 3.2, Bun/Cr elevated, phos from 3/17: 7.8        Malnutrition Assessment:  Malnutrition Status:  Severe malnutrition    Context:  Chronic illness     Findings of the 6 clinical characteristics of malnutrition:   Energy Intake:  7 - 75% or less est energy requirements for 1 month or longer  Weight Loss:  7.00 - Greater than 10% over 6 months     Body Fat Loss:  7 - Severe body fat loss, Triceps   Muscle Mass Loss:  7 - Severe muscle mass loss, Thigh (quadriceps)  Fluid Accumulation:  1 - Mild, Ascites,Extremities   Strength:  Not performed       Nutritionally Significant Medications: folic acid, MVI with iron, thiamine, lactulose    Estimated Daily Nutrient Needs:  Energy (kcal): 1600 (MSJ x AF 1.3); Weight Used for Energy Requirements: Current  Protein (g):  (1.4-1.5 gm/kg);  Weight Used for Protein Requirements: Current  Fluid (ml/day): 1600; Method Used for Fluid Requirements: 1 ml/kcal    Nutrition Related Findings:       BM: loose 3/20  Edema: 1+ BLE  Wounds:  Deep tissue injury (DTI bilateral heels; excoriation and rash to buttock)       Current Nutrition Therapies:   Diet: 2gm Na+  Supplements: Ensure Compact BID  Additional Caloric Sources: TF    Meal intake: Patient Vitals for the past 168 hrs:   % Diet Eaten   03/20/22 0934 1 - 25%   03/18/22 1526 1 - 25%   03/16/22 1000 1 - 25%   03/15/22 1919 1 - 25%   03/15/22 1515 0%   03/15/22 1252 0%     Supplement Intake: No data found. Anthropometric Measures:  · Height:  5' 4\" (162.6 cm)  · Current Body Wt:  65.4 kg (144 lb 2.9 oz)   · Admission Body Wt:       · Usual Body Wt:        · Ideal Body Wt:  120:  124.9 %   · Adjusted Body Weight:   ; Weight Adjustment for: No adjustment   · Adjusted BMI:       · BMI Categories:  Normal weight (BMI 18.5-24. 9)     Wt Readings from Last 10 Encounters:   03/18/22 65.4 kg (144 lb 2.9 oz)   02/17/22 68 kg (149 lb 14.6 oz)   12/13/21 69.4 kg (153 lb)   10/19/21 82 kg (180 lb 12.4 oz)   10/14/17 81.6 kg (180 lb)       Nutrition Diagnosis:   · Inadequate oral intake related to inadequate protein-energy intake as evidenced by intake 0-25%,nutrition support-enteral nutrition    · Unintended weight loss related to inadequate protein-energy intake as evidenced by weight loss greater than or equal to 10% in 6 months      Nutrition Interventions:   Food and/or Nutrient Delivery: Continue current diet,Continue tube feeding,Modify tube feeding,Continue oral nutrition supplement  Nutrition Education and Counseling: No recommendations at this time  Coordination of Nutrition Care: Continue to monitor while inpatient    Goals:  Meet > 75% EEN's with EN while NPO within next 5-7 days       Nutrition Monitoring and Evaluation:   Behavioral-Environmental Outcomes: None identified  Food/Nutrient Intake Outcomes: Food and nutrient intake,Supplement intake,Enteral nutrition intake/tolerance  Physical Signs/Symptoms Outcomes: Biochemical data,GI status,Weight    Discharge Planning:     Too soon to determine     Henri Holm RD  Contact via [employer]

## 2022-03-21 NOTE — PROGRESS NOTES
Problem: Mobility Impaired (Adult and Pediatric)  Goal: *Acute Goals and Plan of Care (Insert Text)  Description: FUNCTIONAL STATUS PRIOR TO ADMISSION: Patient appears to be an inconsistent historian despite being oriented x 4. Originally stated she primarily stays in bed-later she reported ambulating a few times a day and spending most of her day in a recliner. She consistently reports that son assists her \"with everything\" but does not give more details on what exactly or how much assistance. HOME SUPPORT PRIOR TO ADMISSION: The patient lived with son and her mother per report. Unclear how much assistance she required. Physical Therapy Goals  Re-Assessed 3/21/2022  1. Patient will move from supine to sit and sit to supine , scoot up and down, and roll side to side in bed with minimal assistance/contact guard assist within 7 day(s). 2.  Patient will transfer from bed to chair and chair to bed with minimal assistance/contact guard assist using the least restrictive device within 7 day(s). 3.  Patient will perform sit to stand with minimal assistance/contact guard assist within 7 day(s). 4.  Patient will ambulate with moderate assistance  for 10 feet with the least restrictive device within 7 day(s). Initiated 3/6/2022  1. Patient will move from supine to sit and sit to supine , scoot up and down, and roll side to side in bed with minimal assistance/contact guard assist within 7 day(s). 2.  Patient will transfer from bed to chair and chair to bed with moderate assistance  using the least restrictive device within 7 day(s). 3.  Patient will perform sit to stand with moderate assistance  within 7 day(s). 4.  Patient will ambulate with moderate assistance  for 20 feet with the least restrictive device within 7 day(s). 5.  Patient will ascend/descend 2 stairs with 2 handrail(s) with moderate assistance  within 7 day(s).    Outcome: Progressing Towards Goal   PHYSICAL THERAPY TREATMENT: WEEKLY REASSESSMENT  Patient: Cata Boland (37 y.o. female)  Date: 3/21/2022  [facility]: [facility] [K74.60]        Precautions:   Fall      ASSESSMENT  Patient continues with skilled PT services and is slowly progressing towards goals. Pt remains limited by confusion, generalized weakness, deconditioning, poor activity tolerance and functional mobility requiring Mod A x 1 for SPT to recliner chair. Pt requires increased time for all verbal responses and remains very soft spoken. Maximal verbal encouragement from therapists and pt's son to progress mobility this day. Intact sitting balance at EOB and tolerates 2 stands and SPT to recliner chair with support from RW and up to Mod A for weight shifting and foot advancement. Pt is a high fall risk and requires prolonged seated rest break after transfer. Up in chair with son present at bedside and all needs in reach. Encouraged pt to sit up for 30 minutes and pt in agreement. Patient's progression toward goals since last assessment: slow progress towards goals, up in chair for first time    Current Level of Function Impacting Discharge (mobility/balance): Mod A to SPT    Functional Outcome Measure: The patient scored 15/100 on the Barthel outcome measure. Other factors to consider for discharge: poor prognosis overall, plan for liver transplant? PLAN :  Goals have been updated based on progression since last assessment. Patient continues to benefit from skilled intervention to address the above impairments. Recommendations and Planned Interventions: bed mobility training, transfer training, gait training, therapeutic exercises, neuromuscular re-education, edema management/control, patient and family training/education, and therapeutic activities      Frequency/Duration: Patient will be followed by physical therapy:  3 times a week to address goals.     Recommendation for discharge: (in order for the patient to meet his/her long term goals)  Therapy up to 5 days/week in SNF setting    This discharge recommendation:  Has been made in collaboration with the attending provider and/or case management    IF patient discharges home will need the following DME: rolling walker and wheelchair         SUBJECTIVE:   Patient stated Sure.     OBJECTIVE DATA SUMMARY:   HISTORY:    Past Medical History:   Diagnosis Date    Anxiety 10/19/2021    Basal cell carcinoma (BCC) of face 10/19/2021    Chronic kidney disease     \"end stage liver disease\" per son    Cirrhosis of liver not due to alcohol ([facility]     Depression 10/19/2021     Past Surgical History:   Procedure Laterality Date    HX  SECTION      x2    HX ORTHOPAEDIC Right     x2       Personal factors and/or comorbidities impacting plan of care: CKD, liver failure, high fall risk, depression, anxiety, poor nutritional status    Home Situation  Home Environment: Private residence  # Steps to Enter: 2  Rails to Enter: Yes  Hand Rails : Bilateral  One/Two Story Residence: Two story  Living Alone: No  Support Systems: Parent(s),Child(joe)  Patient Expects to be Discharged to[de-identified] Skilled nursing facility  Current DME Used/Available at Home: [address] [employer],Com[employer], bedside  Tub or Shower Type: Shower    EXAMINATION/PRESENTATION/DECISION MAKING:   Critical Behavior:  Neurologic State: Alert  Orientation Level: Oriented to person,Oriented to place  Cognition: Decreased attention/concentration,Follows commands  Safety/Judgement: Awareness of environment  Hearing:   Auditory  Auditory Impairment: None  Skin:    Edema:   Range Of Motion:  AROM: Generally decreased, functional           PROM: Generally decreased, functional           Strength:    Strength: Generally decreased, functional                    Tone & Sensation:   Tone: Normal                              Coordination:  Coordination: Generally decreased, functional  Vision:      Functional Mobility:  Bed Mobility: Supine to Sit: Maximum assistance     Scooting: Minimum assistance  Transfers:  Sit to Stand: Moderate assistance  Stand to Sit: Moderate assistance  Stand Pivot Transfers: Moderate assistance     Bed to Chair: Moderate assistance;Assist x1              Balance:   Sitting: Intact  Standing: Impaired  Standing - Static: Fair;Constant support  Standing - Dynamic : Poor;Constant support  Ambulation/Gait Training:                                                         Stairs: Therapeutic Exercises:       Functional Measure:  Barthel Index:    Bathin  Bladder: 0  Bowels: 5  Groomin  Dressin  Feedin  Mobility: 0  Stairs: 0  Toilet Use: 0  Transfer (Bed to Chair and Back): 5  Total: 15/100       The Barthel ADL Index: Guidelines  1. The index should be used as a record of what a patient does, not as a record of what a patient could do. 2. The main aim is to establish degree of independence from any help, physical or verbal, however minor and for whatever reason. 3. The need for supervision renders the patient not independent. 4. A patient's performance should be established using the best available evidence. Asking the patient, friends/relatives and nurses are the usual sources, but direct observation and common sense are also important. However direct testing is not needed. 5. Usually the patient's performance over the preceding 24-48 hours is important, but occasionally longer periods will be relevant. 6. Middle categories imply that the patient supplies over 50 per cent of the effort. 7. Use of aids to be independent is allowed. Score Interpretation (from [address] [facility])    Independent   60-79 Minimally independent   40-59 Partially dependent   20-39 Very dependent   <20 Totally dependent     -Jesus Roa., Barthel, D.W. (1965). Functional evaluation: the Barthel Index. [address] [facility] ([address] (1997).  The Barthel activities of daily living index: self-reporting versus actual performance in the old (> or = 75 years). Journal of [address] [place] [address] 45(4), [facility], J.DALE.F, Tosin Powers.Kasia. (1999). Measuring the change in disability after inpatient rehabilitation; comparison of the responsiveness of the Barthel Index and Functional [place] Measure. Journal of Neurology, Neurosurgery, and Psychiatry, 66(4), 126-492. JULIEN Bermudez, JONEL Almodovar, & Lula Michelle MKAMILLE. (2004) Assessment of post-stroke quality of life in cost-effectiveness studies: The usefulness of the Barthel Index and the EuroQoL-5D. Quality of Life Research, 13, 427-43           Pain Rating:      Activity Tolerance:   Fair    After treatment patient left in no apparent distress:   Sitting in chair, Call bell within reach, and Caregiver / family present    COMMUNICATION/EDUCATION:   The patients plan of care was discussed with: Occupational therapist and Registered nurse. Fall prevention education was provided and the patient/caregiver indicated understanding., Patient/family have participated as able in goal setting and plan of care. , and Patient/family agree to work toward stated goals and plan of care.     Thank you for this referral.  Carlos Connell, PT   Time Calculation: 32 mins

## 2022-03-21 NOTE — PROGRESS NOTES
Bedside shift change report given to Oriana Jackman RN (oncoming nurse) by Harini Ramsey RN (offgoing nurse). Report included the following information SBAR, Kardex, Intake/Output, MAR and Med Rec Status.

## 2022-03-21 NOTE — PROGRESS NOTES
SELMA:  Goal is still to improve enough to be considered for Liver Transplant at [facility]. Dr. Flor Willson is following. RUR 23%    Unit CM collaborated with  to review discharge planning. Pt remains hospitalized for aggressive nutritional support (dobhoff as PEG tube not able to be placed due to ascites) as well as PT/OT. Chart reviewed and per MD, plan to restart tube feeds today, had vomiting over weekend. Pt's emergency contact is her son: Nita Jo phone [phone]. Recent family meeting held with Dr. Flor Willson, hepatologist and goal is to continue current care with goal of becoming a candidate for liver transplant. CM will continue to follow.     YIFAN Nobles

## 2022-03-21 NOTE — PROGRESS NOTES
[address] [facility], [place], [address], [facility] [place] [facility] [place]      Seferino Angel, PA-C Elenora Crigler, [facility]     Michelle Lopez, [facility] CenterNP[facility]BC   Arcadio Carter, MANISHA Torres, [facility]       Namita Montemayor [address]    at [address], [address] Katlyn Pizano  22.    [phone]    FAX: [address] [place] [address] Avenue    [address], [address]    [phone]    FAX: [phone]       HEPATOLOGY PROGRESS NOTE  The patient is well known to me from a previous visit to my office at [facility] in [facility]. She is a 64 yo  female who was found to have chronic liver disease in 2020 and cirrhosis in 7/2021 when she developed ascites. She had variceal bleeding in 11/2021. Serologic evaluation for markers of chronic liver disease was positive for AMA. Cirrhosis is due to [facility].     The patient has developed the following complications of cirrhosis: esophageal varices, variceal bleeding, ascites, edema, hepatic encephalopathy, severe muslce wasting    I saw the patient for the first time in 1/2022. She had CTP score 9 and MELD score 21 at that time. We started liver transplant evaluation testing. She developed confusion and could not be aroused and was hospitalized 3 weeks ago at [facility][1] in 2/202. During that hospitalization she developed worsening malnutrition, worsening of muscle wasting and a few decubitus ulcers. She has been at [facility] for 9 days. She has been receiving Dobbhoff tube feedings and OT/PT. She has improved somewhat. She is definitely stronger and can move herself around in bed some, she can now hold a glass, and feed herself to a limited degree. However, she is still too weak to stand even with assistance.   Wound care note indicates skin ulcers are stable but not really healing. Family conference on 3/17/22. I had a 1 hour meeting with the patient and her son who is POA. Her only option for survival is a liver transplant but she is currently too weak, too malnurished and has too much muscle wasting to survive LT. I discussed the options with the patient and her son. Either continue what we are doing until she improves nutritionally, improves muscle mass, heals the skin ulcers and she can stand and take a few steps on her own. I estimated it would take at least another month of steady progress for there to achieve this goal.    The other option is to enter hospice. If at any time she developed sepsis, GI bleeding or another major setback she enter hospice as she will never able to achieve her goal.      We cannot place PEG because of ascites  She cannot go to SNF/Rehab because of the Dobbhoff tube. For now she and son wish to continue since they see slow limited improvement. The son will discuss this with her daily and if they could opt for hospice care at any time. They both seem to be realistic regarding her ability to improve but are not yet ready to \"give up\" and enter hospice care today. Hospital course: Tolerating tube feedings well. She is very cooperative. Not pulling at tube. Working with OT/PT  Every day she is looking better, stronger and her spirits are good. Sna is now up to 134. Scr is now improving every day and down to 4.3 mg  Continue to make urine. She is eating some on her own. Not enough to even think about removing tube. Hepatology Plan:  Nursing should retape and secure dobbhoff tube daily, preferably each evening before she sleeps since this is when she tends to accidentily hit tube with arms and pull this out. Suggest nursing keep tube under hospital gown to reduce the risk of accidentally getting this caught by hands moving during sleep and pulled out.      Continue Dobbhuff tube feeding 24-7 and continue to increase calories as tolerated and per Dietician management. Continue with OT/PT who are doing a great job. Lets get a full set of labs with INR next 1-2 days. ASSESSMENT AND PLAN:  Cirrhosis  The diagnosis of cirrhosis is based upon imaging, laboratory studies, complications of cirrhosis. Cirrhosis is secondary to [facility]. Not alcohol as she was initially told.     Serologic testing was positive for AMA, ASMA     The CTP is 10. Child class C. The MELD score is 33.     Based upon the MELD and CTP scores the patient has a mortality of about 50% within the next 90 days until we turn her fraily and weakness around. Right now is is too weak to survive liver transplantation. She needs aggressive nutritional support and both she and her son are in favor of this with the hopes her situation will improve and she could survive liver transplantation.       Primary Biliary Cholangitis  The diagnosis is based upon serology and an elevation in ALP and positive AMA. A liver biopsy has not been performed. PBC is being treated with ANTONIETA 500 mg BID. 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 She is still making urine and does not need dialysis. Continue midodrine, octreotide injections     Malnutrition  The patient has severe protein-calorie malnutrtion and muscle wasting. This is due to advanced liver disease and refractory ascites. The patient needs more calories than she can possible eat to get into positive nutritional balance. Without aggressive nutritional support she will not survive. She is alert, oriented and understands the need for NG dobhoff feeding tube placement. Both she and her son agree with this. Getting tube feeds via Dobhoff feeding tube. Tolerating this well. Formula is being managed by nutritioinal service. Frailty/muscle wasting  The patient is very frail and cannot sit or stand on her own.     She is making great progress with aggressive OT/PT. She can now lift her legs off the bed without strugling    She is very motivated to survive and willing to do whatever excercies she needs to get stronger. The goal is up and walking to bathroom with or without walker. Skin wounds  Large 5 x 5 cm area of tissue injury withtout ulceration on her back that is improving with wound care  Stage 3 pressure injury 1.3 x 1 cm pn sacrum  Rash consistent with candida  Ulcers have to be healed for her to be an LT candidate.     Ascites   Ascites developed for the first time in 7/2021. Ascites is is refractory to the current doses of diuretics because of kidney disease and Hyponatremia. Ascites is stable  No diuretics. NO paracentesis needed    Hyponatremia  This is secondary cirrhosis and diuretics  This is now getting better with better nutrition and hydration from tube feedings. Sna today up to 134  No diuretics. Can stop IV albumin.     Screening for Esophageal varices   The patient has esophageal varices with prior bleeding. Varices were banded in 11/2021 and 12/2021. The last EGD to assess for varices was performed in 12/2021. No repeat EGD needed at this time.     Hepatic encephalopathy   Overt HE is well controlled on lactulose and xifaxan. Serum ammonia is low and in the 40s.      Coagulopathy  INR is in the 1.6 range and stable. Thrombocytopenia   This is secondary to cirrhosis. There is no evidence of overt bleeding. No treatment is required. The platelet count is adequate for the patient to undergo procedures without the need for platelet transfusion or platelet growth factors.     Anemia   This is due to multifactorial causes including portal hypertension with chronic GI blood loss and bone marrow suppression due to severe malnutrition.     The most recent FE studies were from 1/2022.   The serum ferritin is 87  The FE saturation is 74  FE panel is within the normal range but this is low a patient with chronic liver disease and cirrhosis   Will administer intravenous iron. No EGD needed at this time. Thrombocytopenia   This is secondary to cirrhosis. There is no evidence of overt bleeding. No treatment is required. The platelet count is adequate for the patient to undergo procedures without the need for platelet transfusion or platelet growth factors. PHYSICAL EXAMINATION:  VS: per nursing note  General:  Ill appearing  Eyes:  Sclera icteric. ENT:  No oral lesions. Thyroid normal.  Nodes:  No adenopathy. Skin:  Spider angiomata. Severe ecchymosis all over arms. Respiratory:  Lungs clear to auscultation. Cardiovascular:  Regular heart rate. Abdomen:  Soft non-tender, Distended with obvious ascites. Extremities:  No lower extremity edema. Severe muscle wasting of upper and lower extremities. Neurologic:  Alert and oriented. Cranial nerves grossly intact. No asterixis. LABORATORY:  Results for Malathi Macias (MRN [id number]) as of 3/21/2022 04:49   Ref. Range 3/18/2022 05:09 3/19/2022 03:57 3/20/2022 07:27   WBC Latest Ref Range: 3.6 - 11.0 K/uL 8.2 10.1 9.6   HGB Latest Ref Range: 11.5 - 16.0 g/dL 7.6 (L) 8.5 (L) 8.0 (L)   PLATELET Latest Ref Range: 150 - 400 K/uL 34 (LL) 37 (LL) 32 (LL)   Sodium Latest Ref Range: 136 - 145 mmol/L 131 (L) 130 (L) 134 (L)   Potassium Latest Ref Range: 3.5 - 5.1 mmol/L 3.6 4.4 3.4 (L)   Chloride Latest Ref Range: 97 - 108 mmol/L 99 99 102   CO2 Latest Ref Range: 21 - 32 mmol/L 17 (L) 19 (L) 19 (L)   Glucose Latest Ref Range: 65 - 100 mg/dL 125 (H) 116 (H) 135 (H)   BUN Latest Ref Range: 6 - 20 MG/ (H) 149 (H) 159 (H)   Creatinine Latest Ref Range: 0.55 - 1.02 MG/DL 4.54 (H) 4.45 (H) 4.32 (H)     RADIOLOGY:  Ultrasound of liver. Echogenic consistent with cirrhosis. No liver mass lesions. No dilated bile ducts. Moderate ascites.         MD Sujata DhillonKingman Regional Medical Centerv[facility] [address]  [address] [facility] [place], [place]  [address].  [address] [facility]

## 2022-03-21 NOTE — PROGRESS NOTES
[facility]   [address] [address], [facility] [address], [facility]  Phone: [phone]   [phone]       Nephrology Progress Note  Mariel Alvarez     1960     [id number]  Date of Admission : 3/5/2022  03/21/22    CC:  Follow up for greg    Assessment and Plan   GREG:  - likely 2/2 HRS  - U/S neg for obstruction  - Cr essentially unchanged since arrival  - cont midodrine + octreotide  - daily labs     Metabolic acidosis  - unable to take pills  - bicarb drip  X 1 L    Hyponatremia  -Secondary to liver cirrhosis  - Na stable    Severe anemia:  - 1 unit of PRBCs 3/8  - hgb stable     Decompensated liver cirrhosis  -With portal hypertension, primary biliary cholangitis  -Requiring multiple paracenteses, most recently 2/28/2022 with 9.2 L removed  - appreciate hepatology   - plans to optimize nutrition then eventual liver/kidney      Hypothyroidism    Malnutrition:  - getting TF via DHT     Interval History:  Seen and examined. Resting in bed today. Cr stable. Ongoing TF. No cp, sob, n/v/d. Review of Systems: A comprehensive review of systems was negative except for that written in the HPI.     Current Medications:   Current Facility-Administered Medications   Medication Dose Route Frequency    0.9% sodium chloride infusion 250 mL  250 mL IntraVENous PRN    multivitamin, tx-iron-ca-min (THERA-M w/ IRON) tablet 1 Tablet  1 Tablet Oral DAILY    0.9% sodium chloride infusion 250 mL  250 mL IntraVENous PRN    0.9% sodium chloride infusion 250 mL  250 mL IntraVENous PRN    miconazole (MICONTIN) 2 % ointment   Topical BID    balsam peru-castor oiL (VENELEX) ointment   Topical BID    zinc oxide-cod liver oil (DESITIN) 40 % paste   Topical PRN    sodium chloride (NS) flush 5-40 mL  5-40 mL IntraVENous Q8H    sodium chloride (NS) flush 5-40 mL  5-40 mL IntraVENous PRN    acetaminophen (TYLENOL) tablet 650 mg  650 mg Oral Q6H PRN    Or    acetaminophen (TYLENOL) suppository 650 mg  650 mg Rectal Q6H PRN    polyethylene glycol (MIRALAX) packet 17 g  17 g Oral DAILY PRN    ondansetron (ZOFRAN ODT) tablet 4 mg  4 mg Oral Q8H PRN    Or    ondansetron (ZOFRAN) injection 4 mg  4 mg IntraVENous A3N PRN    folic acid (FOLVITE) tablet 1 mg  1 mg Oral DAILY    lactulose (CHRONULAC) 10 gram/15 mL solution 30 mL  30 mL Oral Q6H    levothyroxine (SYNTHROID) tablet 50 mcg  50 mcg Oral 6am    midodrine (PROAMATINE) tablet 15 mg  15 mg Oral TID WITH MEALS    octreotide (SANDOSTATIN) injection 100 mcg  100 mcg IntraVENous TID    pantoprazole (PROTONIX) tablet 40 mg  40 mg Oral DAILY    rifAXIMin (XIFAXAN) tablet 550 mg  550 mg Oral BID    sertraline (ZOLOFT) tablet 100 mg  100 mg Oral DAILY    thiamine HCL (B-1) tablet 100 mg  100 mg Oral DAILY    ursodioL (ACTIGALL) tablet 500 mg (Patient Supplied)  500 mg Oral Q12H    glucagon (GLUCAGEN) injection 1 mg  1 mg IntraMUSCular PRN    naloxone (NARCAN) injection 0.1 mg  0.1 mg IntraVENous PRN    promethazine (PHENERGAN) 6.25 mg in 0.9% sodium chloride 50 mL IVPB  6.25 mg IntraVENous Q6H PRN      Allergies   Allergen Reactions    Morphine Other (comments)     Severe HA. ALL narcotics!!!       Objective:  Vitals:    Vitals:    03/20/22 1319 03/20/22 1523 03/21/22 0109 03/21/22 0721   BP: 128/64 (!) 113/54 (!) 112/51 (!) 100/57   Pulse:  67 63 70   Resp:  16 16 20   Temp:  97.8 °F (36.6 °C) 98.5 °F (36.9 °C) 98.1 °F (36.7 °C)   SpO2:  98% 98% 97%   Weight:       Height:         Intake and Output:  No intake/output data recorded. 03/19 1901 - 03/21 0700  In: 260   Out: 900 [Urine:900]    Physical Examination:  General: NAD,chronically ill appearing  HEENT:           + scleral icterus.  DHT in place  Neck:  Supple, no mass  Resp:  Lungs CTA B/L, no wheezing , normal respiratory effort  CV:  RRR,  no murmur or rub,no  LE edema  GI:  Soft, NT, + Bowel sounds, no hepatosplenomegaly  Neurologic:  Non focal  Skin:  No Rash  :  Beckford     [x]    High complexity decision making was performed  [x]    Patient is at high-risk of decompensation with multiple organ involvement    Lab Data Personally Reviewed: I have reviewed all the pertinent labs, microbiology data and radiology studies during assessment. Recent Labs     03/21/22  0400 03/20/22 0727 03/19/22  0357   * 134* 130*   K 3.2* 3.4* 4.4    102 99   CO2 18* 19* 19*   * 135* 116*   * 159* 149*   CREA 4.21* 4.32* 4.45*   CA 8.5 8.3* 8.4*     Recent Labs     03/21/22  0400 03/20/22 0727 03/19/22  1745   WBC 11.2* 9.6 10.1   HGB 8.5* 8.0* 8.5*   HCT 25.6* 24.0* 24.8*   PLT 41* 32* 37*     No results found for: SDES  Lab Results   Component Value Date/Time    Culture result: NO GROWTH 4 DAYS 03/14/2022 06:48 PM    Culture result: NO GROWTH 4 DAYS 03/12/2022 06:00 PM    Culture result: CANDIDA ALBICANS (A) 03/05/2022 09:22 PM     Recent Results (from the past 24 hour(s))   CBC WITH AUTOMATED DIFF    Collection Time: 03/21/22  4:00 AM   Result Value Ref Range    WBC 11.2 (H) 3.6 - 11.0 K/uL    RBC 2.72 (L) 3.80 - 5.20 M/uL    HGB 8.5 (L) 11.5 - 16.0 g/dL    HCT 25.6 (L) 35.0 - 47.0 %    MCV 94.1 80.0 - 99.0 FL    MCH 31.3 26.0 - 34.0 PG    MCHC 33.2 30.0 - 36.5 g/dL    RDW 24.6 (H) 11.5 - 14.5 %    PLATELET 41 (LL) 533 - 400 K/uL    MPV ABNORMAL 8.9 - 12.9 FL    NRBC 0.0 0  WBC    ABSOLUTE NRBC 0.00 0.00 - 0.01 K/uL    NEUTROPHILS 86 (H) 32 - 75 %    LYMPHOCYTES 4 (L) 12 - 49 %    MONOCYTES 2 (L) 5 - 13 %    EOSINOPHILS 6 0 - 7 %    BASOPHILS 2 (H) 0 - 1 %    IMMATURE GRANULOCYTES 0 %    TOTAL CELLS COUNTED 50      ABS. NEUTROPHILS 9.7 (H) 1.8 - 8.0 K/UL    ABS. LYMPHOCYTES 0.4 (L) 0.8 - 3.5 K/UL    ABS. MONOCYTES 0.2 0.0 - 1.0 K/UL    ABS. EOSINOPHILS 0.7 (H) 0.0 - 0.4 K/UL    ABS. BASOPHILS 0.2 (H) 0.0 - 0.1 K/UL    ABS. IMM.  GRANS. 0.0 K/UL    DF MANUAL      RBC COMMENTS ANISOCYTOSIS  3+        RBC COMMENTS MACROCYTOSIS  1+        RBC COMMENTS POIKILOCYTOSIS  PRESENT        RBC COMMENTS SCHISTOCYTES  PRESENT       METABOLIC PANEL, BASIC    Collection Time: 03/21/22  4:00 AM   Result Value Ref Range    Sodium 133 (L) 136 - 145 mmol/L    Potassium 3.2 (L) 3.5 - 5.1 mmol/L    Chloride 100 97 - 108 mmol/L    CO2 18 (L) 21 - 32 mmol/L    Anion gap 15 5 - 15 mmol/L    Glucose 137 (H) 65 - 100 mg/dL     (H) 6 - 20 MG/DL    Creatinine 4.21 (H) 0.55 - 1.02 MG/DL    BUN/Creatinine ratio 39 (H) 12 - 20      GFR est AA 13 (L) >60 ml/min/1.73m2    GFR est non-AA 11 (L) >60 ml/min/1.73m2    Calcium 8.5 8.5 - 10.1 MG/DL                                       Care Plan discussed with:  Patient     Family      RN      Consulting Physician /Specialist        I have reviewed the flowsheets. Chart and Pertinent Notes have been reviewed. No change in PMH ,family and social history from Consult note.       Fabian Marie MD

## 2022-03-22 NOTE — PROGRESS NOTES
[facility]   [address] [address] [facility], [facility] [address], [facility]  Phone: [phone]   [phone]       Nephrology Progress Note  Davied Severs     1960     [id number]  Date of Admission : 3/5/2022  03/22/22    CC:  Follow up for greg    Assessment and Plan   GREG:  - likely 2/2 HRS  - U/S neg for obstruction  - Cr stable  - cont midodrine, octreotide stopped 3/21  - daily labs     Metabolic acidosis  - unable to take pills  - monitor for now    Hyponatremia  -Secondary to liver cirrhosis  - Na stable    Severe anemia:  - 1 unit of PRBCs 3/8  - start EDWIN TTS     Decompensated liver cirrhosis  -With portal hypertension, primary biliary cholangitis  -Requiring multiple paracenteses, most recently 2/28/2022 with 9.2 L removed  - appreciate hepatology   - plans to optimize nutrition then eventual liver/kidney      Hypothyroidism    Malnutrition:  - getting TF via DHT     Interval History:  Seen and examined. Appears to be feeilng better. Lost IV access, octreotide stopped. Cr better, bicarb stable. No cp or sob reported. Still very weak, hoarse voice. Review of Systems: A comprehensive review of systems was negative except for that written in the HPI.     Current Medications:   Current Facility-Administered Medications   Medication Dose Route Frequency    0.9% sodium chloride infusion 250 mL  250 mL IntraVENous PRN    multivitamin, tx-iron-ca-min (THERA-M w/ IRON) tablet 1 Tablet  1 Tablet Oral DAILY    0.9% sodium chloride infusion 250 mL  250 mL IntraVENous PRN    0.9% sodium chloride infusion 250 mL  250 mL IntraVENous PRN    miconazole (MICONTIN) 2 % ointment   Topical BID    balsam peru-castor oiL (VENELEX) ointment   Topical BID    zinc oxide-cod liver oil (DESITIN) 40 % paste   Topical PRN    sodium chloride (NS) flush 5-40 mL  5-40 mL IntraVENous Q8H    sodium chloride (NS) flush 5-40 mL  5-40 mL IntraVENous PRN    acetaminophen (TYLENOL) tablet 650 mg  650 mg Oral Q6H PRN    Or    acetaminophen (TYLENOL) suppository 650 mg  650 mg Rectal Q6H PRN    polyethylene glycol (MIRALAX) packet 17 g  17 g Oral DAILY PRN    ondansetron (ZOFRAN ODT) tablet 4 mg  4 mg Oral Q8H PRN    Or    ondansetron (ZOFRAN) injection 4 mg  4 mg IntraVENous O5X PRN    folic acid (FOLVITE) tablet 1 mg  1 mg Oral DAILY    lactulose (CHRONULAC) 10 gram/15 mL solution 30 mL  30 mL Oral Q6H    levothyroxine (SYNTHROID) tablet 50 mcg  50 mcg Oral 6am    midodrine (PROAMATINE) tablet 15 mg  15 mg Oral TID WITH MEALS    [Held by provider] octreotide (SANDOSTATIN) injection 100 mcg  100 mcg IntraVENous TID    pantoprazole (PROTONIX) tablet 40 mg  40 mg Oral DAILY    rifAXIMin (XIFAXAN) tablet 550 mg  550 mg Oral BID    sertraline (ZOLOFT) tablet 100 mg  100 mg Oral DAILY    thiamine HCL (B-1) tablet 100 mg  100 mg Oral DAILY    ursodioL (ACTIGALL) tablet 500 mg (Patient Supplied)  500 mg Oral Q12H    glucagon (GLUCAGEN) injection 1 mg  1 mg IntraMUSCular PRN    naloxone (NARCAN) injection 0.1 mg  0.1 mg IntraVENous PRN    promethazine (PHENERGAN) 6.25 mg in 0.9% sodium chloride 50 mL IVPB  6.25 mg IntraVENous Q6H PRN      Allergies   Allergen Reactions    Morphine Other (comments)     Severe HA. ALL narcotics!!!       Objective:  Vitals:    Vitals:    03/21/22 2001 03/22/22 0023 03/22/22 0722 03/22/22 0749   BP: (!) 109/52 (!) 99/48 122/65 (!) 114/54   Pulse: 65 63 64 60   Resp: 16 16 18 18   Temp: 98.4 °F (36.9 °C) 98.3 °F (36.8 °C) 97.9 °F (36.6 °C) 97.6 °F (36.4 °C)   SpO2: 98% 98% 98% 97%   Weight:       Height:         Intake and Output:  No intake/output data recorded. 03/20 1901 - 03/22 0700  In: 950   Out: 1150 [Urine:800]    Physical Examination:  General: NAD,chronically ill appearing  HEENT:           + scleral icterus.  DHT in place  Neck:  Supple, no mass  Resp:  Lungs CTA B/L, no wheezing , normal respiratory effort  CV:  RRR,  no murmur or rub,no  LE edema  GI:  Soft, NT, + Bowel sounds, no hepatosplenomegaly  Neurologic:  Non focal  Skin:  No Rash  :  Beckford     [x]    High complexity decision making was performed  [x]    Patient is at high-risk of decompensation with multiple organ involvement    Lab Data Personally Reviewed: I have reviewed all the pertinent labs, microbiology data and radiology studies during assessment. Recent Labs     03/22/22 0459 03/21/22 0400 03/20/22  0727   * 133* 134*   K 3.2* 3.2* 3.4*   CL 99 100 102   CO2 17* 18* 19*   * 137* 135*   * 166* 159*   CREA 4.15* 4.21* 4.32*   CA 8.6 8.5 8.3*   ALB 3.4*  --   --    ALT 24  --   --    INR 1.8*  --   --      Recent Labs     03/22/22 0459 03/21/22 0400 03/20/22  0727 03/19/22  1745   WBC 9.1 11.2* 9.6 10.1   HGB 7.9* 8.5* 8.0* 8.5*   HCT 23.0* 25.6* 24.0* 24.8*   PLT 40* 41* 32* 37*     No results found for: SDES  Lab Results   Component Value Date/Time    Culture result: NO GROWTH 4 DAYS 03/14/2022 06:48 PM    Culture result: NO GROWTH 4 DAYS 03/12/2022 06:00 PM    Culture result: CANDIDA ALBICANS (A) 03/05/2022 09:22 PM     Recent Results (from the past 24 hour(s))   HEPATIC FUNCTION PANEL    Collection Time: 03/22/22  4:59 AM   Result Value Ref Range    Protein, total 5.2 (L) 6.4 - 8.2 g/dL    Albumin 3.4 (L) 3.5 - 5.0 g/dL    Globulin 1.8 (L) 2.0 - 4.0 g/dL    A-G Ratio 1.9 1.1 - 2.2      Bilirubin, total 3.1 (H) 0.2 - 1.0 MG/DL    Bilirubin, direct 1.6 (H) 0.0 - 0.2 MG/DL    Alk.  phosphatase 94 45 - 117 U/L    AST (SGOT) 35 15 - 37 U/L    ALT (SGPT) 24 12 - 78 U/L   PROTHROMBIN TIME + INR    Collection Time: 03/22/22  4:59 AM   Result Value Ref Range    INR 1.8 (H) 0.9 - 1.1      Prothrombin time 18.6 (H) 9.0 - 11.1 sec   CBC WITH AUTOMATED DIFF    Collection Time: 03/22/22  4:59 AM   Result Value Ref Range    WBC 9.1 3.6 - 11.0 K/uL    RBC 2.50 (L) 3.80 - 5.20 M/uL    HGB 7.9 (L) 11.5 - 16.0 g/dL    HCT 23.0 (L) 35.0 - 47.0 %    MCV 92.0 80.0 - 99.0 FL    MCH 31.6 26.0 - 34.0 PG MCHC 34.3 30.0 - 36.5 g/dL    RDW 24.5 (H) 11.5 - 14.5 %    PLATELET 40 (LL) 516 - 400 K/uL    MPV ABNORMAL 8.9 - 12.9 FL    NRBC 0.0 0  WBC    ABSOLUTE NRBC 0.00 0.00 - 0.01 K/uL    NEUTROPHILS 77 (H) 32 - 75 %    LYMPHOCYTES 8 (L) 12 - 49 %    MONOCYTES 9 5 - 13 %    EOSINOPHILS 5 0 - 7 %    BASOPHILS 0 0 - 1 %    IMMATURE GRANULOCYTES 1 (H) 0.0 - 0.5 %    ABS. NEUTROPHILS 7.0 1.8 - 8.0 K/UL    ABS. LYMPHOCYTES 0.7 (L) 0.8 - 3.5 K/UL    ABS. MONOCYTES 0.8 0.0 - 1.0 K/UL    ABS. EOSINOPHILS 0.5 (H) 0.0 - 0.4 K/UL    ABS. BASOPHILS 0.0 0.0 - 0.1 K/UL    ABS. IMM. GRANS. 0.1 (H) 0.00 - 0.04 K/UL    DF SMEAR SCANNED      RBC COMMENTS ANISOCYTOSIS  3+        RBC COMMENTS AIRAM CELLS  PRESENT       METABOLIC PANEL, BASIC    Collection Time: 03/22/22  4:59 AM   Result Value Ref Range    Sodium 132 (L) 136 - 145 mmol/L    Potassium 3.2 (L) 3.5 - 5.1 mmol/L    Chloride 99 97 - 108 mmol/L    CO2 17 (L) 21 - 32 mmol/L    Anion gap 16 (H) 5 - 15 mmol/L    Glucose 154 (H) 65 - 100 mg/dL     (H) 6 - 20 MG/DL    Creatinine 4.15 (H) 0.55 - 1.02 MG/DL    BUN/Creatinine ratio 39 (H) 12 - 20      GFR est AA 13 (L) >60 ml/min/1.73m2    GFR est non-AA 11 (L) >60 ml/min/1.73m2    Calcium 8.6 8.5 - 10.1 MG/DL                                       Care Plan discussed with:  Patient     Family      RN      Consulting Physician /Specialist        I have reviewed the flowsheets. Chart and Pertinent Notes have been reviewed. No change in PMH ,family and social history from Consult note.       Pete Mcmillan MD

## 2022-03-22 NOTE — PROGRESS NOTES
[facility] Adult  Hospitalist Group                                                                                          Hospitalist Progress Note  Anabella Wright MD  Answering service: [phone] OR [phone] from in house phone        Date of Service:  3/22/2022  NAME:  Tenzin Jeffery  :  1960  MRN:  [id number]      Admission Summary:   Copied form admit: \" Tenzin Jeffery is a 65 y.o. female with history of liver cirrhosis with portal hypertension,recently diagnosed primary biliary cholangitis, recurrent ascites requiring paracentesis approximately every 7 to 10 days who presents here transferred from [address] [place] [address] secondary to decompensated liver cirrhosis, worsening renal insufficiency, progressive weakness currently undergoing work-up for possible liver transplant. The patient of note initially presented to [address] [place] [address] on 2022 secondary to hepatic encephalopathy and progressive weakness. Patient on admission was found at that time to have an ammonia level of 111 and did subsequently require paracentesis in the ED. The patient has had a prolonged and complicated hospital course since that time and has undergone multiple paracentesis including  with 11.8 L removal,  with 9/2 L removed, and  with 9.2 L removed. The patient also did have episodes of transient hypotension requiring a brief ICU admission for pressor support and has periodically received albumin with daily midodrine use. She was followed both by GI and nephrology services. Patient did undergo an EGD on  noted for distal esophagitis and portal hypertensive gastropathy, no evidence of varices. According to the son Arlet Covington at bedside, the patient does have a history of esophageal varices back in 2021 status post banding at that time. Per GI at outlying facility, patient was deemed not to be a candidate for colonoscopy and recommended Cologuard.   Part of her liver transplant evaluation at [facility] also included an MRCP was done on 2/18 which was noted for cirrhosis, portal hypertension, and large ascites. She has also undergone a cardiac stress test which was negative, EF of 69%. Patient has been maintained on lactulose, rifaximin, octreotide, and Protonix at the [facility]. Patient has had progressive worsening of her renal function at the [facility] with creatinine peak at 5.2 on 3/3 and associated metabolic acidosis requiring management with bicarb drip that was maintained as of this morning at [facility]. Per medical records, there was concern that patient may be moving towards initiation of possible hemodialysis. According to son, patient does not have any previous establish care with nephrology and was not aware of any kidney issues prior to current hospitalization. Patient also was treated with with a 7-day course of antibiotics for E. coli UTI, which were completed as of 2/17. The patient had has been seen by Dr. Dona Kauffman hepatologist for an initial visit in January to start the process of liver transplant work-up. Given lack of progression of patient's care prolonged hospitalization at [facility], request was made for patient to be transferred to [facility] to continue further liver transplant work-up and accepted by Dr. Dona Kauffman for transfer under the hospitalist service. Patient of note is currently lethargic and poorhistorian therefore history is obtained via discussion with her son Elis Daniel at the bedside and review of medical records. \"    Interval history / Subjective:     3/22/2022 :   · Patient seen for follow-up of decompensated liver cirrhosis and hepatic encephalopathy. Patient seen and examined earlier this morning by me. No acute complaints at this time. Patient remains about the same with no notable changes. Assessment & Plan:      Decompensated liver cirrhosis, with portal hypertension.   Felt to be secondary to PBC  Transaminitis/hyperbilirubinemia/coagulopathy/thrombocytopenia  -Transferred from [address] [place] [address] with prolonged hospitalization. Transferred for further work-up for liver transplant eval  -EGD on 2/17 [place] noted for distal esophagitis and portal hypertensive gastropathy, no evidence of varices.    -history of esophageal varices back in November 2021 status post banding at that time.    -Per GI at outlying facility, patient was deemed not to be a candidate for colonoscopy and recommended Cologuard.    -s/p MRCP on 2/18 which was noted for cirrhosis, portal hypertension, and large ascites. -s/p cardiac stress test which was negative for ischemia, EF of 69%  -Dr Criselda Lewis following  -Continue lactulose, rifaximin, octreotide, Protonix p.o.  -Closely monitor stool output frequency  -Continue ursodiol  Continue current management at this time  Unable to complete for CBC. Pending a CBC as patient's platelets are very low. No new developments  Continue current management    History of hepatic encephalopathy with refractory ascites  -multiple paracentesis done at [facility] including 2/11 with 11.8 L removal, 2/18 with 9/2 L removed, and 2/28 with 9.2 L removed. -Currently with distended abdomen  -Moderate large ascites on US 3/6. Ascites said to be refractoyr to diuretics,HRS limiting use of diuretics  -Ascitic drain that was placed on 3/12 was apparently clogged, flushed and drained significant amount of ascites. Ascites almost gone. Added IV albumin.  -Ascitic fluid lab negative for SBP  Abdominal girth is increased likely more accumulated ascites        GREG with metabolic acidosis. Creatinine worsened from 0.9 on 11/26/2021 up to 4-5 now  -Likely due to [facility]  -Nephrology following,recommendaitons appreciated.       Chronic normocytic anemia  -Possible anemia of CKD  Patient's hemoglobin came back 6.9 today. I have ordered 1 unit of packed red blood cells to be transfused.   Repeat H&H posttransfusion. Continue to monitor daily     Hyponatremia  -Suspect secondary to underlying liver cirrhosis.   -Na stable 128-131     Recent E. coli UTI  -Treated with Vanco and Zosyn then transitioned to Rocephin, completed 7-day antibiotic course as of 2/17  -Replace Beckford with new catheter 3/5 . Urine culture on 3/5 grew Candida albicans 75,000 CFU.       Hypothyroid  -Continue levothyroxine    Severe malnutrition  --Supplemented with tube feeding via NGT. Patient has been pulling the NGT 2 days in a row and requiring restraint. She needs ongoing nutritional supplement I think she may need PEG allergies becoming difficult to sustain NGT and I am concerned that she may aspirate when she attempts to pull the feeding tube. Discussed with Dr. Dixie Ge. --On regular diet, intake is minimal.    Lines/catheters/drainage  --Beckford catheter in place, replaced on 3/5  --NGT in place          Generalized weakness/chronic wounds. pta  -PT, OT consult  -Wound care consulted    Continue current management continue attempting to optimize nutrition for possible transplant in the future    DVT prophylaxis SCDs for now. No pharmacological prophylaxis due to thrombocytopenia  DNR   Prognosis: Poor     Hospital Problems  Date Reviewed: 2/17/2022          Codes Class Noted POA    Cirrhosis ([facility] [address] ICD-10-CM: K74.60  ICD-9-CM: 571.5  2/8/2022 Unknown                      Data Review:    Review and/or order of clinical lab test    GENERAL:  Chronically sick looking, alert and awake, not in distress. Jaundiced  HEENT:  Deeply icteric. NGT in place. No active bleeding  NECK: Supple, trachea midline, no adenopathy, no thyromegally or tenderness, no carotid bruit and no JVD. LUNGS:   Vesicular breath sounds bilaterally, no added sounds. HEART:   S1 and S2 well heard,RRR,  no murmur, click, rub or gallop. ABDOMEB:   Abdomen somewhat distended, normoactive, diffusely tender without rebound or guarding.   Beckford catheter draining deep yellow urine. EXTREMETIES: No peripheral edema. Bilateral wrist restraints in place  PULSES: 2+ and symmetric all extremities. SKIN: Scattered ecchymosis, a large 1 on her upper chest  NEUROLOGY: Awake, alert today. Nonfocal exam.  Patient is intermittently oriented to person and place. Labs:     Recent Labs     03/22/22 0459 03/21/22  0400   WBC 9.1 11.2*   HGB 7.9* 8.5*   HCT 23.0* 25.6*   PLT 40* 41*     Recent Labs     03/22/22 0459 03/21/22  0400 03/20/22  0727   * 133* 134*   K 3.2* 3.2* 3.4*   CL 99 100 102   CO2 17* 18* 19*   * 166* 159*   CREA 4.15* 4.21* 4.32*   * 137* 135*   CA 8.6 8.5 8.3*     Recent Labs     03/22/22  0459   ALT 24   AP 94   TBILI 3.1*   TP 5.2*   ALB 3.4*   GLOB 1.8*     Recent Labs     03/22/22 0459   INR 1.8*   PTP 18.6*      No results for input(s): FE, TIBC, PSAT, FERR in the last 72 hours. Lab Results   Component Value Date/Time    Folate 38.3 (H) 10/20/2021 07:53 AM    RBC FOLATE 878 02/14/2022 01:27 AM      No results for input(s): PH, PCO2, PO2 in the last 72 hours. No results for input(s): CPK, CKNDX, TROIQ in the last 72 hours.     No lab exists for component: CPKMB  Lab Results   Component Value Date/Time    Cholesterol, total 122 01/25/2022 09:50 AM    HDL Cholesterol 40 01/25/2022 09:50 AM    LDL, calculated 67.2 01/25/2022 09:50 AM    Triglyceride 74 01/25/2022 09:50 AM    CHOL/HDL Ratio 3.1 01/25/2022 09:50 AM     Lab Results   Component Value Date/Time    Glucose (POC) 128 (H) 03/15/2022 06:06 AM    Glucose (POC) 123 (H) 03/15/2022 12:14 AM    Glucose (POC) 114 (H) 02/23/2022 05:42 PM    Glucose (POC) 127 (H) 02/23/2022 12:04 PM    Glucose (POC) 122 (H) 02/23/2022 08:18 AM     Lab Results   Component Value Date/Time    Color DARK YELLOW 03/05/2022 09:22 PM    Appearance TURBID (A) 03/05/2022 09:22 PM    Specific gravity 1.014 03/05/2022 09:22 PM    Specific gravity 1.010 02/11/2022 07:20 PM    pH (UA) 5.5 03/05/2022 09:22 PM Protein 100 (A) 03/05/2022 09:22 PM    Glucose Negative 03/05/2022 09:22 PM    Ketone Negative 03/05/2022 09:22 PM    Bilirubin NEGATIVE 02/11/2022 07:20 PM    Urobilinogen 0.2 03/05/2022 09:22 PM    Nitrites Negative 03/05/2022 09:22 PM    Leukocyte Esterase LARGE (A) 03/05/2022 09:22 PM    Epithelial cells FEW 03/05/2022 09:22 PM    Bacteria 1+ (A) 03/05/2022 09:22 PM    WBC >100 (H) 03/05/2022 09:22 PM    RBC  03/05/2022 09:22 PM         Medications Reviewed:     Current Facility-Administered Medications   Medication Dose Route Frequency    epoetin vic-epbx (RETACRIT) injection 10,000 Units  10,000 Units SubCUTAneous Q TUE, THU & SAT    0.9% sodium chloride infusion 250 mL  250 mL IntraVENous PRN    multivitamin, tx-iron-ca-min (THERA-M w/ IRON) tablet 1 Tablet  1 Tablet Oral DAILY    0.9% sodium chloride infusion 250 mL  250 mL IntraVENous PRN    0.9% sodium chloride infusion 250 mL  250 mL IntraVENous PRN    miconazole (MICONTIN) 2 % ointment   Topical BID    balsam peru-castor oiL (VENELEX) ointment   Topical BID    zinc oxide-cod liver oil (DESITIN) 40 % paste   Topical PRN    sodium chloride (NS) flush 5-40 mL  5-40 mL IntraVENous Q8H    sodium chloride (NS) flush 5-40 mL  5-40 mL IntraVENous PRN    acetaminophen (TYLENOL) tablet 650 mg  650 mg Oral Q6H PRN    Or    acetaminophen (TYLENOL) suppository 650 mg  650 mg Rectal Q6H PRN    polyethylene glycol (MIRALAX) packet 17 g  17 g Oral DAILY PRN    ondansetron (ZOFRAN ODT) tablet 4 mg  4 mg Oral Q8H PRN    Or    ondansetron (ZOFRAN) injection 4 mg  4 mg IntraVENous A8U PRN    folic acid (FOLVITE) tablet 1 mg  1 mg Oral DAILY    lactulose (CHRONULAC) 10 gram/15 mL solution 30 mL  30 mL Oral Q6H    levothyroxine (SYNTHROID) tablet 50 mcg  50 mcg Oral 6am    midodrine (PROAMATINE) tablet 15 mg  15 mg Oral TID WITH MEALS    [Held by provider] octreotide (SANDOSTATIN) injection 100 mcg  100 mcg IntraVENous TID    pantoprazole (PROTONIX) tablet 40 mg  40 mg Oral DAILY    rifAXIMin (XIFAXAN) tablet 550 mg  550 mg Oral BID    sertraline (ZOLOFT) tablet 100 mg  100 mg Oral DAILY    thiamine HCL (B-1) tablet 100 mg  100 mg Oral DAILY    ursodioL (ACTIGALL) tablet 500 mg (Patient Supplied)  500 mg Oral Q12H    glucagon (GLUCAGEN) injection 1 mg  1 mg IntraMUSCular PRN    naloxone (NARCAN) injection 0.1 mg  0.1 mg IntraVENous PRN    promethazine (PHENERGAN) 6.25 mg in 0.9% sodium chloride 50 mL IVPB  6.25 mg IntraVENous Q6H PRN     ______________________________________________________________________  EXPECTED LENGTH OF STAY: 4d 16h  ACTUAL LENGTH OF STAY:          [address] [facility] MD

## 2022-03-22 NOTE — WOUND CARE
WOCN Note:     Wound care re-consulted for skin tear right and left arms  Assessed in room 503  Isolation: no    Chart shows:  Patient admitted on 3/5/22 with Cirrhosis  Past Medical History:   Diagnosis Date    Anxiety 10/19/2021    Basal cell carcinoma (BCC) of face 10/19/2021    Chronic kidney disease     \"end stage liver disease\" per son    Cirrhosis of liver not due to alcohol ([facility] [address]     Depression 10/19/2021      3/23/22  WBC = 9.7  Hgb = 8.8  Albumin = 3.4    Assessment:   A&O x 4, Appropriately conversational  Reports no pain  Mobility: moderate assistance with repositioning and turning  Continence: Incontinent of stool, has a FoleyPure   Restraints: no  Last Rodrigo Score: 14  Surface: foam mattress  Diet: Regular, low sodium, oral nutritional supplements, tube feeding    Wt Readings from Last 2 Encounters:   03/18/22 65.4 kg (144 lb 2.9 oz)   02/17/22 68 kg (149 lb 14.6 oz)     Bilateral heel skin intact and without erythema   Heels offloaded with pillows     1. POA stage 3 sacral pressure injuries  2 x 1.8 x <0.1 cm  Wound bed pink with yellow glaze   scant amount tan exudate  no odor   defined edges  Periwound intact with blanchable erythema   Treatment: Cleansed with saline, honey dressing, foam border dressing    2. POA central back deep tissue injury  Resolved  Treatment: foam border dressing applied     3. small multiple skin tears to right forearm  Etiology: skin very fragile and thin  Partial thickness   Wound beds covered with dry red crust   no exudate  Periwound intact & with multiple purple splotches  Treatment: cleansed with saline, xeroform, gauze, stretch gauze roll    4.  left arm skin tear  Partial thickness   5.5 x 3 x <0.1 cm, 2 skin tears measured as a cluster  Wound bed pink   scant amount serous exudate  flat edges  Periwound intact & with multiple purple splotches  Treatment: cleansed with saline, xeroform, gauze, stretch gauze roll    5. Candidiasis to buttocks, perineum, labia, and groin improving. Rash light pink    Wound Recommendations:      Cleanse sacral wound with normal saline, apply honey dressing, cover with foam border dressing, every other day and prn if becomes soiled    Cleanse skin tears to bilateral arms with normal saline, apply folded xeroform, cover with gauze and stretch gauze (do not apply any adhesive to skin), daily and prn if becomes soiled    Contine antifungal cream twice daily to buttocks, perineum, labia, and groin    PI Prevention:  Turn/reposition approximately every 2 hours  Offload heels with pillows at all times in bed. Pad bony prominences   Keep HOB 30 degrees or less to decrease shearing and pressure unless medically contraindicated. If HOB is to be over 30 degrees, raise knees first then [facility] to prevent sliding   Minimize layers of linen/pads under patient to optimize support surface to one flat sheet and one incontinence pad   Specialty bed:  Prius ordered today. Please call [facility] at [phone] for bed to be picked up at discharge     Orders received from Dr. Daquan Amaral  Discussed with RN, Sarita Cruz    Transition of Care: Plan to follow weekly and as needed while admitted to hospital.      Jonathan Deshpande MSN, RN, [facility]  Certified Wound and Ostomy Nurse  office [phone]  Can be reached through [address] [place] [address]

## 2022-03-22 NOTE — PROGRESS NOTES
Problem: Self Care Deficits Care Plan (Adult)  Goal: *Acute Goals and Plan of Care (Insert Text)  Description: FUNCTIONAL STATUS PRIOR TO ADMISSION: Chart review 2/23/2022 patient modified independent with RW. Son states recently increased assistance to [facility] with functional mobility and sit<>stand; tangential at times during conversation, but aware and able to be redirected. HOME SUPPORT: The patient lived with mother who provided assistance as needed and son lives nearby. 1 step to enter, stays on main level, walk-in-shower, DME: Shower chair, RW, BSC, w/c    Occupational Therapy Goals  Initiated 3/8/2022, Re-assessed to increase frequency of services on 3/9/2022 due to significant improvement in patient's performance . Reviewed 3/17/2022  1. Patient will perform grooming with minimal assistance/contact guard assist within 7 day(s). 2.  Patient will perform bathing from anterior neck to thigh with minimal assistance/contact guard assist within 7 day(s). 3.  Patient will perform upper body dressing with minimal assistance/contact guard assist within 7 day(s). 4.  Patient will perform toilet transfers with moderate assistance  within 7 day(s). 5.  Patient will perform all aspects of toileting with moderate assistance  within 7 day(s). 6.  Patient will participate in upper extremity therapeutic exercise/activities with minimal assistance/contact guard assist for 10 minutes within 7 day(s). 7.  Patient will utilize energy conservation techniques during functional activities with verbal cues within 7 day(s). Outcome: Progressing Towards Goal     OCCUPATIONAL THERAPY TREATMENT  Patient: Emanuel Estimable (44 y.o. female)  Date: 3/22/2022  Diagnosis: Cirrhosis ([facility] [address] [K74.60] <principal problem not specified>       Precautions: Fall  Chart, occupational therapy assessment, plan of care, and goals were reviewed.     ASSESSMENT  Patient continues with skilled OT services and is progressing towards goals, however, remains limited by generalized weakness, slow initiation of movements, and slight confusion. Pt cleared for therapy by nursing and received supine in bed with wound care leaving room. Pt willing to work with therapy and requesting to brush teeth. Completed bed mobility to sit EOB (Max A) and demo'd good seated balance at EOB. Pt required Min A for grooming task for container management and to bring cup to mouth (due to tremors). Returned to supine at end of session (Mod A) with all needs met. Continue to recommend SNF at discharge. Current Level of Function Impacting Discharge (ADLs): Mod-Max A for bed mobility, Min A for UB ADLs     Other factors to consider for discharge: Mod I at baseline          PLAN :  Patient continues to benefit from skilled intervention to address the above impairments. Continue treatment per established plan of care to address goals. Recommend with staff: Pt participation in self care as able     Recommend next OT session: transfer to [facility]     Recommendation for discharge: (in order for the patient to meet his/her long term goals)  Therapy up to 5 days/week in SNF setting    This discharge recommendation:  Has been made in collaboration with the attending provider and/or case management    IF patient discharges home will need the following DME: TBD at discharge        SUBJECTIVE:   Patient stated I'll spill it (when asked if she could bring the cup to her mouth).     OBJECTIVE DATA SUMMARY:   Cognitive/Behavioral Status:  Neurologic State: Alert  Orientation Level: Oriented to person  Cognition: Decreased attention/concentration; Follows commands  Perception: Appears intact  Perseveration: No perseveration noted  Safety/Judgement: Awareness of environment    Functional Mobility and Transfers for ADLs:  Bed Mobility:  Rolling: Moderate assistance  Supine to Sit: Maximum assistance  Sit to Supine:  Moderate assistance  Scooting: Maximum assistance    Balance:  Sitting: Intact  Sitting - Static: Good (unsupported)  Sitting - Dynamic: Fair (occasional)    ADL Intervention:  Feeding  Feeding Assistance: Minimum assistance  Drink to Mouth: Minimum assistance (due to tremors)    Grooming  Grooming Assistance: Set-up; Minimum assistance  Position Performed: Seated edge of bed  Brushing Teeth: Minimum assistance (to open supplies )       Cognitive Retraining  Safety/Judgement: Awareness of environment    Pain:  Reports sore throat     Activity Tolerance:   Fair    After treatment patient left in no apparent distress:   Supine in bed, Call bell within reach and Side rails x 3    COMMUNICATION/COLLABORATION:   The patients plan of care was discussed with: Physical therapist and Registered nurse.      Douglas Root OT  Time Calculation: 24 mins

## 2022-03-23 NOTE — PROGRESS NOTES
[facility] Adult  Hospitalist Group                                                                                          Hospitalist Progress Note  Jessica Dykes MD  Answering service: [phone] from in house phone        Date of Service:  3/23/2022  NAME:  Joy Hinojosa  :  1960  MRN:  [id number]      Admission Summary:   Copied form admit: \" Joy Hinojosa is a 65 y.o. female with history of liver cirrhosis with portal hypertension,recently diagnosed primary biliary cholangitis, recurrent ascites requiring paracentesis approximately every 7 to 10 days who presents here transferred from [address] [place] [address] secondary to decompensated liver cirrhosis, worsening renal insufficiency, progressive weakness currently undergoing work-up for possible liver transplant. The patient of note initially presented to [address] [place] [address] on 2022 secondary to hepatic encephalopathy and progressive weakness. Patient on admission was found at that time to have an ammonia level of 111 and did subsequently require paracentesis in the ED. The patient has had a prolonged and complicated hospital course since that time and has undergone multiple paracentesis including  with 11.8 L removal,  with 9/2 L removed, and  with 9.2 L removed. The patient also did have episodes of transient hypotension requiring a brief ICU admission for pressor support and has periodically received albumin with daily midodrine use. She was followed both by GI and nephrology services. Patient did undergo an EGD on  noted for distal esophagitis and portal hypertensive gastropathy, no evidence of varices. According to the son Uzma Thakkar at bedside, the patient does have a history of esophageal varices back in 2021 status post banding at that time. Per GI at outlying facility, patient was deemed not to be a candidate for colonoscopy and recommended Cologuard.   Part of her liver transplant evaluation at [facility] also included an MRCP was done on 2/18 which was noted for cirrhosis, portal hypertension, and large ascites. She has also undergone a cardiac stress test which was negative, EF of 69%. Patient has been maintained on lactulose, rifaximin, octreotide, and Protonix at the [facility]. Patient has had progressive worsening of her renal function at the [facility] with creatinine peak at 5.2 on 3/3 and associated metabolic acidosis requiring management with bicarb drip that was maintained as of this morning at [facility]. Per medical records, there was concern that patient may be moving towards initiation of possible hemodialysis. According to son, patient does not have any previous establish care with nephrology and was not aware of any kidney issues prior to current hospitalization. Patient also was treated with with a 7-day course of antibiotics for E. coli UTI, which were completed as of 2/17. The patient had has been seen by Dr. Genesis Peres hepatologist for an initial visit in January to start the process of liver transplant work-up. Given lack of progression of patient's care prolonged hospitalization at [facility], request was made for patient to be transferred to [facility] to continue further liver transplant work-up and accepted by Dr. Genesis Peres for transfer under the hospitalist service. Patient of note is currently lethargic and poorhistorian therefore history is obtained via discussion with her son Dolores Lacey at the bedside and review of medical records. \"    Interval history / Subjective:     3/23/2022 :   · Patient seen for follow-up of decompensated liver cirrhosis and hepatic encephalopathy. Patient seen and examined earlier this morning by me. No acute events or acute changes. Patient reports feeling okay. She only responds to some questions. She continues to feed herself as much as she can. Tube feeds are running. No more episodes of vomiting. Assessment & Plan:      Decompensated liver cirrhosis, with portal hypertension. Felt to be secondary to [facility]  Transaminitis/hyperbilirubinemia/coagulopathy/thrombocytopenia  -Transferred from [facility] with prolonged hospitalization. Transferred for further work-up for liver transplant eval  -EGD on 2/17 [place] noted for distal esophagitis and portal hypertensive gastropathy, no evidence of varices.    -history of esophageal varices back in November 2021 status post banding at that time.    -Per GI at outlying facility, patient was deemed not to be a candidate for colonoscopy and recommended Cologuard.    -s/p MRCP on 2/18 which was noted for cirrhosis, portal hypertension, and large ascites. -s/p cardiac stress test which was negative for ischemia, EF of 69%  -Dr Nika Nascimento following  -Continue lactulose, rifaximin, octreotide, Protonix p.o.  -Closely monitor stool output frequency  -Continue ursodiol  Continue current management at this time  Unable to complete for CBC. Pending a CBC as patient's platelets are very low. No new developments  Continue current management    History of hepatic encephalopathy with refractory ascites  -multiple paracentesis done at [facility] including 2/11 with 11.8 L removal, 2/18 with 9/2 L removed, and 2/28 with 9.2 L removed. -Currently with distended abdomen  -Moderate large ascites on US 3/6. Ascites said to be refractoyr to diuretics,HRS limiting use of diuretics  -Ascitic drain that was placed on 3/12 was apparently clogged, flushed and drained significant amount of ascites. Ascites almost gone. Added IV albumin.  -Ascitic fluid lab negative for SBP  Abdominal girth is increased likely more accumulated ascites        GREG with metabolic acidosis.   Creatinine worsened from 0.9 on 11/26/2021 up to 4-5 now  -Likely due to [facility]  -Nephrology following,recommendaitons appreciated.       Chronic normocytic anemia  -Possible anemia of CKD  Patient's hemoglobin came back 6.9 today. I have ordered 1 unit of packed red blood cells to be transfused. Repeat H&H posttransfusion. Continue to monitor daily     Hyponatremia  -Suspect secondary to underlying liver cirrhosis.   -Na stable 128-131     Recent E. coli UTI  -Treated with Vanco and Zosyn then transitioned to Rocephin, completed 7-day antibiotic course as of 2/17  -Replace Beckford with new catheter 3/5 . Urine culture on 3/5 grew Candida albicans 75,000 CFU.       Hypothyroid  -Continue levothyroxine    Severe malnutrition  --Supplemented with tube feeding via NGT. Patient has been pulling the NGT 2 days in a row and requiring restraint. She needs ongoing nutritional supplement I think she may need PEG allergies becoming difficult to sustain NGT and I am concerned that she may aspirate when she attempts to pull the feeding tube. Discussed with Dr. Portia Bautista. --On regular diet, intake is minimal.    Lines/catheters/drainage  --Beckford catheter in place, replaced on 3/5  --NGT in place     Generalized weakness/chronic wounds. pta  -PT, OT consult  -Wound care consulted    Continue current management will discuss with hepatology for continued goals of care. DVT prophylaxis SCDs for now. No pharmacological prophylaxis due to thrombocytopenia  DNR   Prognosis: Poor     Hospital Problems  Date Reviewed: 2/17/2022          Codes Class Noted POA    Cirrhosis ([facility] [address] ICD-10-CM: K74.60  ICD-9-CM: 571.5  2/8/2022 Unknown                      Data Review:    Review and/or order of clinical lab test    GENERAL:  Chronically sick looking, alert and awake, not in distress. Jaundiced  HEENT:  Deeply icteric. NGT in place. No active bleeding  NECK: Supple, trachea midline, no adenopathy, no thyromegally or tenderness, no carotid bruit and no JVD. LUNGS:   Vesicular breath sounds bilaterally, no added sounds. HEART:   S1 and S2 well heard,RRR,  no murmur, click, rub or gallop.   ABDOMEB:   Abdomen somewhat distended, normoactive, diffusely tender without rebound or guarding. Beckford catheter draining deep yellow urine. EXTREMETIES: No peripheral edema. Bilateral wrist restraints in place  PULSES: 2+ and symmetric all extremities. SKIN: Scattered ecchymosis, a large 1 on her upper chest  NEUROLOGY: Awake, alert today. Nonfocal exam.  Patient is intermittently oriented to person and place. Labs:     Recent Labs     03/23/22 0314 03/22/22  0459   WBC 9.7 9.1   HGB 8.8* 7.9*   HCT 25.5* 23.0*   PLT 45* 40*     Recent Labs     03/23/22 0314 03/22/22  0459 03/21/22  0400   * 132* 133*   K 3.2* 3.2* 3.2*   CL 99 99 100   CO2 19* 17* 18*   * 161* 166*   CREA 4.08* 4.15* 4.21*   * 154* 137*   CA 8.8 8.6 8.5     Recent Labs     03/22/22 0459   ALT 24   AP 94   TBILI 3.1*   TP 5.2*   ALB 3.4*   GLOB 1.8*     Recent Labs     03/22/22 0459   INR 1.8*   PTP 18.6*      No results for input(s): FE, TIBC, PSAT, FERR in the last 72 hours. Lab Results   Component Value Date/Time    Folate 38.3 (H) 10/20/2021 07:53 AM    RBC FOLATE 878 02/14/2022 01:27 AM      No results for input(s): PH, PCO2, PO2 in the last 72 hours. No results for input(s): CPK, CKNDX, TROIQ in the last 72 hours.     No lab exists for component: CPKMB  Lab Results   Component Value Date/Time    Cholesterol, total 122 01/25/2022 09:50 AM    HDL Cholesterol 40 01/25/2022 09:50 AM    LDL, calculated 67.2 01/25/2022 09:50 AM    Triglyceride 74 01/25/2022 09:50 AM    CHOL/HDL Ratio 3.1 01/25/2022 09:50 AM     Lab Results   Component Value Date/Time    Glucose (POC) 128 (H) 03/15/2022 06:06 AM    Glucose (POC) 123 (H) 03/15/2022 12:14 AM    Glucose (POC) 114 (H) 02/23/2022 05:42 PM    Glucose (POC) 127 (H) 02/23/2022 12:04 PM    Glucose (POC) 122 (H) 02/23/2022 08:18 AM     Lab Results   Component Value Date/Time    Color DARK YELLOW 03/05/2022 09:22 PM    Appearance TURBID (A) 03/05/2022 09:22 PM    Specific gravity 1.014 03/05/2022 09:22 PM    Specific gravity 1.010 02/11/2022 07:20 PM    pH (UA) 5.5 03/05/2022 09:22 PM    Protein 100 (A) 03/05/2022 09:22 PM    Glucose Negative 03/05/2022 09:22 PM    Ketone Negative 03/05/2022 09:22 PM    Bilirubin NEGATIVE 02/11/2022 07:20 PM    Urobilinogen 0.2 03/05/2022 09:22 PM    Nitrites Negative 03/05/2022 09:22 PM    Leukocyte Esterase LARGE (A) 03/05/2022 09:22 PM    Epithelial cells FEW 03/05/2022 09:22 PM    Bacteria 1+ (A) 03/05/2022 09:22 PM    WBC >100 (H) 03/05/2022 09:22 PM    RBC  03/05/2022 09:22 PM         Medications Reviewed:     Current Facility-Administered Medications   Medication Dose Route Frequency    epoetin vic-epbx (RETACRIT) injection 10,000 Units  10,000 Units SubCUTAneous Q TUE, THU & SAT    0.9% sodium chloride infusion 250 mL  250 mL IntraVENous PRN    multivitamin, tx-iron-ca-min (THERA-M w/ IRON) tablet 1 Tablet  1 Tablet Oral DAILY    0.9% sodium chloride infusion 250 mL  250 mL IntraVENous PRN    0.9% sodium chloride infusion 250 mL  250 mL IntraVENous PRN    miconazole (MICONTIN) 2 % ointment   Topical BID    balsam peru-castor oiL (VENELEX) ointment   Topical BID    zinc oxide-cod liver oil (DESITIN) 40 % paste   Topical PRN    sodium chloride (NS) flush 5-40 mL  5-40 mL IntraVENous Q8H    sodium chloride (NS) flush 5-40 mL  5-40 mL IntraVENous PRN    acetaminophen (TYLENOL) tablet 650 mg  650 mg Oral Q6H PRN    Or    acetaminophen (TYLENOL) suppository 650 mg  650 mg Rectal Q6H PRN    polyethylene glycol (MIRALAX) packet 17 g  17 g Oral DAILY PRN    ondansetron (ZOFRAN ODT) tablet 4 mg  4 mg Oral Q8H PRN    Or    ondansetron (ZOFRAN) injection 4 mg  4 mg IntraVENous V8X PRN    folic acid (FOLVITE) tablet 1 mg  1 mg Oral DAILY    lactulose (CHRONULAC) 10 gram/15 mL solution 30 mL  30 mL Oral Q6H    levothyroxine (SYNTHROID) tablet 50 mcg  50 mcg Oral 6am    midodrine (PROAMATINE) tablet 15 mg  15 mg Oral TID WITH MEALS    [Held by provider] octreotide (SANDOSTATIN) injection 100 mcg  100 mcg IntraVENous TID    pantoprazole (PROTONIX) tablet 40 mg  40 mg Oral DAILY    rifAXIMin (XIFAXAN) tablet 550 mg  550 mg Oral BID    sertraline (ZOLOFT) tablet 100 mg  100 mg Oral DAILY    thiamine HCL (B-1) tablet 100 mg  100 mg Oral DAILY    ursodioL (ACTIGALL) tablet 500 mg (Patient Supplied)  500 mg Oral Q12H    glucagon (GLUCAGEN) injection 1 mg  1 mg IntraMUSCular PRN    naloxone (NARCAN) injection 0.1 mg  0.1 mg IntraVENous PRN    promethazine (PHENERGAN) 6.25 mg in 0.9% sodium chloride 50 mL IVPB  6.25 mg IntraVENous Q6H PRN     ______________________________________________________________________  EXPECTED LENGTH OF STAY: 4d 16h  ACTUAL LENGTH OF STAY:          GABBY [address] MD

## 2022-03-23 NOTE — PROGRESS NOTES
Bedside and Verbal shift change report given to Freddie Quevedo RN  (oncoming nurse) by Jordy Yañez (offgoing nurse). Report included the following information SBAR and Kardex.

## 2022-03-23 NOTE — PROGRESS NOTES
Faxed completed [facility] paperwork for son, Rosalia Solano, to [facility] at [phone]. I called Rosalia Solano and let him know the form has been sent.

## 2022-03-23 NOTE — PROGRESS NOTES
Problem: Self Care Deficits Care Plan (Adult)  Goal: *Acute Goals and Plan of Care (Insert Text)  Description: FUNCTIONAL STATUS PRIOR TO ADMISSION: Chart review 2/23/2022 patient modified independent with RW. Son states recently increased assistance to [id number] with functional mobility and sit<>stand; tangential at times during conversation, but aware and able to be redirected. HOME SUPPORT: The patient lived with mother who provided assistance as needed and son lives nearby. 1 step to enter, stays on main level, walk-in-shower, DME: Shower chair, RW, BSC, w/c    Occupational Therapy Goals  Initiated 3/8/2022, Re-assessed to increase frequency of services on 3/9/2022 due to significant improvement in patient's performance . Reviewed 3/17/2022  1. Patient will perform grooming with minimal assistance/contact guard assist within 7 day(s). 2.  Patient will perform bathing from anterior neck to thigh with minimal assistance/contact guard assist within 7 day(s). 3.  Patient will perform upper body dressing with minimal assistance/contact guard assist within 7 day(s). 4.  Patient will perform toilet transfers with moderate assistance  within 7 day(s). 5.  Patient will perform all aspects of toileting with moderate assistance  within 7 day(s). 6.  Patient will participate in upper extremity therapeutic exercise/activities with minimal assistance/contact guard assist for 10 minutes within 7 day(s). 7.  Patient will utilize energy conservation techniques during functional activities with verbal cues within 7 day(s). Outcome: Progressing Towards Goal   OCCUPATIONAL THERAPY TREATMENT  Patient: Emanuel Resendizable (26 y.o. female)  Date: 3/23/2022  Diagnosis: Cirrhosis ([facility] [address] [K74.60] <principal problem not specified>       Precautions: Fall  Chart, occupational therapy assessment, plan of care, and goals were reviewed. ASSESSMENT  Patient continues with skilled OT services and is progressing towards goals. Nursing cleared for therapy. Received in bed, holding applesauce/meds in mouth. With additional cues and time she spit out burgundy colored applesauce, alerted nursing. Oriented to self only. Reoriented to place, month and year. She was able to recall place and year(with three options) at end of session. Slow processing through out session, needing additional time, verbal cues and tactile cues. Supine > sit with mod A x2 with good static sitting balance. She participated in washing her face and combing her hair with RUE with supervision and additional time with mild tremor. Sit > stand with mod Ax2 with HHA and she was unable to process side stepping. Returned to sitting and then x2 assist for return to supine. Current Level of Function Impacting Discharge (ADLs): washing face/combing hair with set up and supervision, LB dressing socks total A    Other factors to consider for discharge: Patient with extended hospitalization for cirrhosis. Per chart review, working up for liver transplant. She is from [facility]. PLAN :  Patient continues to benefit from skilled intervention to address the above impairments. Continue treatment per established plan of care to address goals. 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     OBJECTIVE DATA SUMMARY:   Cognitive/Behavioral Status:                      Functional Mobility and Transfers for ADLs:  Bed Mobility:  Supine to Sit: Moderate assistance;Maximum assistance;Assist x2  Sit to Supine: Moderate assistance;Assist x2  Scooting: Maximum assistance    Transfers:  Sit to Stand: Minimum assistance; Moderate assistance;Assist x2          Balance:  Sitting: Impaired  Sitting - Static: Good (unsupported)  Sitting - Dynamic: Fair (occasional)  Standing: Impaired; With support  Standing - Static: Constant support; Fair  Standing - Dynamic : Constant support;Poor    ADL Intervention:   Patient instructed and indicated understanding the benefits of maintaining activity tolerance, functional mobility, and independence with self care tasks during acute stay  to ensure safe return home and to baseline. Encouraged patient to increase frequency and duration OOB, be out of bed for all meals, perform daily ADLs (as approved by RN/MD regarding bathing etc), and performing functional mobility to/from [facility]. Grooming  Washing Face: Set-up; Supervision    Lower Body Dressing Assistance  Socks: Total assistance (dependent)    Pain:  No c/o pain    Activity Tolerance:   Fair    After treatment patient left in no apparent distress:   Supine in bed and Call bell within reach    COMMUNICATION/COLLABORATION:   The patients plan of care was discussed with: Physical therapist and Registered nurse.      Rory Rahman OT  Time Calculation: 29 mins

## 2022-03-23 NOTE — PROGRESS NOTES
[facility]   [address] [facility], [facility] [address], [facility]  Phone: [phone]   [phone]       Nephrology Progress Note  Tenzin Jeffery     1960     [id number]  Date of Admission : 3/5/2022  03/23/22    CC:  Follow up for greg    Assessment and Plan   GREG:  - likely 2/2 HRS  - U/S neg for obstruction  - Cr stable  - cont midodrine, octreotide stopped 3/21  - daily labs     Metabolic acidosis  - unable to take pills  - monitor for now    Hyponatremia  -Secondary to liver cirrhosis  - Na stable    Severe anemia:  - 1 unit of PRBCs 3/8  - start EDWIN TTS     Decompensated liver cirrhosis  -With portal hypertension, primary biliary cholangitis  -Requiring multiple paracenteses, most recently 2/28/2022 with 9.2 L removed  - appreciate hepatology   - plans to optimize nutrition then eventual liver/kidney      Hypothyroidism    Malnutrition:  - getting TF via DHT     Interval History:  Seen and examined. Cr slowly improving. Tired, weak. Tolerating TF. Denies cp, sob or abd pain at this time. Review of Systems: A comprehensive review of systems was negative except for that written in the HPI.     Current Medications:   Current Facility-Administered Medications   Medication Dose Route Frequency    epoetin vic-epbx (RETACRIT) injection 10,000 Units  10,000 Units SubCUTAneous Q TUE, THU & SAT    0.9% sodium chloride infusion 250 mL  250 mL IntraVENous PRN    multivitamin, tx-iron-ca-min (THERA-M w/ IRON) tablet 1 Tablet  1 Tablet Oral DAILY    0.9% sodium chloride infusion 250 mL  250 mL IntraVENous PRN    0.9% sodium chloride infusion 250 mL  250 mL IntraVENous PRN    miconazole (MICONTIN) 2 % ointment   Topical BID    balsam peru-castor oiL (VENELEX) ointment   Topical BID    zinc oxide-cod liver oil (DESITIN) 40 % paste   Topical PRN    sodium chloride (NS) flush 5-40 mL  5-40 mL IntraVENous Q8H    sodium chloride (NS) flush 5-40 mL  5-40 mL IntraVENous PRN    acetaminophen (TYLENOL) tablet 650 mg  650 mg Oral Q6H PRN    Or    acetaminophen (TYLENOL) suppository 650 mg  650 mg Rectal Q6H PRN    polyethylene glycol (MIRALAX) packet 17 g  17 g Oral DAILY PRN    ondansetron (ZOFRAN ODT) tablet 4 mg  4 mg Oral Q8H PRN    Or    ondansetron (ZOFRAN) injection 4 mg  4 mg IntraVENous L1B PRN    folic acid (FOLVITE) tablet 1 mg  1 mg Oral DAILY    lactulose (CHRONULAC) 10 gram/15 mL solution 30 mL  30 mL Oral Q6H    levothyroxine (SYNTHROID) tablet 50 mcg  50 mcg Oral 6am    midodrine (PROAMATINE) tablet 15 mg  15 mg Oral TID WITH MEALS    [Held by provider] octreotide (SANDOSTATIN) injection 100 mcg  100 mcg IntraVENous TID    pantoprazole (PROTONIX) tablet 40 mg  40 mg Oral DAILY    rifAXIMin (XIFAXAN) tablet 550 mg  550 mg Oral BID    sertraline (ZOLOFT) tablet 100 mg  100 mg Oral DAILY    thiamine HCL (B-1) tablet 100 mg  100 mg Oral DAILY    ursodioL (ACTIGALL) tablet 500 mg (Patient Supplied)  500 mg Oral Q12H    glucagon (GLUCAGEN) injection 1 mg  1 mg IntraMUSCular PRN    naloxone (NARCAN) injection 0.1 mg  0.1 mg IntraVENous PRN    promethazine (PHENERGAN) 6.25 mg in 0.9% sodium chloride 50 mL IVPB  6.25 mg IntraVENous Q6H PRN      Allergies   Allergen Reactions    Morphine Other (comments)     Severe HA. ALL narcotics!!!       Objective:  Vitals:    Vitals:    03/22/22 1605 03/22/22 1930 03/23/22 0035 03/23/22 0737   BP: (!) 94/47 118/69 (!) 103/53 102/60   Pulse: (!) 58 (!) 58 68 64   Resp: 20 20 20 16   Temp: 97.4 °F (36.3 °C) 97.6 °F (36.4 °C) 98.4 °F (36.9 °C) 98 °F (36.7 °C)   SpO2: 100% 98% 99% 98%   Weight:       Height:         Intake and Output:  No intake/output data recorded. 03/21 1901 - 03/23 0700  In: 1550   Out: 800 [Urine:800]    Physical Examination:  General: NAD,chronically ill appearing  HEENT:           + scleral icterus.  DHT in place  Neck:  Supple, no mass  Resp:  Lungs CTA B/L, no wheezing , normal respiratory effort  CV:  RRR, no murmur or rub,no  LE edema  GI:  Soft, NT, + Bowel sounds, no hepatosplenomegaly  Neurologic:  Non focal  Skin:  No Rash  :  Beckford     [x]    High complexity decision making was performed  [x]    Patient is at high-risk of decompensation with multiple organ involvement    Lab Data Personally Reviewed: I have reviewed all the pertinent labs, microbiology data and radiology studies during assessment. Recent Labs     03/23/22 0314 03/22/22  0459 03/21/22  0400   * 132* 133*   K 3.2* 3.2* 3.2*   CL 99 99 100   CO2 19* 17* 18*   * 154* 137*   * 161* 166*   CREA 4.08* 4.15* 4.21*   CA 8.8 8.6 8.5   ALB  --  3.4*  --    ALT  --  24  --    INR  --  1.8*  --      Recent Labs     03/23/22 0314 03/22/22  0459 03/21/22  0400   WBC 9.7 9.1 11.2*   HGB 8.8* 7.9* 8.5*   HCT 25.5* 23.0* 25.6*   PLT 45* 40* 41*     No results found for: SDES  Lab Results   Component Value Date/Time    Culture result: NO GROWTH 4 DAYS 03/14/2022 06:48 PM    Culture result: NO GROWTH 4 DAYS 03/12/2022 06:00 PM    Culture result: CANDIDA ALBICANS (A) 03/05/2022 09:22 PM     Recent Results (from the past 24 hour(s))   CBC WITH AUTOMATED DIFF    Collection Time: 03/23/22  3:14 AM   Result Value Ref Range    WBC 9.7 3.6 - 11.0 K/uL    RBC 2.76 (L) 3.80 - 5.20 M/uL    HGB 8.8 (L) 11.5 - 16.0 g/dL    HCT 25.5 (L) 35.0 - 47.0 %    MCV 92.4 80.0 - 99.0 FL    MCH 31.9 26.0 - 34.0 PG    MCHC 34.5 30.0 - 36.5 g/dL    RDW 24.8 (H) 11.5 - 14.5 %    PLATELET 45 (LL) 831 - 400 K/uL    NRBC 0.0 0  WBC    ABSOLUTE NRBC 0.00 0.00 - 0.01 K/uL    NEUTROPHILS 80 (H) 32 - 75 %    LYMPHOCYTES 6 (L) 12 - 49 %    MONOCYTES 7 5 - 13 %    EOSINOPHILS 5 0 - 7 %    BASOPHILS 1 0 - 1 %    IMMATURE GRANULOCYTES 1 (H) 0.0 - 0.5 %    ABS. NEUTROPHILS 7.7 1.8 - 8.0 K/UL    ABS. LYMPHOCYTES 0.6 (L) 0.8 - 3.5 K/UL    ABS. MONOCYTES 0.7 0.0 - 1.0 K/UL    ABS. EOSINOPHILS 0.5 (H) 0.0 - 0.4 K/UL    ABS. BASOPHILS 0.1 0.0 - 0.1 K/UL    ABS. IMM. GRANS. 0.1 (H) 0.00 - 0.04 K/UL    DF SMEAR SCANNED      RBC COMMENTS ANISOCYTOSIS  3+        RBC COMMENTS AIRAM CELLS  PRESENT       METABOLIC PANEL, BASIC    Collection Time: 03/23/22  3:14 AM   Result Value Ref Range    Sodium 132 (L) 136 - 145 mmol/L    Potassium 3.2 (L) 3.5 - 5.1 mmol/L    Chloride 99 97 - 108 mmol/L    CO2 19 (L) 21 - 32 mmol/L    Anion gap 14 5 - 15 mmol/L    Glucose 145 (H) 65 - 100 mg/dL     (H) 6 - 20 MG/DL    Creatinine 4.08 (H) 0.55 - 1.02 MG/DL    BUN/Creatinine ratio 39 (H) 12 - 20      GFR est AA 13 (L) >60 ml/min/1.73m2    GFR est non-AA 11 (L) >60 ml/min/1.73m2    Calcium 8.8 8.5 - 10.1 MG/DL                                       Care Plan discussed with:  Patient     Family      RN      Consulting Physician /Specialist        I have reviewed the flowsheets. Chart and Pertinent Notes have been reviewed. No change in PMH ,family and social history from Consult note.       Jordon Khan MD

## 2022-03-23 NOTE — PROGRESS NOTES
Problem: Mobility Impaired (Adult and Pediatric)  Goal: *Acute Goals and Plan of Care (Insert Text)  Description: FUNCTIONAL STATUS PRIOR TO ADMISSION: Patient appears to be an inconsistent historian despite being oriented x 4. Originally stated she primarily stays in bed-later she reported ambulating a few times a day and spending most of her day in a recliner. She consistently reports that son assists her \"with everything\" but does not give more details on what exactly or how much assistance. HOME SUPPORT PRIOR TO ADMISSION: The patient lived with son and her mother per report. Unclear how much assistance she required. Physical Therapy Goals  Re-Assessed 3/21/2022  1. Patient will move from supine to sit and sit to supine , scoot up and down, and roll side to side in bed with minimal assistance/contact guard assist within 7 day(s). 2.  Patient will transfer from bed to chair and chair to bed with minimal assistance/contact guard assist using the least restrictive device within 7 day(s). 3.  Patient will perform sit to stand with minimal assistance/contact guard assist within 7 day(s). 4.  Patient will ambulate with moderate assistance  for 10 feet with the least restrictive device within 7 day(s). Initiated 3/6/2022  1. Patient will move from supine to sit and sit to supine , scoot up and down, and roll side to side in bed with minimal assistance/contact guard assist within 7 day(s). 2.  Patient will transfer from bed to chair and chair to bed with moderate assistance  using the least restrictive device within 7 day(s). 3.  Patient will perform sit to stand with moderate assistance  within 7 day(s). 4.  Patient will ambulate with moderate assistance  for 20 feet with the least restrictive device within 7 day(s). 5.  Patient will ascend/descend 2 stairs with 2 handrail(s) with moderate assistance  within 7 day(s).    Outcome: Progressing Towards Goal   PHYSICAL THERAPY TREATMENT  Patient: David Hinkle (66 y.o. female)  Date: 3/23/2022  Diagnosis: Cirrhosis ([facility] [K74.60] <principal problem not specified>       Precautions: Fall  Chart, physical therapy assessment, plan of care and goals were reviewed. ASSESSMENT  Patient continues with skilled PT services and is slowly progressing towards goals. Pt received supine in bed and agreeable to therapy. Pt tolerated session fairly. Pt continues to be limited by confusion, delayed processing, delayed motor planning and execution which requires additional time to complete all mobility tasks, decreased functional mobility, impaired seated and standing balance, impaired gait, and global weakness. Pt completed supine to sit with max A x 2 and additional time and effort to initiate mobility towards EOB. Pt completed sit<>stand with  min-mod A x 2 and bilateral HHA. Pt attempted side stepping along EOB with mod A x 2 to facilitate weight shifting, but pt only able to take 1 step laterally before returning to sitting position. Pt unable to laterally scoot along EOB with max multi-modal cueing. Pt assisted back to supine position with all needs met. Pt will benefit from SNF placement upon discharge to continue therapy efforts. Current Level of Function Impacting Discharge (mobility/balance): max A x 2 supine to sit, min-mod A x 2 sit<>stand    Other factors to consider for discharge:          PLAN :  Patient continues to benefit from skilled intervention to address the above impairments. Continue treatment per established plan of care. to address goals.     Recommendation for discharge: (in order for the patient to meet his/her long term goals)  Therapy up to 5 days/week in SNF setting    This discharge recommendation:  Has been made in collaboration with the attending provider and/or case management    IF patient discharges home will need the following DME: to be determined (TBD)       SUBJECTIVE:   Patient stated I didn't want to take it.   pt spit out her morning meds that were crushed in applesauce---notified RN. Pt had been holding her medicine in her mouth    OBJECTIVE DATA SUMMARY:   Critical Behavior:  Neurologic State: Alert,Confused  Orientation Level: Oriented to person  Cognition: Decreased attention/concentration,Impaired decision making  Safety/Judgement: Awareness of environment  Functional Mobility Training:  Bed Mobility:     Supine to Sit: Moderate assistance;Maximum assistance;Assist x2  Sit to Supine: Moderate assistance;Assist x2  Scooting: Maximum assistance        Transfers:  Sit to Stand: Minimum assistance; Moderate assistance;Assist x2  Stand to Sit: Minimum assistance;Assist x2              Balance:  Sitting: Impaired  Sitting - Static: Good (unsupported)  Sitting - Dynamic: Fair (occasional)  Standing: Impaired; With support  Standing - Static: Constant support; Fair  Standing - Dynamic : Constant support;Poor    Pain Rating:  Pt denied pain    Activity Tolerance:   Fair    After treatment patient left in no apparent distress:   Supine in bed, Call bell within reach, and Side rails x 3    COMMUNICATION/COLLABORATION:   The patients plan of care was discussed with: Occupational therapist and Registered nurse.      Dakota Greco PT, DPT   Time Calculation: 29 mins

## 2022-03-23 NOTE — PROGRESS NOTES
RUR:  23% High     SELMA: TBD. BLS transport once medically stable. Follow-up with PCP/specialist.     Primary Contact: Son, Ambrosio Peters, [phone]     11:00AM - CM reviewed chart. Per review and ID rounds, continue with current plan of care; aggressive nutritional support via Dobhoff tube feedings and continue working with PT & OT. Patient and son are hopeful that with nutritional treatment and therapy she may become a potential candidate for a liver transplant at [place].     Discharge pending medical progress and final recommendations.  CM to continue to follow as needed.     YIFAN Ballard   [phone]

## 2022-03-24 NOTE — PROGRESS NOTES
[address] [facility] [place], [place], [address], [place], [place]      Fletcher Mejía, ISAAC Wyman, [facility]-SHAILA Lopez, [facility]   MANISHA Law, [facility]       [address]    at [address], [address] Katlyn Pizano  22.    [phone]    FAX: [address] [place] [address] Avenue    [address], [address]    [phone]    FAX: [phone]       HEPATOLOGY PROGRESS NOTE  The patient is well known to me from a previous visit to my office at [facility] in [facility]. She is a 64 yo  female who was found to have chronic liver disease in 2020 and cirrhosis in 7/2021 when she developed ascites. She had variceal bleeding in 11/2021. Serologic evaluation for markers of chronic liver disease was positive for AMA. Cirrhosis is due to [facility].     The patient has developed the following complications of cirrhosis: esophageal varices, variceal bleeding, ascites, edema, hepatic encephalopathy, severe muslce wasting    I saw the patient for the first time in 1/2022. She had CTP score 9 and MELD score 21 at that time. We started liver transplant evaluation testing. She developed confusion and could not be aroused and was hospitalized 3 weeks ago at [facility] in 2/202. During that hospitalization she developed worsening malnutrition, worsening of muscle wasting and a few decubitus ulcers. She has been at [facility] for 9 days. She has been receiving Dobbhoff tube feedings and OT/PT. She has improved somewhat. She is definitely stronger and can move herself around in bed some, she can now hold a glass, and feed herself to a limited degree. However, she is still too weak to stand even with assistance.   Wound care note indicates skin ulcers are stable but not really healing. Family conference on 3/17/22. I had a 1 hour meeting with the patient and her son who is POA. Her only option for survival is a liver transplant but she is currently too weak, too malnurished and has too much muscle wasting to survive LT. I discussed the options with the patient and her son. Either continue what we are doing until she improves nutritionally, improves muscle mass, heals the skin ulcers and she can stand and take a few steps on her own. I estimated it would take at least another month of steady progress for there to achieve this goal.    The other option is to enter hospice. If at any time she developed sepsis, GI bleeding or another major setback she enter hospice as she will never able to achieve her goal.      We cannot place PEG because of ascites  She cannot go to SNF/Rehab because of the Dobbhoff tube. For now she and son wish to continue since they see slow limited improvement. The son will discuss this with her daily and if they could opt for hospice care at any time. They both seem to be realistic regarding her ability to improve but are not yet ready to \"give up\" and enter hospice care today. Hospital course: Tolerating tube feedings well. She is very cooperative. Not pulling at tube. Working with OT/PT  Every day she is looking better, stronger and her spirits are good. Sna is now up to 134. Scr continues to improve and down to 3.8 mg  Continues to make urine. She is not eating that much on her own. Will continue tube feeds. Hepatology Plan:  Nursing should retape and secure dobbhoff tube daily, preferably each evening before she sleeps since this is when she tends to accidentily hit tube with arms and pull this out. Suggest nursing keep tube under hospital gown to reduce the risk of accidentally getting this caught by hands moving during sleep and pulled out.      Continue Dobbhuff tube feeding 24-7 and continue to increase calories as tolerated and per Dietician management. Continue with OT/PT. She is now able to stand for a few minutes and take 1 step. Will need to have another family meeting and discuss next step. ASSESSMENT AND PLAN:  Cirrhosis  The diagnosis of cirrhosis is based upon imaging, laboratory studies, complications of cirrhosis. Cirrhosis is secondary to [facility]. Not alcohol as she was initially told.     Serologic testing was positive for AMA, ASMA     The CTP is 10. Child class C. The MELD score is 32.     Based upon the MELD and CTP scores the patient has a mortality of about 50% within the next 90 days until we turn her fraily and weakness around. Right now is is too weak to survive liver transplantation. She needs aggressive nutritional support and both she and her son are in favor of this with the hopes her situation will improve and she could survive liver transplantation.       Primary Biliary Cholangitis  The diagnosis is based upon serology and an elevation in ALP and positive AMA. A liver biopsy has not been performed. PBC is being treated with ANTONIETA 500 mg BID. CKD-HRS  Scr is moving in right direction and now down to 3.84 mg  Continue midodrine, octreotide injections     Malnutrition  The patient has severe protein-calorie malnutrtion and muscle wasting. This is due to advanced liver disease and refractory ascites. The patient needs more calories than she can possible eat to get into positive nutritional balance. Without aggressive nutritional support she will not survive. She is alert, oriented and understands the need for NG dobhoff feeding tube placement. Both she and her son agree with this. Getting tube feeds via Dobhoff feeding tube. Tolerating this well. Formula is being managed by nutritioinal service. Frailty/muscle wasting  The patient is very frail and cannot sit or stand on her own.     She is making great progress with aggressive OT/PT.    She can now lift her legs off the bed without strugling    She is very motivated to survive and willing to do whatever excercies she needs to get stronger. The goal is up and walking to bathroom with or without walker. Skin wounds  Large 5 x 5 cm area of tissue injury withtout ulceration on her back has nearly resolved. Stage 3 pressure injury continues to heal.    Ulcers have to be healed for her to be an LT candidate.     Ascites   Ascites developed for the first time in 7/2021. Ascites is is refractory to the current doses of diuretics because of kidney disease and Hyponatremia. Ascites has increased some but not tight. No diuretics. Will remove all ascites with paracentesis. Hyponatremia  This is secondary cirrhosis and diuretics  This has been stable in the 131-134 range. No diuretics.       Screening for Esophageal varices   The patient has esophageal varices with prior bleeding. Varices were banded in 11/2021 and 12/2021. The last EGD to assess for varices was performed in 12/2021. No repeat EGD needed at this time.     Hepatic encephalopathy   Overt HE is well controlled on lactulose and xifaxan. Serum ammonia is low and in the 40s.      Coagulopathy  INR is in the 1.6 range and stable. Thrombocytopenia   This is secondary to cirrhosis. There is no evidence of overt bleeding. No treatment is required. The platelet count is adequate for the patient to undergo procedures without the need for platelet transfusion or platelet growth factors.     Anemia   This is due to multifactorial causes including portal hypertension with chronic GI blood loss and bone marrow suppression due to severe malnutrition.     The most recent FE studies were from 1/2022. The serum ferritin is 87  The FE saturation is 74  She recieved 3 does of IV FE earlier in the hospitalization  Will recheck FE panel     Thrombocytopenia   This is secondary to cirrhosis.   There is no evidence of overt bleeding. No treatment is required. The platelet count is adequate for the patient to undergo procedures without the need for platelet transfusion or platelet growth factors. PHYSICAL EXAMINATION:  VS: per nursing note  General:  Looks much better. Skin color normal.  Eyes:  Sclera non-icteric. ENT:  No oral lesions. Thyroid normal.  Nodes:  No adenopathy. Skin:  Spider angiomata. Less ecchymosis on arms. Respiratory:  Lungs clear to auscultation. Cardiovascular:  Regular heart rate. Abdomen:  Soft non-tender, Distended with obvious ascites. Extremities:  No lower extremity edema. Severe muscle wasting of upper and lower extremities. Neurologic:  Alert and oriented. Cranial nerves grossly intact. No asterixis. LABORATORY:  Results for Maylin Banegas (MRN [id number]) as of 3/24/2022 06:48   Ref. Range 3/22/2022 04:59 3/23/2022 03:14 3/24/2022 02:36   WBC Latest Ref Range: 3.6 - 11.0 K/uL 9.1 9.7 11.0   HGB Latest Ref Range: 11.5 - 16.0 g/dL 7.9 (L) 8.8 (L) 8.2 (L)   PLATELET Latest Ref Range: 150 - 400 K/uL 40 (LL) 45 (LL) 48 (LL)   INR Latest Ref Range: 0.9 - 1.1   1.8 (H)     Sodium Latest Ref Range: 136 - 145 mmol/L 132 (L) 132 (L) 131 (L)   Potassium Latest Ref Range: 3.5 - 5.1 mmol/L 3.2 (L) 3.2 (L) 3.3 (L)   Chloride Latest Ref Range: 97 - 108 mmol/L 99 99 98   CO2 Latest Ref Range: 21 - 32 mmol/L 17 (L) 19 (L) 18 (L)   Glucose Latest Ref Range: 65 - 100 mg/dL 154 (H) 145 (H) 119 (H)   BUN Latest Ref Range: 6 - 20 MG/ (H) 159 (H) 162 (H)   Creatinine Latest Ref Range: 0.55 - 1.02 MG/DL 4.15 (H) 4.08 (H) 3.84 (H)   Bilirubin, total Latest Ref Range: 0.2 - 1.0 MG/DL 3.1 (H)     Albumin Latest Ref Range: 3.5 - 5.0 g/dL 3.4 (L)     ALT Latest Ref Range: 12 - 78 U/L 24     AST Latest Ref Range: 15 - 37 U/L 35     Alk. phosphatase Latest Ref Range: 45 - 117 U/L 94       RADIOLOGY:  Ultrasound of liver. Echogenic consistent with cirrhosis. No liver mass lesions.   No dilated bile ducts. Moderate ascites.         MD Galileo Silver [facility] [address], [address] [facility] c[address]yo  [address].  [phone]  [address] [facility]

## 2022-03-24 NOTE — PROGRESS NOTES
Bedside and Verbal shift change report given to Chris Pollock  (oncoming nurse) by Vira Abad (offgoing nurse). Report included the following information SBAR and Kardex.

## 2022-03-24 NOTE — PROGRESS NOTES
Problem: Self Care Deficits Care Plan (Adult)  Goal: *Acute Goals and Plan of Care (Insert Text)  Description: FUNCTIONAL STATUS PRIOR TO ADMISSION: Chart review 2/23/2022 patient modified independent with RW. Son states recently increased assistance to [facility] with functional mobility and sit<>stand; tangential at times during conversation, but aware and able to be redirected. HOME SUPPORT: The patient lived with mother who provided assistance as needed and son lives nearby. 1 step to enter, stays on main level, walk-in-shower, DME: Shower chair, RW, BSC, w/c    Occupational Therapy Goals  Initiated 3/8/2022, Re-assessed to increase frequency of services on 3/9/2022 due to significant improvement in patient's performance . Reviewed 3/17/2022; Weekly Re-assessment 3/24/2022    1. Patient will perform grooming with minimal assistance/contact guard assist within 7 day(s). -Continue  2. Patient will perform bathing from anterior neck to thigh with minimal assistance/contact guard assist within 7 day(s). -Continue  3. Patient will perform upper body dressing with minimal assistance/contact guard assist within 7 day(s). -Continue  4. Patient will perform toilet transfers with moderate assistance  within 7 day(s). -Continue  5. Patient will perform all aspects of toileting with moderate assistance  within 7 day(s). -Continue  6. Patient will participate in upper extremity therapeutic exercise/activities with minimal assistance/contact guard assist for 10 minutes within 7 day(s). -Continue  7. Patient will utilize energy conservation techniques during functional activities with verbal cues within 7 day(s). -Continue    Outcome: Not Progressing Towards Goal    OCCUPATIONAL THERAPY TREATMENT/WEEKLY RE-ASSESSMENT  Patient: David Hinkle (89 y.o. female)  Date: 3/24/2022  Diagnosis: [facility] [K74.60] <principal problem not specified>       Precautions: Fall  Chart, occupational therapy assessment, plan of care, and goals were reviewed. ASSESSMENT  Patient continues with skilled OT services and is not progressing towards goals. Pt continues to demonstrate decreased problem solving/sequencing/safety/task initiation, decreased strength/endurance, decreased mobility/balance, decreased activity tolerance and decreased full body reaching, all of which continue to limit pt's safe functional independence. Pt continues to benefit from 2 person assist from bed mobility and sit to stand transfers, as well as, Max A for thoroughness of bathing; therefore, all goals continued at their current levels. This date, pt initially agreeable to bed level UE thera ex; however, had c/o nausea, which limited task engagement, with pt requiring Max A of therapist to roll/scoot and reposition in bed. OT to continue to follow during acute hospitalization, with reporting therapist believing pt would benefit from SNF rehab a discharge. Current Level of Function Impacting Discharge (ADLs): Max A for bed level ADLs    Other factors to consider for discharge: 2 person transfer assist         PLAN :  Goals have been updated based on progression since last assessment. Patient continues to benefit from skilled intervention to address the above impairments. Continue to follow patient 5 times a week to address goals. Recommend with staff: Frequent positional changes, bed level ADL engagement    Recommend next OT session: POC progression    Recommendation for discharge: (in order for the patient to meet his/her long term goals)  Therapy up to 5 days/week in SNF setting    This discharge recommendation:  Has been made in collaboration with the attending provider and/or case management    IF patient discharges home will need the following DME: TBD       SUBJECTIVE:   Patient stated I don't feel well.     OBJECTIVE DATA SUMMARY:   Cognitive/Behavioral Status:  Neurologic State: Alert;Confused  Orientation Level: Disoriented to situation;Disoriented to place; Disoriented to time  Cognition: Impulsive;Poor safety awareness; Follows commands     Perseveration: No perseveration noted  Safety/Judgement: Decreased awareness of need for safety;Decreased insight into deficits    Functional Mobility and Transfers for ADLs:  Bed Mobility:  Rolling: Maximum assistance  Scooting: Maximum assistance    ADL Intervention:  Cognitive Retraining  Safety/Judgement: Decreased awareness of need for safety;Decreased insight into deficits    Pain:  No c/o pain rather nausea; RN aware and following    Activity Tolerance:   Poor and requires frequent rest breaks    After treatment patient left in no apparent distress:   Supine in bed, Call bell within reach, Bed / chair alarm activated, and Side rails x 3    COMMUNICATION/COLLABORATION:   The patients plan of care was discussed with: Registered nurse and Case management.      Naty Mckee OT  Time Calculation: 9 mins

## 2022-03-24 NOTE — PROGRESS NOTES
Patient denied pain throughout shift. Cleaned up multiple bowel movements. Patient refusing oral meds, when asked about taking oral meds patient states \"i'm going to throw up if I take them\". Tube feed running all night with no complaints of nausea when asked, no vomiting this shift. Patient educated and encouraged to take oral meds this morning, including lactulose, patient once again stated, \"i'll throw up right now\". MD aware.

## 2022-03-24 NOTE — PROGRESS NOTES
[facility] Adult  Hospitalist Group                                                                                          Hospitalist Progress Note  Amber Weaver Juan   Answering service: [phone] or [phone] from in house phone        Date of Service:  3/24/2022  NAME:  Kostas Martins  :  1960  MRN:  [id number]      Admission Summary:   Copied form admit: \" Kostas Martins is a 65 y.o. female with history of liver cirrhosis with portal hypertension,recently diagnosed primary biliary cholangitis, recurrent ascites requiring paracentesis approximately every 7 to 10 days who presents here transferred from [address] [place] [address] secondary to decompensated liver cirrhosis, worsening renal insufficiency, progressive weakness currently undergoing work-up for possible liver transplant. \"    Interval history / Subjective:     3/24/2022 :   · Patient seen for follow-up of decompensated liver cirrhosis and hepatic encephalopathy. Patient seen and examined earlier this AM. AAOx 3 but sleepy and not very interactive. Nursing reports later in shift that patient nauseated with small amount of emesis. Assessment & Plan:      Decompensated liver cirrhosis, with portal hypertension. Felt to be secondary to [facility]  Transaminitis/hyperbilirubinemia/coagulopathy/thrombocytopenia  -Transferred from [address] [place] [address] with prolonged hospitalization. Transferred for further work-up for liver transplant eval  -EGD on  [place] noted for distal esophagitis and portal hypertensive gastropathy, no evidence of varices.    -history of esophageal varices back in 2021 status post banding at that time.    -Per GI at outlying facility, patient was deemed not to be a candidate for colonoscopy and recommended Cologuard.    -s/p MRCP on  which was noted for cirrhosis, portal hypertension, and large ascites.    -s/p cardiac stress test which was negative for ischemia, EF of 69%  -Dr Ivana Burrows following  -Continue lactulose, rifaximin, octreotide, Protonix p.o.  -Closely monitor stool output frequency  -Continue ursodiol    History of hepatic encephalopathy with refractory ascites  -multiple paracentesis done at [facility] including 2/11 with 11.8 L removal, 2/18 with 9/2 L removed, and 2/28 with 9.2 L removed. -Currently with distended abdomen  -Moderate large ascites on US 3/6. Ascites said to be refractory to diuretics, HRS limiting use of diuretics  -Ascitic drain that was placed on 3/12 was apparently clogged, flushed and drained significant amount of ascites. Ascites almost gone. Added IV albumin.  -Ascitic fluid lab negative for SBP  -Consider repeat paracentesis      GREG with metabolic acidosis. Creatinine worsened from 0.9 on 11/26/2021 up to 4-5 now  -Likely due to [facility]  -Nephrology following,recommendations appreciated. Chronic normocytic anemia  -Possible anemia of CKD  -s/p 1 unit pRBCs     Hyponatremia  -Suspect secondary to underlying liver cirrhosis.   -Na stable 128-131     Recent E. coli UTI  -Treated with Vanco and Zosyn then transitioned to Rocephin, completed 7-day antibiotic course as of 2/17  -Replace Beckford with new catheter 3/ . Urine culture on 3/5 grew Candida albicans 75,000 CFU. Hypothyroid  -Continue levothyroxine    Severe malnutrition  --Continue NG tube with tube feeds as tolerated. Encourage oral intake   --On regular diet, intake is minimal.    Lines/catheters/drainage  --Beckford catheter in place, replaced on 3/5  --NGT in place     Generalized weakness/chronic wounds. pta  -PT, OT consult  -Wound care consulted    Continue current management will discuss with hepatology for continued goals of care. DVT prophylaxis SCDs for now.   No pharmacological prophylaxis due to thrombocytopenia  DNR   Prognosis: Poor     Hospital Problems  Date Reviewed: 2/17/2022          Codes Class Noted POA    Cirrhosis ([facility] ICD-10-CM: K74.60  ICD-9-CM: 571.5  2/8/2022 Unknown Data Review:    Review and/or order of clinical lab test    GENERAL:  Chronically sick looking, alert and awake, not in distress. Jaundiced  HEENT:  Deeply icteric. NGT in place. No active bleeding  NECK: Supple, trachea midline, no adenopathy, no thyromegally or tenderness, no carotid bruit and no JVD. LUNGS:   Vesicular breath sounds bilaterally, no added sounds. HEART:   S1 and S2 well heard,RRR,  no murmur, click, rub or gallop. ABDOMEB:   Abdomen somewhat distended, normoactive, diffusely tender without rebound or guarding. Beckford catheter draining deep yellow urine. EXTREMETIES: No peripheral edema. Bilateral wrist restraints in place  PULSES: 2+ and symmetric all extremities. SKIN: Scattered ecchymosis, a large 1 on her upper chest  NEUROLOGY: sleepy but alert . Nonfocal exam. AAOx 3       Labs:     Recent Labs     03/24/22 0236 03/23/22 0314   WBC 11.0 9.7   HGB 8.2* 8.8*   HCT 24.2* 25.5*   PLT 48* 45*     Recent Labs     03/24/22 0236 03/23/22 0314 03/22/22 0459   * 132* 132*   K 3.3* 3.2* 3.2*   CL 98 99 99   CO2 18* 19* 17*   * 159* 161*   CREA 3.84* 4.08* 4.15*   * 145* 154*   CA 8.6 8.8 8.6     Recent Labs     03/22/22  0459   ALT 24   AP 94   TBILI 3.1*   TP 5.2*   ALB 3.4*   GLOB 1.8*     Recent Labs     03/22/22 0459   INR 1.8*   PTP 18.6*      No results for input(s): FE, TIBC, PSAT, FERR in the last 72 hours. Lab Results   Component Value Date/Time    Folate 38.3 (H) 10/20/2021 07:53 AM    RBC FOLATE 878 02/14/2022 01:27 AM      No results for input(s): PH, PCO2, PO2 in the last 72 hours. No results for input(s): CPK, CKNDX, TROIQ in the last 72 hours.     No lab exists for component: CPKMB  Lab Results   Component Value Date/Time    Cholesterol, total 122 01/25/2022 09:50 AM    HDL Cholesterol 40 01/25/2022 09:50 AM    LDL, calculated 67.2 01/25/2022 09:50 AM    Triglyceride 74 01/25/2022 09:50 AM    CHOL/HDL Ratio 3.1 01/25/2022 09:50 AM     Lab Results   Component Value Date/Time    Glucose (POC) 128 (H) 03/15/2022 06:06 AM    Glucose (POC) 123 (H) 03/15/2022 12:14 AM    Glucose (POC) 114 (H) 02/23/2022 05:42 PM    Glucose (POC) 127 (H) 02/23/2022 12:04 PM    Glucose (POC) 122 (H) 02/23/2022 08:18 AM     Lab Results   Component Value Date/Time    Color DARK YELLOW 03/05/2022 09:22 PM    Appearance TURBID (A) 03/05/2022 09:22 PM    Specific gravity 1.014 03/05/2022 09:22 PM    Specific gravity 1.010 02/11/2022 07:20 PM    pH (UA) 5.5 03/05/2022 09:22 PM    Protein 100 (A) 03/05/2022 09:22 PM    Glucose Negative 03/05/2022 09:22 PM    Ketone Negative 03/05/2022 09:22 PM    Bilirubin NEGATIVE 02/11/2022 07:20 PM    Urobilinogen 0.2 03/05/2022 09:22 PM    Nitrites Negative 03/05/2022 09:22 PM    Leukocyte Esterase LARGE (A) 03/05/2022 09:22 PM    Epithelial cells FEW 03/05/2022 09:22 PM    Bacteria 1+ (A) 03/05/2022 09:22 PM    WBC >100 (H) 03/05/2022 09:22 PM    RBC  03/05/2022 09:22 PM         Medications Reviewed:     Current Facility-Administered Medications   Medication Dose Route Frequency    promethazine (PHENERGAN) tablet 12.5 mg  12.5 mg Oral Q6H PRN    epoetin vic-epbx (RETACRIT) injection 10,000 Units  10,000 Units SubCUTAneous Q TUE, THU & SAT    multivitamin, tx-iron-ca-min (THERA-M w/ IRON) tablet 1 Tablet  1 Tablet Oral DAILY    miconazole (MICONTIN) 2 % ointment   Topical BID    balsam peru-castor oiL (VENELEX) ointment   Topical BID    zinc oxide-cod liver oil (DESITIN) 40 % paste   Topical PRN    sodium chloride (NS) flush 5-40 mL  5-40 mL IntraVENous Q8H    sodium chloride (NS) flush 5-40 mL  5-40 mL IntraVENous PRN    acetaminophen (TYLENOL) tablet 650 mg  650 mg Oral Q6H PRN    Or    acetaminophen (TYLENOL) suppository 650 mg  650 mg Rectal Q6H PRN    polyethylene glycol (MIRALAX) packet 17 g  17 g Oral DAILY PRN    ondansetron (ZOFRAN ODT) tablet 4 mg  4 mg Oral Q8H PRN    Or    ondansetron (ZOFRAN) injection 4 mg  4 mg IntraVENous C1M PRN    folic acid (FOLVITE) tablet 1 mg  1 mg Oral DAILY    lactulose (CHRONULAC) 10 gram/15 mL solution 30 mL  30 mL Oral Q6H    levothyroxine (SYNTHROID) tablet 50 mcg  50 mcg Oral 6am    midodrine (PROAMATINE) tablet 15 mg  15 mg Oral TID WITH MEALS    [Held by provider] octreotide (SANDOSTATIN) injection 100 mcg  100 mcg IntraVENous TID    pantoprazole (PROTONIX) tablet 40 mg  40 mg Oral DAILY    rifAXIMin (XIFAXAN) tablet 550 mg  550 mg Oral BID    sertraline (ZOLOFT) tablet 100 mg  100 mg Oral DAILY    thiamine HCL (B-1) tablet 100 mg  100 mg Oral DAILY    ursodioL (ACTIGALL) tablet 500 mg (Patient Supplied)  500 mg Oral Q12H    glucagon (GLUCAGEN) injection 1 mg  1 mg IntraMUSCular PRN    naloxone (NARCAN) injection 0.1 mg  0.1 mg IntraVENous PRN     ______________________________________________________________________  EXPECTED LENGTH OF STAY: 4d 16h  ACTUAL LENGTH OF STAY:          Verónica Pathak DO

## 2022-03-24 NOTE — PROGRESS NOTES
3/24/2022 -   TRANSITIONS OF CARE PLAN:   1. RUR: 24%; HIGH  2. DESTINATION: TBD  3. TRANSPORT: Likely BLS  4. NEEDS FOR DISCHARGE: TBD  5. ANTICIPATED FOLLOW UPS: PCP, Nephro, Hepatology   6. ONGOING INPATIENT NEEDS: Tube Feeds Continue, Patient continues to decline PO meds, PT and OT continue    Anticipated Discharge is: Greater than 48 Hours    CM notes that patient continues with tube feeds via NGT. Therapy recommendations remain for SNF placement. Current goal remains for aggressive nutritional support for possible consideration of liver transplant at [facility]. CM Team to continue following.     CRM: Heidy Monroy, MPH, [facility]; Z: [phone]

## 2022-03-24 NOTE — PROGRESS NOTES
[facility]   [address] [address], [facility] [address], [facility]  Phone: [phone]   [phone]       Nephrology Progress Note  Paige Camacho     1960     [id number]  Date of Admission : 3/5/2022  03/24/22    CC:  Follow up for greg    Assessment and Plan   GREG:  - likely 2/2 HRS  - U/S neg for obstruction  - Cr slowly improving  - cont midodrine, octreotide stopped 3/21  - daily labs     Metabolic acidosis  - unable to take pills  - monitor for now    Hyponatremia  -Secondary to liver cirrhosis  - Na stable    Severe anemia:  - 1 unit of PRBCs 3/8  - start EDWIN TTS     Decompensated liver cirrhosis  -With portal hypertension, primary biliary cholangitis  -Requiring multiple paracenteses, most recently 2/28/2022 with 9.2 L removed  - appreciate hepatology   - plans to optimize nutrition then eventual liver/kidney      Hypothyroidism    Malnutrition:  - getting TF via DHT     Interval History:  Seen and examined. Cr slowly improving. Tired, weak. Tolerating TF. Denies cp, sob or abd pain at this time. Review of Systems: A comprehensive review of systems was negative except for that written in the HPI.     Current Medications:   Current Facility-Administered Medications   Medication Dose Route Frequency    epoetin vic-epbx (RETACRIT) injection 10,000 Units  10,000 Units SubCUTAneous Q TUE, THU & SAT    0.9% sodium chloride infusion 250 mL  250 mL IntraVENous PRN    multivitamin, tx-iron-ca-min (THERA-M w/ IRON) tablet 1 Tablet  1 Tablet Oral DAILY    0.9% sodium chloride infusion 250 mL  250 mL IntraVENous PRN    0.9% sodium chloride infusion 250 mL  250 mL IntraVENous PRN    miconazole (MICONTIN) 2 % ointment   Topical BID    balsam peru-castor oiL (VENELEX) ointment   Topical BID    zinc oxide-cod liver oil (DESITIN) 40 % paste   Topical PRN    sodium chloride (NS) flush 5-40 mL  5-40 mL IntraVENous Q8H    sodium chloride (NS) flush 5-40 mL  5-40 mL IntraVENous PRN  acetaminophen (TYLENOL) tablet 650 mg  650 mg Oral Q6H PRN    Or    acetaminophen (TYLENOL) suppository 650 mg  650 mg Rectal Q6H PRN    polyethylene glycol (MIRALAX) packet 17 g  17 g Oral DAILY PRN    ondansetron (ZOFRAN ODT) tablet 4 mg  4 mg Oral Q8H PRN    Or    ondansetron (ZOFRAN) injection 4 mg  4 mg IntraVENous Z5S PRN    folic acid (FOLVITE) tablet 1 mg  1 mg Oral DAILY    lactulose (CHRONULAC) 10 gram/15 mL solution 30 mL  30 mL Oral Q6H    levothyroxine (SYNTHROID) tablet 50 mcg  50 mcg Oral 6am    midodrine (PROAMATINE) tablet 15 mg  15 mg Oral TID WITH MEALS    [Held by provider] octreotide (SANDOSTATIN) injection 100 mcg  100 mcg IntraVENous TID    pantoprazole (PROTONIX) tablet 40 mg  40 mg Oral DAILY    rifAXIMin (XIFAXAN) tablet 550 mg  550 mg Oral BID    sertraline (ZOLOFT) tablet 100 mg  100 mg Oral DAILY    thiamine HCL (B-1) tablet 100 mg  100 mg Oral DAILY    ursodioL (ACTIGALL) tablet 500 mg (Patient Supplied)  500 mg Oral Q12H    glucagon (GLUCAGEN) injection 1 mg  1 mg IntraMUSCular PRN    naloxone (NARCAN) injection 0.1 mg  0.1 mg IntraVENous PRN    promethazine (PHENERGAN) 6.25 mg in 0.9% sodium chloride 50 mL IVPB  6.25 mg IntraVENous Q6H PRN      Allergies   Allergen Reactions    Morphine Other (comments)     Severe HA. ALL narcotics!!!       Objective:  Vitals:    Vitals:    03/23/22 1521 03/23/22 2150 03/24/22 0021 03/24/22 1036   BP: (!) 155/90 (!) 122/55 (!) 100/58 (!) 120/50   Pulse: 61 74 81 74   Resp: 16 16 16 16   Temp: 97.6 °F (36.4 °C) 98.6 °F (37 °C) 98.9 °F (37.2 °C) 97.7 °F (36.5 °C)   SpO2: 100% 99% 96% 98%   Weight:       Height:         Intake and Output:  03/24 0701 - 03/24 1900  In: -   Out: 475 [Urine:475]  03/22 1901 - 03/24 0700  In: 1110 [P.O.:120]  Out: 1250 [Urine:1250]    Physical Examination:  General: NAD,chronically ill appearing  HEENT:           + scleral icterus.  DHT in place  Neck:  Supple, no mass  Resp:  Lungs CTA B/L, no wheezing , normal respiratory effort  CV:  RRR,  no murmur or rub,no  LE edema  GI:  Soft, NT, + Bowel sounds, no hepatosplenomegaly  Neurologic:  Non focal  Skin:  No Rash  :  Beckford     [x]    High complexity decision making was performed  [x]    Patient is at high-risk of decompensation with multiple organ involvement    Lab Data Personally Reviewed: I have reviewed all the pertinent labs, microbiology data and radiology studies during assessment. Recent Labs     03/24/22 0236 03/23/22 0314 03/22/22 0459   * 132* 132*   K 3.3* 3.2* 3.2*   CL 98 99 99   CO2 18* 19* 17*   * 145* 154*   * 159* 161*   CREA 3.84* 4.08* 4.15*   CA 8.6 8.8 8.6   ALB  --   --  3.4*   ALT  --   --  24   INR  --   --  1.8*     Recent Labs     03/24/22 0236 03/23/22 0314 03/22/22 0459   WBC 11.0 9.7 9.1   HGB 8.2* 8.8* 7.9*   HCT 24.2* 25.5* 23.0*   PLT 48* 45* 40*     No results found for: SDES  Lab Results   Component Value Date/Time    Culture result: NO GROWTH 4 DAYS 03/14/2022 06:48 PM    Culture result: NO GROWTH 4 DAYS 03/12/2022 06:00 PM    Culture result: CANDIDA ALBICANS (A) 03/05/2022 09:22 PM     Recent Results (from the past 24 hour(s))   CBC WITH AUTOMATED DIFF    Collection Time: 03/24/22  2:36 AM   Result Value Ref Range    WBC 11.0 3.6 - 11.0 K/uL    RBC 2.58 (L) 3.80 - 5.20 M/uL    HGB 8.2 (L) 11.5 - 16.0 g/dL    HCT 24.2 (L) 35.0 - 47.0 %    MCV 93.8 80.0 - 99.0 FL    MCH 31.8 26.0 - 34.0 PG    MCHC 33.9 30.0 - 36.5 g/dL    RDW 25.0 (H) 11.5 - 14.5 %    PLATELET 48 (LL) 763 - 400 K/uL    MPV 10.6 8.9 - 12.9 FL    NRBC 0.0 0  WBC    ABSOLUTE NRBC 0.00 0.00 - 0.01 K/uL    NEUTROPHILS 76 (H) 32 - 75 %    LYMPHOCYTES 6 (L) 12 - 49 %    MONOCYTES 12 5 - 13 %    EOSINOPHILS 4 0 - 7 %    BASOPHILS 1 0 - 1 %    IMMATURE GRANULOCYTES 1 (H) 0.0 - 0.5 %    ABS. NEUTROPHILS 8.4 (H) 1.8 - 8.0 K/UL    ABS. LYMPHOCYTES 0.7 (L) 0.8 - 3.5 K/UL    ABS. MONOCYTES 1.3 (H) 0.0 - 1.0 K/UL    ABS.  EOSINOPHILS 0.4 0.0 - 0.4 K/UL    ABS. BASOPHILS 0.1 0.0 - 0.1 K/UL    ABS. IMM. GRANS. 0.1 (H) 0.00 - 0.04 K/UL    DF SMEAR SCANNED      RBC COMMENTS ANISOCYTOSIS  2+        RBC COMMENTS SCHISTOCYTES  1+        RBC COMMENTS OVALOCYTES  1+        RBC COMMENTS AIRAM CELLS  1+       METABOLIC PANEL, BASIC    Collection Time: 03/24/22  2:36 AM   Result Value Ref Range    Sodium 131 (L) 136 - 145 mmol/L    Potassium 3.3 (L) 3.5 - 5.1 mmol/L    Chloride 98 97 - 108 mmol/L    CO2 18 (L) 21 - 32 mmol/L    Anion gap 15 5 - 15 mmol/L    Glucose 119 (H) 65 - 100 mg/dL     (H) 6 - 20 MG/DL    Creatinine 3.84 (H) 0.55 - 1.02 MG/DL    BUN/Creatinine ratio 42 (H) 12 - 20      GFR est AA 14 (L) >60 ml/min/1.73m2    GFR est non-AA 12 (L) >60 ml/min/1.73m2    Calcium 8.6 8.5 - 10.1 MG/DL                                       Care Plan discussed with:  Patient     Family      RN      Consulting Physician /Specialist        I have reviewed the flowsheets. Chart and Pertinent Notes have been reviewed. No change in PMH ,family and social history from Consult note.       Court Campuzano MD

## 2022-03-25 NOTE — PROGRESS NOTES
Problem: Self Care Deficits Care Plan (Adult)  Goal: *Acute Goals and Plan of Care (Insert Text)  Description: FUNCTIONAL STATUS PRIOR TO ADMISSION: Chart review 2/23/2022 patient modified independent with RW. Son states recently increased assistance to [facility] with functional mobility and sit<>stand; tangential at times during conversation, but aware and able to be redirected. HOME SUPPORT: The patient lived with mother who provided assistance as needed and son lives nearby. 1 step to enter, stays on main level, walk-in-shower, DME: Shower chair, RW, BSC, w/c    Occupational Therapy Goals  Initiated 3/8/2022, Re-assessed to increase frequency of services on 3/9/2022 due to significant improvement in patient's performance . Reviewed 3/17/2022; Weekly Re-assessment 3/24/2022    1. Patient will perform grooming with minimal assistance/contact guard assist within 7 day(s). -Continue  2. Patient will perform bathing from anterior neck to thigh with minimal assistance/contact guard assist within 7 day(s). -Continue  3. Patient will perform upper body dressing with minimal assistance/contact guard assist within 7 day(s). -Continue  4. Patient will perform toilet transfers with moderate assistance  within 7 day(s). -Continue  5. Patient will perform all aspects of toileting with moderate assistance  within 7 day(s). -Continue  6. Patient will participate in upper extremity therapeutic exercise/activities with minimal assistance/contact guard assist for 10 minutes within 7 day(s). -Continue  7. Patient will utilize energy conservation techniques during functional activities with verbal cues within 7 day(s). -Continue    Outcome: Not Progressing Towards Goal   OCCUPATIONAL THERAPY TREATMENT  Patient: Glenna Merino (80 y.o. female)  Date: 3/25/2022  Diagnosis: Cirrhosis ([facility] [address] [K74.60] <principal problem not specified>       Precautions: Fall  Chart, occupational therapy assessment, plan of care, and goals were reviewed. ASSESSMENT  Patient continues with skilled OT services and is not progressing towards goals. Pt remains limited by impaired functional mobility, generalized weakness, and impaired cognition. Pt cleared for therapy by nursing and received supine in bed willing to work with therapy. Pt with soiled brief and linens. Required Min A for rolling and Max A for bowel hygiene. At end of session pt supine with all needs met. Continue to recommend SNF at discharge to increase safety and independence in ADLs. Current Level of Function Impacting Discharge (ADLs): Min A for rolling, Max A for LB ADLs     Other factors to consider for discharge: awaiting liver transfer          PLAN :  Patient continues to benefit from skilled intervention to address the above impairments. Continue treatment per established plan of care to address goals. Recommend with staff: Pt participation in self care as able     Recommend next OT session: EOB grooming     Recommendation for discharge: (in order for the patient to meet his/her long term goals)  Therapy up to 5 days/week in SNF setting    This discharge recommendation:  Has been made in collaboration with the attending provider and/or case management    IF patient discharges home will need the following DME: TBD at discharge        SUBJECTIVE:   Patient stated I walk around like this (arms held in air) .     OBJECTIVE DATA SUMMARY:   Cognitive/Behavioral Status:  Neurologic State: Alert  Orientation Level: Oriented to person  Cognition: Poor safety awareness  Perception: Appears intact  Perseveration: No perseveration noted  Safety/Judgement: Decreased awareness of need for assistance;Decreased awareness of environment    Functional Mobility and Transfers for ADLs:  Bed Mobility:  Rolling: Minimum assistance      ADL Intervention:        Upper Body Dressing Assistance  Dressing Assistance: Moderate assistance  Hospital Gown:  Moderate assistance         Toileting  Toileting Assistance: Maximum assistance  Bladder Hygiene: Maximum assistance  Bowel Hygiene: Maximum assistance    Cognitive Retraining  Safety/Judgement: Decreased awareness of need for assistance;Decreased awareness of environment        Pain:  None reported     Activity Tolerance:   Fair    After treatment patient left in no apparent distress:   Supine in bed, Call bell within reach and Side rails x 3    COMMUNICATION/COLLABORATION:   The patients plan of care was discussed with: Registered nurse.      Douglas Root OT  Time Calculation: 23 mins

## 2022-03-25 NOTE — PROGRESS NOTES
[facility]   [address] [address], [address], [place]  Phone: [phone]   [phone]       Nephrology Progress Note  Alvaro Wilson     1960     [id number]  Date of Admission : 3/5/2022  03/25/22    CC:  Follow up for greg    Assessment and Plan   GREG:  - likely 2/2 HRS  - U/S neg for obstruction  - Cr slowly improving  - cont midodrine, octreotide stopped 3/21  - daily labs     Metabolic acidosis  - unable to take pills  - monitor for now    Hyponatremia  -Secondary to liver cirrhosis  - Na stable    Severe anemia:  - 1 unit of PRBCs 3/8  -  EDWIN TTS     Decompensated liver cirrhosis  -With portal hypertension, primary biliary cholangitis  -Requiring multiple paracenteses  - appreciate hepatology   - plans to optimize nutrition then eventual liver/kidney      Hypothyroidism    Malnutrition:  - getting TF via DHT     Interval History:  Seen and examined. Cr slowly improving. Appears better today. Eating more. For paracentesis today. No cp or sob. Review of Systems: A comprehensive review of systems was negative except for that written in the HPI.     Current Medications:   Current Facility-Administered Medications   Medication Dose Route Frequency    lidocaine (PF) (XYLOCAINE) 10 mg/mL (1 %) injection 10 mL  10 mL SubCUTAneous RAD ONCE    sodium bicarbonate (4.2%) injection 84 mg  2 mL SubCUTAneous RAD ONCE    promethazine (PHENERGAN) tablet 12.5 mg  12.5 mg Oral Q6H PRN    epoetin vic-epbx (RETACRIT) injection 10,000 Units  10,000 Units SubCUTAneous Q TUE, THU & SAT    multivitamin, tx-iron-ca-min (THERA-M w/ IRON) tablet 1 Tablet  1 Tablet Oral DAILY    miconazole (MICONTIN) 2 % ointment   Topical BID    balsam peru-castor oiL (VENELEX) ointment   Topical BID    zinc oxide-cod liver oil (DESITIN) 40 % paste   Topical PRN    sodium chloride (NS) flush 5-40 mL  5-40 mL IntraVENous Q8H    sodium chloride (NS) flush 5-40 mL  5-40 mL IntraVENous PRN    acetaminophen (TYLENOL) tablet 650 mg  650 mg Oral Q6H PRN    Or    acetaminophen (TYLENOL) suppository 650 mg  650 mg Rectal Q6H PRN    polyethylene glycol (MIRALAX) packet 17 g  17 g Oral DAILY PRN    ondansetron (ZOFRAN ODT) tablet 4 mg  4 mg Oral Q8H PRN    Or    ondansetron (ZOFRAN) injection 4 mg  4 mg IntraVENous Z8M PRN    folic acid (FOLVITE) tablet 1 mg  1 mg Oral DAILY    lactulose (CHRONULAC) 10 gram/15 mL solution 30 mL  30 mL Oral Q6H    levothyroxine (SYNTHROID) tablet 50 mcg  50 mcg Oral 6am    midodrine (PROAMATINE) tablet 15 mg  15 mg Oral TID WITH MEALS    [Held by provider] octreotide (SANDOSTATIN) injection 100 mcg  100 mcg IntraVENous TID    pantoprazole (PROTONIX) tablet 40 mg  40 mg Oral DAILY    rifAXIMin (XIFAXAN) tablet 550 mg  550 mg Oral BID    sertraline (ZOLOFT) tablet 100 mg  100 mg Oral DAILY    thiamine HCL (B-1) tablet 100 mg  100 mg Oral DAILY    ursodioL (ACTIGALL) tablet 500 mg (Patient Supplied)  500 mg Oral Q12H    glucagon (GLUCAGEN) injection 1 mg  1 mg IntraMUSCular PRN    naloxone (NARCAN) injection 0.1 mg  0.1 mg IntraVENous PRN      Allergies   Allergen Reactions    Morphine Other (comments)     Severe HA. ALL narcotics!!!       Objective:  Vitals:    Vitals:    03/24/22 1036 03/24/22 1744 03/24/22 2216 03/25/22 0750   BP: (!) 120/50 (!) 104/49 (!) 114/55 (!) 124/49   Pulse: 74 77 77 70   Resp: 16 18 18 16   Temp: 97.7 °F (36.5 °C) 98.1 °F (36.7 °C) 98.5 °F (36.9 °C) 98 °F (36.7 °C)   SpO2: 98% 99% 97% 99%   Weight:       Height:         Intake and Output:  No intake/output data recorded. 03/23 1901 - 03/25 0700  In: 120 [P.O.:120]  Out: 660 [Urine:660]    Physical Examination:  General: NAD,chronically ill appearing  HEENT:           + scleral icterus.  DHT in place  Neck:  Supple, no mass  Resp:  Lungs CTA B/L, no wheezing , normal respiratory effort  CV:  RRR,  no murmur or rub,no  LE edema  GI:  Soft, NT, + Bowel sounds, no hepatosplenomegaly  Neurologic:  Non focal  Skin:  No Rash  :  Beck[place]     [x]    High complexity decision making was performed  [x]    Patient is at high-risk of decompensation with multiple organ involvement    Lab Data Personally Reviewed: I have reviewed all the pertinent labs, microbiology data and radiology studies during assessment.     Recent Labs     03/25/22  0342 03/24/22 0236 03/23/22  0314   * 131* 132*   K 3.7 3.3* 3.2*   CL 98 98 99   CO2 19* 18* 19*   * 119* 145*   * 162* 159*   CREA 3.61* 3.84* 4.08*   CA 8.7 8.6 8.8   PHOS 7.0*  --   --    ALB 3.4*  3.3*  --   --    ALT 26  --   --    INR 1.8*  --   --      Recent Labs     03/24/22 0236 03/23/22 0314   WBC 11.0 9.7   HGB 8.2* 8.8*   HCT 24.2* 25.5*   PLT 48* 45*     No results found for: SDES  Lab Results   Component Value Date/Time    Culture result: NO GROWTH 4 DAYS 03/14/2022 06:48 PM    Culture result: NO GROWTH 4 DAYS 03/12/2022 06:00 PM    Culture result: CANDIDA ALBICANS (A) 03/05/2022 09:22 PM     Recent Results (from the past 24 hour(s))   RENAL FUNCTION PANEL    Collection Time: 03/25/22  3:42 AM   Result Value Ref Range    Sodium 132 (L) 136 - 145 mmol/L    Potassium 3.7 3.5 - 5.1 mmol/L    Chloride 98 97 - 108 mmol/L    CO2 19 (L) 21 - 32 mmol/L    Anion gap 15 5 - 15 mmol/L    Glucose 113 (H) 65 - 100 mg/dL     (H) 6 - 20 MG/DL    Creatinine 3.61 (H) 0.55 - 1.02 MG/DL    BUN/Creatinine ratio 45 (H) 12 - 20      GFR est AA 16 (L) >60 ml/min/1.73m2    GFR est non-AA 13 (L) >60 ml/min/1.73m2    Calcium 8.7 8.5 - 10.1 MG/DL    Phosphorus 7.0 (H) 2.6 - 4.7 MG/DL    Albumin 3.3 (L) 3.5 - 5.0 g/dL   PROTHROMBIN TIME + INR    Collection Time: 03/25/22  3:42 AM   Result Value Ref Range    INR 1.8 (H) 0.9 - 1.1      Prothrombin time 18.8 (H) 9.0 - 11.1 sec   HEPATIC FUNCTION PANEL    Collection Time: 03/25/22  3:42 AM   Result Value Ref Range    Protein, total 5.3 (L) 6.4 - 8.2 g/dL    Albumin 3.4 (L) 3.5 - 5.0 g/dL Globulin 1.9 (L) 2.0 - 4.0 g/dL    A-G Ratio 1.8 1.1 - 2.2      Bilirubin, total 3.3 (H) 0.2 - 1.0 MG/DL    Bilirubin, direct 1.7 (H) 0.0 - 0.2 MG/DL    Alk. phosphatase 105 45 - 117 U/L    AST (SGOT) 49 (H) 15 - 37 U/L    ALT (SGPT) 26 12 - 78 U/L   AMMONIA    Collection Time: 03/25/22  3:47 AM   Result Value Ref Range    Ammonia, plasma 57 (H) <32 UMOL/L                                       Care Plan discussed with:  Patient     Family      RN      Consulting Physician [facility],         I have reviewed the flowsheets. Chart and Pertinent Notes have been reviewed. No change in PMH ,family and social history from Consult note.       Robin Quintana MD

## 2022-03-25 NOTE — PROGRESS NOTES
Comprehensive Nutrition Assessment    Type and Reason for Visit: Reassess    Nutrition Recommendations/Plan:      1. Will change TF order to nocturnal:   Jevity 1.5 at 45 ml/hr x first hour            70 ml/hr x 10 hours            45 ml/hr x last hour   + 120 ml h20 flush TID each night. 2. Continue with 2gm Na+ diet     3. Continue with Ensure Compact (chocolate) BID    4. Consider ordering phosphate binder with meals due to phos 7.0      Nutrition Assessment:    64 yo female admitted for cirrhosis, AMS.  PMhx: Cirrhosis with ascites s/p paracentesis every week, CKD, esophageal varices s/p banding.  Weight hx in EMR indicates 17% loss over last 5 months which is significant for time frame.  Pt slightly confused, RN states she is A&O x 2 at baseline.  Pt states she was eating well PTA and that her mom prepares her meals (unsure accuracy of this).  RD notes from recent admission to different hospital indicates very poor to fair PO intakes.  Had multiple ONS ordered. MD ordered pt to be NPO with DHT placed.  Consult received for TF order.  Spoke with RN about ordering PO diet along with TF.  Will add ONS as well and monitor intakes. TF ordered as Jevity 1.5 at 50 ml/hr + 1 pack ProSource daily + 130 ml h2o flush q6h.   Labs: Na+ 128, Bun/Cr elevated, NH3 44    3/14: F/u. Spoke with RN this morning and she states pt has been having multiple loose stools/day. Discussed with her that pt has been receiving Lactulose which can contribute to that. She notes that pt has refused meds for last 2 days, last documented day receiving lactulose was 3/12.       Will change TF formula to Vital AF 1.2 at goal rate of 60 ml/hr + 100 ml h20 flush 5x/day to provide 1320 ml, 1584 kcals (99%), 99 gm protein (100%), 1070 + 500 = 1570 ml water.     Continue with PO diet along with TF. Pt states she is not eating much. PO intakes documented as 0-25% meals.   Ensure compact sitting on bedside table, asked pt if she likes them, she said yes, prefers chocolate. Could not get any further information from pt as she is confused and was then focused on chocolate.       S/P paracentesis with peritoneal drainage catheter left in place on 3/12. BUN/Cr continuing to rise - Nephrologist notes pt is not a candidate for HD until after liver transplant. 3/21: F/u. MD changed TF back to Jevity 1.5 at 50 ml/hr with 1 pack ProSource daily and 130 ml h20 flush q6h. This is providing 1100 ml, 1710 kcals (100% kcal needs), 85 gm protein (89% protein needs), 836 + 780 = 1616 ml water. Pt has been tolerating TF at goal rate. PO intakes have remained poor. Asked pt if she ate any of her meals yesterday and she said she \"cannot remember\". Intakes documented as < 25% all meals. Ensure Compact ordered, pt states she is still drinking these and likes the chocolate flavor best.  RN reports pt vomited this morning so she placed TF on hold until speaking with MD.    Labs: Na+ 133, K+ 3.2, Bun/Cr elevated, phos from 3/17: 7.8    3/25: RN informed me today that pt vomited yesterday so TF was turned off. She is eating better today, > 75% breakfast, 100% Ensure compact. Son was in room encouraging her to eat lunch. Son expressed concern about pt not getting adequate nutrition if TF is turned off. If continuing to run TF 24/7 is causing vomiting, it is not beneficial.  Will try nocturnal feeds and see if this allows pt to continue eating more during the day while meeting her increased nutrition needs. Will change TF order to Jevity 1.5 at 45 ml/hr x 1h, 70 ml x 10h, 45 ml/hr x 1h + 120 ml h20 flush TID to provide 790 ml, 1185 kcals (60% estimated kcal needs), 50 gm protein (53% protein needs), 600 + 360 = 960 ml water. Labs: phos 7.0 - Nephrology following but no mention of this. K+ WNL today, has been low. Will recommend adding phosphate binder with meals during day.       Malnutrition Assessment:  Malnutrition Status:  Severe malnutrition    Context: Chronic illness     Findings of the 6 clinical characteristics of malnutrition:   Energy Intake:  7 - 75% or less est energy requirements for 1 month or longer  Weight Loss:  7.00 - Greater than 10% over 6 months     Body Fat Loss:  7 - Severe body fat loss, Triceps   Muscle Mass Loss:  7 - Severe muscle mass loss, Thigh (quadriceps)  Fluid Accumulation:  1 - Mild, Ascites,Extremities   Strength:  Not performed       Nutritionally Significant Medications: folic acid, MVI with iron, thiamine, lactulose    Estimated Daily Nutrient Needs:  Energy (kcal): 4249-1000 (30-35 kcals/kg); Weight Used for Energy Requirements: Current  Protein (g):  (1.4-1.5 gm/kg); Weight Used for Protein Requirements: Current  Fluid (ml/day): 1950; Method Used for Fluid Requirements: 1 ml/kcal    Nutrition Related Findings:       BM: loose 3/20  Edema: 1+ BLE  Wounds:  Deep tissue injury (DTI bilateral heels; excoriation and rash to buttock)       Current Nutrition Therapies:   Diet: 2gm Na+  Supplements: Ensure Compact BID  Additional Caloric Sources: TF    Meal intake:   Patient Vitals for the past 168 hrs:   % Diet Eaten   03/23/22 1830 0%   03/23/22 0900 0%   03/22/22 1846 0%   03/22/22 1230 0%   03/21/22 1000 0%   03/20/22 0934 1 - 25%   03/18/22 1526 1 - 25%     Supplement Intake:   Patient Vitals for the past 168 hrs:   Supplement intake %   03/24/22 1036 76 - 100%       Anthropometric Measures:  · Height:  5' 4\" (162.6 cm)  · Current Body Wt:  65.4 kg (144 lb 2.9 oz)   · Admission Body Wt:       · Usual Body Wt:        · Ideal Body Wt:  120:  124.9 %   · Adjusted Body Weight:   ; Weight Adjustment for: No adjustment   · Adjusted BMI:       · BMI Categories:  Normal weight (BMI 18.5-24. 9)     Wt Readings from Last 10 Encounters:   03/18/22 65.4 kg (144 lb 2.9 oz)   02/17/22 68 kg (149 lb 14.6 oz)   12/13/21 69.4 kg (153 lb)   10/19/21 82 kg (180 lb 12.4 oz)   10/14/17 81.6 kg (180 lb)       Nutrition Diagnosis:   · Inadequate oral intake related to inadequate protein-energy intake as evidenced by intake 0-25%,nutrition support-enteral nutrition    · Unintended weight loss related to inadequate protein-energy intake as evidenced by weight loss greater than or equal to 10% in 6 months      Nutrition Interventions:   Food and/or Nutrient Delivery: Continue current diet,Continue tube feeding,Modify tube feeding,Continue oral nutrition supplement  Nutrition Education and Counseling: No recommendations at this time  Coordination of Nutrition Care: Continue to monitor while inpatient    Goals:  Meet > 75% EEN's with EN while NPO within next 5-7 days       Nutrition Monitoring and Evaluation:   Behavioral-Environmental Outcomes: None identified  Food/Nutrient Intake Outcomes: Food and nutrient intake,Supplement intake,Enteral nutrition intake/tolerance  Physical Signs/Symptoms Outcomes: Biochemical data,GI status,Weight    Discharge Planning:     Too soon to determine     Brenda Stover RD  Contact via [employer]

## 2022-03-25 NOTE — PROGRESS NOTES
[address] [facility] Adult  Hospitalist Group                                                                                          Hospitalist Progress Note   Cricketeldon Garcia DO  Answering service: [phone] from in house phone        Date of Service:  3/25/2022  NAME:  Mandy Lopez  :  1960  MRN:  [id number]      Admission Summary:   Copied form admit: \" Mandy Lopez is a 65 y.o. female with history of liver cirrhosis with portal hypertension,recently diagnosed primary biliary cholangitis, recurrent ascites requiring paracentesis approximately every 7 to 10 days who presents here transferred from [address] [place] [address] secondary to decompensated liver cirrhosis, worsening renal insufficiency, progressive weakness currently undergoing work-up for possible liver transplant. \"    Interval history / Subjective: Follow up decompensated liver cirrhosis. Patient seen and examined. More alert today during my exam. Student nurse at bedside and encouraging patient to taking medications, eat. Paracentesis planned for today. Assessment & Plan:     Decompensated liver cirrhosis, with portal hypertension. Felt to be secondary to [facility]  Transaminitis/hyperbilirubinemia/coagulopathy/thrombocytopenia  History of hepatic encephalopathy   -Transferred from [address] [place] [address] with prolonged hospitalization. Transferred for further work-up for liver transplant eval  -EGD on  [place] noted for distal esophagitis and portal hypertensive gastropathy, no evidence of varices.    -history of esophageal varices back in 2021 status post banding at that time.    -Per GI at outlying facility, patient was deemed not to be a candidate for colonoscopy and recommended Cologuard.    -s/p MRCP on  which was noted for cirrhosis, portal hypertension, and large ascites.    -s/p cardiac stress test which was negative for ischemia, EF of 69%  -Dr Gia Callahan following  -Continue lactulose, rifaximin, octreotide, Protonix p.o.  -Closely monitor stool output frequency  -Continue ursodiol  -Paracentesis ordered 3/25      Refractory ascites  -multiple paracentesis done at [facility] including 2/11 with 11.8 L removal, 2/18 with 9/2 L removed, and 2/28 with 9.2 L removed, 3/12, 3/25  -Currently with distended abdomen  -Moderate large ascites on US 3/6. Ascites said to be refractory to diuretics, HRS limiting use of diuretics  -Ascitic drain that was placed on 3/12 was apparently clogged, flushed and drained significant amount of ascite  -Ascitic fluid lab negative for SBP     GREG with metabolic acidosis. -Baseline 0.9 on 11/26/2021   -Likely due to [facility]  -Nephrology following, recommendations appreciated. Chronic normocytic anemia  -Possible anemia of CKD  -s/p 1 unit pRBCs     Hyponatremia  -Suspect secondary to underlying liver cirrhosis.   -Na stable 128-131     Recent E. coli UTI  -Treated with Vanco and Zosyn then transitioned to Rocephin, completed 7-day antibiotic course as of 2/17  -Replace Beckford with new catheter 3/ . Urine culture on 3/5 grew Candida albicans 75,000 CFU. Hypothyroid  -Continue levothyroxine    Severe malnutrition  --Continue NG tube with tube feeds as tolerated. Encourage oral intake   --On regular diet, intake is minimal.    Lines/catheters/drainage  --Beckford catheter in place, replaced on 3/5  --NGT in place     Generalized weakness/chronic wounds. pta  -PT, OT consult  -Wound care consulted    Goals remain aggressive     DVT prophylaxis SCDs for now.   No pharmacological prophylaxis due to thrombocytopenia    CODE: DNR     Disposition: SNF when medically stable     Hospital Problems  Date Reviewed: 2/17/2022          Codes Class Noted POA    Cirrhosis ([facility] [address] ICD-10-CM: K74.60  ICD-9-CM: 571.5  2/8/2022 Unknown              Review of systems:     Negative unless stated above        Data Review:    Review and/or order of clinical lab test    GENERAL:  Chronically sick looking, alert and awake, not in distress. Mildly Jaundiced  HEENT:  + icteric. NGT in place. No active bleeding  NECK: Supple, trachea midline, no adenopathy, no thyromegally or tenderness, no carotid bruit and no JVD. LUNGS:   Vesicular breath sounds bilaterally, no added sounds. HEART:   S1 and S2 well heard,RRR,  no murmur, click, rub or gallop. ABDOMEB:   Abdomen distended, normoactive, nontender  Beckford catheter draining deep yellow urine. EXTREMETIES: No peripheral edema  PULSES: 2+ and symmetric all extremities. SKIN: Scattered ecchymosis, a large 1 on her upper chest  NEUROLOGY: sleepy but alert . Nonfocal exam. AAOx 3       Labs:     Recent Labs     03/24/22 0236 03/23/22  0314   WBC 11.0 9.7   HGB 8.2* 8.8*   HCT 24.2* 25.5*   PLT 48* 45*     Recent Labs     03/25/22  0342 03/24/22 0236 03/23/22 0314   * 131* 132*   K 3.7 3.3* 3.2*   CL 98 98 99   CO2 19* 18* 19*   * 162* 159*   CREA 3.61* 3.84* 4.08*   * 119* 145*   CA 8.7 8.6 8.8   PHOS 7.0*  --   --      Recent Labs     03/25/22 0342   ALT 26      TBILI 3.3*   TP 5.3*   ALB 3.4*  3.3*   GLOB 1.9*     Recent Labs     03/25/22 0342   INR 1.8*   PTP 18.8*      No results for input(s): FE, TIBC, PSAT, FERR in the last 72 hours. Lab Results   Component Value Date/Time    Folate 38.3 (H) 10/20/2021 07:53 AM    RBC FOLATE 878 02/14/2022 01:27 AM      No results for input(s): PH, PCO2, PO2 in the last 72 hours. No results for input(s): CPK, CKNDX, TROIQ in the last 72 hours.     No lab exists for component: CPKMB  Lab Results   Component Value Date/Time    Cholesterol, total 122 01/25/2022 09:50 AM    HDL Cholesterol 40 01/25/2022 09:50 AM    LDL, calculated 67.2 01/25/2022 09:50 AM    Triglyceride 74 01/25/2022 09:50 AM    CHOL/HDL Ratio 3.1 01/25/2022 09:50 AM     Lab Results   Component Value Date/Time    Glucose (POC) 128 (H) 03/15/2022 06:06 AM    Glucose (POC) 123 (H) 03/15/2022 12:14 AM    Glucose (POC) 114 (H) 02/23/2022 05:42 PM Glucose (POC) 127 (H) 02/23/2022 12:04 PM    Glucose (POC) 122 (H) 02/23/2022 08:18 AM     Lab Results   Component Value Date/Time    Color DARK YELLOW 03/05/2022 09:22 PM    Appearance TURBID (A) 03/05/2022 09:22 PM    Specific gravity 1.014 03/05/2022 09:22 PM    Specific gravity 1.010 02/11/2022 07:20 PM    pH (UA) 5.5 03/05/2022 09:22 PM    Protein 100 (A) 03/05/2022 09:22 PM    Glucose Negative 03/05/2022 09:22 PM    Ketone Negative 03/05/2022 09:22 PM    Bilirubin NEGATIVE 02/11/2022 07:20 PM    Urobilinogen 0.2 03/05/2022 09:22 PM    Nitrites Negative 03/05/2022 09:22 PM    Leukocyte Esterase LARGE (A) 03/05/2022 09:22 PM    Epithelial cells FEW 03/05/2022 09:22 PM    Bacteria 1+ (A) 03/05/2022 09:22 PM    WBC >100 (H) 03/05/2022 09:22 PM    RBC  03/05/2022 09:22 PM         Medications Reviewed:     Current Facility-Administered Medications   Medication Dose Route Frequency    lidocaine (PF) (XYLOCAINE) 10 mg/mL (1 %) injection 10 mL  10 mL SubCUTAneous RAD ONCE    sodium bicarbonate (4.2%) injection 84 mg  2 mL SubCUTAneous RAD ONCE    promethazine (PHENERGAN) tablet 12.5 mg  12.5 mg Oral Q6H PRN    epoetin vic-epbx (RETACRIT) injection 10,000 Units  10,000 Units SubCUTAneous Q TUE, THU & SAT    multivitamin, tx-iron-ca-min (THERA-M w/ IRON) tablet 1 Tablet  1 Tablet Oral DAILY    miconazole (MICONTIN) 2 % ointment   Topical BID    balsam peru-castor oiL (VENELEX) ointment   Topical BID    zinc oxide-cod liver oil (DESITIN) 40 % paste   Topical PRN    sodium chloride (NS) flush 5-40 mL  5-40 mL IntraVENous Q8H    sodium chloride (NS) flush 5-40 mL  5-40 mL IntraVENous PRN    acetaminophen (TYLENOL) tablet 650 mg  650 mg Oral Q6H PRN    Or    acetaminophen (TYLENOL) suppository 650 mg  650 mg Rectal Q6H PRN    polyethylene glycol (MIRALAX) packet 17 g  17 g Oral DAILY PRN    ondansetron (ZOFRAN ODT) tablet 4 mg  4 mg Oral Q8H PRN    Or    ondansetron (ZOFRAN) injection 4 mg  4 mg IntraVENous Q5X PRN    folic acid (FOLVITE) tablet 1 mg  1 mg Oral DAILY    lactulose (CHRONULAC) 10 gram/15 mL solution 30 mL  30 mL Oral Q6H    levothyroxine (SYNTHROID) tablet 50 mcg  50 mcg Oral 6am    midodrine (PROAMATINE) tablet 15 mg  15 mg Oral TID WITH MEALS    [Held by provider] octreotide (SANDOSTATIN) injection 100 mcg  100 mcg IntraVENous TID    pantoprazole (PROTONIX) tablet 40 mg  40 mg Oral DAILY    rifAXIMin (XIFAXAN) tablet 550 mg  550 mg Oral BID    sertraline (ZOLOFT) tablet 100 mg  100 mg Oral DAILY    thiamine HCL (B-1) tablet 100 mg  100 mg Oral DAILY    ursodioL (ACTIGALL) tablet 500 mg (Patient Supplied)  500 mg Oral Q12H    glucagon (GLUCAGEN) injection 1 mg  1 mg IntraMUSCular PRN    naloxone (NARCAN) injection 0.1 mg  0.1 mg IntraVENous PRN     ______________________________________________________________________  EXPECTED LENGTH OF STAY: 4d 16h  ACTUAL LENGTH OF STAY:          [address], DO

## 2022-03-25 NOTE — PROGRESS NOTES
TRANSITIONS OF CARE:  RUR:  24%  Dispostion: Patient continues to receive tube feeds via NGT. Patient is a possible consideration for a liver transplant at [facility] in the future. Care Mgt will continue to monitor for transitions of care needs.

## 2022-03-26 NOTE — PROGRESS NOTES
[facility]   [address] [facility], [facility] [address], [facility]  Phone: [phone]   [phone]       Nephrology Progress Note  Tenzin Jeffery     1960     [id number]  Date of Admission : 3/5/2022  03/26/22    CC:  Follow up for greg    Assessment and Plan   GREG:  - likely 2/2 HRS  - U/S neg for obstruction  - Cr stable   - continue IV albumin w/ ongoing paracentesis   - cont midodrine, octreotide stopped 3/21  - daily labs     Metabolic acidosis  - started bicarb through [address]     Hyponatremia  -Secondary to liver cirrhosis  - Na stable    Severe anemia:  - 1 unit of PRBCs 3/8  -  EDWIN TTS     Decompensated liver cirrhosis  -With portal hypertension, primary biliary cholangitis  -Requiring multiple paracenteses  - appreciate hepatology   - plans to optimize nutrition then eventual liver/kidney      Hypothyroidism    Malnutrition:  - getting TF via DHT     Interval History:  Seen and examined. Cr slowly improving. Has DHT. .had 5800 cc paracentesis in last 24 hrs  No cp or sob. Review of Systems: A comprehensive review of systems was negative except for that written in the HPI.     Current Medications:   Current Facility-Administered Medications   Medication Dose Route Frequency    potassium chloride SR (KLOR-CON 10) tablet 40 mEq  40 mEq Oral NOW    lactulose (CHRONULAC) 10 gram/15 mL solution 30 mL  30 mL Oral DAILY    promethazine (PHENERGAN) tablet 12.5 mg  12.5 mg Oral Q6H PRN    epoetin vic-epbx (RETACRIT) injection 10,000 Units  10,000 Units SubCUTAneous Q TUE, THU & SAT    multivitamin, tx-iron-ca-min (THERA-M w/ IRON) tablet 1 Tablet  1 Tablet Oral DAILY    miconazole (MICONTIN) 2 % ointment   Topical BID    balsam peru-castor oiL (VENELEX) ointment   Topical BID    zinc oxide-cod liver oil (DESITIN) 40 % paste   Topical PRN    sodium chloride (NS) flush 5-40 mL  5-40 mL IntraVENous Q8H    sodium chloride (NS) flush 5-40 mL  5-40 mL IntraVENous PRN    acetaminophen (TYLENOL) tablet 650 mg  650 mg Oral Q6H PRN    Or    acetaminophen (TYLENOL) suppository 650 mg  650 mg Rectal Q6H PRN    polyethylene glycol (MIRALAX) packet 17 g  17 g Oral DAILY PRN    ondansetron (ZOFRAN ODT) tablet 4 mg  4 mg Oral Q8H PRN    Or    ondansetron (ZOFRAN) injection 4 mg  4 mg IntraVENous P4V PRN    folic acid (FOLVITE) tablet 1 mg  1 mg Oral DAILY    levothyroxine (SYNTHROID) tablet 50 mcg  50 mcg Oral 6am    midodrine (PROAMATINE) tablet 15 mg  15 mg Oral TID WITH MEALS    [Held by provider] octreotide (SANDOSTATIN) injection 100 mcg  100 mcg IntraVENous TID    pantoprazole (PROTONIX) tablet 40 mg  40 mg Oral DAILY    rifAXIMin (XIFAXAN) tablet 550 mg  550 mg Oral BID    sertraline (ZOLOFT) tablet 100 mg  100 mg Oral DAILY    thiamine HCL (B-1) tablet 100 mg  100 mg Oral DAILY    ursodioL (ACTIGALL) tablet 500 mg (Patient Supplied)  500 mg Oral Q12H    glucagon (GLUCAGEN) injection 1 mg  1 mg IntraMUSCular PRN    naloxone (NARCAN) injection 0.1 mg  0.1 mg IntraVENous PRN      Allergies   Allergen Reactions    Morphine Other (comments)     Severe HA. ALL narcotics!!!       Objective:  Vitals:    Vitals:    03/25/22 2054 03/26/22 0008 03/26/22 0721 03/26/22 0815   BP: 125/61 (!) 124/52 111/61 (!) 105/54   Pulse: 71 69 79 75   Resp: 16 16  16   Temp: 98.7 °F (37.1 °C) 97.9 °F (36.6 °C)  98.1 °F (36.7 °C)   SpO2: 97% 97%  98%   Weight:       Height:         Intake and Output:  03/26 0701 - 03/26 1900  In: -   Out: 900 [Urine:550; Drains:350]  03/24 1901 - 03/26 0700  In: 120   Out: 6250 [Urine:450; Drains:5800]    Physical Examination:  General: NAD,chronically ill appearing  HEENT:           + scleral icterus.  DHT in place  Neck:  Supple, no mass  Resp:  Lungs CTA B/L, no wheezing , normal respiratory effort  CV:  RRR,  no murmur or rub,no  LE edema  GI:  Soft, NT, + Bowel sounds, no hepatosplenomegaly  Neurologic:  Non focal  Skin:  No Rash  :  Beckford     [x]    High complexity decision making was performed  [x]    Patient is at high-risk of decompensation with multiple organ involvement    Lab Data Personally Reviewed: I have reviewed all the pertinent labs, microbiology data and radiology studies during assessment.     Recent Labs     03/26/22  0252 03/25/22 0342 03/24/22 0236   * 132* 131*   K 3.1* 3.7 3.3*   CL 99 98 98   CO2 19* 19* 18*   * 113* 119*   * 162* 162*   CREA 3.40* 3.61* 3.84*   CA 8.7 8.7 8.6   MG 2.4  --   --    PHOS 7.0* 7.0*  --    ALB 3.2* 3.4*  3.3*  --    ALT  --  26  --    INR  --  1.8*  --      Recent Labs     03/24/22 0236   WBC 11.0   HGB 8.2*   HCT 24.2*   PLT 48*     No results found for: SDES  Lab Results   Component Value Date/Time    Culture result: NO GROWTH 4 DAYS 03/14/2022 06:48 PM    Culture result: NO GROWTH 4 DAYS 03/12/2022 06:00 PM    Culture result: CANDIDA ALBICANS (A) 03/05/2022 09:22 PM     Recent Results (from the past 24 hour(s))   CELL COUNT, BODY FLUID    Collection Time: 03/25/22  1:40 PM   Result Value Ref Range    BODY FLUID TYPE ASCITIC FLUID       FLUID COLOR YELLOW      FLUID APPEARANCE HAZY      FLUID RBC CT. >100 (H) 0 /cu mm    FLUID NUCLEATED CELLS 57 /cu mm    FLD NEUTROPHILS 10 (A) NRRE %    FLD LYMPHS 45 (A) NRRE %    FLD MONO/MACROPHAGES 45 (A) NRRE %   RENAL FUNCTION PANEL    Collection Time: 03/26/22  2:52 AM   Result Value Ref Range    Sodium 132 (L) 136 - 145 mmol/L    Potassium 3.1 (L) 3.5 - 5.1 mmol/L    Chloride 99 97 - 108 mmol/L    CO2 19 (L) 21 - 32 mmol/L    Anion gap 14 5 - 15 mmol/L    Glucose 149 (H) 65 - 100 mg/dL     (H) 6 - 20 MG/DL    Creatinine 3.40 (H) 0.55 - 1.02 MG/DL    BUN/Creatinine ratio 42 (H) 12 - 20      GFR est AA 17 (L) >60 ml/min/1.73m2    GFR est non-AA 14 (L) >60 ml/min/1.73m2    Calcium 8.7 8.5 - 10.1 MG/DL    Phosphorus 7.0 (H) 2.6 - 4.7 MG/DL    Albumin 3.2 (L) 3.5 - 5.0 g/dL   MAGNESIUM    Collection Time: 03/26/22  2:52 AM   Result Value Ref Range Magnesium 2.4 1.6 - 2.4 mg/dL   SAMPLES BEING HELD    Collection Time: 03/26/22  2:52 AM   Result Value Ref Range    SAMPLES BEING HELD 1LAV     COMMENT        Add-on orders for these samples will be processed based on acceptable specimen integrity and analyte stability, which may vary by analyte. Care Plan discussed with:  Patient     Family      RN      Consulting Physician [address] [facility],         I have reviewed the flowsheets. Chart and Pertinent Notes have been reviewed. No change in PMH ,family and social history from Consult note.       Keri Solano MD

## 2022-03-26 NOTE — PROGRESS NOTES
[facility] Adult  Hospitalist Group                                                                                          Hospitalist Progress Note  Amber Gray DO  Answering service: [phone] OR [phone] from in house phone        Date of Service:  3/26/2022  NAME:  Juana Monteiro  :  1960  MRN:  [id number]      Admission Summary:   Copied form admit: \" Juana Monteiro is a 65 y.o. female with history of liver cirrhosis with portal hypertension,recently diagnosed primary biliary cholangitis, recurrent ascites requiring paracentesis approximately every 7 to 10 days who presents here transferred from [facility] secondary to decompensated liver cirrhosis, worsening renal insufficiency, progressive weakness currently undergoing work-up for possible liver transplant. \"    Interval history / Subjective: Follow up decompensated liver cirrhosis. Patient seen and examined. Remains more alert. Pulled NG tube out overnight. Now on night feeds. Paracentesis with 5800 removed. Assessment & Plan:     Decompensated liver cirrhosis, with portal hypertension. Felt to be secondary to [facility]  Transaminitis/hyperbilirubinemia/coagulopathy/thrombocytopenia  History of hepatic encephalopathy   -Transferred from [facility] with prolonged hospitalization. Transferred for further work-up for liver transplant eval  -EGD on  [place] noted for distal esophagitis and portal hypertensive gastropathy, no evidence of varices.    -history of esophageal varices back in 2021 status post banding at that time.    -Per GI at outlying facility, patient was deemed not to be a candidate for colonoscopy and recommended Cologuard.    -s/p MRCP on  which was noted for cirrhosis, portal hypertension, and large ascites.    -s/p cardiac stress test which was negative for ischemia, EF of 69%  -Dr Ed Lim following  -Continue lactulose, rifaximin, octreotide, Protonix p.o.  -Closely monitor stool output frequency  -Continue ursodiol  -Paracentesis ordered 3/25 with 5800 removed since insertion     Refractory ascites  -multiple paracentesis done at [facility] including 2/11 with 11.8 L removal, 2/18 with 9/2 L removed, and 2/28 with 9.2 L removed, 3/12, 3/25  -Currently with distended abdomen  -Moderate large ascites on US 3/6. Ascites said to be refractory to diuretics, HRS limiting use of diuretics  -Ascitic drain that was placed on 3/12 was apparently clogged, flushed and drained significant amount of ascite  -Ascitic fluid lab negative for SBP     GREG with metabolic acidosis. -Baseline 0.9 on 11/26/2021   -Likely due to [facility]  -Nephrology following, recommendations appreciated. Chronic normocytic anemia  -Possible anemia of CKD  -s/p 1 unit pRBCs     Hyponatremia  -Suspect secondary to underlying liver cirrhosis.   -Na stable 128-131     Recent E. coli UTI  -Treated with Vanco and Zosyn then transitioned to Rocephin, completed 7-day antibiotic course as of 2/17  -Replace Beckford with new catheter 3/5 . Urine culture on 3/5 grew Candida albicans 75,000 CFU. Hypothyroid  -Continue levothyroxine    Severe malnutrition  --Continue NG tube with tube feeds at night. Encourage oral intake   --On regular diet and tolerating    Lines/catheters/drainage  --Beckford catheter in place, replaced on 3/5  --NGT in place     Generalized weakness/chronic wounds. pta  -PT, OT consult  -Wound care consulted    Goals remain aggressive     DVT prophylaxis SCDs for now. No pharmacological prophylaxis due to thrombocytopenia    CODE: DNR     Disposition: SNF when medically stable.  Needs choices      Hospital Problems  Date Reviewed: 2/17/2022          Codes Class Noted POA    Cirrhosis ([facility] [address] ICD-10-CM: K74.60  ICD-9-CM: 571.5  2/8/2022 Unknown              Review of systems:     Negative unless stated above        Data Review:    Review and/or order of clinical lab test    GENERAL:  Chronically sick looking, alert and awake, not in distress. Mildly Jaundiced  HEENT:  + icteric. NGT in place. No active bleeding  NECK: Supple, trachea midline, no adenopathy, no thyromegally or tenderness, no carotid bruit and no JVD. LUNGS:   Vesicular breath sounds bilaterally, no added sounds. HEART:   S1 and S2 well heard,RRR,  no murmur, click, rub or gallop. ABDOMEB:   Abdomen distended, normoactive, nontender  Beckford catheter draining deep yellow urine. EXTREMETIES: No peripheral edema  PULSES: 2+ and symmetric all extremities. SKIN: Scattered ecchymosis, a large 1 on her upper chest  NEUROLOGY: sleepy but alert . Nonfocal exam. Globally weak, AAOx 3       Labs:     Recent Labs     03/24/22 0236   WBC 11.0   HGB 8.2*   HCT 24.2*   PLT 48*     Recent Labs     03/26/22 0252 03/25/22 0342 03/24/22 0236   * 132* 131*   K 3.1* 3.7 3.3*   CL 99 98 98   CO2 19* 19* 18*   * 162* 162*   CREA 3.40* 3.61* 3.84*   * 113* 119*   CA 8.7 8.7 8.6   MG 2.4  --   --    PHOS 7.0* 7.0*  --      Recent Labs     03/26/22 0252 03/25/22 0342   ALT  --  26   AP  --  105   TBILI  --  3.3*   TP  --  5.3*   ALB 3.2* 3.4*  3.3*   GLOB  --  1.9*     Recent Labs     03/25/22 0342   INR 1.8*   PTP 18.8*      No results for input(s): FE, TIBC, PSAT, FERR in the last 72 hours. Lab Results   Component Value Date/Time    Folate 38.3 (H) 10/20/2021 07:53 AM    RBC FOLATE 878 02/14/2022 01:27 AM      No results for input(s): PH, PCO2, PO2 in the last 72 hours. No results for input(s): CPK, CKNDX, TROIQ in the last 72 hours.     No lab exists for component: CPKMB  Lab Results   Component Value Date/Time    Cholesterol, total 122 01/25/2022 09:50 AM    HDL Cholesterol 40 01/25/2022 09:50 AM    LDL, calculated 67.2 01/25/2022 09:50 AM    Triglyceride 74 01/25/2022 09:50 AM    CHOL/HDL Ratio 3.1 01/25/2022 09:50 AM     Lab Results   Component Value Date/Time    Glucose (POC) 128 (H) 03/15/2022 06:06 AM    Glucose (POC) 123 (H) 03/15/2022 12:14 AM Glucose (POC) 114 (H) 02/23/2022 05:42 PM    Glucose (POC) 127 (H) 02/23/2022 12:04 PM    Glucose (POC) 122 (H) 02/23/2022 08:18 AM     Lab Results   Component Value Date/Time    Color DARK YELLOW 03/05/2022 09:22 PM    Appearance TURBID (A) 03/05/2022 09:22 PM    Specific gravity 1.014 03/05/2022 09:22 PM    Specific gravity 1.010 02/11/2022 07:20 PM    pH (UA) 5.5 03/05/2022 09:22 PM    Protein 100 (A) 03/05/2022 09:22 PM    Glucose Negative 03/05/2022 09:22 PM    Ketone Negative 03/05/2022 09:22 PM    Bilirubin NEGATIVE 02/11/2022 07:20 PM    Urobilinogen 0.2 03/05/2022 09:22 PM    Nitrites Negative 03/05/2022 09:22 PM    Leukocyte Esterase LARGE (A) 03/05/2022 09:22 PM    Epithelial cells FEW 03/05/2022 09:22 PM    Bacteria 1+ (A) 03/05/2022 09:22 PM    WBC >100 (H) 03/05/2022 09:22 PM    RBC  03/05/2022 09:22 PM         Medications Reviewed:     Current Facility-Administered Medications   Medication Dose Route Frequency    albumin human 25% (BUMINATE) solution 25 g  25 g IntraVENous Q6H    lactulose (CHRONULAC) 10 gram/15 mL solution 30 mL  30 mL Oral DAILY    promethazine (PHENERGAN) tablet 12.5 mg  12.5 mg Oral Q6H PRN    epoetin vic-epbx (RETACRIT) injection 10,000 Units  10,000 Units SubCUTAneous Q TUE, THU & SAT    multivitamin, tx-iron-ca-min (THERA-M w/ IRON) tablet 1 Tablet  1 Tablet Oral DAILY    miconazole (MICONTIN) 2 % ointment   Topical BID    balsam peru-castor oiL (VENELEX) ointment   Topical BID    zinc oxide-cod liver oil (DESITIN) 40 % paste   Topical PRN    sodium chloride (NS) flush 5-40 mL  5-40 mL IntraVENous Q8H    sodium chloride (NS) flush 5-40 mL  5-40 mL IntraVENous PRN    acetaminophen (TYLENOL) tablet 650 mg  650 mg Oral Q6H PRN    Or    acetaminophen (TYLENOL) suppository 650 mg  650 mg Rectal Q6H PRN    polyethylene glycol (MIRALAX) packet 17 g  17 g Oral DAILY PRN    ondansetron (ZOFRAN ODT) tablet 4 mg  4 mg Oral Q8H PRN    Or    ondansetron (ZOFRAN) injection 4 mg  4 mg IntraVENous J9S PRN    folic acid (FOLVITE) tablet 1 mg  1 mg Oral DAILY    levothyroxine (SYNTHROID) tablet 50 mcg  50 mcg Oral 6am    midodrine (PROAMATINE) tablet 15 mg  15 mg Oral TID WITH MEALS    [Held by provider] octreotide (SANDOSTATIN) injection 100 mcg  100 mcg IntraVENous TID    pantoprazole (PROTONIX) tablet 40 mg  40 mg Oral DAILY    rifAXIMin (XIFAXAN) tablet 550 mg  550 mg Oral BID    sertraline (ZOLOFT) tablet 100 mg  100 mg Oral DAILY    thiamine HCL (B-1) tablet 100 mg  100 mg Oral DAILY    ursodioL (ACTIGALL) tablet 500 mg (Patient Supplied)  500 mg Oral Q12H    glucagon (GLUCAGEN) injection 1 mg  1 mg IntraMUSCular PRN    naloxone (NARCAN) injection 0.1 mg  0.1 mg IntraVENous PRN     ______________________________________________________________________  EXPECTED LENGTH OF STAY: 4d 16h  ACTUAL LENGTH OF STAY:          [place] [address], DO

## 2022-03-26 NOTE — PROGRESS NOTES
5957- This RN went into check on patient, patient has pulled out dobbhoff. Securement device still attached to nose and tube but end of tube on patients bed with tube feed still running. Tube feed turned off at this time and securement device removed from nose. Patient seems to be purposefully pulling tube out of nose considering I had to redo securement device numerous times throughout night. 0500- New dobbhoff tube placed. Order for stat KUB put in and radiology was called to notify of KUB. 8514- Radiologist read KUB. States Dobbhoff tube tip overlies the gastric fundus. This RN called Radiologist to clarify what the impression mean. Radiologist stated that the tube is in the stomach and able to be used for tube feeds. Per order tube feed to be stopped at 6am, will leave the patient unhooked from tube feeds for now. Will continue to monitor and educate patient on importance of not pulling out tube.

## 2022-03-26 NOTE — PROGRESS NOTES
Bedside and Verbal shift change report given to Kandice (oncoming nurse) by Jun Murphy (offgoing nurse). Report included the following information SBAR, Kardex, Procedure Summary, Intake/Output, MAR and Recent Results.

## 2022-03-26 NOTE — PROGRESS NOTES
[address] [facility], MD, [address], [place], [place]      Judeen Rands, PA-C Hermine Spatz, [facility]     Michelle Lopez, [facility]   MANISHA Stubbs, [facility]       [facility] [address]    at [address], [address] Katlyn Pizano  22.    [phone]    FAX: [address] [place] [address] Avenue    [address], [address]    [phone]    FAX: [phone]       HEPATOLOGY PROGRESS NOTE  The patient is well known to me from a previous visit to my office at [facility]. She is a 66 yo  female who was found to have chronic liver disease in 2020 and cirrhosis in 7/2021 when she developed ascites. She had variceal bleeding in 11/2021. Serologic evaluation for markers of chronic liver disease was positive for AMA. Cirrhosis is due to [facility].     The patient has developed the following complications of cirrhosis: esophageal varices, variceal bleeding, ascites, edema, hepatic encephalopathy, severe muslce wasting    I saw the patient for the first time in 1/2022. She had CTP score 9 and MELD score 21 at that time. We started liver transplant evaluation testing. She developed confusion and could not be aroused and was hospitalized 3 weeks ago at [facility] in 2/202. During that hospitalization she developed worsening malnutrition, worsening of muscle wasting and a few decubitus ulcers. She has been at [facility] 9 days. She has been receiving Dobbhoff tube feedings and OT/PT. She has improved somewhat. She is definitely stronger and can move herself around in bed some, she can now hold a glass, and feed herself to a limited degree. However, she is still too weak to stand even with assistance.   Wound care note indicates skin ulcers are stable but not really healing. Family conference on 3/17/22. I had a 1 hour meeting with the patient and her son who is POA. Her only option for survival is a liver transplant but she is currently too weak, too malnurished and has too much muscle wasting to survive LT. I discussed the options with the patient and her son. Either continue what we are doing until she improves nutritionally, improves muscle mass, heals the skin ulcers and she can stand and take a few steps on her own. I estimated it would take at least another month of steady progress for there to achieve this goal.    The other option is to enter hospice. If at any time she developed sepsis, GI bleeding or another major setback she enter hospice as she will never able to achieve her goal.      We cannot place PEG because of ascites  She cannot go to SNF/Rehab because of the Dobbhoff tube. For now she and son wish to continue since they see slow limited improvement. The son will discuss this with her daily and if they could opt for hospice care at any time. They both seem to be realistic regarding her ability to improve but are not yet ready to \"give up\" and enter hospice care today. Hospital course: Tolerating tube feedings well. She is very cooperative. Not pulling at tube. Working with OT/PT  Every day she is looking better, stronger and her spirits are good. Sna is now stable in the 132-134 range  Scr continues to improve and down to 3.4 mg  Continues to make urine. She is starting to eat. I like dietary suggestion to switch tube feedings to only at night so she can eat more during during the day     Hepatology Plan:  Nursing should retape and secure dobbhoff tube daily, preferably each evening before she sleeps since this is when she tends to accidentily hit tube with arms and pull this out.   Suggest nursing keep tube under hospital gown to reduce the risk of accidentally getting this caught by hands moving during sleep and pulled out. Continue Dobbhuff tube feeding 24-7 and continue to increase calories as tolerated and per Dietician management. Continue with OT/PT. She is now able to stand for a few minutes and take 1 step. I have spoken with Ambrosio/son by phone and will have family conferenece with she and son on Sunday about next steps and when is correct time to transfer to rehab      ASSESSMENT AND PLAN:  Cirrhosis  The diagnosis of cirrhosis is based upon imaging, laboratory studies, complications of cirrhosis. Cirrhosis is secondary to [facility]. Not alcohol as she was initially told.     Serologic testing was positive for AMA, ASMA     The CTP is 10. Child class C. The MELD score is 32.     Based upon the MELD and CTP scores the patient has a mortality of about 50% within the next 90 days until we turn her fraily and weakness around. Right now is is too weak to survive liver transplantation. She needs aggressive nutritional support and both she and her son are in favor of this with the hopes her situation will improve and she could survive liver transplantation.       Primary Biliary Cholangitis  The diagnosis is based upon serology and an elevation in ALP and positive AMA. A liver biopsy has not been performed. PBC is being treated with ANTONIETA 500 mg BID. CKD-HRS  Scr is moving in right direction and now down to 3.84 mg  Continue midodrine, octreotide injections     Malnutrition  The patient has severe protein-calorie malnutrtion and muscle wasting. This is due to advanced liver disease and refractory ascites. The patient needs more calories than she can possible eat to get into positive nutritional balance. Without aggressive nutritional support she will not survive. She is alert, oriented and understands the need for NG dobhoff feeding tube placement. Both she and her son agree with this. Getting tube feeds via Dobhoff feeding tube. Tolerating this well.   Formula is being managed by nutritioinal service. Frailty/muscle wasting  The patient is very frail and cannot sit or stand on her own. She is making great progress with aggressive OT/PT. She can now lift her legs off the bed without strugling    She is very motivated to survive and willing to do whatever excercies she needs to get stronger. The goal is up and walking to bathroom with or without walker. Skin wounds  Large 5 x 5 cm area of tissue injury withtout ulceration on her back has nearly resolved. Stage 3 pressure injury continues to heal.    Ulcers have to be healed for her to be an LT candidate.     Ascites   Ascites developed for the first time in 7/2021. Ascites is is refractory to the current doses of diuretics because of kidney disease and Hyponatremia. Ascites has increased some but not tight. No diuretics. Will remove all ascites with paracentesis. Hyponatremia  This is secondary cirrhosis and diuretics  This has been stable in the 131-134 range. No diuretics.       Screening for Esophageal varices   The patient has esophageal varices with prior bleeding. Varices were banded in 11/2021 and 12/2021. The last EGD to assess for varices was performed in 12/2021. No repeat EGD needed at this time.     Hepatic encephalopathy   Overt HE is well controlled on lactulose and xifaxan. Serum ammonia is low and in the 40s.      Coagulopathy  INR is in the 1.6 range and stable. Thrombocytopenia   This is secondary to cirrhosis. There is no evidence of overt bleeding. No treatment is required. The platelet count is adequate for the patient to undergo procedures without the need for platelet transfusion or platelet growth factors.     Anemia   This is due to multifactorial causes including portal hypertension with chronic GI blood loss and bone marrow suppression due to severe malnutrition.     The most recent FE studies were from 1/2022.   The serum ferritin is 87  The FE saturation is 74  She recieved 3 does of IV FE earlier in the hospitalization  Will recheck FE panel     Thrombocytopenia   This is secondary to cirrhosis. There is no evidence of overt bleeding. No treatment is required. The platelet count is adequate for the patient to undergo procedures without the need for platelet transfusion or platelet growth factors. PHYSICAL EXAMINATION:  VS: per nursing note  General:  Looks much better. Skin color normal.  Eyes:  Sclera non-icteric. ENT:  No oral lesions. Thyroid normal.  Nodes:  No adenopathy. Skin:  Spider angiomata. Less ecchymosis on arms. Respiratory:  Lungs clear to auscultation. Cardiovascular:  Regular heart rate. Abdomen:  Soft non-tender, Distended with obvious ascites. Extremities:  No lower extremity edema. Severe muscle wasting of upper and lower extremities. Neurologic:  Alert and oriented. Cranial nerves grossly intact. No asterixis. LABORATORY:  Results for Jenny Tompkins (MRN [id number]) as of 3/26/2022 04:38   Ref. Range 3/24/2022 02:36 3/25/2022 03:42   WBC Latest Ref Range: 3.6 - 11.0 K/uL 11.0    HGB Latest Ref Range: 11.5 - 16.0 g/dL 8.2 (L)    PLATELET Latest Ref Range: 150 - 400 K/uL 48 (LL)    INR Latest Ref Range: 0.9 - 1.1    1.8 (H)   Sodium Latest Ref Range: 136 - 145 mmol/L 131 (L) 132 (L)   Potassium Latest Ref Range: 3.5 - 5.1 mmol/L 3.3 (L) 3.7   Chloride Latest Ref Range: 97 - 108 mmol/L 98 98   CO2 Latest Ref Range: 21 - 32 mmol/L 18 (L) 19 (L)   Glucose Latest Ref Range: 65 - 100 mg/dL 119 (H) 113 (H)   BUN Latest Ref Range: 6 - 20 MG/ (H) 162 (H)   Creatinine Latest Ref Range: 0.55 - 1.02 MG/DL 3.84 (H) 3.61 (H)   Bilirubin, total Latest Ref Range: 0.2 - 1.0 MG/DL  3.3 (H)   Albumin Latest Ref Range: 3.5 - 5.0 g/dL  3.3 (L)   ALT Latest Ref Range: 12 - 78 U/L  26   AST Latest Ref Range: 15 - 37 U/L  49 (H)   Alk. phosphatase Latest Ref Range: 45 - 117 U/L  105     RADIOLOGY:  Ultrasound of liver. Echogenic consistent with cirrhosis. No liver mass lesions. No dilated bile ducts. Moderate ascites.         So Whitfield MD  [facility] [address]  [address], [address] [place] [address].  [phone]  [address] [place] [address]

## 2022-03-26 NOTE — ROUTINE PROCESS
Bedside and Verbal shift change report given to Jay Vang RN (oncoming nurse) by LYNNETTE Woodson (offgoing nurse). Report included the following information SBAR, Kardex, Intake/Output, MAR and Recent Results.

## 2022-03-27 NOTE — ROUTINE PROCESS
Bedside and Verbal shift change report given to Pascual Sorenson RN (oncoming nurse) by LYNNETTE Woodson (offgoing nurse). Report included the following information SBAR, Kardex, Intake/Output, MAR and Recent Results.

## 2022-03-27 NOTE — PROGRESS NOTES
[facility] [facility] Adult  Hospitalist Group                                                                                          Hospitalist Progress Note  Amber Gabriel,   Answering service: [phone] from in house phone        Date of Service:  3/27/2022  NAME:  Davied Severs  :  1960  MRN:  [id number]      Admission Summary:   Copied form admit: \" Davied Severs is a 65 y.o. female with history of liver cirrhosis with portal hypertension,recently diagnosed primary biliary cholangitis, recurrent ascites requiring paracentesis approximately every 7 to 10 days who presents here transferred from [address] [place] [address] secondary to decompensated liver cirrhosis, worsening renal insufficiency, progressive weakness currently undergoing work-up for possible liver transplant. \"    Interval history / Subjective: Follow up decompensated liver cirrhosis. Patient seen and examined. Additional 6000 output from paracentesis drain, >11 liters total. Nursing reports patient does not like hospital food. Tolerating nocturnal feeds. Per notes, hepatology to meet with patient/family today. Assessment & Plan:     Decompensated liver cirrhosis, with portal hypertension. Felt to be secondary to [facility]  Transaminitis/hyperbilirubinemia/coagulopathy/thrombocytopenia  History of hepatic encephalopathy   -Transferred from [address] [place] [address] with prolonged hospitalization. Transferred for further work-up for liver transplant eval  -EGD on  [place] noted for distal esophagitis and portal hypertensive gastropathy, no evidence of varices.    -history of esophageal varices back in 2021 status post banding at that time.    -Per GI at outlying facility, patient was deemed not to be a candidate for colonoscopy and recommended Cologuard.    -s/p MRCP on  which was noted for cirrhosis, portal hypertension, and large ascites.    -s/p cardiac stress test which was negative for ischemia, EF of 69%  -Dr Chapa Shoulders following  -Continue lactulose, rifaximin, octreotide, Protonix p.o.  -Closely monitor stool output frequency  -Continue ursodiol  -Paracentesis ordered 3/25 with [id number] removed since insertion     Refractory ascites  -multiple paracentesis done at [facility] THE [facility] including 2/11 with 11.8 L removal, 2/18 with 9/2 L removed, and 2/28 with 9.2 L removed, 3/12, 3/25  -Currently with distended abdomen  -Moderate large ascites on US 3/6. Ascites said to be refractory to diuretics, HRS limiting use of diuretics  -Ascitic drain that was placed on 3/12 was apparently clogged, flushed and drained significant amount of ascite  -Ascitic fluid lab negative for SBP     GREG with metabolic acidosis. persisent  -Baseline 0.9 on 11/26/2021   -Likely due to [facility]  -Nephrology following, recommendations appreciated. Chronic normocytic anemia  -Possible anemia of CKD  -s/p 1 unit pRBCs     Hyponatremia  -Suspect secondary to underlying liver cirrhosis.   -Na stable 128-131     Recent E. coli UTI  -Treated with Vanco and Zosyn then transitioned to Rocephin, completed 7-day antibiotic course as of 2/17  -Replace Beckford with new catheter 3/5 . Urine culture on 3/5 grew Candida albicans 75,000 CFU. Hypothyroid  -Continue levothyroxine    Severe malnutrition  --Continue NG tube with tube feeds at night. Encourage oral intake   --On regular diet and tolerating    Lines/catheters/drainage  --Beckford catheter in place, replaced on 3/5  --NGT in place     Generalized weakness/chronic wounds. pta  -PT, OT consult  -Wound care consulted    Goals remain aggressive     DVT prophylaxis SCDs for now. No pharmacological prophylaxis due to thrombocytopenia    CODE: DNR     Disposition: SNF when medically stable.  Needs choices      Hospital Problems  Date Reviewed: 2/17/2022          Codes Class Noted POA    Cirrhosis [facility]) ICD-10-CM: K74.60  ICD-9-CM: 571.5  2/8/2022 Unknown              Review of systems:     Negative unless stated above Data Review:    Review and/or order of clinical lab test    GENERAL:  Chronically sick looking, alert and awake, not in distress. Mildly Jaundiced  HEENT:  + icteric. NGT in place. No active bleeding  NECK: Supple, trachea midline, no adenopathy, no thyromegally or tenderness, no carotid bruit and no JVD. LUNGS:   Vesicular breath sounds bilaterally, no added sounds. HEART:   S1 and S2 well heard,RRR,  no murmur, click, rub or gallop. ABDOMEB:   Abdomen nondistended, normoactive, nontender  Beckford catheter draining deep yellow urine. EXTREMETIES: No peripheral edema  PULSES: 2+ and symmetric all extremities. SKIN: Scattered ecchymosis, a large 1 on her upper chest  NEUROLOGY: sleepy but alert . Nonfocal exam. Globally weak, AAOx 3       Labs:     No results for input(s): WBC, HGB, HCT, PLT, HGBEXT, HCTEXT, PLTEXT, HGBEXT, HCTEXT, PLTEXT in the last 72 hours. Recent Labs     03/27/22 0513 03/26/22 0252 03/25/22 0342   * 132* 132*   K 3.6 3.1* 3.7    99 98   CO2 20* 19* 19*   * 144* 162*   CREA 3.43* 3.40* 3.61*   * 149* 113*   CA 8.7 8.7 8.7   MG  --  2.4  --    PHOS 6.9* 7.0* 7.0*     Recent Labs     03/27/22 0513 03/26/22 0252 03/25/22 0342   ALT  --   --  26   AP  --   --  105   TBILI  --   --  3.3*   TP  --   --  5.3*   ALB 3.8 3.2* 3.4*  3.3*   GLOB  --   --  1.9*     Recent Labs     03/25/22 0342   INR 1.8*   PTP 18.8*      No results for input(s): FE, TIBC, PSAT, FERR in the last 72 hours. Lab Results   Component Value Date/Time    Folate 38.3 (H) 10/20/2021 07:53 AM    RBC FOLATE 878 02/14/2022 01:27 AM      No results for input(s): PH, PCO2, PO2 in the last 72 hours. No results for input(s): CPK, CKNDX, TROIQ in the last 72 hours.     No lab exists for component: CPKMB  Lab Results   Component Value Date/Time    Cholesterol, total 122 01/25/2022 09:50 AM    HDL Cholesterol 40 01/25/2022 09:50 AM    LDL, calculated 67.2 01/25/2022 09:50 AM    Triglyceride 74 01/25/2022 09:50 AM    CHOL/HDL Ratio 3.1 01/25/2022 09:50 AM     Lab Results   Component Value Date/Time    Glucose (POC) 128 (H) 03/15/2022 06:06 AM    Glucose (POC) 123 (H) 03/15/2022 12:14 AM    Glucose (POC) 114 (H) 02/23/2022 05:42 PM    Glucose (POC) 127 (H) 02/23/2022 12:04 PM    Glucose (POC) 122 (H) 02/23/2022 08:18 AM     Lab Results   Component Value Date/Time    Color DARK YELLOW 03/05/2022 09:22 PM    Appearance TURBID (A) 03/05/2022 09:22 PM    Specific gravity 1.014 03/05/2022 09:22 PM    Specific gravity 1.010 02/11/2022 07:20 PM    pH (UA) 5.5 03/05/2022 09:22 PM    Protein 100 (A) 03/05/2022 09:22 PM    Glucose Negative 03/05/2022 09:22 PM    Ketone Negative 03/05/2022 09:22 PM    Bilirubin NEGATIVE 02/11/2022 07:20 PM    Urobilinogen 0.2 03/05/2022 09:22 PM    Nitrites Negative 03/05/2022 09:22 PM    Leukocyte Esterase LARGE (A) 03/05/2022 09:22 PM    Epithelial cells FEW 03/05/2022 09:22 PM    Bacteria 1+ (A) 03/05/2022 09:22 PM    WBC >100 (H) 03/05/2022 09:22 PM    RBC  03/05/2022 09:22 PM         Medications Reviewed:     Current Facility-Administered Medications   Medication Dose Route Frequency    albumin human 25% (BUMINATE) solution 25 g  25 g IntraVENous Q6H    lactulose (CHRONULAC) 10 gram/15 mL solution 30 mL  30 mL Oral DAILY    promethazine (PHENERGAN) tablet 12.5 mg  12.5 mg Oral Q6H PRN    epoetin vic-epbx (RETACRIT) injection 10,000 Units  10,000 Units SubCUTAneous Q TUE, THU & SAT    multivitamin, tx-iron-ca-min (THERA-M w/ IRON) tablet 1 Tablet  1 Tablet Oral DAILY    miconazole (MICONTIN) 2 % ointment   Topical BID    balsam peru-castor oiL (VENELEX) ointment   Topical BID    zinc oxide-cod liver oil (DESITIN) 40 % paste   Topical PRN    sodium chloride (NS) flush 5-40 mL  5-40 mL IntraVENous Q8H    sodium chloride (NS) flush 5-40 mL  5-40 mL IntraVENous PRN    acetaminophen (TYLENOL) tablet 650 mg  650 mg Oral Q6H PRN    Or    acetaminophen (TYLENOL) suppository 650 mg  650 mg Rectal Q6H PRN    polyethylene glycol (MIRALAX) packet 17 g  17 g Oral DAILY PRN    ondansetron (ZOFRAN ODT) tablet 4 mg  4 mg Oral Q8H PRN    Or    ondansetron (ZOFRAN) injection 4 mg  4 mg IntraVENous H9H PRN    folic acid (FOLVITE) tablet 1 mg  1 mg Oral DAILY    levothyroxine (SYNTHROID) tablet 50 mcg  50 mcg Oral 6am    midodrine (PROAMATINE) tablet 15 mg  15 mg Oral TID WITH MEALS    [Held by provider] octreotide (SANDOSTATIN) injection 100 mcg  100 mcg IntraVENous TID    pantoprazole (PROTONIX) tablet 40 mg  40 mg Oral DAILY    rifAXIMin (XIFAXAN) tablet 550 mg  550 mg Oral BID    sertraline (ZOLOFT) tablet 100 mg  100 mg Oral DAILY    thiamine HCL (B-1) tablet 100 mg  100 mg Oral DAILY    ursodioL (ACTIGALL) tablet 500 mg (Patient Supplied)  500 mg Oral Q12H    glucagon (GLUCAGEN) injection 1 mg  1 mg IntraMUSCular PRN    naloxone (NARCAN) injection 0.1 mg  0.1 mg IntraVENous PRN     ______________________________________________________________________  EXPECTED LENGTH OF STAY: 4d 16h  ACTUAL LENGTH OF STAY:          [address] [facility],

## 2022-03-27 NOTE — PROGRESS NOTES
[address] [facility], MD, [address], [place], [place]      Mike Mayorga, ISAAC Pelaez, [facility]     April S John, [facility]   Melody Duran, MANISHA [facility], [facility]       Namita Deputado [address]    at [facility] 59    [address] [facility], [address] Katlyn Pizano  22.    [phone]    FAX: [address] [place] [address] Avenue    at [address], [address]    [phone]    FAX: [phone]       HEPATOLOGY PROGRESS NOTE  The patient is well known to me from a previous visit to my office at THE [facility] in [place]. She is a 64 yo  female who was found to have chronic liver disease in 2020 and cirrhosis in 7/2021 when she developed ascites. She had variceal bleeding in 11/2021. Serologic evaluation for markers of chronic liver disease was positive for AMA. Cirrhosis is due to [facility].     The patient has developed the following complications of cirrhosis: esophageal varices, variceal bleeding, ascites, edema, hepatic encephalopathy, severe muslce wasting    I saw the patient for the first time in 1/2022. She had CTP score 9 and MELD score 21 at that time. We started liver transplant evaluation testing. She developed confusion and could not be aroused and was hospitalized 3 weeks ago at [facility] in 2/202. During that hospitalization she developed worsening malnutrition, worsening of muscle wasting and a few decubitus ulcers. She has been at [facility] for 9 days. She has been receiving Dobbhoff tube feedings and OT/PT. She has improved somewhat. She is definitely stronger and can move herself around in bed some, she can now hold a glass, and feed herself to a limited degree. However, she is still too weak to stand even with assistance.   Wound care note indicates skin ulcers are stable but not really healing. Family conference on 3/17/22. I had a 1 hour meeting with the patient and her son who is POA. Her only option for survival is a liver transplant but she is currently too weak, too malnurished and has too much muscle wasting to survive LT. I discussed the options with the patient and her son. Either continue what we are doing until she improves nutritionally, improves muscle mass, heals the skin ulcers and she can stand and take a few steps on her own. I estimated it would take at least another month of steady progress for there to achieve this goal.    The other option is to enter hospice. If at any time she developed sepsis, GI bleeding or another major setback she enter hospice as she will never able to achieve her goal.      We cannot place PEG because of ascites  She cannot go to SNF/Rehab because of the Dobbhoff tube. For now she and son wish to continue since they see slow limited improvement. The son will discuss this with her daily and if they could opt for hospice care at any time. They both seem to be realistic regarding her ability to improve but are not yet ready to \"give up\" and enter hospice care today. Hospital course: Tolerating tube feedings well. She is very cooperative. Not pulling at tube. Working with OT/PT  Every day she is looking better, stronger and her spirits are good. Sna is now stable in the 132-134 range  Scr continues to improve and down to 3.4 mg  Continues to make urine. Tube feedings now only nocturnal.  No appetite today and not eating much. Paracentesis catheter draining ascites. 5800 yesterday. 200 so far today    Hepatology Plan:  Meeting with son Brett Aguirre and patient tomorrow about next steps and when is correct time to transfer to rehab      ASSESSMENT AND PLAN:  Cirrhosis  The diagnosis of cirrhosis is based upon imaging, laboratory studies, complications of cirrhosis.   Cirrhosis is secondary to PBC.  Not alcohol as she was initially told.     Serologic testing was positive for AMA, ASMA     The CTP is 10. Child class C. The MELD score is 32.     Based upon the MELD and CTP scores the patient has a mortality of about 50% within the next 90 days until we turn her fraily and weakness around. Right now is is too weak to survive liver transplantation. She needs aggressive nutritional support and both she and her son are in favor of this with the hopes her situation will improve and she could survive liver transplantation.       Primary Biliary Cholangitis  The diagnosis is based upon serology and an elevation in ALP and positive AMA. A liver biopsy has not been performed. PBC is being treated with ANTONIETA 500 mg BID. CKD-HRS  Scr is moving in right direction and now down to 3.40 mg  Continue midodrine,     Malnutrition  The patient has severe protein-calorie malnutrtion and muscle wasting. This is due to advanced liver disease and refractory ascites. The patient needs more calories than she can possible eat to get into positive nutritional balance. Without aggressive nutritional support she will not survive. She is alert, oriented and understands the need for NG dobhoff feeding tube placement. Both she and her son agree with this. Getting tube feeds via Dobhoff feeding tube. Tolerating this well. Formula is being managed by nutritioinal service. Frailty/muscle wasting  The patient is very frail and cannot sit or stand on her own. She is making great progress with aggressive OT/PT. She can now lift her legs off the bed without strugling    She is very motivated to survive and willing to do whatever excercies she needs to get stronger. The goal is up and walking to bathroom with or without walker. Skin wounds  Large 5 x 5 cm area of tissue injury withtout ulceration on her back has nearly resolved.     Stage 3 pressure injury continues to heal.    Ulcers have to be healed for her to be an LT candidate.     Ascites   Ascites developed for the first time in 7/2021. Ascites is is refractory to the current doses of diuretics because of kidney disease and Hyponatremia. Ascites has increased some but not tight. No diuretics. Paracentesis draining 5L ascites per day. Hyponatremia  This is secondary cirrhosis and diuretics  This has been stable in the 131-134 range. No diuretics.       Screening for Esophageal varices   The patient has esophageal varices with prior bleeding. Varices were banded in 11/2021 and 12/2021. The last EGD to assess for varices was performed in 12/2021. No repeat EGD needed at this time.     Hepatic encephalopathy   Overt HE is well controlled on lactulose and xifaxan. Serum ammonia is low and in the 40s.      Coagulopathy  INR is in the 1.6 range and stable. Thrombocytopenia   This is secondary to cirrhosis. There is no evidence of overt bleeding. No treatment is required. The platelet count is adequate for the patient to undergo procedures without the need for platelet transfusion or platelet growth factors.     Anemia   This is due to multifactorial causes including portal hypertension with chronic GI blood loss and bone marrow suppression due to severe malnutrition.     The most recent FE studies were from 1/2022. The serum ferritin is 87  The FE saturation is 74  She recieved 3 does of IV FE earlier in the hospitalization  Will recheck FE panel     Thrombocytopenia   This is secondary to cirrhosis. There is no evidence of overt bleeding. No treatment is required. The platelet count is adequate for the patient to undergo procedures without the need for platelet transfusion or platelet growth factors. PHYSICAL EXAMINATION:  VS: per nursing note  General:  Looks much better. Skin color normal.  Eyes:  Sclera non-icteric. ENT:  No oral lesions. Thyroid normal.  Nodes:  No adenopathy. Skin:  Spider angiomata.   Less ecchymosis on arms. Respiratory:  Lungs clear to auscultation. Cardiovascular:  Regular heart rate. Abdomen:  Soft non-tender, Some ascites is present. Paracentesis catheter draining ascites  Extremities:  No lower extremity edema. Severe muscle wasting of upper and lower extremities. Neurologic:  Alert and oriented. Cranial nerves grossly intact. No asterixis. LABORATORY:  Results for Mely Alva (MRN [id number]) as of 3/26/2022 21:26   Ref. Range 3/25/2022 03:42 3/25/2022 03:42 3/26/2022 02:52   INR Latest Ref Range: 0.9 - 1.1   1.8 (H)     Sodium Latest Ref Range: 136 - 145 mmol/L 132 (L)  132 (L)   Potassium Latest Ref Range: 3.5 - 5.1 mmol/L 3.7  3.1 (L)   Chloride Latest Ref Range: 97 - 108 mmol/L 98  99   CO2 Latest Ref Range: 21 - 32 mmol/L 19 (L)  19 (L)   Glucose Latest Ref Range: 65 - 100 mg/dL 113 (H)  149 (H)   BUN Latest Ref Range: 6 - 20 MG/ (H)  144 (H)   Creatinine Latest Ref Range: 0.55 - 1.02 MG/DL 3.61 (H)  3.40 (H)   Bilirubin, total Latest Ref Range: 0.2 - 1.0 MG/DL  3.3 (H)    Albumin Latest Ref Range: 3.5 - 5.0 g/dL 3.3 (L) 3.4 (L) 3.2 (L)   ALT Latest Ref Range: 12 - 78 U/L  26    AST Latest Ref Range: 15 - 37 U/L  49 (H)    Alk. phosphatase Latest Ref Range: 45 - 117 U/L  105    Ammonia, plasma Latest Ref Range: <32 UMOL/L  57 (H)      RADIOLOGY:  Ultrasound of liver. Echogenic consistent with cirrhosis. No liver mass lesions. No dilated bile ducts. Moderate ascites.         MD Su[facility]ata ZepedaSt. Agnes Hospital [address] of [address], [address] [facility] 22.  [phone]  [address] [facility]

## 2022-03-27 NOTE — PROGRESS NOTES
[address] [facility], Yomi BRICE, [place]ray [place], [place]      ISAAC Meadows Staff, [facility]     Michelle Lopez, [facility]   Fernanda Barajas, JOSE RAMON-JAKE Oates, [facility]       Namita Montemayor [facility] 136    at [address], [facility] Katlyn Pizano  22.    [phone]    FAX: [address] [place] [address], [address]    [phone]    FAX: [phone]       HEPATOLOGY PROGRESS NOTE  The patient is well known to me from a previous visit to my office at THE [facility] in [facility]. She is a 66 yo  female who was found to have chronic liver disease in 2020 and cirrhosis in 7/2021 when she developed ascites. She had variceal bleeding in 11/2021. Serologic evaluation for markers of chronic liver disease was positive for AMA. Cirrhosis is due to [facility].     The patient has developed the following complications of cirrhosis: esophageal varices, variceal bleeding, ascites, edema, hepatic encephalopathy, severe muslce wasting    I saw the patient for the first time in 1/2022. She had CTP score 9 and MELD score 21 at that time. We started liver transplant evaluation testing. She developed confusion and could not be aroused and was hospitalized 3 weeks ago at [facility] in 2/202. During that hospitalization she developed worsening malnutrition, worsening of muscle wasting and a few decubitus ulcers. She has been at [facility] for 9 days. She has been receiving Dobbhoff tube feedings and OT/PT. She has improved somewhat. She is definitely stronger and can move herself around in bed some, she can now hold a glass, and feed herself to a limited degree. However, she is still too weak to stand even with assistance.   Wound care note indicates skin ulcers are stable but not really healing. Family conference on 3/27/22. I had a 1 hour meeting with the patient and her son who is POA. She has done remarkably well with tube feedings over the past few weeks. Much more awake. Much stronger. Has been participating with OT/PT. She has sat some in chair and taken 1-2 step with PT assistance. Next step is to transfer to rehab for 2-3 more weeks. In order to do this will need to remove feeding tube. In order to do this she will need to be able to eat sufficient calories to maintain her nutritional status    She is not too talkative but nods head yes that she is willing to do this. If she does as well with rehab as she did these past 2 weeks we will be able to order LT eval testing in 3-4 more weeks and get her a LT. Hospital course: Tolerating tube feedings well. She is very cooperative. Not pulling at tube. Working with OT/PT  Every day she is looking better, stronger and her spirits are good. Sna is now stable in the 132-134 range  Scr continues to improve and down to 3.4 mg  Continues to make urine. All ascites has been drained and abdomen flat. Hepatology Plan:  Remove paracentesis catheter today    Nocturnal Tube feedings   She needs to eat during the day. She can have as much food as she wants. She can have as much Ensure as she wants.  to find rehab place close to son's home in [facility] area. Continue nocturnal tube feeds until transfer to rehab. Remove feeding tube just prior to transfer. ASSESSMENT AND PLAN:  Cirrhosis  The diagnosis of cirrhosis is based upon imaging, laboratory studies, complications of cirrhosis. Cirrhosis is secondary to [facility]. Not alcohol as she was initially told.     Serologic testing was positive for AMA, ASMA     The CTP is 10. Child class C.   The MELD score is 32.     Based upon the MELD and CTP scores the patient has a mortality of about 50% within the next 90 days until we turn her fraily and weakness around. Right now is is too weak to survive liver transplantation. She needs aggressive nutritional support and both she and her son are in favor of this with the hopes her situation will improve and she could survive liver transplantation.       Primary Biliary Cholangitis  The diagnosis is based upon serology and an elevation in ALP and positive AMA. A liver biopsy has not been performed. PBC is being treated with ANTONIETA 500 mg BID. CKD-HRS  Scr is moving in right direction and now down to 3.40 mg  Continue midodrine,     Malnutrition  The patient has severe protein-calorie malnutrtion and muscle wasting. This is due to advanced liver disease and refractory ascites. The patient needs more calories than she can possible eat to get into positive nutritional balance. Without aggressive nutritional support she will not survive. She is alert, oriented and understands the need for NG dobhoff feeding tube placement. Both she and her son agree with this. Getting tube feeds via Dobhoff feeding tube. Tolerating this well. Formula is being managed by nutritioinal service. Frailty/muscle wasting  The patient is very frail and cannot sit or stand on her own. She is making great progress with aggressive OT/PT. She can now lift her legs off the bed without strugling    She is very motivated to survive and willing to do whatever excercies she needs to get stronger. The goal is up and walking to bathroom with or without walker. Skin wounds  Large 5 x 5 cm area of tissue injury withtout ulceration on her back has nearly resolved. Stage 3 pressure injury continues to heal.    Ulcers have to be healed for her to be an LT candidate.     Ascites   Ascites developed for the first time in 7/2021. Ascites is is refractory to the current doses of diuretics because of kidney disease and Hyponatremia. Ascites has increased some but not tight.   No diuretics. Paracentesis draining 5L ascites per day. Hyponatremia  This is secondary cirrhosis and diuretics  This has been stable in the 131-134 range. No diuretics.       Screening for Esophageal varices   The patient has esophageal varices with prior bleeding. Varices were banded in 11/2021 and 12/2021. The last EGD to assess for varices was performed in 12/2021. No repeat EGD needed at this time.     Hepatic encephalopathy   Overt HE is well controlled on lactulose and xifaxan. Serum ammonia is low and in the 40s.      Coagulopathy  INR is in the 1.6 range and stable. Thrombocytopenia   This is secondary to cirrhosis. There is no evidence of overt bleeding. No treatment is required. The platelet count is adequate for the patient to undergo procedures without the need for platelet transfusion or platelet growth factors.     Anemia   This is due to multifactorial causes including portal hypertension with chronic GI blood loss and bone marrow suppression due to severe malnutrition.     The most recent FE studies were from 1/2022. The serum ferritin is 87  The FE saturation is 74  She recieved 3 does of IV FE earlier in the hospitalization  Will recheck FE panel     Thrombocytopenia   This is secondary to cirrhosis. There is no evidence of overt bleeding. No treatment is required. The platelet count is adequate for the patient to undergo procedures without the need for platelet transfusion or platelet growth factors. PHYSICAL EXAMINATION:  VS: per nursing note  General:  Looks much better. Skin color normal.  Eyes:  Sclera non-icteric. ENT:  No oral lesions. Thyroid normal.  Nodes:  No adenopathy. Skin:  Spider angiomata. Less ecchymosis on arms. Respiratory:  Lungs clear to auscultation. Cardiovascular:  Regular heart rate. Abdomen:  Soft non-tender, Some ascites is present. Paracentesis catheter draining ascites  Extremities:  No lower extremity edema.   Severe muscle wasting of upper and lower extremities. Neurologic:  Alert and oriented. Cranial nerves grossly intact. No asterixis. LABORATORY:  Results for Maylin Banegas (MRN [id number]) as of 3/27/2022 15:17   Ref. Range 3/24/2022 02:36 3/25/2022 03:42 3/26/2022 02:52 3/27/2022 05:13   WBC Latest Ref Range: 3.6 - 11.0 K/uL 11.0      HGB Latest Ref Range: 11.5 - 16.0 g/dL 8.2 (L)      PLATELET Latest Ref Range: 150 - 400 K/uL 48 (LL)      INR Latest Ref Range: 0.9 - 1.1    1.8 (H)     Sodium Latest Ref Range: 136 - 145 mmol/L 131 (L) 132 (L) 132 (L) 134 (L)   Potassium Latest Ref Range: 3.5 - 5.1 mmol/L 3.3 (L) 3.7 3.1 (L) 3.6   Chloride Latest Ref Range: 97 - 108 mmol/L 98 98 99 101   CO2 Latest Ref Range: 21 - 32 mmol/L 18 (L) 19 (L) 19 (L) 20 (L)   BUN Latest Ref Range: 6 - 20 MG/ (H) 162 (H) 144 (H) 141 (H)   Creatinine Latest Ref Range: 0.55 - 1.02 MG/DL 3.84 (H) 3.61 (H) 3.40 (H) 3.43 (H)   Bilirubin, total Latest Ref Range: 0.2 - 1.0 MG/DL  3.3 (H)     Albumin Latest Ref Range: 3.5 - 5.0 g/dL  3.3 (L) 3.2 (L) 3.8   AST Latest Ref Range: 15 - 37 U/L  49 (H)     Alk. phosphatase Latest Ref Range: 45 - 117 U/L  105       RADIOLOGY:  Ultrasound of liver. Echogenic consistent with cirrhosis. No liver mass lesions. No dilated bile ducts. Moderate ascites.         MD Galileo Saba [address], [address] [place] [address].  [phone]  [address] [facility]

## 2022-03-28 NOTE — PROGRESS NOTES
Problem: Self Care Deficits Care Plan (Adult)  Goal: *Acute Goals and Plan of Care (Insert Text)  Description: FUNCTIONAL STATUS PRIOR TO ADMISSION: Chart review 2/23/2022 patient modified independent with RW. Son states recently increased assistance to [facility] with functional mobility and sit<>stand; tangential at times during conversation, but aware and able to be redirected. HOME SUPPORT: The patient lived with mother who provided assistance as needed and son lives nearby. 1 step to enter, stays on main level, walk-in-shower, DME: Shower chair, RW, BSC, w/c    Occupational Therapy Goals  Initiated 3/8/2022, Re-assessed to increase frequency of services on 3/9/2022 due to significant improvement in patient's performance . Reviewed 3/17/2022; Weekly Re-assessment 3/24/2022    1. Patient will perform grooming with minimal assistance/contact guard assist within 7 day(s). -Continue  2. Patient will perform bathing from anterior neck to thigh with minimal assistance/contact guard assist within 7 day(s). -Continue  3. Patient will perform upper body dressing with minimal assistance/contact guard assist within 7 day(s). -Continue  4. Patient will perform toilet transfers with moderate assistance  within 7 day(s). -Continue  5. Patient will perform all aspects of toileting with moderate assistance  within 7 day(s). -Continue  6. Patient will participate in upper extremity therapeutic exercise/activities with minimal assistance/contact guard assist for 10 minutes within 7 day(s). -Continue  7. Patient will utilize energy conservation techniques during functional activities with verbal cues within 7 day(s). -Continue    Outcome: Progressing Towards Goal    OCCUPATIONAL THERAPY TREATMENT  Patient: Paige Camacho (60 y.o. female)  Date: 3/28/2022  Diagnosis: Cirrhosis ([facility] [address] [K74.60] <principal problem not specified>       Precautions: Fall  Chart, occupational therapy assessment, plan of care, and goals were reviewed. ASSESSMENT  Patient continues with skilled OT services and is progressing towards goals, however, remains limited by generalized weakness, decreased command following, slow processing, and impaired functional mobility. Pt cleared for therapy by nursing and received supine in bed with son in room. Pt required verbal and tactile cues to initiate movement to sit EOB. Therapist provided Mod A for supine>sit. Pt with good static seated balance at EOB and sat approximately 15-20 minutes for grooming tasks (Set up-Min A). Returned to bed with Mod A and Max A for scooting. Continue to recommend discharge to SNF to increase safety and independence in ADLs. Current Level of Function Impacting Discharge (ADLs): Mod -Max A for bed mobility, Requires mod-max cues to follow commands      Other factors to consider for discharge: Mod I at baseline          PLAN :  Patient continues to benefit from skilled intervention to address the above impairments. Continue treatment per established plan of care to address goals. Recommend with staff: OOB to chair,BSC with Ax1-2; pt participation in self care     Recommend next OT session: transfer to chair     Recommendation for discharge: (in order for the patient to meet his/her long term goals)  Therapy up to 5 days/week in SNF setting    This discharge recommendation:  Has been made in collaboration with the attending provider and/or case management    IF patient discharges home will need the following DME: TBD at discharge        SUBJECTIVE:   Patient stated I drank too much orange juice this morning.     OBJECTIVE DATA SUMMARY:   Cognitive/Behavioral Status:  Neurologic State: Alert  Orientation Level: Oriented to person;Oriented to place  Cognition: Follows commands  Perception: Appears intact  Perseveration: No perseveration noted  Safety/Judgement: Awareness of environment    Functional Mobility and Transfers for ADLs:  Bed Mobility:  Supine to Sit: Moderate assistance  Sit to Supine: Moderate assistance  Scooting: Maximum assistance    Transfers:  Sit to Stand: Minimum assistance          Balance:  Sitting: Impaired  Sitting - Static: Good (unsupported)  Sitting - Dynamic: Fair (occasional)  Standing: Impaired  Standing - Static: Constant support; Fair  Standing - Dynamic : Constant support;Poor    ADL Intervention:       Grooming  Grooming Assistance: Set-up; Stand-by assistance  Position Performed: Seated edge of bed  Washing Hands: Set-up; Stand-by assistance       Cognitive Retraining  Attention to Task: Single task  Following Commands: Follows one step commands/directions  Safety/Judgement: Awareness of environment  Cues: Verbal cues provided; Tactile cues provided      Pain:  None reported     Activity Tolerance:   Fair    After treatment patient left in no apparent distress:   Supine in bed, Call bell within reach, and Caregiver / family present    COMMUNICATION/COLLABORATION:   The patients plan of care was discussed with: Registered nurse.      Andre Jovel OT  Time Calculation: 23 mins

## 2022-03-28 NOTE — PROGRESS NOTES
0105- Patient picking at skin tear on right lower arm. Dressing overtop of skin tear has been pulled back by patient. Patient has blood underneath fingers on left hand from picking at skin tear on right arm which is currently still bleeding. This RN informed patient not to pick at wounds as it could lead to infection and other problems. New dressing applied. 5114- This RN went into room to check on patient and patient found with NGT in her hand with the tube feed still running. This RN asked patient what happened and patient shrugged shoulders and said \"I don't know. \" NGT was underneath gown prior to being pulled out to try and prevent the patient from pulling at it. Tube feed stopped at this time. Patient agreed to let this RN place a new one.     0230- Perfect serve sent to John Espinosa NP to notify of critical platelets: 55.     2039- John Espinosa NP read message. No new orders placed at this time. 200- This RN placed a new NGT in the right nare, secured to nose. Order placed for STAT KUB.     0305- Stat KUB completed. Waiting on results from radiologist.     0588- According to results, NGT able to be hooked back up to feedings. Patient hooked back up to tube feed. This RN educated patient on importance of not messing with the tube. 0330- This RN went back into room to check on patient. Patient trying to pick at nose and tube. This RN educated patient again on importance of not messing with the NGT and why she has it in.     2027- Patients tube feed pump beeping. This RN went in room to check on pump and patient pulled out NGT again and peeling securement sticker off nose. Tube feeds stopped. Patients nose also bleeding at this time. When this RN asked patient what happened and why she pulled out the tube, patient denied pulling it out. End of tube was in the patients hand, does not appear to be an accident.  At this time patient is complaining of discomfort in nose and wants to take a break from putting one back in nose. Tube feeds supposed to be stopped at 6am so will hold off on placing a new NGT for now. Will relay to day shift RN about discussing with Dannie Colunga MD the next steps since patient seems to keep pulling out NGT.    6717- Patient refusing her medications this morning. Attempted to educate on importance of taking, still refusing.

## 2022-03-28 NOTE — PROGRESS NOTES
Parcentesis drain removed from lt side. Patient placed on rt side. Patient told to stay on rt side x6h. Resting quietly in bed.

## 2022-03-28 NOTE — ROUTINE PROCESS
Bedside and Verbal shift change report given to CHELSEA Lopez RN (oncoming nurse) by LYNNETTE Woodson (offgoing nurse). Report included the following information SBAR, Kardex, Intake/Output, MAR and Recent Results.

## 2022-03-28 NOTE — PROGRESS NOTES
[facility]   [address] [facility], [address] [place] [address] [address], [facility]  Phone: [phone]   [phone]       Nephrology Progress Note  Kostas Martins     1960     [id number]  Date of Admission : 3/5/2022  03/28/22    CC:  Follow up for greg    Assessment and Plan   GREG:  - 2/2 HRS  - poor renal function and high risk for needing dialysis prior to transplant   - daily labs     Hyponatremia  - 2/2 cirrhosis  - Na stable    Severe anemia:  -  On EDWIN TTS     Decompensated liver cirrhosis  -With portal hypertension, primary biliary cholangitis  -Requiring multiple paracenteses  - plans to optimize nutrition then eventual liver/kidney      Hypothyroidism    Malnutrition:       Interval History:  Seen and examined. Hepatology plans noted.  cc   Denies any new sx. States that her son will be moving in with her upon discharge       Review of Systems: A comprehensive review of systems was negative except for that written in the HPI.     Current Medications:   Current Facility-Administered Medications   Medication Dose Route Frequency    lactulose (CHRONULAC) 10 gram/15 mL solution 30 mL  30 mL Oral DAILY    promethazine (PHENERGAN) tablet 12.5 mg  12.5 mg Oral Q6H PRN    epoetin vic-epbx (RETACRIT) injection 10,000 Units  10,000 Units SubCUTAneous Q TUE, THU & SAT    multivitamin, tx-iron-ca-min (THERA-M w/ IRON) tablet 1 Tablet  1 Tablet Oral DAILY    miconazole (MICONTIN) 2 % ointment   Topical BID    balsam peru-castor oiL (VENELEX) ointment   Topical BID    zinc oxide-cod liver oil (DESITIN) 40 % paste   Topical PRN    sodium chloride (NS) flush 5-40 mL  5-40 mL IntraVENous Q8H    sodium chloride (NS) flush 5-40 mL  5-40 mL IntraVENous PRN    acetaminophen (TYLENOL) tablet 650 mg  650 mg Oral Q6H PRN    Or    acetaminophen (TYLENOL) suppository 650 mg  650 mg Rectal Q6H PRN    polyethylene glycol (MIRALAX) packet 17 g  17 g Oral DAILY PRN    ondansetron (ZOFRAN ODT) tablet 4 mg  4 mg Oral Q8H PRN    Or    ondansetron (ZOFRAN) injection 4 mg  4 mg IntraVENous N4J PRN    folic acid (FOLVITE) tablet 1 mg  1 mg Oral DAILY    levothyroxine (SYNTHROID) tablet 50 mcg  50 mcg Oral 6am    midodrine (PROAMATINE) tablet 15 mg  15 mg Oral TID WITH MEALS    [Held by provider] octreotide (SANDOSTATIN) injection 100 mcg  100 mcg IntraVENous TID    pantoprazole (PROTONIX) tablet 40 mg  40 mg Oral DAILY    rifAXIMin (XIFAXAN) tablet 550 mg  550 mg Oral BID    sertraline (ZOLOFT) tablet 100 mg  100 mg Oral DAILY    thiamine HCL (B-1) tablet 100 mg  100 mg Oral DAILY    ursodioL (ACTIGALL) tablet 500 mg (Patient Supplied)  500 mg Oral Q12H    glucagon (GLUCAGEN) injection 1 mg  1 mg IntraMUSCular PRN    naloxone (NARCAN) injection 0.1 mg  0.1 mg IntraVENous PRN      Allergies   Allergen Reactions    Morphine Other (comments)     Severe HA. ALL narcotics!!!       Objective:  Vitals:    Vitals:    03/27/22 1815 03/27/22 2042 03/28/22 0023 03/28/22 0747   BP: 116/62 (!) 112/50 124/63 111/60   Pulse: 81 81 74 77   Resp:  16 16 16   Temp:  98.5 °F (36.9 °C) 98.6 °F (37 °C) 98.6 °F (37 °C)   SpO2:  98% 98% 98%   Weight:       Height:         Intake and Output:  No intake/output data recorded. 03/26 1901 - 03/28 0700  In: 1901   Out: 2465 [Urine:1225; Drains:1240]    Physical Examination:  General: NAD,chronically ill appearing  HEENT:           + scleral icterus  Neck:  Supple, no mass  Resp:  Lungs CTA B/L  CV:  RRR,  no murmur or rub,no  LE edema  GI:  Soft, NT, + Bowel sounds, no hepatosplenomegaly  Neurologic:  Non focal  :  Beckford     [x]    High complexity decision making was performed  [x]    Patient is at high-risk of decompensation with multiple organ involvement    Lab Data Personally Reviewed: I have reviewed all the pertinent labs, microbiology data and radiology studies during assessment.     Recent Labs     03/28/22  0130 03/27/22  0513 03/26/22  0252   * 134* 132*   K 3.4* 3.6 3.1*    101 99   CO2 21 20* 19*   * 130* 149*   * 141* 144*   CREA 3.43* 3.43* 3.40*   CA 8.8 8.7 8.7   MG  --   --  2.4   PHOS 6.4* 6.9* 7.0*   ALB 4.2 3.8 3.2*     Recent Labs     03/28/22  0130   WBC 10.3   HGB 8.2*   HCT 24.8*   PLT 46*     No results found for: SDES  Lab Results   Component Value Date/Time    Culture result: NO GROWTH 4 DAYS 03/14/2022 06:48 PM    Culture result: NO GROWTH 4 DAYS 03/12/2022 06:00 PM    Culture result: CANDIDA ALBICANS (A) 03/05/2022 09:22 PM     Recent Results (from the past 24 hour(s))   RENAL FUNCTION PANEL    Collection Time: 03/28/22  1:30 AM   Result Value Ref Range    Sodium 135 (L) 136 - 145 mmol/L    Potassium 3.4 (L) 3.5 - 5.1 mmol/L    Chloride 102 97 - 108 mmol/L    CO2 21 21 - 32 mmol/L    Anion gap 12 5 - 15 mmol/L    Glucose 119 (H) 65 - 100 mg/dL     (H) 6 - 20 MG/DL    Creatinine 3.43 (H) 0.55 - 1.02 MG/DL    BUN/Creatinine ratio 42 (H) 12 - 20      GFR est AA 16 (L) >60 ml/min/1.73m2    GFR est non-AA 14 (L) >60 ml/min/1.73m2    Calcium 8.8 8.5 - 10.1 MG/DL    Phosphorus 6.4 (H) 2.6 - 4.7 MG/DL    Albumin 4.2 3.5 - 5.0 g/dL   CBC W/O DIFF    Collection Time: 03/28/22  1:30 AM   Result Value Ref Range    WBC 10.3 3.6 - 11.0 K/uL    RBC 2.63 (L) 3.80 - 5.20 M/uL    HGB 8.2 (L) 11.5 - 16.0 g/dL    HCT 24.8 (L) 35.0 - 47.0 %    MCV 94.3 80.0 - 99.0 FL    MCH 31.2 26.0 - 34.0 PG    MCHC 33.1 30.0 - 36.5 g/dL    RDW 24.9 (H) 11.5 - 14.5 %    PLATELET 46 (LL) 774 - 400 K/uL    MPV 10.4 8.9 - 12.9 FL    NRBC 0.0 0  WBC    ABSOLUTE NRBC 0.00 0.00 - 0.01 K/uL                                       Care Plan discussed with:  Patient     Family      RN      Consulting Physician [facility],         I have reviewed the flowsheets. Chart and Pertinent Notes have been reviewed. No change in PMH ,family and social history from Consult note.       Tamara Ellsworth MD

## 2022-03-28 NOTE — PROGRESS NOTES
RUR:  23% High     SELMA: SNF. Referrals sent to [address] [place] [address] (1st choice) and [facility] (2nd choice); awaiting responses. BLS transport once medically stable. Follow-up with PCP/specialist.     Primary Contact: Son, Rick Olivera, [phone]    2:30PM - Family conference held with Dr. Mitesh Pruitt, patient and son yesterday evening, 3/27. Plan for patient to transfer to SNF. CM spoke with patient's son to obtain SNF choices. In order of preference, son would like referrals sent to [address] [place] [address] (f: [phone]) and [facility] (f: [phone]). Referrals manually faxed to both, as they do not participate with [facility]. CM left voicemail for both Admissions Directors to confirm referrals received; awaiting return calls. CM to continue to follow as needed.     Larwence Sacks, YIFAN   [phone]

## 2022-03-29 NOTE — PROGRESS NOTES
Progress Notes by Az Way MD at 17 02:53 PM     Author:  Az Way MD Service:  (none) Author Type:  Physician     Filed:  17 02:59 PM Encounter Date:  2017 Status:  Signed     :  Az Way MD (Physician)              SUBJECTIVE:  Bernice Mattson is a 75 year old female who presents today for[TW1.1T] numbness and tingling in both hands more on the right.  Also complains of difficulty bending fingers on the right hand.  The numbness has been there for 3 years but the joint stiffness just started a few months ago.  An EMG was done last week that showed carpal tunnel syndrome.[TW1.1M]    Social History     Social History      • Marital status:       Spouse name: N/A   • Number of children:  N/A   • Years of education:  N/A     Occupational History    • Not on file.     Social History Main Topics      • Smoking status:  Former Smoker     Packs/day: 1.00     Years: 43.00     Quit date: 2009   • Smokeless tobacco:  Never Used   • Alcohol use  No   • Drug use:  No   • Sexual activity:  Not on file     Other Topics  Concern   • .... Medical Equipment .... No   • Cpap No   • Oxygen No   • Other Home Medical Equipment No   • Cane Yes   • .... Lifestyle .... No   • Walker No   • Hazardous Hobbies No   • Wheel Chair No   • Seat Belt Yes     Social History Narrative     Past Medical History:     Diagnosis  Date   • Cancer     Breast DCIS    • Emphysema lung    • Hyperlipidemia    • Hypertension      Past Surgical History:      Procedure  Laterality Date   • BREAST LUMPECTOMY      and RT internal      • CATARACT REMOVAL WITH IMPLANT Left 12-8-15   • TONSILLECTOMY      age 6       Family History       Problem   Relation Age of Onset   • Cancer  Brother       from lung cancer     • Diabetes  Mother    • Heart  Father    • High Blood Pressure  Sister    • Heart  Brother    • Cancer  Daughter 46     ovarian Stage I[TW1.1T]       REVIEW OF  RUR:  24% High     SELMA: SNF. Referrals sent to [address] [place] [address] (1st choice) and [facility] (2nd choice); awaiting responses. BLS transport once medically stable. Follow-up with PCP/specialist.     Primary Contact: Son, Ambrosio Wallace, [phone]    9:45AM - CM spoke with Sarina, Admissions Director with [address] [place] [address] (p: [phone]) who expressed they are not in network with patient [address] [place] [address]. CM left voicemail for [facility] Admissions (p: [phone]) requesting call back to discuss & obtain update on referral.     Rapid response initiated by nursing this morning, 3/29. Patient to transfer to immediate care. Unit CM to continue to follow for transition of care needs.     YIFAN White   [phone] SYSTEMS:  General: No fever or chills.  HEENT: No ear pain.  Respiratory: No cough.  Cardiovascular: No chest discomfort.  Gastrointestinal: No abdominal pain.  Genitourinary: No dysuria.  Musculoskeletal: No back pain.  Neurologic: No syncope.    Psychiatric: No depression.  Hematologic/lymphatic/immunologic: No bruising.  Endocrine: No polydipsia or polyuria.  Skin: No rash.[TW1.1M]        Current Outpatient Prescriptions     Medication  Sig   • predniSONE (DELTASONE) 50 MG tablet Take 1 Tab by mouth daily. Take with food   • hydrocortisone (WESTCORT) 0.2 % cream Apply thin layer to red, rough itchy skin.  Continue to treat until symptoms resolve   • ADVAIR DISKUS 250-50 MCG/DOSE AEPB INHALE ONE PUFF TWICE DAILY   • hydrochlorothiazide (MICROZIDE) 12.5 MG Cap Take 1 Cap by mouth every morning.   • losartan (COZAAR) 50 MG tablet Take 1 Tab by mouth daily.   • Tamoxifen Citrate (NOLVADEX) 20 MG tablet TAKE 1 TABLET EVERY DAY   • nystatin-triamcinolone (MYCOLOG II) cream Apply to affected area 1-2 x a day as needed for itching   • triamcinolone (KENALOG) 0.1 % cream Apply twice daily for 7 days to lower extremities.   • CUSTOM FORMULATION -- SEE SIG Allergy tab daily   • acetaminophen (TYLENOL ARTHRITIS PAIN) 650 MG CR tablet Take 1,300 mg by mouth daily as needed.     Allergies:  Aspirin[TW1.1T]     ROS[TW1.1M]    OBJECTIVE:  Ht 5' (1.524 m)  Wt 260 lb (117.9 kg)  BMI 50.78 kg/m2    Physical Exam   Constitutional: She is[TW1.1T] oriented to person, place, and time[TW1.1M]. She appears[TW1.1T] well-developed[TW1.1M] and[TW1.1T] well-nourished[TW1.1M].   Cardiovascular:[TW1.1T] Intact distal pulses[TW1.1M].    Neurological: She is[TW1.1T] alert[TW1.1M] and[TW1.1T] oriented to person, place, and time[TW1.1M].   Skin: Skin is[TW1.1T] warm[TW1.1M] and[TW1.1T] dry[TW1.1M].      Left Hand/Wrist Exam   Sensation:[TW1.1T] Normal[TW1.1M]    Tenderness[TW1.1T]   None[TW1.1M]    Range of Motion   Wrist  Pronation:[TW1.1T]   Normal[TW1.1M]  Supination:[TW1.1T] Normal[TW1.1M]  Flexion:[TW1.1T]       Normal[TW1.1M]  Extension:[TW1.1T]  Normal[TW1.1M]  Hand  DIP Thumb:[TW1.1T] Normal[TW1.1M]  DIP Index:[TW1.1T]    Normal[TW1.1M]  DIP Middle:[TW1.1T]  Normal[TW1.1M]  DIP Ring:[TW1.1T]     Normal[TW1.1M]  DIP Little:[TW1.1T]     Normal[TW1.1M]  MP Thumb:[TW1.1T]  Normal[TW1.1M]  MP Index:[TW1.1T]     Normal[TW1.1M]  MP Middle:[TW1.1T]   Normal[TW1.1M]  MP Ring:[TW1.1T]      Normal[TW1.1M]  MP Little:[TW1.1T]      Normal[TW1.1M]  PIP Index:[TW1.1T]     Normal[TW1.1M]  PIP Middle:[TW1.1T]   Normal[TW1.1M]  PIP Ring:[TW1.1T]      Normal[TW1.1M]  PIP Little:[TW1.1T]      Normal[TW1.1M]    Muscle Strength   Wrist Extension:[TW1.1T]   5/5[TW1.1M]  Wrist Flexion:[TW1.1T]       5/5[TW1.1M]  :[TW1.1T]                      5/5[TW1.1M]    Tests   Phalen’s Sign:[TW1.1T] Positive[TW1.1M]  Tinel’s Sign (Medial Nerve):[TW1.1T] Negative[TW1.1M]  Finkelstein:[TW1.1T] Negative[TW1.1M]    Comments:[TW1.1T]  There are degenerative changes in multiple digits  There is decreased sensation to touch in the first 3 fingers[TW1.1M]    Right Hand/Wrist Exam   Sensation:[TW1.1T] Abnormal[TW1.1M]    Tenderness[TW1.1T]   None[TW1.1M]    Range of Motion   Wrist  Pronation:[TW1.1T]  Normal[TW1.1M]  Supination:[TW1.1T] Normal[TW1.1M]  Flexion:[TW1.1T]      Normal[TW1.1M]  Extension:[TW1.1T]  Normal[TW1.1M]  Hand  DIP Thumb:[TW1.1T] Normal[TW1.1M]  DIP Index:[TW1.1T]    Abnormal[TW1.1M]  DIP Middle:[TW1.1T]  Abnormal[TW1.1M]  DIP Ring:[TW1.1T]     Abnormal[TW1.1M]  DIP Little:[TW1.1T]     Abnormal[TW1.1M]  MP Thumb:[TW1.1T]  Normal[TW1.1M]  MP Index:[TW1.1T]     Normal[TW1.1M]  MP Middle:[TW1.1T]   Normal[TW1.1M]  MP Ring:[TW1.1T]      Normal[TW1.1M]  MP Little:[TW1.1T]      Normal[TW1.1M]  PIP Index:[TW1.1T]    Abnormal[TW1.1M]  PIP Middle:[TW1.1T]  Abnormal[TW1.1M]  PIP Ring:[TW1.1T]     Abnormal[TW1.1M]  PIP Little:[TW1.1T]     Abnormal[TW1.1M]    Muscle Strength    Wrist Extension:[TW1.1T]   5/5[TW1.1M]  Wrist Flexion:[TW1.1T]       5/5[TW1.1M]  :[TW1.1T]                      5/5[TW1.1M]    Tests   Phalen’s Sign:[TW1.1T] Positive[TW1.1M]  Tinel’s Sign (Medial Nerve):[TW1.1T] Negative[TW1.1M]  Finkelstein:[TW1.1T] Negative[TW1.1M]    Comments:[TW1.1T]  There are degenerative changes in multiple digits  There is diminished active flexion in the PIP and DIP joint of digits 2 through 5  There is diminished sensation in the first 3 fingers      EMG report reviewed[TW1.1M]      ASSESSMENT/PLAN:[TW1.1T]  Numbness and tingling both hands  Carpal tunnel syndrome both hand  Degenerative joint disease both    Advised that the numbness and neurologic symptoms are related to carpal tunnel syndrome.  Stiffness in the fingers of the right hand is due to arthritis.  Due to the constant nature of the neurologic symptoms and the long-standing nature of the complaints would recommend surgical treatment for the carpal tunnel syndrome.  Surgery would be an outpatient procedure done under local anesthetic.  Recovery time approximately 6-8 weeks.  Biggest risk of surgery is that it may not relieve all the symptoms or any symptoms at all due to the long-standing nature of the condition.  Other risks such as infection, painful scar, and worsening neurologic symptoms discussed.  The finger symptoms are related to arthritis it would not be expected to improve with carpal tunnel surgery.  To treat the finger stiffness she would need occupational therapy.  She would like to proceed to the surgery on the carpal tunnel of the right hand and due to therapy postoperative[TW1.1M]    Call if questions or problems.  Follow up:[TW1.1T] Scheduled for carpal tunnel release right hand under local anesthetic[TW1.1M]      Revision History        User Key Date/Time User Provider Type Action    > TW1.1 11/13/17 02:59 PM Az Way MD Physician Sign    M - Manual, T - Template

## 2022-03-29 NOTE — PROGRESS NOTES
Comprehensive Nutrition Assessment    Type and Reason for Visit: Reassess    Nutrition Recommendations/Plan:       1. If NGT replaced, recommend resuming nocturnal TF:              Jevity 1.5 at 45 ml/hr x first hour                                  70 ml/hr x 10 hours                                  45 ml/hr x last hour              + 120 ml h20 flush TID each night.     2. Continue with 2gm Na+ diet      3. Continue with Ensure Compact (chocolate) BID     4. Consider ordering phosphate binder with meals       Nutrition Assessment:    66 yo female admitted for cirrhosis, AMS.  PMhx: Cirrhosis with ascites s/p paracentesis every week, CKD, esophageal varices s/p banding.  Weight hx in EMR indicates 17% loss over last 5 months which is significant for time frame.  Pt slightly confused, RN states she is A&O x 2 at baseline.  Pt states she was eating well PTA and that her mom prepares her meals (unsure accuracy of this).  RD notes from recent admission to different hospital indicates very poor to fair PO intakes.  Had multiple ONS ordered. MD ordered pt to be NPO with DHT placed.  Consult received for TF order.  Spoke with RN about ordering PO diet along with TF.  Will add ONS as well and monitor intakes.  TF ordered as Jevity 1.5 at 50 ml/hr + 1 pack ProSource daily + 130 ml h2o flush q6h.   Labs: Na+ 128, Bun/Cr elevated, NH3 44     3/14: F/u.  Spoke with RN this morning and she states pt has been having multiple loose stools/day.  Discussed with her that pt has been receiving Lactulose which can contribute to that. Hipolito Cook notes that pt has refused meds for last 2 days, last documented day receiving lactulose was 3/12.       Will change TF formula to Vital AF 1.2 at goal rate of 60 ml/hr + 100 ml h20 flush 5x/day to provide 1320 ml, 1584 kcals (99%), 99 gm protein (100%), 1070 + 500 = 1570 ml water.     Continue with PO diet along with TF.  Pt states she is not eating much.  PO intakes documented as 0-25% meals.  Ensure compact sitting on bedside table, asked pt if she likes them, she said yes, prefers chocolate.  Could not get any further information from pt as she is confused and was then focused on chocolate.       S/P paracentesis with peritoneal drainage catheter left in place on 3/12.  BUN/Cr continuing to rise - Nephrologist notes pt is not a candidate for HD until after liver transplant.       3/21: F/u. MD changed TF back to Jevity 1.5 at 50 ml/hr with 1 pack ProSource daily and 130 ml h20 flush q6h. This is providing 1100 ml, 1710 kcals (100% kcal needs), 85 gm protein (89% protein needs), 836 + 780 = 1616 ml water. Pt has been tolerating TF at goal rate. PO intakes have remained poor. Asked pt if she ate any of her meals yesterday and she said she \"cannot remember\". Intakes documented as < 25% all meals. Ensure Compact ordered, pt states she is still drinking these and likes the chocolate flavor best.  RN reports pt vomited this morning so she placed TF on hold until speaking with MD.    Labs: Na+ 133, K+ 3.2, Bun/Cr elevated, phos from 3/17: 7.8     3/25: RN informed me today that pt vomited yesterday so TF was turned off. She is eating better today, > 75% breakfast, 100% Ensure compact. Son was in room encouraging her to eat lunch. Son expressed concern about pt not getting adequate nutrition if TF is turned off. If continuing to run TF 24/7 is causing vomiting, it is not beneficial.  Will try nocturnal feeds and see if this allows pt to continue eating more during the day while meeting her increased nutrition needs.     Will change TF order to Jevity 1.5 at 45 ml/hr x 1h, 70 ml x 10h, 45 ml/hr x 1h + 120 ml h20 flush TID to provide 790 ml, 1185 kcals (60% estimated kcal needs), 50 gm protein (53% protein needs), 600 + 360 = 960 ml water.     Labs: phos 7.0 - Nephrology following but no mention of this. K+ WNL today, has been low. Will recommend adding phosphate binder with meals during day.     3/29: F/u.  Rapid response called this morning after pt vomited blood. Pt pulled out NGT 3/28, RN reports multiple attempts at replacing that day with no success. Pt did not receive TF that night. Now with upper GI bleed, unsure if MD will want NGT replaced. PO intakes have declined since last assessment on 3/25 when she was eating better. Pt still confused, unable to remember how much she ate yesterday when I asked her. No intakes recorded in chart. Breakfast tray in room, untouched, had a sip of Ensure Compact only. Pt is not able to meet EEN's with PO alone. May need to consider TPN if no NGT replaced. S/P paracentesis drain placed 3/25: >5000 ml being removed daily. Labs: phos 5.8 (trending down), Mg 2.8      Malnutrition Assessment:  Malnutrition Status:  Severe malnutrition    Context:  Chronic illness     Findings of the 6 clinical characteristics of malnutrition:   Energy Intake:  7 - 75% or less est energy requirements for 1 month or longer  Weight Loss:  7.00 - Greater than 10% over 6 months     Body Fat Loss:  7 - Severe body fat loss, Triceps   Muscle Mass Loss:  7 - Severe muscle mass loss, Thigh (quadriceps)  Fluid Accumulation:  1 - Mild, Ascites,Extremities   Strength:  Not performed       Nutritionally Significant Medications: Epo, Folic acid, lactulose, MVI, thiamine, NaCl at 75 ml/hr    Estimated Daily Nutrient Needs:  Energy (kcal): 1333-5707 (30-35 kcals/kg); Weight Used for Energy Requirements: Current  Protein (g):  (1.4-1.5 gm/kg);  Weight Used for Protein Requirements: Current  Fluid (ml/day): 1950; Method Used for Fluid Requirements: 1 ml/kcal    Nutrition Related Findings:       BM: 2/28  Edema: none  Wounds:  Deep tissue injury (DTI bilateral heels; excoriation and rash to buttock)       Current Nutrition Therapies:   Diet: 2 gm Na+   Supplements: Ensure Compact BID  Additional Caloric Sources: TF    Meal intake: Patient Vitals for the past 168 hrs:   % Diet Eaten   03/26/22 1000 1 - 25%   03/23/22 1830 0%   03/23/22 0900 0%   03/22/22 1846 0%   03/22/22 1230 0%     Supplement Intake:   Patient Vitals for the past 168 hrs:   Supplement intake %   03/24/22 1036 76 - 100%       Anthropometric Measures:  · Height:  5' 4\" (162.6 cm)  · Current Body Wt:  65.4 kg (144 lb 2.9 oz)   · Admission Body Wt:       · Usual Body Wt:        · Ideal Body Wt:  120:  124.9 %   · Adjusted Body Weight:   ; Weight Adjustment for: No adjustment   · Adjusted BMI:       · BMI Categories:  Normal weight (BMI 18.5-24. 9)     Wt Readings from Last 10 Encounters:   03/18/22 65.4 kg (144 lb 2.9 oz)   02/17/22 68 kg (149 lb 14.6 oz)   12/13/21 69.4 kg (153 lb)   10/19/21 82 kg (180 lb 12.4 oz)   10/14/17 81.6 kg (180 lb)       Nutrition Diagnosis:   · Inadequate oral intake related to inadequate protein-energy intake as evidenced by intake 0-25%,nutrition support-enteral nutrition    · Unintended weight loss related to inadequate protein-energy intake as evidenced by weight loss greater than or equal to 10% in 6 months      Nutrition Interventions:   Food and/or Nutrient Delivery: Continue current diet,Continue tube feeding,Modify tube feeding,Continue oral nutrition supplement  Nutrition Education and Counseling: No recommendations at this time  Coordination of Nutrition Care: Continue to monitor while inpatient    Goals:  Meet  75% EEN's with EN + PO intakes over next 5-7 days       Nutrition Monitoring and Evaluation:   Behavioral-Environmental Outcomes: None identified  Food/Nutrient Intake Outcomes: Food and nutrient intake,Supplement intake,Enteral nutrition intake/tolerance  Physical Signs/Symptoms Outcomes: Biochemical data,GI status,Weight    Discharge Planning:     Too soon to determine     Henry Arrington RD  Contact via [employer]

## 2022-03-29 NOTE — CONSULTS
[facility]    ICU TEAM consult    Name: Ramin Godinez   : 1960   MRN: [id number]   Date: 3/29/2022       3/29/2022      Reason for ICU Admission: Coffee-ground emesis, acute on chronic blood loss anemia    HPI:   This is a 28-year-old lady with liver cirrhosis due to [facility]. She has had multiple complication in form of esophageal varices, recurrent encephalopathy, recurrent ascites and severe debility and malnutrition. Patient was admitted to the hospital on  with cirrhosis decompensation. She has been in the hospital since then with multiple complication but overall was improving. Today she had large coffee ground emesis but was associated with significant drop in her hemoglobin. Primary team consulted ICU to assist the need for ICU level care.     Active Problem List:     Problem List  Date Reviewed: 2022          Codes Class    Iron deficiency anemia due to chronic blood loss (Chronic) ICD-10-CM: D50.0  ICD-9-CM: 280.0     Overview Signed 11/10/2021  3:00 PM by Higinio Liu MD     10/20/21 F3 26 TIBC 312  8% sat             Hypothyroidism ICD-10-CM: E03.9  ICD-9-CM: 244.9     Overview Signed 11/10/2021  3:52 PM by Higinio Liu MD     10/20/21 TSH 5.27             Hepatic encephalopathy (HCC) ICD-10-CM: K72.90  ICD-9-CM: 572.2         Acute hyperkalemia ICD-10-CM: E87.5  ICD-9-CM: 276.7         Acute renal failure (ARF) (HCC) ICD-10-CM: N17.9  ICD-9-CM: 584.9         Increased ammonia level ICD-10-CM: R79.89  ICD-9-CM: 790.6         Altered mental status, unspecified ICD-10-CM: R41.82  ICD-9-CM: 780.97         Cirrhosis ([facility] ICD-10-CM: K74.60  ICD-9-CM: 571.5         Primary biliary cholangitis ([facility] ICD-10-CM: K74.3  ICD-9-CM: 571.6         Ascites ICD-10-CM: R18.8  ICD-9-CM: 789.59         Esophageal varices ([facility] ICD-10-CM: I85.00  ICD-9-CM: 456.1     Overview Signed 2021  8:02 AM by Higinio Liu MD     MELD 22, DF 34             Anxiety ICD-10-CM: F41.9  ICD-9-CM: 300.00         Basal cell carcinoma (BCC) of face ICD-10-CM: C44.310  ICD-9-CM: 173.31         Depression ICD-10-CM: F32. A  ICD-9-CM: 150         DAGTOPUM LTA-33-RR: R60.1  ICD-9-CM: 949. 3         Thrombocytopenia (HCC) ICD-10-CM: D69.6  ICD-9-CM: 287.5         Essential hypertension ICD-10-CM: I10  ICD-9-CM: 401.9     Overview Signed 10/19/2021  1:34 AM by Paco Begum PA-C     Increase dose of Toprolol to BID for better BP control. Follow up in three months with LIYAH Gallegos for bp check. Past Medical History:      has a past medical history of Anxiety (10/19/2021), Basal cell carcinoma (BCC) of face (10/19/2021), Chronic kidney disease, Cirrhosis of liver not due to alcohol ([facility] and Depression (10/19/2021). Past Surgical History:      has a past surgical history that includes hx  section and hx orthopaedic (Right). Home Medications:     Prior to Admission medications    Medication Sig Start Date End Date Taking? Authorizing Provider   midodrine (PROAMATINE) 5 mg tablet Take 3 Tablets by mouth three (3) times daily (with meals) for 30 days. 3/4/22 4/3/22  Chetna Cadena MD   rifAXIMin (XIFAXAN) 550 mg tablet Take 1 Tablet by mouth two (2) times a day for 30 days. Indications: impaired brain function due to liver disease 3/4/22 4/3/22  Chetna Cadena MD   lactulose (CHRONULAC) 10 gram/15 mL solution Take 30 mL by mouth every six (6) hours. 3/4/22   Chetna Cadena MD   octreotide (SANDOSTATIN) 100 mcg/mL injection 1 mL by IntraVENous route three (3) times daily. 3/4/22   Chetna Cadena MD   pantoprazole (PROTONIX) 40 mg tablet Take 1 Tablet by mouth daily for 30 days.  3/5/22 4/4/22  Chetna Cadena MD   sodium bicarbonate infusion Patient is currently on bicarb drip on dextrose 3/4/22   Chetna Cadena MD   levothyroxine (SYNTHROID) 50 mcg tablet TAKE 1 TABLET BY MOUTH DAILY BEFORE AND BREAKFAST 22 Thai Andres MD   ursodioL (ANTONIETA Forte) 500 mg tablet Take 1 Tablet by mouth two (2) times a day. 22   Thai Andres MD   ondansetron (ZOFRAN ODT) 8 mg disintegrating tablet Take 0.5 Tablets by mouth every eight (8) hours as needed for Nausea or Nausea or Vomiting for up to 30 days. 2/8/22 3/10/22  Thai Andres MD   ferrous gluconate 324 mg (38 mg iron) tablet Take 1 Tablet by mouth two (2) times a day. 22   Thai Andres MD   folic acid (FOLVITE) 1 mg tablet Take 1 Tablet by mouth daily. 22   Thai Andres MD   magnesium oxide (MAG-OX) 400 mg tablet Take 1 Tablet by mouth daily. 22   Thai Andrse MD   sertraline (Zoloft) 100 mg tablet Take 1 Tablet by mouth daily. 22   Thai Andres MD   thiamine HCL (B-1) 100 mg tablet Take 1 Tablet by mouth daily. 22   Thai Andres MD       Allergies/Social/Family History: Allergies   Allergen Reactions    Morphine Other (comments)     Severe HA. ALL narcotics!!!       Social History     Tobacco Use    Smoking status: Former Smoker     Quit date:      Years since quittin.2    Smokeless tobacco: Never Used   Substance Use Topics    Alcohol use: Not Currently     Comment: History of alcohol use      Family History   Problem Relation Age of Onset    Melanoma Brother        Review of Systems:     Difficult to obtain given patient medical condition    Objective:   Vital Signs:  Visit Vitals  BP (!) 92/51   Pulse 93   Temp 98.2 °F (36.8 °C)   Resp 18   Ht 5' 4\" (1.626 m)   Wt 65.4 kg (144 lb 2.9 oz)   SpO2 99%   BMI 24.75 kg/m²      O2 Device: None (Room air) Temp (24hrs), Av.1 °F (36.7 °C), Min:97.8 °F (36.6 °C), Max:98.4 °F (36.9 °C)           Intake/Output:     Intake/Output Summary (Last 24 hours) at 3/29/2022 1524  Last data filed at 3/29/2022 1432  Gross per 24 hour   Intake 0 ml   Output 2600 ml   Net -2600 ml       Physical Exam:    GENERAL:       Chronically ill looking female not in distress, sleepy  HEENT:           + icteric. NGT in place. No active bleeding  NECK:  Supple,  LUNGS:           Vesicular breath sounds bilaterally, no added sounds. HEART:            S1 and S2 well heard,RRR,  no murmur, click, rub or gallop. ABDOMEB:     Abdomen nondistended, normoactive, nontender  EXTREMETIES: No peripheral edema  PULSES:         2+ and symmetric all extremities. SKIN:   Scattered ecchymosis, a large 1 on her upper chest, multiple wounds  NEUROLOGY: Conscious alert oriented to self place and seems to be oriented to situation as she is agreeable to hepatologist plan of endoscopy. Able to talk and ask for ginger ale, follow command and nods appropriately, profoundly weak    LABS AND  DATA: Personally reviewed  Recent Labs     03/29/22  1031 03/29/22  0530   WBC 13.7* 14.3*   HGB 6.7* 8.1*   HCT 20.1* 24.4*   PLT 84* 59*     Recent Labs     03/29/22  1031 03/29/22  0530   * 134*   K 3.9 3.8    100   CO2 20* 20*   * 165*   CREA 3.57* 3.56*   * 99   CA 9.1 9.3   MG  --  2.8*   PHOS 5.5* 5.8*     Recent Labs     03/29/22  1031 03/28/22  0130   ALB 3.5 4.2     Recent Labs     03/29/22  1031   INR 2.3*   PTP 23.2*      No results for input(s): PHI, PCO2I, PO2I, FIO2I in the last 72 hours. No results for input(s): CPK, CKMB, TROIQ, BNPP in the last 72 hours.     Hemodynamics:   PAP:   CO:     Wedge:   CI:     CVP:    SVR: BP 2: 114/65 (03/17/22 1100)     PVR:       Ventilator Settings:  Mode Rate Tidal Volume Pressure FiO2 PEEP                    Peak airway pressure:      Minute ventilation:          MEDS: Reviewed    Chest X-Ray:  CXR Results  (Last 48 hours)    None            Assessment and Plan:   Large coffee-ground emesis:  Acute on chronic blood loss anemia:  Acute kidney injury due to hepatorenal syndrome:  Advanced liver cirrhosis due to PBC:  Portal hypertension  History of esophageal varices: EGD on February 17 in outside facility showed distal esophagitis and portal hypertensive gastropathy without esophageal varices  History of hepatic encephalopathy  History of recurrent ascites  Profound debility  Protein calorie malnutrition  Multiple wounds      Transfer patient to ICU for close monitoring giving her risk for acute decompensation [cirrhosis, history of esophageal varices]  Transfuse packed RBC to maintain hemoglobin above 7. Serial H&H  Appreciate nephrology, continue with the current IV fluid. Monitor urine output and renal function. Continue with midodrine  Appreciate hepatology, may need to repeat endoscopy. Currently on octreotide drip and PPI twice daily. Restart ceftriaxone given the possible GI bleed in setting of ascites and cirrhosis. Reportedly finished 7-day course on February 17. Continue lactulose rifaximin and ursodiol. Tube feed on hold for possible EGD  Wound care  PT OT        CRITICAL CARE CONSULTANT NOTE  I had a face to face encounter with the patient, reviewed and interpreted patient data including clinical events, labs, images, vital signs, I/O's, and examined patient. I have discussed the case and the plan and management of the patient's care with the consulting services, the bedside nurses and the respiratory therapist.      NOTE OF PERSONAL INVOLVEMENT IN CARE   This patient has a high probability of imminent, clinically significant deterioration, which requires the highest level of preparedness to intervene urgently. I participated in the decision-making and personally managed or directed the management of the following life and organ supporting interventions that required my frequent assessment to treat or prevent imminent deterioration. I personally spent 40 minutes of critical care time. This is time spent at this critically ill patient's bedside actively involved in patient care as well as the coordination of care and discussions with the patient's family.   This does not include any procedural time which has been billed separately. Ayde Johnson M.D.   Staff Intensivist/Pulmonologist  [facility]  3/29/2022

## 2022-03-29 NOTE — PROGRESS NOTES
[address] [facility], MD, [address], [place], [place]      ISAAC Gates, [facility]     Michelle Lopez, [facility]   MANISHA Tabares, [facility]       Namita Montemayor [address]    at [address], [facility] Katlyn Pizano  22.    [phone]    FAX: [address] [place] [address]    at [address], [address]    [phone]    FAX: [phone]       HEPATOLOGY PROGRESS NOTE  The patient is well known to me from a previous visit to my office at [facility] in [facility]. She is a 66 yo  female who was found to have chronic liver disease in 2020 and cirrhosis in 7/2021 when she developed ascites. She had variceal bleeding in 11/2021. Serologic evaluation for markers of chronic liver disease was positive for AMA. Cirrhosis is due to [facility].     The patient has developed the following complications of cirrhosis: esophageal varices, variceal bleeding, ascites, edema, hepatic encephalopathy, severe muslce wasting    I saw the patient for the first time in 1/2022. She had CTP score 9 and MELD score 21 at that time. We started liver transplant evaluation testing. She developed confusion and could not be aroused and was hospitalized 3 weeks ago at [facility] in 2/202. During that hospitalization she developed worsening malnutrition, worsening of muscle wasting and a few decubitus ulcers. She has been at [facility] since 3/5. She has been receiving Dobbhoff tube feedings and OT/PT.   She has had dramatic improvement in nutritional status, skin wounds have nearly healed, mental status is clear, she is stronger, can move herself around in bed, she can sit in chair for an hour at a time, can feed herself and has been able to stand with PT assistance. Family conference on 3/27/22. I had a 1 hour meeting with the patient and her son who is POA. Next step is to transfer to rehab for 2-3 more weeks. In order to do this will need to remove feeding tube. In order to do this she will need to be able to eat sufficient calories to maintain her nutritional status  If she does well with 3-4 weeks of rehab we may be able to get LT eval testing and eventually on LT list.    Hospital course: Tolerating tube feedings well. She is very cooperative. Not pulling at tube. Working with OT/PT  Every day she is looking better, stronger and her spirits are good. Sna is now stable in the 132-134 range  Scr continues to improve and down to 3.4 mg  Continues to make urine. All ascites has been drained and abdomen flat. She had 4 episodes of coffee ground hematemesis today. Dropped SBP into the 90s. Initial drop in HB of 1.5 gms. Will probably be more. She is on octreotide, IV PPI, IV albumin  Transferred to ICU. Hepatology Plan: Will perform emergent EGD in ICU later this evening. ASSESSMENT AND PLAN:  Cirrhosis  The diagnosis of cirrhosis is based upon imaging, laboratory studies, complications of cirrhosis. Cirrhosis is secondary to [facility]. Not alcohol as she was initially told.     Serologic testing was positive for AMA, ASMA     The CTP is 10. Child class C. The MELD score is 32.     Based upon the MELD and CTP scores the patient has a mortality of about 50% within the next 90 days until we turn her fraily and weakness around. Right now is is too weak to survive liver transplantation. She needs aggressive nutritional support and both she and her son are in favor of this with the hopes her situation will improve and she could survive liver transplantation.       Primary Biliary Cholangitis  The diagnosis is based upon serology and an elevation in ALP and positive AMA. A liver biopsy has not been performed.     PBC is being treated with ANTONIETA 500 mg BID. CKD-HRS  Scr is moving in right direction and now down to 3.40 mg  Continue midodrine,     Malnutrition  The patient has severe protein-calorie malnutrtion and muscle wasting. This is due to advanced liver disease and refractory ascites. The patient needs more calories than she can possible eat to get into positive nutritional balance. Without aggressive nutritional support she will not survive. She is alert, oriented and understands the need for NG dobhoff feeding tube placement. Both she and her son agree with this. Getting tube feeds via Dobhoff feeding tube. Tolerating this well. Formula is being managed by nutritioinal service. Frailty/muscle wasting  The patient is very frail and cannot sit or stand on her own. She is making great progress with aggressive OT/PT. She can now lift her legs off the bed without strugling    She is very motivated to survive and willing to do whatever excercies she needs to get stronger. The goal is up and walking to bathroom with or without walker. Skin wounds  Large 5 x 5 cm area of tissue injury withtout ulceration on her back has nearly resolved. Stage 3 pressure injury continues to heal.    Ulcers have to be healed for her to be an LT candidate.     Ascites   Ascites developed for the first time in 7/2021. Ascites is is refractory to the current doses of diuretics because of kidney disease and Hyponatremia. Ascites has increased some but not tight. No diuretics. Paracentesis draining 5L ascites per day. Hyponatremia  This is secondary cirrhosis and diuretics  This has been stable in the 131-134 range. No diuretics.       Screening for Esophageal varices   The patient has esophageal varices with prior bleeding. Varices were banded in 11/2021 and 12/2021. The last EGD to assess for varices was performed in 12/2021.     No repeat EGD needed at this time.     Hepatic encephalopathy   Overt HE is well controlled on lactulose and xifaxan. Serum ammonia is low and in the 40s.      Coagulopathy  INR is in the 1.6 range and stable. Thrombocytopenia   This is secondary to cirrhosis. There is no evidence of overt bleeding. No treatment is required. The platelet count is adequate for the patient to undergo procedures without the need for platelet transfusion or platelet growth factors.     Anemia   This is due to multifactorial causes including portal hypertension with chronic GI blood loss and bone marrow suppression due to severe malnutrition.     The most recent FE studies were from 1/2022. The serum ferritin is 87  The FE saturation is 74  She recieved 3 does of IV FE earlier in the hospitalization    She has now had acute blood loss anemia superimposed upon chronic anemia. Thrombocytopenia   This is secondary to cirrhosis. There is no evidence of overt bleeding. No treatment is required. The platelet count is adequate for the patient to undergo procedures without the need for platelet transfusion or platelet growth factors. PHYSICAL EXAMINATION:  VS: per nursing note  General:  Looks much better. Skin color normal.  Eyes:  Sclera non-icteric. ENT:  No oral lesions. Thyroid normal.  Nodes:  No adenopathy. Skin:  Spider angiomata. Less ecchymosis on arms. Respiratory:  Lungs clear to auscultation. Cardiovascular:  Regular heart rate. Abdomen:  Soft non-tender, Some ascites is present. Paracentesis catheter draining ascites  Extremities:  No lower extremity edema. Severe muscle wasting of upper and lower extremities. Neurologic:  Alert and oriented. Cranial nerves grossly intact. No asterixis. LABORATORY:  Results for Tahira Cast (MRN [id number]) as of 3/29/2022 18:55   Ref.  Range 3/28/2022 01:30 3/29/2022 05:30 3/29/2022 10:31   WBC Latest Ref Range: 3.6 - 11.0 K/uL 10.3 14.3 (H) 13.7 (H)   HGB Latest Ref Range: 11.5 - 16.0 g/dL 8.2 (L) 8.1 (L) 6.7 (L)   PLATELET Latest Ref Range: 150 - 400 K/uL 46 (LL) 59 (L) 84 (L)   INR Latest Ref Range: 0.9 - 1.1     2.3 (H)   Sodium Latest Ref Range: 136 - 145 mmol/L 135 (L) 134 (L) 135 (L)   Potassium Latest Ref Range: 3.5 - 5.1 mmol/L 3.4 (L) 3.8 3.9   Chloride Latest Ref Range: 97 - 108 mmol/L 102 100 100   CO2 Latest Ref Range: 21 - 32 mmol/L 21 20 (L) 20 (L)   Glucose Latest Ref Range: 65 - 100 mg/dL 119 (H) 99 103 (H)   BUN Latest Ref Range: 6 - 20 MG/ (H) 165 (H) 170 (H)   Creatinine Latest Ref Range: 0.55 - 1.02 MG/DL 3.43 (H) 3.56 (H) 3.57 (H)   Albumin Latest Ref Range: 3.5 - 5.0 g/dL 4.2  3.5     RADIOLOGY:  Ultrasound of liver. Echogenic consistent with cirrhosis. No liver mass lesions. No dilated bile ducts. Moderate ascites.         MD Galileo Boucher [address] [facility], [facility] 22.  [phone]  [address] [facility]

## 2022-03-29 NOTE — PROGRESS NOTES
Called RRT for patient having projectile vomiting coffee ground/black emesis. 250 ml collected plus large amount on brielle (on floor). 500 ml bolus given. Labs ordered.

## 2022-03-29 NOTE — PROGRESS NOTES
Problem: Self Care Deficits Care Plan (Adult)  Goal: *Acute Goals and Plan of Care (Insert Text)  Description: FUNCTIONAL STATUS PRIOR TO ADMISSION: Chart review 2/23/2022 patient modified independent with RW. Son states recently increased assistance to [facility] with functional mobility and sit<>stand; tangential at times during conversation, but aware and able to be redirected. HOME SUPPORT: The patient lived with mother who provided assistance as needed and son lives nearby. 1 step to enter, stays on main level, walk-in-shower, DME: Shower chair, RW, BSC, w/c    Occupational Therapy Goals  Initiated 3/8/2022, Re-assessed to increase frequency of services on 3/9/2022 due to significant improvement in patient's performance . Reviewed 3/17/2022; Weekly Re-assessment 3/24/2022    1. Patient will perform grooming with minimal assistance/contact guard assist within 7 day(s). -Continue  2. Patient will perform bathing from anterior neck to thigh with minimal assistance/contact guard assist within 7 day(s). -Continue  3. Patient will perform upper body dressing with minimal assistance/contact guard assist within 7 day(s). -Continue  4. Patient will perform toilet transfers with moderate assistance  within 7 day(s). -Continue  5. Patient will perform all aspects of toileting with moderate assistance  within 7 day(s). -Continue  6. Patient will participate in upper extremity therapeutic exercise/activities with minimal assistance/contact guard assist for 10 minutes within 7 day(s). -Continue  7. Patient will utilize energy conservation techniques during functional activities with verbal cues within 7 day(s). -Continue    Outcome: Not Progressing Towards Goal     OCCUPATIONAL THERAPY TREATMENT  Patient: David Hinkle (67 y.o. female)  Date: 3/29/2022  Diagnosis: Cirrhosis ([facility] [K74.60] <principal problem not specified>      Precautions: Fall  Chart, occupational therapy assessment, plan of care, and goals were reviewed. ASSESSMENT  Patient continues with skilled OT services and is not progressing towards goals. Pt remains limited by generalized weakness and cognitive impairments with a rapid response called this session for large quantity of dark vomit. Pt cleared for therapy by nursing and received supine in bed. Completed bed mobility to don socks (Max A) and roll to the right (Min A) for brief management (Max A) and wound care. Pt left with wound care nurse and PT while this therapist retrieved clean linens and gown. Upon return pt on right side and vomiting. Nursing notified and called to room and rapid response called. Pt left with RRT in room at end of session. Will require re-evaluation with resumption of OT services. Current Level of Function Impacting Discharge (ADLs): Min-Mod A for functional mobility, Max A for LB ADLs and toileting     Other factors to consider for discharge: waiting for liver transplant          PLAN :  Patient continues to benefit from skilled intervention to address the above impairments. Continue treatment per established plan of care to address goals. Recommend with staff: pt participation in self care as able, 1 step commands     Recommend next OT session: re-evaluation     Recommendation for discharge: (in order for the patient to meet his/her long term goals)  To be determined: at re-evaluation. SNF likely. This discharge recommendation:  Has been made in collaboration with the attending provider and/or case management    IF patient discharges home will need the following DME: TBD at discharge        SUBJECTIVE:   Patient nodded when asked to swallow the water in her mouth.      OBJECTIVE DATA SUMMARY:   Cognitive/Behavioral Status:  Neurologic State: Alert;Confused  Orientation Level: Unable to verbalize  Cognition: Impaired decision making  Perception: Appears intact  Perseveration: No perseveration noted  Safety/Judgement: Decreased insight into deficits    Functional Mobility and Transfers for ADLs:  Bed Mobility:  Rolling: Minimum assistance         ADL Intervention:        Lower Body Dressing Assistance  Dressing Assistance: Maximum assistance  Protective Undergarmet: Maximum assistance  Socks: Maximum assistance         Cognitive Retraining  Safety/Judgement: Decreased insight into deficits    Pain:  None reported     Activity Tolerance:   Poor    After treatment patient left in no apparent distress: With RRT     COMMUNICATION/COLLABORATION:   The patients plan of care was discussed with: Physical therapist and Registered nurse.      Jewel Porter OT  Time Calculation: 14 mins

## 2022-03-29 NOTE — WOUND CARE
WOCN Note:     Follow up wound care visit to re-assess sacral wound and skin tears  Assessed in room 503  Isolation: no    Chart shows:  Patient admitted on 3/5/22 with Cirrhosis  Past Medical History:   Diagnosis Date    Anxiety 10/19/2021    Basal cell carcinoma (BCC) of face 10/19/2021    Chronic kidney disease     \"end stage liver disease\" per son    Cirrhosis of liver not due to alcohol ([facility] [address]     Depression 10/19/2021      3/29/22  WBC = 14.3  Hgb = 8.1    Assessment:   Alert, not interacting, lethargic   Reports no pain  Mobility: moderate assistance with repositioning and turning  Continence: Incontinent of stool and urine  Restraints: no  Last Rodrigo Score: 14  Surface: Prius mattress  Diet: NPO    Wt Readings from Last 2 Encounters:   03/18/22 65.4 kg (144 lb 2.9 oz)   02/17/22 68 kg (149 lb 14.6 oz)     Unable to complete assessment to look at bilateral arm skin tears and to assess heels. Rapid response was called. Will follow up at a later time    1.  POA stage 3 sacral pressure injuries  2 x 1.8 x <0.1 cm  Wound bed pink with yellow glaze   scant amount tan exudate  no odor   defined edges  Periwound intact with blanchable erythema   Treatment: Cleansed with saline, hydrocolloid    Wound Recommendations:      1) Cleanse wound to sacrum with wound cleanser or normal saline, pat dry   2) Apply skin barrier wipe to periwound skin and allow to dry   3) Warm hydrocolloid dressing between hands   4) Fold  hydrocolloid dressing and apply to area   5) Smooth dressing from center outward and hold dressing in place to improve adhesion   Change dressing weekly and prn if becomes soiled or loosens/ edges curl    Continue  Cleanse skin tears to bilateral arms with normal saline, apply folded xeroform, cover with gauze and stretch gauze (do not apply any adhesive to skin), daily and prn if becomes soiled    Contine antifungal cream twice daily to buttocks, perineum, labia, and groin    PI Prevention:  Turn/reposition approximately every 2 hours  Offload heels with pillows at all times in bed. Pad bony prominences   Keep HOB 30 degrees or less to decrease shearing and pressure unless medically contraindicated.  If HOB is to be over 30 degrees, raise knees first then [facility] to prevent sliding   Minimize layers of linen/pads under patient to optimize support surface to one flat sheet and one incontinence pad     Orders received from Dr. Fabio Nieto  Discussed with RN    Transition of Care: Plan to follow weekly and as needed while admitted to hospital.      Jori Flores MSN, RN, [facility]  Certified Wound and Ostomy Nurse  office [phone]  Can be reached through [employer]

## 2022-03-29 NOTE — PROGRESS NOTES
Bedside and Verbal shift change report given to Won Sweeney (oncoming nurse) by Asya (offgoing nurse). Report included the following information SBAR, Kardex, Procedure Summary, Intake/Output and MAR.

## 2022-03-29 NOTE — PROGRESS NOTES
Problem: Mobility Impaired (Adult and Pediatric)  Goal: *Acute Goals and Plan of Care (Insert Text)  Description: FUNCTIONAL STATUS PRIOR TO ADMISSION: Patient appears to be an inconsistent historian despite being oriented x 4. Originally stated she primarily stays in bed-later she reported ambulating a few times a day and spending most of her day in a recliner. She consistently reports that son assists her \"with everything\" but does not give more details on what exactly or how much assistance. HOME SUPPORT PRIOR TO ADMISSION: The patient lived with son and her mother per report. Unclear how much assistance she required. Physical Therapy Goals  Re-Assessed 3/21/2022  1. Patient will move from supine to sit and sit to supine , scoot up and down, and roll side to side in bed with minimal assistance/contact guard assist within 7 day(s). 2.  Patient will transfer from bed to chair and chair to bed with minimal assistance/contact guard assist using the least restrictive device within 7 day(s). 3.  Patient will perform sit to stand with minimal assistance/contact guard assist within 7 day(s). 4.  Patient will ambulate with moderate assistance  for 10 feet with the least restrictive device within 7 day(s). Initiated 3/6/2022  1. Patient will move from supine to sit and sit to supine , scoot up and down, and roll side to side in bed with minimal assistance/contact guard assist within 7 day(s). 2.  Patient will transfer from bed to chair and chair to bed with moderate assistance  using the least restrictive device within 7 day(s). 3.  Patient will perform sit to stand with moderate assistance  within 7 day(s). 4.  Patient will ambulate with moderate assistance  for 20 feet with the least restrictive device within 7 day(s). 5.  Patient will ascend/descend 2 stairs with 2 handrail(s) with moderate assistance  within 7 day(s).    Outcome: Not Progressing Towards Goal    PHYSICAL THERAPY TREATMENT  Patient: Denice Vega (21 y.o. female)  Date: 3/29/2022  Diagnosis: [facility]rhosis ([facility] [K74.60] <principal problem not specified>       Precautions: Fall  Chart, physical therapy assessment, plan of care and goals were reviewed. ASSESSMENT  Patient continues with skilled PT services and is not progressing towards goals. Spoke with RN and cleared to see pt and initiated session co treating with OT. While setting up room, wound care entered room and requested to examine sacrum while mobilizing with PT. Pt required total A to don socks prior to mobilization. Additionally prior to mobilization, pt observed holding water in mouth and required multiple VC to swallow water prior to mobilizing. Pt then rolled to R and after ~30 seconds started vomiting coffee ground emesis and rapid response called. At this time weekly re-assessment aborted with RRT present in room. Pt will require re-evaluation next session given change in status. Current Level of Function Impacting Discharge (mobility/balance): min a for rolling. Unable to assess further due to rapid    Other factors to consider for discharge: rapid response          PLAN :  Patient continues to benefit from skilled intervention to address the above impairments. Continue treatment per established plan of care. to address goals. Recommendation for discharge: (in order for the patient to meet his/her long term goals)  Therapy up to 5 days/week in SNF setting    This discharge recommendation:  Has been made in collaboration with the attending provider and/or case management    IF patient discharges home will need the following DME: to be determined (TBD)       SUBJECTIVE:   Patient stated ok.     OBJECTIVE DATA SUMMARY:   Critical Behavior:  Neurologic State: Alert,Confused  Orientation Level: Unable to verbalize  Cognition: Impaired decision making  Safety/Judgement: Decreased insight into deficits  Functional Mobility Training:  Bed Mobility:  Rolling: Minimum assistance       Pain Rating:  Pt did not relate pain during session    Activity Tolerance:   Fair and SpO2 stable on RA    After treatment patient left in no apparent distress:   Supine in bed and rapid response team in room    COMMUNICATION/COLLABORATION:   The patients plan of care was discussed with: Occupational therapist and Registered nurse.      Jamison Joyner, PT   Time Calculation: 14 mins

## 2022-03-29 NOTE — PROGRESS NOTES
[id number] Adult  Hospitalist Group                                                                                          Hospitalist Progress Note  Amber Hatfield   Answering service: [phone] from in house phone        Date of Service:  3/29/2022  NAME:  David Hinkle  :  1960  MRN:  [id number]      Admission Summary:   Copied form admit: \" David Hinkle is a 65 y.o. female with history of liver cirrhosis with portal hypertension,recently diagnosed primary biliary cholangitis, recurrent ascites requiring paracentesis approximately every 7 to 10 days who presents here transferred from [address] [place] [address] secondary to decompensated liver cirrhosis, worsening renal insufficiency, progressive weakness currently undergoing work-up for possible liver transplant. \"    Interval history / Subjective: Follow up decompensated liver cirrhosis. Patient seen and examined earlier today and then again this afternoon. Patient with sudden onset coffee ground emesis x 2. Hgb with acute drop to 6.7. Received 1 unit pRBCs. Hepatology and ICU notified. Assessment & Plan:     Acute blood loss anemia:   GI bleed  -coffee ground emesis large volume 3/29  -transfuse 1 unit pRBCs, additional unit kept ahead  -Trend H/H q 8 hours  -PPI BID, octreotide drip  -maintain IV access   -Hepatology aware, possible EGD  -ICU consulted in setting of acute bleed and advanced cirrhosis     Decompensated liver cirrhosis, with portal hypertension. Felt to be secondary to [facility]  Transaminitis/hyperbilirubinemia/coagulopathy/thrombocytopenia  History of hepatic encephalopathy   -Transferred from [address] [place] [address] with prolonged hospitalization. Transferred for further work-up for liver transplant eval  -EGD on  [place] noted for distal esophagitis and portal hypertensive gastropathy, no evidence of varices.    -history of esophageal varices back in 2021 status post banding at that time. -Per GI at outlying facility, patient was deemed not to be a candidate for colonoscopy and recommended Cologuard.    -s/p MRCP on 2/18 which was noted for cirrhosis, portal hypertension, and large ascites. -s/p cardiac stress test which was negative for ischemia, EF of 69%  -Dr Madonna Aquino following  -Continue lactulose, rifaximin, octreotide, Protonix p.o.  -Closely monitor stool output frequency  -Continue ursodiol  -Paracentesis ordered 3/25 with [id number] removed    Refractory ascites  -multiple paracentesis done at [facility] including 2/11 with 11.8 L removal, 2/18 with 9/2 L removed, and 2/28 with 9.2 L removed, 3/12, 3/25  -Currently with distended abdomen  -Moderate large ascites on US 3/6. Ascites said to be refractory to diuretics, HRS limiting use of diuretics  -Ascitic drain that was placed on 3/12 was apparently clogged, flushed and drained significant amount of ascite  -Ascitic fluid lab negative for SBP     GREG with metabolic acidosis. persisent  -Baseline 0.9 on 11/26/2021   -Likely due to [facility]  -Nephrology following, recommendations appreciated.     Hyponatremia  -Suspect secondary to underlying liver cirrhosis.   -Na stable 128-131     Recent E. coli UTI  -Treated with Vanco and Zosyn then transitioned to Rocephin, completed 7-day antibiotic course as of 2/17  -Replace Beckford with new catheter 3/5 . Urine culture on 3/5 grew Candida albicans 75,000 CFU. Hypothyroid  -Continue levothyroxine    Severe malnutrition  --Continue NG tube with tube feeds at night if patient tolerates. Encourage oral intake   --On regular diet and tolerating    Lines/catheters/drainage  --Beckford catheter in place, replaced on 3/5  --NGT in place     Generalized weakness/chronic wounds. pta  -PT, OT consult  -Wound care consulted    Goals remain aggressive     DVT prophylaxis SCDs for now.   No pharmacological prophylaxis due to thrombocytopenia    CODE: DNR     Disposition: SNF in [facility] when medically stable Hospital Problems  Date Reviewed: 2/17/2022          Codes Class Noted POA    Cirrhosis ([facility] [address] ICD-10-CM: K74.60  ICD-9-CM: 571.5  2/8/2022 Unknown              Review of systems:     Negative unless stated above        Data Review:    Review and/or order of clinical lab test    GENERAL:  Chronically sick looking, alert and awake, not in distress. Mildly Jaundiced  HEENT:  + icteric. NGT in place. No active bleeding  NECK: Supple, trachea midline, no adenopathy, no thyromegally or tenderness, no carotid bruit and no JVD. LUNGS:   Vesicular breath sounds bilaterally, no added sounds. HEART:   S1 and S2 well heard,RRR,  no murmur, click, rub or gallop. ABDOMEB:   Abdomen nondistended, normoactive, nontender  Beckford catheter draining deep yellow urine. EXTREMETIES: No peripheral edema  PULSES: 2+ and symmetric all extremities. SKIN: Scattered ecchymosis, a large 1 on her upper chest  NEUROLOGY: sleepy but alert . Nonfocal exam. Globally weak, intermittently alert       Labs:     Recent Labs     03/29/22  1031 03/29/22  0530   WBC 13.7* 14.3*   HGB 6.7* 8.1*   HCT 20.1* 24.4*   PLT 84* 59*     Recent Labs     03/29/22  1031 03/29/22  0530 03/28/22  0130   * 134* 135*   K 3.9 3.8 3.4*    100 102   CO2 20* 20* 21   * 165* 145*   CREA 3.57* 3.56* 3.43*   * 99 119*   CA 9.1 9.3 8.8   MG  --  2.8*  --    PHOS 5.5* 5.8* 6.4*     Recent Labs     03/29/22  1031 03/28/22  0130 03/27/22  0513   ALB 3.5 4.2 3.8     Recent Labs     03/29/22  1031   INR 2.3*   PTP 23.2*      No results for input(s): FE, TIBC, PSAT, FERR in the last 72 hours. Lab Results   Component Value Date/Time    Folate 38.3 (H) 10/20/2021 07:53 AM    RBC FOLATE 878 02/14/2022 01:27 AM      No results for input(s): PH, PCO2, PO2 in the last 72 hours. No results for input(s): CPK, CKNDX, TROIQ in the last 72 hours.     No lab exists for component: CPKMB  Lab Results   Component Value Date/Time    Cholesterol, total 122 01/25/2022 09:50 AM    HDL Cholesterol 40 01/25/2022 09:50 AM    LDL, calculated 67.2 01/25/2022 09:50 AM    Triglyceride 74 01/25/2022 09:50 AM    CHOL/HDL Ratio 3.1 01/25/2022 09:50 AM     Lab Results   Component Value Date/Time    Glucose (POC) 128 (H) 03/15/2022 06:06 AM    Glucose (POC) 123 (H) 03/15/2022 12:14 AM    Glucose (POC) 114 (H) 02/23/2022 05:42 PM    Glucose (POC) 127 (H) 02/23/2022 12:04 PM    Glucose (POC) 122 (H) 02/23/2022 08:18 AM     Lab Results   Component Value Date/Time    Color DARK YELLOW 03/05/2022 09:22 PM    Appearance TURBID (A) 03/05/2022 09:22 PM    Specific gravity 1.014 03/05/2022 09:22 PM    Specific gravity 1.010 02/11/2022 07:20 PM    pH (UA) 5.5 03/05/2022 09:22 PM    Protein 100 (A) 03/05/2022 09:22 PM    Glucose Negative 03/05/2022 09:22 PM    Ketone Negative 03/05/2022 09:22 PM    Bilirubin NEGATIVE 02/11/2022 07:20 PM    Urobilinogen 0.2 03/05/2022 09:22 PM    Nitrites Negative 03/05/2022 09:22 PM    Leukocyte Esterase LARGE (A) 03/05/2022 09:22 PM    Epithelial cells FEW 03/05/2022 09:22 PM    Bacteria 1+ (A) 03/05/2022 09:22 PM    WBC >100 (H) 03/05/2022 09:22 PM    RBC  03/05/2022 09:22 PM         Medications Reviewed:     Current Facility-Administered Medications   Medication Dose Route Frequency    0.9% sodium chloride infusion  75 mL/hr IntraVENous CONTINUOUS    sodium chloride 0.9 % bolus infusion 500 mL  500 mL IntraVENous ONCE    pantoprazole (PROTONIX) 40 mg in 0.9% sodium chloride 10 mL injection  40 mg IntraVENous Q12H    octreotide (SANDOSTATIN) 500 mcg in 0.9% sodium chloride 500 mL infusion  50 mcg/hr IntraVENous CONTINUOUS    sodium bicarbonate tablet 325 mg  325 mg Oral BID    lactulose (CHRONULAC) 10 gram/15 mL solution 30 mL  30 mL Oral DAILY    promethazine (PHENERGAN) tablet 12.5 mg  12.5 mg Oral Q6H PRN    epoetin vic-epbx (RETACRIT) injection 10,000 Units  10,000 Units SubCUTAneous Q TUE, THU & SAT    multivitamin, tx-iron-ca-min (THERA-M w/ IRON) tablet 1 Tablet  1 Tablet Oral DAILY    miconazole (MICONTIN) 2 % ointment   Topical BID    balsam peru-castor oiL (VENELEX) ointment   Topical BID    zinc oxide-cod liver oil (DESITIN) 40 % paste   Topical PRN    sodium chloride (NS) flush 5-40 mL  5-40 mL IntraVENous Q8H    sodium chloride (NS) flush 5-40 mL  5-40 mL IntraVENous PRN    acetaminophen (TYLENOL) tablet 650 mg  650 mg Oral Q6H PRN    Or    acetaminophen (TYLENOL) suppository 650 mg  650 mg Rectal Q6H PRN    polyethylene glycol (MIRALAX) packet 17 g  17 g Oral DAILY PRN    ondansetron (ZOFRAN ODT) tablet 4 mg  4 mg Oral Q8H PRN    Or    ondansetron (ZOFRAN) injection 4 mg  4 mg IntraVENous X7F PRN    folic acid (FOLVITE) tablet 1 mg  1 mg Oral DAILY    levothyroxine (SYNTHROID) tablet 50 mcg  50 mcg Oral 6am    midodrine (PROAMATINE) tablet 15 mg  15 mg Oral TID WITH MEALS    [Held by provider] octreotide (SANDOSTATIN) injection 100 mcg  100 mcg IntraVENous TID    rifAXIMin (XIFAXAN) tablet 550 mg  550 mg Oral BID    sertraline (ZOLOFT) tablet 100 mg  100 mg Oral DAILY    thiamine HCL (B-1) tablet 100 mg  100 mg Oral DAILY    ursodioL (ACTIGALL) tablet 500 mg (Patient Supplied)  500 mg Oral Q12H    glucagon (GLUCAGEN) injection 1 mg  1 mg IntraMUSCular PRN    naloxone (NARCAN) injection 0.1 mg  0.1 mg IntraVENous PRN     ______________________________________________________________________  EXPECTED LENGTH OF STAY: 4d 16h  ACTUAL LENGTH OF STAY:          24                 Amber Jacobson DO                                     Critical Care Attestation: This patient is unstable and critically ill. Due to a high probability of clinically significant, life threatening deterioration, the patient required my highest level of preparedness to intervene emergently and I personally spent this critical care time directly and personally managing the patient.  This critical care time included obtaining a history; examining the patient; pulse oximetry; ordering and review of studies; arranging urgent treatment with development of a management plan; evaluation of patient's response to treatment; frequent reassessment; and, discussions with other providers and/or family. This critical care time was performed to assess and manage the high probability of imminent, life-threatening deterioration that could result in multi-organ failure and death. It was exclusive of separately billable procedures, treating other patients, and teaching time. Time Spent:     I personally spent >30 minutes in providing critical care time.     Izzy Cruz DO  3/29/2022  6:37 PM

## 2022-03-29 NOTE — PROGRESS NOTES
Hospitalist Progress Note:    RRT initiated by nursing for large volume coffee ground emesis x 1. Vitals with SBP 130s, [id number]. Concerning for GI bleed. Plan:   -CBC, INR, type and screen  -fluid bolus  -IV access  -Protonix 80 mg now, followed by BID  -octreotide drip  -NPO  -Transfer to intermediate care    Discussed plan with Dr. Azalia Lyle who is in agreement. Nursing updated. Notified emergency contact, Sintia Watsons. Kacy Abad DO         Addendum 1100:     Patient with recurrent emesis, ~500cc. Have asked ICU to evaluate. Labs pending. Discussed with nursing and updated son.

## 2022-03-29 NOTE — PROGRESS NOTES
[facility]   [address] [address], [facility] [address], [facility]  Phone: [phone]   [phone]       Nephrology Progress Note  Sanjeev Noe     1960     [id number]  Date of Admission : 3/5/2022  03/29/22    CC:  Follow up for greg    Assessment and Plan   GREG:  - 2/2 HRS  - Cr up after steve removal --? Low threshold to place steve   - ordered 1L NS today   - d/w nursing staff and pt   - poor renal function and high risk for needing dialysis prior to transplant   - daily labs     Hyponatremia  - 2/2 cirrhosis  - Na stable    Severe anemia:  -  On EDWIN TTS     Decompensated liver cirrhosis  -With portal hypertension, primary biliary cholangitis  -Requiring multiple paracenteses  - plans to optimize nutrition then eventual liver/kidney      Hypothyroidism    Malnutrition:       Interval History:  Seen and examined. She accidentally pulled out steve overnight. Labs showed bump in S Cr   Reports burping and sneezing but otherwise no new sx   Denies any N/V/CP/SOB/ abdo pain       Review of Systems: A comprehensive review of systems was negative except for that written in the HPI.     Current Medications:   Current Facility-Administered Medications   Medication Dose Route Frequency    0.9% sodium chloride infusion  75 mL/hr IntraVENous CONTINUOUS    sodium bicarbonate tablet 325 mg  325 mg Oral BID    lactulose (CHRONULAC) 10 gram/15 mL solution 30 mL  30 mL Oral DAILY    promethazine (PHENERGAN) tablet 12.5 mg  12.5 mg Oral Q6H PRN    epoetin vic-epbx (RETACRIT) injection 10,000 Units  10,000 Units SubCUTAneous Q TUE, THU & SAT    multivitamin, tx-iron-ca-min (THERA-M w/ IRON) tablet 1 Tablet  1 Tablet Oral DAILY    miconazole (MICONTIN) 2 % ointment   Topical BID    balsam peru-castor oiL (VENELEX) ointment   Topical BID    zinc oxide-cod liver oil (DESITIN) 40 % paste   Topical PRN    sodium chloride (NS) flush 5-40 mL  5-40 mL IntraVENous Q8H    sodium chloride (NS) flush 5-40 mL  5-40 mL IntraVENous PRN    acetaminophen (TYLENOL) tablet 650 mg  650 mg Oral Q6H PRN    Or    acetaminophen (TYLENOL) suppository 650 mg  650 mg Rectal Q6H PRN    polyethylene glycol (MIRALAX) packet 17 g  17 g Oral DAILY PRN    ondansetron (ZOFRAN ODT) tablet 4 mg  4 mg Oral Q8H PRN    Or    ondansetron (ZOFRAN) injection 4 mg  4 mg IntraVENous Y9Z PRN    folic acid (FOLVITE) tablet 1 mg  1 mg Oral DAILY    levothyroxine (SYNTHROID) tablet 50 mcg  50 mcg Oral 6am    midodrine (PROAMATINE) tablet 15 mg  15 mg Oral TID WITH MEALS    [Held by provider] octreotide (SANDOSTATIN) injection 100 mcg  100 mcg IntraVENous TID    pantoprazole (PROTONIX) tablet 40 mg  40 mg Oral DAILY    rifAXIMin (XIFAXAN) tablet 550 mg  550 mg Oral BID    sertraline (ZOLOFT) tablet 100 mg  100 mg Oral DAILY    thiamine HCL (B-1) tablet 100 mg  100 mg Oral DAILY    ursodioL (ACTIGALL) tablet 500 mg (Patient Supplied)  500 mg Oral Q12H    glucagon (GLUCAGEN) injection 1 mg  1 mg IntraMUSCular PRN    naloxone (NARCAN) injection 0.1 mg  0.1 mg IntraVENous PRN      Allergies   Allergen Reactions    Morphine Other (comments)     Severe HA. ALL narcotics!!!       Objective:  Vitals:    Vitals:    03/28/22 0747 03/28/22 1546 03/29/22 0053 03/29/22 0807   BP: 111/60 118/64 (!) 121/55 112/64   Pulse: 77 80 91 96   Resp: 16 16 16 16   Temp: 98.6 °F (37 °C) 97.8 °F (36.6 °C) 98.2 °F (36.8 °C) 98.4 °F (36.9 °C)   SpO2: 98% 99% 99% 98%   Weight:       Height:         Intake and Output:  No intake/output data recorded.   03/27 1901 - 03/29 0700  In: 240   Out: 750 [Urine:750]    Physical Examination:  General: NAD,chronically ill appearing  HEENT:           + scleral icterus  Neck:  Supple, no mass  Resp:  Lungs CTA B/L  CV:  RRR,  no murmur or rub,no  LE edema  GI:  Soft, NT, + Bowel sounds, no hepatosplenomegaly  Neurologic:  Non focal  :  Beckford     [x]    High complexity decision making was performed  [x]    Patient is at high-risk of decompensation with multiple organ involvement    Lab Data Personally Reviewed: I have reviewed all the pertinent labs, microbiology data and radiology studies during assessment.     Recent Labs     03/29/22 0530 03/28/22 0130 03/27/22  0513   * 135* 134*   K 3.8 3.4* 3.6    102 101   CO2 20* 21 20*   GLU 99 119* 130*   BUN PENDING 145* 141*   CREA 3.56* 3.43* 3.43*   CA 9.3 8.8 8.7   MG 2.8*  --   --    PHOS 5.8* 6.4* 6.9*   ALB  --  4.2 3.8     Recent Labs     03/29/22 0530 03/28/22 0130   WBC 14.3* 10.3   HGB 8.1* 8.2*   HCT 24.4* 24.8*   PLT 59* 46*     No results found for: SDES  Lab Results   Component Value Date/Time    Culture result: NO GROWTH 4 DAYS 03/14/2022 06:48 PM    Culture result: NO GROWTH 4 DAYS 03/12/2022 06:00 PM    Culture result: CANDIDA ALBICANS (A) 03/05/2022 09:22 PM     Recent Results (from the past 24 hour(s))   CBC W/O DIFF    Collection Time: 03/29/22  5:30 AM   Result Value Ref Range    WBC 14.3 (H) 3.6 - 11.0 K/uL    RBC 2.56 (L) 3.80 - 5.20 M/uL    HGB 8.1 (L) 11.5 - 16.0 g/dL    HCT 24.4 (L) 35.0 - 47.0 %    MCV 95.3 80.0 - 99.0 FL    MCH 31.6 26.0 - 34.0 PG    MCHC 33.2 30.0 - 36.5 g/dL    RDW 25.0 (H) 11.5 - 14.5 %    PLATELET 59 (L) 774 - 400 K/uL    MPV 10.7 8.9 - 12.9 FL    NRBC 0.2 (H) 0  WBC    ABSOLUTE NRBC 0.03 (H) 0.00 - 7.84 K/uL   METABOLIC PANEL, BASIC    Collection Time: 03/29/22  5:30 AM   Result Value Ref Range    Sodium 134 (L) 136 - 145 mmol/L    Potassium 3.8 3.5 - 5.1 mmol/L    Chloride 100 97 - 108 mmol/L    CO2 20 (L) 21 - 32 mmol/L    Anion gap 14 5 - 15 mmol/L    Glucose 99 65 - 100 mg/dL    BUN PENDING MG/DL    Creatinine 3.56 (H) 0.55 - 1.02 MG/DL    BUN/Creatinine ratio PENDING     GFR est AA 16 (L) >60 ml/min/1.73m2    GFR est non-AA 13 (L) >60 ml/min/1.73m2    Calcium 9.3 8.5 - 10.1 MG/DL   MAGNESIUM    Collection Time: 03/29/22  5:30 AM   Result Value Ref Range    Magnesium 2.8 (H) 1.6 - 2.4 mg/dL   PHOSPHORUS Collection Time: 03/29/22  5:30 AM   Result Value Ref Range    Phosphorus 5.8 (H) 2.6 - 4.7 MG/DL                                       Care Plan discussed with:  Patient     Family      RN      Consulting Physician [facility],         I have reviewed the flowsheets. Chart and Pertinent Notes have been reviewed. No change in PMH ,family and social history from Consult note.       Mina Walker MD

## 2022-03-29 NOTE — PROGRESS NOTES
Bedside and Verbal shift change report given to Ness Farr RN (oncoming nurse) by Ashley Syed RN (offgoing nurse). Report included the following information SBAR, Kardex, Intake/Output, MAR and Recent Results.

## 2022-03-30 NOTE — PROGRESS NOTES
1930: Bedside shift change report given to Cliff Tobar  (oncoming nurse) by Loney Duverney  (offgoing nurse). Report included the following information SBAR, Kardex, Intake/Output and MAR.     0730: Bedside shift change report given to Loney Duverney  (oncoming nurse) by Cliff Tobar  (offgoing nurse). Report included the following information SBAR, Kardex, Intake/Output and MAR.

## 2022-03-30 NOTE — PROGRESS NOTES
03/30/22 0909   Weaning Parameters   Spontaneous Breathing Trial Complete Yes   Resp Rate Observed 6.3   Ve 4.8   VT 1090   RSBI 8   SBT Results  Cuff leak presented at this time  No ABG prior to extubation per MD    0900: Patient extubated to 4LPM NC O2 saturations remain 100%. Patient tolerated well. RN & Nursing Student at bedside. Will continue to monitor pt.

## 2022-03-30 NOTE — PROGRESS NOTES
TRANSFER - IN REPORT:    Verbal report received from SHIKHA POWERS [facility] RN(name) on Rajani Peters  ON ICU--17(unit) for ordered procedure      Report consisted of patients Situation, Background, Assessment and   Recommendations(SBAR). Information from the following report(s) SBAR was reviewed with the receiving nurse. Opportunity for questions and clarification was provided. Assessment completed upon patients arrival to unit and care assumed.

## 2022-03-30 NOTE — PROCEDURES
[facility]    Procedure Note - Intubation:   Performed by BRYAN Cornelius/ Dr. Dayna Gomez MD     Diagnosis: Gastrointestinal hemorrhage, cirrhosis   Insertion Date: 03/29/22 Time: 2150   Obtained Consent? yes; informed - consent signed. Procedure Location:  ICU. Immediately prior to the procedure, the patient was reevaluated and found suitable for the planned procedure and any planned medications. Immediately prior to the procedure a time out was called to verify the correct patient, procedure, equipment, staff, and marking as appropriate. Preoxygenation applied via %  Medications given were ketamine, rocuronium (Zemuron) and Fentanyl. First attempt by ICU NP with Grade 2 view obtained with Mac 4 blade Laryngoscope. Unable to pass tube through cords. Patient bagged with BVM, given another dose of fentanyl IV. Second look was unable to view cords. ICU attending took over and patient was intubated with Mac 4 blade Laryngoscope. A number 7.5 cuffed   ETT was placed to 22 cm at the teeth. Placement was evaluated by noting bilateral, symmetric breath sounds, good end-tidal CO2 detector color change  and chest x-ray visualization. Attempts required: 2. Complications: oropharyngeal bleeding. RSI was used, Cricoid pressure was applied. .  The procedure was tolerated well. Chest Xray ordered?  Yes.

## 2022-03-30 NOTE — PROGRESS NOTES
[facility]   [address] [address], [facility] [address], [facility]  Phone: [phone]   [phone]       Nephrology Progress Note  Cata Boland     1960     [id number]  Date of Admission : 3/5/2022  03/30/22    CC:  Follow up for greg    Assessment and Plan   GREG:  - 2/2 HRS and now w/ GI bleed  - re-insert Steve   - Not emergent need for HD given her non oliguric state   - would like to keep her on BROOKE (alb,Midodrine, octreotide)therapy if she is not on Levophed  - daily labs      Hyponatremia  - 2/2 cirrhosis  - Na better    Severe anemia:  GI bleed  -  On EDWIN TTS  - transfusions per primary team      Cirrhosis, Portal HTN, PBC  - poral gastropathy  - being w/u for [facility] transplant      Hypothyroidism    Malnutrition:       Interval History:  Had COFFEE ground emsesis yesterday, transferred to ICU and underwent EGD  EGD findings noted   Needed straight cath. Has not had steve re-inserted   BUN high from GI bleed      Review of Systems: Review of systems not obtained due to patient factors.     Current Medications:   Current Facility-Administered Medications   Medication Dose Route Frequency    0.9% sodium chloride infusion 250 mL  250 mL IntraVENous PRN    pantoprazole (PROTONIX) 40 mg in 0.9% sodium chloride 10 mL injection  40 mg IntraVENous Q12H    0.9% sodium chloride infusion 250 mL  250 mL IntraVENous PRN    PHENYLephrine (NEOSYNEPHRINE) in NS syringe 100-400 mcg  100-400 mcg IntraVENous ONCE    sodium chloride 0.9 % bolus infusion 500 mL  500 mL IntraVENous ONCE    propofol (DIPRIVAN) 10 mg/mL infusion  0-50 mcg/kg/min IntraVENous TITRATE    sodium chloride (NS) flush 5-40 mL  5-40 mL IntraVENous Q8H    sodium chloride (NS) flush 5-40 mL  5-40 mL IntraVENous PRN    simethicone (MYLICON) 56XJ/3.7RB oral drops 80 mg  1.2 mL Oral Multiple    atropine injection 0.5 mg  0.5 mg IntraVENous ONCE PRN    EPINEPHrine (ADRENALIN) 0.1 mg/mL syringe 1 mg  1 mg Endoscopically ONCE PRN    sodium bicarbonate tablet 325 mg  325 mg Oral BID    lactulose (CHRONULAC) 10 gram/15 mL solution 30 mL  30 mL Oral DAILY    promethazine (PHENERGAN) tablet 12.5 mg  12.5 mg Oral Q6H PRN    epoetin vic-epbx (RETACRIT) injection 10,000 Units  10,000 Units SubCUTAneous Q TUE, THU & SAT    multivitamin, tx-iron-ca-min (THERA-M w/ IRON) tablet 1 Tablet  1 Tablet Oral DAILY    miconazole (MICONTIN) 2 % ointment   Topical BID    balsam peru-castor oiL (VENELEX) ointment   Topical BID    zinc oxide-cod liver oil (DESITIN) 40 % paste   Topical PRN    sodium chloride (NS) flush 5-40 mL  5-40 mL IntraVENous Q8H    sodium chloride (NS) flush 5-40 mL  5-40 mL IntraVENous PRN    acetaminophen (TYLENOL) tablet 650 mg  650 mg Oral Q6H PRN    Or    acetaminophen (TYLENOL) suppository 650 mg  650 mg Rectal Q6H PRN    polyethylene glycol (MIRALAX) packet 17 g  17 g Oral DAILY PRN    ondansetron (ZOFRAN ODT) tablet 4 mg  4 mg Oral Q8H PRN    Or    ondansetron (ZOFRAN) injection 4 mg  4 mg IntraVENous B6W PRN    folic acid (FOLVITE) tablet 1 mg  1 mg Oral DAILY    levothyroxine (SYNTHROID) tablet 50 mcg  50 mcg Oral 6am    midodrine (PROAMATINE) tablet 15 mg  15 mg Oral TID WITH MEALS    octreotide (SANDOSTATIN) injection 100 mcg  100 mcg IntraVENous TID    rifAXIMin (XIFAXAN) tablet 550 mg  550 mg Oral BID    sertraline (ZOLOFT) tablet 100 mg  100 mg Oral DAILY    thiamine HCL (B-1) tablet 100 mg  100 mg Oral DAILY    ursodioL (ACTIGALL) tablet 500 mg (Patient Supplied)  500 mg Oral Q12H    glucagon (GLUCAGEN) injection 1 mg  1 mg IntraMUSCular PRN    naloxone (NARCAN) injection 0.1 mg  0.1 mg IntraVENous PRN      Allergies   Allergen Reactions    Morphine Other (comments)     Severe HA.  ALL narcotics!!!       Objective:  Vitals:    Vitals:    03/30/22 0454 03/30/22 0500 03/30/22 0600 03/30/22 0700   BP:  (!) 94/48 (!) 110/54 (!) 105/48   Pulse: 64 63 76 71   Resp: 16 16 14 16   Temp:       SpO2: 99% 99% 93% 94%   Weight:       Height:         Intake and Output:  No intake/output data recorded. 03/28 1901 - 03/30 0700  In: 3861.5 [P.O.:80; I.V.:3411.1]  Out: 9539 [Urine:575]    Physical Examination:  General: On vent   HEENT:           ET tube   Neck:  Supple, no mass  Resp:  Lungs CTA B/L  CV:  RRR,  no murmur or rub,no  LE edema  GI:  Soft, NT, + Bowel sounds, no hepatosplenomegaly  Neurologic:  Sedated   :  No Beckford     [x]    High complexity decision making was performed  [x]    Patient is at high-risk of decompensation with multiple organ involvement    Lab Data Personally Reviewed: I have reviewed all the pertinent labs, microbiology data and radiology studies during assessment.     Recent Labs     03/30/22  0643 03/29/22  1031 03/29/22  0530 03/28/22  0130    135* 134* 135*   K 4.7 3.9 3.8 3.4*    100 100 102   CO2 20* 20* 20* 21   GLU 92 103* 99 119*   * 170* 165* 145*   CREA 3.57* 3.57* 3.56* 3.43*   CA 9.2 9.1 9.3 8.8   MG 2.8*  --  2.8*  --    PHOS 7.8* 5.5* 5.8* 6.4*   ALB 4.0 3.5  --  4.2   INR  --  2.3*  --   --      Recent Labs     03/30/22  0643 03/29/22  1849 03/29/22  1031 03/29/22  0530 03/28/22  0130   WBC 9.4  --  13.7* 14.3* 10.3   HGB 6.6* 8.0* 6.7* 8.1* 8.2*   HCT 19.7* 24.0* 20.1* 24.4* 24.8*   PLT 39*  --  84* 59* 46*     No results found for: BRIGID  Lab Results   Component Value Date/Time    Culture result: NO GROWTH 4 DAYS 03/14/2022 06:48 PM    Culture result: NO GROWTH 4 DAYS 03/12/2022 06:00 PM    Culture result: CANDIDA ALBICANS (A) 03/05/2022 09:22 PM     Recent Results (from the past 24 hour(s))   RENAL FUNCTION PANEL    Collection Time: 03/29/22 10:31 AM   Result Value Ref Range    Sodium 135 (L) 136 - 145 mmol/L    Potassium 3.9 3.5 - 5.1 mmol/L    Chloride 100 97 - 108 mmol/L    CO2 20 (L) 21 - 32 mmol/L    Anion gap 15 5 - 15 mmol/L    Glucose 103 (H) 65 - 100 mg/dL     (H) 6 - 20 MG/DL    Creatinine 3.57 (H) 0.55 - 1.02 MG/DL    BUN/Creatinine ratio 48 (H) 12 - 20      GFR est AA 16 (L) >60 ml/min/1.73m2    GFR est non-AA 13 (L) >60 ml/min/1.73m2    Calcium 9.1 8.5 - 10.1 MG/DL    Phosphorus 5.5 (H) 2.6 - 4.7 MG/DL    Albumin 3.5 3.5 - 5.0 g/dL   CBC WITH AUTOMATED DIFF    Collection Time: 03/29/22 10:31 AM   Result Value Ref Range    WBC 13.7 (H) 3.6 - 11.0 K/uL    RBC 2.12 (L) 3.80 - 5.20 M/uL    HGB 6.7 (L) 11.5 - 16.0 g/dL    HCT 20.1 (L) 35.0 - 47.0 %    MCV 94.8 80.0 - 99.0 FL    MCH 31.6 26.0 - 34.0 PG    MCHC 33.3 30.0 - 36.5 g/dL    RDW 25.6 (H) 11.5 - 14.5 %    PLATELET 84 (L) 271 - 400 K/uL    MPV 12.6 8.9 - 12.9 FL    NRBC 0.0 0  WBC    ABSOLUTE NRBC 0.00 0.00 - 0.01 K/uL    NEUTROPHILS 79 (H) 32 - 75 %    LYMPHOCYTES 11 (L) 12 - 49 %    MONOCYTES 9 5 - 13 %    EOSINOPHILS 0 0 - 7 %    BASOPHILS 0 0 - 1 %    IMMATURE GRANULOCYTES 1 (H) 0.0 - 0.5 %    ABS. NEUTROPHILS 10.9 (H) 1.8 - 8.0 K/UL    ABS. LYMPHOCYTES 1.5 0.8 - 3.5 K/UL    ABS. MONOCYTES 1.2 (H) 0.0 - 1.0 K/UL    ABS. EOSINOPHILS 0.0 0.0 - 0.4 K/UL    ABS. BASOPHILS 0.0 0.0 - 0.1 K/UL    ABS. IMM.  GRANS. 0.1 (H) 0.00 - 0.04 K/UL    DF SMEAR SCANNED      RBC COMMENTS ANISOCYTOSIS  3+        RBC COMMENTS MACROCYTOSIS  PRESENT        RBC COMMENTS MICROCYTOSIS  PRESENT        RBC COMMENTS AIRAM CELLS  PRESENT        RBC COMMENTS POLYCHROMASIA  1+        RBC COMMENTS OVALOCYTES  PRESENT        RBC COMMENTS SCHISTOCYTES  PRESENT       PROTHROMBIN TIME + INR    Collection Time: 03/29/22 10:31 AM   Result Value Ref Range    INR 2.3 (H) 0.9 - 1.1      Prothrombin time 23.2 (H) 9.0 - 11.1 sec   TYPE & SCREEN    Collection Time: 03/29/22 10:31 AM   Result Value Ref Range    Crossmatch Expiration 04/01/2022,2359     ABO/Rh(D) O NEGATIVE     Antibody screen NEG     Unit number [id number]     Blood component type  LR,1     Unit division 00     Status of unit TRANSFUSED     Crossmatch result Compatible     Unit number [id number]     Blood component type  LR     Unit division 00 Status of unit ALLOCATED     Crossmatch result Compatible    RBC, ALLOCATE    Collection Time: 03/29/22 12:00 PM   Result Value Ref Range    HISTORY CHECKED?  Historical check performed    HGB & HCT    Collection Time: 03/29/22  6:49 PM   Result Value Ref Range    HGB 8.0 (L) 11.5 - 16.0 g/dL    HCT 24.0 (L) 35.0 - 47.0 %   POC G3 - PUL    Collection Time: 03/29/22 11:20 PM   Result Value Ref Range    FIO2 (POC) 30 %    pH (POC) 7.33 (L) 7.35 - 7.45      pCO2 (POC) 37.1 35.0 - 45.0 MMHG    pO2 (POC) 133 (H) 80 - 100 MMHG    HCO3 (POC) 19.3 (L) 22 - 26 MMOL/L    sO2 (POC) 98.8 (H) 92 - 97 %    Base deficit (POC) 6.1 mmol/L    Site LEFT RADIAL      Device: ADULT VENT      Mode Volume Control      Tidal volume 350 ml    Set Rate 16 bpm    PEEP/CPAP (POC) 5 cmH2O    Mean Airway Pressure 7.9 cmH2O    Allens test (POC) Positive      Specimen type (POC) ARTERIAL     RENAL FUNCTION PANEL    Collection Time: 03/30/22  6:43 AM   Result Value Ref Range    Sodium 137 136 - 145 mmol/L    Potassium 4.7 3.5 - 5.1 mmol/L    Chloride 105 97 - 108 mmol/L    CO2 20 (L) 21 - 32 mmol/L    Anion gap 12 5 - 15 mmol/L    Glucose 92 65 - 100 mg/dL     (H) 6 - 20 MG/DL    Creatinine 3.57 (H) 0.55 - 1.02 MG/DL    BUN/Creatinine ratio 50 (H) 12 - 20      GFR est AA 16 (L) >60 ml/min/1.73m2    GFR est non-AA 13 (L) >60 ml/min/1.73m2    Calcium 9.2 8.5 - 10.1 MG/DL    Phosphorus 7.8 (H) 2.6 - 4.7 MG/DL    Albumin 4.0 3.5 - 5.0 g/dL   CBC W/O DIFF    Collection Time: 03/30/22  6:43 AM   Result Value Ref Range    WBC 9.4 3.6 - 11.0 K/uL    RBC 2.07 (L) 3.80 - 5.20 M/uL    HGB 6.6 (L) 11.5 - 16.0 g/dL    HCT 19.7 (L) 35.0 - 47.0 %    MCV 95.2 80.0 - 99.0 FL    MCH 31.9 26.0 - 34.0 PG    MCHC 33.5 30.0 - 36.5 g/dL    RDW 23.9 (H) 11.5 - 14.5 %    PLATELET 39 (LL) 301 - 400 K/uL    NRBC 0.0 0  WBC    ABSOLUTE NRBC 0.00 0.00 - 0.01 K/uL   MAGNESIUM    Collection Time: 03/30/22  6:43 AM   Result Value Ref Range    Magnesium 2.8 (H) 1.6 - 2.4 mg/dL   RBC, ALLOCATE    Collection Time: 03/30/22  8:45 AM   Result Value Ref Range    HISTORY CHECKED? Historical check performed                                        Care Plan discussed with:  Patient     Family      RN      Consulting Physician [facility],         I have reviewed the flowsheets. Chart and Pertinent Notes have been reviewed. No change in PMH ,family and social history from Consult note.       Heike Lopez MD

## 2022-03-30 NOTE — PROGRESS NOTES
Patient had another episode of maroon emesis (vitals in doc flowsheet). Patient awake and alert. This RN and Charge RN Transported patient to ICU 17. Gave bedside report to Renetta Harris RN who assumed care. Belongings sent with patient to room ICU 17.

## 2022-03-30 NOTE — PROGRESS NOTES
TRANSFER - OUT REPORT:    Verbal report given to SHIKHA POWERS [facility] RN(name) on Rajani RENO [facility]  ICU--17(unit) for routine post - op       Report consisted of patients Situation, Background, Assessment and   Recommendations(SBAR). Information from the following report(s) Procedure Summary was reviewed with the receiving nurse. Lines:   Peripheral IV 03/29/22 Anterior;Right Forearm (Active)   Site Assessment Clean, dry, & intact 03/29/22 0900   Phlebitis Assessment 0 03/29/22 0900   Infiltration Assessment 0 03/29/22 0900   Dressing Status Clean, dry, & intact 03/29/22 0900   Dressing Type Transparent;Tape 03/29/22 0900   Hub Color/Line Status Blue 03/29/22 0900   Alcohol Cap Used Yes 03/29/22 0900       Peripheral IV 03/29/22 Right Antecubital (Active)   Site Assessment Clean, dry, & intact 03/29/22 1137   Phlebitis Assessment 0 03/29/22 1137   Infiltration Assessment 0 03/29/22 1137   Dressing Status Clean, dry, & intact 03/29/22 1137   Dressing Type Sue;Transparent;Tape 03/29/22 1137   Hub Color/Line Status Pink 03/29/22 1137   Alcohol Cap Used Yes 03/29/22 1137        Opportunity for questions and clarification was provided.       Patient transported with:   Registered Nurse

## 2022-03-30 NOTE — PROGRESS NOTES
0730: Bedside and Verbal shift change report given to Vielka Santiago RN (oncoming nurse) by Elio Tariq (offgoing nurse). Report included the following information SBAR, Kardex, ED Summary, Procedure Summary, Intake/Output, MAR, Accordion, Recent Results, Med Rec Status, Cardiac Rhythm NSR and Alarm Parameters .      0900: Pt extubated to 3L nc

## 2022-03-30 NOTE — PROCEDURES
[address] [facility], MD, FACP, [facility], [facility], PACECELIA Evangelista, [facility] HospitalP-BC     Michelle Lopez, [facility]priseNP-BC   MANISHA Holman [facility]       Namita RomeroNew [facility] 136    at [address], [facility][note 3] Katlyn Pizano  22.    [phone]    FAX: [address] [place] [address], [address] [place] [address] - [address]    [phone]    FAX: [phone]         UPPER ENDOSCOPY PROCEDURE NOTE    Ilene Daly  1960    INDICATION: Cirrhosis. Screening for esophageal varices with variceal ligation if  indicated. : Sonia Sam MD    SURGICAL ASSISTANT:  None    PROSTHETIC DEVISES, TISSUE GRAFTS, ORGAN TRANSPLANTS:  Not applicable     ANESTHESIA/SEDATION: MAC Sedation per ICU staff    PROCEDURE DESCRIPTION:  [employer] consent was obtained from the patient for the procedure. All risks and benefits of the procedure explained. The procedure was performed in the patient's ICU room. The patient was already intubated prior to the procedure. The patient was lying on his back and tilted to the left side by propping up with pillows. Propofol drip was administered by ICU staff. The endoscope was inserted into the mouth and advanced under direct vision to the second portion of the duodenum. Careful inspection of upper gastrointestinal tract was made as the endoscope was inserted and withdrawn. Retroflexion of the endoscope to view of the cardia of the stomach was performed. The findings and interventions are described below. FINDINGS:  Esophagus:    Normal.    No esophageal varices. NO ulcerations. No MW tear.     Stomach:   Fundus with blood and clots  Moderate portal hypertensive gastropathy of the body of the stomach  No   No gastritis in antrum  No gastric varices identified. Duodenum:   Normal bulb and second portion    SPECIMENS COLLECTED:   None    INTERVENTIONS:  None    COMPLICATIONS: None. The patient tolerated the procedure well. EBL: Negligible. RECOMMENDATIONS:  Continue IV PPI  Continue IV octreotide through night.   Can DC in AM.  Monitor HB Q6H  Reinsert Dobhuff feeding tube    MD Francisco Zepeda[address] 22.  [phone]  [address] [place] [address]

## 2022-03-30 NOTE — PROGRESS NOTES
[facility]    ICU TEAM Progress Note    Name: Loralie Mohs   : 1960   MRN: [id number]   Date: 3/30/2022      I  Subjective:   Progress Note: 3/30/2022      Reason for ICU Admission: Large coffee-ground emesis    Interval history: From critical care consult on   65year-old lady with liver cirrhosis due to [facility]. She has had multiple complication in form of esophageal varices, recurrent encephalopathy, recurrent ascites and severe debility and malnutrition. Patient was admitted to the hospital on  with cirrhosis decompensation. She has been in the hospital since then with multiple complication but overall was improving. Today she had large coffee ground emesis but was associated with significant drop in her hemoglobin. Primary team consulted ICU to assist the need for ICU level care. Overnight Events:   3/30: Endoscopy showed moderate portal hypertensive gastropathy with some blood and clots in the fundus. No varices. Octreotide discontinued this morning, extubated this morning, receiving packed RBC transfusion this morning for hemoglobin of 6.6.   Hemodynamically remained stable    Active Problem List:     Problem List  Date Reviewed: 2022          Codes Class    Iron deficiency anemia due to chronic blood loss (Chronic) ICD-10-CM: D50.0  ICD-9-CM: 280.0     Overview Signed 11/10/2021  3:00 PM by Cliff Ordaz MD     10/20/21 F3 26 TIBC 312  8% sat             Hypothyroidism ICD-10-CM: E03.9  ICD-9-CM: 244.9     Overview Signed 11/10/2021  3:52 PM by Cliff Ordaz MD     10/20/21 TSH 5.27             Hepatic encephalopathy ([facility] ICD-10-CM: K72.90  ICD-9-CM: 572.2         Acute hyperkalemia ICD-10-CM: E87.5  ICD-9-CM: 276.7         Acute renal failure (ARF) (HCC) ICD-10-CM: N17.9  ICD-9-CM: 584.9         Increased ammonia level ICD-10-CM: R79.89  ICD-9-CM: 790.6         Altered mental status, unspecified ICD-10-CM: R41.82  ICD-9-CM: 780.97         Cirrhosis [facility]) ICD-10-CM: K74.60  ICD-9-CM: 571.5         Primary biliary cholangitis (HCC) ICD-10-CM: K74.3  ICD-9-CM: 571.6         Ascites ICD-10-CM: R18.8  ICD-9-CM: 789.59         Esophageal varices (HCC) ICD-10-CM: I85.00  ICD-9-CM: 456.1     Overview Signed 2021  8:02 AM by Omid Saavedra MD     MELD 22, DF 34             Anxiety ICD-10-CM: F41.9  ICD-9-CM: 300.00         Basal cell carcinoma (BCC) of face ICD-10-CM: C44.310  ICD-9-CM: 173.31         Depression ICD-10-CM: F32. A  ICD-9-CM: 230         [facility]-AC: R60.1  ICD-9-CM: 343. 3         Thrombocytopenia (HCC) ICD-10-CM: D69.6  ICD-9-CM: 287.5         Essential hypertension ICD-10-CM: I10  ICD-9-CM: 401.9     Overview Signed 10/19/2021  1:34 AM by Veronica Richmond PA-C     Increase dose of Toprolol to BID for better BP control. Follow up in three months with LIYAH Gallegos for bp check. Past Medical History:      has a past medical history of Anxiety (10/19/2021), Basal cell carcinoma (BCC) of face (10/19/2021), Chronic kidney disease, Cirrhosis of liver not due to alcohol [facility]), Depression (10/19/2021), and Thyroid disease. Past Surgical History:      has a past surgical history that includes hx  section and hx orthopaedic (Right). Home Medications:     Prior to Admission medications    Medication Sig Start Date End Date Taking? Authorizing Provider   midodrine (PROAMATINE) 5 mg tablet Take 3 Tablets by mouth three (3) times daily (with meals) for 30 days. 3/4/22 4/3/22  Avinash Mcallister MD   rifAXIMin (XIFAXAN) 550 mg tablet Take 1 Tablet by mouth two (2) times a day for 30 days. Indications: impaired brain function due to liver disease 3/4/22 4/3/22  Avinash Mcallister MD   lactulose (CHRONULAC) 10 gram/15 mL solution Take 30 mL by mouth every six (6) hours. 3/4/22   Avinash Mcallister MD   octreotide (SANDOSTATIN) 100 mcg/mL injection 1 mL by IntraVENous route three (3) times daily.  3/4/22 Sekou Galan MD   pantoprazole (PROTONIX) 40 mg tablet Take 1 Tablet by mouth daily for 30 days. 3/5/22 4/4/22  Sekou Galan MD   sodium bicarbonate infusion Patient is currently on bicarb drip on dextrose 3/4/22   Sekou Galan MD   levothyroxine (SYNTHROID) 50 mcg tablet TAKE 1 TABLET BY MOUTH DAILY BEFORE AND BREAKFAST 22   Santa Marshall MD   ursodioL (ANTONIETA Forte) 500 mg tablet Take 1 Tablet by mouth two (2) times a day. 22   Santa Marshall MD   ondansetron (ZOFRAN ODT) 8 mg disintegrating tablet Take 0.5 Tablets by mouth every eight (8) hours as needed for Nausea or Nausea or Vomiting for up to 30 days. 2/8/22 3/10/22  Santa Marshall MD   ferrous gluconate 324 mg (38 mg iron) tablet Take 1 Tablet by mouth two (2) times a day. 22   Santa Marshall MD   folic acid (FOLVITE) 1 mg tablet Take 1 Tablet by mouth daily. 22   Santa Marshall MD   magnesium oxide (MAG-OX) 400 mg tablet Take 1 Tablet by mouth daily. 22   Santa Marshall MD   sertraline (Zoloft) 100 mg tablet Take 1 Tablet by mouth daily. 22   Santa Marshall MD   thiamine HCL (B-1) 100 mg tablet Take 1 Tablet by mouth daily. 22   Santa Marshall MD       Allergies/Social/Family History: Allergies   Allergen Reactions    Morphine Other (comments)     Severe HA. ALL narcotics!!!       Social History     Tobacco Use    Smoking status: Former Smoker     Quit date:      Years since quittin.2    Smokeless tobacco: Never Used   Substance Use Topics    Alcohol use: Not Currently     Comment: History of alcohol use      Family History   Problem Relation Age of Onset    Melanoma Brother        Review of Systems:     Difficult to obtain given her medical condition    Objective:   Vital Signs:  Visit Vitals  BP (!) 104/50   Pulse 73   Temp (!) 96.1 °F (35.6 °C)   Resp 11   Ht 5' 4\" (1.626 m)   Wt 65.4 kg (144 lb 2.9 oz)   SpO2 99% Breastfeeding No   BMI 24.75 kg/m²    O2 Flow Rate (L/min): 4 l/min O2 Device: Nasal cannula Temp (24hrs), Av.9 °F (36.6 °C), Min:96.1 °F (35.6 °C), Max:98.7 °F (37.1 °C)           Intake/Output:     Intake/Output Summary (Last 24 hours) at 3/30/2022 1137  Last data filed at 3/30/2022 1000  Gross per 24 hour   Intake 3281.46 ml   Output 375 ml   Net 2906.46 ml       Physical Exam:    GENERAL:       Chronically ill looking female not in distress, sleepy  HEENT:           + icteric.  NGT in place. No active bleeding  NECK:  Supple,  LUNGS:           Vesicular breath sounds bilaterally, no added sounds. HEART:            S1 and S2 well heard,RRR,  no murmur, click, rub or gallop. ABDOMEB:     Abdomen nondistended, normoactive, nontender  EXTREMETIES: No peripheral edema  PULSES:         2+ and symmetric all extremities. SKIN:   Scattered ecchymosis, a large 1 on her upper chest, multiple wounds  NEUROLOGY:  Conscious alert, nods appropriately keep her eyes closed but opens on command, moves 4 limbs with profound weakness  LABS AND  DATA: Personally reviewed  Recent Labs     22  0643 22  1849 22  1031 22  1031   WBC 9.4  --   --  13.7*   HGB 6.6* 8.0*   < > 6.7*   HCT 19.7* 24.0*   < > 20.1*   PLT 39*  --   --  84*    < > = values in this interval not displayed. Recent Labs     22  0643 22  1031 22  0530 22  0530    135*   < > 134*   K 4.7 3.9   < > 3.8    100   < > 100   CO2 20* 20*   < > 20*   * 170*   < > 165*   CREA 3.57* 3.57*   < > 3.56*   GLU 92 103*   < > 99   CA 9.2 9.1   < > 9.3   MG 2.8*  --   --  2.8*   PHOS 7.8* 5.5*   < > 5.8*    < > = values in this interval not displayed.      Recent Labs     22  0643 22  1031   ALB 4.0 3.5     Recent Labs     22  1031   INR 2.3*   PTP 23.2*      Recent Labs     22  2320   PHI 7.33*   PCO2I 37.1   PO2I 133*   FIO2I 30     No results for input(s): CPK, CKMB, TROIQ, BNPP in the last 72 hours. Hemodynamics:   PAP:   CO:     Wedge:   CI:     CVP:    SVR: BP 2: 114/65 (03/17/22 1100)     PVR:       Ventilator Settings:  Mode Rate Tidal Volume Pressure FiO2 PEEP   Spontaneous   350 ml  8 cm H2O 21 % 5 cm H20     Peak airway pressure: 14 cm H2O    Minute ventilation: 6.86 l/min        MEDS: Reviewed    Chest X-Ray:  CXR Results  (Last 48 hours)               03/29/22 2206  XR CHEST PORT Final result    Impression:  No acute process. Narrative:  INDICATION: ETT verification       EXAM:  AP CHEST RADIOGRAPH       COMPARISON: None       FINDINGS:       AP portable view of the chest demonstrates endotracheal tube tip in satisfactory   position at the thoracic inlet, 4.2 cm above the ney. There is no edema,   effusion, consolidation, or pneumothorax. The osseous structures are   unremarkable. Assessment and Plan:   Large coffee-ground emesis:  Acute on chronic blood loss anemia:  Acute kidney injury due to hepatorenal syndrome:  Advanced liver cirrhosis due to PBC:  Portal hypertension  History of esophageal varices: EGD on February 17 in outside facility showed distal esophagitis and portal hypertensive gastropathy without esophageal varices  History of hepatic encephalopathy  History of recurrent ascites  Profound debility  Protein calorie malnutrition  Multiple wounds        Extubated the morning of March 30. Doing well   Transfuse packed RBC to maintain hemoglobin above 7. Serial H&H. Receiving transfusion this morning  Appreciate nephrology, continue with the current IV fluid. Monitor urine output and renal function. Continue with midodrine and octreotide for hepatorenal syndrome  Appreciate hepatology, discontinue octreotide drip, discontinue antibiotic, continue PPI twice daily   Continue lactulose rifaximin and ursodiol.   Resume tube feeding with supplements  Wound care  PT OT    Patient received 1 unit packed RBC with appropriate response. No evidence of further bleeding. Remained hemodynamically stable. Neurologically at baseline. At this time I do not see a clear reason for her to stay in the ICU. Will transfer to intermediate care unit    DISPOSITION  Transfer to non-ICU bed    CRITICAL CARE CONSULTANT NOTE  I had a face to face encounter with the patient, reviewed and interpreted patient data including clinical events, labs, images, vital signs, I/O's, and examined patient. I have discussed the case and the plan and management of the patient's care with the consulting services, the bedside nurses and the respiratory therapist.      NOTE OF PERSONAL INVOLVEMENT IN CARE   This patient has a high probability of imminent, clinically significant deterioration, which requires the highest level of preparedness to intervene urgently. I participated in the decision-making and personally managed or directed the management of the following life and organ supporting interventions that required my frequent assessment to treat or prevent imminent deterioration. I personally spent 40 minutes of critical care time. This is time spent at this critically ill patient's bedside actively involved in patient care as well as the coordination of care and discussions with the patient's family. This does not include any procedural time which has been billed separately. Mariam Hanks M.D.   Staff Intensivist/Pulmonologist  [facility]  3/30/2022

## 2022-03-30 NOTE — PROGRESS NOTES
Physical therapy:    Chart reviewed and spoke with RN. Patient is now extubated. Pt is currently receiving a blood transfusion for Hgb 6.6. Will hold at this time and continue to follow for re-evaluation s/p transfer to ICU on 3/29.  Thank you    Baldomero Lewis, PT, DPT

## 2022-03-31 NOTE — PROGRESS NOTES
[address] [facility]  Hospitalist Group                                                                                          Hospitalist Progress Note  Saint Parr, MD  Answering service: [phone] or [phone] from in house phone        Date of Service:  3/31/2022  NAME:  Yuly Rodriguez  :  1960  MRN:  [id number]      Admission Summary:   Copied form admit: \" Per ICU: Interval history: From critical care consult on   65year-old lady with liver cirrhosis due to [facility].  She has had multiple complication in form of esophageal varices, recurrent encephalopathy, recurrent ascites and severe debility and malnutrition. Patient was admitted to the hospital on  with cirrhosis decompensation. Mark Juarez has been in the hospital since then with multiple complication but overall was improving. Today she had large coffee ground emesis but was associated with significant drop in her hemoglobin.  Primary team consulted ICU to assist the need for ICU level care. Overnight Events:   3/30: Endoscopy showed moderate portal hypertensive gastropathy with some blood and clots in the fundus.  No varices.  Octreotide discontinued this morning, extubated this morning, receiving packed RBC transfusion this morning for hemoglobin of 6.6.  Hemodynamically remained stable        Hospitalist note:   Seen and examined patient lying in bed. States that she is ok. Flat affect   Chart reviewed. Patient to transfer out of ICU.   \"    Interval history / Subjective:     3/31/2022 :    Not in acute distress   alvarado current poly   Will briefly discuss case but chooses to be mute mainly   Nicolas Mcpherson as below        Assessment & Plan:               Acute blood loss anemia:   GI bleed  - s/p 4 units of pRBCs   - s/p EGD on   - Hgb 7.6   - Continue PPI BID   - Monitor labs and transfuse for hgb <7.0    - Hepatology following   - good iron stores, stable hgb at 7.7 3/31     Decompensated liver cirrhosis, with portal hypertension.  Felt to be secondary to [facility]  Transaminitis/hyperbilirubinemia/coagulopathy/thrombocytopenia  History of hepatic encephalopathy   -Transferred from [facility] with prolonged hospitalization.  Transferred for further work-up for liver transplant eval  -EGD on 2/17 [place] noted for distal esophagitis and portal hypertensive gastropathy, no evidence of varices.    -history of esophageal varices back in November 2021 status post banding at that time.    -Per GI at outlying facility, patient was deemed not to be a candidate for colonoscopy and recommended Cologuard.    -s/p MRCP on 2/18 which was noted for cirrhosis, portal hypertension, and large ascites.   -s/p cardiac stress test which was negative for ischemia, EF of 69%  -Continue lactulose, rifaximin, Protonix  -Continue ursodiol  -Paracentesis ordered 3/25 with [id number] removed  - PT/OT following   - Consult palliative for goals of care.      Refractory ascites  -multiple paracentesis done at [facility] including 2/11 with 11.8 L removal, 2/18 with 9/2 L removed, and 2/28 with 9.2 L removed, 3/12, 3/25  -Currently with distended abdomen  -Moderate large ascites on US 3/6. Ascites said to be refractory to diuretics, HRS limiting use of diuretics  -Ascitic drain that was placed on 3/12 was apparently clogged, flushed and drained significant amount of ascite  -Ascitic fluid lab negative for SBP     GREG with metabolic acidosis. persisent  -Baseline 0.9 on 11/26/2021   -Likely due to [facility]  -Nephrology following, recommendations appreciated.   - Continue albumin, midodrine and octreotide per nephrology   Lab Results   Component Value Date/Time    Creatinine 3.53 (H) 03/31/2022 05:21 AM         Hyponatremia  -Suspect secondary to underlying liver cirrhosis.   -Na stable 128-131 - last 143 range 3/31      Recent E. coli UTI  -Treated with Vanco and Zosyn then transitioned to Rocephin, completed 7-day antibiotic course as of 2/17  -Replace Beckford with new catheter 3/5 .  Urine culture on 3/5 grew Candida albicans 75,000 CFU. - nephrology desires that steve stay in for now      Hypothyroid  -Continue levothyroxine     Severe malnutrition  --Continue NG tube with tube feeds at night if patient tolerates. Encourage oral intake   --On regular diet and tolerating     Generalized weakness/chronic wounds. pta  -PT, OT consult  -Wound care following              Hospital Problems  Date Reviewed: 2/17/2022          Codes Class Noted POA    Cirrhosis ([facility] [address] ICD-10-CM: K74.60  ICD-9-CM: 571.5  2/8/2022 Unknown                  After personally interviewing pt at bedside the following is noted on 3/31/2022 :    Review of Systems   Reason unable to perform ROS: denies pain but will not otherwise enter into conversatons               I had a face to face encounter with patient on 3/31/2022 at bedside for the following physical exam:     PHYSICAL EXAM:    Visit Vitals  BP (!) 109/52   Pulse 63   Temp 97.3 °F (36.3 °C)   Resp 14   Ht 5' 4\" (1.626 m)   Wt 65.4 kg (144 lb 2.9 oz)   SpO2 98%   Breastfeeding No   BMI 24.75 kg/m²          Physical Exam  Constitutional:       General: She is not in acute distress. Appearance: She is ill-appearing. She is not toxic-appearing or diaphoretic. HENT:      Head: Normocephalic and atraumatic. Right Ear: External ear normal.      Left Ear: External ear normal.      Mouth/Throat:      Mouth: Mucous membranes are dry. Pharynx: Oropharynx is clear. Eyes:      General:         Right eye: No discharge. Left eye: No discharge. Cardiovascular:      Rate and Rhythm: Normal rate and regular rhythm. Pulmonary:      Effort: Pulmonary effort is normal.      Breath sounds: Normal breath sounds. Abdominal:      General: Abdomen is flat. Palpations: Abdomen is soft. Musculoskeletal:         General: No swelling or deformity. Cervical back: Neck supple. No rigidity. Skin:     General: Skin is warm and dry. Coloration: Skin is jaundiced. Skin is not pale. Neurological:      General: No focal deficit present. Mental Status: She is alert. Mental status is at baseline. Comments: Oriented to recent past, currently notes being in hospital    Psychiatric:         Mood and Affect: Mood normal.         Behavior: Behavior normal.                     Data Review:    Review and/or order of clinical lab test      Labs:     Recent Labs     03/31/22  0521 03/30/22  1448 03/30/22  0643 03/30/22  0643   WBC 11.5*  --   --  9.4   HGB 7.7* 7.6*   < > 6.6*   HCT 22.5* 22.8*   < > 19.7*   PLT 59*  --   --  39*    < > = values in this interval not displayed. Recent Labs     03/31/22  0521 03/30/22  0643 03/29/22  1031 03/29/22  0530 03/29/22  0530    137 135*   < > 134*   K 3.3* 4.7 3.9   < > 3.8   * 105 100   < > 100   CO2 18* 20* 20*   < > 20*   * 179* 170*   < > 165*   CREA 3.53* 3.57* 3.57*   < > 3.56*   * 92 103*   < > 99   CA 8.5 9.2 9.1   < > 9.3   MG  --  2.8*  --   --  2.8*   PHOS 7.1* 7.8* 5.5*   < > 5.8*    < > = values in this interval not displayed. Recent Labs     03/31/22  0521 03/30/22  0643 03/29/22  1031   ALB 3.4* 4.0 3.5     Recent Labs     03/29/22  1031   INR 2.3*   PTP 23.2*      No results for input(s): FE, TIBC, PSAT, FERR in the last 72 hours. Lab Results   Component Value Date/Time    Folate 38.3 (H) 10/20/2021 07:53 AM    RBC FOLATE 878 02/14/2022 01:27 AM      No results for input(s): PH, PCO2, PO2 in the last 72 hours. No results for input(s): CPK, CKNDX, TROIQ in the last 72 hours.     No lab exists for component: CPKMB  Lab Results   Component Value Date/Time    Cholesterol, total 122 01/25/2022 09:50 AM    HDL Cholesterol 40 01/25/2022 09:50 AM    LDL, calculated 67.2 01/25/2022 09:50 AM    Triglyceride 74 01/25/2022 09:50 AM    CHOL/HDL Ratio 3.1 01/25/2022 09:50 AM     Lab Results   Component Value Date/Time    Glucose (POC) 128 (H) 03/15/2022 06:06 AM Glucose (POC) 123 (H) 03/15/2022 12:14 AM    Glucose (POC) 114 (H) 02/23/2022 05:42 PM    Glucose (POC) 127 (H) 02/23/2022 12:04 PM    Glucose (POC) 122 (H) 02/23/2022 08:18 AM     Lab Results   Component Value Date/Time    Color DARK YELLOW 03/05/2022 09:22 PM    Appearance TURBID (A) 03/05/2022 09:22 PM    Specific gravity 1.014 03/05/2022 09:22 PM    Specific gravity 1.010 02/11/2022 07:20 PM    pH (UA) 5.5 03/05/2022 09:22 PM    Protein 100 (A) 03/05/2022 09:22 PM    Glucose Negative 03/05/2022 09:22 PM    Ketone Negative 03/05/2022 09:22 PM    Bilirubin NEGATIVE 02/11/2022 07:20 PM    Urobilinogen 0.2 03/05/2022 09:22 PM    Nitrites Negative 03/05/2022 09:22 PM    Leukocyte Esterase LARGE (A) 03/05/2022 09:22 PM    Epithelial cells FEW 03/05/2022 09:22 PM    Bacteria 1+ (A) 03/05/2022 09:22 PM    WBC >100 (H) 03/05/2022 09:22 PM    RBC  03/05/2022 09:22 PM         Medications Reviewed:     Current Facility-Administered Medications   Medication Dose Route Frequency    0.9% sodium chloride infusion 250 mL  250 mL IntraVENous PRN    pantoprazole (PROTONIX) 40 mg in 0.9% sodium chloride 10 mL injection  40 mg IntraVENous Q12H    0.9% sodium chloride infusion 250 mL  250 mL IntraVENous PRN    sodium chloride (NS) flush 5-40 mL  5-40 mL IntraVENous Q8H    sodium chloride (NS) flush 5-40 mL  5-40 mL IntraVENous PRN    simethicone (MYLICON) 94AX/5.9IG oral drops 80 mg  1.2 mL Oral Multiple    sodium bicarbonate tablet 325 mg  325 mg Oral BID    lactulose (CHRONULAC) 10 gram/15 mL solution 30 mL  30 mL Oral DAILY    promethazine (PHENERGAN) tablet 12.5 mg  12.5 mg Oral Q6H PRN    epoetin vic-epbx (RETACRIT) injection 10,000 Units  10,000 Units SubCUTAneous Q TUE, THU & SAT    multivitamin, tx-iron-ca-min (THERA-M w/ IRON) tablet 1 Tablet  1 Tablet Oral DAILY    miconazole (MICONTIN) 2 % ointment   Topical BID    balsam peru-castor oiL (VENELEX) ointment   Topical BID    zinc oxide-cod liver oil (DESITIN) 40 % paste   Topical PRN    sodium chloride (NS) flush 5-40 mL  5-40 mL IntraVENous Q8H    sodium chloride (NS) flush 5-40 mL  5-40 mL IntraVENous PRN    acetaminophen (TYLENOL) tablet 650 mg  650 mg Oral Q6H PRN    Or    acetaminophen (TYLENOL) suppository 650 mg  650 mg Rectal Q6H PRN    polyethylene glycol (MIRALAX) packet 17 g  17 g Oral DAILY PRN    ondansetron (ZOFRAN ODT) tablet 4 mg  4 mg Oral Q8H PRN    Or    ondansetron (ZOFRAN) injection 4 mg  4 mg IntraVENous R0V PRN    folic acid (FOLVITE) tablet 1 mg  1 mg Oral DAILY    levothyroxine (SYNTHROID) tablet 50 mcg  50 mcg Oral 6am    midodrine (PROAMATINE) tablet 15 mg  15 mg Oral TID WITH MEALS    octreotide (SANDOSTATIN) injection 100 mcg  100 mcg IntraVENous TID    rifAXIMin (XIFAXAN) tablet 550 mg  550 mg Oral BID    sertraline (ZOLOFT) tablet 100 mg  100 mg Oral DAILY    thiamine HCL (B-1) tablet 100 mg  100 mg Oral DAILY    ursodioL (ACTIGALL) tablet 500 mg (Patient Supplied)  500 mg Oral Q12H    glucagon (GLUCAGEN) injection 1 mg  1 mg IntraMUSCular PRN    naloxone (NARCAN) injection 0.1 mg  0.1 mg IntraVENous PRN     ______________________________________________________________________  EXPECTED LENGTH OF STAY: 4d 16h  ACTUAL LENGTH OF STAY:          26                 Darling Christopher MD

## 2022-03-31 NOTE — PROGRESS NOTES
Problem: Falls - Risk of  Goal: *Absence of Falls  Description: Document Juan Nicholas Fall Risk and appropriate interventions in the flowsheet. Outcome: Progressing Towards Goal  Note: Fall Risk Interventions:  Mobility Interventions: Communicate number of staff needed for ambulation/transfer    Mentation Interventions: Door open when patient unattended    Medication Interventions: Evaluate medications/consider consulting pharmacy    Elimination Interventions: Toileting schedule/hourly rounds    History of Falls Interventions: Consult care management for discharge planning         Problem: Patient Education: Go to Patient Education Activity  Goal: Patient/Family Education  Outcome: Progressing Towards Goal     Problem: Pressure Injury - Risk of  Goal: *Prevention of pressure injury  Description: Document Rodrigo Scale and appropriate interventions in the flowsheet.   Outcome: Progressing Towards Goal  Note: Pressure Injury Interventions:  Sensory Interventions: Assess changes in LOC    Moisture Interventions: Absorbent underpads    Activity Interventions: Assess need for specialty bed    Mobility Interventions: Assess need for specialty bed    Nutrition Interventions: Document food/fluid/supplement intake    Friction and Shear Interventions: Apply protective barrier, creams and emollients

## 2022-03-31 NOTE — PROGRESS NOTES
1930: Bedside shift change report given to Diane Perez  (oncoming nurse) by Berta Tapia  (offgoing nurse). Report included the following information SBAR, Kardex, Intake/Output and MAR.       0730: Bedside shift change report given to Yi Singh  (oncoming nurse) by Diane Perez  (offgoing nurse). Report included the following information SBAR, Kardex, Intake/Output and MAR.

## 2022-03-31 NOTE — WOUND CARE
WOCN Note:     Follow up wound care visit to re-assess sacral wound and skin tears  Assessed in room 7117  Isolation: no    Chart shows:  Patient admitted on 3/5/22 with Cirrhosis  Past Medical History:   Diagnosis Date    Anxiety 10/19/2021    Basal cell carcinoma (BCC) of face 10/19/2021    Chronic kidney disease     \"end stage liver disease\" per son    Cirrhosis of liver not due to alcohol ([address]     Depression 10/19/2021    Thyroid disease       3/31/22  WBC = 11.5  Hgb = 7.7    Assessment:   Alert, answering questions by nodding but is keeping eyes closed   Reports no pain  Mobility: moderate assistance with repositioning and turning  Continence: Incontinent of stool. Beckfrod catheter  Restraints: no  Last Rodrigo Score: 12  Surface: Piyush in-touch mattress  Diet: NPO, tube feeding    Wt Readings from Last 2 Encounters:   03/18/22 65.4 kg (144 lb 2.9 oz)   02/17/22 68 kg (149 lb 14.6 oz)     1. POA stage 3 sacral pressure injury  0.8 x 1.8 x <0.1 cm  Wound bed pink   scant amount tan exudate  no odor   defined edges  Periwound intact with blanchable erythema   Treatment: Cleansed with saline, hydrocolloid    2. Skin tear to right forearm  Etiology: skin very fragile and thin  Partial thickness   5 x 2 x <0.1 cm  Wound bed red/pink   sanguinous exudate  Periwound intact & with multiple purple splotches  Treatment: cleansed with saline, Mepitel one, opticel Ag to help with hemostasis, foam    3. Skin tear to right upper arm  Etiology: skin very fragile and thin  Partial thickness   1.2 x 1 x <0.1 cm  Wound bed pink  Scant serous exudate  Periwound intact & with multiple purple splotches  Treatment: cleansed with saline, xeroform, foam    4.  Skin tear to left upper arm  Etiology: skin very fragile and thin  Partial thickness   1 x 2 x <0.1 cm  Wound bed pink   Scant serous exudate  Periwound intact & with multiple purple splotches  Treatment: cleansed with saline, xeroform, foam    Wound Recommendations:      Continue  1) Cleanse wound to sacrum with wound cleanser or normal saline, pat dry   2) Apply skin barrier wipe to periwound skin and allow to dry   3) Warm hydrocolloid dressing between hands   4) Fold  hydrocolloid dressing and apply to area   5) Smooth dressing from center outward and hold dressing in place to improve adhesion   Change dressing weekly and prn if becomes soiled or loosens/ edges curl    Continue  Cleanse skin tears to bilateral arms with normal saline, apply folded xeroform, cover with gauze and stretch gauze (do not apply any adhesive to skin), daily and prn if becomes soiled    PI Prevention:  Turn/reposition approximately every 2 hours  Offload heels with pillows at all times in bed. Pad bony prominences   Keep HOB 30 degrees or less to decrease shearing and pressure unless medically contraindicated.  If HOB is to be over 30 degrees, raise knees first then [facility] [place] to prevent sliding   Minimize layers of linen/pads under patient to optimize support surface to one flat sheet and one incontinence pad     Discussed with RN, Ava Blas    Transition of Care: Plan to follow weekly and as needed while admitted to hospital.      Candice Risk MSN, RN, [facility]  Certified Wound and Ostomy Nurse  office [phone]  Can be reached through [address] [place] [address]

## 2022-03-31 NOTE — PROGRESS NOTES
Occupational Therapy:     Attempted OT re-evaluation. Spoke with RN and patient declining OT today. Noted in chart pt with palliative care consult pending today. Will hold and continue to follow.      Thank you,   BRIAN Miller, OTR/L

## 2022-03-31 NOTE — PROGRESS NOTES
Transition of Care Plan   RUR- High Risk   DISPOSITION: SNF for rehab   F/U with PCP/Specialist     Transport: [facility]  CM received a call from patient's son Ashley Julio with additional choices for SNF fo rhis mother.    Referrals made through [facility] to   [facility] and rehab  [facility] and rehab    Ismael Torres RN,Care Management

## 2022-03-31 NOTE — PROGRESS NOTES
[facility]   [address] [address], [facility], [facility]  Phone: [phone]   [phone]       Nephrology Progress Note  Frances Perkins     1960     [id number]  Date of Admission : 3/5/2022  03/31/22    CC:  Follow up for greg    Assessment and Plan   GREG:  - 2/2 HRS and now w/ GI bleed  - Non oliguric   - increased FW flushes and expect improvement in BUN   - Beckford should not be removed   - labs daily     Hypernatremia :  - 2/2 lack of FW     Severe anemia:  GI bleed  -  On EDWIN TTS  - transfusions per primary team      Cirrhosis, Portal HTN, PBC  - poral gastropathy  - being w/u for [facility] transplant      Hypothyroidism    Malnutrition:       Interval History:  Extubated and doing well. No further GI bleed. Has DHT and getting nocturnal TF  MS back to baseline   Cr stable and BUN trending down .  cc     Review of Systems: A comprehensive review of systems was negative except for that written in the HPI.     Current Medications:   Current Facility-Administered Medications   Medication Dose Route Frequency    0.9% sodium chloride infusion 250 mL  250 mL IntraVENous PRN    pantoprazole (PROTONIX) 40 mg in 0.9% sodium chloride 10 mL injection  40 mg IntraVENous Q12H    0.9% sodium chloride infusion 250 mL  250 mL IntraVENous PRN    sodium chloride (NS) flush 5-40 mL  5-40 mL IntraVENous Q8H    sodium chloride (NS) flush 5-40 mL  5-40 mL IntraVENous PRN    simethicone (MYLICON) 36UG/8.6CU oral drops 80 mg  1.2 mL Oral Multiple    sodium bicarbonate tablet 325 mg  325 mg Oral BID    lactulose (CHRONULAC) 10 gram/15 mL solution 30 mL  30 mL Oral DAILY    promethazine (PHENERGAN) tablet 12.5 mg  12.5 mg Oral Q6H PRN    epoetin vic-epbx (RETACRIT) injection 10,000 Units  10,000 Units SubCUTAneous Q TUE, THU & SAT    multivitamin, tx-iron-ca-min (THERA-M w/ IRON) tablet 1 Tablet  1 Tablet Oral DAILY    miconazole (MICONTIN) 2 % ointment   Topical BID    balsam peru-castor oiL (VENELEX) ointment   Topical BID    zinc oxide-cod liver oil (DESITIN) 40 % paste   Topical PRN    sodium chloride (NS) flush 5-40 mL  5-40 mL IntraVENous Q8H    sodium chloride (NS) flush 5-40 mL  5-40 mL IntraVENous PRN    acetaminophen (TYLENOL) tablet 650 mg  650 mg Oral Q6H PRN    Or    acetaminophen (TYLENOL) suppository 650 mg  650 mg Rectal Q6H PRN    polyethylene glycol (MIRALAX) packet 17 g  17 g Oral DAILY PRN    ondansetron (ZOFRAN ODT) tablet 4 mg  4 mg Oral Q8H PRN    Or    ondansetron (ZOFRAN) injection 4 mg  4 mg IntraVENous E9U PRN    folic acid (FOLVITE) tablet 1 mg  1 mg Oral DAILY    levothyroxine (SYNTHROID) tablet 50 mcg  50 mcg Oral 6am    midodrine (PROAMATINE) tablet 15 mg  15 mg Oral TID WITH MEALS    octreotide (SANDOSTATIN) injection 100 mcg  100 mcg IntraVENous TID    rifAXIMin (XIFAXAN) tablet 550 mg  550 mg Oral BID    sertraline (ZOLOFT) tablet 100 mg  100 mg Oral DAILY    thiamine HCL (B-1) tablet 100 mg  100 mg Oral DAILY    ursodioL (ACTIGALL) tablet 500 mg (Patient Supplied)  500 mg Oral Q12H    glucagon (GLUCAGEN) injection 1 mg  1 mg IntraMUSCular PRN    naloxone (NARCAN) injection 0.1 mg  0.1 mg IntraVENous PRN      Allergies   Allergen Reactions    Morphine Other (comments)     Severe HA. ALL narcotics!!!       Objective:  Vitals:    Vitals:    03/31/22 0400 03/31/22 0500 03/31/22 0600 03/31/22 0700   BP: 109/61 (!) 112/56 (!) 113/56 111/64   Pulse: 70 65 63 69   Resp: 26 16  10   Temp: 97.6 °F (36.4 °C)      SpO2: 98% 95% 97% 99%   Weight:       Height:         Intake and Output:  No intake/output data recorded.   03/29 1901 - 03/31 0700  In: 4761.1 [I.V.:2911.1]  Out: 1566 [Urine:1155]    Physical Examination:  General: Off vent   HEENT:           Pale and icteric   Neck:  Supple, no mass  Resp:  Lungs CTA B/L  CV:  RRR,  no murmur or rub,no  LE edema  GI:  Soft, NT, + Bowel sounds, no hepatosplenomegaly  Neurologic:  AAO X 3  :  + Beckford [x]    High complexity decision making was performed  [x]    Patient is at high-risk of decompensation with multiple organ involvement    Lab Data Personally Reviewed: I have reviewed all the pertinent labs, microbiology data and radiology studies during assessment. Recent Labs     03/31/22  0521 03/30/22  0643 03/29/22  1031 03/29/22  0530    137 135* 134*   K 3.3* 4.7 3.9 3.8   * 105 100 100   CO2 18* 20* 20* 20*   * 92 103* 99   * 179* 170* 165*   CREA 3.53* 3.57* 3.57* 3.56*   CA 8.5 9.2 9.1 9.3   MG  --  2.8*  --  2.8*   PHOS 7.1* 7.8* 5.5* 5.8*   ALB 3.4* 4.0 3.5  --    INR  --   --  2.3*  --      Recent Labs     03/31/22  0521 03/30/22  1448 03/30/22  0643 03/29/22  1849 03/29/22  1031 03/29/22  0530 03/29/22  0530   WBC 11.5*  --  9.4  --  13.7*  --  14.3*   HGB 7.7* 7.6* 6.6* 8.0* 6.7*   < > 8.1*   HCT 22.5* 22.8* 19.7* 24.0* 20.1*   < > 24.4*   PLT 59*  --  39*  --  84*  --  59*    < > = values in this interval not displayed. No results found for: SDES  Lab Results   Component Value Date/Time    Culture result: NO GROWTH 4 DAYS 03/14/2022 06:48 PM    Culture result: NO GROWTH 4 DAYS 03/12/2022 06:00 PM    Culture result: CANDIDA ALBICANS (A) 03/05/2022 09:22 PM     Recent Results (from the past 24 hour(s))   RBC, ALLOCATE    Collection Time: 03/30/22  8:45 AM   Result Value Ref Range    HISTORY CHECKED?  Historical check performed    HGB & HCT    Collection Time: 03/30/22  2:48 PM   Result Value Ref Range    HGB 7.6 (L) 11.5 - 16.0 g/dL    HCT 22.8 (L) 35.0 - 47.0 %   RENAL FUNCTION PANEL    Collection Time: 03/31/22  5:21 AM   Result Value Ref Range    Sodium 142 136 - 145 mmol/L    Potassium 3.3 (L) 3.5 - 5.1 mmol/L    Chloride 110 (H) 97 - 108 mmol/L    CO2 18 (L) 21 - 32 mmol/L    Anion gap 14 5 - 15 mmol/L    Glucose 128 (H) 65 - 100 mg/dL     (H) 6 - 20 MG/DL    Creatinine 3.53 (H) 0.55 - 1.02 MG/DL    BUN/Creatinine ratio 47 (H) 12 - 20      GFR est AA 16 (L) >60 ml/min/1.73m2    GFR est non-AA 13 (L) >60 ml/min/1.73m2    Calcium 8.5 8.5 - 10.1 MG/DL    Phosphorus 7.1 (H) 2.6 - 4.7 MG/DL    Albumin 3.4 (L) 3.5 - 5.0 g/dL   CBC W/O DIFF    Collection Time: 03/31/22  5:21 AM   Result Value Ref Range    WBC 11.5 (H) 3.6 - 11.0 K/uL    RBC 2.42 (L) 3.80 - 5.20 M/uL    HGB 7.7 (L) 11.5 - 16.0 g/dL    HCT 22.5 (L) 35.0 - 47.0 %    MCV 93.0 80.0 - 99.0 FL    MCH 31.8 26.0 - 34.0 PG    MCHC 34.2 30.0 - 36.5 g/dL    RDW 22.5 (H) 11.5 - 14.5 %    PLATELET 59 (L) 450 - 400 K/uL    MPV 12.1 8.9 - 12.9 FL    NRBC 0.0 0  WBC    ABSOLUTE NRBC 0.00 0.00 - 0.01 K/uL                                       Care Plan discussed with:  Patient     Family      RN      Consulting Physician [address],         I have reviewed the flowsheets. Chart and Pertinent Notes have been reviewed. No change in PMH ,family and social history from Consult note.       Tamara Ellsworth MD

## 2022-03-31 NOTE — PROGRESS NOTES
[address] [facility] Adult  Hospitalist Group                                                                                          Hospitalist Progress Note  Devonte Bautistaort  Answering service: [phone] OR [phone] from in house phone        Date of Service:  3/31/2022  NAME:  Arabella Bravo  :  1960  MRN:  [id number]      Admission Summary:   60-year-old  female with a past medical history of liver cirrhosis with ascites requiring large volume paracentesis, esophageal varices s/p banding, acute kidney injury, protal hypertension, anxiety, and depression who is transferred to [facility] for further care and liver transplant work up. Pt initially presented to outside hospital with alteral mental status due to  Hepatic encephalopathy. Hospital stay has been long and complicated by multiple paracentesis, worsening renal function with possibility of HD, liver cirrhosis not related to alcohol. Patient was admitted to ICU. Hospitalist service consulted for ICU down grade. Interval history / Subjective:    Patient seen rest in bed with no signs of acute distress. Patient's affect flat, responses appropriately and follows commands. Nurse reports patient is not very interactive with staff. Nursing also reports two black stools in past 24 hours.      Assessment & Plan:     Cirrhosis, Portal Hypertension, d/t PBC  Hx Hepatic Encephalopathy   -Dr. Ángel Stein following  -Continue lactulose, rifaximin, octreotide, and protonix  -Monitor stool out put    Acute Kidney Injury  -Nephrology following  -Creatinine 3.57, Baseline 0.8 on 2021  -Continue albumin, midodrine, and octreotide    Anemia d/t  Acute blood loss associated with coffee ground emesis  -Hgb 7.6 after 2 units PRBC  -Continue PPI, octreotide  -Trend labs    Hypothyroidism  -Continue levothyroxine    Severe Protein Caloric  Malnutrition  -Continue NG tube feedings    Debility/Generalized Weakness/Chronic Wounds  -PT/OT consult  -Wound care consult        Code status: Full  DVT prophylaxis: SCD's    Care Plan discussed with: Patient/Family and Nurse  Anticipated Disposition: SNF/LTC  Anticipated Discharge: Greater than 48 hours     Hospital Problems  Date Reviewed: 2/17/2022          Codes Class Noted POA    Cirrhosis ([facility] [address] ICD-10-CM: K74.60  ICD-9-CM: 571.5  2/8/2022 Unknown                Review of Systems:   A comprehensive review of systems was negative except for that written in the HPI. Vital Signs:    Last 24hrs VS reviewed since prior progress note. Most recent are:  Visit Vitals  /61   Pulse 75   Temp 97.4 °F (36.3 °C)   Resp (!) 0   Ht 5' 4\" (1.626 m)   Wt 65.4 kg (144 lb 2.9 oz)   SpO2 100%   Breastfeeding No   BMI 24.75 kg/m²         Intake/Output Summary (Last 24 hours) at 3/31/2022 2811  Last data filed at 3/30/2022 2100  Gross per 24 hour   Intake 1534.5 ml   Output 735 ml   Net 799.5 ml        Physical Examination:             Constitutional:  No acute distress, cooperative, pleasant    ENT:  Oral mucosa moist, oropharynx benign. Resp:  CTA bilaterally. No wheezing/rhonchi/rales. No accessory muscle use   CV:  Regular rhythm, normal rate, no murmurs, gallops, rubs    GI:  Soft, non distended, non tender. normoactive bowel sounds, no hepatosplenomegaly     Musculoskeletal:  No edema, warm, 2+ pulses throughout    Neurologic:  Moves all extremities. AAOx3, CN II-XII reviewed     Psych:  affect flat, appears depressed       Data Review:    Review and/or order of clinical lab test  Review and/or order of tests in the radiology section of CPT      Labs:     Recent Labs     03/30/22  1448 03/30/22  0643 03/29/22  1849 03/29/22  1031   WBC  --  9.4  --  13.7*   HGB 7.6* 6.6*   < > 6.7*   HCT 22.8* 19.7*   < > 20.1*   PLT  --  39*  --  84*    < > = values in this interval not displayed.      Recent Labs     03/30/22  0643 03/29/22  1031 03/29/22  0530    135* 134*   K 4.7 3.9 3.8    100 100   CO2 20* 20* 20*   * 170* 165*   CREA 3.57* 3.57* 3.56*   GLU 92 103* 99   CA 9.2 9.1 9.3   MG 2.8*  --  2.8*   PHOS 7.8* 5.5* 5.8*     Recent Labs     03/30/22  0643 03/29/22  1031   ALB 4.0 3.5     Recent Labs     03/29/22  1031   INR 2.3*   PTP 23.2*      No results for input(s): FE, TIBC, PSAT, FERR in the last 72 hours. Lab Results   Component Value Date/Time    Folate 38.3 (H) 10/20/2021 07:53 AM    RBC FOLATE 878 02/14/2022 01:27 AM      No results for input(s): PH, PCO2, PO2 in the last 72 hours. No results for input(s): CPK, CKNDX, TROIQ in the last 72 hours.     No lab exists for component: CPKMB  Lab Results   Component Value Date/Time    Cholesterol, total 122 01/25/2022 09:50 AM    HDL Cholesterol 40 01/25/2022 09:50 AM    LDL, calculated 67.2 01/25/2022 09:50 AM    Triglyceride 74 01/25/2022 09:50 AM    CHOL/HDL Ratio 3.1 01/25/2022 09:50 AM     Lab Results   Component Value Date/Time    Glucose (POC) 128 (H) 03/15/2022 06:06 AM    Glucose (POC) 123 (H) 03/15/2022 12:14 AM    Glucose (POC) 114 (H) 02/23/2022 05:42 PM    Glucose (POC) 127 (H) 02/23/2022 12:04 PM    Glucose (POC) 122 (H) 02/23/2022 08:18 AM     Lab Results   Component Value Date/Time    Color DARK YELLOW 03/05/2022 09:22 PM    Appearance TURBID (A) 03/05/2022 09:22 PM    Specific gravity 1.014 03/05/2022 09:22 PM    Specific gravity 1.010 02/11/2022 07:20 PM    pH (UA) 5.5 03/05/2022 09:22 PM    Protein 100 (A) 03/05/2022 09:22 PM    Glucose Negative 03/05/2022 09:22 PM    Ketone Negative 03/05/2022 09:22 PM    Bilirubin NEGATIVE 02/11/2022 07:20 PM    Urobilinogen 0.2 03/05/2022 09:22 PM    Nitrites Negative 03/05/2022 09:22 PM    Leukocyte Esterase LARGE (A) 03/05/2022 09:22 PM    Epithelial cells FEW 03/05/2022 09:22 PM    Bacteria 1+ (A) 03/05/2022 09:22 PM    WBC >100 (H) 03/05/2022 09:22 PM    RBC  03/05/2022 09:22 PM         Medications Reviewed:     Current Facility-Administered Medications   Medication Dose Route Frequency    0.9% sodium chloride infusion 250 mL  250 mL IntraVENous PRN    pantoprazole (PROTONIX) 40 mg in 0.9% sodium chloride 10 mL injection  40 mg IntraVENous Q12H    0.9% sodium chloride infusion 250 mL  250 mL IntraVENous PRN    sodium chloride (NS) flush 5-40 mL  5-40 mL IntraVENous Q8H    sodium chloride (NS) flush 5-40 mL  5-40 mL IntraVENous PRN    simethicone (MYLICON) 60XT/8.8TJ oral drops 80 mg  1.2 mL Oral Multiple    sodium bicarbonate tablet 325 mg  325 mg Oral BID    lactulose (CHRONULAC) 10 gram/15 mL solution 30 mL  30 mL Oral DAILY    promethazine (PHENERGAN) tablet 12.5 mg  12.5 mg Oral Q6H PRN    epoetin vic-epbx (RETACRIT) injection 10,000 Units  10,000 Units SubCUTAneous Q TUE, THU & SAT    multivitamin, tx-iron-ca-min (THERA-M w/ IRON) tablet 1 Tablet  1 Tablet Oral DAILY    miconazole (MICONTIN) 2 % ointment   Topical BID    balsam peru-castor oiL (VENELEX) ointment   Topical BID    zinc oxide-cod liver oil (DESITIN) 40 % paste   Topical PRN    sodium chloride (NS) flush 5-40 mL  5-40 mL IntraVENous Q8H    sodium chloride (NS) flush 5-40 mL  5-40 mL IntraVENous PRN    acetaminophen (TYLENOL) tablet 650 mg  650 mg Oral Q6H PRN    Or    acetaminophen (TYLENOL) suppository 650 mg  650 mg Rectal Q6H PRN    polyethylene glycol (MIRALAX) packet 17 g  17 g Oral DAILY PRN    ondansetron (ZOFRAN ODT) tablet 4 mg  4 mg Oral Q8H PRN    Or    ondansetron (ZOFRAN) injection 4 mg  4 mg IntraVENous K5V PRN    folic acid (FOLVITE) tablet 1 mg  1 mg Oral DAILY    levothyroxine (SYNTHROID) tablet 50 mcg  50 mcg Oral 6am    midodrine (PROAMATINE) tablet 15 mg  15 mg Oral TID WITH MEALS    octreotide (SANDOSTATIN) injection 100 mcg  100 mcg IntraVENous TID    rifAXIMin (XIFAXAN) tablet 550 mg  550 mg Oral BID    sertraline (ZOLOFT) tablet 100 mg  100 mg Oral DAILY    thiamine HCL (B-1) tablet 100 mg  100 mg Oral DAILY    ursodioL (ACTIGALL) tablet 500 mg (Patient Supplied)  500 mg Oral Q12H    glucagon (GLUCAGEN) injection 1 mg  1 mg IntraMUSCular PRN    naloxone (NARCAN) injection 0.1 mg  0.1 mg IntraVENous PRN     ______________________________________________________________________  EXPECTED LENGTH OF STAY: 4d 16h  ACTUAL LENGTH OF STAY:          58 Michael Bates

## 2022-03-31 NOTE — PROGRESS NOTES
[facility] Adult  Hospitalist Group     ICU Transfer/Accept Summary     This [place] is being [place] [address] [place]  DATE OF TRANSFER: 3/30/2022       PATIENT ID: Sanjeev Noe  MRN: [id number]   YOB: 1960    PRIMARY CARE PROVIDER: Viviana Hauser MD   DATE OF ADMISSION: 3/5/2022  2:14 PM    ATTENDING PHYSICIAN: Arian Barboza NP  CONSULTATIONS:   IP CONSULT TO NEPHROLOGY  IP CONSULT TO HEPATOLOGY    PROCEDURES/SURGERIES:   Procedure(s):  ESOPHAGOGASTRODUODENOSCOPY (EGD)    REASON FOR ADMISSION: <principal problem not specified>     HOSPITAL PROBLEM LIST:  Patient Active Problem List   Diagnosis Code    Essential hypertension I10    Anxiety F41.9    Basal cell carcinoma (BCC) of face C44.310    Depression F32. 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  She has had multiple complication in form of esophageal varices, recurrent encephalopathy, recurrent ascites and severe debility and malnutrition. Patient was admitted to the hospital on March 5 with cirrhosis decompensation. Shalom Cee has been in the hospital since then with multiple complication but overall was improving. Today she had large coffee ground emesis but was associated with significant drop in her hemoglobin.  Primary team consulted ICU to assist the need for ICU level care.   Overnight Events:   3/30: Endoscopy showed moderate portal hypertensive gastropathy with some blood and clots in the fundus. No varices. Octreotide discontinued this morning, extubated this morning, receiving packed RBC transfusion this morning for hemoglobin of 6.6. Hemodynamically remained stable      Hospitalist note:   Seen and examined patient lying in bed. States that she is ok. Flat affect   Chart reviewed. Patient to transfer out of ICU. Assessment and Plan:    Acute blood loss anemia:   GI bleed  - s/p 4 units of pRBCs   - s/p EGD on 03/29  - Hgb 7.6   - Continue PPI BID   - Monitor labs and transfuse for hgb <7.0    - Hepatology following      Decompensated liver cirrhosis, with portal hypertension.  Felt to be secondary to [facility]  Transaminitis/hyperbilirubinemia/coagulopathy/thrombocytopenia  History of hepatic encephalopathy   -Transferred from [address] [place] [address] with prolonged hospitalization.  Transferred for further work-up for liver transplant eval  -EGD on 2/17 Zahl noted for distal esophagitis and portal hypertensive gastropathy, no evidence of varices.    -history of esophageal varices back in November 2021 status post banding at that time.    -Per GI at outlying facility, patient was deemed not to be a candidate for colonoscopy and recommended Cologuard.    -s/p MRCP on 2/18 which was noted for cirrhosis, portal hypertension, and large ascites.   -s/p cardiac stress test which was negative for ischemia, EF of 69%  -Continue lactulose, rifaximin, Protonix  -Continue ursodiol  -Paracentesis ordered 3/25 with 82221 removed  - PT/OT following   - Consult palliative for goals of care.      Refractory ascites  -multiple paracentesis done at [facility] including 2/11 with 11.8 L removal, 2/18 with 9/2 L removed, and 2/28 with 9.2 L removed, 3/12, 3/25  -Currently with distended abdomen  -Moderate large ascites on US 3/6. Ascites said to be refractory to diuretics, HRS limiting use of diuretics  -Ascitic drain that was placed on 3/12 was apparently clogged, flushed and drained significant amount of ascite  -Ascitic fluid lab negative for SBP     GREG with metabolic acidosis. persisent  -Baseline 0.9 on 11/26/2021   -Likely due to [facility]  -Nephrology following, recommendations appreciated. - Continue albumin, midodrine and octreotide per nephrology      Hyponatremia  -Suspect secondary to underlying liver cirrhosis.   -Na stable 128-131     Recent E. coli UTI  -Treated with Vanco and Zosyn then transitioned to Rocephin, completed 7-day antibiotic course as of 2/17  -Replace Beckford with new catheter 3/5 . Urine culture on 3/5 grew Candida albicans 75,000 CFU.     Hypothyroid  -Continue levothyroxine     Severe malnutrition  --Continue NG tube with tube feeds at night if patient tolerates. Encourage oral intake   --On regular diet and tolerating    Generalized weakness/chronic wounds. pta  -PT, OT consult  -Wound care following          PHYSICAL EXAMINATION:  Visit Vitals  BP (!) 111/53   Pulse 64   Temp 97.4 °F (36.3 °C)   Resp 11   Ht 5' 4\" (1.626 m)   Wt 65.4 kg (144 lb 2.9 oz)   SpO2 100%   Breastfeeding No   BMI 24.75 kg/m²       General:          Alert, cooperative, ill- appearing   HEENT:           Atraumatic, MMM            Neck:               Supple, symmetrical,  thyroid: non tender  Lungs:             Clear to auscultation bilaterally. No Wheezing or Rhonchi. No rales. Heart:              Regular  rhythm,  No  murmur   No edema  Abdomen:       Soft, non-tender. Not distended. Bowel sounds normal  Extremities:     No cyanosis. No clubbing,  +2 distal pulses  Skin:                Scattered ecchymosis. Multiple skin wounds   Psych:             Not anxious or agitated.   Neurologic:      Alert, moves all extremities with generalized weakness, oriented X 3.     CODE STATUS:   Full Code   X DNR    Partial    Comfort Care     Signed:   Rafaela Crowley NP  Date of Service:  3/30/2022  11:14 PM

## 2022-03-31 NOTE — PROGRESS NOTES
Physical Therapy:    Attempted PT re-evaluation. Spoke with RN and patient declining PT today. Noted in chart pt with palliative care consult pending today. Will hold and continue to follow.  Thank you    Patsy Shoemaker, PT, DPT

## 2022-03-31 NOTE — PROGRESS NOTES
[address] [facility], [place], [address], [facility], [place]      ISAAC Zaragoza, [facility]     Michelle Lopez, [facility]   Ulisses Escobar JOSE RAMON-C    Nalini Kumar, [facility]-BC       Namita Montemayor [address]    at [address], [address] [place] [address], [address]    [phone]    FAX: [address]    at [address], [address], [address]    [phone]    FAX: [phone]       HEPATOLOGY PROGRESS NOTE  The patient is well known to me from a previous visit to my [facility] at [facility]. She is a 66 yo  female who was found to have chronic liver disease in 2020 and cirrhosis in 7/2021 when she developed ascites. She had variceal bleeding in 11/2021. Serologic evaluation for markers of chronic liver disease was positive for AMA. Cirrhosis is due to [facility].     The patient has developed the following complications of cirrhosis: esophageal varices, variceal bleeding, ascites, edema, hepatic encephalopathy, severe muslce wasting    I saw the patient for the first time in 1/2022. She had CTP score 9 and MELD score 21 at that time. We started liver transplant evaluation testing. She developed confusion and could not be aroused and was hospitalized 3 weeks ago at [facility] in 2/202. During that hospitalization she developed worsening malnutrition, worsening of muscle wasting and a few decubitus ulcers. She has been at [facility] 3/5. She has been receiving Dobbhoff tube feedings and OT/PT.   She has had dramatic improvement in nutritional status, skin wounds have nearly healed, mental status is clear, she is stronger, can move herself around in bed, she can sit in chair for an hour at a time, can feed herself and has been able to stand with PT assistance. Family conference on 3/27/22. I had a 1 hour meeting with the patient and her son who is POA. Next step is to transfer to rehab for 2-3 more weeks. In order to do this will need to remove feeding tube. In order to do this she will need to be able to eat sufficient calories to maintain her nutritional status  If she does well with 3-4 weeks of rehab we may be able to get LT eval testing and eventually on LT list.    Hospital course:  Upper GI bleeding yesterday  This was the first major setback since the start of this hospitalization almost 1 month ago  EGD showed no varices. Only severe portal gastropathy which was likely the source of bleeding/oozing  HB now stable  Back on tube feeds. Can transfer out of ICU. Her mood has changed and she no longer wants to talk. Hepatology Plan:  I think she has given up and feels like she cannot improve enough to make it to LT. Suggest Palliative Care become involved. I will be away Friday and Saturday. I will have discussion with she and son on Sunday      ASSESSMENT AND PLAN:  Cirrhosis  The diagnosis of cirrhosis is based upon imaging, laboratory studies, complications of cirrhosis. Cirrhosis is secondary to [facility]. Not alcohol as she was initially told.     Serologic testing was positive for AMA, ASMA     The CTP is 10. Child class C. The MELD score is 32.     Based upon the MELD and CTP scores the patient has a mortality of about 50% within the next 90 days until we turn her fraily and weakness around. Right now is is too weak to survive liver transplantation. She needs aggressive nutritional support and both she and her son are in favor of this with the hopes her situation will improve and she could survive liver transplantation.       Primary Biliary Cholangitis  The diagnosis is based upon serology and an elevation in ALP and positive AMA. A liver biopsy has not been performed.     PBC is being treated with ANTONIETA 500 mg BID.    CKD-HRS  Scr is moving in right direction and now down to 3.40 mg  Continue midodrine,     Malnutrition  The patient has severe protein-calorie malnutrtion and muscle wasting. This is due to advanced liver disease and refractory ascites. The patient needs more calories than she can possible eat to get into positive nutritional balance. Without aggressive nutritional support she will not survive. She is alert, oriented and understands the need for NG dobhoff feeding tube placement. Both she and her son agree with this. Getting tube feeds via Dobhoff feeding tube. Tolerating this well. Formula is being managed by nutritioinal service. Frailty/muscle wasting  The patient is very frail and cannot sit or stand on her own. She is making great progress with aggressive OT/PT. She can now lift her legs off the bed without strugling    She is very motivated to survive and willing to do whatever excercies she needs to get stronger. The goal is up and walking to bathroom with or without walker. Skin wounds  Large 5 x 5 cm area of tissue injury withtout ulceration on her back has nearly resolved. Stage 3 pressure injury continues to heal.    Ulcers have to be healed for her to be an LT candidate.     Ascites   Ascites developed for the first time in 7/2021. Ascites is is refractory to the current doses of diuretics because of kidney disease and Hyponatremia. Ascites has increased some but not tight. No diuretics. Paracentesis draining 5L ascites per day. Hyponatremia  This is secondary cirrhosis and diuretics  This has been stable in the 131-134 range. No diuretics.       Screening for Esophageal varices   The patient has esophageal varices with prior bleeding. Varices were banded in 11/2021 and 12/2021. The last EGD to assess for varices was performed in 12/2021.     No repeat EGD needed at this time.     Hepatic encephalopathy   Overt HE is well controlled on lactulose and xifaxan. Serum ammonia is low and in the 40s.      Coagulopathy  INR is in the 1.6 range and stable. Thrombocytopenia   This is secondary to cirrhosis. There is no evidence of overt bleeding. No treatment is required. The platelet count is adequate for the patient to undergo procedures without the need for platelet transfusion or platelet growth factors.     Anemia   This is due to multifactorial causes including portal hypertension with chronic GI blood loss and bone marrow suppression due to severe malnutrition.     The most recent FE studies were from 1/2022. The serum ferritin is 87  The FE saturation is 74  She recieved 3 does of IV FE earlier in the hospitalization    She has now had acute blood loss anemia superimposed upon chronic anemia. Thrombocytopenia   This is secondary to cirrhosis. There is no evidence of overt bleeding. No treatment is required. The platelet count is adequate for the patient to undergo procedures without the need for platelet transfusion or platelet growth factors. PHYSICAL EXAMINATION:  VS: per nursing note  General:  Looks much better. Skin color normal.  Eyes:  Sclera non-icteric. ENT:  No oral lesions. Thyroid normal.  Nodes:  No adenopathy. Skin:  Spider angiomata. Less ecchymosis on arms. Respiratory:  Lungs clear to auscultation. Cardiovascular:  Regular heart rate. Abdomen:  Soft non-tender, Some ascites is present. Paracentesis catheter draining ascites  Extremities:  No lower extremity edema. Severe muscle wasting of upper and lower extremities. Neurologic:  Alert and oriented. Cranial nerves grossly intact. No asterixis. LABORATORY:  Results for Salvatore Mckenzie (MRN [id number]) as of 3/31/2022 05:26   Ref.  Range 3/29/2022 10:31 3/29/2022 18:49 3/30/2022 06:43 3/30/2022 14:48   WBC Latest Ref Range: 3.6 - 11.0 K/uL 13.7 (H)  9.4    HGB Latest Ref Range: 11.5 - 16.0 g/dL 6.7 (L) 8.0 (L) 6.6 (L) 7.6 (L) PLATELET Latest Ref Range: 150 - 400 K/uL 84 (L)  39 (LL)    INR Latest Ref Range: 0.9 - 1.1   2.3 (H)      Sodium Latest Ref Range: 136 - 145 mmol/L 135 (L)  137    Potassium Latest Ref Range: 3.5 - 5.1 mmol/L 3.9  4.7    Chloride Latest Ref Range: 97 - 108 mmol/L 100  105    CO2 Latest Ref Range: 21 - 32 mmol/L 20 (L)  20 (L)    Glucose Latest Ref Range: 65 - 100 mg/dL 103 (H)  92    BUN Latest Ref Range: 6 - 20 MG/ (H)  179 (H)    Creatinine Latest Ref Range: 0.55 - 1.02 MG/DL 3.57 (H)  3.57 (H)    Albumin Latest Ref Range: 3.5 - 5.0 g/dL 3.5  4.0      RADIOLOGY:  Ultrasound of liver. Echogenic consistent with cirrhosis. No liver mass lesions. No dilated bile ducts. Moderate ascites.         Lizette Hernandez MD  [facility] [address] [facility] [address].  [phone]  [address] [facility]

## 2022-03-31 NOTE — PROGRESS NOTES
0730: Bedside, Verbal and Written shift change report given to Shemar Velazquez RN (oncoming nurse) by Anil Braden RN (offgoing nurse). Report included the following information SBAR, Kardex, ED Summary, Intake/Output, MAR, Recent Results, Cardiac Rhythm NSR and Alarm Parameters . Shift summary: Patient had uneventful shift. Vitals remained stable and patient is on room air. PERRL. Will follow commands but is very weak in all four extremities. X2 dark brown/green stools throughout shift. Remains on TF but ate small amount of PO food for lunch. Beckford remains in place per nephrology.

## 2022-04-01 NOTE — PROGRESS NOTES
Problem: Self Care Deficits Care Plan (Adult)  Goal: *Acute Goals and Plan of Care (Insert Text)  Description: FUNCTIONAL STATUS PRIOR TO ADMISSION: Chart review 2/23/2022 patient modified independent with RW. Son states recently increased assistance to [facility] with functional mobility and sit<>stand; tangential at times during conversation, but aware and able to be redirected. HOME SUPPORT: The patient lived with mother who provided assistance as needed and son lives nearby. 1 step to enter, stays on main level, walk-in-shower, DME: Shower chair, RW, BSC, w/c    Occupational Therapy Goals  Medical Re-Evaluation s/p ICU transfer for GIB and worsening cirrhosis, goals modified below  1. Patient will perform at least one grooming task in supported sitting with moderate assistance within 7 day(s). 2.  Patient will perform bathing from anterior neck to thigh with maximal assistance within 7 day(s). 3.  Patient will perform upper body dressing with moderate assistance within 7 day(s). 4.  Patient will perform rolling in supine for toilet transfers with maximal assistance within 7 day(s). 6.  Patient will complete self-feeding tasks with moderate assistance within 7 day(s). 5.  Patient will participate in upper extremity therapeutic exercise/activities with moderate assistance for 10 minutes within 7 day(s). 7.  Patient will utilize energy conservation techniques during functional activities with verbal cues within 7 day(s). Initiated 3/8/2022, Re-assessed to increase frequency of services on 3/9/2022 due to significant improvement in patient's performance . Reviewed 3/17/2022; Weekly Re-assessment 3/24/2022    1. Patient will perform grooming with minimal assistance/contact guard assist within 7 day(s). -Continue  2. Patient will perform bathing from anterior neck to thigh with minimal assistance/contact guard assist within 7 day(s). -Continue  3.   Patient will perform upper body dressing with minimal assistance/contact guard assist within 7 day(s). -Continue  4. Patient will perform toilet transfers with moderate assistance  within 7 day(s). -Continue  5. Patient will perform all aspects of toileting with moderate assistance  within 7 day(s). -Continue  6. Patient will participate in upper extremity therapeutic exercise/activities with minimal assistance/contact guard assist for 10 minutes within 7 day(s). -Continue  7. Patient will utilize energy conservation techniques during functional activities with verbal cues within 7 day(s). -Continue    Outcome: Not Met     OCCUPATIONAL THERAPY RE-EVALUATION  Patient: Brenda Kumar (19 y.o. female)  Date: 4/1/2022  Diagnosis: Cirrhosis ([facility] [K74.60] <principal problem not specified>  Procedure(s) (LRB):  ESOPHAGOGASTRODUODENOSCOPY (EGD) (N/A) 3 Days Post-Op  Precautions: Fall,Skin  Chart, occupational therapy assessment, plan of care, and goals were reviewed. ASSESSMENT  Based on the objective data described below, patient presents with significant decline in functional status s/p ICU transfer for acute on chronic GIB and worsening cirrhosis. She currently requires Total A x2 for limited bed mobility, grossly non-functional AROM of BUEs/BLEs with Total A for dependent toileting in supine, Max A and significantly increased time to don clean gown. Increased time required for processing with all functional tasks, inconsistent delay with patient intermittently smiling when asked orientation question. Limited eye opening with all bed level activity, reports pain with opening the L eye, inconsistent with R eye. Given her currently level of functional and medical status, would benefit from SNF rehab if goals of care are clear for rehab, would benefit from palliative care discussions to clarify.       Current Level of Function Impacting Discharge (ADLs): Max-Total A for ADLs, Total A x2 for limited bed mobility    Other factors to consider for discharge: fall risk, assist of 2, PMH, PLOF         PLAN :  Recommendations and Planned Interventions: self care training, functional mobility training, therapeutic exercise, balance training, visual/perceptual training, therapeutic activities, cognitive retraining, endurance activities, neuromuscular re-education, patient education, home safety training, and family training/education    Frequency/Duration: Patient will be followed by occupational therapy 2 times a week to address goals. Recommend with staff: Recommend with nursing, ADLs with assist, modified bed in chair position 3x/day, and toileting via bedpan with 2 assist. Thank you for completing as able in order to maintain patient strength, endurance and independence. Recommendation for discharge: (in order for the patient to meet his/her long term goals)  Therapy up to 5 days/week in SNF setting pending goals of care    This discharge recommendation:  Has been made in collaboration with the attending provider and/or case management    Equipment recommendations for successful discharge (if) home: To be determined (TBD)         SUBJECTIVE:   Patient stated I wish this shaking would stop.  re: LUE tremoring    OBJECTIVE DATA SUMMARY:   Hospital course since last seen and reason for reevaluation: s/p ICU transfer for GIB and worsening cirrhosis    Cognitive/Behavioral Status:  Neurologic State: Alert  Orientation Level: Oriented to person;Oriented to place;Oriented to time;Disoriented to situation  Cognition: Decreased attention/concentration; Follows commands  Perception:  (limited assessment at bed level d/t physical & cog defs)  Perseveration: No perseveration noted  Safety/Judgement: Decreased awareness of environment;Decreased insight into deficits; Decreased awareness of need for assistance;Decreased awareness of need for safety    Skin: Scattered bruising    Edema: None    Hearing:   Auditory  Auditory Impairment: None    Vision/Perceptual:    Tracking: Able to track stimulus in all quadrants w/o difficulty (mostly with R eye with cues to open)                                Range of Motion:  AROM: Grossly decreased, non-functional                         Strength:  Strength: Generally decreased, functional                Coordination:  Coordination: Grossly decreased, non-functional  Fine Motor Skills-Upper: Left Impaired;Right Impaired    Gross Motor Skills-Upper: Right Impaired;Left Impaired    Tone & Sensation:  Tone: Abnormal (LUE>RUE tremoring)                           Functional Mobility and Transfers for ADLs:  Bed Mobility:  Rolling: Total assistance;Assist x2  Scooting: Total assistance;Assist x2    Transfers:     Functional Transfers  Toilet Transfer : Total assistance;Assist x2 (rolling in supine)       Balance:  Sitting: Impaired; With support  Sitting - Static: Poor (constant support)    ADL Assessment:  Feeding: Total assistance    Oral Facial Hygiene/Grooming: Total assistance    Bathing: Total assistance    Upper Body Dressing: Total assistance    Lower Body Dressing: Total assistance    Toileting: Total assistance                ADL Intervention and task modifications:  Feeding  Feeding Assistance: Total assistance (dependent)  Drink to Mouth: Total assistance (dependent) (very limited BUE AROM, unable to hold & compelte AROM)                   Upper Body Dressing Assistance  Dressing Assistance: Maximum assistance  Hospital Gown: Maximum assistance (increased time, max cues for coordination)  Cues: Physical assistance; Tactile cues provided;Verbal cues provided;Visual cues provided         Toileting  Toileting Assistance: Total assistance(dependent)  Bladder Hygiene: Total assistance (dependent) (steve)  Bowel Hygiene: Total assistance (dependent)    Cognitive Retraining  Safety/Judgement: Decreased awareness of environment;Decreased insight into deficits; Decreased awareness of need for assistance;Decreased awareness of need for safety    Functional Measure:    Barthel Index:  Bathin  Bladder: 0  Bowels: 0  Groomin  Dressin  Feedin  Mobility: 0  Stairs: 0  Toilet Use: 0  Transfer (Bed to Chair and Back): 0  Total: 0/100      The Barthel ADL Index: Guidelines  1. The index should be used as a record of what a patient does, not as a record of what a patient could do. 2. The main aim is to establish degree of independence from any help, physical or verbal, however minor and for whatever reason. 3. The need for supervision renders the patient not independent. 4. A patient's performance should be established using the best available evidence. Asking the patient, friends/relatives and nurses are the usual sources, but direct observation and common sense are also important. However direct testing is not needed. 5. Usually the patient's performance over the preceding 24-48 hours is important, but occasionally longer periods will be relevant. 6. Middle categories imply that the patient supplies over 50 per cent of the effort. 7. Use of aids to be independent is allowed. Score Interpretation (from [address] [address])    Independent   60-79 Minimally independent   40-59 Partially dependent   20-39 Very dependent   <20 Totally dependent     -Jesus Roa., Barthel, D.W. (1965). Functional evaluation: the Barthel Index. [address] [facility] ([address] (1997). The Barthel activities of daily living index: self-reporting versus actual performance in the old (> or = 75 years). Journal of [address] [place] [address] 45(7), [address] [facility], J.J.M.F, Nick Walker., Manuel Jason. (1999). Measuring the change in disability after inpatient rehabilitation; comparison of the responsiveness of the Barthel Index and Functional [place] Measure. Journal of Neurology, Neurosurgery, and Psychiatry, 66(4), 788-269.   -Willian Seymour, N.J.A, JONEL Almodovar, & Mick Pearl, M.A. (2004) Assessment of post-stroke quality of life in cost-effectiveness studies: The usefulness of the Barthel Index and the EuroQoL-5D. Quality of Life Research, 13, 427-43         Pain:  Sensitivity to cold hands/wipes    Activity Tolerance:   Poor    After treatment patient left in no apparent distress:   Supine in bed, Call bell within reach, and Side rails x 3    COMMUNICATION/COLLABORATION:   The patients plan of care was discussed with: Physical therapist and Registered nurse.      BRIAN Gutiérrez, OTR/L  Time Calculation: 11 mins

## 2022-04-01 NOTE — PROGRESS NOTES
[facility]   [address], [address], [place]  Phone: [phone]   [phone]       Nephrology Progress Note  Joy Hinojosa     1960     [id number]  Date of Admission : 3/5/2022  04/01/22    CC:  Follow up for greg    Assessment and Plan   GREG:  - 2/2 HRS and now w/ GI bleed  - Non oliguric   - encourage po free water. She will benefit from DHT feeds    - Beckford should not be removed   - labs daily     Hypernatremia :  - 2/2 lack of FW   - encourage FW    Severe anemia:  GI bleed  - On EDWIN TTS  - transfusions per primary team      Cirrhosis, Portal HTN, PBC  - poral gastropathy  - being w/u for [facility] transplant      Hypothyroidism    Malnutrition:       Interval History:  She pulled out her [address] again overnight. She did not get FW. Na at 142, BUN/Cr unchanged   She has poor apetite     Review of Systems: A comprehensive review of systems was negative except for that written in the HPI.     Current Medications:   Current Facility-Administered Medications   Medication Dose Route Frequency    0.9% sodium chloride infusion 250 mL  250 mL IntraVENous PRN    pantoprazole (PROTONIX) 40 mg in 0.9% sodium chloride 10 mL injection  40 mg IntraVENous Q12H    0.9% sodium chloride infusion 250 mL  250 mL IntraVENous PRN    sodium chloride (NS) flush 5-40 mL  5-40 mL IntraVENous Q8H    sodium chloride (NS) flush 5-40 mL  5-40 mL IntraVENous PRN    simethicone (MYLICON) 58DT/9.8AB oral drops 80 mg  1.2 mL Oral Multiple    sodium bicarbonate tablet 325 mg  325 mg Oral BID    lactulose (CHRONULAC) 10 gram/15 mL solution 30 mL  30 mL Oral DAILY    promethazine (PHENERGAN) tablet 12.5 mg  12.5 mg Oral Q6H PRN    epoetin vic-epbx (RETACRIT) injection 10,000 Units  10,000 Units SubCUTAneous Q TUE, THU & SAT    multivitamin, tx-iron-ca-min (THERA-M w/ IRON) tablet 1 Tablet  1 Tablet Oral DAILY    miconazole (MICONTIN) 2 % ointment   Topical BID    balsam peru-castor oiL (VENELEX) ointment   Topical BID    zinc oxide-cod liver oil (DESITIN) 40 % paste   Topical PRN    sodium chloride (NS) flush 5-40 mL  5-40 mL IntraVENous Q8H    sodium chloride (NS) flush 5-40 mL  5-40 mL IntraVENous PRN    acetaminophen (TYLENOL) tablet 650 mg  650 mg Oral Q6H PRN    Or    acetaminophen (TYLENOL) suppository 650 mg  650 mg Rectal Q6H PRN    polyethylene glycol (MIRALAX) packet 17 g  17 g Oral DAILY PRN    ondansetron (ZOFRAN ODT) tablet 4 mg  4 mg Oral Q8H PRN    Or    ondansetron (ZOFRAN) injection 4 mg  4 mg IntraVENous C0G PRN    folic acid (FOLVITE) tablet 1 mg  1 mg Oral DAILY    levothyroxine (SYNTHROID) tablet 50 mcg  50 mcg Oral 6am    midodrine (PROAMATINE) tablet 15 mg  15 mg Oral TID WITH MEALS    octreotide (SANDOSTATIN) injection 100 mcg  100 mcg IntraVENous TID    rifAXIMin (XIFAXAN) tablet 550 mg  550 mg Oral BID    sertraline (ZOLOFT) tablet 100 mg  100 mg Oral DAILY    thiamine HCL (B-1) tablet 100 mg  100 mg Oral DAILY    ursodioL (ACTIGALL) tablet 500 mg (Patient Supplied)  500 mg Oral Q12H    glucagon (GLUCAGEN) injection 1 mg  1 mg IntraMUSCular PRN    naloxone (NARCAN) injection 0.1 mg  0.1 mg IntraVENous PRN      Allergies   Allergen Reactions    Morphine Other (comments)     Severe HA. ALL narcotics!!!       Objective:  Vitals:    Vitals:    04/01/22 0100 04/01/22 0200 04/01/22 0300 04/01/22 0400   BP: (!) 116/55 124/61 129/63 (!) 149/73   Pulse: 88 85 85 91   Resp: 21 15 13    Temp:    98.1 °F (36.7 °C)   SpO2: 99% 98% 98% 99%   Weight:       Height:         Intake and Output:  No intake/output data recorded.   03/30 1901 - 04/01 0700  In: 2385   Out: 4619 [Urine:1455]    Physical Examination:  General: No distress  HEENT:           Pale and icteric   Neck:  Supple, no mass  Resp:  Lungs CTA B/L  CV:  RRR,  no murmur or rub,no  LE edema  GI:  Soft, NT, + Bowel sounds, no hepatosplenomegaly  Neurologic:  AAO X 3  :  + Beckford     [x]    High complexity decision making was performed  [x]    Patient is at high-risk of decompensation with multiple organ involvement    Lab Data Personally Reviewed: I have reviewed all the pertinent labs, microbiology data and radiology studies during assessment. Recent Labs     04/01/22  0510 03/31/22  0521 03/30/22  0643 03/29/22  1031    142 137 135*   K 3.4* 3.3* 4.7 3.9   * 110* 105 100   CO2 19* 18* 20* 20*   * 128* 92 103*   * 167* 179* 170*   CREA 3.59* 3.53* 3.57* 3.57*   CA 8.7 8.5 9.2 9.1   MG  --   --  2.8*  --    PHOS 5.7* 7.1* 7.8* 5.5*   ALB 3.6 3.4* 4.0 3.5   INR  --   --   --  2.3*     Recent Labs     04/01/22  0510 03/31/22  1421 03/31/22  0521 03/30/22  1448 03/30/22  0643 03/29/22  1849 03/29/22  1031   WBC 11.4*  --  11.5*  --  9.4  --  13.7*   HGB 8.7* 8.0* 7.7* 7.6* 6.6*   < > 6.7*   HCT 26.1* 23.8* 22.5* 22.8* 19.7*   < > 20.1*   PLT 41*  --  59*  --  39*  --  84*    < > = values in this interval not displayed.      No results found for: SDES  Lab Results   Component Value Date/Time    Culture result: NO GROWTH 4 DAYS 03/14/2022 06:48 PM    Culture result: NO GROWTH 4 DAYS 03/12/2022 06:00 PM    Culture result: CANDIDA ALBICANS (A) 03/05/2022 09:22 PM     Recent Results (from the past 24 hour(s))   HGB & HCT    Collection Time: 03/31/22  2:21 PM   Result Value Ref Range    HGB 8.0 (L) 11.5 - 16.0 g/dL    HCT 23.8 (L) 35.0 - 47.0 %   RENAL FUNCTION PANEL    Collection Time: 04/01/22  5:10 AM   Result Value Ref Range    Sodium 142 136 - 145 mmol/L    Potassium 3.4 (L) 3.5 - 5.1 mmol/L    Chloride 110 (H) 97 - 108 mmol/L    CO2 19 (L) 21 - 32 mmol/L    Anion gap 13 5 - 15 mmol/L    Glucose 116 (H) 65 - 100 mg/dL     (H) 6 - 20 MG/DL    Creatinine 3.59 (H) 0.55 - 1.02 MG/DL    BUN/Creatinine ratio 48 (H) 12 - 20      GFR est AA 16 (L) >60 ml/min/1.73m2    GFR est non-AA 13 (L) >60 ml/min/1.73m2    Calcium 8.7 8.5 - 10.1 MG/DL    Phosphorus 5.7 (H) 2.6 - 4.7 MG/DL    Albumin 3.6 3.5 - 5.0 g/dL   CBC W/O DIFF    Collection Time: 04/01/22  5:10 AM   Result Value Ref Range    WBC 11.4 (H) 3.6 - 11.0 K/uL    RBC 2.75 (L) 3.80 - 5.20 M/uL    HGB 8.7 (L) 11.5 - 16.0 g/dL    HCT 26.1 (L) 35.0 - 47.0 %    MCV 94.9 80.0 - 99.0 FL    MCH 31.6 26.0 - 34.0 PG    MCHC 33.3 30.0 - 36.5 g/dL    RDW 22.5 (H) 11.5 - 14.5 %    PLATELET 41 (LL) 404 - 400 K/uL    MPV 11.4 8.9 - 12.9 FL    NRBC 0.0 0  WBC    ABSOLUTE NRBC 0.00 0.00 - 0.01 K/uL                                       Care Plan discussed with:  Patient     Family      RN      Consulting Physician [facility],         I have reviewed the flowsheets. Chart and Pertinent Notes have been reviewed. No change in PMH ,family and social history from Consult note.       Tiffany Mitchell MD

## 2022-04-01 NOTE — PROGRESS NOTES
[address] [facility] Adult  Hospitalist Group                                                                                          Hospitalist Progress Note  Bibi Bravo MD  Answering service: [phone] OR [phone] from in house phone        Date of Service:  2022  NAME:  Mariel Alvarez  :  1960  MRN:  [id number]      Admission Summary:   Copied form admit: \" Per ICU: Interval history: From critical care consult on   65year-old lady with liver cirrhosis due to [facility].  She has had multiple complication in form of esophageal varices, recurrent encephalopathy, recurrent ascites and severe debility and malnutrition. Patient was admitted to the hospital on  with cirrhosis decompensation. Gabriella Lopez has been in the hospital since then with multiple complication but overall was improving. Today she had large coffee ground emesis but was associated with significant drop in her hemoglobin.  Primary team consulted ICU to assist the need for ICU level care. Overnight Events:   3/30: Endoscopy showed moderate portal hypertensive gastropathy with some blood and clots in the fundus.  No varices.  Octreotide discontinued this morning, extubated this morning, receiving packed RBC transfusion this morning for hemoglobin of 6.6.  Hemodynamically remained stable        Hospitalist note:   Seen and examined patient lying in bed. States that she is ok. Flat affect   Chart reviewed. Patient to transfer out of ICU. \"    Interval history / Subjective:     2022 :    Alert, eating   No acute distress   Cont monitor labs/vs   Oral intake is being closely monitored,    Case discussed w nutrition   Can go to medical bed.         Assessment & Plan:               Acute blood loss anemia:   GI bleed  - s/p 4 units of pRBCs   - s/p EGD on   - Hgb 7.6   - Continue PPI BID   - Monitor labs and transfuse for hgb <7.0    - Hepatology following   - good iron stores, stable hgb at 7.7 3/31     Decompensated liver cirrhosis, with portal hypertension.  Felt to be secondary to [facility]  Transaminitis/hyperbilirubinemia/coagulopathy/thrombocytopenia  History of hepatic encephalopathy   -Transferred from [facility] with prolonged hospitalization.  Transferred for further work-up for liver transplant eval  -EGD on 2/17 [place] noted for distal esophagitis and portal hypertensive gastropathy, no evidence of varices.    -history of esophageal varices back in November 2021 status post banding at that time.    -Per GI at outlying facility, patient was deemed not to be a candidate for colonoscopy and recommended Cologuard.    -s/p MRCP on 2/18 which was noted for cirrhosis, portal hypertension, and large ascites.   -s/p cardiac stress test which was negative for ischemia, EF of 69%  -Continue lactulose, rifaximin, Protonix  -Continue ursodiol  -Paracentesis ordered 3/25 with [id number] removed  - PT/OT following   - Consult palliative for goals of care.      Refractory ascites  -multiple paracentesis done at [facility] including 2/11 with 11.8 L removal, 2/18 with 9/2 L removed, and 2/28 with 9.2 L removed, 3/12, 3/25  -Currently with distended abdomen  -Moderate large ascites on US 3/6. Ascites said to be refractory to diuretics, HRS limiting use of diuretics  -Ascitic drain that was placed on 3/12 was apparently clogged, flushed and drained significant amount of ascite  -Ascitic fluid lab negative for SBP  - no need for paracentesis based on exam 4/1, cont to eval      GREG with metabolic acidosis. persisent  -Baseline 0.9 on 11/26/2021   -Likely due to [facility]  -Nephrology following, recommendations appreciated.   - Continue albumin, midodrine and octreotide per nephrology   Lab Results   Component Value Date/Time    Creatinine 3.59 (H) 04/01/2022 05:10 AM         Hyponatremia  -Suspect secondary to underlying liver cirrhosis.   -Na stable 128-131 - last 143 range 3/31      Recent E. coli UTI  -Treated with Vanco and Zosyn then transitioned to Rocephin, completed 7-day antibiotic course as of 2/17  -Replace Steve with new catheter 3/5 .  Urine culture on 3/5 grew Candida albicans 75,000 CFU. - nephrology desires that steve stay in for now      Hypothyroid  -Continue levothyroxine     Severe malnutrition  --Continue NG tube with tube feeds at night if patient tolerates. Encourage oral intake   --On regular diet and tolerating     Generalized weakness/chronic wounds. pta  -PT, OT consult  -Wound care following              Hospital Problems  Date Reviewed: 2/17/2022          Codes Class Noted POA    Cirrhosis ([facility] [address] ICD-10-CM: K74.60  ICD-9-CM: 571.5  2/8/2022 Unknown                  After personally interviewing pt at bedside the following is noted on 4/1/2022 :    Review of Systems   Constitutional: Negative for chills and fever. HENT:        Tongue/gums sore    All other systems reviewed and are negative. I had a face to face encounter with patient on 4/1/2022 at bedside for the following physical exam:     PHYSICAL EXAM:    Visit Vitals  /74 (BP 1 Location: Right upper arm, BP Patient Position: At rest)   Pulse (!) 111   Temp 97.8 °F (36.6 °C)   Resp 18   Ht 5' 4\" (1.626 m)   Wt 65.4 kg (144 lb 2.9 oz)   SpO2 100%   Breastfeeding No   BMI 24.75 kg/m²          Physical Exam  Constitutional:       General: She is not in acute distress. Appearance: She is not ill-appearing, toxic-appearing or diaphoretic. Comments: chronically ill appearing    HENT:      Head: Normocephalic and atraumatic. Right Ear: External ear normal.      Left Ear: External ear normal.      Mouth/Throat:      Mouth: Mucous membranes are dry. Pharynx: Oropharynx is clear. Eyes:      General:         Right eye: No discharge. Left eye: No discharge. Cardiovascular:      Rate and Rhythm: Normal rate and regular rhythm. Pulmonary:      Effort: Pulmonary effort is normal.      Breath sounds: Normal breath sounds. Abdominal:      General: Abdomen is flat. Palpations: Abdomen is soft. Musculoskeletal:         General: No swelling or deformity. Cervical back: Neck supple. No rigidity. Skin:     General: Skin is warm and dry. Coloration: Skin is jaundiced. Skin is not pale. Comments: Ecchymosis multiple sites   Neurological:      General: No focal deficit present. Mental Status: She is alert. Mental status is at baseline. Comments:     Psychiatric:         Mood and Affect: Mood normal.         Behavior: Behavior normal.                     Data Review:    Review and/or order of clinical lab test      Labs:     Recent Labs     04/01/22  0510 03/31/22  1421 03/31/22  0521 03/31/22  0521   WBC 11.4*  --   --  11.5*   HGB 8.7* 8.0*   < > 7.7*   HCT 26.1* 23.8*   < > 22.5*   PLT 41*  --   --  59*    < > = values in this interval not displayed. Recent Labs     04/01/22  0510 03/31/22  0521 03/30/22  0643    142 137   K 3.4* 3.3* 4.7   * 110* 105   CO2 19* 18* 20*   * 167* 179*   CREA 3.59* 3.53* 3.57*   * 128* 92   CA 8.7 8.5 9.2   MG  --   --  2.8*   PHOS 5.7* 7.1* 7.8*     Recent Labs     04/01/22  0510 03/31/22  0521 03/30/22  0643   ALB 3.6 3.4* 4.0     No results for input(s): INR, PTP, APTT, INREXT, INREXT in the last 72 hours. No results for input(s): FE, TIBC, PSAT, FERR in the last 72 hours. Lab Results   Component Value Date/Time    Folate 38.3 (H) 10/20/2021 07:53 AM    RBC FOLATE 878 02/14/2022 01:27 AM      No results for input(s): PH, PCO2, PO2 in the last 72 hours. No results for input(s): CPK, CKNDX, TROIQ in the last 72 hours.     No lab exists for component: CPKMB  Lab Results   Component Value Date/Time    Cholesterol, total 122 01/25/2022 09:50 AM    HDL Cholesterol 40 01/25/2022 09:50 AM    LDL, calculated 67.2 01/25/2022 09:50 AM    Triglyceride 74 01/25/2022 09:50 AM    CHOL/HDL Ratio 3.1 01/25/2022 09:50 AM     Lab Results   Component Value Date/Time    Glucose (POC) 128 (H) 03/15/2022 06:06 AM    Glucose (POC) 123 (H) 03/15/2022 12:14 AM    Glucose (POC) 114 (H) 02/23/2022 05:42 PM    Glucose (POC) 127 (H) 02/23/2022 12:04 PM    Glucose (POC) 122 (H) 02/23/2022 08:18 AM     Lab Results   Component Value Date/Time    Color DARK YELLOW 03/05/2022 09:22 PM    Appearance TURBID (A) 03/05/2022 09:22 PM    Specific gravity 1.014 03/05/2022 09:22 PM    Specific gravity 1.010 02/11/2022 07:20 PM    pH (UA) 5.5 03/05/2022 09:22 PM    Protein 100 (A) 03/05/2022 09:22 PM    Glucose Negative 03/05/2022 09:22 PM    Ketone Negative 03/05/2022 09:22 PM    Bilirubin NEGATIVE 02/11/2022 07:20 PM    Urobilinogen 0.2 03/05/2022 09:22 PM    Nitrites Negative 03/05/2022 09:22 PM    Leukocyte Esterase LARGE (A) 03/05/2022 09:22 PM    Epithelial cells FEW 03/05/2022 09:22 PM    Bacteria 1+ (A) 03/05/2022 09:22 PM    WBC >100 (H) 03/05/2022 09:22 PM    RBC  03/05/2022 09:22 PM         Medications Reviewed:     Current Facility-Administered Medications   Medication Dose Route Frequency    magic mouthwash cpd (without sucralfate)  5 mL Oral TIDAC    0.9% sodium chloride infusion 250 mL  250 mL IntraVENous PRN    pantoprazole (PROTONIX) 40 mg in 0.9% sodium chloride 10 mL injection  40 mg IntraVENous Q12H    0.9% sodium chloride infusion 250 mL  250 mL IntraVENous PRN    sodium chloride (NS) flush 5-40 mL  5-40 mL IntraVENous Q8H    sodium chloride (NS) flush 5-40 mL  5-40 mL IntraVENous PRN    simethicone (MYLICON) 89ZJ/1.2PC oral drops 80 mg  1.2 mL Oral Multiple    sodium bicarbonate tablet 325 mg  325 mg Oral BID    lactulose (CHRONULAC) 10 gram/15 mL solution 30 mL  30 mL Oral DAILY    promethazine (PHENERGAN) tablet 12.5 mg  12.5 mg Oral Q6H PRN    epoetin vic-epbx (RETACRIT) injection 10,000 Units  10,000 Units SubCUTAneous Q TUE, THU & SAT    multivitamin, tx-iron-ca-min (THERA-M w/ IRON) tablet 1 Tablet  1 Tablet Oral DAILY    miconazole (MICONTIN) 2 % ointment   Topical BID    balsam peru-castor oiL (VENELEX) ointment   Topical BID    zinc oxide-cod liver oil (DESITIN) 40 % paste   Topical PRN    sodium chloride (NS) flush 5-40 mL  5-40 mL IntraVENous Q8H    sodium chloride (NS) flush 5-40 mL  5-40 mL IntraVENous PRN    acetaminophen (TYLENOL) tablet 650 mg  650 mg Oral Q6H PRN    Or    acetaminophen (TYLENOL) suppository 650 mg  650 mg Rectal Q6H PRN    polyethylene glycol (MIRALAX) packet 17 g  17 g Oral DAILY PRN    ondansetron (ZOFRAN ODT) tablet 4 mg  4 mg Oral Q8H PRN    Or    ondansetron (ZOFRAN) injection 4 mg  4 mg IntraVENous A4U PRN    folic acid (FOLVITE) tablet 1 mg  1 mg Oral DAILY    levothyroxine (SYNTHROID) tablet 50 mcg  50 mcg Oral 6am    midodrine (PROAMATINE) tablet 15 mg  15 mg Oral TID WITH MEALS    octreotide (SANDOSTATIN) injection 100 mcg  100 mcg IntraVENous TID    rifAXIMin (XIFAXAN) tablet 550 mg  550 mg Oral BID    sertraline (ZOLOFT) tablet 100 mg  100 mg Oral DAILY    thiamine HCL (B-1) tablet 100 mg  100 mg Oral DAILY    ursodioL (ACTIGALL) tablet 500 mg (Patient Supplied)  500 mg Oral Q12H    glucagon (GLUCAGEN) injection 1 mg  1 mg IntraMUSCular PRN    naloxone (NARCAN) injection 0.1 mg  0.1 mg IntraVENous PRN     ______________________________________________________________________  EXPECTED LENGTH OF STAY: 4d 16h  ACTUAL LENGTH OF STAY:          27                 Xena Ordonez MD

## 2022-04-01 NOTE — PROGRESS NOTES
1930- Bedside and Verbal shift change report given to Yoselin Rn (oncoming nurse) by Liam Armenta RN (offgoing nurse). Report included the following information SBAR, Kardex, ED Summary, Intake/Output, MAR, Accordion, Recent Results, Med Rec Status, Cardiac Rhythm NSR  and Alarm Parameters . Pt more alert. Pulled Dobhoff out and hospitalist contacted over night. Verbal order to leave out and re-evaluate in AM.  Pt had 5 stools overnight, brown and green, no signs of blood. 0730- Bedside and Verbal shift change report given to Sherri RN (oncoming nurse) by Yoselin RN (offgoing nurse). Report included the following information SBAR, Kardex, Procedure Summary, Intake/Output, MAR, Accordion, Recent Results, Cardiac Rhythm NSR, Alarm Parameters  and Dual Neuro Assessment.

## 2022-04-01 NOTE — PROGRESS NOTES
Problem: Mobility Impaired (Adult and Pediatric)  Goal: *Acute Goals and Plan of Care (Insert Text)  Description: FUNCTIONAL STATUS PRIOR TO ADMISSION: Patient appears to be an inconsistent historian despite being oriented x 4. Originally stated she primarily stays in bed-later she reported ambulating a few times a day and spending most of her day in a recliner. She consistently reports that son assists her \"with everything\" but does not give more details on what exactly or how much assistance. HOME SUPPORT PRIOR TO ADMISSION: The patient lived with son and her mother per report. Unclear how much assistance she required. Physical Therapy Goals    Reassessed 4/1/2022 s/p after transfer to ICU  1. Patient will move from supine to sit and sit to supine  and roll side to side in bed with maximal assistance within 7 day(s). 2.  Patient will transfer from bed to chair and chair to bed with maximal assistance using the least restrictive device within 7 day(s). 3.  Patient will perform sit to stand with maximal assistance within 7 day(s). 4.  Patient will ambulate with maximal assistance for 5 feet with the least restrictive device within 7 day(s). 5.  Patient will tolerate seated EOB x 10 minutes with min A within 7 days    Re-Assessed 3/21/2022  1. Patient will move from supine to sit and sit to supine , scoot up and down, and roll side to side in bed with minimal assistance/contact guard assist within 7 day(s). 2.  Patient will transfer from bed to chair and chair to bed with minimal assistance/contact guard assist using the least restrictive device within 7 day(s). 3.  Patient will perform sit to stand with minimal assistance/contact guard assist within 7 day(s). 4.  Patient will ambulate with moderate assistance  for 10 feet with the least restrictive device within 7 day(s). Initiated 3/6/2022  1.   Patient will move from supine to sit and sit to supine , scoot up and down, and roll side to side in bed with minimal assistance/contact guard assist within 7 day(s). 2.  Patient will transfer from bed to chair and chair to bed with moderate assistance  using the least restrictive device within 7 day(s). 3.  Patient will perform sit to stand with moderate assistance  within 7 day(s). 4.  Patient will ambulate with moderate assistance  for 20 feet with the least restrictive device within 7 day(s). 5.  Patient will ascend/descend 2 stairs with 2 handrail(s) with moderate assistance  within 7 day(s). Outcome: Not Progressing Towards Goal   PHYSICAL THERAPY REEVALUATION  Patient: Dalila Bautista (89 y.o. female)  Date: 4/1/2022  Primary Diagnosis: Cirrhosis ([facility] [address] [K74.60]  Procedure(s) (LRB):  ESOPHAGOGASTRODUODENOSCOPY (EGD) (N/A) 3 Days Post-Op   Precautions:   Fall      ASSESSMENT  Based on the objective data described below, the patient presents with confusion, delayed processing, impaired command following, decreased functional mobility, grossly decreased strength and ROM, decreased tolerance to activity s/p transfer to ICU for vomiting large amount of blood requiring intubation and EGD. Pt now extubated. Pt tolerated session fairly. Pt required total A x 1-2 rolling L and R, repositioning in the bed. Pt repositioned with total A and placed pillows for proper positioning and maintain skin integrity. Pt is functioning well below baseline and will benefit from SNF placement upon discharge to continue therapy efforts. Current Level of Function Impacting Discharge (mobility/balance): total A x 1-2 rolling, repositioning and scooting    Functional Outcome Measure: The patient scored 0/100 on the barthel outcome measure which is indicative of totally dependent . Other factors to consider for discharge:      Patient will benefit from skilled therapy intervention to address the above noted impairments.        PLAN :  Recommendations and Planned Interventions: bed mobility training, transfer training, gait training, therapeutic exercises, neuromuscular re-education, patient and family training/education and therapeutic activities      Frequency/Duration: Patient will be followed by physical therapy:  2 times a week to address goals. (will increase frequency as able to tolerate increased activity)    Recommendation for discharge: (in order for the patient to meet his/her long term goals)  Therapy up to 5 days/week in SNF setting    This discharge recommendation:  Has been made in collaboration with the attending provider and/or case management    Equipment recommendations for successful discharge (if) home: tbd         SUBJECTIVE:   Patient stated I want some water.     OBJECTIVE DATA SUMMARY:   HISTORY:    Past Medical History:   Diagnosis Date    Anxiety 10/19/2021    Basal cell carcinoma (BCC) of face 10/19/2021    Chronic kidney disease     \"end stage liver disease\" per son    Cirrhosis of liver not due to alcohol ([facility] [address]     Depression 10/19/2021    Thyroid disease      Past Surgical History:   Procedure Laterality Date    HX  SECTION      x2    HX ORTHOPAEDIC Right     x2     Hospital course since last seen and reason for reevaluation: s/p transfer to ICU requiring intubation    Personal factors and/or comorbidities impacting plan of care:     Home Situation  Home Environment: Private residence  # Steps to Enter: 2  Rails to Enter: Yes  Hand Rails : Bilateral  One/Two Story Residence: Two story  Living Alone: No  Support Systems: Parent(s),Child(joe)  Patient Expects to be Discharged to[de-identified] Skilled nursing facility  Current DME Used/Available at Home: [address],[employer], bedside  Tub or Shower Type: Shower    EXAMINATION/PRESENTATION/DECISION MAKING:   Critical Behavior:  Neurologic State: Eyes open spontaneously  Orientation Level: Oriented to place,Oriented to person,Oriented to time  Cognition: Follows commands  Safety/Judgement: Decreased insight into deficits  Hearing: Auditory  Auditory Impairment: None    Range Of Motion:  AROM: Grossly decreased, non-functional                       Strength:    Strength: Generally decreased, functional       Functional Mobility:  Bed Mobility:  Rolling: Total assistance;Assist x2        Scooting: Total assistance;Assist x2  Transfers:  NT                Therapeutic Exercises:   Knee flexion with assist, ankle pumps    Functional Measure:  Barthel Index:    Bathin  Bladder: 0  Bowels: 0  Groomin  Dressin  Feedin  Mobility: 0  Stairs: 0  Toilet Use: 0  Transfer (Bed to Chair and Back): 0  Total: 0/100       The Barthel ADL Index: Guidelines  1. The index should be used as a record of what a patient does, not as a record of what a patient could do. 2. The main aim is to establish degree of independence from any help, physical or verbal, however minor and for whatever reason. 3. The need for supervision renders the patient not independent. 4. A patient's performance should be established using the best available evidence. Asking the patient, friends/relatives and nurses are the usual sources, but direct observation and common sense are also important. However direct testing is not needed. 5. Usually the patient's performance over the preceding 24-48 hours is important, but occasionally longer periods will be relevant. 6. Middle categories imply that the patient supplies over 50 per cent of the effort. 7. Use of aids to be independent is allowed. Score Interpretation (from [address] [place] [address])    Independent   60-79 Minimally independent   40-59 Partially dependent   20-39 Very dependent   <20 Totally dependent     -Jesus Roa., Barthel, D.W. (1965). Functional evaluation: the Barthel Index. [address] [facility] ([address] (1997). The Barthel activities of daily living index: self-reporting versus actual performance in the old (> or = 75 years).  Journal of [address] [place] [address] 45(7), 14 [facility] HospitalKianKian, Marcos Talbot, Conrado Doshi. (1999). Measuring the change in disability after inpatient rehabilitation; comparison of the responsiveness of the Barthel Index and Functional [place] Measure. Journal of Neurology, Neurosurgery, and Psychiatry, 66(4), 379-609. JULIEN Edward, JONEL Almodovar, & Portai Packer M.A. (2004) Assessment of post-stroke quality of life in cost-effectiveness studies: The usefulness of the Barthel Index and the EuroQoL-5D. Quality of Life Research, 13, 427-43       Pain Rating:  Pt denied pain    Activity Tolerance:   Poor    After treatment patient left in no apparent distress:   Supine in bed, Call bell within reach, and Side rails x 3    COMMUNICATION/EDUCATION:   The patients plan of care was discussed with: Occupational therapist, Registered nurse, and Case management. Patient is unable to participate in goal setting and plan of care.     Thank you for this referral.  Jason Hernandez, PT, DPT   Time Calculation: 24 mins

## 2022-04-01 NOTE — PROGRESS NOTES
NUTRITION brief  Recommendations:     Modify ONS: Replace Ensure Compact with Ensure Enlive tid                        Discontinue Boost pudding; Send Magic cup bid     Encourage patient to consume liquids after eating meals or as a snack        Diet: Regular-changed to Dysphagia Soft and Bite Sized @ dinner  Supplements: Ensure Compact bid, Boost pudding once daily  Nutrition Support: DHT removed this morning (Jevity 1.5 @ 70 ml/hr x 12 hr with 150 ml water flush q 12 hr)  Nutrition-related meds: Lactulose, Folic acid, Synthroid, Magic mouthwash, MVI, Protonix, B1    New events impacting nutrition plan of care:   UGI bleed 2/2 severe portal gastropathy per EGD. Bleed has resolved-diet resumed yesterday along with EN. DHT came out this morning-no plan to replace at this time per hospitalist. Diet was adjusted and Magic mouthwash was ordered. RD to adjust supplements per pt's preference and to increase nutrient intake. Pt eating very little but per son this is much better. Ms Marilee Wallace accepts liquids will; drank 100% Ensure Compact for lunch. RD will switch Ensure Compact to Ensure Enlive (chocolate); if all 3 consumed will provide 1050 calories and 60 gm protein per day. Magic cup bid will provide 580 calories and 18 gm protein per day. Discussed with patient and son to focus on consuming meals prior to drinking supplements/beverages first as this can reduce appetite and overall intake. Son was interested in replacing [address] to ensure his mother was getting adequate nutrition in preparation for possible LT. Meal Intake:   Patient Vitals for the past 168 hrs:   % Diet Eaten   04/01/22 1000 1 - 25%   03/30/22 2000 0%   03/29/22 2000 0%   03/29/22 0800 1 - 25%   03/26/22 1000 1 - 25%     Supplement Intake:  Patient Vitals for the past 168 hrs:   Supplement intake %   04/01/22 1000 76 - 100%       See full RD assessment from 3/29 for additional details, goals, and monitoring/evaluation. RD to follow.   Estimated Nutrition Needs:   Energy: 7035-5758 (30-35 kcals/kg)  Wt used: Current  Protein:  (1.4-1.5 gm/kg)  Wt used: Current   Fluid: [address]

## 2022-04-02 NOTE — PROGRESS NOTES
[facility]                                                                                          Hospitalist Progress Note  Temitope Reynolds MD  Answering service: [phone] or [phone] from in house phone        Date of Service:  2022  NAME:  Joy Hinojosa  :  1960  MRN:  [id number]      Admission Summary:   Copied form admit: \" Per ICU: Interval history: From critical care consult on   65year-old lady with liver cirrhosis due to [facility].  She has had multiple complication in form of esophageal varices, recurrent encephalopathy, recurrent ascites and severe debility and malnutrition. Patient was admitted to the hospital on  with cirrhosis decompensation. Delbert Arellano has been in the hospital since then with multiple complication but overall was improving. Today she had large coffee ground emesis but was associated with significant drop in her hemoglobin.  Primary team consulted ICU to assist the need for ICU level care. Overnight Events:   3/30: Endoscopy showed moderate portal hypertensive gastropathy with some blood and clots in the fundus.  No varices.  Octreotide discontinued this morning, extubated this morning, receiving packed RBC transfusion this morning for hemoglobin of 6.6.  Hemodynamically remained stable        Hospitalist note:   Seen and examined patient lying in bed. States that she is ok. Flat affect   Chart reviewed. Patient to transfer out of ICU.   \"    Interval history / Subjective:     2022 :    Discussed w nutritional services   Attempts to push po intake as ngt pulled out   Pt very confused , tearful this am    Case discussed w RN emphasis on oral intake    Else        Assessment & Plan:               Acute blood loss anemia:   GI bleed  - s/p 4 units of pRBCs   - s/p EGD on   - Hgb 7.6   - Continue PPI BID   - Monitor labs and transfuse for hgb <7.0    - Hepatology following   - good iron stores, stable hgb at 7.7 3/31  Lab Results   Component Value Date/Time    HGB 7.5 (L) 04/02/2022 03:07 AM         Decompensated liver cirrhosis, with portal hypertension.  Felt to be secondary to [facility]  Transaminitis/hyperbilirubinemia/coagulopathy/thrombocytopenia  History of hepatic encephalopathy   -Transferred from [address] [place] [address] with prolonged hospitalization.  Transferred for further work-up for liver transplant eval  -EGD on 2/17 [place] noted for distal esophagitis and portal hypertensive gastropathy, no evidence of varices.    -history of esophageal varices back in November 2021 status post banding at that time.    -Per GI at outlying facility, patient was deemed not to be a candidate for colonoscopy and recommended Cologuard.    -s/p MRCP on 2/18 which was noted for cirrhosis, portal hypertension, and large ascites.   -s/p cardiac stress test which was negative for ischemia, EF of 69%  -Continue lactulose, rifaximin, Protonix  -Continue ursodiol  -Paracentesis ordered 3/25 with [id number] removed  - PT/OT following   - Consult palliative for goals of care.      Refractory ascites  -multiple paracentesis done at [facility] including 2/11 with 11.8 L removal, 2/18 with 9/2 L removed, and 2/28 with 9.2 L removed, 3/12, 3/25  -Currently with distended abdomen  -Moderate large ascites on US 3/6. Ascites said to be refractory to diuretics, HRS limiting use of diuretics  -Ascitic drain that was placed on 3/12 was apparently clogged, flushed and drained significant amount of ascite  -Ascitic fluid lab negative for SBP  - no need for paracentesis based on exam 4/1, cont to eval      GREG with metabolic acidosis. persisent  -Baseline 0.9 on 11/26/2021   -Likely due to [facility]  -Nephrology following, recommendations appreciated.   - Continue albumin, midodrine and octreotide per nephrology   Lab Results   Component Value Date/Time    Creatinine 3.09 (H) 04/02/2022 03:07 AM         Hyponatremia  -Suspect secondary to underlying liver cirrhosis.   -Na stable 128-131 - last 143 range 3/31      Recent E. coli UTI  -Treated with Vanco and Zosyn then transitioned to Rocephin, completed 7-day antibiotic course as of 2/17  -Replace Steve with new catheter 3/5 .  Urine culture on 3/5 grew Candida albicans 75,000 CFU. - nephrology desires that steve stay in for now      Hypothyroid  -Continue levothyroxine     Severe malnutrition  --Continue NG tube with tube feeds at night if patient tolerates. Encourage oral intake   --On regular diet and tolerating     Generalized weakness/chronic wounds. pta  -PT, OT consult  -Wound care following              Hospital Problems  Date Reviewed: 2/17/2022          Codes Class Noted POA    Cirrhosis ([facility] [address] ICD-10-CM: K74.60  ICD-9-CM: 571.5  2/8/2022 Unknown                  After personally interviewing pt at bedside the following is noted on 4/2/2022 :    Review of Systems   Reason unable to perform ROS: confused               I had a face to face encounter with patient on 4/2/2022 at bedside for the following physical exam:     PHYSICAL EXAM:    Visit Vitals  BP (!) 97/56   Pulse 76   Temp 98 °F (36.7 °C)   Resp 13   Ht 5' 4\" (1.626 m)   Wt 51.8 kg (114 lb 3.2 oz)   SpO2 99%   Breastfeeding No   BMI 19.60 kg/m²          Physical Exam  Constitutional:       General: She is not in acute distress. Appearance: She is not ill-appearing, toxic-appearing or diaphoretic. Comments: chronically ill appearing ; malnurished; low muscle mass   HENT:      Head: Normocephalic and atraumatic. Right Ear: External ear normal.      Left Ear: External ear normal.      Mouth/Throat:      Mouth: Mucous membranes are dry. Pharynx: Oropharynx is clear. Eyes:      General:         Right eye: No discharge. Left eye: No discharge. Cardiovascular:      Rate and Rhythm: Normal rate and regular rhythm. Pulmonary:      Effort: Pulmonary effort is normal.      Breath sounds: Normal breath sounds. Abdominal:      General: Abdomen is flat. Palpations: Abdomen is soft. Musculoskeletal:         General: No swelling or deformity. Cervical back: Neck supple. No rigidity. Skin:     General: Skin is warm and dry. Coloration: Skin is jaundiced. Skin is not pale. Comments: Ecchymosis multiple sites   Neurological:      General: No focal deficit present. Mental Status: She is alert. Mental status is at baseline. Comments:     Psychiatric:         Mood and Affect: Mood normal.         Behavior: Behavior normal.                     Data Review:    Review and/or order of clinical lab test      Labs:     Recent Labs     04/02/22 0307 04/01/22  0510   WBC 9.8 11.4*   HGB 7.5* 8.7*   HCT 22.7* 26.1*   PLT 53* 41*     Recent Labs     04/02/22 0307 04/01/22 0510 03/31/22  0521    142 142   K 3.5 3.4* 3.3*    110* 110*   CO2 18* 19* 18*   * 172* 167*   CREA 3.09* 3.59* 3.53*   * 116* 128*   CA 7.8* 8.7 8.5   PHOS  --  5.7* 7.1*     Recent Labs     04/02/22 0307 04/01/22 0510 03/31/22  0521   ALT 24  --   --    AP 89  --   --    TBILI 4.2*  --   --    TP 4.6*  --   --    ALB 3.2* 3.6 3.4*   GLOB 1.4*  --   --      No results for input(s): INR, PTP, APTT, INREXT, INREXT in the last 72 hours. No results for input(s): FE, TIBC, PSAT, FERR in the last 72 hours. Lab Results   Component Value Date/Time    Folate 38.3 (H) 10/20/2021 07:53 AM    RBC FOLATE 878 02/14/2022 01:27 AM      No results for input(s): PH, PCO2, PO2 in the last 72 hours. No results for input(s): CPK, CKNDX, TROIQ in the last 72 hours.     No lab exists for component: CPKMB  Lab Results   Component Value Date/Time    Cholesterol, total 122 01/25/2022 09:50 AM    HDL Cholesterol 40 01/25/2022 09:50 AM    LDL, calculated 67.2 01/25/2022 09:50 AM    Triglyceride 74 01/25/2022 09:50 AM    CHOL/HDL Ratio 3.1 01/25/2022 09:50 AM     Lab Results   Component Value Date/Time    Glucose (POC) 128 (H) 03/15/2022 06:06 AM    Glucose (POC) 123 (H) 03/15/2022 12:14 AM    Glucose (POC) 114 (H) 02/23/2022 05:42 PM    Glucose (POC) 127 (H) 02/23/2022 12:04 PM    Glucose (POC) 122 (H) 02/23/2022 08:18 AM     Lab Results   Component Value Date/Time    Color DARK YELLOW 03/05/2022 09:22 PM    Appearance TURBID (A) 03/05/2022 09:22 PM    Specific gravity 1.014 03/05/2022 09:22 PM    Specific gravity 1.010 02/11/2022 07:20 PM    pH (UA) 5.5 03/05/2022 09:22 PM    Protein 100 (A) 03/05/2022 09:22 PM    Glucose Negative 03/05/2022 09:22 PM    Ketone Negative 03/05/2022 09:22 PM    Bilirubin NEGATIVE 02/11/2022 07:20 PM    Urobilinogen 0.2 03/05/2022 09:22 PM    Nitrites Negative 03/05/2022 09:22 PM    Leukocyte Esterase LARGE (A) 03/05/2022 09:22 PM    Epithelial cells FEW 03/05/2022 09:22 PM    Bacteria 1+ (A) 03/05/2022 09:22 PM    WBC >100 (H) 03/05/2022 09:22 PM    RBC  03/05/2022 09:22 PM         Medications Reviewed:     Current Facility-Administered Medications   Medication Dose Route Frequency    magic mouthwash cpd (without sucralfate)  5 mL Oral TIDAC    0.9% sodium chloride infusion 250 mL  250 mL IntraVENous PRN    pantoprazole (PROTONIX) 40 mg in 0.9% sodium chloride 10 mL injection  40 mg IntraVENous Q12H    0.9% sodium chloride infusion 250 mL  250 mL IntraVENous PRN    sodium chloride (NS) flush 5-40 mL  5-40 mL IntraVENous Q8H    sodium chloride (NS) flush 5-40 mL  5-40 mL IntraVENous PRN    simethicone (MYLICON) 96DP/5.7WC oral drops 80 mg  1.2 mL Oral Multiple    sodium bicarbonate tablet 325 mg  325 mg Oral BID    lactulose (CHRONULAC) 10 gram/15 mL solution 30 mL  30 mL Oral DAILY    promethazine (PHENERGAN) tablet 12.5 mg  12.5 mg Oral Q6H PRN    epoetin vic-epbx (RETACRIT) injection 10,000 Units  10,000 Units SubCUTAneous Q TUE, THU & SAT    multivitamin, tx-iron-ca-min (THERA-M w/ IRON) tablet 1 Tablet  1 Tablet Oral DAILY    miconazole (MICONTIN) 2 % ointment   Topical BID    balsam peru-castor oiL (VENELEX) ointment Topical BID    zinc oxide-cod liver oil (DESITIN) 40 % paste   Topical PRN    sodium chloride (NS) flush 5-40 mL  5-40 mL IntraVENous Q8H    sodium chloride (NS) flush 5-40 mL  5-40 mL IntraVENous PRN    acetaminophen (TYLENOL) tablet 650 mg  650 mg Oral Q6H PRN    Or    acetaminophen (TYLENOL) suppository 650 mg  650 mg Rectal Q6H PRN    polyethylene glycol (MIRALAX) packet 17 g  17 g Oral DAILY PRN    ondansetron (ZOFRAN ODT) tablet 4 mg  4 mg Oral Q8H PRN    Or    ondansetron (ZOFRAN) injection 4 mg  4 mg IntraVENous K3D PRN    folic acid (FOLVITE) tablet 1 mg  1 mg Oral DAILY    levothyroxine (SYNTHROID) tablet 50 mcg  50 mcg Oral 6am    midodrine (PROAMATINE) tablet 15 mg  15 mg Oral TID WITH MEALS    octreotide (SANDOSTATIN) injection 100 mcg  100 mcg IntraVENous TID    rifAXIMin (XIFAXAN) tablet 550 mg  550 mg Oral BID    sertraline (ZOLOFT) tablet 100 mg  100 mg Oral DAILY    thiamine HCL (B-1) tablet 100 mg  100 mg Oral DAILY    ursodioL (ACTIGALL) tablet 500 mg (Patient Supplied)  500 mg Oral Q12H    glucagon (GLUCAGEN) injection 1 mg  1 mg IntraMUSCular PRN    naloxone (NARCAN) injection 0.1 mg  0.1 mg IntraVENous PRN     ______________________________________________________________________  EXPECTED LENGTH OF STAY: 4d 16h  ACTUAL LENGTH OF STAY:          28                 Maicol Wisdom MD

## 2022-04-02 NOTE — PROGRESS NOTES
0730 Bedside shift change report given to Gari Oppenheim, RN (oncoming nurse) by ALICE Lopez RN (offgoing nurse). Report included the following information SBAR, Kardex, ED Summary, OR Summary, Procedure Summary, Intake/Output, MAR, Accordion, Recent Results, Med Rec Status, Cardiac Rhythm Sinus rhythm and Alarm Parameters . 1930 Bedside and Verbal shift change report given to ALICE Petit (oncoming nurse) by Gari Oppenheim, RN (offgoing nurse). Report included the following information SBAR, Kardex, ED Summary, OR Summary, Procedure Summary, Intake/Output, MAR, Accordion, Recent Results, Med Rec Status, Cardiac Rhythm Sinus rhythm and Alarm Parameters .

## 2022-04-02 NOTE — PROGRESS NOTES
[facility]   [address] [address], [facility] [address], [facility]  Phone: [phone]   [phone]       Nephrology Progress Note  Fe Fuller     1960     [id number]  Date of Admission : 3/5/2022  04/02/22    CC:  Follow up for greg    Assessment and Plan   GREG:  - 2/2 HRS and now w/ GI bleed  - Non oliguric   - encourage po free water. She will benefit from DHT feeds    - Beckford should not be removed   - labs daily     Hypernatremia :  - 2/2 lack of FW   - encourage FW    Severe anemia:  GI bleed  - On EDWIN TTS  - transfusions per primary team      Cirrhosis, Portal HTN, PBC  - poral gastropathy  - being w/u for [facility] transplant      Hypothyroidism    Malnutrition:       Interval History:  No major issues. Cr better still confused. Encourage fluid intake     Review of Systems: A comprehensive review of systems was negative except for that written in the HPI.     Current Medications:   Current Facility-Administered Medications   Medication Dose Route Frequency    magic mouthwash cpd (without sucralfate)  5 mL Oral TIDAC    0.9% sodium chloride infusion 250 mL  250 mL IntraVENous PRN    pantoprazole (PROTONIX) 40 mg in 0.9% sodium chloride 10 mL injection  40 mg IntraVENous Q12H    0.9% sodium chloride infusion 250 mL  250 mL IntraVENous PRN    sodium chloride (NS) flush 5-40 mL  5-40 mL IntraVENous Q8H    sodium chloride (NS) flush 5-40 mL  5-40 mL IntraVENous PRN    simethicone (MYLICON) 52DL/6.8IU oral drops 80 mg  1.2 mL Oral Multiple    sodium bicarbonate tablet 325 mg  325 mg Oral BID    lactulose (CHRONULAC) 10 gram/15 mL solution 30 mL  30 mL Oral DAILY    promethazine (PHENERGAN) tablet 12.5 mg  12.5 mg Oral Q6H PRN    epoetin vic-epbx (RETACRIT) injection 10,000 Units  10,000 Units SubCUTAneous Q TUE, THU & SAT    multivitamin, tx-iron-ca-min (THERA-M w/ IRON) tablet 1 Tablet  1 Tablet Oral DAILY    miconazole (MICONTIN) 2 % ointment   Topical BID    balsam peru-castor oiL (VENELEX) ointment   Topical BID    zinc oxide-cod liver oil (DESITIN) 40 % paste   Topical PRN    sodium chloride (NS) flush 5-40 mL  5-40 mL IntraVENous Q8H    sodium chloride (NS) flush 5-40 mL  5-40 mL IntraVENous PRN    acetaminophen (TYLENOL) tablet 650 mg  650 mg Oral Q6H PRN    Or    acetaminophen (TYLENOL) suppository 650 mg  650 mg Rectal Q6H PRN    polyethylene glycol (MIRALAX) packet 17 g  17 g Oral DAILY PRN    ondansetron (ZOFRAN ODT) tablet 4 mg  4 mg Oral Q8H PRN    Or    ondansetron (ZOFRAN) injection 4 mg  4 mg IntraVENous T0F PRN    folic acid (FOLVITE) tablet 1 mg  1 mg Oral DAILY    levothyroxine (SYNTHROID) tablet 50 mcg  50 mcg Oral 6am    midodrine (PROAMATINE) tablet 15 mg  15 mg Oral TID WITH MEALS    octreotide (SANDOSTATIN) injection 100 mcg  100 mcg IntraVENous TID    rifAXIMin (XIFAXAN) tablet 550 mg  550 mg Oral BID    sertraline (ZOLOFT) tablet 100 mg  100 mg Oral DAILY    thiamine HCL (B-1) tablet 100 mg  100 mg Oral DAILY    ursodioL (ACTIGALL) tablet 500 mg (Patient Supplied)  500 mg Oral Q12H    glucagon (GLUCAGEN) injection 1 mg  1 mg IntraMUSCular PRN    naloxone (NARCAN) injection 0.1 mg  0.1 mg IntraVENous PRN      Allergies   Allergen Reactions    Morphine Other (comments)     Severe HA.  ALL narcotics!!!       Objective:  Vitals:    Vitals:    04/02/22 0900 04/02/22 1000 04/02/22 1100 04/02/22 1200   BP: (!) 99/49 117/63 119/63 117/61   Pulse: 70 68  63   Resp: 18 17  11   Temp:    97.5 °F (36.4 °C)   SpO2: 100% 100% 92% 100%   Weight:       Height:         Intake and Output:  04/02 0701 - 04/02 1900  In: 500 [P.O.:500]  Out: 550 [Urine:550]  03/31 1901 - 04/02 0700  In: 720 [P.O.:720]  Out: 1390 [Urine:1390]    Physical Examination:  General: No distress  HEENT:           Pale and icteric   Neck:  Supple, no mass  Resp:  Lungs CTA B/L  CV:  RRR,  no murmur or rub,no  LE edema  GI:  Soft, NT, + Bowel sounds, no hepatosplenomegaly  Neurologic:  AAO X 3  :  + Beckford     [x]    High complexity decision making was performed  [x]    Patient is at high-risk of decompensation with multiple organ involvement    Lab Data Personally Reviewed: I have reviewed all the pertinent labs, microbiology data and radiology studies during assessment.     Recent Labs     04/02/22 0307 04/01/22 0510 03/31/22  0521    142 142   K 3.5 3.4* 3.3*    110* 110*   CO2 18* 19* 18*   * 116* 128*   * 172* 167*   CREA 3.09* 3.59* 3.53*   CA 7.8* 8.7 8.5   PHOS  --  5.7* 7.1*   ALB 3.2* 3.6 3.4*   ALT 24  --   --      Recent Labs     04/02/22 0307 04/01/22 0510 03/31/22  1421 03/31/22  0521 03/30/22  1448   WBC 9.8 11.4*  --  11.5*  --    HGB 7.5* 8.7* 8.0* 7.7* 7.6*   HCT 22.7* 26.1* 23.8* 22.5* 22.8*   PLT 53* 41*  --  59*  --      No results found for: SDES  Lab Results   Component Value Date/Time    Culture result: NO GROWTH 4 DAYS 03/14/2022 06:48 PM    Culture result: NO GROWTH 4 DAYS 03/12/2022 06:00 PM    Culture result: CANDIDA ALBICANS (A) 03/05/2022 09:22 PM     Recent Results (from the past 24 hour(s))   CBC W/O DIFF    Collection Time: 04/02/22  3:07 AM   Result Value Ref Range    WBC 9.8 3.6 - 11.0 K/uL    RBC 2.42 (L) 3.80 - 5.20 M/uL    HGB 7.5 (L) 11.5 - 16.0 g/dL    HCT 22.7 (L) 35.0 - 47.0 %    MCV 93.8 80.0 - 99.0 FL    MCH 31.0 26.0 - 34.0 PG    MCHC 33.0 30.0 - 36.5 g/dL    RDW 22.7 (H) 11.5 - 14.5 %    PLATELET 53 (L) 939 - 400 K/uL    MPV ABNORMAL 8.9 - 12.9 FL    NRBC 0.0 0  WBC    ABSOLUTE NRBC 0.00 0.00 - 8.05 K/uL   METABOLIC PANEL, COMPREHENSIVE    Collection Time: 04/02/22  3:07 AM   Result Value Ref Range    Sodium 138 136 - 145 mmol/L    Potassium 3.5 3.5 - 5.1 mmol/L    Chloride 108 97 - 108 mmol/L    CO2 18 (L) 21 - 32 mmol/L    Anion gap 12 5 - 15 mmol/L    Glucose 115 (H) 65 - 100 mg/dL     (H) 6 - 20 MG/DL    Creatinine 3.09 (H) 0.55 - 1.02 MG/DL    BUN/Creatinine ratio 46 (H) 12 - 20      GFR est AA 19 (L) >60 ml/min/1.73m2    GFR est non-AA 15 (L) >60 ml/min/1.73m2    Calcium 7.8 (L) 8.5 - 10.1 MG/DL    Bilirubin, total 4.2 (H) 0.2 - 1.0 MG/DL    ALT (SGPT) 24 12 - 78 U/L    AST (SGOT) 46 (H) 15 - 37 U/L    Alk. phosphatase 89 45 - 117 U/L    Protein, total 4.6 (L) 6.4 - 8.2 g/dL    Albumin 3.2 (L) 3.5 - 5.0 g/dL    Globulin 1.4 (L) 2.0 - 4.0 g/dL    A-G Ratio 2.3 (H) 1.1 - 2.2                                         Care Plan discussed with:  Patient     Family      RN      Consulting Physician [facility],         I have reviewed the flowsheets. Chart and Pertinent Notes have been reviewed. No change in PMH ,family and social history from Consult note.       Melissa Mary MD

## 2022-04-02 NOTE — PROGRESS NOTES
1930-bedside report received from [facility]  2007-pt found with both hands covered in feces-incont care given ointments/creams applied per order and diaper placed to keep pt from sticking hands in stool with next incont episode      0754-bedside report given to Rn using sbar format

## 2022-04-03 NOTE — PROGRESS NOTES
TRANSFER - IN REPORT:    Verbal report received from LYNNETTE Obregon(name) on [address]  being received from ICU(unit) for routine progression of care      Report consisted of patients Situation, Background, Assessment and   Recommendations(SBAR). Information from the following report(s) SBAR, Intake/Output, MAR, Recent Results and Cardiac Rhythm SR was reviewed with the receiving nurse. Opportunity for questions and clarification was provided. Assessment completed upon patients arrival to unit and care assumed.

## 2022-04-03 NOTE — PROGRESS NOTES
Bedside shift change report given to LYNNETTE Munoz (oncoming nurse) by Asia Hampton (offgoing nurse).  Report included the following information SBAR, Kardex, Intake/Output, Recent Results and Cardiac Rhythm SR.

## 2022-04-03 NOTE — PROGRESS NOTES
PT Note:    New orders received and acknowledged. Chart reviewed. Noted patient is currently on PT caseload 2 x week-seen on Friday. She is total A x 2 for rolling and scooting. Discharge recommendation is SNF. Will follow up next week. Thank you.

## 2022-04-03 NOTE — PROGRESS NOTES
[address] [facility], MD, [address], [place], [place]      NeymarISAAC Ramirez [facility]     April S John, [facility]   MANISHA Allred [facility]       Namita Montemayor [facility] [address]    at [address], [address] Katlyn Pizano  22.    [phone]    FAX: [address] [place] [address], [address]    [phone]    FAX: [phone]       HEPATOLOGY PROGRESS NOTE  The patient is well known to me from a previous visit to my office at THE [facility] in [place]. She is a 66 yo  female who was found to have chronic liver disease in 2020 and cirrhosis in 7/2021 when she developed ascites. She had variceal bleeding in 11/2021. Serologic evaluation for markers of chronic liver disease was positive for AMA. Cirrhosis is due to [facility].     The patient has developed the following complications of cirrhosis: esophageal varices, variceal bleeding, ascites, edema, hepatic encephalopathy, severe muslce wasting    I saw the patient for the first time in 1/2022. She had CTP score 9 and MELD score 21 at that time. We started liver transplant evaluation testing. She developed confusion and could not be aroused and was hospitalized 3 weeks ago at [facility] in 2/202. During that hospitalization she developed worsening malnutrition, worsening of muscle wasting and a few decubitus ulcers. She has been at [facility] since 3/5. She has been receiving Dobbhoff tube feedings and OT/PT.   She has had dramatic improvement in nutritional status, skin wounds have nearly healed, mental status is clear, she is stronger, can move herself around in bed, she can sit in chair for an hour at a time, can feed herself and has been able to stand with PT assistance. Family conference on 3/27/22. I had a 1 hour meeting with the patient and her son who is POA. Next step is to transfer to rehab for 2-3 more weeks. In order to do this will need to remove feeding tube. In order to do this she will need to be able to eat sufficient calories to maintain her nutritional status  If she does well with 3-4 weeks of rehab we may be able to get LT eval testing and eventually on LT list.    Hospital course:  Several episodes of hematemesis last week   EGD showed no varices. Only severe portal gastropathy   HB now stable  Feeding tube is out. She is alert and eating on her own. Can transfer out of ICU. Her mood is excellent today. She is in good spirits. She is determined to improve and wants to go to rehab and get stronger. Not ready for hospice. Hepatology Plan:   to find rehab place in [facility] area close to son. I can see her as OP in my [facility] office      ASSESSMENT AND PLAN:  Cirrhosis  The diagnosis of cirrhosis is based upon imaging, laboratory studies, complications of cirrhosis. Cirrhosis is secondary to [facility]. Not alcohol as she was initially told.     Serologic testing was positive for AMA, ASMA     The CTP is 10. Child class C. The MELD score is 32.     Based upon the MELD and CTP scores the patient has a mortality of about 50% within the next 90 days until we turn her fraily and weakness around. Right now is is too weak to survive liver transplantation. She needs aggressive nutritional support and both she and her son are in favor of this with the hopes her situation will improve and she could survive liver transplantation.       Primary Biliary Cholangitis  The diagnosis is based upon serology and an elevation in ALP and positive AMA. A liver biopsy has not been performed. PBC is being treated with ANTONIETA 500 mg BID.     CKD-HRS  Scr is moving in right direction and now down to 3.40 mg  Continue midodrine, Malnutrition  The patient has severe protein-calorie malnutrtion and muscle wasting. This is due to advanced liver disease and refractory ascites. The patient needs more calories than she can possible eat to get into positive nutritional balance. Without aggressive nutritional support she will not survive. She is alert, oriented and understands the need for NG dobhoff feeding tube placement. Both she and her son agree with this. Getting tube feeds via Dobhoff feeding tube. Tolerating this well. Formula is being managed by nutritioinal service. Frailty/muscle wasting  The patient is very frail and cannot sit or stand on her own. She is making great progress with aggressive OT/PT. She can now lift her legs off the bed without strugling    She is very motivated to survive and willing to do whatever excercies she needs to get stronger. The goal is up and walking to bathroom with or without walker. Skin wounds  Large 5 x 5 cm area of tissue injury withtout ulceration on her back has nearly resolved. Stage 3 pressure injury continues to heal.    Ulcers have to be healed for her to be an LT candidate.     Ascites   Ascites developed for the first time in 7/2021. Ascites is is refractory to the current doses of diuretics because of kidney disease and Hyponatremia. Ascites has increased some but not tight. No diuretics. Paracentesis draining 5L ascites per day. Hyponatremia  This is secondary cirrhosis and diuretics  This has been stable in the 131-134 range. No diuretics.       Screening for Esophageal varices   The patient has esophageal varices with prior bleeding. Varices were banded in 11/2021 and 12/2021. The last EGD to assess for varices was performed in 12/2021. No repeat EGD needed at this time.     Hepatic encephalopathy   Overt HE is well controlled on lactulose and xifaxan.     Serum ammonia is low and in the 40s.      Coagulopathy  INR is in the 1.6 range and stable. Thrombocytopenia   This is secondary to cirrhosis. There is no evidence of overt bleeding. No treatment is required. The platelet count is adequate for the patient to undergo procedures without the need for platelet transfusion or platelet growth factors.     Anemia   This is due to multifactorial causes including portal hypertension with chronic GI blood loss and bone marrow suppression due to severe malnutrition.     The most recent FE studies were from 1/2022. The serum ferritin is 87  The FE saturation is 74  She recieved 3 does of IV FE earlier in the hospitalization    She has now had acute blood loss anemia superimposed upon chronic anemia. Thrombocytopenia   This is secondary to cirrhosis. There is no evidence of overt bleeding. No treatment is required. The platelet count is adequate for the patient to undergo procedures without the need for platelet transfusion or platelet growth factors. PHYSICAL EXAMINATION:  VS: per nursing note  General:  Looks much better. Skin color normal.  Eyes:  Sclera non-icteric. ENT:  No oral lesions. Thyroid normal.  Nodes:  No adenopathy. Skin:  Spider angiomata. Less ecchymosis on arms. Respiratory:  Lungs clear to auscultation. Cardiovascular:  Regular heart rate. Abdomen:  Soft non-tender, Some ascites is present. Paracentesis catheter draining ascites  Extremities:  No lower extremity edema. Severe muscle wasting of upper and lower extremities. Neurologic:  Alert and oriented. Cranial nerves grossly intact. No asterixis. LABORATORY:  Results for Britta Kapoor (MRN [id number]) as of 4/3/2022 14:26   Ref.  Range 4/1/2022 05:10 4/2/2022 03:07 4/3/2022 02:53   WBC Latest Ref Range: 3.6 - 11.0 K/uL 11.4 (H) 9.8 10.3   HGB Latest Ref Range: 11.5 - 16.0 g/dL 8.7 (L) 7.5 (L) 8.1 (L)   PLATELET Latest Ref Range: 150 - 400 K/uL 41 (LL) 53 (L) 36 (LL)   Sodium Latest Ref Range: 136 - 145 mmol/L 142 138 133 (L) Potassium Latest Ref Range: 3.5 - 5.1 mmol/L 3.4 (L) 3.5 3.4 (L)   Chloride Latest Ref Range: 97 - 108 mmol/L 110 (H) 108 103   CO2 Latest Ref Range: 21 - 32 mmol/L 19 (L) 18 (L) 18 (L)   Glucose Latest Ref Range: 65 - 100 mg/dL 116 (H) 115 (H) 116 (H)   BUN Latest Ref Range: 6 - 20 MG/ (H) 142 (H) 142 (H)   Creatinine Latest Ref Range: 0.55 - 1.02 MG/DL 3.59 (H) 3.09 (H) 3.04 (H)   Bilirubin, total Latest Ref Range: 0.2 - 1.0 MG/DL  4.2 (H) 4.4 (H)   Albumin Latest Ref Range: 3.5 - 5.0 g/dL 3.6 3.2 (L) 3.0 (L)   ALT Latest Ref Range: 12 - 78 U/L  24 20   AST Latest Ref Range: 15 - 37 U/L  46 (H) 42 (H)   Alk. phosphatase Latest Ref Range: 45 - 117 U/L  89 94   Ammonia, plasma Latest Ref Range: <32 UMOL/L   43 (H)     RADIOLOGY:  Ultrasound of liver. Echogenic consistent with cirrhosis. No liver mass lesions. No dilated bile ducts. Moderate ascites.         MD [facility] [address], [address] [place] [address] 22.  [phone]  [address] [place] [address]

## 2022-04-03 NOTE — PROGRESS NOTES
1930 Bedside and Verbal shift change report given to ALICE Connor (oncoming nurse) by Hue Ivy RN (offgoing nurse). Report included the following information SBAR, Kardex, ED Summary, OR Summary, Procedure Summary, Intake/Output, MAR, Accordion, Recent Results, Med Rec Status, Cardiac Rhythm Sinus rhythm and Alarm Parameters .      0730-bedside report given to RN using sbar format

## 2022-04-03 NOTE — PROGRESS NOTES
0730 Bedside and Verbal shift change report given to Mana Aragon RN (oncoming nurse) by ALICE Workman RN (offgoing nurse). Report included the following information SBAR, Kardex, ED Summary, OR Summary, Procedure Summary, Intake/Output, MAR, Accordion, Recent Results, Med Rec Status, Cardiac Rhythm Sinus rhythm and Alarm Parameters . 1842 Attempted to call report. 1853 TRANSFER - OUT REPORT:    Verbal report given to CASH Walker RN(name) on [facility]  being transferred to (unit) for routine progression of care       Report consisted of patients Situation, Background, Assessment and   Recommendations(SBAR). Information from the following report(s) SBAR, Kardex, ED Summary, OR Summary, Procedure Summary, Intake/Output, MAR, Accordion, Recent Results, Med Rec Status, Cardiac Rhythm sinus rhythm and Alarm Parameters  was reviewed with the receiving nurse. Lines:   Peripheral IV 04/01/22 Left Antecubital (Active)   Site Assessment Clean, dry, & intact; Ecchymotic (bruised) 04/03/22 1200   Phlebitis Assessment 0 04/03/22 1200   Infiltration Assessment 0 04/03/22 1200   Dressing Status Clean, dry, & intact 04/03/22 1200   Dressing Type 4 X 4;Transparent;Sue 04/03/22 1200   Hub Color/Line Status Blue;Capped 04/03/22 1200   Action Taken Open ports on tubing capped 04/03/22 1200   Alcohol Cap Used Yes 04/03/22 1200        Opportunity for questions and clarification was provided.       Patient transported with:   Monitor  Patient's medications from home  Patient-specific medications from Pharmacy  Registered Nurse  Tech

## 2022-04-03 NOTE — PROGRESS NOTES
[facility]                                                                                          Hospitalist Progress Note  Miller Gonzalez MD  Answering service: [phone] OR [phone] from in house phone        Date of Service:  4/3/2022  NAME:  Sanjeev Noe  :  1960  MRN:  [id number]      Admission Summary:   Copied form admit: \" Per ICU: Interval history: From critical care consult on   65year-old lady with liver cirrhosis due to [facility].  She has had multiple complication in form of esophageal varices, recurrent encephalopathy, recurrent ascites and severe debility and malnutrition. Patient was admitted to the hospital on  with cirrhosis decompensation. Shalom Cee has been in the hospital since then with multiple complication but overall was improving. Today she had large coffee ground emesis but was associated with significant drop in her hemoglobin.  Primary team consulted ICU to assist the need for ICU level care. Overnight Events:   3/30: Endoscopy showed moderate portal hypertensive gastropathy with some blood and clots in the fundus.  No varices.  Octreotide discontinued this morning, extubated this morning, receiving packed RBC transfusion this morning for hemoglobin of 6.6.  Hemodynamically remained stable        Hospitalist note:   Seen and examined patient lying in bed. States that she is ok. Flat affect   Chart reviewed. Patient to transfer out of ICU.   \"    Interval history / Subjective:     4/3/2022 :    Affect more affable,    Taking the liquid boost well for multiple meals  - current   Discussed nutrition w pt and RN in room 4/2 and 4/3 am rounds   Ammonia 40's    Pt oriented to hospital but not to: name thereof/season/month/year       Assessment & Plan:               Acute blood loss anemia:   GI bleed  - s/p 4 units of pRBCs   - s/p EGD on   - Hgb 7.6   - Continue PPI BID   - Monitor labs and transfuse for hgb <7.0    - Hepatology following   - good iron stores, stable hgb at 7.7 3/31  Lab Results   Component Value Date/Time    HGB 8.1 (L) 04/03/2022 02:53 AM         Decompensated liver cirrhosis, with portal hypertension.  Felt to be secondary to [facility]  Transaminitis/hyperbilirubinemia/coagulopathy/thrombocytopenia  History of hepatic encephalopathy   -Transferred from [address] [place] [address] with prolonged hospitalization.  Transferred for further work-up for liver transplant eval  -EGD on 2/17 [place] noted for distal esophagitis and portal hypertensive gastropathy, no evidence of varices.    -history of esophageal varices back in November 2021 status post banding at that time.    -Per GI at outlying facility, patient was deemed not to be a candidate for colonoscopy and recommended Cologuard.    -s/p MRCP on 2/18 which was noted for cirrhosis, portal hypertension, and large ascites.   -s/p cardiac stress test which was negative for ischemia, EF of 69%  -Continue lactulose, rifaximin, Protonix  -Continue ursodiol  -Paracentesis ordered 3/25 with [id number] removed  - PT/OT following   - Consult palliative for goals of care. 4/3:   Affect more affable,    Taking the liquid boost well for multiple meals 4/2 - current   Discussed nutrition w pt and RN in room 4/2 and 4/3 am rounds   Ammonia 40's    Pt oriented to hospital but not to: name thereof/season/month/year     Refractory ascites  -multiple paracentesis done at [facility] including 2/11 with 11.8 L removal, 2/18 with 9/2 L removed, and 2/28 with 9.2 L removed, 3/12, 3/25  -Currently with distended abdomen  -Moderate large ascites on US 3/6. Ascites said to be refractory to diuretics, HRS limiting use of diuretics  -Ascitic drain that was placed on 3/12 was apparently clogged, flushed and drained significant amount of ascite  -Ascitic fluid lab negative for SBP  - no need for paracentesis based on exam 4/1, cont to eval      GREG with metabolic acidosis.  persisent  -Baseline 0.9 on 11/26/2021   -Likely due to [facility]  -Nephrology following, recommendations appreciated. - Continue albumin, midodrine and octreotide per nephrology   Lab Results   Component Value Date/Time    Creatinine 3.04 (H) 04/03/2022 02:53 AM         Hyponatremia  -Suspect secondary to underlying liver cirrhosis.   -Na stable 128-131 - last 143 range 3/31 - 4/3      Recent E. coli UTI  -Treated with Vanco and Zosyn then transitioned to Rocephin, completed 7-day antibiotic course as of 2/17  -Replace Steve with new catheter 3/5 .  Urine culture on 3/5 grew Candida albicans 75,000 CFU. - nephrology desires that steve stay in for now      Hypothyroid  -Continue levothyroxine  Lab Results   Component Value Date/Time    TSH 5.470 (H) 02/11/2022 09:25 AM    Triiodothyronine (T3), free 2.4 01/25/2022 09:50 AM    T4, Free 1.2 01/25/2022 09:50 AM    Free T4 1.14 02/11/2022 09:25 AM     F/u TSH 4/4     Severe malnutrition  --Continue NG tube with tube feeds at night if patient tolerates. Encourage oral intake   --On regular diet and tolerating     Generalized weakness/chronic wounds. pta  -PT, OT consult  -Wound care following              Hospital Problems  Date Reviewed: 2/17/2022          Codes Class Noted POA    * (Principal) Cirrhosis ([facility] [address] ICD-10-CM: K74.60  ICD-9-CM: 571.5  2/8/2022 Unknown                  After personally interviewing pt at bedside the following is noted on 4/3/2022 :    Review of Systems   Reason unable to perform ROS: confused               I had a face to face encounter with patient on 4/3/2022 at bedside for the following physical exam:     PHYSICAL EXAM:    Visit Vitals  BP (!) 89/46   Pulse 61   Temp 97.6 °F (36.4 °C)   Resp 14   Ht 5' 4\" (1.626 m)   Wt 52.8 kg (116 lb 6.5 oz)   SpO2 97%   Breastfeeding No   BMI 19.98 kg/m²          Physical Exam  Constitutional:       General: She is not in acute distress. Appearance: She is not ill-appearing, toxic-appearing or diaphoretic.       Comments: chronically ill appearing ; malnurished; low muscle mass   HENT:      Head: Normocephalic and atraumatic. Right Ear: External ear normal.      Left Ear: External ear normal.      Mouth/Throat:      Mouth: Mucous membranes are dry. Pharynx: Oropharynx is clear. Eyes:      General:         Right eye: No discharge. Left eye: No discharge. Cardiovascular:      Rate and Rhythm: Normal rate and regular rhythm. Pulmonary:      Effort: Pulmonary effort is normal.      Breath sounds: Normal breath sounds. Abdominal:      General: Abdomen is flat. Palpations: Abdomen is soft. Musculoskeletal:         General: No swelling or deformity. Cervical back: Neck supple. No rigidity. Skin:     General: Skin is warm and dry. Coloration: Skin is jaundiced. Skin is not pale. Comments: Ecchymosis multiple sites   Neurological:      General: No focal deficit present. Mental Status: She is alert. Mental status is at baseline. Comments:     Psychiatric:         Mood and Affect: Mood normal.         Behavior: Behavior normal.                     Data Review:    Review and/or order of clinical lab test      Labs:     Recent Labs     04/03/22 0253 04/02/22 0307   WBC 10.3 9.8   HGB 8.1* 7.5*   HCT 23.8* 22.7*   PLT 36* 53*     Recent Labs     04/03/22 0253 04/02/22 0307 04/01/22  0510   * 138 142   K 3.4* 3.5 3.4*    108 110*   CO2 18* 18* 19*   * 142* 172*   CREA 3.04* 3.09* 3.59*   * 115* 116*   CA 7.7* 7.8* 8.7   PHOS  --   --  5.7*     Recent Labs     04/03/22 0253 04/02/22 0307 04/01/22  0510   ALT 20 24  --    AP 94 89  --    TBILI 4.4* 4.2*  --    TP 4.8* 4.6*  --    ALB 3.0* 3.2* 3.6   GLOB 1.8* 1.4*  --      No results for input(s): INR, PTP, APTT, INREXT, INREXT in the last 72 hours. No results for input(s): FE, TIBC, PSAT, FERR in the last 72 hours.    Lab Results   Component Value Date/Time    Folate 38.3 (H) 10/20/2021 07:53 AM    RBC FOLATE 878 02/14/2022 01:27 AM      No results for input(s): PH, PCO2, PO2 in the last 72 hours. No results for input(s): CPK, CKNDX, TROIQ in the last 72 hours.     No lab exists for component: CPKMB  Lab Results   Component Value Date/Time    Cholesterol, total 122 01/25/2022 09:50 AM    HDL Cholesterol 40 01/25/2022 09:50 AM    LDL, calculated 67.2 01/25/2022 09:50 AM    Triglyceride 74 01/25/2022 09:50 AM    CHOL/HDL Ratio 3.1 01/25/2022 09:50 AM     Lab Results   Component Value Date/Time    Glucose (POC) 128 (H) 03/15/2022 06:06 AM    Glucose (POC) 123 (H) 03/15/2022 12:14 AM    Glucose (POC) 114 (H) 02/23/2022 05:42 PM    Glucose (POC) 127 (H) 02/23/2022 12:04 PM    Glucose (POC) 122 (H) 02/23/2022 08:18 AM     Lab Results   Component Value Date/Time    Color DARK YELLOW 03/05/2022 09:22 PM    Appearance TURBID (A) 03/05/2022 09:22 PM    Specific gravity 1.014 03/05/2022 09:22 PM    Specific gravity 1.010 02/11/2022 07:20 PM    pH (UA) 5.5 03/05/2022 09:22 PM    Protein 100 (A) 03/05/2022 09:22 PM    Glucose Negative 03/05/2022 09:22 PM    Ketone Negative 03/05/2022 09:22 PM    Bilirubin NEGATIVE 02/11/2022 07:20 PM    Urobilinogen 0.2 03/05/2022 09:22 PM    Nitrites Negative 03/05/2022 09:22 PM    Leukocyte Esterase LARGE (A) 03/05/2022 09:22 PM    Epithelial cells FEW 03/05/2022 09:22 PM    Bacteria 1+ (A) 03/05/2022 09:22 PM    WBC >100 (H) 03/05/2022 09:22 PM    RBC  03/05/2022 09:22 PM         Medications Reviewed:     Current Facility-Administered Medications   Medication Dose Route Frequency    magic mouthwash cpd (without sucralfate)  5 mL Oral TIDAC    0.9% sodium chloride infusion 250 mL  250 mL IntraVENous PRN    pantoprazole (PROTONIX) 40 mg in 0.9% sodium chloride 10 mL injection  40 mg IntraVENous Q12H    0.9% sodium chloride infusion 250 mL  250 mL IntraVENous PRN    sodium chloride (NS) flush 5-40 mL  5-40 mL IntraVENous Q8H    sodium chloride (NS) flush 5-40 mL  5-40 mL IntraVENous PRN    simethicone (MYLICON) 88PP/2.7AX oral drops 80 mg  1.2 mL Oral Multiple    sodium bicarbonate tablet 325 mg  325 mg Oral BID    lactulose (CHRONULAC) 10 gram/15 mL solution 30 mL  30 mL Oral DAILY    promethazine (PHENERGAN) tablet 12.5 mg  12.5 mg Oral Q6H PRN    epoetin vic-epbx (RETACRIT) injection 10,000 Units  10,000 Units SubCUTAneous Q TUE, THU & SAT    multivitamin, tx-iron-ca-min (THERA-M w/ IRON) tablet 1 Tablet  1 Tablet Oral DAILY    miconazole (MICONTIN) 2 % ointment   Topical BID    balsam peru-castor oiL (VENELEX) ointment   Topical BID    zinc oxide-cod liver oil (DESITIN) 40 % paste   Topical PRN    sodium chloride (NS) flush 5-40 mL  5-40 mL IntraVENous Q8H    sodium chloride (NS) flush 5-40 mL  5-40 mL IntraVENous PRN    acetaminophen (TYLENOL) tablet 650 mg  650 mg Oral Q6H PRN    Or    acetaminophen (TYLENOL) suppository 650 mg  650 mg Rectal Q6H PRN    polyethylene glycol (MIRALAX) packet 17 g  17 g Oral DAILY PRN    ondansetron (ZOFRAN ODT) tablet 4 mg  4 mg Oral Q8H PRN    Or    ondansetron (ZOFRAN) injection 4 mg  4 mg IntraVENous C5F PRN    folic acid (FOLVITE) tablet 1 mg  1 mg Oral DAILY    levothyroxine (SYNTHROID) tablet 50 mcg  50 mcg Oral 6am    midodrine (PROAMATINE) tablet 15 mg  15 mg Oral TID WITH MEALS    octreotide (SANDOSTATIN) injection 100 mcg  100 mcg IntraVENous TID    rifAXIMin (XIFAXAN) tablet 550 mg  550 mg Oral BID    sertraline (ZOLOFT) tablet 100 mg  100 mg Oral DAILY    thiamine HCL (B-1) tablet 100 mg  100 mg Oral DAILY    ursodioL (ACTIGALL) tablet 500 mg (Patient Supplied)  500 mg Oral Q12H    glucagon (GLUCAGEN) injection 1 mg  1 mg IntraMUSCular PRN    naloxone (NARCAN) injection 0.1 mg  0.1 mg IntraVENous PRN     ______________________________________________________________________  EXPECTED LENGTH OF STAY: 4d 16h  ACTUAL LENGTH OF STAY:          29                 Ese Meredith MD

## 2022-04-04 NOTE — PROGRESS NOTES
[facility]   [address] [facility], [facility] [address], [facility]  Phone: [phone]   [phone]       Nephrology Progress Note  Barbara Woods     1960     [id number]  Date of Admission : 3/5/2022  04/04/22    CC:  Follow up for greg    Assessment and Plan   GREG:  - 2/2 HRS and now w/ GI bleed  - stable renal function  - keep steve  - cont midodrine and octreotide  - daily abs     Hypokalemia:  - oral repletion ordered    Severe anemia:  GI bleed  - On EDWIN TTS  - transfusions per primary team      Cirrhosis, Portal HTN, PBC  - poral gastropathy  - being w/u for [facility] ED transplant      Hypothyroidism    Malnutrition:       Interval History:  Seen and examined. Awake, alert. No issues over the weekend. K low. No cp, sob reported. Review of Systems: A comprehensive review of systems was negative except for that written in the HPI.     Current Medications:   Current Facility-Administered Medications   Medication Dose Route Frequency    magic mouthwash cpd (without sucralfate)  5 mL Oral TIDAC    0.9% sodium chloride infusion 250 mL  250 mL IntraVENous PRN    pantoprazole (PROTONIX) 40 mg in 0.9% sodium chloride 10 mL injection  40 mg IntraVENous Q12H    0.9% sodium chloride infusion 250 mL  250 mL IntraVENous PRN    sodium chloride (NS) flush 5-40 mL  5-40 mL IntraVENous Q8H    sodium chloride (NS) flush 5-40 mL  5-40 mL IntraVENous PRN    simethicone (MYLICON) 21LS/5.0ED oral drops 80 mg  1.2 mL Oral Multiple    sodium bicarbonate tablet 325 mg  325 mg Oral BID    lactulose (CHRONULAC) 10 gram/15 mL solution 30 mL  30 mL Oral DAILY    promethazine (PHENERGAN) tablet 12.5 mg  12.5 mg Oral Q6H PRN    epoetin vic-epbx (RETACRIT) injection 10,000 Units  10,000 Units SubCUTAneous Q TUE, THU & SAT    multivitamin, tx-iron-ca-min (THERA-M w/ IRON) tablet 1 Tablet  1 Tablet Oral DAILY    miconazole (MICONTIN) 2 % ointment   Topical BID    balsam peru-castor oiL (VENELEX) ointment Topical BID    zinc oxide-cod liver oil (DESITIN) 40 % paste   Topical PRN    sodium chloride (NS) flush 5-40 mL  5-40 mL IntraVENous Q8H    sodium chloride (NS) flush 5-40 mL  5-40 mL IntraVENous PRN    acetaminophen (TYLENOL) tablet 650 mg  650 mg Oral Q6H PRN    Or    acetaminophen (TYLENOL) suppository 650 mg  650 mg Rectal Q6H PRN    polyethylene glycol (MIRALAX) packet 17 g  17 g Oral DAILY PRN    ondansetron (ZOFRAN ODT) tablet 4 mg  4 mg Oral Q8H PRN    Or    ondansetron (ZOFRAN) injection 4 mg  4 mg IntraVENous B8S PRN    folic acid (FOLVITE) tablet 1 mg  1 mg Oral DAILY    levothyroxine (SYNTHROID) tablet 50 mcg  50 mcg Oral 6am    midodrine (PROAMATINE) tablet 15 mg  15 mg Oral TID WITH MEALS    octreotide (SANDOSTATIN) injection 100 mcg  100 mcg IntraVENous TID    rifAXIMin (XIFAXAN) tablet 550 mg  550 mg Oral BID    sertraline (ZOLOFT) tablet 100 mg  100 mg Oral DAILY    thiamine HCL (B-1) tablet 100 mg  100 mg Oral DAILY    ursodioL (ACTIGALL) tablet 500 mg (Patient Supplied)  500 mg Oral Q12H    glucagon (GLUCAGEN) injection 1 mg  1 mg IntraMUSCular PRN    naloxone (NARCAN) injection 0.1 mg  0.1 mg IntraVENous PRN      Allergies   Allergen Reactions    Morphine Other (comments)     Severe HA. ALL narcotics!!!       Objective:  Vitals:    Vitals:    04/04/22 0152 04/04/22 0307 04/04/22 0353 04/04/22 0545   BP:  (!) 112/58     Pulse: 69 66 70 70   Resp:  15     Temp:  98 °F (36.7 °C)     SpO2:  100%     Weight:  53.7 kg (118 lb 6.2 oz)     Height:         Intake and Output:  No intake/output data recorded.   04/02 1901 - 04/04 0700  In: 480 [P.O.:480]  Out: 1100 [Urine:1100]    Physical Examination:  General: No distress  HEENT:           Pale and icteric   Neck:  Supple, no mass  Resp:  Lungs CTA B/L  CV:  RRR,  no murmur or rub,no  LE edema  GI:  Soft, NT, + Bowel sounds, no hepatosplenomegaly  Neurologic:  AAO X 3  :  + Beckford     [x]    High complexity decision making was performed  [x]    Patient is at high-risk of decompensation with multiple organ involvement    Lab Data Personally Reviewed: I have reviewed all the pertinent labs, microbiology data and radiology studies during assessment.     Recent Labs     04/04/22 0156 04/03/22 0253 04/02/22 0307   * 133* 138   K 3.2* 3.4* 3.5   CL 97 103 108   CO2 18* 18* 18*   * 116* 115*   * 142* 142*   CREA 3.31* 3.04* 3.09*   CA 8.3* 7.7* 7.8*   PHOS 6.3*  --   --    ALB 3.5 3.0* 3.2*   ALT  --  20 24     Recent Labs     04/04/22 0156 04/03/22 0253 04/02/22 0307   WBC 10.4 10.3 9.8   HGB 9.1* 8.1* 7.5*   HCT 27.4* 23.8* 22.7*   PLT 32* 36* 53*     No results found for: SDES  Lab Results   Component Value Date/Time    Culture result: NO GROWTH 4 DAYS 03/14/2022 06:48 PM    Culture result: NO GROWTH 4 DAYS 03/12/2022 06:00 PM    Culture result: CANDIDA ALBICANS (A) 03/05/2022 09:22 PM     Recent Results (from the past 24 hour(s))   RENAL FUNCTION PANEL    Collection Time: 04/04/22  1:56 AM   Result Value Ref Range    Sodium 130 (L) 136 - 145 mmol/L    Potassium 3.2 (L) 3.5 - 5.1 mmol/L    Chloride 97 97 - 108 mmol/L    CO2 18 (L) 21 - 32 mmol/L    Anion gap 15 5 - 15 mmol/L    Glucose 121 (H) 65 - 100 mg/dL     (H) 6 - 20 MG/DL    Creatinine 3.31 (H) 0.55 - 1.02 MG/DL    BUN/Creatinine ratio 42 (H) 12 - 20      GFR est AA 17 (L) >60 ml/min/1.73m2    GFR est non-AA 14 (L) >60 ml/min/1.73m2    Calcium 8.3 (L) 8.5 - 10.1 MG/DL    Phosphorus 6.3 (H) 2.6 - 4.7 MG/DL    Albumin 3.5 3.5 - 5.0 g/dL   AMMONIA    Collection Time: 04/04/22  1:56 AM   Result Value Ref Range    Ammonia, plasma 46 (H) <32 UMOL/L   CBC WITH AUTOMATED DIFF    Collection Time: 04/04/22  1:56 AM   Result Value Ref Range    WBC 10.4 3.6 - 11.0 K/uL    RBC 2.88 (L) 3.80 - 5.20 M/uL    HGB 9.1 (L) 11.5 - 16.0 g/dL    HCT 27.4 (L) 35.0 - 47.0 %    MCV 95.1 80.0 - 99.0 FL    MCH 31.6 26.0 - 34.0 PG    MCHC 33.2 30.0 - 36.5 g/dL    RDW 22.0 (H) 11.5 - 14.5 %    PLATELET 32 (LL) 175 - 400 K/uL    MPV 10.8 8.9 - 12.9 FL    NRBC 0.0 0  WBC    ABSOLUTE NRBC 0.00 0.00 - 0.01 K/uL    NEUTROPHILS 86 (H) 32 - 75 %    LYMPHOCYTES 8 (L) 12 - 49 %    MONOCYTES 5 5 - 13 %    EOSINOPHILS 0 0 - 7 %    BASOPHILS 0 0 - 1 %    IMMATURE GRANULOCYTES 1 (H) 0.0 - 0.5 %    ABS. NEUTROPHILS 9.0 (H) 1.8 - 8.0 K/UL    ABS. LYMPHOCYTES 0.8 0.8 - 3.5 K/UL    ABS. MONOCYTES 0.5 0.0 - 1.0 K/UL    ABS. EOSINOPHILS 0.0 0.0 - 0.4 K/UL    ABS. BASOPHILS 0.0 0.0 - 0.1 K/UL    ABS. IMM. GRANS. 0.1 (H) 0.00 - 0.04 K/UL    DF SMEAR SCANNED      RBC COMMENTS ANISOCYTOSIS  2+        RBC COMMENTS MACROCYTOSIS  1+        RBC COMMENTS AIRAM CELLS  PRESENT       TSH 3RD GENERATION    Collection Time: 04/04/22  1:56 AM   Result Value Ref Range    TSH 1.58 0.36 - 3.74 uIU/mL                                       Care Plan discussed with:  Patient     Family      RN      Consulting Physician [facility],         I have reviewed the flowsheets. Chart and Pertinent Notes have been reviewed. No change in PMH ,family and social history from Consult note.       Abeba Sanchez MD

## 2022-04-04 NOTE — PROGRESS NOTES
SELMA - TBD- Referral faxed to [facility]. CM called [address] ([phone]) and Lamonte Conv ([phone]) to see if they have reviewed referrals sent on 3/31/22. CM had to leave a message for [employer], but Ankur from [employer] stated that they do not participate with [facility]. CM faxed her this SNF referral to [phone].  Suzi Pena

## 2022-04-04 NOTE — PROGRESS NOTES
Occupational Therapy    Duplicate OT order received. Pt is on OT caseload and being followed 2x/week. Recommend SNF rehab per last OT note.

## 2022-04-04 NOTE — PROGRESS NOTES
Bedside shift change report given to LYNNETTE Mckinley (oncoming nurse) by Tex Monzon (offgoing nurse).  Report included the following information SBAR, Intake/Output, MAR, Recent Results and Cardiac Rhythm SR.

## 2022-04-04 NOTE — PROGRESS NOTES
[address] [facility]  Hospitalist Group                                                                                          Hospitalist Progress Note  Natasha Ibarra MD  Answering service: [phone] OR [phone] from in house phone        Date of Service:  2022  NAME:  Jayne Hassan  :  1960  MRN:  [id number]      Admission Summary:   Copied form admit: \" Per ICU: Interval history: From critical care consult on   65year-old lady with liver cirrhosis due to [facility].  She has had multiple complication in form of esophageal varices, recurrent encephalopathy, recurrent ascites and severe debility and malnutrition. Patient was admitted to the hospital on  with cirrhosis decompensation. Florencia Arshad has been in the hospital since then with multiple complication but overall was improving. Today she had large coffee ground emesis but was associated with significant drop in her hemoglobin.  Primary team consulted ICU to assist the need for ICU level care. Overnight Events:   3/30: Endoscopy showed moderate portal hypertensive gastropathy with some blood and clots in the fundus.  No varices.  Octreotide discontinued this morning, extubated this morning, receiving packed RBC transfusion this morning for hemoglobin of 6.6.  Hemodynamically remained stable        Hospitalist note:   Seen and examined patient lying in bed. States that she is ok. Flat affect   Chart reviewed. Patient to transfer out of ICU. \"    Interval history / Subjective:     2022 :    Affect more affable.  Animated, not tearful,    More talkative; admits to alcohol and else pt is educated on chalangitis contribution to her liver failure    Discussed with nephrology    Pt eating better   Else:      Assessment & Plan:               Acute blood loss anemia:   GI bleed  - s/p 4 units of pRBCs   - s/p EGD on   - Hgb 7.6   - Continue PPI BID   - Monitor labs and transfuse for hgb <7.0    - Hepatology following   - good iron stores, stable hgb at 7.7 3/31  Lab Results   Component Value Date/Time    HGB 9.1 (L) 04/04/2022 01:56 AM         Decompensated liver cirrhosis, with portal hypertension.  Felt to be secondary to [facility]  Transaminitis/hyperbilirubinemia/coagulopathy/thrombocytopenia  History of hepatic encephalopathy   -Transferred from [address] [place] [address] with prolonged hospitalization.  Transferred for further work-up for liver transplant eval  -EGD on 2/17 [place] noted for distal esophagitis and portal hypertensive gastropathy, no evidence of varices.    -history of esophageal varices back in November 2021 status post banding at that time.    -Per GI at outlying facility, patient was deemed not to be a candidate for colonoscopy and recommended Cologuard.    -s/p MRCP on 2/18 which was noted for cirrhosis, portal hypertension, and large ascites.   -s/p cardiac stress test which was negative for ischemia, EF of 69%  -Continue lactulose, rifaximin, Protonix  -Continue ursodiol  -Paracentesis ordered 3/25 with 97813 removed  - PT/OT following   - Consult palliative for goals of care. 4/3:   Affect more affable,    Taking the liquid boost well for multiple meals 4/2 - current   Discussed nutrition w pt and RN in room 4/2 and 4/3 am rounds   Ammonia 40's    Pt oriented to hospital but not to: name thereof/season/month/year  4/4:   Affect more affable. Animated, not tearful,    More talkative; admits to alcohol and else pt is educated on chalangitis contribution to her liver failure    Discussed with nephrology    Pt eating better     Refractory ascites  -multiple paracentesis done [facility] including 2/11 with 11.8 L removal, 2/18 with 9/2 L removed, and 2/28 with 9.2 L removed, 3/12, 3/25  -Currently with distended abdomen  -Moderate large ascites on US 3/6. Ascites said to be refractory to diuretics, HRS limiting use of diuretics  -Ascitic drain that was placed on 3/12 was apparently clogged, flushed and drained significant amount of ascite  -Ascitic fluid lab negative for SBP  - no need for paracentesis based on exam 4/1, cont to eval      GREG with metabolic acidosis. persisent  -Baseline 0.9 on 11/26/2021   -Likely due to [facility]  -Nephrology following, recommendations appreciated. Lab Results   Component Value Date/Time    Creatinine 3.31 (H) 04/04/2022 01:56 AM        Hyponatremia  -Suspect secondary to underlying liver cirrhosis.   -Na stable 128-131 last few days      Recent E. coli UTI  -Treated with Vanco and Zosyn then transitioned to Rocephin, completed 7-day antibiotic course as of 2/17  -Replace Steve with new catheter 3/5 .  Urine culture on 3/5 grew Candida albicans 75,000 CFU. - nephrology desires that steve stay in for now      Hypothyroid  -Continue levothyroxine  Lab Results   Component Value Date/Time    TSH 1.58 04/04/2022 01:56 AM    Triiodothyronine (T3), free 2.4 01/25/2022 09:50 AM    T4, Free 1.2 01/25/2022 09:50 AM    Free T4 1.14 02/11/2022 09:25 AM     F/u TSH 4/4     Severe malnutrition  --Continue NG tube with tube feeds at night if patient tolerates. Encourage oral intake   --On regular diet and tolerating     Generalized weakness/chronic wounds. pta  -PT, OT consult  -Wound care following              Hospital Problems  Date Reviewed: 2/17/2022          Codes Class Noted POA    * (Principal) [facility] ICD-10-CM: K74.60  ICD-9-CM: 571.5  2/8/2022 Unknown                  After personally interviewing pt at bedside the following is noted on 4/4/2022 :    Review of Systems   Reason unable to perform ROS: less confused               I had a face to face encounter with patient on 4/4/2022 at bedside for the following physical exam:     PHYSICAL EXAM:    Visit Vitals  /67   Pulse 72   Temp 98 °F (36.7 °C)   Resp 15   Ht 5' 4\" (1.626 m)   Wt 53.7 kg (118 lb 6.2 oz)   SpO2 100%   Breastfeeding No   BMI 20.32 kg/m²          Physical Exam  Constitutional:       General: She is not in acute distress. Appearance: She is not ill-appearing, toxic-appearing or diaphoretic. Comments: chronically ill appearing ; malnurished; low muscle mass   HENT:      Head: Normocephalic and atraumatic. Right Ear: External ear normal.      Left Ear: External ear normal.      Mouth/Throat:      Mouth: Mucous membranes are dry. Pharynx: Oropharynx is clear. Eyes:      General:         Right eye: No discharge. Left eye: No discharge. Cardiovascular:      Rate and Rhythm: Normal rate and regular rhythm. Pulmonary:      Effort: Pulmonary effort is normal.      Breath sounds: Normal breath sounds. Abdominal:      General: Abdomen is flat. Palpations: Abdomen is soft. Musculoskeletal:         General: No swelling or deformity. Cervical back: Neck supple. No rigidity. Skin:     General: Skin is warm and dry. Coloration: Skin is jaundiced. Skin is not pale. Comments: Ecchymosis multiple sites   Neurological:      General: No focal deficit present. Mental Status: She is alert. Mental status is at baseline. Comments:     Psychiatric:         Mood and Affect: Mood normal.         Behavior: Behavior normal.                     Data Review:    Review and/or order of clinical lab test      Labs:     Recent Labs     04/04/22 0156 04/03/22 0253   WBC 10.4 10.3   HGB 9.1* 8.1*   HCT 27.4* 23.8*   PLT 32* 36*     Recent Labs     04/04/22 0156 04/03/22 0253 04/02/22  0307   * 133* 138   K 3.2* 3.4* 3.5   CL 97 103 108   CO2 18* 18* 18*   * 142* 142*   CREA 3.31* 3.04* 3.09*   * 116* 115*   CA 8.3* 7.7* 7.8*   PHOS 6.3*  --   --      Recent Labs     04/04/22 0156 04/03/22 0253 04/02/22  0307   ALT  --  20 24   AP  --  94 89   TBILI  --  4.4* 4.2*   TP  --  4.8* 4.6*   ALB 3.5 3.0* 3.2*   GLOB  --  1.8* 1.4*     No results for input(s): INR, PTP, APTT, INREXT, INREXT in the last 72 hours.    No results for input(s): FE, TIBC, PSAT, FERR in the last 72 hours. Lab Results   Component Value Date/Time    Folate 38.3 (H) 10/20/2021 07:53 AM    RBC FOLATE 878 02/14/2022 01:27 AM      No results for input(s): PH, PCO2, PO2 in the last 72 hours. No results for input(s): CPK, CKNDX, TROIQ in the last 72 hours.     No lab exists for component: CPKMB  Lab Results   Component Value Date/Time    Cholesterol, total 122 01/25/2022 09:50 AM    HDL Cholesterol 40 01/25/2022 09:50 AM    LDL, calculated 67.2 01/25/2022 09:50 AM    Triglyceride 74 01/25/2022 09:50 AM    CHOL/HDL Ratio 3.1 01/25/2022 09:50 AM     Lab Results   Component Value Date/Time    Glucose (POC) 128 (H) 03/15/2022 06:06 AM    Glucose (POC) 123 (H) 03/15/2022 12:14 AM    Glucose (POC) 114 (H) 02/23/2022 05:42 PM    Glucose (POC) 127 (H) 02/23/2022 12:04 PM    Glucose (POC) 122 (H) 02/23/2022 08:18 AM     Lab Results   Component Value Date/Time    Color DARK YELLOW 03/05/2022 09:22 PM    Appearance TURBID (A) 03/05/2022 09:22 PM    Specific gravity 1.014 03/05/2022 09:22 PM    Specific gravity 1.010 02/11/2022 07:20 PM    pH (UA) 5.5 03/05/2022 09:22 PM    Protein 100 (A) 03/05/2022 09:22 PM    Glucose Negative 03/05/2022 09:22 PM    Ketone Negative 03/05/2022 09:22 PM    Bilirubin NEGATIVE 02/11/2022 07:20 PM    Urobilinogen 0.2 03/05/2022 09:22 PM    Nitrites Negative 03/05/2022 09:22 PM    Leukocyte Esterase LARGE (A) 03/05/2022 09:22 PM    Epithelial cells FEW 03/05/2022 09:22 PM    Bacteria 1+ (A) 03/05/2022 09:22 PM    WBC >100 (H) 03/05/2022 09:22 PM    RBC  03/05/2022 09:22 PM         Medications Reviewed:     Current Facility-Administered Medications   Medication Dose Route Frequency    magic mouthwash cpd (without sucralfate)  5 mL Oral TIDAC    0.9% sodium chloride infusion 250 mL  250 mL IntraVENous PRN    pantoprazole (PROTONIX) 40 mg in 0.9% sodium chloride 10 mL injection  40 mg IntraVENous Q12H    0.9% sodium chloride infusion 250 mL  250 mL IntraVENous PRN    sodium chloride (NS) flush 5-40 mL  5-40 mL IntraVENous Q8H    sodium chloride (NS) flush 5-40 mL  5-40 mL IntraVENous PRN    simethicone (MYLICON) 59PF/5.8SA oral drops 80 mg  1.2 mL Oral Multiple    sodium bicarbonate tablet 325 mg  325 mg Oral BID    lactulose (CHRONULAC) 10 gram/15 mL solution 30 mL  30 mL Oral DAILY    promethazine (PHENERGAN) tablet 12.5 mg  12.5 mg Oral Q6H PRN    epoetin vic-epbx (RETACRIT) injection 10,000 Units  10,000 Units SubCUTAneous Q TUE, THU & SAT    multivitamin, tx-iron-ca-min (THERA-M w/ IRON) tablet 1 Tablet  1 Tablet Oral DAILY    miconazole (MICONTIN) 2 % ointment   Topical BID    balsam peru-castor oiL (VENELEX) ointment   Topical BID    zinc oxide-cod liver oil (DESITIN) 40 % paste   Topical PRN    sodium chloride (NS) flush 5-40 mL  5-40 mL IntraVENous Q8H    sodium chloride (NS) flush 5-40 mL  5-40 mL IntraVENous PRN    acetaminophen (TYLENOL) tablet 650 mg  650 mg Oral Q6H PRN    Or    acetaminophen (TYLENOL) suppository 650 mg  650 mg Rectal Q6H PRN    polyethylene glycol (MIRALAX) packet 17 g  17 g Oral DAILY PRN    ondansetron (ZOFRAN ODT) tablet 4 mg  4 mg Oral Q8H PRN    Or    ondansetron (ZOFRAN) injection 4 mg  4 mg IntraVENous W1W PRN    folic acid (FOLVITE) tablet 1 mg  1 mg Oral DAILY    levothyroxine (SYNTHROID) tablet 50 mcg  50 mcg Oral 6am    midodrine (PROAMATINE) tablet 15 mg  15 mg Oral TID WITH MEALS    octreotide (SANDOSTATIN) injection 100 mcg  100 mcg IntraVENous TID    rifAXIMin (XIFAXAN) tablet 550 mg  550 mg Oral BID    sertraline (ZOLOFT) tablet 100 mg  100 mg Oral DAILY    thiamine HCL (B-1) tablet 100 mg  100 mg Oral DAILY    ursodioL (ACTIGALL) tablet 500 mg (Patient Supplied)  500 mg Oral Q12H    glucagon (GLUCAGEN) injection 1 mg  1 mg IntraMUSCular PRN    naloxone (NARCAN) injection 0.1 mg  0.1 mg IntraVENous PRN     ______________________________________________________________________  EXPECTED LENGTH OF STAY: 4d 16h  ACTUAL LENGTH OF STAY:          [facility] MD

## 2022-04-05 NOTE — PROGRESS NOTES
Problem: Falls - Risk of  Goal: *Absence of Falls  Description: Document Carole Almonte Fall Risk and appropriate interventions in the flowsheet. Outcome: Progressing Towards Goal  Note: Fall Risk Interventions:  Mobility Interventions: Bed/chair exit alarm,Patient to call before getting OOB    Mentation Interventions: Bed/chair exit alarm,More frequent rounding,Reorient patient    Medication Interventions: Assess postural VS orthostatic hypotension,Bed/chair exit alarm    Elimination Interventions: Bed/chair exit alarm,Call light in reach,Toileting schedule/hourly rounds    History of Falls Interventions: Bed/chair exit alarm,Room close to nurse's station         Problem: Patient Education: Go to Patient Education Activity  Goal: Patient/Family Education  Outcome: Progressing Towards Goal     Problem: Pressure Injury - Risk of  Goal: *Prevention of pressure injury  Description: Document Rodrigo Scale and appropriate interventions in the flowsheet. Outcome: Progressing Towards Goal  Note: Pressure Injury Interventions:  Sensory Interventions: Assess changes in LOC,Avoid rigorous massage over bony prominences,Float heels,Keep linens dry and wrinkle-free,Maintain/enhance activity level,Minimize linen layers,Turn and reposition approx. every two hours (pillows and wedges if needed),Monitor skin under medical devices    Moisture Interventions: Absorbent underpads,Internal/External urinary devices,Limit adult briefs,Minimize layers,Check for incontinence Q2 hours and as needed    Activity Interventions: Assess need for specialty bed,Pressure redistribution bed/mattress(bed type)    Mobility Interventions: Assess need for specialty bed,Float heels,Turn and reposition approx.  every two hours(pillow and wedges)    Nutrition Interventions: Document food/fluid/supplement intake,Offer support with meals,snacks and hydration    Friction and Shear Interventions: Apply protective barrier, creams and emollients,Transferring/repositioning devices                Problem: Patient Education: Go to Patient Education Activity  Goal: Patient/Family Education  Outcome: Progressing Towards Goal     Problem: Patient Education: Go to Patient Education Activity  Goal: Patient/Family Education  Outcome: Progressing Towards Goal     Problem: Non-Violent Restraints  Goal: Removal from restraints as soon as assessed to be safe  Outcome: Progressing Towards Goal  Goal: No harm/injury to patient while restraints in use  Outcome: Progressing Towards Goal  Goal: Patient's dignity will be maintained  Outcome: Progressing Towards Goal  Goal: Patient Interventions  Outcome: Progressing Towards Goal     Problem: Ventilator Management  Goal: *Adequate oxygenation and ventilation  Outcome: Progressing Towards Goal  Goal: *Patient maintains clear airway/free of aspiration  Outcome: Progressing Towards Goal  Goal: *Absence of infection signs and symptoms  Outcome: Progressing Towards Goal  Goal: *Normal spontaneous ventilation  Outcome: Progressing Towards Goal     Problem: Patient Education: Go to Patient Education Activity  Goal: Patient/Family Education  Outcome: Progressing Towards Goal     Problem: Nutrition Deficit  Goal: *Optimize nutritional status  Outcome: Progressing Towards Goal

## 2022-04-05 NOTE — PROGRESS NOTES
[facility]   [address] [facility], [facility] [address], [facility]  Phone: [phone]   [phone]       Nephrology Progress Note  David Hinkle     1960     [id number]  Date of Admission : 3/5/2022  04/05/22    CC:  Follow up for greg    Assessment and Plan   GREG:  - 2/2 HRS and now w/ GI bleed  - Cr up some, Na down this AM  -  IV NS x 1 L  - keep stvee  - cont midodrine and octreotide  - daily abs     Hypokalemia:  - oral repletion ordered    Severe anemia:  GI bleed  - On EDWIN TTS  - transfusions per primary team      Cirrhosis, Portal HTN, PBC  - poral gastropathy  - being w/u for [facility] transplant      Hypothyroidism    Malnutrition:       Interval History:  Seen and examined. Awake, alert. No issues overnight. Cr up some, Na 129. No cp, sob reported. Review of Systems: A comprehensive review of systems was negative except for that written in the HPI.     Current Medications:   Current Facility-Administered Medications   Medication Dose Route Frequency    magic mouthwash cpd (without sucralfate)  5 mL Oral TIDAC    0.9% sodium chloride infusion 250 mL  250 mL IntraVENous PRN    pantoprazole (PROTONIX) 40 mg in 0.9% sodium chloride 10 mL injection  40 mg IntraVENous Q12H    0.9% sodium chloride infusion 250 mL  250 mL IntraVENous PRN    sodium chloride (NS) flush 5-40 mL  5-40 mL IntraVENous Q8H    sodium chloride (NS) flush 5-40 mL  5-40 mL IntraVENous PRN    simethicone (MYLICON) 42JZ/4.6AV oral drops 80 mg  1.2 mL Oral Multiple    sodium bicarbonate tablet 325 mg  325 mg Oral BID    lactulose (CHRONULAC) 10 gram/15 mL solution 30 mL  30 mL Oral DAILY    promethazine (PHENERGAN) tablet 12.5 mg  12.5 mg Oral Q6H PRN    epoetin vic-epbx (RETACRIT) injection 10,000 Units  10,000 Units SubCUTAneous Q TUE, THU & SAT    multivitamin, tx-iron-ca-min (THERA-M w/ IRON) tablet 1 Tablet  1 Tablet Oral DAILY    miconazole (MICONTIN) 2 % ointment   Topical BID    balsam peru-castor oiL (VENELEX) ointment   Topical BID    zinc oxide-cod liver oil (DESITIN) 40 % paste   Topical PRN    sodium chloride (NS) flush 5-40 mL  5-40 mL IntraVENous Q8H    sodium chloride (NS) flush 5-40 mL  5-40 mL IntraVENous PRN    acetaminophen (TYLENOL) tablet 650 mg  650 mg Oral Q6H PRN    Or    acetaminophen (TYLENOL) suppository 650 mg  650 mg Rectal Q6H PRN    polyethylene glycol (MIRALAX) packet 17 g  17 g Oral DAILY PRN    ondansetron (ZOFRAN ODT) tablet 4 mg  4 mg Oral Q8H PRN    Or    ondansetron (ZOFRAN) injection 4 mg  4 mg IntraVENous Q9P PRN    folic acid (FOLVITE) tablet 1 mg  1 mg Oral DAILY    levothyroxine (SYNTHROID) tablet 50 mcg  50 mcg Oral 6am    midodrine (PROAMATINE) tablet 15 mg  15 mg Oral TID WITH MEALS    octreotide (SANDOSTATIN) injection 100 mcg  100 mcg IntraVENous TID    rifAXIMin (XIFAXAN) tablet 550 mg  550 mg Oral BID    sertraline (ZOLOFT) tablet 100 mg  100 mg Oral DAILY    thiamine HCL (B-1) tablet 100 mg  100 mg Oral DAILY    ursodioL (ACTIGALL) tablet 500 mg (Patient Supplied)  500 mg Oral Q12H    glucagon (GLUCAGEN) injection 1 mg  1 mg IntraMUSCular PRN    naloxone (NARCAN) injection 0.1 mg  0.1 mg IntraVENous PRN      Allergies   Allergen Reactions    Morphine Other (comments)     Severe HA. ALL narcotics!!!       Objective:  Vitals:    Vitals:    04/05/22 0400 04/05/22 0552 04/05/22 0815 04/05/22 1000   BP: (!) 114/52  (!) 108/48    Pulse: 65 75 73 72   Resp: 17  17    Temp: 97.7 °F (36.5 °C)  97.7 °F (36.5 °C)    SpO2: 99%  99%    Weight:       Height:         Intake and Output:  No intake/output data recorded.   04/03 1901 - 04/05 0700  In: -   Out: 65 [Urine:835]    Physical Examination:  General: No distress  HEENT:           Pale and icteric   Neck:  Supple, no mass  Resp:  Lungs CTA B/L  CV:  RRR,  no murmur or rub,no  LE edema  GI:  Soft, NT, + Bowel sounds, no hepatosplenomegaly  Neurologic:  AAO X 3  :  + [place]     [x]    High complexity decision making was performed  [x]    Patient is at high-risk of decompensation with multiple organ involvement    Lab Data Personally Reviewed: I have reviewed all the pertinent labs, microbiology data and radiology studies during assessment.     Recent Labs     04/05/22 0815 04/04/22 0156 04/03/22 0253   * 130* 133*   K 3.5 3.2* 3.4*   CL 96* 97 103   CO2 20* 18* 18*   * 121* 116*   * 139* 142*   CREA 3.54* 3.31* 3.04*   CA 8.9 8.3* 7.7*   PHOS 7.1* 6.3*  --    ALB 3.5 3.5 3.0*   ALT  --   --  20     Recent Labs     04/05/22 0815 04/04/22 0156 04/03/22 0253   WBC 10.1 10.4 10.3   HGB 9.0* 9.1* 8.1*   HCT 26.9* 27.4* 23.8*   PLT 34* 32* 36*     No results found for: SDES  Lab Results   Component Value Date/Time    Culture result: NO GROWTH 4 DAYS 03/14/2022 06:48 PM    Culture result: NO GROWTH 4 DAYS 03/12/2022 06:00 PM    Culture result: CANDIDA ALBICANS (A) 03/05/2022 09:22 PM     Recent Results (from the past 24 hour(s))   RENAL FUNCTION PANEL    Collection Time: 04/05/22  8:15 AM   Result Value Ref Range    Sodium 129 (L) 136 - 145 mmol/L    Potassium 3.5 3.5 - 5.1 mmol/L    Chloride 96 (L) 97 - 108 mmol/L    CO2 20 (L) 21 - 32 mmol/L    Anion gap 13 5 - 15 mmol/L    Glucose 115 (H) 65 - 100 mg/dL     (H) 6 - 20 MG/DL    Creatinine 3.54 (H) 0.55 - 1.02 MG/DL    BUN/Creatinine ratio 40 (H) 12 - 20      GFR est AA 16 (L) >60 ml/min/1.73m2    GFR est non-AA 13 (L) >60 ml/min/1.73m2    Calcium 8.9 8.5 - 10.1 MG/DL    Phosphorus 7.1 (H) 2.6 - 4.7 MG/DL    Albumin 3.5 3.5 - 5.0 g/dL   AMMONIA    Collection Time: 04/05/22  8:15 AM   Result Value Ref Range    Ammonia, plasma 43 (H) <32 UMOL/L   CBC WITH AUTOMATED DIFF    Collection Time: 04/05/22  8:15 AM   Result Value Ref Range    WBC 10.1 3.6 - 11.0 K/uL    RBC 2.82 (L) 3.80 - 5.20 M/uL    HGB 9.0 (L) 11.5 - 16.0 g/dL    HCT 26.9 (L) 35.0 - 47.0 %    MCV 95.4 80.0 - 99.0 FL    MCH 31.9 26.0 - 34.0 PG    MCHC 33.5 30.0 - 36.5 g/dL    RDW 22.1 (H) 11.5 - 14.5 %    PLATELET 34 (LL) 655 - 400 K/uL    NRBC 0.0 0  WBC    ABSOLUTE NRBC 0.00 0.00 - 0.01 K/uL    NEUTROPHILS 88 (H) 32 - 75 %    LYMPHOCYTES 8 (L) 12 - 49 %    MONOCYTES 4 (L) 5 - 13 %    EOSINOPHILS 0 0 - 7 %    BASOPHILS 0 0 - 1 %    IMMATURE GRANULOCYTES 0 %    ABS. NEUTROPHILS 8.9 (H) 1.8 - 8.0 K/UL    ABS. LYMPHOCYTES 0.8 0.8 - 3.5 K/UL    ABS. MONOCYTES 0.4 0.0 - 1.0 K/UL    ABS. EOSINOPHILS 0.0 0.0 - 0.4 K/UL    ABS. BASOPHILS 0.0 0.0 - 0.1 K/UL    ABS. IMM. GRANS. 0.0 K/UL    DF MANUAL      RBC COMMENTS ANISOCYTOSIS  1+        RBC COMMENTS AIRAM CELLS  PRESENT        Pathologist review Pathology Review Requested                                         Care Plan discussed with:  Patient     Family      RN      Consulting Physician [facility],         I have reviewed the flowsheets. Chart and Pertinent Notes have been reviewed. No change in PMH ,family and social history from Consult note.       Martha Saavedra MD

## 2022-04-05 NOTE — PROGRESS NOTES
SELMA PLAN:  RUR-23%  Disposition -SNF placement  Transportation-BLS    A referral was sent to [facility] ([phone]) (fax [phone]) per previous CM. [facility] declined-Out of network    CM called [facility] regarding status of referral, left a VM message to call this CM back. Wil Leal MSA, RN, CRM    2:00 pm. CM faxed clinical notes/demographics to [facility] at [phone] as requested. She did not received the previously faxed clinical notes.  Wil Leal MSA, RN,

## 2022-04-05 NOTE — PROGRESS NOTES
[address] [facility], [place], [address], [facility], [place]      Mike Mayorga, ISAAC Pelaez, ACNP-BC     April S John, [facility]-BC   Melody Duran, MANISHA Cabrera [facility], [facility]-BC       Namita Deputado [address]    at [facility]    [address], [address] [address]    [address] [facility], [facility].    [phone]    FAX: [address]    at [address], [address], [address]    [phone]    FAX: [phone]       HEPATOLOGY PROGRESS NOTE  The patient is well known to me from a previous visit to my office at [facility] in [address] [place] [address]. She is a 66 yo  female who was found to have chronic liver disease in 2020 and cirrhosis in 7/2021 when she developed ascites. She had variceal bleeding in 11/2021. Serologic evaluation for markers of chronic liver disease was positive for AMA. Cirrhosis is due to [facility].     The patient has developed the following complications of cirrhosis: esophageal varices, variceal bleeding, ascites, edema, hepatic encephalopathy, severe muslce wasting    I saw the patient for the first time in 1/2022. She had CTP score 9 and MELD score 21 at that time. We started liver transplant evaluation testing. She developed confusion and could not be aroused and was hospitalized 3 weeks ago at [facility] in 2/202. During that hospitalization she developed worsening malnutrition, worsening of muscle wasting and a few decubitus ulcers. She has been at [facility] since 3/5. She has been receiving Dobbhoff tube feedings and OT/PT.   She has had dramatic improvement in nutritional status, skin wounds have nearly healed, mental status is clear, she is stronger, can move herself around in bed, she can sit in chair for an hour at a time, can feed herself and has been able to stand with PT assistance. Family conference on 3/27/22. I had a 1 hour meeting with the patient and her son who is POA. Next step is to transfer to rehab for 2-3 more weeks. In order to do this will need to remove feeding tube. In order to do this she will need to be able to eat sufficient calories to maintain her nutritional status  If she does well with 3-4 weeks of rehab we may be able to get LT eval testing and eventually on LT list.    Hospital course:  Several episodes of hematemesis last week   EGD showed no varices. Only severe portal gastropathy   HB now stable  Feeding tube is out. She is alert and eating on her own. She is out of ICU. Awaiting placement to rehab center close to son's home in [address] [place] [address]. Her mood remains good. Hepatology Plan:   to find rehab place in [address] [place] [address] area close to son. I can see her as OP in my [address] [place] [address] office      ASSESSMENT AND PLAN:  Cirrhosis  The diagnosis of cirrhosis is based upon imaging, laboratory studies, complications of cirrhosis. Cirrhosis is secondary to [facility]. Not alcohol as she was initially told.     Serologic testing was positive for AMA, ASMA     The CTP is 10. Child class C. The MELD score is 32.     Based upon the MELD and CTP scores the patient has a mortality of about 50% within the next 90 days until we turn her fraily and weakness around. Right now is is too weak to survive liver transplantation. She needs aggressive nutritional support and both she and her son are in favor of this with the hopes her situation will improve and she could survive liver transplantation.       Primary Biliary Cholangitis  The diagnosis is based upon serology and an elevation in ALP and positive AMA. A liver biopsy has not been performed. PBC is being treated with ANTONIETA 500 mg BID.     CKD-HRS  Scr is moving in right direction and now down to 3.40 mg  Continue midodrine,     Malnutrition  The patient has severe protein-calorie malnutrtion and muscle wasting. This is due to advanced liver disease and refractory ascites. The patient needs as many calories as she can possibly consume   Without aggressive nutritional support she will not survive. She is alert, oriented and understands the need to consume as many Ensure as possible. There are no plans to replace Dobhoff feeding tube at this time. Frailty/muscle wasting  The patient is very frail and working with OPT/PT. She has made great progress but lost some after her trip to ICU. This will be address by rehab. Skin wounds  Still has several pressure wounds on back, buttocks that appear to be healing but are not healed. Ulcers have to be healed for her to be an LT candidate.     Ascites   Ascites developed for the first time in 7/2021. Being managed by periodic paracentesis. NO diuretics given GREG    Hyponatremia  This is secondary cirrhosis and diuretics  Sna has declined from 134 back down to 129 today. No diuretics.       Screening for Esophageal varices   The patient has esophageal varices with prior bleeding. Varices were banded in 11/2021 and 12/2021. The last EGD to assess for varices was performed in 3/2022. No esophageal varices were present.     Hepatic encephalopathy   Overt HE is well controlled on lactulose and xifaxan. She has been alert and oriented througtout the hospitalization. Can probably stop lactulose and avoid diarrhea and continue on xifaxan alone. Coagulopathy  INR is in the 1.6 range and stable. Thrombocytopenia   This is secondary to cirrhosis. There is no evidence of overt bleeding. No treatment is required.   The platelet count is adequate for the patient to undergo procedures without the need for platelet transfusion or platelet growth factors.     Anemia   This is due to multifactorial causes including portal hypertension with chronic GI blood loss, acute GI blood loss 1 week ago, and bone marrow suppression due to severe malnutrition.   HB now stable in the 9 gm range. Thrombocytopenia   This is secondary to cirrhosis. There is no evidence of overt bleeding. No treatment is required. The platelet count is adequate for the patient to undergo procedures without the need for platelet transfusion or platelet growth factors. PHYSICAL EXAMINATION:  VS: per nursing note  General:  Looks much better. Skin color normal.  Eyes:  Sclera non-icteric. ENT:  No oral lesions. Thyroid normal.  Nodes:  No adenopathy. Skin:  Spider angiomata. Multiple diffuse ecchymosis on arms. Respiratory:  Lungs clear to auscultation. Cardiovascular:  Regular heart rate. Abdomen:  Soft non-tender, NO obvious ascites. Extremities:  No lower extremity edema. Severe muscle wasting of upper and lower extremities. Neurologic:  Alert and oriented. Cranial nerves grossly intact. No asterixis. LABORATORY:  Results for Tahira Cast (MRN [id number]) as of 4/5/2022 17:31   Ref. Range 4/3/2022 02:53 4/4/2022 01:56 4/5/2022 08:15   WBC Latest Ref Range: 3.6 - 11.0 K/uL 10.3 10.4 10.1   HGB Latest Ref Range: 11.5 - 16.0 g/dL 8.1 (L) 9.1 (L) 9.0 (L)   PLATELET Latest Ref Range: 150 - 400 K/uL 36 (LL) 32 (LL) 34 (LL)   Sodium Latest Ref Range: 136 - 145 mmol/L 133 (L) 130 (L) 129 (L)   Potassium Latest Ref Range: 3.5 - 5.1 mmol/L 3.4 (L) 3.2 (L) 3.5   Chloride Latest Ref Range: 97 - 108 mmol/L 103 97 96 (L)   CO2 Latest Ref Range: 21 - 32 mmol/L 18 (L) 18 (L) 20 (L)   Glucose Latest Ref Range: 65 - 100 mg/dL 116 (H) 121 (H) 115 (H)   BUN Latest Ref Range: 6 - 20 MG/ (H) 139 (H) 142 (H)   Creatinine Latest Ref Range: 0.55 - 1.02 MG/DL 3.04 (H) 3.31 (H) 3.54 (H)   Bilirubin, total Latest Ref Range: 0.2 - 1.0 MG/DL 4.4 (H)     Albumin Latest Ref Range: 3.5 - 5.0 g/dL 3.0 (L) 3.5 3.5   ALT Latest Ref Range: 12 - 78 U/L 20     AST Latest Ref Range: 15 - 37 U/L 42 (H)     Alk.  phosphatase Latest Ref Range: 45 - 117 U/L 94     Ammonia, plasma Latest Ref Range: <32 UMOL/L 43 (H) 46 (H) 43 (H)     RADIOLOGY:  Ultrasound of liver. Echogenic consistent with cirrhosis. No liver mass lesions. No dilated bile ducts. Moderate ascites.         Cameron Rees MD  [address] [place] [address].  [phone]  [address] [place] [address]

## 2022-04-05 NOTE — PROGRESS NOTES
[facility]  Hospitalist Group                                                                                          Hospitalist Progress Note  Jr Ritchie MD  Answering service: [phone] OR [phone] from in house phone        Date of Service:  2022  NAME:  Piage Camacho  :  1960  MRN:  [id number]      Admission Summary:   Copied form admit: \" Per ICU: Interval history: From critical care consult on   65year-old lady with liver cirrhosis due to [facility].  She has had multiple complication in form of esophageal varices, recurrent encephalopathy, recurrent ascites and severe debility and malnutrition. Patient was admitted to the hospital on  with cirrhosis decompensation. Olvin Duran has been in the hospital since then with multiple complication but overall was improving. Today she had large coffee ground emesis but was associated with significant drop in her hemoglobin.  Primary team consulted ICU to assist the need for ICU level care. Overnight Events:   3/30: Endoscopy showed moderate portal hypertensive gastropathy with some blood and clots in the fundus.  No varices.  Octreotide discontinued this morning, extubated this morning, receiving packed RBC transfusion this morning for hemoglobin of 6.6.  Hemodynamically remained stable        Hospitalist note:   Seen and examined patient lying in bed. States that she is ok. Flat affect   Chart reviewed. Patient to transfer out of ICU.   \"    Interval history / Subjective:     2022 :    Pleasant, appropriate, eating   No distress   Lab/vs's stable S cr 3.5 , S Na 129      Assessment & Plan:               Acute blood loss anemia:   GI bleed  - s/p 4 units of pRBCs   - s/p EGD on   - Hgb 7.6   - Continue PPI BID   - Monitor labs and transfuse for hgb <7.0    - Hepatology following   - good iron stores, stable hgb at 7.7 3/31  Lab Results   Component Value Date/Time    HGB 9.0 (L) 2022 08:15 AM    Decompensated liver cirrhosis, with portal hypertension.  Felt to be secondary to [facility]  Transaminitis/hyperbilirubinemia/coagulopathy/thrombocytopenia  History of hepatic encephalopathy   -Transferred from [facility] with prolonged hospitalization.  Transferred for further work-up for liver transplant eval  -EGD on 2/17 [place] noted for distal esophagitis and portal hypertensive gastropathy, no evidence of varices.    -history of esophageal varices back in November 2021 status post banding at that time.    -Per GI at outlying facility, patient was deemed not to be a candidate for colonoscopy and recommended Cologuard.    -s/p MRCP on 2/18 which was noted for cirrhosis, portal hypertension, and large ascites.   -s/p cardiac stress test which was negative for ischemia, EF of 69%  -Continue lactulose, rifaximin, Protonix  -Continue ursodiol  -Paracentesis ordered 3/25 with [id number] removed  - PT/OT following   - Consult palliative for goals of care. 4/3:   Affect more affable,    Taking the liquid boost well for multiple meals 4/2 - current   Discussed nutrition w pt and RN in room 4/2 and 4/3 am rounds   Ammonia 40's    Pt oriented to hospital but not to: name thereof/season/month/year  4/4:   Affect more affable. Animated, not tearful,    More talkative; admits to alcohol and else pt is educated on chalangitis contribution to her liver failure    Discussed with nephrology    Pt eating better  4/5:   Pleasant, appropriate, eating   No distress   Lab/vs's stable S cr 3.5 , S Na 129      Refractory ascites  -multiple paracentesis done at [facility] including 2/11 with 11.8 L removal, 2/18 with 9/2 L removed, and 2/28 with 9.2 L removed, 3/12, 3/25  -Currently with distended abdomen  -Moderate large ascites on US 3/6. Ascites said to be refractory to diuretics, HRS limiting use of diuretics  -Ascitic drain that was placed on 3/12 was apparently clogged, flushed and drained significant amount of ascite  -Ascitic fluid lab negative for SBP  - no need for paracentesis based on exam 4/1, cont to eval      GREG with metabolic acidosis. persisent  -Baseline 0.9 on 11/26/2021   -Likely due to [facility]  -Nephrology following, recommendations appreciated. Lab Results   Component Value Date/Time    Creatinine 3.54 (H) 04/05/2022 08:15 AM        Hyponatremia  -Suspect secondary to underlying liver cirrhosis.   -Na stable 128-131 last few days      Recent E. coli UTI  -Treated with Vanco and Zosyn then transitioned to Rocephin, completed 7-day antibiotic course as of 2/17  -Replace Steve with new catheter 3/5 .  Urine culture on 3/5 grew Candida albicans 75,000 CFU. - nephrology desires that steve stay in for now      Hypothyroid  -Continue levothyroxine  Lab Results   Component Value Date/Time    TSH 1.58 04/04/2022 01:56 AM    Triiodothyronine (T3), free 2.4 01/25/2022 09:50 AM    T4, Free 1.2 01/25/2022 09:50 AM    Free T4 1.14 02/11/2022 09:25 AM     F/u TSH 4/4     Severe malnutrition  --Continue NG tube with tube feeds at night if patient tolerates. Encourage oral intake   --On regular diet and tolerating     Generalized weakness/chronic wounds. pta  -PT, OT consult  -Wound care following              Hospital Problems  Date Reviewed: 2/17/2022          Codes Class Noted POA    * (Principal) [facility] ICD-10-CM: K74.60  ICD-9-CM: 571.5  2/8/2022 Unknown                  After personally interviewing pt at bedside the following is noted on 4/5/2022 :    Review of Systems   Reason unable to perform ROS: less confused    Constitutional: Negative for chills and fever.    Psychiatric/Behavioral:        Pleas anne appropriate, eating               I had a face to face encounter with patient on 4/5/2022 at bedside for the following physical exam:     PHYSICAL EXAM:    Visit Vitals  BP (!) 108/48 (BP 1 Location: Right upper arm, BP Patient Position: At rest)   Pulse 73   Temp 97.7 °F (36.5 °C)   Resp 17   Ht 5' 4\" (1.626 m)   Wt 53.7 kg (118 lb 6.2 oz)   SpO2 99%   Breastfeeding No   BMI 20.32 kg/m²          Physical Exam  Constitutional:       General: She is not in acute distress. Appearance: She is not ill-appearing, toxic-appearing or diaphoretic. Comments: chronically ill appearing ; malnurished; low muscle mass  Pleasant appropriate, eating    HENT:      Head: Normocephalic and atraumatic. Right Ear: External ear normal.      Left Ear: External ear normal.      Mouth/Throat:      Mouth: Mucous membranes are dry. Pharynx: Oropharynx is clear. Eyes:      General:         Right eye: No discharge. Left eye: No discharge. Cardiovascular:      Rate and Rhythm: Normal rate and regular rhythm. Pulmonary:      Effort: Pulmonary effort is normal.      Breath sounds: Normal breath sounds. Abdominal:      General: Abdomen is flat. Palpations: Abdomen is soft. Musculoskeletal:         General: No swelling or deformity. Cervical back: Neck supple. No rigidity. Skin:     General: Skin is warm and dry. Coloration: Skin is jaundiced. Skin is not pale. Comments: Ecchymosis multiple sites   Neurological:      General: No focal deficit present. Mental Status: She is alert. Mental status is at baseline.       Comments:     Psychiatric:         Mood and Affect: Mood normal.         Behavior: Behavior normal.                     Data Review:    Review and/or order of clinical lab test      Labs:     Recent Labs     04/05/22 0815 04/04/22 0156   WBC 10.1 10.4   HGB 9.0* 9.1*   HCT 26.9* 27.4*   PLT 34* 32*     Recent Labs     04/05/22 0815 04/04/22 0156 04/03/22 0253   * 130* 133*   K 3.5 3.2* 3.4*   CL 96* 97 103   CO2 20* 18* 18*   * 139* 142*   CREA 3.54* 3.31* 3.04*   * 121* 116*   CA 8.9 8.3* 7.7*   PHOS 7.1* 6.3*  --      Recent Labs     04/05/22 0815 04/04/22 0156 04/03/22  0253   ALT  --   --  20   AP  --   --  94   TBILI  --   --  4.4*   TP  -- --  4.8*   ALB 3.5 3.5 3.0*   GLOB  --   --  1.8*     No results for input(s): INR, PTP, APTT, INREXT, INREXT in the last 72 hours. No results for input(s): FE, TIBC, PSAT, FERR in the last 72 hours. Lab Results   Component Value Date/Time    Folate 38.3 (H) 10/20/2021 07:53 AM    RBC FOLATE 878 02/14/2022 01:27 AM      No results for input(s): PH, PCO2, PO2 in the last 72 hours. No results for input(s): CPK, CKNDX, TROIQ in the last 72 hours.     No lab exists for component: CPKMB  Lab Results   Component Value Date/Time    Cholesterol, total 122 01/25/2022 09:50 AM    HDL Cholesterol 40 01/25/2022 09:50 AM    LDL, calculated 67.2 01/25/2022 09:50 AM    Triglyceride 74 01/25/2022 09:50 AM    CHOL/HDL Ratio 3.1 01/25/2022 09:50 AM     Lab Results   Component Value Date/Time    Glucose (POC) 128 (H) 03/15/2022 06:06 AM    Glucose (POC) 123 (H) 03/15/2022 12:14 AM    Glucose (POC) 114 (H) 02/23/2022 05:42 PM    Glucose (POC) 127 (H) 02/23/2022 12:04 PM    Glucose (POC) 122 (H) 02/23/2022 08:18 AM     Lab Results   Component Value Date/Time    Color DARK YELLOW 03/05/2022 09:22 PM    Appearance TURBID (A) 03/05/2022 09:22 PM    Specific gravity 1.014 03/05/2022 09:22 PM    Specific gravity 1.010 02/11/2022 07:20 PM    pH (UA) 5.5 03/05/2022 09:22 PM    Protein 100 (A) 03/05/2022 09:22 PM    Glucose Negative 03/05/2022 09:22 PM    Ketone Negative 03/05/2022 09:22 PM    Bilirubin NEGATIVE 02/11/2022 07:20 PM    Urobilinogen 0.2 03/05/2022 09:22 PM    Nitrites Negative 03/05/2022 09:22 PM    Leukocyte Esterase LARGE (A) 03/05/2022 09:22 PM    Epithelial cells FEW 03/05/2022 09:22 PM    Bacteria 1+ (A) 03/05/2022 09:22 PM    WBC >100 (H) 03/05/2022 09:22 PM    RBC  03/05/2022 09:22 PM         Medications Reviewed:     Current Facility-Administered Medications   Medication Dose Route Frequency    magic mouthwash cpd (without sucralfate)  5 mL Oral TIDAC    0.9% sodium chloride infusion 250 mL  250 mL IntraVENous PRN    pantoprazole (PROTONIX) 40 mg in 0.9% sodium chloride 10 mL injection  40 mg IntraVENous Q12H    0.9% sodium chloride infusion 250 mL  250 mL IntraVENous PRN    sodium chloride (NS) flush 5-40 mL  5-40 mL IntraVENous Q8H    sodium chloride (NS) flush 5-40 mL  5-40 mL IntraVENous PRN    simethicone (MYLICON) 51MK/1.0SN oral drops 80 mg  1.2 mL Oral Multiple    sodium bicarbonate tablet 325 mg  325 mg Oral BID    lactulose (CHRONULAC) 10 gram/15 mL solution 30 mL  30 mL Oral DAILY    promethazine (PHENERGAN) tablet 12.5 mg  12.5 mg Oral Q6H PRN    epoetin vic-epbx (RETACRIT) injection 10,000 Units  10,000 Units SubCUTAneous Q TUE, THU & SAT    multivitamin, tx-iron-ca-min (THERA-M w/ IRON) tablet 1 Tablet  1 Tablet Oral DAILY    miconazole (MICONTIN) 2 % ointment   Topical BID    balsam peru-castor oiL (VENELEX) ointment   Topical BID    zinc oxide-cod liver oil (DESITIN) 40 % paste   Topical PRN    sodium chloride (NS) flush 5-40 mL  5-40 mL IntraVENous Q8H    sodium chloride (NS) flush 5-40 mL  5-40 mL IntraVENous PRN    acetaminophen (TYLENOL) tablet 650 mg  650 mg Oral Q6H PRN    Or    acetaminophen (TYLENOL) suppository 650 mg  650 mg Rectal Q6H PRN    polyethylene glycol (MIRALAX) packet 17 g  17 g Oral DAILY PRN    ondansetron (ZOFRAN ODT) tablet 4 mg  4 mg Oral Q8H PRN    Or    ondansetron (ZOFRAN) injection 4 mg  4 mg IntraVENous Q0U PRN    folic acid (FOLVITE) tablet 1 mg  1 mg Oral DAILY    levothyroxine (SYNTHROID) tablet 50 mcg  50 mcg Oral 6am    midodrine (PROAMATINE) tablet 15 mg  15 mg Oral TID WITH MEALS    octreotide (SANDOSTATIN) injection 100 mcg  100 mcg IntraVENous TID    rifAXIMin (XIFAXAN) tablet 550 mg  550 mg Oral BID    sertraline (ZOLOFT) tablet 100 mg  100 mg Oral DAILY    thiamine HCL (B-1) tablet 100 mg  100 mg Oral DAILY    ursodioL (ACTIGALL) tablet 500 mg (Patient Supplied)  500 mg Oral Q12H    glucagon (GLUCAGEN) injection 1 mg  1 mg IntraMUSCular PRN    naloxone (NARCAN) injection 0.1 mg  0.1 mg IntraVENous PRN     ______________________________________________________________________  EXPECTED LENGTH OF STAY: 4d 16h  ACTUAL LENGTH OF STAY:          31                 Sue Trujillo MD

## 2022-04-06 NOTE — PROGRESS NOTES
Problem: Mobility Impaired (Adult and Pediatric)  Goal: *Acute Goals and Plan of Care (Insert Text)  Description: FUNCTIONAL STATUS PRIOR TO ADMISSION: Patient appears to be an inconsistent historian despite being oriented x 4. Originally stated she primarily stays in bed-later she reported ambulating a few times a day and spending most of her day in a recliner. She consistently reports that son assists her \"with everything\" but does not give more details on what exactly or how much assistance. HOME SUPPORT PRIOR TO ADMISSION: The patient lived with son and her mother per report. Unclear how much assistance she required. Physical Therapy Goals    Reassessed 4/1/2022 s/p after transfer to ICU  1. Patient will move from supine to sit and sit to supine  and roll side to side in bed with maximal assistance within 7 day(s). 2.  Patient will transfer from bed to chair and chair to bed with maximal assistance using the least restrictive device within 7 day(s). 3.  Patient will perform sit to stand with maximal assistance within 7 day(s). 4.  Patient will ambulate with maximal assistance for 5 feet with the least restrictive device within 7 day(s). 5.  Patient will tolerate seated EOB x 10 minutes with min A within 7 days    Re-Assessed 3/21/2022  1. Patient will move from supine to sit and sit to supine , scoot up and down, and roll side to side in bed with minimal assistance/contact guard assist within 7 day(s). 2.  Patient will transfer from bed to chair and chair to bed with minimal assistance/contact guard assist using the least restrictive device within 7 day(s). 3.  Patient will perform sit to stand with minimal assistance/contact guard assist within 7 day(s). 4.  Patient will ambulate with moderate assistance  for 10 feet with the least restrictive device within 7 day(s). Initiated 3/6/2022  1.   Patient will move from supine to sit and sit to supine , scoot up and down, and roll side to side in bed with minimal assistance/contact guard assist within 7 day(s). 2.  Patient will transfer from bed to chair and chair to bed with moderate assistance  using the least restrictive device within 7 day(s). 3.  Patient will perform sit to stand with moderate assistance  within 7 day(s). 4.  Patient will ambulate with moderate assistance  for 20 feet with the least restrictive device within 7 day(s). 5.  Patient will ascend/descend 2 stairs with 2 handrail(s) with moderate assistance  within 7 day(s). Outcome: Not Progressing Towards Goal   PHYSICAL THERAPY TREATMENT  Patient: Kostas Martins (82 y.o. female)  Date: 4/6/2022  Diagnosis: Cirrhosis ([facility] [K74.60] Cirrhosis ([facility]  Procedure(s) (LRB):  ESOPHAGOGASTRODUODENOSCOPY (EGD) (N/A) 8 Days Post-Op  Precautions: Fall,Skin  Chart, physical therapy assessment, plan of care and goals were reviewed. ASSESSMENT  Patient continues with skilled PT services and is progressing towards goals. Patient overall limited by global weakness, impaired balance, decreased motivation and decreased activity tolerance. Patient assisted with rolling R<>L numerous times for clean up with maxA. Attempted to encourage patient to sit EOB but she declines. Recommend SNF rehab vs LTC pending progress and patient wishes/motivation. Minimal progress overall. Other factors to consider for discharge: at risk for falls, below baseline         PLAN :  Patient continues to benefit from skilled intervention to address the above impairments. Continue treatment per established plan of care. to address goals. Recommendation for discharge: (in order for the patient to meet his/her long term goals)  Therapy up to 5 days/week in SNF setting vs LTC        IF patient discharges home will need the following DME: to be determined (TBD)       SUBJECTIVE:   Patient stated I am tired of all of this.     OBJECTIVE DATA SUMMARY:   Critical Behavior:  Neurologic State: Alert,Appropriate for age,Eyes open spontaneously  Orientation Level: Disoriented to time,Oriented to person,Oriented to place,Oriented to situation  Cognition: Decreased attention/concentration,Follows commands,Memory loss  Safety/Judgement: Decreased awareness of environment,Decreased insight into deficits,Decreased awareness of need for assistance,Decreased awareness of need for safety  Functional Mobility Training:  Bed Mobility:  Rolling: Maximum assistance;Assist x1  Supine to Sit:  (Patient refuses to attempt)               Pain Rating:  No c/o pain    Activity Tolerance:   Fair and requires rest breaks    After treatment patient left in no apparent distress:   Supine in bed, Call bell within reach, Bed / chair alarm activated, and Side rails x 3    COMMUNICATION/COLLABORATION:   The patients plan of care was discussed with: Physical therapist, Occupational therapist, and Registered nurse.      Gely Hurt PT, DPT   Time Calculation: 14 mins

## 2022-04-06 NOTE — PROGRESS NOTES
[facility]   [address] [address], [facility], [facility]  Phone: [phone]   [phone]       Nephrology Progress Note  David Hinkle     1960     [id number]  Date of Admission : 3/5/2022  04/06/22    CC:  Follow up for greg    Assessment and Plan   GREG:  - 2/2 HRS and now w/ GI bleed  - Cr stable  - cont present care  - daily labs while here    Hypokalemia:  - oral repletion this AM    Acidosis:  - increased oral bicarb    Severe anemia:  GI bleed  - On EDWIN TTS  - transfuse if hgb < 7     Cirrhosis, Portal HTN, PBC  - poral gastropathy  - being w/u for [facility] transplant      Hypothyroidism    Malnutrition:       Interval History:  Seen and examined. Awake, alert. No issues overnight. Cr stbale, Na 128, bicarb 18. No cp or sob. UOP stable. Review of Systems: A comprehensive review of systems was negative except for that written in the HPI.     Current Medications:   Current Facility-Administered Medications   Medication Dose Route Frequency    sodium bicarbonate tablet 650 mg  650 mg Oral TID    potassium bicarb-citric acid (EFFER-K) tablet 20 mEq  20 mEq Oral NOW    magic mouthwash cpd (without sucralfate)  5 mL Oral TIDAC    0.9% sodium chloride infusion 250 mL  250 mL IntraVENous PRN    pantoprazole (PROTONIX) 40 mg in 0.9% sodium chloride 10 mL injection  40 mg IntraVENous Q12H    0.9% sodium chloride infusion 250 mL  250 mL IntraVENous PRN    sodium chloride (NS) flush 5-40 mL  5-40 mL IntraVENous Q8H    sodium chloride (NS) flush 5-40 mL  5-40 mL IntraVENous PRN    simethicone (MYLICON) 96KV/7.7RL oral drops 80 mg  1.2 mL Oral Multiple    promethazine (PHENERGAN) tablet 12.5 mg  12.5 mg Oral Q6H PRN    epoetin vic-epbx (RETACRIT) injection 10,000 Units  10,000 Units SubCUTAneous Q TUE, THU & SAT    multivitamin, tx-iron-ca-min (THERA-M w/ IRON) tablet 1 Tablet  1 Tablet Oral DAILY    miconazole (MICONTIN) 2 % ointment   Topical BID    balsam peru-castor oiL (VENELEX) ointment   Topical BID    zinc oxide-cod liver oil (DESITIN) 40 % paste   Topical PRN    sodium chloride (NS) flush 5-40 mL  5-40 mL IntraVENous Q8H    sodium chloride (NS) flush 5-40 mL  5-40 mL IntraVENous PRN    acetaminophen (TYLENOL) tablet 650 mg  650 mg Oral Q6H PRN    Or    acetaminophen (TYLENOL) suppository 650 mg  650 mg Rectal Q6H PRN    polyethylene glycol (MIRALAX) packet 17 g  17 g Oral DAILY PRN    ondansetron (ZOFRAN ODT) tablet 4 mg  4 mg Oral Q8H PRN    Or    ondansetron (ZOFRAN) injection 4 mg  4 mg IntraVENous D8E PRN    folic acid (FOLVITE) tablet 1 mg  1 mg Oral DAILY    levothyroxine (SYNTHROID) tablet 50 mcg  50 mcg Oral 6am    midodrine (PROAMATINE) tablet 15 mg  15 mg Oral TID WITH MEALS    octreotide (SANDOSTATIN) injection 100 mcg  100 mcg IntraVENous TID    rifAXIMin (XIFAXAN) tablet 550 mg  550 mg Oral BID    sertraline (ZOLOFT) tablet 100 mg  100 mg Oral DAILY    thiamine HCL (B-1) tablet 100 mg  100 mg Oral DAILY    ursodioL (ACTIGALL) tablet 500 mg (Patient Supplied)  500 mg Oral Q12H    glucagon (GLUCAGEN) injection 1 mg  1 mg IntraMUSCular PRN    naloxone (NARCAN) injection 0.1 mg  0.1 mg IntraVENous PRN      Allergies   Allergen Reactions    Morphine Other (comments)     Severe HA. ALL narcotics!!!       Objective:  Vitals:    Vitals:    04/06/22 0300 04/06/22 0400 04/06/22 0600 04/06/22 0745   BP: 120/62   (!) 124/56   Pulse: 72 69 70 77   Resp: 16   16   Temp: 98.3 °F (36.8 °C)   98.2 °F (36.8 °C)   SpO2: 98%   94%   Weight: 56.1 kg (123 lb 11.2 oz)      Height:         Intake and Output:  No intake/output data recorded. 04/04 1901 - 04/06 0700  In: 991.7 [P.O.:120;  I.V.:871.7]  Out: 351 [Urine:885]    Physical Examination:  General: No distress  HEENT:           Pale and icteric   Neck:  Supple, no mass  Resp:  Lungs CTA B/L  CV:  RRR,  no murmur or rub,no  LE edema  GI:  Soft, NT, + Bowel sounds, no hepatosplenomegaly  Neurologic: AAO X 3  :  + [place]     [x]    High complexity decision making was performed  [x]    Patient is at high-risk of decompensation with multiple organ involvement    Lab Data Personally Reviewed: I have reviewed all the pertinent labs, microbiology data and radiology studies during assessment. Recent Labs     04/06/22 0526 04/05/22 0815 04/04/22  0156   * 129* 130*   K 3.4* 3.5 3.2*   CL 96* 96* 97   CO2 18* 20* 18*   * 115* 121*   * 142* 139*   CREA 3.40* 3.54* 3.31*   CA 8.4* 8.9 8.3*   PHOS  --  7.1* 6.3*   ALB 3.2* 3.5 3.5   ALT 21  --   --      Recent Labs     04/06/22 0526 04/05/22 0815 04/04/22  0156   WBC 9.6 10.1 10.4   HGB 8.8* 9.0* 9.1*   HCT 26.2* 26.9* 27.4*   PLT 35* 34* 32*     No results found for: SDES  Lab Results   Component Value Date/Time    Culture result: NO GROWTH 4 DAYS 03/14/2022 06:48 PM    Culture result: NO GROWTH 4 DAYS 03/12/2022 06:00 PM    Culture result: CANDIDA ALBICANS (A) 03/05/2022 09:22 PM     Recent Results (from the past 24 hour(s))   METABOLIC PANEL, COMPREHENSIVE    Collection Time: 04/06/22  5:26 AM   Result Value Ref Range    Sodium 128 (L) 136 - 145 mmol/L    Potassium 3.4 (L) 3.5 - 5.1 mmol/L    Chloride 96 (L) 97 - 108 mmol/L    CO2 18 (L) 21 - 32 mmol/L    Anion gap 14 5 - 15 mmol/L    Glucose 108 (H) 65 - 100 mg/dL     (H) 6 - 20 MG/DL    Creatinine 3.40 (H) 0.55 - 1.02 MG/DL    BUN/Creatinine ratio 40 (H) 12 - 20      GFR est AA 17 (L) >60 ml/min/1.73m2    GFR est non-AA 14 (L) >60 ml/min/1.73m2    Calcium 8.4 (L) 8.5 - 10.1 MG/DL    Bilirubin, total 5.4 (H) 0.2 - 1.0 MG/DL    ALT (SGPT) 21 12 - 78 U/L    AST (SGOT) 33 15 - 37 U/L    Alk.  phosphatase 107 45 - 117 U/L    Protein, total 5.3 (L) 6.4 - 8.2 g/dL    Albumin 3.2 (L) 3.5 - 5.0 g/dL    Globulin 2.1 2.0 - 4.0 g/dL    A-G Ratio 1.5 1.1 - 2.2     CBC WITH AUTOMATED DIFF    Collection Time: 04/06/22  5:26 AM   Result Value Ref Range    WBC 9.6 3.6 - 11.0 K/uL    RBC 2.78 (L) 3.80 - 5.20 M/uL    HGB 8.8 (L) 11.5 - 16.0 g/dL    HCT 26.2 (L) 35.0 - 47.0 %    MCV 94.2 80.0 - 99.0 FL    MCH 31.7 26.0 - 34.0 PG    MCHC 33.6 30.0 - 36.5 g/dL    RDW 21.7 (H) 11.5 - 14.5 %    PLATELET 35 (LL) 606 - 400 K/uL    MPV 10.6 8.9 - 12.9 FL    NRBC 0.0 0  WBC    ABSOLUTE NRBC 0.00 0.00 - 0.01 K/uL    NEUTROPHILS PENDING %    LYMPHOCYTES PENDING %    MONOCYTES PENDING %    EOSINOPHILS PENDING %    BASOPHILS PENDING %    IMMATURE GRANULOCYTES PENDING %    ABS. NEUTROPHILS PENDING K/UL    ABS. LYMPHOCYTES PENDING K/UL    ABS. MONOCYTES PENDING K/UL    ABS. EOSINOPHILS PENDING K/UL    ABS. BASOPHILS PENDING K/UL    ABS. IMM. GRANS. PENDING K/UL    DF PENDING                                        Care Plan discussed with:  Patient     Family      RN      Consulting Physician [facility],         I have reviewed the flowsheets. Chart and Pertinent Notes have been reviewed. No change in PMH ,family and social history from Consult note.       Kamille Reilly MD

## 2022-04-06 NOTE — PROGRESS NOTES
Bedside shift change report given to Aniceto RN (oncoming nurse) by Eugenio Goodwin RN (offgoing nurse).  Report included the following information SBAR, Kardex and Cardiac Rhythm SR.

## 2022-04-06 NOTE — PROGRESS NOTES
[facility] Adult  Hospitalist Group                                                                                          Hospitalist Progress Note  Maykel Murdock MD  Answering service: [phone] OR [phone] from in house phone        Date of Service:  2022  NAME:  Emmett Zavaleta  :  1960  MRN:  [id number]      Admission Summary:   Copied form admit: \" Per ICU: Interval history: From critical care consult on   65year-old lady with liver cirrhosis due to [facility].  She has had multiple complication in form of esophageal varices, recurrent encephalopathy, recurrent ascites and severe debility and malnutrition. Patient was admitted to the hospital on  with cirrhosis decompensation. Lance Goode has been in the hospital since then with multiple complication but overall was improving. Today she had large coffee ground emesis but was associated with significant drop in her hemoglobin.  Primary team consulted ICU to assist the need for ICU level care. Overnight Events:   3/30: Endoscopy showed moderate portal hypertensive gastropathy with some blood and clots in the fundus.  No varices.  Octreotide discontinued this morning, extubated this morning, receiving packed RBC transfusion this morning for hemoglobin of 6.6.  Hemodynamically remained stable        Hospitalist note:   Seen and examined patient lying in bed. States that she is ok. Flat affect   Chart reviewed. Patient to transfer out of ICU.   \"    Interval history / Subjective:     2022 :    Depressed   Case discuss w son Hudson Kent, same is encouraged to visit pt regularly as pt requested to see him   Enrrique Mora:      Assessment & Plan:               Acute blood loss anemia:   GI bleed  - s/p 4 units of pRBCs   - s/p EGD on   - Hgb 7.6   - Continue PPI BID   - Monitor labs and transfuse for hgb <7.0    - Hepatology following   - good iron stores, stable hgb at 7.7 3/31  Lab Results   Component Value Date/Time    HGB 8.8 (L) 04/06/2022 05:26 AM         Decompensated liver cirrhosis, with portal hypertension.  Felt to be secondary to [facility]  Transaminitis/hyperbilirubinemia/coagulopathy/thrombocytopenia  History of hepatic encephalopathy   -Transferred from [address] [place] [address] with prolonged hospitalization.  Transferred for further work-up for liver transplant eval  -EGD on 2/17 [place] noted for distal esophagitis and portal hypertensive gastropathy, no evidence of varices.    -history of esophageal varices back in November 2021 status post banding at that time.    -Per GI at outlying facility, patient was deemed not to be a candidate for colonoscopy and recommended Cologuard.    -s/p MRCP on 2/18 which was noted for cirrhosis, portal hypertension, and large ascites.   -s/p cardiac stress test which was negative for ischemia, EF of 69%  -Continue lactulose, rifaximin, Protonix  -Continue ursodiol  -Paracentesis ordered 3/25 with [id number] removed  - PT/OT following   - Consult palliative for goals of care. 4/3:   Affect more affable,    Taking the liquid boost well for multiple meals 4/2 - current   Discussed nutrition w pt and RN in room 4/2 and 4/3 am rounds   Ammonia 40's    Pt oriented to hospital but not to: name thereof/season/month/year  4/4:   Affect more affable. Animated, not tearful,    More talkative; admits to alcohol and else pt is educated on chalangitis contribution to her liver failure    Discussed with nephrology    Pt eating better  4/5:   Pleasant, appropriate, eating   No distress   Lab/vs's stable S cr 3.5 , S Na 129   4/6:   · No new issues,   · Depression is palpable, son adived his presence may be helpful   · Lab for am      Refractory ascites  -multiple paracentesis done at [facility] including 2/11 with 11.8 L removal, 2/18 with 9/2 L removed, and 2/28 with 9.2 L removed, 3/12, 3/25  -Currently with distended abdomen  -Moderate large ascites on US 3/6. Ascites said to be refractory to diuretics, HRS limiting use of diuretics  -Ascitic drain that was placed on 3/12 was apparently clogged, flushed and drained significant amount of ascite  -Ascitic fluid lab negative for SBP  - no need for paracentesis based on exam 4/1, cont to eval      GREG with metabolic acidosis. persisent  -Baseline 0.9 on 11/26/2021   -Likely due to [facility]  -Nephrology following, recommendations appreciated. Lab Results   Component Value Date/Time    Creatinine 3.40 (H) 04/06/2022 05:26 AM        Hyponatremia  -Suspect secondary to underlying liver cirrhosis.   -Na stable 128-131 last few days      Recent E. coli UTI  -Treated with Vanco and Zosyn then transitioned to Rocephin, completed 7-day antibiotic course as of 2/17  -Replace Steve with new catheter 3/5 .  Urine culture on 3/5 grew Candida albicans 75,000 CFU. - nephrology desires that steve stay in for now      Hypothyroid  -Continue levothyroxine  Lab Results   Component Value Date/Time    TSH 1.58 04/04/2022 01:56 AM    Triiodothyronine (T3), free 2.4 01/25/2022 09:50 AM    T4, Free 1.2 01/25/2022 09:50 AM    Free T4 1.14 02/11/2022 09:25 AM     F/u TSH 4/4     Severe malnutrition  --Continue NG tube with tube feeds at night if patient tolerates. Encourage oral intake   --On regular diet and tolerating     Generalized weakness/chronic wounds. pta  -PT, OT consult  -Wound care following              Hospital Problems  Date Reviewed: 2/17/2022          Codes Class Noted POA    * (Principal) Cirrhosis ([facility] [address] ICD-10-CM: K74.60  ICD-9-CM: 571.5  2/8/2022 Unknown                  After personally interviewing pt at bedside the following is noted on 4/6/2022 :    Review of Systems   Gastrointestinal: Positive for diarrhea. Negative for abdominal pain. Psychiatric/Behavioral: Positive for depression. All other systems reviewed and are negative.              I had a face to face encounter with patient on 4/6/2022 at bedside for the following physical exam:     PHYSICAL EXAM:    Visit Vitals  BP (!) 124/56 (BP 1 Location: Right upper arm, BP Patient Position: At rest)   Pulse 77   Temp 98.2 °F (36.8 °C)   Resp 16   Ht 5' 4\" (1.626 m)   Wt 56.1 kg (123 lb 11.2 oz)   SpO2 94%   Breastfeeding No   BMI 21.23 kg/m²          Physical Exam  Constitutional:       General: She is not in acute distress. Appearance: She is not ill-appearing, toxic-appearing or diaphoretic. Comments: chronically ill appearing ; malnurished; low muscle mass  Pleasant appropriate, eating    HENT:      Head: Normocephalic and atraumatic. Right Ear: External ear normal.      Left Ear: External ear normal.      Mouth/Throat:      Mouth: Mucous membranes are dry. Pharynx: Oropharynx is clear. Eyes:      General:         Right eye: No discharge. Left eye: No discharge. Cardiovascular:      Rate and Rhythm: Normal rate and regular rhythm. Pulmonary:      Effort: Pulmonary effort is normal.      Breath sounds: Normal breath sounds. Abdominal:      General: Abdomen is flat. Palpations: Abdomen is soft. Musculoskeletal:         General: No swelling or deformity. Cervical back: Neck supple. No rigidity. Skin:     General: Skin is warm and dry. Coloration: Skin is jaundiced. Skin is not pale. Comments: Ecchymosis multiple sites   Neurological:      General: No focal deficit present. Mental Status: She is alert. Mental status is at baseline.       Comments:     Psychiatric:         Behavior: Behavior normal.      Comments: Depressed                      Data Review:    Review and/or order of clinical lab test      Labs:     Recent Labs     04/06/22 0526 04/05/22  0815   WBC 9.6 10.1   HGB 8.8* 9.0*   HCT 26.2* 26.9*   PLT 35* 34*     Recent Labs     04/06/22  0526 04/05/22  0815 04/04/22  0156   * 129* 130*   K 3.4* 3.5 3.2*   CL 96* 96* 97   CO2 18* 20* 18*   * 142* 139*   CREA 3.40* 3.54* 3.31*   * 115* 121*   CA 8.4* 8.9 8.3*   PHOS  --  7.1* 6.3*     Recent Labs 04/06/22  0526 04/05/22  0815 04/04/22  0156   ALT 21  --   --      --   --    TBILI 5.4*  --   --    TP 5.3*  --   --    ALB 3.2* 3.5 3.5   GLOB 2.1  --   --      No results for input(s): INR, PTP, APTT, INREXT, INREXT in the last 72 hours. No results for input(s): FE, TIBC, PSAT, FERR in the last 72 hours. Lab Results   Component Value Date/Time    Folate 38.3 (H) 10/20/2021 07:53 AM    RBC FOLATE 878 02/14/2022 01:27 AM      No results for input(s): PH, PCO2, PO2 in the last 72 hours. No results for input(s): CPK, CKNDX, TROIQ in the last 72 hours.     No lab exists for component: CPKMB  Lab Results   Component Value Date/Time    Cholesterol, total 122 01/25/2022 09:50 AM    HDL Cholesterol 40 01/25/2022 09:50 AM    LDL, calculated 67.2 01/25/2022 09:50 AM    Triglyceride 74 01/25/2022 09:50 AM    CHOL/HDL Ratio 3.1 01/25/2022 09:50 AM     Lab Results   Component Value Date/Time    Glucose (POC) 128 (H) 03/15/2022 06:06 AM    Glucose (POC) 123 (H) 03/15/2022 12:14 AM    Glucose (POC) 114 (H) 02/23/2022 05:42 PM    Glucose (POC) 127 (H) 02/23/2022 12:04 PM    Glucose (POC) 122 (H) 02/23/2022 08:18 AM     Lab Results   Component Value Date/Time    Color DARK YELLOW 03/05/2022 09:22 PM    Appearance TURBID (A) 03/05/2022 09:22 PM    Specific gravity 1.014 03/05/2022 09:22 PM    Specific gravity 1.010 02/11/2022 07:20 PM    pH (UA) 5.5 03/05/2022 09:22 PM    Protein 100 (A) 03/05/2022 09:22 PM    Glucose Negative 03/05/2022 09:22 PM    Ketone Negative 03/05/2022 09:22 PM    Bilirubin NEGATIVE 02/11/2022 07:20 PM    Urobilinogen 0.2 03/05/2022 09:22 PM    Nitrites Negative 03/05/2022 09:22 PM    Leukocyte Esterase LARGE (A) 03/05/2022 09:22 PM    Epithelial cells FEW 03/05/2022 09:22 PM    Bacteria 1+ (A) 03/05/2022 09:22 PM    WBC >100 (H) 03/05/2022 09:22 PM    RBC  03/05/2022 09:22 PM         Medications Reviewed:     Current Facility-Administered Medications   Medication Dose Route Frequency  sodium bicarbonate tablet 650 mg  650 mg Oral TID    potassium bicarb-citric acid (EFFER-K) tablet 20 mEq  20 mEq Oral NOW    magic mouthwash cpd (without sucralfate)  5 mL Oral TIDAC    0.9% sodium chloride infusion 250 mL  250 mL IntraVENous PRN    pantoprazole (PROTONIX) 40 mg in 0.9% sodium chloride 10 mL injection  40 mg IntraVENous Q12H    0.9% sodium chloride infusion 250 mL  250 mL IntraVENous PRN    sodium chloride (NS) flush 5-40 mL  5-40 mL IntraVENous Q8H    sodium chloride (NS) flush 5-40 mL  5-40 mL IntraVENous PRN    simethicone (MYLICON) 19MT/8.9DU oral drops 80 mg  1.2 mL Oral Multiple    promethazine (PHENERGAN) tablet 12.5 mg  12.5 mg Oral Q6H PRN    epoetin vic-epbx (RETACRIT) injection 10,000 Units  10,000 Units SubCUTAneous Q TUE, THU & SAT    multivitamin, tx-iron-ca-min (THERA-M w/ IRON) tablet 1 Tablet  1 Tablet Oral DAILY    miconazole (MICONTIN) 2 % ointment   Topical BID    balsam peru-castor oiL (VENELEX) ointment   Topical BID    zinc oxide-cod liver oil (DESITIN) 40 % paste   Topical PRN    sodium chloride (NS) flush 5-40 mL  5-40 mL IntraVENous Q8H    sodium chloride (NS) flush 5-40 mL  5-40 mL IntraVENous PRN    acetaminophen (TYLENOL) tablet 650 mg  650 mg Oral Q6H PRN    Or    acetaminophen (TYLENOL) suppository 650 mg  650 mg Rectal Q6H PRN    polyethylene glycol (MIRALAX) packet 17 g  17 g Oral DAILY PRN    ondansetron (ZOFRAN ODT) tablet 4 mg  4 mg Oral Q8H PRN    Or    ondansetron (ZOFRAN) injection 4 mg  4 mg IntraVENous W6W PRN    folic acid (FOLVITE) tablet 1 mg  1 mg Oral DAILY    levothyroxine (SYNTHROID) tablet 50 mcg  50 mcg Oral 6am    midodrine (PROAMATINE) tablet 15 mg  15 mg Oral TID WITH MEALS    octreotide (SANDOSTATIN) injection 100 mcg  100 mcg IntraVENous TID    rifAXIMin (XIFAXAN) tablet 550 mg  550 mg Oral BID    sertraline (ZOLOFT) tablet 100 mg  100 mg Oral DAILY    thiamine HCL (B-1) tablet 100 mg  100 mg Oral DAILY    ursodioL (ACTIGALL) tablet 500 mg (Patient Supplied)  500 mg Oral Q12H    glucagon (GLUCAGEN) injection 1 mg  1 mg IntraMUSCular PRN    naloxone (NARCAN) injection 0.1 mg  0.1 mg IntraVENous PRN     ______________________________________________________________________  EXPECTED LENGTH OF STAY: 4d 16h  ACTUAL LENGTH OF STAY:          32                 Saint Parr, MD

## 2022-04-06 NOTE — PROGRESS NOTES
SELMA PLAN:  RUR-23%  Disposition -SNF placement  Transportation-S     A referral was sent to [facility] ([phone]) (fax [phone])-[facility]     [facility] declined-Out of network    JHONATAN spoke with Armando Hampton at [facility] regarding status of referral. She will discuss patient's complex care with nursing staff today and will call this CM back. Awaiting a call back from SNF.  Doron Hough MSA, RN, CM

## 2022-04-06 NOTE — PROGRESS NOTES
2000 Report received from Casimiro Kumar, [facility]. Patient alert with some confusion, resting in bed. Call bell left within reach and no other needs expressed. Pt repositioned, bed left in lowest position. [place] care and CHG bath given. 2435 Bedside shift change report given to Vance Pike by Cami Martinez RN. Report included the following information SBAR, Kardex, MAR, Accordion, and Recent Results and Cardiac Rhythm NSR. Problem: Falls - Risk of  Goal: *Absence of Falls  Description: Document Viola Fairbanks Fall Risk and appropriate interventions in the flowsheet. Outcome: Progressing Towards Goal  Note: Fall Risk Interventions:  Mobility Interventions: Bed/chair exit alarm,Communicate number of staff needed for ambulation/transfer    Mentation Interventions: Bed/chair exit alarm,Adequate sleep, hydration, pain control    Medication Interventions: Patient to call before getting OOB    Elimination Interventions: Patient to call for help with toileting needs,Call light in reach    History of Falls Interventions: Vital signs minimum Q4HRs X 24 hrs (comment for end date)      Problem: Pressure Injury - Risk of  Goal: *Prevention of pressure injury  Description: Document Rodrigo Scale and appropriate interventions in the flowsheet. Outcome: Progressing Towards Goal  Note: Pressure Injury Interventions:  Sensory Interventions: Assess changes in LOC,Minimize linen layers    Moisture Interventions: Absorbent underpads,Limit adult briefs    Activity Interventions: PT/OT evaluation    Mobility Interventions: PT/OT evaluation,Turn and reposition approx.  every two hours(pillow and wedges)    Nutrition Interventions: Document food/fluid/supplement intake,Discuss nutritional consult with provider    Friction and Shear Interventions: Lift sheet,Minimize layers

## 2022-04-07 NOTE — PROGRESS NOTES
Problem: Mobility Impaired (Adult and Pediatric)  Goal: *Acute Goals and Plan of Care (Insert Text)  Description: FUNCTIONAL STATUS PRIOR TO ADMISSION: Patient appears to be an inconsistent historian despite being oriented x 4. Originally stated she primarily stays in bed-later she reported ambulating a few times a day and spending most of her day in a recliner. She consistently reports that son assists her \"with everything\" but does not give more details on what exactly or how much assistance. HOME SUPPORT PRIOR TO ADMISSION: The patient lived with son and her mother per report. Unclear how much assistance she required. Physical Therapy Goals  Weekly re-assessment 4/7/2022  1. Patient will move from supine to sit and sit to supine  and roll side to side in bed with moderate assistance within 7 day(s). 2.  Patient will transfer from bed to chair and chair to bed with maximal assistance using the least restrictive device within 7 day(s). 3.  Patient will perform sit to stand with moderate assistance within 7 day(s). 4.  Patient will ambulate with maximal assistance for 5 feet with the least restrictive device within 7 day(s). 5.  Patient will tolerate seated EOB x 10 minutes with min A within 7 days      Reassessed 4/1/2022 s/p after transfer to ICU  1. Patient will move from supine to sit and sit to supine  and roll side to side in bed with maximal assistance within 7 day(s). 2.  Patient will transfer from bed to chair and chair to bed with maximal assistance using the least restrictive device within 7 day(s). 3.  Patient will perform sit to stand with maximal assistance within 7 day(s). 4.  Patient will ambulate with maximal assistance for 5 feet with the least restrictive device within 7 day(s). 5.  Patient will tolerate seated EOB x 10 minutes with min A within 7 days    Re-Assessed 3/21/2022  1.   Patient will move from supine to sit and sit to supine , scoot up and down, and roll side to side in bed with minimal assistance/contact guard assist within 7 day(s). 2.  Patient will transfer from bed to chair and chair to bed with minimal assistance/contact guard assist using the least restrictive device within 7 day(s). 3.  Patient will perform sit to stand with minimal assistance/contact guard assist within 7 day(s). 4.  Patient will ambulate with moderate assistance  for 10 feet with the least restrictive device within 7 day(s). Initiated 3/6/2022  1. Patient will move from supine to sit and sit to supine , scoot up and down, and roll side to side in bed with minimal assistance/contact guard assist within 7 day(s). 2.  Patient will transfer from bed to chair and chair to bed with moderate assistance  using the least restrictive device within 7 day(s). 3.  Patient will perform sit to stand with moderate assistance  within 7 day(s). 4.  Patient will ambulate with moderate assistance  for 20 feet with the least restrictive device within 7 day(s). 5.  Patient will ascend/descend 2 stairs with 2 handrail(s) with moderate assistance  within 7 day(s). Outcome: Progressing Towards Goal   PHYSICAL THERAPY TREATMENT: WEEKLY REASSESSMENT  Patient: Juana Monteiro (32 y.o. female)  Date: 4/7/2022  Primary Diagnosis: Cirrhosis ([facility] [address] [K74.60]  Procedure(s) (LRB):  ESOPHAGOGASTRODUODENOSCOPY (EGD) (N/A) 9 Days Post-Op   Precautions:   Fall,Skin      ASSESSMENT  Patient continues with skilled PT services and is progressing towards goals. Patient participating more today with therapy and assisting more with all aspects once directed how to assist and encouraged. Able to  progress to standing today to side step to [address]. Still far below baseline and has not consistently worked with therapy but hopefully now on medical unit and with consistent therapist perhaps will progress and be more engaged in therapy.   .     Patient's progression toward goals since last assessment: goals adjusted to show slight progress    Current Level of Function Impacting Discharge (mobility/balance): mod to max assist; limited over last period    Other factors to consider for discharge: far below baseline         PLAN :  Goals have been updated based on progression since last assessment. Patient continues to benefit from skilled intervention to address the above impairments. Recommendations and Planned Interventions: bed mobility training, transfer training, gait training, therapeutic exercises, patient and family training/education, and therapeutic activities      Frequency/Duration: Patient will be followed by physical therapy:  3 times a week to address goals. Recommendation for discharge: (in order for the patient to meet his/her long term goals)  Therapy up to 5 days/week in SNF setting    This discharge recommendation:  Has been made in collaboration with the attending provider and/or case management    IF patient discharges home will need the following DME: to be determined (TBD)         SUBJECTIVE:   Patient stated I am cold.     OBJECTIVE DATA SUMMARY:   HISTORY:    Past Medical History:   Diagnosis Date    Anxiety 10/19/2021    Basal cell carcinoma (BCC) of face 10/19/2021    Chronic kidney disease     \"end stage liver disease\" per son    Cirrhosis of liver not due to alcohol ([facility] [address]     Depression 10/19/2021    Thyroid disease      Past Surgical History:   Procedure Laterality Date    HX  SECTION      x2    HX ORTHOPAEDIC Right     x2       Personal factors and/or comorbidities impacting plan of care:     Home Situation  Home Environment: Private residence  # Steps to Enter: 2  Rails to Enter: Yes  Hand Rails : Bilateral  One/Two Story Residence: Two story  Living Alone: No  Support Systems: Parent(s),Child(joe)  Patient Expects to be Discharged to[de-identified] Skilled nursing facility  Current DME Used/Available at Home: [address],[employer], bedside  Tub or Shower Type: Shower    EXAMINATION/PRESENTATION/DECISION MAKING:   Critical Behavior:  Neurologic State: Alert  Orientation Level: Oriented to person,Oriented to place,Oriented to time,Disoriented to situation  Cognition: Decreased attention/concentration,Follows commands,Memory loss  Safety/Judgement: Decreased awareness of environment,Decreased insight into deficits,Decreased awareness of need for assistance,Decreased awareness of need for safety  Hearing: Auditory  Auditory Impairment: None  Range Of Motion:      Generally decresased; functional                    Strength:     Generally decreased        Functional Mobility:  Bed Mobility:  Rolling: Moderate assistance  Supine to Sit: Moderate assistance  Sit to Supine: Maximum assistance  Scooting: Maximum assistance  Transfers:  Sit to Stand: Moderate assistance  Stand to Sit: Minimum assistance                       Balance:   Sitting: Impaired; Without support  Sitting - Static: Fair (occasional)  Sitting - Dynamic: Fair (occasional)  Standing: Impaired; With support  Standing - Static: Constant support; Fair  Standing - Dynamic : Constant support; Fair  Ambulation/Gait Training:   Side stepped two steps with use of walker and assist of 2;   Decreased step length'  Decreased foot clearanc   Activity Tolerance:   Poor    After treatment patient left in no apparent distress:   Supine in bed, Heels elevated for pressure relief, Call bell within reach, and Side rails x 3    COMMUNICATION/EDUCATION:   The patients plan of care was discussed with: Registered nurse and Certified nursing assistant/patient care technician. Fall prevention education was provided and the patient/caregiver indicated understanding., Patient/family have participated as able in goal setting and plan of care. , and Patient/family agree to work toward stated goals and plan of care.     Thank you for this referral.  Elgin Manley, PT

## 2022-04-07 NOTE — PROGRESS NOTES
Problem: Falls - Risk of  Goal: *Absence of Falls  Description: Document Marciana Distance Fall Risk and appropriate interventions in the flowsheet.   Outcome: Progressing Towards Goal  Note: Fall Risk Interventions:  Mobility Interventions: Bed/chair exit alarm,Communicate number of staff needed for ambulation/transfer    Mentation Interventions: Adequate sleep, hydration, pain control    Medication Interventions: Bed/chair exit alarm    Elimination Interventions: Call light in reach,Bed/chair exit alarm    History of Falls Interventions: Bed/chair exit alarm

## 2022-04-07 NOTE — PROGRESS NOTES
[facility]   [address] [facility], [facility] [address], [facility]  Phone: [phone]   [phone]       Nephrology Progress Note  Joy Hinojosa     1960     [id number]  Date of Admission : 3/5/2022  04/07/22    CC:  Follow up for greg    Assessment and Plan   GREG:  - 2/2 HRS and now w/ GI bleed  - renal function is stable; U/O 425 cc overnight  - keep steve  - cont midodrine and octreotide  - daily labs    Hyponatremia - due to liver disease  -remains low but stable    Worsening acidosis -  -continue oral bicarb  -add bicarb drip at 60 cc/hour; adjust per labs     Hypokalemia:  - repletion prn    Severe anemia:  GI bleed  - On EDWIN TTS  - transfusions per primary team      Cirrhosis, Portal HTN, PBC  - poral gastropathy  - being w/u for [facility] transplant      Hypothyroidism    Malnutrition:       Interval History:  Seen and examined. Awake, alert. Adequate U/O overnight; not SOB    Review of Systems: A comprehensive review of systems was negative except for that written in the HPI.     Current Medications:   Current Facility-Administered Medications   Medication Dose Route Frequency    dextrose 5% 1,000 mL with sodium bicarbonate (8.4%) 150 mEq infusion   IntraVENous CONTINUOUS    sodium bicarbonate tablet 650 mg  650 mg Oral TID    magic mouthwash cpd (without sucralfate)  5 mL Oral TIDAC    0.9% sodium chloride infusion 250 mL  250 mL IntraVENous PRN    pantoprazole (PROTONIX) 40 mg in 0.9% sodium chloride 10 mL injection  40 mg IntraVENous Q12H    0.9% sodium chloride infusion 250 mL  250 mL IntraVENous PRN    sodium chloride (NS) flush 5-40 mL  5-40 mL IntraVENous Q8H    sodium chloride (NS) flush 5-40 mL  5-40 mL IntraVENous PRN    simethicone (MYLICON) 94NW/1.0QM oral drops 80 mg  1.2 mL Oral Multiple    promethazine (PHENERGAN) tablet 12.5 mg  12.5 mg Oral Q6H PRN    epoetin vic-epbx (RETACRIT) injection 10,000 Units  10,000 Units SubCUTAneous Q TUE, THU & SAT    multivitamin, tx-iron-ca-min (THERA-M w/ IRON) tablet 1 Tablet  1 Tablet Oral DAILY    miconazole (MICONTIN) 2 % ointment   Topical BID    balsam peru-castor oiL (VENELEX) ointment   Topical BID    zinc oxide-cod liver oil (DESITIN) 40 % paste   Topical PRN    sodium chloride (NS) flush 5-40 mL  5-40 mL IntraVENous Q8H    sodium chloride (NS) flush 5-40 mL  5-40 mL IntraVENous PRN    acetaminophen (TYLENOL) tablet 650 mg  650 mg Oral Q6H PRN    Or    acetaminophen (TYLENOL) suppository 650 mg  650 mg Rectal Q6H PRN    polyethylene glycol (MIRALAX) packet 17 g  17 g Oral DAILY PRN    ondansetron (ZOFRAN ODT) tablet 4 mg  4 mg Oral Q8H PRN    Or    ondansetron (ZOFRAN) injection 4 mg  4 mg IntraVENous B0N PRN    folic acid (FOLVITE) tablet 1 mg  1 mg Oral DAILY    levothyroxine (SYNTHROID) tablet 50 mcg  50 mcg Oral 6am    midodrine (PROAMATINE) tablet 15 mg  15 mg Oral TID WITH MEALS    octreotide (SANDOSTATIN) injection 100 mcg  100 mcg IntraVENous TID    rifAXIMin (XIFAXAN) tablet 550 mg  550 mg Oral BID    sertraline (ZOLOFT) tablet 100 mg  100 mg Oral DAILY    thiamine HCL (B-1) tablet 100 mg  100 mg Oral DAILY    ursodioL (ACTIGALL) tablet 500 mg (Patient Supplied)  500 mg Oral Q12H    glucagon (GLUCAGEN) injection 1 mg  1 mg IntraMUSCular PRN    naloxone (NARCAN) injection 0.1 mg  0.1 mg IntraVENous PRN      Allergies   Allergen Reactions    Morphine Other (comments)     Severe HA. ALL narcotics!!!       Objective:  Vitals:    Vitals:    04/06/22 1150 04/06/22 1430 04/06/22 2004 04/07/22 0404   BP: 118/61 113/67 116/63 112/67   Pulse: 69 71 78 75   Resp: 16 17 17 16   Temp: 98 °F (36.7 °C) 97.7 °F (36.5 °C) 97.8 °F (36.6 °C) 97.8 °F (36.6 °C)   SpO2:  100% 97% 98%   Weight:       Height:         Intake and Output:  04/06 1901 - 04/07 0700  In: -   Out: 425 [Urine:425]  04/05 0701 - 04/06 1900  In: 991.7 [P.O.:120;  I.V.:871.7]  Out: 500 [Urine:500]    Physical Examination:  General: No distress  HEENT:           Pale and icteric   Neck:  Supple, no mass  Resp:  Lungs CTA B/L  CV:  RRR,  no murmur or rub,no  LE edema  GI:  Soft, NT, + Bowel sounds, no hepatosplenomegaly  Neurologic:  AAO X 3  :  + Beckford     [x]    High complexity decision making was performed  [x]    Patient is at high-risk of decompensation with multiple organ involvement    Lab Data Personally Reviewed: I have reviewed all the pertinent labs, microbiology data and radiology studies during assessment.     Recent Labs     04/07/22  0002 04/07/22  0001 04/06/22 0526 04/05/22 0815   * 129* 128* 129*   K 3.5 3.2* 3.4* 3.5   CL 96* 94* 96* 96*   CO2 13* 18* 18* 20*   * 130* 108* 115*   * 128* 135* 142*   CREA 3.48* 3.49* 3.40* 3.54*   CA 8.2* 8.3* 8.4* 8.9   PHOS 6.2*  --   --  7.1*   ALB 3.4*  --  3.2* 3.5   ALT  --   --  21  --      Recent Labs     04/07/22  0002 04/06/22 0526 04/05/22 0815   WBC 11.0 9.6 10.1   HGB 9.1* 8.8* 9.0*   HCT 27.6* 26.2* 26.9*   PLT 39* 35* 34*     No results found for: [facility]  Lab Results   Component Value Date/Time    Culture result: NO GROWTH 4 DAYS 03/14/2022 06:48 PM    Culture result: NO GROWTH 4 DAYS 03/12/2022 06:00 PM    Culture result: CANDIDA ALBICANS (A) 03/05/2022 09:22 PM     Recent Results (from the past 24 hour(s))   METABOLIC PANEL, BASIC    Collection Time: 04/07/22 12:01 AM   Result Value Ref Range    Sodium 129 (L) 136 - 145 mmol/L    Potassium 3.2 (L) 3.5 - 5.1 mmol/L    Chloride 94 (L) 97 - 108 mmol/L    CO2 18 (L) 21 - 32 mmol/L    Anion gap 17 (H) 5 - 15 mmol/L    Glucose 130 (H) 65 - 100 mg/dL     (H) 6 - 20 MG/DL    Creatinine 3.49 (H) 0.55 - 1.02 MG/DL    BUN/Creatinine ratio 37 (H) 12 - 20      GFR est AA 16 (L) >60 ml/min/1.73m2    GFR est non-AA 13 (L) >60 ml/min/1.73m2    Calcium 8.3 (L) 8.5 - 10.1 MG/DL   RENAL FUNCTION PANEL    Collection Time: 04/07/22 12:02 AM   Result Value Ref Range    Sodium 129 (L) 136 - 145 mmol/L    Potassium 3.5 3.5 - 5.1 mmol/L    Chloride 96 (L) 97 - 108 mmol/L    CO2 13 (LL) 21 - 32 mmol/L    Anion gap 20 (H) 5 - 15 mmol/L    Glucose 116 (H) 65 - 100 mg/dL     (H) 6 - 20 MG/DL    Creatinine 3.48 (H) 0.55 - 1.02 MG/DL    BUN/Creatinine ratio 37 (H) 12 - 20      GFR est AA 16 (L) >60 ml/min/1.73m2    GFR est non-AA 13 (L) >60 ml/min/1.73m2    Calcium 8.2 (L) 8.5 - 10.1 MG/DL    Phosphorus 6.2 (H) 2.6 - 4.7 MG/DL    Albumin 3.4 (L) 3.5 - 5.0 g/dL   CBC WITH AUTOMATED DIFF    Collection Time: 04/07/22 12:02 AM   Result Value Ref Range    WBC 11.0 3.6 - 11.0 K/uL    RBC 2.88 (L) 3.80 - 5.20 M/uL    HGB 9.1 (L) 11.5 - 16.0 g/dL    HCT 27.6 (L) 35.0 - 47.0 %    MCV 95.8 80.0 - 99.0 FL    MCH 31.6 26.0 - 34.0 PG    MCHC 33.0 30.0 - 36.5 g/dL    RDW 22.3 (H) 11.5 - 14.5 %    PLATELET 39 (LL) 920 - 400 K/uL    NRBC 0.0 0  WBC    ABSOLUTE NRBC 0.00 0.00 - 0.01 K/uL    NEUTROPHILS 84 (H) 32 - 75 %    LYMPHOCYTES 8 (L) 12 - 49 %    MONOCYTES 7 5 - 13 %    EOSINOPHILS 0 0 - 7 %    BASOPHILS 0 0 - 1 %    IMMATURE GRANULOCYTES 1 (H) 0.0 - 0.5 %    ABS. NEUTROPHILS 9.2 (H) 1.8 - 8.0 K/UL    ABS. LYMPHOCYTES 0.9 0.8 - 3.5 K/UL    ABS. MONOCYTES 0.8 0.0 - 1.0 K/UL    ABS. EOSINOPHILS 0.0 0.0 - 0.4 K/UL    ABS. BASOPHILS 0.0 0.0 - 0.1 K/UL    ABS. IMM. GRANS. 0.1 (H) 0.00 - 0.04 K/UL    DF SMEAR SCANNED      RBC COMMENTS ANISOCYTOSIS  2+        RBC COMMENTS MACROCYTOSIS  1+        RBC COMMENTS AIRAM CELLS  PRESENT                                           Care Plan discussed with:  Patient     Family      RN      Consulting Physician [address],         I have reviewed the flowsheets. Chart and Pertinent Notes have been reviewed. No change in PMH ,family and social history from Consult note.       Viri Akers MD

## 2022-04-07 NOTE — WOUND CARE
WOCN Note:     Follow up wound care visit to re-assess skin tears to arms  Assessed in room 615  Isolation: no    Chart shows:  Patient admitted on 3/5/22 with Cirrhosis  Past Medical History:   Diagnosis Date    Anxiety 10/19/2021    Basal cell carcinoma (BCC) of face 10/19/2021    Chronic kidney disease     \"end stage liver disease\" per son    Cirrhosis of liver not due to alcohol ([address]     Depression 10/19/2021    Thyroid disease       4/7/22  WBC = 11  Hgb = 9.1    Assessment:   Alert, Oriented to person and place  Reports no pain  Mobility: moderate assistance with repositioning and turning  Continence: Incontinent of stool. Beckford catheter  Restraints: no  Last Rodrigo Score: 14  Surface: Foam mattress  Diet: Regular dysphagia, oral nutritional supplements     Wt Readings from Last 2 Encounters:   04/06/22 56.1 kg (123 lb 11.2 oz)   02/17/22 68 kg (149 lb 14.6 oz)     1. POA stage 3 sacral pressure injury  1 x 1 x <0.1 cm  Wound bed pink   scant amount tan exudate  no odor   defined edges  Periwound intact with blanchable erythema   Treatment: Cleansed with saline, hydrocolloid    2. Skin tear to right forearm  Etiology: skin very fragile and thin  Partial thickness   5.5 x 4 x <0.1 cm  Wound bed red/pink   Small amount serosang exudate  Periwound intact & with multiple purple splotches  Treatment: cleansed with saline, Mepitel one, Petroleum jelly, gauze, stretch gauze roll    3. Skin tear to right elbow  Etiology: skin very fragile and thin  Partial thickness   0.6 x 0.5 x <0.1 cm  Wound bed pink  Scant serosang exudate  Periwound intact & with multiple purple splotches  Treatment: cleansed with saline, mepitel one, petroleum jelly, gauze, stretch gauze roll    4. Skin tear to left upper arm  Resolved    5. Buttocks with red bright rash with satellite lesions.  Symptoms consistent with incontinent associated dermatitis and superficial yeast infection    Wound Recommendations:      Mepitel One dressing to skin tears to right forearm and elbow. Leave mepitel one dressing in place, Apply Petroleum jelly over mepitel one and cover with gauze and stretch gauze (do not apply any adhesive to skin), daily     1) Cleanse wound to sacrum with wound cleanser or normal saline, pat dry   2) Apply skin barrier wipe to periwound skin and allow to dry   4) Fold  hydrocolloid ( Exuderm Satin) dressing and apply to area   5) Smooth dressing from center outward and hold dressing in place to improve adhesion   Change dressing weekly and prn if becomes soiled or loosens/ edges curl    Cleanse buttocks  with Remedy foaming cleanser, pat dry, Appy Nystatin cream then apply Desitin barrier cream TID and as needed with incontinence care   PLEASE DO NOT APPLY INCONTINENT BRIEFS this will increase moisture to the skin leading to skin breakdown    PI Prevention:  Turn/reposition approximately every 2 hours  Offload heels with pillows at all times in bed. Pad bony prominences   Keep HOB 30 degrees or less to decrease shearing and pressure unless medically contraindicated. If HOB is to be over 30 degrees, raise knees first then [facility] to prevent sliding   Minimize layers of linen/pads under patient to optimize support surface to one flat sheet and one incontinence pad   Specialty bed: Prius ordered via [address] only flat sheet and one incontinence pad. Please call [facility] at [phone] to request  of bed when patient discharged.     Discussed with RN, Waqar Rooney    Transition of Care: Plan to follow weekly and as needed while admitted to hospital.      Stefanie Mason MSN, RN, [employer]  Certified Wound and Ostomy Nurse  office [phone]  Can be reached through [address] [place] [address]

## 2022-04-07 NOTE — PROGRESS NOTES
[address]  Hospitalist Group                                                                                          Hospitalist Progress Note  Anselmo Quiroga MD  Answering service: [phone] OR [phone] from in house phone        Date of Service:  2022  NAME:  Frances Perkins  :  1960  MRN:  [id number]      Admission Summary:   Copied form admit: \" Per ICU: Interval history: From critical care consult on   65year-old lady with liver cirrhosis due to [facility].  She has had multiple complication in form of esophageal varices, recurrent encephalopathy, recurrent ascites and severe debility and malnutrition. Patient was admitted to the hospital on  with cirrhosis decompensation. Javed Browne has been in the hospital since then with multiple complication but overall was improving. Today she had large coffee ground emesis but was associated with significant drop in her hemoglobin.  Primary team consulted ICU to assist the need for ICU level care. Overnight Events:   3/30: Endoscopy showed moderate portal hypertensive gastropathy with some blood and clots in the fundus.  No varices.  Octreotide discontinued this morning, extubated this morning, receiving packed RBC transfusion this morning for hemoglobin of 6.6.  Hemodynamically remained stable        Hospitalist note:   Seen and examined patient lying in bed. States that she is ok. Flat affect   Chart reviewed. Patient to transfer out of ICU. \"    Interval history / Subjective:   Patient seen and examined, chart was reviewed. Bicarb dropped to 13, started on bicarb drip by nephrology. Patient comfortable in bed denies any acute complaints overall poor historian. No other acute issues reported to me by staff at this time. Assessment & Plan:        Acute blood loss anemia/GI bleed  - s/p 4 units of pRBCs   - s/p EGD on   - Hgb >7 now  -  PPI BID   - Hepatology following        Liver cirrhosis  with portal HTN.   Felt to be secondary to [facility]  Transaminitis/hyperbilirubinemia/coagulopathy/thrombocytopenia  Hepatic Encephalopathy   -Transferred from [facility] with prolonged hospitalization.  Transferred for further work-up for liver transplant eval  -EGD on 2/17 [place] noted for distal esophagitis and portal hypertensive gastropathy, no evidence of varices.    -history of esophageal varices back in November 2021 status post banding at that time.    -Per GI at outlying facility, patient was deemed not to be a candidate for colonoscopy and recommended Cologuard.    -s/p MRCP on 2/18 which was noted for cirrhosis, portal hypertension, and large ascites.   -s/p cardiac stress test which was negative for ischemia, EF of 69%  -Continue lactulose, rifaximin, Protonix  -Continue ursodiol  -Paracentesis  3/25 with [id number] removed  - PT/OT following   - Consult palliative for goals of care.      Refractory ascites  -Moderate large ascites on US 3/6. Ascites said to be refractory to diuretics, HRS limiting use of diuretics  -Ascitic drain that was placed on 3/12 was apparently clogged, flushed and drained significant amount of ascite  -Ascitic fluid lab negative for SBP     GREG with metabolic acidosis. persisent  -Baseline 0.9 on 11/26/2021   -Likely due to [facility]  -Nephrology following, recommendations appreciated. - cont bicarb drip    Hyponatremia  -Suspect secondary to underlying liver cirrhosis.   -Na stable 129      Recent E. coli UTI  -Treated with Vanco and Zosyn then transitioned to Rocephin, completed 7-day antibiotic course as of 2/17  -Replace Steve with new catheter 3/5 .  Urine culture on 3/5 grew Candida albicans 75,000 CFU.   - nephrology desires that steve stay in for now      Hypothyroid  -Continue levothyroxine  Lab Results   Component Value Date/Time    TSH 1.58 04/04/2022 01:56 AM    Triiodothyronine (T3), free 2.4 01/25/2022 09:50 AM    T4, Free 1.2 01/25/2022 09:50 AM    Free T4 1.14 02/11/2022 09:25 AM     chronic anemiacontinue EDWIN per nephrology    Severe malnutrition  -- Encourage oral intake   --On regular diet and tolerating     Generalized weakness/chronic wounds. pta  -PT, OT consult  -Wound care following     AD DNR  DVT PRx SCD  NOK    Dispo: >48hrs TBD                      I had a face to face encounter with patient on 4/7/2022 at bedside for the following physical exam:     PHYSICAL EXAM:    Visit Vitals  BP (!) 102/54 (BP 1 Location: Right arm, BP Patient Position: At rest)   Pulse 75   Temp 98.3 °F (36.8 °C)   Resp 16   Ht 5' 4\" (1.626 m)   Wt 56.1 kg (123 lb 11.2 oz)   SpO2 98%   Breastfeeding No   BMI 21.23 kg/m²     General chronic ill looking, thin built  Neck supple  CVS RRR  Respiratory symmetric expansion  Abdomen distended, soft  Extremities no edema   Psych flat affect  Neuro alert, normal speech  Skin multiple bruises                             Labs:     Recent Labs     04/07/22  0002 04/06/22 0526   WBC 11.0 9.6   HGB 9.1* 8.8*   HCT 27.6* 26.2*   PLT 39* 35*     Recent Labs     04/07/22  0002 04/07/22  0001 04/06/22 0526 04/05/22  0815 04/05/22  0815   * 129* 128*   < > 129*   K 3.5 3.2* 3.4*   < > 3.5   CL 96* 94* 96*   < > 96*   CO2 13* 18* 18*   < > 20*   * 128* 135*   < > 142*   CREA 3.48* 3.49* 3.40*   < > 3.54*   * 130* 108*   < > 115*   CA 8.2* 8.3* 8.4*   < > 8.9   PHOS 6.2*  --   --   --  7.1*    < > = values in this interval not displayed.      Recent Labs     04/07/22  0002 04/06/22 0526 04/05/22  0815   ALT  --  21  --    AP  --  107  --    TBILI  --  5.4*  --    TP  --  5.3*  --    ALB 3.4* 3.2* 3.5   GLOB  --  2.1  --        Medications Reviewed:     Current Facility-Administered Medications   Medication Dose Route Frequency    dextrose 5% 1,000 mL with sodium bicarbonate (8.4%) 150 mEq infusion   IntraVENous CONTINUOUS    sodium bicarbonate tablet 650 mg  650 mg Oral TID    magic mouthwash cpd (without sucralfate)  5 mL Oral TIDAC    0.9% sodium chloride infusion 250 mL  250 mL IntraVENous PRN    pantoprazole (PROTONIX) 40 mg in 0.9% sodium chloride 10 mL injection  40 mg IntraVENous Q12H    0.9% sodium chloride infusion 250 mL  250 mL IntraVENous PRN    sodium chloride (NS) flush 5-40 mL  5-40 mL IntraVENous Q8H    sodium chloride (NS) flush 5-40 mL  5-40 mL IntraVENous PRN    simethicone (MYLICON) 74RR/4.3NI oral drops 80 mg  1.2 mL Oral Multiple    promethazine (PHENERGAN) tablet 12.5 mg  12.5 mg Oral Q6H PRN    epoetin vic-epbx (RETACRIT) injection 10,000 Units  10,000 Units SubCUTAneous Q TUE, THU & SAT    multivitamin, tx-iron-ca-min (THERA-M w/ IRON) tablet 1 Tablet  1 Tablet Oral DAILY    miconazole (MICONTIN) 2 % ointment   Topical BID    balsam peru-castor oiL (VENELEX) ointment   Topical BID    zinc oxide-cod liver oil (DESITIN) 40 % paste   Topical PRN    sodium chloride (NS) flush 5-40 mL  5-40 mL IntraVENous Q8H    sodium chloride (NS) flush 5-40 mL  5-40 mL IntraVENous PRN    acetaminophen (TYLENOL) tablet 650 mg  650 mg Oral Q6H PRN    Or    acetaminophen (TYLENOL) suppository 650 mg  650 mg Rectal Q6H PRN    polyethylene glycol (MIRALAX) packet 17 g  17 g Oral DAILY PRN    ondansetron (ZOFRAN ODT) tablet 4 mg  4 mg Oral Q8H PRN    Or    ondansetron (ZOFRAN) injection 4 mg  4 mg IntraVENous A4F PRN    folic acid (FOLVITE) tablet 1 mg  1 mg Oral DAILY    levothyroxine (SYNTHROID) tablet 50 mcg  50 mcg Oral 6am    midodrine (PROAMATINE) tablet 15 mg  15 mg Oral TID WITH MEALS    octreotide (SANDOSTATIN) injection 100 mcg  100 mcg IntraVENous TID    rifAXIMin (XIFAXAN) tablet 550 mg  550 mg Oral BID    sertraline (ZOLOFT) tablet 100 mg  100 mg Oral DAILY    thiamine HCL (B-1) tablet 100 mg  100 mg Oral DAILY    ursodioL (ACTIGALL) tablet 500 mg (Patient Supplied)  500 mg Oral Q12H    glucagon (GLUCAGEN) injection 1 mg  1 mg IntraMUSCular PRN    naloxone (NARCAN) injection 0.1 mg  0.1 mg IntraVENous PRN ______________________________________________________________________  EXPECTED LENGTH OF STAY: 4d 16h  ACTUAL LENGTH OF STAY:          35                 Anselmo Ragland MD

## 2022-04-07 NOTE — PROGRESS NOTES
SELMA:  RUR-23%  Disposition -SNF placement  Transportation-S    Summary:  Patient admitted here on 3/5/22 from [facility] in [facility]. Prior to this hospitalization, the patient was hospitalized at [facility] 2/11/22 -3/5/22. The patient was originally admitted to [facility] with complaints of  Liver cirrhosis, AMS, Acute Hepatic Encephalopathy. The patient lived with her mother in a 2-story residence. She mainly used the first floor. The patient used a rolling walker. The patient had a significant decline in her functional status which led to her hospitalization. Primary Contact is Radha Salas (son) at mobile #[phone]. Eliana Mcnamara lives in [facility]. Ambrosio visits several times a week between 1-5pm.    Therapy Team (PT/OT), Hepatology, Nutrition, Nephrology and Wound Care following. Insurance:   [employer]    CM received call from the previous CM. [facility] (SNF) has denied referral.  This CM called [facility] and facility has denied referral.    CM will continue search in the [facility] area as this patient is from [facility]. CM continuing to follow. Lary Nissen, BSW, CRM  [phone]    4pm. JHONATAN met with patient's son Kannan Vega) and patient. CM introduced self and role. CM reviewed recent facility referrals and responses. CM explained to Ambrosio that search would need to be expanded as there had been no favorable responses. Eliana Garciaolga states patient had been approved for admission to [facility] (in [place]) twice most recently. CM will follow-up with this facility. CM recommended search to be expanded to the following localities and Eliana Mcnamara is in agreement:    [facility], [facility], [place], [address]. Plan confirmed as d/c to SNF rehab with plans for posible liver transplant at [facility] [place]. CM called [facility] [phone]  Torsten Martinez.   Facility has no bed availability at this time.  The patient was approved for admission in Feb 2022. Vivek Samuels suggested that CM resubmit referral via [facility].   CM sending referral.    Neftali Tellez, [address], [address]

## 2022-04-07 NOTE — PROGRESS NOTES
Comprehensive Nutrition Assessment    Type and Reason for Visit: Reassess    Nutrition Recommendations/Plan:       Continue soft and bite size diet as ordered, vanilla or Chocolate Ensure Enlive as ordered      Nutrition Assessment:    66 yo female admitted for cirrhosis, AMS.  PMhx: Cirrhosis with ascites s/p paracentesis every week, CKD, esophageal varices s/p banding.  Weight hx in EMR indicates 17% loss over last 5 months which is significant for time frame.  Pt slightly confused, RN states she is A&O x 2 at baseline.  Pt states she was eating well PTA and that her mom prepares her meals (unsure accuracy of this).  RD notes from recent admission to different hospital indicates very poor to fair PO intakes.  Had multiple ONS ordered. MD ordered pt to be NPO with DHT placed.  Consult received for TF order.  Spoke with RN about ordering PO diet along with TF.  Will add ONS as well and monitor intakes.  TF ordered as Jevity 1.5 at 50 ml/hr + 1 pack ProSource daily + 130 ml h2o flush q6h.   Labs: Na+ 128, Bun/Cr elevated, NH3 44         3/25: RN informed me today that pt vomited yesterday so TF was turned off. She is eating better today, > 75% breakfast, 100% Ensure compact. Son was in room encouraging her to eat lunch. Son expressed concern about pt not getting adequate nutrition if TF is turned off. If continuing to run TF 24/7 is causing vomiting, it is not beneficial.  Will try nocturnal feeds and see if this allows pt to continue eating more during the day while meeting her increased nutrition needs.     Will change TF order to Jevity 1.5 at 45 ml/hr x 1h, 70 ml x 10h, 45 ml/hr x 1h + 120 ml h20 flush TID to provide 790 ml, 1185 kcals (60% estimated kcal needs), 50 gm protein (53% protein needs), 600 + 360 = 960 ml water.     Labs: phos 7.0 - Nephrology following but no mention of this. K+ WNL today, has been low. Will recommend adding phosphate binder with meals during day. 3/29: F/u.   Rapid response called this morning after pt vomited blood. Pt pulled out NGT 3/28, RN reports multiple attempts at replacing that day with no success. Pt did not receive TF that night. Now with upper GI bleed, unsure if MD will want NGT replaced. PO intakes have declined since last assessment on 3/25 when she was eating better. Pt still confused, unable to remember how much she ate yesterday when I asked her. No intakes recorded in chart. Breakfast tray in room, untouched, had a sip of Ensure Compact only. Pt is not able to meet EEN's with PO alone. May need to consider TPN if no NGT replaced. S/P paracentesis drain placed 3/25: >5000 ml being removed daily. Labs: phos 5.8 (trending down), Mg 2.8    4/7:  Visited pt this morning. She reports she is eating \"pretty good,\" she reports drinking the Ensure Enlives, just doesn't like strawberry flavor. I continued to encourage her to drink as many of the supplements daily as she can, unsure of how well she is actually eating      Malnutrition Assessment:  Malnutrition Status:  Severe malnutrition    Context:  Chronic illness     Findings of the 6 clinical characteristics of malnutrition:   Energy Intake:  7 - 75% or less est energy requirements for 1 month or longer  Weight Loss:  7.00 - Greater than 10% over 6 months     Body Fat Loss:  7 - Severe body fat loss, Triceps   Muscle Mass Loss:  7 - Severe muscle mass loss, Thigh (quadriceps)  Fluid Accumulation:  1 - Mild, Ascites,Extremities   Strength:  Not performed       Nutritionally Significant Medications:  D5% at 60 ml/hr, epo, folic acid, synthroid, Thera-M w/iron, protonix, miralax, zoloft    Estimated Daily Nutrient Needs:  Energy (kcal): 0177-7080 (30-35 kcals/kg); Weight Used for Energy Requirements: Current  Protein (g):  (1.4-1.5 gm/kg);  Weight Used for Protein Requirements: Current  Fluid (ml/day): 1950; Method Used for Fluid Requirements: 1 ml/kcal    Nutrition Related Findings:       BM:  4/6  No data recorded    Wounds:  Deep tissue injury (DTI bilateral heels; excoriation and rash to buttock)       Current Nutrition Therapies:   Diet:  Soft and bite sized  Supplements:  Ensure Enlive TID, Magic Cup BID  Additional Caloric Sources: TF    Meal intake:   Patient Vitals for the past 168 hrs:   % Diet Eaten   04/02/22 1700 1 - 25%   04/02/22 1200 1 - 25%   04/02/22 0800 1 - 25%   04/01/22 2000 1 - 25%   04/01/22 1300 1 - 25%   04/01/22 1000 1 - 25%     Supplement Intake:   Patient Vitals for the past 168 hrs:   Supplement intake %   04/03/22 1000 26 - 50%   04/02/22 1700 76 - 100%   04/02/22 1200 26 - 50%   04/02/22 0800 76 - 100%   04/01/22 1300 76 - 100%   04/01/22 1000 76 - 100%       Anthropometric Measures:  · Height:  5' 4\" (162.6 cm)  · Current Body Wt:  65.4 kg (144 lb 2.9 oz)   · Admission Body Wt:       · Usual Body Wt:        · Ideal Body Wt:  120:  124.9 %   · Adjusted Body Weight:   ; Weight Adjustment for: No adjustment   · Adjusted BMI:       · BMI Categories:  Normal weight (BMI 18.5-24. 9)     Wt Readings from Last 10 Encounters:   04/06/22 56.1 kg (123 lb 11.2 oz)   02/17/22 68 kg (149 lb 14.6 oz)   12/13/21 69.4 kg (153 lb)   10/19/21 82 kg (180 lb 12.4 oz)   10/14/17 81.6 kg (180 lb)       Nutrition Diagnosis:   · Increased nutrient needs related to altered GI function,impaired nutrient utilization as evidenced by intake 26-50%,weight loss    · Unintended weight loss related to inadequate protein-energy intake as evidenced by weight loss greater than or equal to 10% in 6 months      Nutrition Interventions:   Food and/or Nutrient Delivery: Continue current diet,Continue oral nutrition supplement  Nutrition Education and Counseling: No recommendations at this time  Coordination of Nutrition Care: Continue to monitor while inpatient    Goals:   Tolerance of 50-75% of all meals and supplements over the next week       Nutrition Monitoring and Evaluation:   Behavioral-Environmental Outcomes: None identified  Food/Nutrient Intake Outcomes: Food and nutrient intake,Supplement intake  Physical Signs/Symptoms Outcomes: Biochemical data,GI status,Weight    Discharge Planning:     Too soon to determine     Yariel Sena RD, CSP  Contact via [employer]

## 2022-04-08 NOTE — PROGRESS NOTES
[facility]  [address]  [facility] [address]  Phone: [phone]  Fax: [phone]                  Vladimir Garcia   3/6/2017 11:00 AM   Office Visit    Description:  Male : 1979   Provider:  Valerie Akhtar MD   Department:  [facility]           Reason for Visit     Follow-up                To Do List           Future Appointments        Provider Department Dept Phone    3/6/2017 11:00 AM MD Rod Beck [facility] Bariatric Surgery [phone]    3/22/2017 10:05 AM [facility] [place] [facility] [phone]    3/29/2017 10:00 AM SHEILA Cruz,P-C [facility] - Endocrinology [phone]    2017 11:00 AM Valerie Akhtar MD [facility] Bariatric Surgery [phone]      Goals (5 Years of Data)     None       These Medications        Disp Refills Start End    topiramate (TOPAMAX) 50 MG tablet 180 tablet 0 3/6/2017 2017    Take 1-2 tablets ( mg total) by mouth every evening. - Oral    Pharmacy: [facility] [address]  #: [phone]         [facility]     [facility] Care [facility] -  Assistance  Registered nurses in the [facility] provide clinical advisement, health education, appointment booking, and other advisory services.  Call for this free service at [phone].             Medications           Message regarding Medications     Verify the changes and/or additions to your medication regime listed below are the same as discussed with your clinician today.  If any of these changes or additions are incorrect, please notify your healthcare provider.             Verify that the below list of medications is an accurate representation of the medications you are currently taking.  If none reported, the list may be blank. If incorrect, please contact your healthcare provider. Carry this list with you in case of emergency.          Problem: Falls - Risk of  Goal: *Absence of Falls  Description: Document Lorrie Sawant Fall Risk and appropriate interventions in the flowsheet. Outcome: Progressing Towards Goal  Note: Fall Risk Interventions:  Mobility Interventions: Bed/chair exit alarm    Mentation Interventions: Door open when patient unattended    Medication Interventions: Bed/chair exit alarm,Evaluate medications/consider consulting pharmacy    Elimination Interventions: Call light in reach,Patient to call for help with toileting needs    History of Falls Interventions: Bed/chair exit alarm,Door open when patient unattended,Room close to nurse's station         Problem: Patient Education: Go to Patient Education Activity  Goal: Patient/Family Education  Outcome: Progressing Towards Goal     Problem: Pressure Injury - Risk of  Goal: *Prevention of pressure injury  Description: Document Rodrigo Scale and appropriate interventions in the flowsheet.   Outcome: Progressing Towards Goal  Note: Pressure Injury Interventions:  Sensory Interventions: Assess changes in LOC    Moisture Interventions: Absorbent underpads    Activity Interventions: Pressure redistribution bed/mattress(bed type)    Mobility Interventions: Float heels,HOB 30 degrees or less,Pressure redistribution bed/mattress (bed type)    Nutrition Interventions: Document food/fluid/supplement intake,Offer support with meals,snacks and hydration    Friction and Shear Interventions: HOB 30 degrees or less                Problem: Patient Education: Go to Patient Education Activity  Goal: Patient/Family Education  Outcome: Progressing Towards Goal     Problem: Patient Education: Go to Patient Education Activity  Goal: Patient/Family Education  Outcome: Progressing Towards Goal     Problem: Patient Education: Go to Patient Education Activity  Goal: Patient/Family Education  Outcome: Progressing Towards Goal     Problem: Nutrition Deficit  Goal: *Optimize nutritional status  Outcome: Progressing   Current Medications     atorvastatin (LIPITOR) 20 MG tablet Take 1 tablet (20 mg total) by mouth once daily.    blood sugar diagnostic (BLOOD GLUCOSE TEST) Strp Test blood sugar once daily. Medically necessary. Dx code E11.65    blood-glucose meter kit Use as instructed.  Medically necessary. Dx code E11.65    divalproex (DEPAKOTE) 500 MG Tb24     escitalopram oxalate (LEXAPRO) 10 MG tablet Take 10 mg by mouth once daily.    fenofibrate micronized (LOFIBRA) 134 MG Cap Take 1 capsule (134 mg total) by mouth before breakfast.    lancets Misc 1 each by Misc.(Non-Drug; Combo Route) route 2 (two) times daily before meals. Medically necessary. Dx code E11.65    metformin (GLUCOPHAGE-XR) 500 MG 24 hr tablet Take 2 tablets (1,000 mg total) by mouth 2 (two) times daily with meals.    olanzapine (ZYPREXA) 15 MG Tab     topiramate (TOPAMAX) 50 MG tablet Take 1-2 tablets ( mg total) by mouth every evening.           Clinical Reference Information           Your Vitals Were     BP Pulse Height Weight BMI    122/70 84 6' (1.829 m) 132.2 kg (291 lb 7.2 oz) 39.53 kg/m2      Blood Pressure          Most Recent Value    BP  122/70      Allergies as of 3/6/2017     Hycodan (With Homatropin) [Hydrocodone-homatropine]    Levaquin [Levofloxacin]    Zofran [Ondansetron Hcl (Pf)]      Immunizations Administered on Date of Encounter - 3/6/2017     None      Instructions    [email or web address] for recipes.     3 meals and 1 snack a day made up of the following:  Unlimited green vegetables, tomatoes, mushrooms, spaghetti squash, cauliflower, meat, poultry, seafood, eggs and hard cheeses.   Avoid fried foods  Dressings, seasonings, condiments, etc should have less than 2 g sugars.    beans or nuts can have 1 x a day.   1-2 servings of citrus fruits, berries, pineapple or melon a day (1/2 cup)  Milk and plain yogurt    Exercise 30 min every other day this month.       Start topiramate 1 pill in the in the evening for 1 week, then can  Towards Goal increase to 2 pills if needs.     Plenty of water.     Patient was informed that topiramate is used for migraine prevention and seizures. Weight loss is a common side effect that is well documented. he understands this. he was informed of the potential side effects such as serious and possibly fatal rash in which case the medication should be discontinued immediately. Paresthesias, forgetfulness, fatigue, kidney stones, GI symptoms, and changes in lab values such as electrolytes, blood counts and kidney function.          Return in about 6 weeks         Language Assistance Services     ATTENTION: Language assistance services are available, free of charge. Please call [phone].      ATENCIÓN: Si habla williamlisa, tiene a mckay disposición servicios gratuitos de asistencia lingüística. Llame al [phone].     SAWYER Ý: N?u b?n nói Ti?ng Vi?t, có các d?ch v? h? tr? ngôn ng? mi?n phí dành cho b?n. G?i s? [phone].         [facility] complies with applicable Federal civil rights laws and does not discriminate on the basis of race, color, national origin, age, disability, or sex.

## 2022-04-08 NOTE — PROGRESS NOTES
Problem: Self Care Deficits Care Plan (Adult)  Goal: *Acute Goals and Plan of Care (Insert Text)  Description: FUNCTIONAL STATUS PRIOR TO ADMISSION: Patient modified independent with RW. Son states recently increased assistance to [address] 63 with functional mobility and sit<>stand; tangential at times during conversation, but aware and able to be redirected. HOME SUPPORT: The patient lived with mother who provided assistance as needed and son lives nearby. 1 step to enter, stays on main level, walk-in-shower, DME: Shower chair, RW, BSC, w/c    Occupational Therapy Goals  Medical Re-Evaluation s/p ICU transfer for GIB and worsening cirrhosis, goals modified below; revised 4/8/2022  1. Patient will perform at least one grooming task in supported sitting with moderate assistance within 7 day(s). 2.  Patient will perform bathing from anterior neck to thigh with maximal assistance within 7 day(s). 3.  Patient will perform upper body dressing with moderate assistance within 7 day(s). 4.  Patient will perform rolling in supine for toilet transfers with maximal assistance within 7 day(s). 6.  Patient will complete self-feeding tasks with moderate assistance within 7 day(s). MET 4/8/2022 cutting food with modified independence  5. Patient will participate in upper extremity therapeutic exercise/activities with moderate assistance for 10 minutes within 7 day(s). 7.  Patient will utilize energy conservation techniques during functional activities with verbal cues within 7 day(s). Initiated 3/8/2022, Re-assessed to increase frequency of services on 3/9/2022 due to significant improvement in patient's performance . Reviewed 3/17/2022; Weekly Re-assessment 3/24/2022    1. Patient will perform grooming with minimal assistance/contact guard assist within 7 day(s). -Continue  2. Patient will perform bathing from anterior neck to thigh with minimal assistance/contact guard assist within 7 day(s). -Continue  3.   Patient will perform upper body dressing with minimal assistance/contact guard assist within 7 day(s). -Continue  4. Patient will perform toilet transfers with moderate assistance  within 7 day(s). -Continue  5. Patient will perform all aspects of toileting with moderate assistance  within 7 day(s). -Continue  6. Patient will participate in upper extremity therapeutic exercise/activities with minimal assistance/contact guard assist for 10 minutes within 7 day(s). -Continue  7. Patient will utilize energy conservation techniques during functional activities with verbal cues within 7 day(s). -Continue    Outcome: Progressing Towards Goal   OCCUPATIONAL THERAPY TREATMENT/WEEKLY RE-ASSESSMENT  Patient: Loralie Mohs (20 y.o. female)  Date: 4/8/2022  Diagnosis: Cirrhosis ([facility] [address] [K74.60] Cirrhosis ([facility] [address]  Procedure(s) (LRB):  ESOPHAGOGASTRODUODENOSCOPY (EGD) (N/A) 10 Days Post-Op  Precautions: Fall,Skin  Chart, occupational therapy assessment, plan of care, and goals were reviewed. ASSESSMENT  Patient continues with skilled OT services and is progressing towards goals in which one goal upgraded and frequency increased to 3x/week due to increase ROM, cognition, vision (both eyes are open, scanning, right eye red), desires progression of independence and physical ability to participate. ADLs limited by cognition (one step command following, orientation, sequencing, attention to task), pain management, skin integrity, strength, ROM, functional reach, sitting dynamic balance and tolerance; therefore ability to come to standing for [facility] use; hospital course of intubated 3/29 and extubated 3/30 (1 day total). Current Level of Function Impacting Discharge (ADLs): max assistance overall upper body ADLs; total assistance lower body ADLs; bed mobility max assist overall. Other factors to consider for discharge: son         PLAN :  Goals have been updated based on progression since last assessment.  Patient continues to benefit from skilled intervention to address the above impairments. Continue to follow patient 3 times a week to address goals. Recommend with staff: bathing, grooming, feeding self    Recommend next OT session: sitting EOB ADLs    Recommendation for discharge: (in order for the patient to meet his/her long term goals)  Therapy up to 5 days/week in SNF setting    This discharge recommendation:  Has not yet been discussed the attending provider and/or case management    IF patient discharges home will need the following DME: TBD       SUBJECTIVE:   Patient stated [facility] you seen my stomach!     OBJECTIVE DATA SUMMARY:   Cognitive/Behavioral Status:  Neurologic State: Alert  Orientation Level: Oriented to person;Oriented to place; Disoriented to situation;Disoriented to time  Cognition: Follows commands             Functional Mobility and Transfers for ADLs:  Bed Mobility:  Scooting: Maximum assistance instruction bed rails, flexion LEs, trendelenburg but unable to tolerate flat 2* abdominal pain  Re-adjusting self in bed with max assistance cognition and physical A. Transfers:             Balance:       ADL Intervention:   patient and son stating opening containers and self feeding all meals. Ate 75% lunch with verbal encouragement throughout from son. Grooming  Washing Face: Set-up  Washing Hands: Set-up  Brushing Teeth: moderate assistance to reach and place emesis basin, processing, attention to task to sequence  Sitting up in bed      Patient instructed and indicated understanding the benefits of maintaining activity tolerance, functional mobility, and independence with self care tasks during acute stay  to ensure safe return home and to baseline. Encouraged patient to increase frequency and duration EOB, sitting up for all meals, perform daily ADLs (as approved by RN/MD regarding bathing etc), and performing functional mobility in bed for toileting.                              Therapeutic Exercises:   Patient instructed and demonstrated shoulder flexion (demonstrated to 90*) 0.5 lbs 5 reps 1 set with supervision. Instruction increase weight, reps, sets with water jug. Sit up in bed with moderate assistance hand placement on bed rails, trunk flexion assistance 1 rep. Instruction over weekend to exercise arms, legs, stomach sit ups and written on board as reminder. Left with therapy sponge light resistance pink and light resistance band yellow. Instruction on concern skin integrity but patient and son requesting band.      Pain:  abdomen    Activity Tolerance:   Poor  Vitals:    04/07/22 1959 04/08/22 0154 04/08/22 0856 04/08/22 1507   BP: (!) 113/53 119/60 110/62 (!) 109/58   BP 2:       BP 1 Location: Left upper arm Left upper arm Left upper arm Left upper arm   BP Patient Position: At rest At rest At rest At rest   Pulse: 80 77 75 74   Temp: 97.6 °F (36.4 °C) 98.1 °F (36.7 °C) 97.5 °F (36.4 °C) 98.4 °F (36.9 °C)   Resp: 16 16 17 18   Height:       Weight:       SpO2: 97% 99% 99% 96%         After treatment patient left in no apparent distress:   Supine in bed and Call bell within reach, son      Monica Sb  Time Calculation: 39 mins

## 2022-04-08 NOTE — PROGRESS NOTES
[facility]   [address] [facility], [facility] [address], [facility]  Phone: [phone]   [phone]       Nephrology Progress Note  Joy Hinojosa     1960     [id number]  Date of Admission : 3/5/2022  04/08/22    CC:  Follow up for greg    Assessment and Plan   GREG:  - 2/2 HRS and now w/ GI bleed  - renal function is stable  - keep steve  - cont midodrine and octreotide  - daily labs    Hyponatremia:  - 2/2 cirrhosis  - Na stable    Acidosis:  - improving  - cont oral bicarb and d/c  drip     Hypokalemia:  - repletion prn    Severe anemia:  GI bleed  - On EDWIN TTS  - transfusions per primary team      Cirrhosis, Portal HTN, PBC  - poral gastropathy  - being w/u for [facility] transplant      Hypothyroidism    Malnutrition:       Interval History:  Seen and examined. Awake, alert. Bicarb better. Stable Cr. Denies cp, sob this aM. Review of Systems: A comprehensive review of systems was negative except for that written in the HPI.     Current Medications:   Current Facility-Administered Medications   Medication Dose Route Frequency    dextrose 5% 1,000 mL with sodium bicarbonate (8.4%) 150 mEq infusion   IntraVENous CONTINUOUS    nystatin (MYCOSTATIN) 100,000 unit/gram cream   Topical TID    sodium bicarbonate tablet 650 mg  650 mg Oral TID    magic mouthwash cpd (without sucralfate)  5 mL Oral TIDAC    0.9% sodium chloride infusion 250 mL  250 mL IntraVENous PRN    pantoprazole (PROTONIX) 40 mg in 0.9% sodium chloride 10 mL injection  40 mg IntraVENous Q12H    0.9% sodium chloride infusion 250 mL  250 mL IntraVENous PRN    sodium chloride (NS) flush 5-40 mL  5-40 mL IntraVENous Q8H    sodium chloride (NS) flush 5-40 mL  5-40 mL IntraVENous PRN    simethicone (MYLICON) 73MD/9.5OF oral drops 80 mg  1.2 mL Oral Multiple    promethazine (PHENERGAN) tablet 12.5 mg  12.5 mg Oral Q6H PRN    epoetin vic-epbx (RETACRIT) injection 10,000 Units  10,000 Units SubCUTAneous Q TUE, THU & SAT  multivitamin, tx-iron-ca-min (THERA-M w/ IRON) tablet 1 Tablet  1 Tablet Oral DAILY    balsam peru-castor oiL (VENELEX) ointment   Topical BID    zinc oxide-cod liver oil (DESITIN) 40 % paste   Topical PRN    sodium chloride (NS) flush 5-40 mL  5-40 mL IntraVENous Q8H    sodium chloride (NS) flush 5-40 mL  5-40 mL IntraVENous PRN    acetaminophen (TYLENOL) tablet 650 mg  650 mg Oral Q6H PRN    Or    acetaminophen (TYLENOL) suppository 650 mg  650 mg Rectal Q6H PRN    polyethylene glycol (MIRALAX) packet 17 g  17 g Oral DAILY PRN    ondansetron (ZOFRAN ODT) tablet 4 mg  4 mg Oral Q8H PRN    Or    ondansetron (ZOFRAN) injection 4 mg  4 mg IntraVENous V1G PRN    folic acid (FOLVITE) tablet 1 mg  1 mg Oral DAILY    levothyroxine (SYNTHROID) tablet 50 mcg  50 mcg Oral 6am    midodrine (PROAMATINE) tablet 15 mg  15 mg Oral TID WITH MEALS    octreotide (SANDOSTATIN) injection 100 mcg  100 mcg IntraVENous TID    rifAXIMin (XIFAXAN) tablet 550 mg  550 mg Oral BID    sertraline (ZOLOFT) tablet 100 mg  100 mg Oral DAILY    thiamine HCL (B-1) tablet 100 mg  100 mg Oral DAILY    ursodioL (ACTIGALL) tablet 500 mg (Patient Supplied)  500 mg Oral Q12H    glucagon (GLUCAGEN) injection 1 mg  1 mg IntraMUSCular PRN    naloxone (NARCAN) injection 0.1 mg  0.1 mg IntraVENous PRN      Allergies   Allergen Reactions    Morphine Other (comments)     Severe HA.  ALL narcotics!!!       Objective:  Vitals:    Vitals:    04/07/22 0842 04/07/22 1449 04/07/22 1959 04/08/22 0154   BP: (!) 102/54 (!) 109/53 (!) 113/53 119/60   Pulse: 75 79 80 77   Resp: 16 16 16 16   Temp: 98.3 °F (36.8 °C) 97.7 °F (36.5 °C) 97.6 °F (36.4 °C) 98.1 °F (36.7 °C)   SpO2: 98% 99% 97% 99%   Weight:       Height:         Intake and Output:  04/08 0701 - 04/08 1900  In: -   Out: 350 [Urine:350]  04/06 1901 - 04/08 0700  In: -   Out: 682 [Urine:885]    Physical Examination:  General: No distress  HEENT:           Pale and icteric   Neck:  Supple, no mass  Resp:  Lungs CTA B/L  CV:  RRR,  no murmur or rub,no  LE edema  GI:  Soft, NT, + Bowel sounds, no hepatosplenomegaly  Neurologic:  AAO X 3  :  + Beckford     [x]    High complexity decision making was performed  [x]    Patient is at high-risk of decompensation with multiple organ involvement    Lab Data Personally Reviewed: I have reviewed all the pertinent labs, microbiology data and radiology studies during assessment.     Recent Labs     04/08/22  0327 04/07/22  0002 04/07/22  0001 04/06/22  0526 04/05/22  0815   * 129* 129* 128* 129*   K 2.9* 3.5 3.2* 3.4* 3.5   CL 92* 96* 94* 96* 96*   CO2 23 13* 18* 18* 20*   * 116* 130* 108* 115*   * 128* 128* 135* 142*   CREA 3.44* 3.48* 3.49* 3.40* 3.54*   CA 8.7 8.2* 8.3* 8.4* 8.9   PHOS 5.4* 6.2*  --   --  7.1*   ALB 3.2* 3.4*  --  3.2* 3.5   ALT  --   --   --  21  --      Recent Labs     04/07/22  0002 04/06/22 0526 04/05/22 0815   WBC 11.0 9.6 10.1   HGB 9.1* 8.8* 9.0*   HCT 27.6* 26.2* 26.9*   PLT 39* 35* 34*     No results found for: SDES  Lab Results   Component Value Date/Time    Culture result: NO GROWTH 4 DAYS 03/14/2022 06:48 PM    Culture result: NO GROWTH 4 DAYS 03/12/2022 06:00 PM    Culture result: CANDIDA ALBICANS (A) 03/05/2022 09:22 PM     Recent Results (from the past 24 hour(s))   RENAL FUNCTION PANEL    Collection Time: 04/08/22  3:27 AM   Result Value Ref Range    Sodium 129 (L) 136 - 145 mmol/L    Potassium 2.9 (L) 3.5 - 5.1 mmol/L    Chloride 92 (L) 97 - 108 mmol/L    CO2 23 21 - 32 mmol/L    Anion gap 14 5 - 15 mmol/L    Glucose 136 (H) 65 - 100 mg/dL     (H) 6 - 20 MG/DL    Creatinine 3.44 (H) 0.55 - 1.02 MG/DL    BUN/Creatinine ratio 37 (H) 12 - 20      GFR est AA 16 (L) >60 ml/min/1.73m2    GFR est non-AA 14 (L) >60 ml/min/1.73m2    Calcium 8.7 8.5 - 10.1 MG/DL    Phosphorus 5.4 (H) 2.6 - 4.7 MG/DL    Albumin 3.2 (L) 3.5 - 5.0 g/dL                                       Care Plan discussed with:  Patient     Family RN      Consulting Physician Nestor Joyner I have reviewed the flowsheets. Chart and Pertinent Notes have been reviewed. No change in PMH ,family and social history from Consult note.       Beronica Zayas MD

## 2022-04-08 NOTE — PROGRESS NOTES
[address] [facility] Adult  Hospitalist Group                                                                                          Hospitalist Progress Note  Anselmo Sanchez MD  Answering service: [phone] or [phone] from in house phone        Date of Service:  2022  NAME:  Jose Manuel Rojas  :  1960  MRN:  [id number]      Admission Summary:   Copied form admit: \" Per ICU: Interval history: From critical care consult on   65year-old lady with liver cirrhosis due to [facility].  She has had multiple complication in form of esophageal varices, recurrent encephalopathy, recurrent ascites and severe debility and malnutrition. Patient was admitted to the hospital on  with cirrhosis decompensation. Michael Ojeda has been in the hospital since then with multiple complication but overall was improving. Today she had large coffee ground emesis but was associated with significant drop in her hemoglobin.  Primary team consulted ICU to assist the need for ICU level care. Overnight Events:   3/30: Endoscopy showed moderate portal hypertensive gastropathy with some blood and clots in the fundus.  No varices.  Octreotide discontinued this morning, extubated this morning, receiving packed RBC transfusion this morning for hemoglobin of 6.6.  Hemodynamically remained stable        Hospitalist note:   Seen and examined patient lying in bed. States that she is ok. Flat affect   Chart reviewed. Patient to transfer out of ICU. \"    Interval history / Subjective:   Patient seen and examined, chart was reviewed. Patient comfortable in bed overall does not complain any acute issues to me. Potassium is low at 2.9. Bicarb has improved. No other acute issues reported to me by staff at this time. Assessment & Plan:        Acute blood loss anemia/GI bleed  - s/p 4 units of pRBCs   - s/p EGD on   - Hgb >7 now  -  PPI BID   - Hepatology following        Liver cirrhosis  with portal HTN.   Felt to be secondary to PBC  Transaminitis/hyperbilirubinemia/coagulopathy/thrombocytopenia  Hepatic Encephalopathy   -Transferred from [facility] with prolonged hospitalization.  Transferred for further work-up for liver transplant eval  -EGD on 2/17 [place] noted for distal esophagitis and portal hypertensive gastropathy, no evidence of varices.    -history of esophageal varices back in November 2021 status post banding at that time.    -Per GI at outlying facility, patient was deemed not to be a candidate for colonoscopy and recommended Cologuard.    -s/p MRCP on 2/18 which was noted for cirrhosis, portal hypertension, and large ascites.   -s/p cardiac stress test which was negative for ischemia, EF of 69%  -Continue lactulose, rifaximin, Protonix  -Continue ursodiol  -Paracentesis  3/25 with [id number] removed  - PT/OT following   - Consulted palliative for goals of care.      Refractory ascites  -Moderate large ascites on US 3/6.  -Ascites said to be refractory to diuretics, HRS limiting use of diuretics  -Ascitic drain that was placed on 3/12 was apparently clogged, flushed and drained significant amount of ascites  -Ascitic fluid lab negative for SBP     GREG with metabolic acidosis. persisent  -Baseline 0.9 on 11/26/2021   -Likely due to [facility]  -Nephrology following, recommendations appreciated. - off bicarb drip on oral bicarb    Hypokalemia severe 2.9replete K monitor BMP in a.m. Hyponatremia  -Suspect secondary to underlying liver cirrhosis.   -Na stable 129      Recent E. coli UTI  -Treated with Vanco and Zosyn then transitioned to Rocephin, completed 7-day antibiotic course as of 2/17  -Replace Steve with new catheter 3/5 .  Urine culture on 3/5 grew Candida albicans 75,000 CFU.   - nephrology desires that steve stay in for now      Hypothyroid  -Continue levothyroxine  Lab Results   Component Value Date/Time    TSH 1.58 04/04/2022 01:56 AM    Triiodothyronine (T3), free 2.4 01/25/2022 09:50 AM    T4, Free 1.2 01/25/2022 09:50 AM    Free T4 1.14 02/11/2022 09:25 AM     chronic anemiacontinue EDWIN per nephrology    Severe malnutrition  -- Encourage oral intake   --On regular diet and tolerating     Generalized weakness/chronic wounds. pta  -PT, OT consult  -Wound care following     AD DNR  DVT PRx SCD  NOK son [phone] called 4/8/22  Updated clinical status.     Dispo: >48hrs  SNF                      I had a face to face encounter with patient on 4/8/2022 at bedside for the following physical exam:     PHYSICAL EXAM:    Visit Vitals  /60 (BP 1 Location: Left upper arm, BP Patient Position: At rest)   Pulse 77   Temp 98.1 °F (36.7 °C)   Resp 16   Ht 5' 4\" (1.626 m)   Wt 56.1 kg (123 lb 11.2 oz)   SpO2 99%   Breastfeeding No   BMI 21.23 kg/m²     General chronic ill looking, thin built  Neck supple  CVS RRR  Respiratory symmetric expansion  Abdomen distended, soft  Extremities no edema   Psych flat affect  Neuro alert, normal speech  Skin multiple bruises                             Labs:     Recent Labs     04/07/22  0002 04/06/22  0526   WBC 11.0 9.6   HGB 9.1* 8.8*   HCT 27.6* 26.2*   PLT 39* 35*     Recent Labs     04/08/22  0327 04/07/22  0002 04/07/22  0001   * 129* 129*   K 2.9* 3.5 3.2*   CL 92* 96* 94*   CO2 23 13* 18*   * 128* 128*   CREA 3.44* 3.48* 3.49*   * 116* 130*   CA 8.7 8.2* 8.3*   PHOS 5.4* 6.2*  --      Recent Labs     04/08/22 0327 04/07/22  0002 04/06/22  0526   ALT  --   --  21   AP  --   --  107   TBILI  --   --  5.4*   TP  --   --  5.3*   ALB 3.2* 3.4* 3.2*   GLOB  --   --  2.1       Medications Reviewed:     Current Facility-Administered Medications   Medication Dose Route Frequency    potassium chloride SR (KLOR-CON 10) tablet 40 mEq  40 mEq Oral Q6H    nystatin (MYCOSTATIN) 100,000 unit/gram cream   Topical TID    sodium bicarbonate tablet 650 mg  650 mg Oral TID    magic mouthwash cpd (without sucralfate)  5 mL Oral TIDAC    0.9% sodium chloride infusion 250 mL  250 mL IntraVENous PRN    pantoprazole (PROTONIX) 40 mg in 0.9% sodium chloride 10 mL injection  40 mg IntraVENous Q12H    0.9% sodium chloride infusion 250 mL  250 mL IntraVENous PRN    sodium chloride (NS) flush 5-40 mL  5-40 mL IntraVENous Q8H    sodium chloride (NS) flush 5-40 mL  5-40 mL IntraVENous PRN    simethicone (MYLICON) 76WH/4.1HL oral drops 80 mg  1.2 mL Oral Multiple    promethazine (PHENERGAN) tablet 12.5 mg  12.5 mg Oral Q6H PRN    epoetin vic-epbx (RETACRIT) injection 10,000 Units  10,000 Units SubCUTAneous Q TUE, THU & SAT    multivitamin, tx-iron-ca-min (THERA-M w/ IRON) tablet 1 Tablet  1 Tablet Oral DAILY    balsam peru-castor oiL (VENELEX) ointment   Topical BID    zinc oxide-cod liver oil (DESITIN) 40 % paste   Topical PRN    sodium chloride (NS) flush 5-40 mL  5-40 mL IntraVENous Q8H    sodium chloride (NS) flush 5-40 mL  5-40 mL IntraVENous PRN    acetaminophen (TYLENOL) tablet 650 mg  650 mg Oral Q6H PRN    Or    acetaminophen (TYLENOL) suppository 650 mg  650 mg Rectal Q6H PRN    polyethylene glycol (MIRALAX) packet 17 g  17 g Oral DAILY PRN    ondansetron (ZOFRAN ODT) tablet 4 mg  4 mg Oral Q8H PRN    Or    ondansetron (ZOFRAN) injection 4 mg  4 mg IntraVENous Y7M PRN    folic acid (FOLVITE) tablet 1 mg  1 mg Oral DAILY    levothyroxine (SYNTHROID) tablet 50 mcg  50 mcg Oral 6am    midodrine (PROAMATINE) tablet 15 mg  15 mg Oral TID WITH MEALS    octreotide (SANDOSTATIN) injection 100 mcg  100 mcg IntraVENous TID    rifAXIMin (XIFAXAN) tablet 550 mg  550 mg Oral BID    sertraline (ZOLOFT) tablet 100 mg  100 mg Oral DAILY    thiamine HCL (B-1) tablet 100 mg  100 mg Oral DAILY    ursodioL (ACTIGALL) tablet 500 mg (Patient Supplied)  500 mg Oral Q12H    glucagon (GLUCAGEN) injection 1 mg  1 mg IntraMUSCular PRN    naloxone (NARCAN) injection 0.1 mg  0.1 mg IntraVENous PRN     ______________________________________________________________________  EXPECTED LENGTH OF STAY: 4d 16h  ACTUAL LENGTH OF STAY:          29                 Anselmo Khanna MD

## 2022-04-08 NOTE — PROGRESS NOTES
SELMA:  RUR-23%  Disposition -SNF placement  Transportation-S     Summary:  Patient admitted here on 3/5/22 from [facility] in [place]. Prior to this hospitalization, the patient was hospitalized at [facility] 2/11/22 -3/5/22. The patient was originally admitted to [facility] with complaints of  Liver cirrhosis, AMS, Acute Hepatic Encephalopathy.       The patient lived with her mother in a 2-story residence. She mainly used the first floor. The patient used a rolling walker. The patient had a significant decline in her functional status which led to her hospitalization.     Primary Contact is Kimberly Wu (son) at mobile #[phone]. Harjeet Torres lives in [facility]. Ambrosio visits several times a week between 1-5pm.     Therapy Team (PT/OT), Hepatology, Nutrition, Nephrology and Wound Care following.     Insurance:  [employer]       Patient reviewed. JUANA for possibly Monday as today patient has low potassium. Referral to [facility] was approved earlier. Per CM's call yesterday, facility had no beds available. CM following up with facility today as  updated referral sent via CC link on yesterday.

## 2022-04-08 NOTE — PROGRESS NOTES
Kimberley Peñaloza MD, [place], Cite Carmine Rico, ISAAC Michaels, [facility]     April S John, [facility]   [facility] Dykes [facility]    Ivy Mata, [facility]       Namita Montemayor [facility] [address]    at [place] [address] [address] [facility], [address] [facility], [facility] 22.    [phone]    FAX: [address] [place] [address] Avenue    [address], [address], [address]    [phone]    FAX: [phone]       HEPATOLOGY PROGRESS NOTE  The patient is well known to me from a previous visit to my office at [facility] in [place]. She is a 64 yo  female who was found to have chronic liver disease in 2020 and cirrhosis in 7/2021 when she developed ascites. She had variceal bleeding in 11/2021. Serologic evaluation for markers of chronic liver disease was positive for AMA. Cirrhosis is due to [facility].     The patient has developed the following complications of cirrhosis: esophageal varices, variceal bleeding, ascites, edema, hepatic encephalopathy, severe muslce wasting    I saw the patient for the first time in 1/2022. She had CTP score 9 and MELD score 21 at that time. We started liver transplant evaluation testing. She developed confusion and could not be aroused and was hospitalized 3 weeks ago at [facility] in 2/202. During that hospitalization she developed worsening malnutrition, worsening of muscle wasting and a few decubitus ulcers. She has been at [facility] since 3/5. She has been receiving Dobbhoff tube feedings and OT/PT.   She has had dramatic improvement in nutritional status, skin wounds have nearly healed, mental status is clear, she is stronger, can move herself around in bed, she can sit in chair for an hour at a time, can feed herself and has been able to stand with PT assistance. Family conference on 3/27/22. I had a 1 hour meeting with the patient and her son who is POA. Next step is to transfer to rehab for for 4+ weeks to improve ambulation, muscle mass so she could possibly be a LT candidate  In order to do this will need to remove feeding tube. In order to do this she will need to be able to eat sufficient calories to maintain her nutritional status  If she does well with in rehab we may be able to get LT eval testing and eventually on LT list.    Hospital course:  She had severe malnutrition, frailty, muscle weakness upon transfer from [facility]  She recieved tube feedings and had aggressive PT/OT for 3 weeks and started to improve. Then had several episodes of hematemesis and spent some time in ICU. GD showed no varices. Only severe portal gastropathy     Feeding tube is out. She is alert and eating on her own. Awaiting placement to rehab center close to son's home in [facility]. Her mood remains good. Hepatology Plan:   to find rehab place in [facility] area close to son. I can see her as OP in my [facility] office  Liver function is stable      ASSESSMENT AND PLAN:  Cirrhosis  The diagnosis of cirrhosis is based upon imaging, laboratory studies, complications of cirrhosis. Cirrhosis is secondary to [facility]. Not alcohol as she was initially told.     Serologic testing was positive for AMA, ASMA     The CTP is 10. Child class C. The MELD score is 32.     Based upon the MELD and CTP scores the patient has a mortality of about 50% within the next 90 days until we turn her fraily and weakness around. Right now is is too weak to survive liver transplantation.   She needs aggressive nutritional support and both she and her son are in favor of this with the hopes her situation will improve and she could survive liver transplantation.       Primary Biliary Cholangitis  The diagnosis is based upon serology and an elevation in ALP and positive AMA. A liver biopsy has not been performed. PBC is being treated with ANTONIETA 500 mg BID. CKD-HRS  Scr has now stabilized in the 3.0-3.5 mg range. Continue midodrine,     Malnutrition  The patient has severe protein-calorie malnutrtion and muscle wasting. This is due to advanced liver disease and refractory ascites. The patient needs as many calories as she can possibly consume   Without aggressive nutritional support she will not survive. She is alert, oriented and understands the need to consume as many Ensure as possible. There are no plans to replace Dobhoff feeding tube at this time. Frailty/muscle wasting  The patient is very frail and working with OPT/PT. She has made great progress but lost some after her trip to ICU. This will be address by rehab. Skin wounds  Still has several pressure wounds on back, buttocks that appear to be healing but are not healed. Ulcers have to be healed for her to be an LT candidate.     Ascites   Ascites developed for the first time in 7/2021. Being managed by periodic paracentesis. No diuretics can be given with CKD and Scr 3-3.5 mg    Hyponatremia  This is secondary cirrhosis and diuretics  Sna has declined from 134 back down to 129 today. No diuretics.       Screening for Esophageal varices   The patient has esophageal varices with prior bleeding. Varices were banded in 11/2021 and 12/2021. The last EGD to assess for varices was performed in 3/2022. No esophageal varices were present.     Hepatic encephalopathy   Overt HE is well controlled on lactulose and xifaxan. She has been alert and oriented througtout the hospitalization. Can probably stop lactulose and avoid diarrhea and continue on xifaxan alone. Coagulopathy  INR is in the 1.6 range and stable. Thrombocytopenia   This is secondary to cirrhosis. There is no evidence of overt bleeding. No treatment is required.   The platelet count is adequate for the patient to undergo procedures without the need for platelet transfusion or platelet growth factors.     Anemia   This is due to multifactorial causes including portal hypertension with chronic GI blood loss, acute GI blood loss 1 week ago, and bone marrow suppression due to severe malnutrition.   HB now stable in the 9 gm range. Thrombocytopenia   This is secondary to cirrhosis. There is no evidence of overt bleeding. No treatment is required. The platelet count is adequate for the patient to undergo procedures without the need for platelet transfusion or platelet growth factors. PHYSICAL EXAMINATION:  VS: per nursing note  General:  Looks much better. Skin color normal.  Eyes:  Sclera non-icteric. ENT:  No oral lesions. Thyroid normal.  Nodes:  No adenopathy. Skin:  Spider angiomata. Multiple diffuse ecchymosis on arms. Respiratory:  Lungs clear to auscultation. Cardiovascular:  Regular heart rate. Abdomen:  Soft non-tender, NO obvious ascites. Extremities:  No lower extremity edema. Severe muscle wasting of upper and lower extremities. Neurologic:  Alert and oriented. Cranial nerves grossly intact. No asterixis. LABORATORY:  Results for Andrew Hi (MRN [id number]) as of 4/5/2022 17:31   Ref.  Range 4/3/2022 02:53 4/4/2022 01:56 4/5/2022 08:15   WBC Latest Ref Range: 3.6 - 11.0 K/uL 10.3 10.4 10.1   HGB Latest Ref Range: 11.5 - 16.0 g/dL 8.1 (L) 9.1 (L) 9.0 (L)   PLATELET Latest Ref Range: 150 - 400 K/uL 36 (LL) 32 (LL) 34 (LL)   Sodium Latest Ref Range: 136 - 145 mmol/L 133 (L) 130 (L) 129 (L)   Potassium Latest Ref Range: 3.5 - 5.1 mmol/L 3.4 (L) 3.2 (L) 3.5   Chloride Latest Ref Range: 97 - 108 mmol/L 103 97 96 (L)   CO2 Latest Ref Range: 21 - 32 mmol/L 18 (L) 18 (L) 20 (L)   Glucose Latest Ref Range: 65 - 100 mg/dL 116 (H) 121 (H) 115 (H)   BUN Latest Ref Range: 6 - 20 MG/ (H) 139 (H) 142 (H)   Creatinine Latest Ref Range: 0.55 - 1.02 MG/DL 3.04 (H) 3.31 (H) 3.54 (H)   Bilirubin, total Latest Ref Range: 0.2 - 1.0 MG/DL 4.4 (H)     Albumin Latest Ref Range: 3.5 - 5.0 g/dL 3.0 (L) 3.5 3.5   ALT Latest Ref Range: 12 - 78 U/L 20     AST Latest Ref Range: 15 - 37 U/L 42 (H)     Alk. phosphatase Latest Ref Range: 45 - 117 U/L 94     Ammonia, plasma Latest Ref Range: <32 UMOL/L 43 (H) 46 (H) 43 (H)     RADIOLOGY:  Ultrasound of liver. Echogenic consistent with cirrhosis. No liver mass lesions. No dilated bile ducts. Moderate ascites.         Jaylon Reed MD  [facility] [address], [address] [place] [address].  [phone]  [address] [place] [address]

## 2022-04-09 NOTE — PROGRESS NOTES
Renal function overall stable  Cont present care  Daily labs over the weekend  Call with any issues        Glory Fontenot MD  [facility]   [address] [address]  Phone - [phone]   Fax - [phone]  www. [facility]

## 2022-04-09 NOTE — PROGRESS NOTES
[address] [facility], MD, [address], [facility], [place]      ISAAC Meadows Staff, [facility]     Michelle Lopez, [facility]   Fernanda Barajas, MANISHA Oates, [facility]       Namita Montemayor [address]    at [address], [address] Katlyn Pizano  22.    [phone]    FAX: [address] [place] [address] [address], [address]    [phone]    FAX: [phone]       HEPATOLOGY PROGRESS NOTE  The patient is well known to me from a previous visit to my office at [facility] in [place]. She is a 64 yo  female who was found to have chronic liver disease in 2020 and cirrhosis in 7/2021 when she developed ascites. She had variceal bleeding in 11/2021. Serologic evaluation for markers of chronic liver disease was positive for AMA. Cirrhosis is due to [facility].     The patient has developed the following complications of cirrhosis: esophageal varices, variceal bleeding, ascites, edema, hepatic encephalopathy, severe muslce wasting    I saw the patient for the first time in 1/2022. She had CTP score 9 and MELD score 21 at that time. We started liver transplant evaluation testing. She developed confusion and could not be aroused and was hospitalized 3 weeks ago at [facility] in 2/202. During that hospitalization she developed worsening malnutrition, worsening of muscle wasting and a few decubitus ulcers. She has been at [facility] [place] since 3/5. She has been receiving Dobbhoff tube feedings and OT/PT.   She has had dramatic improvement in nutritional status, skin wounds have nearly healed, mental status is clear, she is stronger, can move herself around in bed, she can sit in chair for an hour at a time, can feed herself and has been able to stand with PT assistance. Family conference on 3/27/22. I had a 1 hour meeting with the patient and her son who is POA. Next step is to transfer to rehab for for 4+ weeks to improve ambulation, muscle mass so she could possibly be a LT candidate  In order to do this will need to remove feeding tube. In order to do this she will need to be able to eat sufficient calories to maintain her nutritional status  If she does well with in rehab we may be able to get LT eval testing and eventually on LT list.    Hospital course:  She had severe malnutrition, frailty, muscle weakness upon transfer from [facility]  She recieved tube feedings and had aggressive PT/OT for 3 weeks and started to improve. Then had several episodes of hematemesis and spent some time in ICU. GD showed no varices. Only severe portal gastropathy     Feeding tube is out. She is alert and eating on her own. Awaiting placement to rehab center close to son's home in [place]. Discussed placement options with son today. Her mood remains good and she seems to be eating on her own and making progress with OT/PT. Alma Conde Hepatology Plan:   to find rehab place in [place] area close to son. I can see her as OP in my [place] office  Liver function is stable      ASSESSMENT AND PLAN:  Cirrhosis  The diagnosis of cirrhosis is based upon imaging, laboratory studies, complications of cirrhosis. Cirrhosis is secondary to [facility]. Not alcohol as she was initially told.     Serologic testing was positive for AMA, ASMA     The CTP is 10. Child class C. The MELD score is 32.     Based upon the MELD and CTP scores the patient has a mortality of about 50% within the next 90 days until we turn her fraily and weakness around. Right now is is too weak to survive liver transplantation.   She needs aggressive nutritional support and both she and her son are in favor of this with the hopes her situation will improve and she could survive liver transplantation.       Primary Biliary Cholangitis  The diagnosis is based upon serology and an elevation in ALP and positive AMA. A liver biopsy has not been performed. PBC is being treated with ANTONIETA 500 mg BID. CKD-HRS  Scr has now stabilized in the 3.0-3.5 mg range. Continue midodrine,     Malnutrition  The patient has severe protein-calorie malnutrtion and muscle wasting. This is due to advanced liver disease and refractory ascites. The patient needs as many calories as she can possibly consume   Without aggressive nutritional support she will not survive. She is alert, oriented and understands the need to consume as many Ensure as possible. There are no plans to replace Dobhoff feeding tube at this time. Frailty/muscle wasting  The patient is very frail and working with OPT/PT. She has made great progress but lost some after her trip to ICU. This will be address by rehab. Skin wounds  Still has several pressure wounds on back, buttocks that appear to be healing but are not healed. Ulcers have to be healed for her to be an LT candidate.     Ascites   Ascites developed for the first time in 7/2021. Being managed by periodic paracentesis. No diuretics can be given with CKD and Scr 3-3.5 mg    Hyponatremia  This is secondary cirrhosis and diuretics  Sna has declined from 134 back down to 129 today. No diuretics.       Screening for Esophageal varices   The patient has esophageal varices with prior bleeding. Varices were banded in 11/2021 and 12/2021. The last EGD to assess for varices was performed in 3/2022. No esophageal varices were present.     Hepatic encephalopathy   Overt HE is well controlled on lactulose and xifaxan. She has been alert and oriented througtout the hospitalization. Can probably stop lactulose and avoid diarrhea and continue on xifaxan alone. Coagulopathy  INR is in the 1.6 range and stable.     Thrombocytopenia   This is secondary to cirrhosis. There is no evidence of overt bleeding. No treatment is required. The platelet count is adequate for the patient to undergo procedures without the need for platelet transfusion or platelet growth factors.     Anemia   This is due to multifactorial causes including portal hypertension with chronic GI blood loss, acute GI blood loss 1 week ago, and bone marrow suppression due to severe malnutrition.   HB now stable in the 9 gm range. Thrombocytopenia   This is secondary to cirrhosis. There is no evidence of overt bleeding. No treatment is required. The platelet count is adequate for the patient to undergo procedures without the need for platelet transfusion or platelet growth factors. PHYSICAL EXAMINATION:  VS: per nursing note  General:  Looks much better. Skin color normal.  Eyes:  Sclera non-icteric. ENT:  No oral lesions. Thyroid normal.  Nodes:  No adenopathy. Skin:  Spider angiomata. Multiple diffuse ecchymosis on arms. Respiratory:  Lungs clear to auscultation. Cardiovascular:  Regular heart rate. Abdomen:  Soft non-tender, NO obvious ascites. Extremities:  No lower extremity edema. Severe muscle wasting of upper and lower extremities. Neurologic:  Alert and oriented. Cranial nerves grossly intact. No asterixis. LABORATORY:  Results for Joel Jason (MRN [id number]) as of 4/9/2022 15:36   Ref.  Range 4/6/2022 05:26 4/7/2022 00:02 4/8/2022 03:27 4/9/2022 01:03   WBC Latest Ref Range: 3.6 - 11.0 K/uL 9.6 11.0     HGB Latest Ref Range: 11.5 - 16.0 g/dL 8.8 (L) 9.1 (L)     PLATELET Latest Ref Range: 150 - 400 K/uL 35 (LL) 39 (LL)     Sodium Latest Ref Range: 136 - 145 mmol/L 128 (L) 129 (L) 129 (L) 128 (L)   Potassium Latest Ref Range: 3.5 - 5.1 mmol/L 3.4 (L) 3.5 2.9 (L) 3.8   Chloride Latest Ref Range: 97 - 108 mmol/L 96 (L) 96 (L) 92 (L) 92 (L)   CO2 Latest Ref Range: 21 - 32 mmol/L 18 (L) 13 (LL) 23 24   Glucose Latest Ref Range: 65 - 100 mg/dL 108 (H) 116 (H) 136 (H) 101 (H)   BUN Latest Ref Range: 6 - 20 MG/ (H) 128 (H) 128 (H) 121 (H)   Creatinine Latest Ref Range: 0.55 - 1.02 MG/DL 3.40 (H) 3.48 (H) 3.44 (H) 3.62 (H)   Bilirubin, total Latest Ref Range: 0.2 - 1.0 MG/DL 5.4 (H)      Albumin Latest Ref Range: 3.5 - 5.0 g/dL 3.2 (L) 3.4 (L) 3.2 (L) 3.1 (L)   ALT Latest Ref Range: 12 - 78 U/L 21      AST Latest Ref Range: 15 - 37 U/L 33      Alk. phosphatase Latest Ref Range: 45 - 117 U/L 107        RADIOLOGY:  Ultrasound of liver. Echogenic consistent with cirrhosis. No liver mass lesions. No dilated bile ducts. Moderate ascites.         Mc Farias MD  [facility] 13 of [address], [address] [place] [address].  [phone]  [address] [place] [address]

## 2022-04-09 NOTE — PROGRESS NOTES
Problem: Falls - Risk of  Goal: *Absence of Falls  Description: Document Gigi Avalos Fall Risk and appropriate interventions in the flowsheet. Outcome: Progressing Towards Goal  Note: Fall Risk Interventions:  Mobility Interventions: Communicate number of staff needed for ambulation/transfer    Mentation Interventions: Door open when patient unattended,Adequate sleep, hydration, pain control    Medication Interventions: Bed/chair exit alarm,Evaluate medications/consider consulting pharmacy    Elimination Interventions: Call light in reach,Patient to call for help with toileting needs    History of Falls Interventions: Door open when patient unattended,Bed/chair exit alarm,Room close to nurse's station         Problem: Patient Education: Go to Patient Education Activity  Goal: Patient/Family Education  Outcome: Progressing Towards Goal     Problem: Pressure Injury - Risk of  Goal: *Prevention of pressure injury  Description: Document Rodrigo Scale and appropriate interventions in the flowsheet.   Outcome: Progressing Towards Goal  Note: Pressure Injury Interventions:  Sensory Interventions: Assess changes in LOC    Moisture Interventions: Absorbent underpads,Apply protective barrier, creams and emollients    Activity Interventions: Increase time out of bed,Pressure redistribution bed/mattress(bed type)    Mobility Interventions: Float heels,HOB 30 degrees or less,Pressure redistribution bed/mattress (bed type)    Nutrition Interventions: Document food/fluid/supplement intake,Offer support with meals,snacks and hydration    Friction and Shear Interventions: HOB 30 degrees or less,Lift sheet,Minimize layers,Lift team/patient mobility team                Problem: Patient Education: Go to Patient Education Activity  Goal: Patient/Family Education  Outcome: Progressing Towards Goal     Problem: Patient Education: Go to Patient Education Activity  Goal: Patient/Family Education  Outcome: Progressing Towards Goal     Problem: Patient Education: Go to Patient Education Activity  Goal: Patient/Family Education  Outcome: Progressing Towards Goal     Problem: Nutrition Deficit  Goal: *Optimize nutritional status  Outcome: Progressing Towards Goal

## 2022-04-09 NOTE — PROGRESS NOTES
[facility]                                                                                          Hospitalist Progress Note  Anselmo Shankar MD  Answering service: [phone] OR [phone] from in house phone        Date of Service:  2022  NAME:  Fe Fuller  :  1960  MRN:  [id number]      Admission Summary:   Copied form admit: \" Per ICU: Interval history: From critical care consult on   65year-old lady with liver cirrhosis due to [facility].  She has had multiple complication in form of esophageal varices, recurrent encephalopathy, recurrent ascites and severe debility and malnutrition. Patient was admitted to the hospital on  with cirrhosis decompensation. Carla Holly has been in the hospital since then with multiple complication but overall was improving. Today she had large coffee ground emesis but was associated with significant drop in her hemoglobin.  Primary team consulted ICU to assist the need for ICU level care. Overnight Events:   3/30: Endoscopy showed moderate portal hypertensive gastropathy with some blood and clots in the fundus.  No varices.  Octreotide discontinued this morning, extubated this morning, receiving packed RBC transfusion this morning for hemoglobin of 6.6.  Hemodynamically remained stable        Hospitalist note:   Seen and examined patient lying in bed. States that she is ok. Flat affect   Chart reviewed. Patient to transfer out of ICU. \"    Interval history / Subjective:   Patient seen and examined, chart was reviewed. Patient denies any acute complaints to me at this time. However her abdomen appears slightly distended today. Assessment & Plan:        Acute blood loss anemia/GI bleed  - s/p 4 units of pRBCs   - s/p EGD on   - Hgb >8 now  -  PPI BID   - Hepatology following        Liver cirrhosis  with portal HTN.   Felt to be secondary to PBC  Transaminitis/hyperbilirubinemia/coagulopathy/thrombocytopenia  Hepatic Encephalopathy   -Transferred from [address] [place] [address] with prolonged hospitalization.  Transferred for further work-up for liver transplant eval  -EGD on 2/17 [place] noted for distal esophagitis and portal hypertensive gastropathy, no evidence of varices.    -history of esophageal varices back in November 2021 status post banding at that time.    -Per GI at outlying facility, patient was deemed not to be a candidate for colonoscopy and recommended Cologuard.    -s/p MRCP on 2/18 which was noted for cirrhosis, portal hypertension, and large ascites.   -s/p cardiac stress test which was negative for ischemia, EF of 69%  -Continue lactulose, rifaximin, Protonix  -Continue ursodiol  -Paracentesis  3/25 with [id number] removed  - PT/OT following   - Consulted palliative for goals of care.      Refractory ascites  -Moderate large ascites on US 3/6.  -Ascites said to be refractory to diuretics, HRS limiting use of diuretics  -Ascitic drain that was placed on 3/12 was apparently clogged, flushed and drained significant amount of ascites  -Ascitic fluid lab negative for SBP  - may need another paracentesis before discharge     GREG with metabolic acidosis. persisent  -Baseline 0.9 on 11/26/2021   -Likely due to [facility]  -Nephrology following, recommendations appreciated. - off bicarb drip on oral bicarb    Hypokalemia severe 2.9repleted K monitor BMP periodically    Hyponatremia  -Suspect secondary to underlying liver cirrhosis.   -Na stable 128   -Monitor periodically     Recent E. coli UTI  -Treated with Vanco and Zosyn then transitioned to Rocephin, completed 7-day antibiotic course as of 2/17  -Replace Steve with new catheter 3/5 .  Urine culture on 3/5 grew Candida albicans 75,000 CFU.   - nephrology desires that steve stay in for now      Hypothyroid  -Continue levothyroxine  Lab Results   Component Value Date/Time    TSH 1.58 04/04/2022 01:56 AM Triiodothyronine (T3), free 2.4 01/25/2022 09:50 AM    T4, Free 1.2 01/25/2022 09:50 AM    Free T4 1.14 02/11/2022 09:25 AM     chronic anemiacontinue EDWIN per nephrology    Severe malnutrition  -- Encourage oral intake   --On regular diet and tolerating     Generalized weakness/chronic wounds. pta  -PT, OT consult  -Wound care following     AD DNR  DVT PRx SCD  NOK son [phone] called 4/8/22  Updated clinical status.     Dispo: SNF placement pending                      I had a face to face encounter with patient on 4/9/2022 at bedside for the following physical exam:     PHYSICAL EXAM:    Visit Vitals  /69 (BP 1 Location: Right arm, BP Patient Position: At rest)   Pulse 78   Temp 97.4 °F (36.3 °C)   Resp 18   Ht 5' 4\" (1.626 m)   Wt 56.1 kg (123 lb 11.2 oz)   SpO2 98%   Breastfeeding No   BMI 21.23 kg/m²     General chronic ill looking, thin built  Neck supple  CVS RRR  Respiratory symmetric expansion  Abdomen distended, soft  Extremities no edema   Psych flat affect  Neuro alert, normal speech  Skin multiple bruises                             Labs:     Recent Labs     04/07/22  0002   WBC 11.0   HGB 9.1*   HCT 27.6*   PLT 39*     Recent Labs     04/09/22  0103 04/08/22  0327 04/07/22  0002   * 129* 129*   K 3.8 2.9* 3.5   CL 92* 92* 96*   CO2 24 23 13*   * 128* 128*   CREA 3.62* 3.44* 3.48*   * 136* 116*   CA 8.5 8.7 8.2*   PHOS 5.7* 5.4* 6.2*     Recent Labs     04/09/22  0103 04/08/22  0327 04/07/22  0002   ALB 3.1* 3.2* 3.4*       Medications Reviewed:     Current Facility-Administered Medications   Medication Dose Route Frequency    nystatin (MYCOSTATIN) 100,000 unit/gram cream   Topical TID    sodium bicarbonate tablet 650 mg  650 mg Oral TID    magic mouthwash cpd (without sucralfate)  5 mL Oral TIDAC    0.9% sodium chloride infusion 250 mL  250 mL IntraVENous PRN    pantoprazole (PROTONIX) 40 mg in 0.9% sodium chloride 10 mL injection  40 mg IntraVENous Q12H    0.9% sodium chloride infusion 250 mL  250 mL IntraVENous PRN    sodium chloride (NS) flush 5-40 mL  5-40 mL IntraVENous Q8H    sodium chloride (NS) flush 5-40 mL  5-40 mL IntraVENous PRN    simethicone (MYLICON) 35DW/9.2WO oral drops 80 mg  1.2 mL Oral Multiple    promethazine (PHENERGAN) tablet 12.5 mg  12.5 mg Oral Q6H PRN    epoetin vic-epbx (RETACRIT) injection 10,000 Units  10,000 Units SubCUTAneous Q TUE, THU & SAT    multivitamin, tx-iron-ca-min (THERA-M w/ IRON) tablet 1 Tablet  1 Tablet Oral DAILY    balsam peru-castor oiL (VENELEX) ointment   Topical BID    zinc oxide-cod liver oil (DESITIN) 40 % paste   Topical PRN    sodium chloride (NS) flush 5-40 mL  5-40 mL IntraVENous Q8H    sodium chloride (NS) flush 5-40 mL  5-40 mL IntraVENous PRN    acetaminophen (TYLENOL) tablet 650 mg  650 mg Oral Q6H PRN    Or    acetaminophen (TYLENOL) suppository 650 mg  650 mg Rectal Q6H PRN    polyethylene glycol (MIRALAX) packet 17 g  17 g Oral DAILY PRN    ondansetron (ZOFRAN ODT) tablet 4 mg  4 mg Oral Q8H PRN    Or    ondansetron (ZOFRAN) injection 4 mg  4 mg IntraVENous Y8K PRN    folic acid (FOLVITE) tablet 1 mg  1 mg Oral DAILY    levothyroxine (SYNTHROID) tablet 50 mcg  50 mcg Oral 6am    midodrine (PROAMATINE) tablet 15 mg  15 mg Oral TID WITH MEALS    octreotide (SANDOSTATIN) injection 100 mcg  100 mcg IntraVENous TID    rifAXIMin (XIFAXAN) tablet 550 mg  550 mg Oral BID    sertraline (ZOLOFT) tablet 100 mg  100 mg Oral DAILY    thiamine HCL (B-1) tablet 100 mg  100 mg Oral DAILY    ursodioL (ACTIGALL) tablet 500 mg (Patient Supplied)  500 mg Oral Q12H    glucagon (GLUCAGEN) injection 1 mg  1 mg IntraMUSCular PRN    naloxone (NARCAN) injection 0.1 mg  0.1 mg IntraVENous PRN     ______________________________________________________________________  EXPECTED LENGTH OF STAY: 4d 16h  ACTUAL LENGTH OF STAY:          35                 Anselmo Zapata MD

## 2022-04-10 NOTE — PROGRESS NOTES
[facility]   [address] [facility], [address], [facility]  Phone: [phone]   [phone]       Nephrology Progress Note  Ozzie Dunlap     1960     [id number]  Date of Admission : 3/5/2022  04/10/22    CC:  Follow up for greg    Assessment and Plan   GREG:  - 2/2 HRS and now w/ GI bleed  - Cr up some today  - 1 L IVF today  - daily labs    Hyponatremia:  - 2/2 cirrhosis  - Na stable    Acidosis:  - improving     Hypokalemia:  - repletion prn    Severe anemia:  GI bleed  - On EDWIN TTS  - transfusions per primary team      Cirrhosis, Portal HTN, PBC  - poral gastropathy  - being w/u for [facility] transplant      Hypothyroidism    Malnutrition:       Interval History:  Seen and examined. Awake, alert. Cr up some today. Denies cp, sob this aM. Review of Systems: A comprehensive review of systems was negative except for that written in the HPI.     Current Medications:   Current Facility-Administered Medications   Medication Dose Route Frequency    nystatin (MYCOSTATIN) 100,000 unit/gram cream   Topical TID    sodium bicarbonate tablet 650 mg  650 mg Oral TID    magic mouthwash cpd (without sucralfate)  5 mL Oral TIDAC    0.9% sodium chloride infusion 250 mL  250 mL IntraVENous PRN    pantoprazole (PROTONIX) 40 mg in 0.9% sodium chloride 10 mL injection  40 mg IntraVENous Q12H    0.9% sodium chloride infusion 250 mL  250 mL IntraVENous PRN    sodium chloride (NS) flush 5-40 mL  5-40 mL IntraVENous Q8H    sodium chloride (NS) flush 5-40 mL  5-40 mL IntraVENous PRN    simethicone (MYLICON) 28KB/6.3BT oral drops 80 mg  1.2 mL Oral Multiple    promethazine (PHENERGAN) tablet 12.5 mg  12.5 mg Oral Q6H PRN    epoetin vic-epbx (RETACRIT) injection 10,000 Units  10,000 Units SubCUTAneous Q TUE, THU & SAT    multivitamin, tx-iron-ca-min (THERA-M w/ IRON) tablet 1 Tablet  1 Tablet Oral DAILY    balsam peru-castor oiL (VENELEX) ointment   Topical BID    zinc oxide-cod liver oil (DESITIN) 40 % paste   Topical PRN    sodium chloride (NS) flush 5-40 mL  5-40 mL IntraVENous Q8H    sodium chloride (NS) flush 5-40 mL  5-40 mL IntraVENous PRN    acetaminophen (TYLENOL) tablet 650 mg  650 mg Oral Q6H PRN    Or    acetaminophen (TYLENOL) suppository 650 mg  650 mg Rectal Q6H PRN    polyethylene glycol (MIRALAX) packet 17 g  17 g Oral DAILY PRN    ondansetron (ZOFRAN ODT) tablet 4 mg  4 mg Oral Q8H PRN    Or    ondansetron (ZOFRAN) injection 4 mg  4 mg IntraVENous W4M PRN    folic acid (FOLVITE) tablet 1 mg  1 mg Oral DAILY    levothyroxine (SYNTHROID) tablet 50 mcg  50 mcg Oral 6am    midodrine (PROAMATINE) tablet 15 mg  15 mg Oral TID WITH MEALS    octreotide (SANDOSTATIN) injection 100 mcg  100 mcg IntraVENous TID    rifAXIMin (XIFAXAN) tablet 550 mg  550 mg Oral BID    sertraline (ZOLOFT) tablet 100 mg  100 mg Oral DAILY    thiamine HCL (B-1) tablet 100 mg  100 mg Oral DAILY    ursodioL (ACTIGALL) tablet 500 mg (Patient Supplied)  500 mg Oral Q12H    glucagon (GLUCAGEN) injection 1 mg  1 mg IntraMUSCular PRN    naloxone (NARCAN) injection 0.1 mg  0.1 mg IntraVENous PRN      Allergies   Allergen Reactions    Morphine Other (comments)     Severe HA. ALL narcotics!!!       Objective:  Vitals:    Vitals:    04/09/22 0835 04/09/22 1600 04/09/22 1943 04/10/22 0303   BP: 110/69 108/60 113/72 (!) 104/52   Pulse: 78 79 87 86   Resp: 18 18 18 18   Temp: 97.4 °F (36.3 °C) 97.4 °F (36.3 °C) 98.1 °F (36.7 °C) 98.8 °F (37.1 °C)   SpO2: 98% 97% 97% 98%   Weight:       Height:         Intake and Output:  No intake/output data recorded.   04/08 1901 - 04/10 0700  In: -   Out: 650 [Urine:650]    Physical Examination:  General: No distress  HEENT:           Pale and icteric   Neck:  Supple, no mass  Resp:  Lungs CTA B/L  CV:  RRR,  no murmur or rub,no  LE edema  GI:  Soft, NT, + Bowel sounds, no hepatosplenomegaly  Neurologic:  AAO X 3  :  + [place]     [x]    High complexity decision making was performed  [x]    Patient is at high-risk of decompensation with multiple organ involvement    Lab Data Personally Reviewed: I have reviewed all the pertinent labs, microbiology data and radiology studies during assessment. Recent Labs     04/10/22  0215 04/09/22  0103 04/08/22  0327   * 128* 129*   K 3.8 3.8 2.9*   CL 90* 92* 92*   CO2 24 24 23   * 101* 136*   * 121* 128*   CREA 3.95* 3.62* 3.44*   CA 8.6 8.5 8.7   PHOS 6.2* 5.7* 5.4*   ALB 3.3* 3.1* 3.2*     No results for input(s): WBC, HGB, HCT, PLT, HGBEXT, HCTEXT, PLTEXT, HGBEXT, HCTEXT, PLTEXT in the last 72 hours. No results found for: SDES  Lab Results   Component Value Date/Time    Culture result: NO GROWTH 4 DAYS 03/14/2022 06:48 PM    Culture result: NO GROWTH 4 DAYS 03/12/2022 06:00 PM    Culture result: CANDIDA ALBICANS (A) 03/05/2022 09:22 PM     Recent Results (from the past 24 hour(s))   RENAL FUNCTION PANEL    Collection Time: 04/10/22  2:15 AM   Result Value Ref Range    Sodium 129 (L) 136 - 145 mmol/L    Potassium 3.8 3.5 - 5.1 mmol/L    Chloride 90 (L) 97 - 108 mmol/L    CO2 24 21 - 32 mmol/L    Anion gap 15 5 - 15 mmol/L    Glucose 129 (H) 65 - 100 mg/dL     (H) 6 - 20 MG/DL    Creatinine 3.95 (H) 0.55 - 1.02 MG/DL    BUN/Creatinine ratio 30 (H) 12 - 20      GFR est AA 14 (L) >60 ml/min/1.73m2    GFR est non-AA 12 (L) >60 ml/min/1.73m2    Calcium 8.6 8.5 - 10.1 MG/DL    Phosphorus 6.2 (H) 2.6 - 4.7 MG/DL    Albumin 3.3 (L) 3.5 - 5.0 g/dL                                       Care Plan discussed with:  Patient     Family      RN      Consulting Physician /Specialist        I have reviewed the flowsheets. Chart and Pertinent Notes have been reviewed. No change in PMH ,family and social history from Consult note.       Robin Quintana MD

## 2022-04-10 NOTE — PROGRESS NOTES
Problem: Falls - Risk of  Goal: *Absence of Falls  Description: Document Cindia Neigh Fall Risk and appropriate interventions in the flowsheet. Outcome: Progressing Towards Goal  Note: Fall Risk Interventions:  Mobility Interventions: Communicate number of staff needed for ambulation/transfer    Mentation Interventions: Door open when patient unattended,Adequate sleep, hydration, pain control,Familiar objects from home,Reorient patient,Room close to nurse's station    Medication Interventions: Evaluate medications/consider consulting pharmacy    Elimination Interventions: Call light in reach,Toileting schedule/hourly rounds    History of Falls Interventions: Door open when patient unattended         Problem: Patient Education: Go to Patient Education Activity  Goal: Patient/Family Education  Outcome: Progressing Towards Goal     Problem: Pressure Injury - Risk of  Goal: *Prevention of pressure injury  Description: Document Rodrigo Scale and appropriate interventions in the flowsheet. Outcome: Progressing Towards Goal  Note: Pressure Injury Interventions:  Sensory Interventions: Assess need for specialty bed,Float heels,Keep linens dry and wrinkle-free,Maintain/enhance activity level,Minimize linen layers,Monitor skin under medical devices,Pressure redistribution bed/mattress (bed type),Turn and reposition approx. every two hours (pillows and wedges if needed)    Moisture Interventions: Apply protective barrier, creams and emollients,Check for incontinence Q2 hours and as needed,Maintain skin hydration (lotion/cream),Minimize layers,Moisture barrier    Activity Interventions: Pressure redistribution bed/mattress(bed type)    Mobility Interventions: Float heels,Assess need for specialty bed,Pressure redistribution bed/mattress (bed type),Turn and reposition approx.  every two hours(pillow and wedges)    Nutrition Interventions: Offer support with meals,snacks and hydration,Discuss nutritional consult with provider    Friction and Shear Interventions: Apply protective barrier, creams and emollients,Foam dressings/transparent film/skin sealants,Lift sheet,HOB 30 degrees or less,Minimize layers                Problem: Patient Education: Go to Patient Education Activity  Goal: Patient/Family Education  Outcome: Progressing Towards Goal     Problem: Patient Education: Go to Patient Education Activity  Goal: Patient/Family Education  Outcome: Progressing Towards Goal     Problem: Patient Education: Go to Patient Education Activity  Goal: Patient/Family Education  Outcome: Progressing Towards Goal     Problem: Nutrition Deficit  Goal: *Optimize nutritional status  Outcome: Progressing Towards Goal

## 2022-04-10 NOTE — PROGRESS NOTES
[address] [facility]  Hospitalist Group                                                                                          Hospitalist Progress Note  Anselmo Fuentes MD  Answering service: [phone] from in house phone        Date of Service:  4/10/2022  NAME:  Cata Boland  :  1960  MRN:  [id number]      Admission Summary:   Copied form admit: \" Per ICU: Interval history: From critical care consult on   65year-old lady with liver cirrhosis due to [facility].  She has had multiple complication in form of esophageal varices, recurrent encephalopathy, recurrent ascites and severe debility and malnutrition. Patient was admitted to the hospital on  with cirrhosis decompensation. Negrita Mark has been in the hospital since then with multiple complication but overall was improving. Today she had large coffee ground emesis but was associated with significant drop in her hemoglobin.  Primary team consulted ICU to assist the need for ICU level care. Overnight Events:   3/30: Endoscopy showed moderate portal hypertensive gastropathy with some blood and clots in the fundus.  No varices.  Octreotide discontinued this morning, extubated this morning, receiving packed RBC transfusion this morning for hemoglobin of 6.6.  Hemodynamically remained stable        Hospitalist note:   Seen and examined patient lying in bed. States that she is ok. Flat affect   Chart reviewed. Patient to transfer out of ICU. \"    Interval history / Subjective:   Patient seen and examined, chart was reviewed. Nephrology started IV fluids as creatinine was slightly elevated. Patient comfortable in bed no acute issues reported to me by patient or staff at this time. Assessment & Plan:        Acute blood loss anemia/GI bleed  - s/p 4 units of pRBCs   - s/p EGD on   - Hgb >8 now  -  PPI BID   - Hepatology following  peripherally       Liver cirrhosis  with portal HTN.   Felt to be secondary to PBC  Transaminitis/hyperbilirubinemia/coagulopathy/thrombocytopenia  Hepatic Encephalopathy   -Transferred from [address] [place] [address] with prolonged hospitalization.  Transferred for further work-up for liver transplant eval  -EGD on 2/17 [place] noted for distal esophagitis and portal hypertensive gastropathy, no evidence of varices.    -history of esophageal varices back in November 2021 status post banding at that time.    -Per GI at outlying facility, patient was deemed not to be a candidate for colonoscopy and recommended Cologuard.    -s/p MRCP on 2/18 which was noted for cirrhosis, portal hypertension, and large ascites.   -s/p cardiac stress test which was negative for ischemia, EF of 69%  -Continue lactulose, rifaximin, Protonix  -Continue ursodiol  -Paracentesis  3/25 with 30253 removed  - PT/OT following   - Consulted palliative for goals of care.      Refractory ascites  -Moderate large ascites on US 3/6.  -Ascites said to be refractory to diuretics, HRS limiting use of diuretics  -Ascitic drain that was placed on 3/12 was apparently clogged, flushed and drained significant amount of ascites  -Ascitic fluid lab negative for SBP  * may need another paracentesis before discharge     GREG with metabolic acidosis. persisent  -Baseline 0.9 on 11/26/2021   -Likely due to [facility]  -Nephrology following, recommendations appreciated. - off bicarb drip on oral bicarb  *IV fluids as per nephrology today    Hypokalemia severe 2.9repleted K monitor BMP periodically    Hyponatremia  -Suspect secondary to underlying liver cirrhosis.   -Na stable 129   -Monitor periodically     Recent E. coli UTI  -Treated with Vanco and Zosyn then transitioned to Rocephin, completed 7-day antibiotic course as of 2/17  -Replace Steve with new catheter 3/5 .  Urine culture on 3/5 grew Candida albicans 75,000 CFU.   - nephrology desires that steve stay in for now      Hypothyroid  -Continue levothyroxine  Lab Results   Component Value Date/Time TSH 1.58 04/04/2022 01:56 AM    Triiodothyronine (T3), free 2.4 01/25/2022 09:50 AM    T4, Free 1.2 01/25/2022 09:50 AM    Free T4 1.14 02/11/2022 09:25 AM     chronic anemiacontinue EDWIN per nephrology    Severe malnutrition  -- Encourage oral intake   --On regular diet and tolerating     Generalized weakness/chronic wounds. pta  -PT, OT consult  -Wound care following     AD DNR  DVT PRx SCD  NOK son [phone] called 4/8/22  Updated clinical status. Dispo: SNF placement pending once medically stable                      I had a face to face encounter with patient on 4/10/2022 at bedside for the following physical exam:     PHYSICAL EXAM:    Visit Vitals  BP (!) 115/56 (BP 1 Location: Right upper arm, BP Patient Position: At rest)   Pulse 78   Temp 97.5 °F (36.4 °C)   Resp 18   Ht 5' 4\" (1.626 m)   Wt 56.1 kg (123 lb 11.2 oz)   SpO2 97%   Breastfeeding No   BMI 21.23 kg/m²     General chronic ill looking, thin built  Neck supple  CVS RRR  Respiratory symmetric expansion  Abdomen distended, soft  Extremities no edema   Psych flat affect  Neuro alert, normal speech  Skin multiple bruises                             Labs:     No results for input(s): WBC, HGB, HCT, PLT, HGBEXT, HCTEXT, PLTEXT, HGBEXT, HCTEXT, PLTEXT in the last 72 hours.   Recent Labs     04/10/22  0215 04/09/22  0103 04/08/22  0327   * 128* 129*   K 3.8 3.8 2.9*   CL 90* 92* 92*   CO2 24 24 23   * 121* 128*   CREA 3.95* 3.62* 3.44*   * 101* 136*   CA 8.6 8.5 8.7   PHOS 6.2* 5.7* 5.4*     Recent Labs     04/10/22  0215 04/09/22  0103 04/08/22  0327   ALB 3.3* 3.1* 3.2*       Medications Reviewed:     Current Facility-Administered Medications   Medication Dose Route Frequency    0.9% sodium chloride infusion  100 mL/hr IntraVENous CONTINUOUS    nystatin (MYCOSTATIN) 100,000 unit/gram cream   Topical TID    sodium bicarbonate tablet 650 mg  650 mg Oral TID    magic mouthwash cpd (without sucralfate)  5 mL Oral TIDAC    0.9% sodium chloride infusion 250 mL  250 mL IntraVENous PRN    pantoprazole (PROTONIX) 40 mg in 0.9% sodium chloride 10 mL injection  40 mg IntraVENous Q12H    0.9% sodium chloride infusion 250 mL  250 mL IntraVENous PRN    sodium chloride (NS) flush 5-40 mL  5-40 mL IntraVENous Q8H    sodium chloride (NS) flush 5-40 mL  5-40 mL IntraVENous PRN    simethicone (MYLICON) 59TH/0.7FG oral drops 80 mg  1.2 mL Oral Multiple    promethazine (PHENERGAN) tablet 12.5 mg  12.5 mg Oral Q6H PRN    epoetin vic-epbx (RETACRIT) injection 10,000 Units  10,000 Units SubCUTAneous Q TUE, THU & SAT    multivitamin, tx-iron-ca-min (THERA-M w/ IRON) tablet 1 Tablet  1 Tablet Oral DAILY    balsam peru-castor oiL (VENELEX) ointment   Topical BID    zinc oxide-cod liver oil (DESITIN) 40 % paste   Topical PRN    sodium chloride (NS) flush 5-40 mL  5-40 mL IntraVENous Q8H    sodium chloride (NS) flush 5-40 mL  5-40 mL IntraVENous PRN    acetaminophen (TYLENOL) tablet 650 mg  650 mg Oral Q6H PRN    Or    acetaminophen (TYLENOL) suppository 650 mg  650 mg Rectal Q6H PRN    polyethylene glycol (MIRALAX) packet 17 g  17 g Oral DAILY PRN    ondansetron (ZOFRAN ODT) tablet 4 mg  4 mg Oral Q8H PRN    Or    ondansetron (ZOFRAN) injection 4 mg  4 mg IntraVENous U6B PRN    folic acid (FOLVITE) tablet 1 mg  1 mg Oral DAILY    levothyroxine (SYNTHROID) tablet 50 mcg  50 mcg Oral 6am    midodrine (PROAMATINE) tablet 15 mg  15 mg Oral TID WITH MEALS    octreotide (SANDOSTATIN) injection 100 mcg  100 mcg IntraVENous TID    rifAXIMin (XIFAXAN) tablet 550 mg  550 mg Oral BID    sertraline (ZOLOFT) tablet 100 mg  100 mg Oral DAILY    thiamine HCL (B-1) tablet 100 mg  100 mg Oral DAILY    ursodioL (ACTIGALL) tablet 500 mg (Patient Supplied)  500 mg Oral Q12H    glucagon (GLUCAGEN) injection 1 mg  1 mg IntraMUSCular PRN    naloxone (NARCAN) injection 0.1 mg  0.1 mg IntraVENous PRN ______________________________________________________________________  EXPECTED LENGTH OF STAY: 4d 16h  ACTUAL LENGTH OF STAY:          39                 Anselmo Zapata MD

## 2022-04-11 NOTE — PROGRESS NOTES
Physical Therapy  Nursing reports patient platelet level very low (45) and requests not to mobilize out of bed. Upon arrival patient reports feeling very fatigued and cold. States assisting nursing with bed mobility and doing LE exercises. Will hold at this time and follow up tomorrow.   Blossom Lion, PT

## 2022-04-11 NOTE — PROGRESS NOTES
[facility] [facility] Adult  Hospitalist Group                                                                                          Hospitalist Progress Note  Ansemlo Aguirre MD  Answering service: [phone] OR [phone] from in house phone        Date of Service:  2022  NAME:  Ilene Daly  :  1960  MRN:  [id number]      Admission Summary:   Copied form admit: \" Per ICU: Interval history: From critical care consult on   65year-old lady with liver cirrhosis due to [facility].  She has had multiple complication in form of esophageal varices, recurrent encephalopathy, recurrent ascites and severe debility and malnutrition. Patient was admitted to the hospital on  with cirrhosis decompensation. Viviana Gay has been in the hospital since then with multiple complication but overall was improving. Today she had large coffee ground emesis but was associated with significant drop in her hemoglobin.  Primary team consulted ICU to assist the need for ICU level care. Overnight Events:   3/30: Endoscopy showed moderate portal hypertensive gastropathy with some blood and clots in the fundus.  No varices.  Octreotide discontinued this morning, extubated this morning, receiving packed RBC transfusion this morning for hemoglobin of 6.6.  Hemodynamically remained stable        Hospitalist note:   Seen and examined patient lying in bed. States that she is ok. Flat affect   Chart reviewed. Patient to transfer out of ICU. \"    Interval history / Subjective:   Patient seen and examined, chart was reviewed. Patient  creatinine still remains elevated. She has no acute complaints to me at this time. Per staff patient abdomen is more distended compared to last week. Assessment & Plan:        Acute blood loss anemia/GI bleed  - s/p 4 units of PRBCs   - s/p EGD on   - Hgb >8 now  - PPI BID   - Hepatology following  peripherally       Liver cirrhosis  with portal HTN.   Felt to be secondary to PBC  Transaminitis/hyperbilirubinemia/coagulopathy/thrombocytopenia  Hepatic Encephalopathy   -Transferred from [address] [place] [address] with prolonged hospitalization.  Transferred for further work-up for liver transplant eval  -EGD on 2/17 [place] noted for distal esophagitis and portal hypertensive gastropathy, no evidence of varices.    -history of esophageal varices back in November 2021 status post banding at that time.    -Per GI at outlying facility, patient was deemed not to be a candidate for colonoscopy and recommended Cologuard.    -s/p MRCP on 2/18 which was noted for cirrhosis, portal hypertension, and large ascites.   -s/p cardiac stress test which was negative for ischemia, EF of 69%  -Continue lactulose, rifaximin, Protonix  -Continue ursodiol  -Paracentesis  3/25 with [id number] removed  - PT/OT following   - Consulted palliative for goals of care.      Refractory ascites  -Moderate large ascites on US 3/6.  -Ascites said to be refractory to diuretics, HRS limiting use of diuretics  -Ascitic drain that was placed on 3/12 was apparently clogged, flushed and drained significant amount of ascites  -Ascitic fluid lab negative for SBP  * may need another paracentesis before discharge will ask radiology to evaluate today 4/11/22     GREG with metabolic acidosis. persisent  -Baseline 0.9 on 11/26/2021   -Likely due to [facility]  -Nephrology following, recommendations appreciated. - off bicarb drip on oral bicarb  *IV fluids as per nephrology today    Hypokalemia severe 2.9repleted K monitor BMP periodically    Hyponatremia  -Suspect secondary to underlying liver cirrhosis.   -Na stable 127   -Monitor periodically     Recent E. coli UTI  -Treated with Vanco and Zosyn then transitioned to Rocephin, completed 7-day antibiotic course as of 2/17  -Replace Steve with new catheter 3/5 .  Urine culture on 3/5 grew Candida albicans 75,000 CFU.   - nephrology desires that steve stay in for now      Hypothyroid  -Continue levothyroxine  Lab Results   Component Value Date/Time    TSH 1.58 04/04/2022 01:56 AM    Triiodothyronine (T3), free 2.4 01/25/2022 09:50 AM    T4, Free 1.2 01/25/2022 09:50 AM    Free T4 1.14 02/11/2022 09:25 AM     chronic anemiacontinue EDWIN per nephrology    Severe malnutrition  --Encourage oral intake   --On regular diet and tolerating     Generalized weakness/chronic wounds. pta  -PT, OT consult  -Wound care following     AD DNR  DVT PRx SCD  NOK son [phone] called 4/8/22  Updated clinical status.     Dispo: SNF placement pending once medically stable                      I had a face to face encounter with patient on 4/11/2022 at bedside for the following physical exam:     PHYSICAL EXAM:    Visit Vitals  /64 (BP 1 Location: Left arm, BP Patient Position: At rest)   Pulse 80   Temp 97.6 °F (36.4 °C)   Resp 17   Ht 5' 4\" (1.626 m)   Wt 56.1 kg (123 lb 11.2 oz)   SpO2 96%   Breastfeeding No   BMI 21.23 kg/m²     General chronic ill looking, thin built  Neck supple  CVS RRR  Respiratory symmetric expansion  Abdomen distended, soft  Extremities no edema   Psych flat affect  Neuro alert, normal speech  Skin multiple bruises                             Labs:     Recent Labs     04/11/22  0020   WBC 8.1   HGB 8.9*   HCT 28.4*   PLT 40*     Recent Labs     04/11/22  0020 04/10/22  0215 04/09/22  0103   * 129* 128*   K 4.1 3.8 3.8   CL 91* 90* 92*   CO2 22 24 24   * 120* 121*   CREA 3.93* 3.95* 3.62*   * 129* 101*   CA 8.4* 8.6 8.5   MG 2.6*  --   --    PHOS 5.8* 6.2* 5.7*     Recent Labs     04/11/22  0020 04/10/22  0215 04/09/22  0103   ALB 3.0* 3.3* 3.1*       Medications Reviewed:     Current Facility-Administered Medications   Medication Dose Route Frequency    nystatin (MYCOSTATIN) 100,000 unit/gram cream   Topical TID    sodium bicarbonate tablet 650 mg  650 mg Oral TID    magic mouthwash cpd (without sucralfate)  5 mL Oral TIDAC    0.9% sodium chloride infusion 250 mL  250 mL IntraVENous PRN    pantoprazole (PROTONIX) 40 mg in 0.9% sodium chloride 10 mL injection  40 mg IntraVENous Q12H    sodium chloride (NS) flush 5-40 mL  5-40 mL IntraVENous Q8H    sodium chloride (NS) flush 5-40 mL  5-40 mL IntraVENous PRN    simethicone (MYLICON) 11JC/1.7OH oral drops 80 mg  1.2 mL Oral Multiple    promethazine (PHENERGAN) tablet 12.5 mg  12.5 mg Oral Q6H PRN    epoetin vic-epbx (RETACRIT) injection 10,000 Units  10,000 Units SubCUTAneous Q TUE, THU & SAT    multivitamin, tx-iron-ca-min (THERA-M w/ IRON) tablet 1 Tablet  1 Tablet Oral DAILY    balsam peru-castor oiL (VENELEX) ointment   Topical BID    zinc oxide-cod liver oil (DESITIN) 40 % paste   Topical PRN    sodium chloride (NS) flush 5-40 mL  5-40 mL IntraVENous Q8H    sodium chloride (NS) flush 5-40 mL  5-40 mL IntraVENous PRN    acetaminophen (TYLENOL) tablet 650 mg  650 mg Oral Q6H PRN    Or    acetaminophen (TYLENOL) suppository 650 mg  650 mg Rectal Q6H PRN    polyethylene glycol (MIRALAX) packet 17 g  17 g Oral DAILY PRN    ondansetron (ZOFRAN ODT) tablet 4 mg  4 mg Oral Q8H PRN    Or    ondansetron (ZOFRAN) injection 4 mg  4 mg IntraVENous M2O PRN    folic acid (FOLVITE) tablet 1 mg  1 mg Oral DAILY    levothyroxine (SYNTHROID) tablet 50 mcg  50 mcg Oral 6am    midodrine (PROAMATINE) tablet 15 mg  15 mg Oral TID WITH MEALS    octreotide (SANDOSTATIN) injection 100 mcg  100 mcg IntraVENous TID    rifAXIMin (XIFAXAN) tablet 550 mg  550 mg Oral BID    sertraline (ZOLOFT) tablet 100 mg  100 mg Oral DAILY    thiamine HCL (B-1) tablet 100 mg  100 mg Oral DAILY    ursodioL (ACTIGALL) tablet 500 mg (Patient Supplied)  500 mg Oral Q12H    glucagon (GLUCAGEN) injection 1 mg  1 mg IntraMUSCular PRN    naloxone (NARCAN) injection 0.1 mg  0.1 mg IntraVENous PRN     ______________________________________________________________________  EXPECTED LENGTH OF STAY: 4d 16h  ACTUAL LENGTH OF STAY:          40                 Anselmo Montana Nelson MD

## 2022-04-11 NOTE — PROGRESS NOTES
Occupational Therapy:    Chart reviewed and attempted to see for OT session, however per RN, patient with low platelet levels and advising to hold OT at this time. Will continue to follow up and attempt as able.      Wolf Hatfield, OT

## 2022-04-11 NOTE — PROGRESS NOTES
SELMA:  RUR-23%  Disposition -SNF placement  Transportation-BLS     Updated referral sent to [facility] 4/8/22. Follow-up call placed to  [phone]. Facility does not have bed availability today. Admissions anticipates bed availability on Wednesday 4/13/22. CM checking other facilities for admission consideration. Summary:  Patient admitted here on 3/5/22 from [facility] ([address] to this hospitalization, the patient was hospitalized at [facility] 2/11/22 -3/5/22. The patient was originally admitted to [facility] with complaints of  Liver cirrhosis, AMS, Acute Hepatic Encephalopathy.       The patient lived with her mother in a 2-story residence. [facility] mainly used the first floor. The patient used a rolling walker. The patient had a significant decline in her functional status which led to her hospitalization.     Primary Contact is Precious Villatoro (son) Naomie Quinteros #[phone]. Rolando Coombs lives in [facility].  Ambrosio visits several times a week between 1-5pm.     Therapy Team (PT/OT), Hepatology, Nutrition, Nephrology and Wound Care following.     Insurance: Maya Dia Out of Network:  Jaki Edge

## 2022-04-11 NOTE — PROGRESS NOTES
[facility]   [address] [address], [facility], [facility]  Phone: [phone]   [phone]       Nephrology Progress Note  Jose Manuel Noss     1960     [id number]  Date of Admission : 3/5/2022  04/11/22    CC:  Follow up for greg    Assessment and Plan   GREG:  - 2/2 HRS and now w/ GI bleed  - Cr stable  - cont octreotide and midodrine    Hyponatremia:  - 2/2 cirrhosis  - Na stable  - cont FR    Acidosis:  - improving     Hypokalemia:  - repletion prn    Severe anemia:  GI bleed  - On EDWIN TTS  - transfusions per primary team      Cirrhosis, Portal HTN, PBC  - poral gastropathy  - being w/u for [facility] transplant      Hypothyroidism    Malnutrition:       Interval History:  Seen and examined. Awake, alert. Cr up some today. Denies cp, sob this aM. Review of Systems: A comprehensive review of systems was negative except for that written in the HPI.     Current Medications:   Current Facility-Administered Medications   Medication Dose Route Frequency    nystatin (MYCOSTATIN) 100,000 unit/gram cream   Topical TID    sodium bicarbonate tablet 650 mg  650 mg Oral TID    magic mouthwash cpd (without sucralfate)  5 mL Oral TIDAC    0.9% sodium chloride infusion 250 mL  250 mL IntraVENous PRN    pantoprazole (PROTONIX) 40 mg in 0.9% sodium chloride 10 mL injection  40 mg IntraVENous Q12H    sodium chloride (NS) flush 5-40 mL  5-40 mL IntraVENous Q8H    sodium chloride (NS) flush 5-40 mL  5-40 mL IntraVENous PRN    simethicone (MYLICON) 18BP/7.7OS oral drops 80 mg  1.2 mL Oral Multiple    promethazine (PHENERGAN) tablet 12.5 mg  12.5 mg Oral Q6H PRN    epoetin vic-epbx (RETACRIT) injection 10,000 Units  10,000 Units SubCUTAneous Q TUE, THU & SAT    multivitamin, tx-iron-ca-min (THERA-M w/ IRON) tablet 1 Tablet  1 Tablet Oral DAILY    balsam peru-castor oiL (VENELEX) ointment   Topical BID    zinc oxide-cod liver oil (DESITIN) 40 % paste   Topical PRN    sodium chloride (NS) flush 5-40 mL  5-40 mL IntraVENous Q8H    sodium chloride (NS) flush 5-40 mL  5-40 mL IntraVENous PRN    acetaminophen (TYLENOL) tablet 650 mg  650 mg Oral Q6H PRN    Or    acetaminophen (TYLENOL) suppository 650 mg  650 mg Rectal Q6H PRN    polyethylene glycol (MIRALAX) packet 17 g  17 g Oral DAILY PRN    ondansetron (ZOFRAN ODT) tablet 4 mg  4 mg Oral Q8H PRN    Or    ondansetron (ZOFRAN) injection 4 mg  4 mg IntraVENous I4C PRN    folic acid (FOLVITE) tablet 1 mg  1 mg Oral DAILY    levothyroxine (SYNTHROID) tablet 50 mcg  50 mcg Oral 6am    midodrine (PROAMATINE) tablet 15 mg  15 mg Oral TID WITH MEALS    octreotide (SANDOSTATIN) injection 100 mcg  100 mcg IntraVENous TID    rifAXIMin (XIFAXAN) tablet 550 mg  550 mg Oral BID    sertraline (ZOLOFT) tablet 100 mg  100 mg Oral DAILY    thiamine HCL (B-1) tablet 100 mg  100 mg Oral DAILY    ursodioL (ACTIGALL) tablet 500 mg (Patient Supplied)  500 mg Oral Q12H    glucagon (GLUCAGEN) injection 1 mg  1 mg IntraMUSCular PRN    naloxone (NARCAN) injection 0.1 mg  0.1 mg IntraVENous PRN      Allergies   Allergen Reactions    Morphine Other (comments)     Severe HA. ALL narcotics!!!       Objective:  Vitals:    Vitals:    04/10/22 1424 04/10/22 1931 04/11/22 0359 04/11/22 0838   BP: (!) 111/58 118/69 118/69 120/64   Pulse: 83 81 81 80   Resp: 18 18 17 17   Temp: 98.4 °F (36.9 °C) 98.3 °F (36.8 °C) 98.3 °F (36.8 °C) 97.6 °F (36.4 °C)   SpO2: 97% 96% 96% 96%   Weight:       Height:         Intake and Output:  No intake/output data recorded.   04/09 1901 - 04/11 0700  In: -   Out: 800 [Urine:800]    Physical Examination:  General: No distress  HEENT:           Pale and icteric   Neck:  Supple, no mass  Resp:  Lungs CTA B/L  CV:  RRR,  no murmur or rub,no  LE edema  GI:  Soft, NT, + Bowel sounds, no hepatosplenomegaly  Neurologic:  AAO X 3  :  + Beckford     [x]    High complexity decision making was performed  [x]    Patient is at high-risk of decompensation with multiple organ involvement    Lab Data Personally Reviewed: I have reviewed all the pertinent labs, microbiology data and radiology studies during assessment.     Recent Labs     04/11/22  0020 04/10/22  0215 04/09/22  0103   * 129* 128*   K 4.1 3.8 3.8   CL 91* 90* 92*   CO2 22 24 24   * 129* 101*   * 120* 121*   CREA 3.93* 3.95* 3.62*   CA 8.4* 8.6 8.5   MG 2.6*  --   --    PHOS 5.8* 6.2* 5.7*   ALB 3.0* 3.3* 3.1*     Recent Labs     04/11/22  0020   WBC 8.1   HGB 8.9*   HCT 28.4*   PLT 40*     No results found for: SDES  Lab Results   Component Value Date/Time    Culture result: NO GROWTH 4 DAYS 03/14/2022 06:48 PM    Culture result: NO GROWTH 4 DAYS 03/12/2022 06:00 PM    Culture result: CANDIDA ALBICANS (A) 03/05/2022 09:22 PM     Recent Results (from the past 24 hour(s))   RENAL FUNCTION PANEL    Collection Time: 04/11/22 12:20 AM   Result Value Ref Range    Sodium 127 (L) 136 - 145 mmol/L    Potassium 4.1 3.5 - 5.1 mmol/L    Chloride 91 (L) 97 - 108 mmol/L    CO2 22 21 - 32 mmol/L    Anion gap 14 5 - 15 mmol/L    Glucose 142 (H) 65 - 100 mg/dL     (H) 6 - 20 MG/DL    Creatinine 3.93 (H) 0.55 - 1.02 MG/DL    BUN/Creatinine ratio 30 (H) 12 - 20      GFR est AA 14 (L) >60 ml/min/1.73m2    GFR est non-AA 12 (L) >60 ml/min/1.73m2    Calcium 8.4 (L) 8.5 - 10.1 MG/DL    Phosphorus 5.8 (H) 2.6 - 4.7 MG/DL    Albumin 3.0 (L) 3.5 - 5.0 g/dL   CBC WITH AUTOMATED DIFF    Collection Time: 04/11/22 12:20 AM   Result Value Ref Range    WBC 8.1 3.6 - 11.0 K/uL    RBC 2.92 (L) 3.80 - 5.20 M/uL    HGB 8.9 (L) 11.5 - 16.0 g/dL    HCT 28.4 (L) 35.0 - 47.0 %    MCV 97.3 80.0 - 99.0 FL    MCH 30.5 26.0 - 34.0 PG    MCHC 31.3 30.0 - 36.5 g/dL    RDW 21.4 (H) 11.5 - 14.5 %    PLATELET 40 (LL) 415 - 400 K/uL    MPV 10.2 8.9 - 12.9 FL    NRBC 0.0 0  WBC    ABSOLUTE NRBC 0.00 0.00 - 0.01 K/uL    NEUTROPHILS 80 (H) 32 - 75 %    LYMPHOCYTES 10 (L) 12 - 49 %    MONOCYTES 9 5 - 13 %    EOSINOPHILS 0 0 - 7 %    BASOPHILS 0 0 - 1 %    IMMATURE GRANULOCYTES 1 (H) 0.0 - 0.5 %    ABS. NEUTROPHILS 6.5 1.8 - 8.0 K/UL    ABS. LYMPHOCYTES 0.8 0.8 - 3.5 K/UL    ABS. MONOCYTES 0.7 0.0 - 1.0 K/UL    ABS. EOSINOPHILS 0.0 0.0 - 0.4 K/UL    ABS. BASOPHILS 0.0 0.0 - 0.1 K/UL    ABS. IMM. GRANS. 0.1 (H) 0.00 - 0.04 K/UL    DF SMEAR SCANNED      RBC COMMENTS ANISOCYTOSIS  1+        RBC COMMENTS MACROCYTOSIS  1+        RBC COMMENTS SCHISTOCYTES  1+       MAGNESIUM    Collection Time: 04/11/22 12:20 AM   Result Value Ref Range    Magnesium 2.6 (H) 1.6 - 2.4 mg/dL                                       Care Plan discussed with:  Patient     Family      RN      Consulting Physician [facility],         I have reviewed the flowsheets. Chart and Pertinent Notes have been reviewed. No change in PMH ,family and social history from Consult note.       Pete Mcmillan MD

## 2022-04-12 NOTE — PROGRESS NOTES
[facility]   [address] [address] [facility] [address] [facility], [facility] [address], [facility]  Phone: [phone]   [phone]       Nephrology Progress Note  Joy Hinojosa     1960     [id number]  Date of Admission : 3/5/2022  04/12/22    CC:  Follow up for greg    Assessment and Plan   GREG:  - 2/2 HRS and now w/ GI bleed  - Cr slowly rising  - cont octreotide and midodrine  - add albumin Q6 hrs for now  - daily labs and I/Os    Hyponatremia:  - 2/2 cirrhosis  - Na stable  - cont FR    Acidosis:  - improving     Hypokalemia:  - repletion prn    Severe anemia:  GI bleed  - On EDWIN TTS  - transfusions per primary team      Cirrhosis, Portal HTN, PBC  - poral gastropathy  - being w/u for [facility] transplant      Hypothyroidism    Malnutrition:       Interval History:  Seen and examined. Awake, more confused today. Had 600cc of UOP, 2 L of abd fluid drained. No reported cp or sob, appears comfortable    Review of Systems: A comprehensive review of systems was negative except for that written in the HPI.     Current Medications:   Current Facility-Administered Medications   Medication Dose Route Frequency    nystatin (MYCOSTATIN) 100,000 unit/gram cream   Topical TID    sodium bicarbonate tablet 650 mg  650 mg Oral TID    magic mouthwash cpd (without sucralfate)  5 mL Oral TIDAC    0.9% sodium chloride infusion 250 mL  250 mL IntraVENous PRN    pantoprazole (PROTONIX) 40 mg in 0.9% sodium chloride 10 mL injection  40 mg IntraVENous Q12H    sodium chloride (NS) flush 5-40 mL  5-40 mL IntraVENous Q8H    sodium chloride (NS) flush 5-40 mL  5-40 mL IntraVENous PRN    simethicone (MYLICON) 56VA/3.6HU oral drops 80 mg  1.2 mL Oral Multiple    promethazine (PHENERGAN) tablet 12.5 mg  12.5 mg Oral Q6H PRN    epoetin vic-epbx (RETACRIT) injection 10,000 Units  10,000 Units SubCUTAneous Q TUE, THU & SAT    multivitamin, tx-iron-ca-min (THERA-M w/ IRON) tablet 1 Tablet  1 Tablet Oral DAILY    balsam peru-castor oiL (VENELEX) ointment   Topical BID    zinc oxide-cod liver oil (DESITIN) 40 % paste   Topical PRN    sodium chloride (NS) flush 5-40 mL  5-40 mL IntraVENous Q8H    sodium chloride (NS) flush 5-40 mL  5-40 mL IntraVENous PRN    acetaminophen (TYLENOL) tablet 650 mg  650 mg Oral Q6H PRN    Or    acetaminophen (TYLENOL) suppository 650 mg  650 mg Rectal Q6H PRN    polyethylene glycol (MIRALAX) packet 17 g  17 g Oral DAILY PRN    ondansetron (ZOFRAN ODT) tablet 4 mg  4 mg Oral Q8H PRN    Or    ondansetron (ZOFRAN) injection 4 mg  4 mg IntraVENous X7L PRN    folic acid (FOLVITE) tablet 1 mg  1 mg Oral DAILY    levothyroxine (SYNTHROID) tablet 50 mcg  50 mcg Oral 6am    midodrine (PROAMATINE) tablet 15 mg  15 mg Oral TID WITH MEALS    octreotide (SANDOSTATIN) injection 100 mcg  100 mcg IntraVENous TID    rifAXIMin (XIFAXAN) tablet 550 mg  550 mg Oral BID    sertraline (ZOLOFT) tablet 100 mg  100 mg Oral DAILY    thiamine HCL (B-1) tablet 100 mg  100 mg Oral DAILY    ursodioL (ACTIGALL) tablet 500 mg (Patient Supplied)  500 mg Oral Q12H    glucagon (GLUCAGEN) injection 1 mg  1 mg IntraMUSCular PRN    naloxone (NARCAN) injection 0.1 mg  0.1 mg IntraVENous PRN      Allergies   Allergen Reactions    Morphine Other (comments)     Severe HA. ALL narcotics!!!       Objective:  Vitals:    Vitals:    04/11/22 1626 04/11/22 2018 04/12/22 0243 04/12/22 0852   BP: (!) 112/55 119/63 119/63 (!) 118/49   Pulse: 87 80 87 78   Resp:  16 16 16   Temp:  97.8 °F (36.6 °C) 98.1 °F (36.7 °C) 98 °F (36.7 °C)   SpO2:  95% 97% 99%   Weight:       Height:         Intake and Output:  No intake/output data recorded. 04/10 1901 - 04/12 0700  In: -   Out: 2800 [Urine:800; Drains:2000]    Physical Examination:  General: No distress.  confused  HEENT:           Pale and icteric   Neck:  Supple, no mass  Resp:  Lungs CTA B/L  CV:  RRR,  no murmur or rub,no  LE edema  GI:  Soft, NT, + Bowel sounds, + paracentesis drain  Neurologic:  AAO X 3  :  + Beckford     [x]    High complexity decision making was performed  [x]    Patient is at high-risk of decompensation with multiple organ involvement    Lab Data Personally Reviewed: I have reviewed all the pertinent labs, microbiology data and radiology studies during assessment. Recent Labs     04/12/22 0119 04/11/22  0020 04/10/22  0215   * 127* 129*   K 3.8 4.1 3.8   CL 91* 91* 90*   CO2 21 22 24   * 142* 129*   * 117* 120*   CREA 4.00* 3.93* 3.95*   CA 8.3* 8.4* 8.6   MG  --  2.6*  --    PHOS 5.8* 5.8* 6.2*   ALB 2.8* 3.0* 3.3*     Recent Labs     04/12/22 0119 04/11/22  0020   WBC 7.2 8.1   HGB 8.8* 8.9*   HCT 26.8* 28.4*   PLT 39* 40*     No results found for: SDES  Lab Results   Component Value Date/Time    Culture result: NO GROWTH 4 DAYS 03/14/2022 06:48 PM    Culture result: NO GROWTH 4 DAYS 03/12/2022 06:00 PM    Culture result: CANDIDA ALBICANS (A) 03/05/2022 09:22 PM     Recent Results (from the past 24 hour(s))   SAMPLES BEING HELD    Collection Time: 04/11/22  3:00 PM   Result Value Ref Range    SAMPLES BEING HELD 1 URINE CUP     COMMENT        Add-on orders for these samples will be processed based on acceptable specimen integrity and analyte stability, which may vary by analyte.    RENAL FUNCTION PANEL    Collection Time: 04/12/22  1:19 AM   Result Value Ref Range    Sodium 127 (L) 136 - 145 mmol/L    Potassium 3.8 3.5 - 5.1 mmol/L    Chloride 91 (L) 97 - 108 mmol/L    CO2 21 21 - 32 mmol/L    Anion gap 15 5 - 15 mmol/L    Glucose 146 (H) 65 - 100 mg/dL     (H) 6 - 20 MG/DL    Creatinine 4.00 (H) 0.55 - 1.02 MG/DL    BUN/Creatinine ratio 29 (H) 12 - 20      GFR est AA 14 (L) >60 ml/min/1.73m2    GFR est non-AA 11 (L) >60 ml/min/1.73m2    Calcium 8.3 (L) 8.5 - 10.1 MG/DL    Phosphorus 5.8 (H) 2.6 - 4.7 MG/DL    Albumin 2.8 (L) 3.5 - 5.0 g/dL   CBC WITH AUTOMATED DIFF    Collection Time: 04/12/22  1:19 AM   Result Value Ref Range    WBC 7.2 3.6 - 11.0 K/uL    RBC 2.83 (L) 3.80 - 5.20 M/uL    HGB 8.8 (L) 11.5 - 16.0 g/dL    HCT 26.8 (L) 35.0 - 47.0 %    MCV 94.7 80.0 - 99.0 FL    MCH 31.1 26.0 - 34.0 PG    MCHC 32.8 30.0 - 36.5 g/dL    RDW 21.0 (H) 11.5 - 14.5 %    PLATELET 39 (LL) 325 - 400 K/uL    MPV 9.9 8.9 - 12.9 FL    NRBC 0.0 0  WBC    ABSOLUTE NRBC 0.00 0.00 - 0.01 K/uL    NEUTROPHILS 81 (H) 32 - 75 %    LYMPHOCYTES 9 (L) 12 - 49 %    MONOCYTES 9 5 - 13 %    EOSINOPHILS 0 0 - 7 %    BASOPHILS 0 0 - 1 %    IMMATURE GRANULOCYTES 1 (H) 0.0 - 0.5 %    ABS. NEUTROPHILS 5.9 1.8 - 8.0 K/UL    ABS. LYMPHOCYTES 0.6 (L) 0.8 - 3.5 K/UL    ABS. MONOCYTES 0.6 0.0 - 1.0 K/UL    ABS. EOSINOPHILS 0.0 0.0 - 0.4 K/UL    ABS. BASOPHILS 0.0 0.0 - 0.1 K/UL    ABS. IMM. GRANS. 0.1 (H) 0.00 - 0.04 K/UL    DF SMEAR SCANNED      RBC COMMENTS ANISOCYTOSIS  2+        RBC COMMENTS AIRAM CELLS  PRESENT        RBC COMMENTS SCHISTOCYTES  PRESENT                                           Care Plan discussed with:  Patient     Family      RN      Consulting Physician [facility],         I have reviewed the flowsheets. Chart and Pertinent Notes have been reviewed. No change in PMH ,family and social history from Consult note.       Andrew Padilla MD

## 2022-04-12 NOTE — PROGRESS NOTES
Hospitalist Progress Note    NAME: Gaby Weaver   :  1960   MRN:  [id number]       Assessment / Plan:  Acute blood loss anemia/GI bleed  s/p 4 units of PRBCs   s/p EGD on   Hb 8.8 today, platelet 39, thrombocytopenia likely d/t Cirrhosis  C/w PPI BID   Hepatology following  peripherally     Liver cirrhosis  with portal HTN.  Felt to be secondary to [facility]  Transaminitis/hyperbilirubinemia/coagulopathy/thrombocytopenia  Hepatic Encephalopathy     -Transferred from [facility] with prolonged hospitalization.  Transferred for further work-up for liver transplant eval  -EGD on  [place] noted for distal esophagitis and portal hypertensive gastropathy, no evidence of varices.    History of esophageal varices back in 2021 status post banding at that time.    -Per GI at outlying facility, patient was deemed not to be a candidate for colonoscopy and recommended Cologuard.    s/p MRCP on  which was noted for cirrhosis, portal hypertension, and large ascites.   s/p cardiac stress test which was negative for ischemia, EF of 69%  Continue lactulose, rifaximin, Protonix  Continue ursodiol  Paracentesis  3/25 with 67620 removed  PT/OT following      Refractory ascites  -Moderate large ascites on US 3/6.  -Ascites said to be refractory to diuretics, HRS limiting use of diuretics  -Ascitic drain that was placed on 3/12 was apparently clogged, flushed and drained significant amount of ascites  -Ascitic fluid lab negative for SBP     GREG with metabolic acidosis. persisent  -Baseline 0.9 on 2021   -Likely due to [facility]  -Nephrology following, recommendations appreciated. - off bicarb drip on oral bicarb  Creatinine uptrending now. Discussed with nephrology.  Giving albumin today     Hypokalemia severe   Normal today     Hyponatremia  -Suspect secondary to underlying liver cirrhosis.   -Na stable 127   -Monitor periodically     Recent E. coli UTI  -Treated with Vanco and Zosyn then transitioned to Rocephin, completed 7-day antibiotic course as of 2/17  -Replace Steve with new catheter 3/5 .  Urine culture on 3/5 grew Candida albicans 75,000 CFU. - nephrology desires that steve stay in for now      Hypothyroid  -Continue levothyroxine    chronic anemiacontinue EDWIN per nephrology     Severe malnutrition  --Encourage oral intake   --On regular diet and tolerating     Generalized weakness/chronic wounds. pta  -PT, OT consult  -Wound care following     Estimated discharge date: >48 hours  Barriers: Creatinine stabilization    Code status: DNR  Prophylaxis: SCD's  Recommended Disposition: SNF/LTC     Subjective:     Chief Complaint / Reason for Physician Visit  Follow up for GREG/ HRS/ Cirrosis  She is confused  No belly pain    Review of Systems:  Symptom Y/N Comments  Symptom Y/N Comments   Fever/Chills    Chest Pain     Poor Appetite    Edema     Cough    Abdominal Pain     Sputum    Joint Pain     SOB/ROBINS    Pruritis/Rash     Nausea/vomit    Tolerating PT/OT     Diarrhea    Tolerating Diet     Constipation    Other       Could NOT obtain due to: Confusion     Objective:     VITALS:   Last 24hrs VS reviewed since prior progress note. Most recent are:  Patient Vitals for the past 24 hrs:   Temp Pulse Resp BP SpO2   04/12/22 1148  70  139/60    04/12/22 0852 98 °F (36.7 °C) 78 16 (!) 118/49 99 %   04/12/22 0243 98.1 °F (36.7 °C) 87 16 119/63 97 %   04/11/22 2018 97.8 °F (36.6 °C) 80 16 119/63 95 %   04/11/22 1626  87  (!) 112/55    04/11/22 1453 97.9 °F (36.6 °C) 85 16 (!) 126/56 96 %       Intake/Output Summary (Last 24 hours) at 4/12/2022 1410  Last data filed at 4/11/2022 2131  Gross per 24 hour   Intake    Output 2250 ml   Net -2250 ml        I had a face to face encounter and independently examined this patient on 4/12/2022, as outlined below:  PHYSICAL EXAM:  General: No acute distress    EENT:  EOMI. Anicteric sclerae. MMM  Resp:  CTA bilaterally, no wheezing or rales.   No accessory muscle use  CV:  Regular  rhythm,  No edema  GI:  Soft, Non distended, Non tender. +Bowel sounds  Neurologic:  Alert and oriented X 2, normal speech,   Psych:   Good insight. Not anxious nor agitated  Skin:  No rashes. No jaundice    Reviewed most current lab test results and cultures  YES  Reviewed most current radiology test results   YES  Review and summation of old records today    NO  Reviewed patient's current orders and MAR    YES  PMH/SH reviewed - no change compared to H&P  ________________________________________________________________________  Care Plan discussed with:    Comments   Patient y    Family      RN y    Care Manager y    Consultant                       y Multidiciplinary team rounds were held today with , nursing, pharmacist and clinical coordinator. Patient's plan of care was discussed; medications were reviewed and discharge planning was addressed. ________________________________________________________________________  Total NON critical care TIME: 39  Minutes    Total CRITICAL CARE TIME Spent:   Minutes non procedure based      Comments   >50% of visit spent in counseling and coordination of care     ________________________________________________________________________  Alo Maldonado MD     Procedures: see electronic medical records for all procedures/Xrays and details which were not copied into this note but were reviewed prior to creation of Plan. LABS:  I reviewed today's most current labs and imaging studies.   Pertinent labs include:  Recent Labs     04/12/22 0119 04/11/22  0020   WBC 7.2 8.1   HGB 8.8* 8.9*   HCT 26.8* 28.4*   PLT 39* 40*     Recent Labs     04/12/22  0119 04/11/22  0020 04/10/22  0215   * 127* 129*   K 3.8 4.1 3.8   CL 91* 91* 90*   CO2 21 22 24   * 142* 129*   * 117* 120*   CREA 4.00* 3.93* 3.95*   CA 8.3* 8.4* 8.6   MG  --  2.6*  --    PHOS 5.8* 5.8* 6.2*   ALB 2.8* 3.0* 3.3*       Signed: Alo Maldonado, MD

## 2022-04-12 NOTE — PROGRESS NOTES
SELMA:  RUR: 21%    Disposition:  SNF/Rehab    CM met with arsen and son Aubrey Payan). CM provided both with updates re response from [facility]). CM provided Ambrosio with list of SNF's in the [facility] area. Both in agreement to expand SNF search. Plan:  Ambrosio to return today with additional choices. Send referrals and follow-up with Abhinav on Wednesday. Per IDR, patient is not medically stable for discharge. Summary:  Patient admitted here on 3/5/22 from [facility] ([address] to this hospitalization, the patient was hospitalized at [address] [place] [address] 2/11/22 -3/5/22. The patient was originally admitted to [address] [place] [address] with complaints of  Liver cirrhosis, AMS, Acute Hepatic Encephalopathy.       The patient lived with her mother in a 2-story residence. Michael Ojeda mainly used the first floor. The patient used a rolling walker. The patient had a significant decline in her functional status which led to her hospitalization.     Primary Contact is Ted Kelley (son) Danny Poll #[phone]. Karina Bennett lives in [facility]. Ambrosio visits several times a week between 1-5pm.     Therapy Team (PT/OT), Hepatology, Nutrition, Nephrology and Wound Care following.     Insurance:   [employer]  Denials Out of Network:  [place]    CM continuing to follow. Rickey Carrillo, [address]      3:54pm  CM spoke with Ese Murrieta (by phone). Choice given for the following facilities:   [facility]. Stephany Pollock will call facilities Wed to determine mode of referral transmission (fax vs [facility]). CM will also follow-up with Abhinav and per call recently, bed avaialability was anticipated on Wed. CM continuing to follow.     Rickey Carrillo, [address]

## 2022-04-13 NOTE — PROGRESS NOTES
Problem: Falls - Risk of  Goal: *Absence of Falls  Description: Document Dany Powers Fall Risk and appropriate interventions in the flowsheet. Outcome: Progressing Towards Goal  Note: Fall Risk Interventions:  Mobility Interventions: Communicate number of staff needed for ambulation/transfer    Mentation Interventions: Adequate sleep, hydration, pain control    Medication Interventions: Evaluate medications/consider consulting pharmacy    Elimination Interventions: Call light in reach    History of Falls Interventions: Consult care management for discharge planning         Problem: Patient Education: Go to Patient Education Activity  Goal: Patient/Family Education  Outcome: Progressing Towards Goal     Problem: Pressure Injury - Risk of  Goal: *Prevention of pressure injury  Description: Document Rodrigo Scale and appropriate interventions in the flowsheet.   Outcome: Progressing Towards Goal  Note: Pressure Injury Interventions:  Sensory Interventions: Assess changes in LOC    Moisture Interventions: Apply protective barrier, creams and emollients    Activity Interventions: Pressure redistribution bed/mattress(bed type)    Mobility Interventions: Float heels    Nutrition Interventions: Document food/fluid/supplement intake    Friction and Shear Interventions: Apply protective barrier, creams and emollients                Problem: Patient Education: Go to Patient Education Activity  Goal: Patient/Family Education  Outcome: Progressing Towards Goal

## 2022-04-13 NOTE — PROGRESS NOTES
Hospitalist Progress Note    NAME: Fe Fuller   :  1960   MRN:  [id number]       Assessment / Plan:  Acute blood loss anemia/GI bleed  s/p 4 units of PRBCs   s/p EGD on   Hb 7.8 today, platelet 27, thrombocytopenia likely d/t Cirrhosis  C/w PPI BID   Hepatology following  peripherally     Liver cirrhosis  with portal HTN.  Felt to be secondary to [facility]  Transaminitis/hyperbilirubinemia/coagulopathy/thrombocytopenia  Hepatic Encephalopathy     -Transferred from [address] [place] [address] with prolonged hospitalization.  Transferred for further work-up for liver transplant eval  -EGD on  [place] noted for distal esophagitis and portal hypertensive gastropathy, no evidence of varices.    History of esophageal varices back in 2021 status post banding at that time.    -Per GI at outlying facility, patient was deemed not to be a candidate for colonoscopy and recommended Cologuard.    s/p MRCP on  which was noted for cirrhosis, portal hypertension, and large ascites.   s/p cardiac stress test which was negative for ischemia, EF of 69%  Continue lactulose, rifaximin, Protonix  Continue ursodiol  Paracentesis  3/25 with 23512 removed  PT/OT following     GREG with metabolic acidosis. persisent  Hyponatremia  -Baseline 0.9 on 2021   -Likely due to [facility]  -Nephrology following, recommendations appreciated. - off bicarb drip on oral bicarb  Creatinine around 4. Discussed with nephrology.  C/w albumin/ midodrine  Hyponatremia secondary to liver cirrhosis, today decreased to 125  C/w free water restriction     Refractory ascites  -Moderate large ascites on US 3/6.  -Ascites said to be refractory to diuretics, HRS limiting use of diuretics  -Ascitic drain that was placed on 3/12 was apparently clogged, flushed and drained significant amount of ascites  -Ascitic fluid lab negative for SBP  Will probably need repeat paracentesis prior to discharge     Recent E. coli UTI  -Treated with Vanco and Zosyn then transitioned to Rocephin, completed 7-day antibiotic course as of 2/17  -Replace Steve with new catheter 3/5 .  Urine culture on 3/5 grew Candida albicans 75,000 CFU. - nephrology desires that steve stay in for now      Hypothyroid  -Continue levothyroxine    chronic anemiacontinue EDWIN per nephrology     Severe malnutrition  --Encourage oral intake   --On regular diet and tolerating     Generalized weakness/chronic wounds. pta  -PT, OT consult  -Wound care following     Estimated discharge date: >48 hours  Barriers: Creatinine/ Sodium stabilization    Code status: DNR  Prophylaxis: SCD's  Recommended Disposition: SNF/LTC     Subjective:     Chief Complaint / Reason for Physician Visit  Follow up for GREG/ HRS/ Cirrosis  Denies belly pain    Review of Systems:  Symptom Y/N Comments  Symptom Y/N Comments   Fever/Chills n   Chest Pain n    Poor Appetite n   Edema n    Cough n   Abdominal Pain     Sputum    Joint Pain     SOB/ROBINS    Pruritis/Rash     Nausea/vomit    Tolerating PT/OT     Diarrhea    Tolerating Diet     Constipation    Other       Could NOT obtain due to:      Objective:     VITALS:   Last 24hrs VS reviewed since prior progress note. Most recent are:  Patient Vitals for the past 24 hrs:   Temp Pulse Resp BP SpO2   04/13/22 1423 98.2 °F (36.8 °C) 79 16 (!) 109/39 96 %   04/13/22 1159  83  (!) 111/49    04/13/22 0836 97.9 °F (36.6 °C) 77 16 (!) 105/58 99 %   04/13/22 0231 98.1 °F (36.7 °C) 83 16 (!) 121/56 95 %   04/12/22 2035 98.4 °F (36.9 °C) 80 16 116/61 97 %       Intake/Output Summary (Last 24 hours) at 4/13/2022 1601  Last data filed at 4/13/2022 0232  Gross per 24 hour   Intake    Output 1100 ml   Net -1100 ml        I had a face to face encounter and independently examined this patient on 4/13/2022, as outlined below:  PHYSICAL EXAM:  General: No acute distress    EENT:  EOMI. Anicteric sclerae. MMM  Resp:  CTA bilaterally, no wheezing or rales.   No accessory muscle use  CV:  Regular rhythm,  No edema  GI:  Soft, Distended d/t ascites  Neurologic:  Alert and oriented X 2, normal speech,   Psych:   Good insight. Not anxious nor agitated  Skin:  No rashes. No jaundice    Reviewed most current lab test results and cultures  YES  Reviewed most current radiology test results   YES  Review and summation of old records today    NO  Reviewed patient's current orders and MAR    YES  PMH/SH reviewed - no change compared to H&P  ________________________________________________________________________  Care Plan discussed with:    Comments   Patient y    Family      RN y    Care Manager y    Consultant                       y Multidiciplinary team rounds were held today with , nursing, pharmacist and clinical coordinator. Patient's plan of care was discussed; medications were reviewed and discharge planning was addressed. ________________________________________________________________________  Total NON critical care TIME: 39  Minutes    Total CRITICAL CARE TIME Spent:   Minutes non procedure based      Comments   >50% of visit spent in counseling and coordination of care     ________________________________________________________________________  Elaina Fournier MD     Procedures: see electronic medical records for all procedures/Xrays and details which were not copied into this note but were reviewed prior to creation of Plan. LABS:  I reviewed today's most current labs and imaging studies.   Pertinent labs include:  Recent Labs     04/13/22  0323 04/12/22  0119 04/11/22  0020   WBC 5.4 7.2 8.1   HGB 7.8* 8.8* 8.9*   HCT 23.6* 26.8* 28.4*   PLT 27* 39* 40*     Recent Labs     04/13/22  0323 04/12/22  0119 04/11/22  0020   * 127* 127*   K 3.7 3.8 4.1   CL 91* 91* 91*   CO2 20* 21 22   * 146* 142*   * 114* 117*   CREA 3.99* 4.00* 3.93*   CA 8.7 8.3* 8.4*   MG  --   --  2.6*   PHOS 5.7* 5.8* 5.8*   ALB 3.4* 2.8* 3.0*       Signed: Elaina Fournier MD

## 2022-04-13 NOTE — PROGRESS NOTES
Problem: Falls - Risk of  Goal: *Absence of Falls  Description: Document Gigi Avalos Fall Risk and appropriate interventions in the flowsheet. Outcome: Progressing Towards Goal  Note: Fall Risk Interventions:  Mobility Interventions: Communicate number of staff needed for ambulation/transfer    Mentation Interventions: Adequate sleep, hydration, pain control,Door open when patient unattended,Room close to nurse's station    Medication Interventions: Evaluate medications/consider consulting pharmacy    Elimination Interventions: Call light in reach,Patient to call for help with toileting needs    History of Falls Interventions: Consult care management for discharge planning         Problem: Patient Education: Go to Patient Education Activity  Goal: Patient/Family Education  Outcome: Progressing Towards Goal     Problem: Pressure Injury - Risk of  Goal: *Prevention of pressure injury  Description: Document Rodrigo Scale and appropriate interventions in the flowsheet.   Outcome: Progressing Towards Goal  Note: Pressure Injury Interventions:  Sensory Interventions: Assess changes in LOC,Avoid rigorous massage over bony prominences,Float heels,Keep linens dry and wrinkle-free,Minimize linen layers    Moisture Interventions: Apply protective barrier, creams and emollients,Absorbent underpads,Check for incontinence Q2 hours and as needed,Maintain skin hydration (lotion/cream)    Activity Interventions: Pressure redistribution bed/mattress(bed type),Increase time out of bed    Mobility Interventions: Float heels,Pressure redistribution bed/mattress (bed type)    Nutrition Interventions: Document food/fluid/supplement intake,Offer support with meals,snacks and hydration    Friction and Shear Interventions: Apply protective barrier, creams and emollients                Problem: Patient Education: Go to Patient Education Activity  Goal: Patient/Family Education  Outcome: Progressing Towards Goal

## 2022-04-13 NOTE — PROGRESS NOTES
[facility]   [address] [facility], [address] [place] [address], [facility]  Phone: [phone]   [phone]       Nephrology Progress Note  Caroyln Moreno     1960     [id number]  Date of Admission : 3/5/2022  04/13/22    CC:  Follow up for greg    Assessment and Plan   GREG:  - 2/2 HRS and now w/ GI bleed  - Cr stable  - cont octreotide and midodrine and albumin for now  - daily labs and I/Os    Hyponatremia:  - 2/2 cirrhosis  - Na dropping  - cont FR  - if drops lower, will give 3%    Acidosis:  - improving     Hypokalemia:  - repletion prn    Severe anemia:  GI bleed  - On EDWIN TTS  - transfusions per primary team      Cirrhosis, Portal HTN, PBC  - poral gastropathy  - being w/u for [facility] transplant      Hypothyroidism    Malnutrition:       Interval History:  Seen and examined. Awake, alert. Cr stable. Na slowly dropping. Stable UOP, > 1 L over the past 24 hrs. No cp or sob reported    Review of Systems: A comprehensive review of systems was negative except for that written in the HPI.     Current Medications:   Current Facility-Administered Medications   Medication Dose Route Frequency    albumin human 25% (BUMINATE) solution 25 g  25 g IntraVENous Q6H    nystatin (MYCOSTATIN) 100,000 unit/gram cream   Topical TID    sodium bicarbonate tablet 650 mg  650 mg Oral TID    magic mouthwash cpd (without sucralfate)  5 mL Oral TIDAC    0.9% sodium chloride infusion 250 mL  250 mL IntraVENous PRN    pantoprazole (PROTONIX) 40 mg in 0.9% sodium chloride 10 mL injection  40 mg IntraVENous Q12H    sodium chloride (NS) flush 5-40 mL  5-40 mL IntraVENous Q8H    sodium chloride (NS) flush 5-40 mL  5-40 mL IntraVENous PRN    simethicone (MYLICON) 94ZQ/4.9TG oral drops 80 mg  1.2 mL Oral Multiple    promethazine (PHENERGAN) tablet 12.5 mg  12.5 mg Oral Q6H PRN    epoetin vic-epbx (RETACRIT) injection 10,000 Units  10,000 Units SubCUTAneous Q TUE, THU & SAT    multivitamin, tx-iron-ca-min (THERA-M w/ IRON) tablet 1 Tablet  1 Tablet Oral DAILY    balsam peru-castor oiL (VENELEX) ointment   Topical BID    zinc oxide-cod liver oil (DESITIN) 40 % paste   Topical PRN    sodium chloride (NS) flush 5-40 mL  5-40 mL IntraVENous Q8H    sodium chloride (NS) flush 5-40 mL  5-40 mL IntraVENous PRN    acetaminophen (TYLENOL) tablet 650 mg  650 mg Oral Q6H PRN    Or    acetaminophen (TYLENOL) suppository 650 mg  650 mg Rectal Q6H PRN    polyethylene glycol (MIRALAX) packet 17 g  17 g Oral DAILY PRN    ondansetron (ZOFRAN ODT) tablet 4 mg  4 mg Oral Q8H PRN    Or    ondansetron (ZOFRAN) injection 4 mg  4 mg IntraVENous M0S PRN    folic acid (FOLVITE) tablet 1 mg  1 mg Oral DAILY    levothyroxine (SYNTHROID) tablet 50 mcg  50 mcg Oral 6am    midodrine (PROAMATINE) tablet 15 mg  15 mg Oral TID WITH MEALS    octreotide (SANDOSTATIN) injection 100 mcg  100 mcg IntraVENous TID    rifAXIMin (XIFAXAN) tablet 550 mg  550 mg Oral BID    sertraline (ZOLOFT) tablet 100 mg  100 mg Oral DAILY    thiamine HCL (B-1) tablet 100 mg  100 mg Oral DAILY    ursodioL (ACTIGALL) tablet 500 mg (Patient Supplied)  500 mg Oral Q12H    glucagon (GLUCAGEN) injection 1 mg  1 mg IntraMUSCular PRN    naloxone (NARCAN) injection 0.1 mg  0.1 mg IntraVENous PRN      Allergies   Allergen Reactions    Morphine Other (comments)     Severe HA. ALL narcotics!!!       Objective:  Vitals:    Vitals:    04/12/22 1434 04/12/22 2035 04/13/22 0231 04/13/22 0836   BP: (!) 104/48 116/61 (!) 121/56 (!) 105/58   Pulse: 74 80 83 77   Resp: 15 16 16 16   Temp: 97.9 °F (36.6 °C) 98.4 °F (36.9 °C) 98.1 °F (36.7 °C) 97.9 °F (36.6 °C)   SpO2: 97% 97% 95% 99%   Weight:       Height:         Intake and Output:  No intake/output data recorded. 04/11 1901 - 04/13 0700  In: -   Out: 2150 [Urine:950; Drains:1200]    Physical Examination:  General: No distress.  confused  HEENT:           Pale and icteric   Neck:  Supple, no mass  Resp:  Lungs CTA B/L  CV:  RRR, no murmur or rub,no  LE edema  GI:  Soft, NT, + Bowel sounds, + paracentesis drain  Neurologic:  AAO X 3  :  + Beckford     [x]    High complexity decision making was performed  [x]    Patient is at high-risk of decompensation with multiple organ involvement    Lab Data Personally Reviewed: I have reviewed all the pertinent labs, microbiology data and radiology studies during assessment.     Recent Labs     04/13/22 0323 04/12/22 0119 04/11/22  0020   * 127* 127*   K 3.7 3.8 4.1   CL 91* 91* 91*   CO2 20* 21 22   * 146* 142*   * 114* 117*   CREA 3.99* 4.00* 3.93*   CA 8.7 8.3* 8.4*   MG  --   --  2.6*   PHOS 5.7* 5.8* 5.8*   ALB 3.4* 2.8* 3.0*     Recent Labs     04/13/22 0323 04/12/22 0119 04/11/22  0020   WBC 5.4 7.2 8.1   HGB 7.8* 8.8* 8.9*   HCT 23.6* 26.8* 28.4*   PLT 27* 39* 40*     No results found for: SDES  Lab Results   Component Value Date/Time    Culture result: NO GROWTH 4 DAYS 03/14/2022 06:48 PM    Culture result: NO GROWTH 4 DAYS 03/12/2022 06:00 PM    Culture result: CANDIDA ALBICANS (A) 03/05/2022 09:22 PM     Recent Results (from the past 24 hour(s))   RENAL FUNCTION PANEL    Collection Time: 04/13/22  3:23 AM   Result Value Ref Range    Sodium 125 (L) 136 - 145 mmol/L    Potassium 3.7 3.5 - 5.1 mmol/L    Chloride 91 (L) 97 - 108 mmol/L    CO2 20 (L) 21 - 32 mmol/L    Anion gap 14 5 - 15 mmol/L    Glucose 103 (H) 65 - 100 mg/dL     (H) 6 - 20 MG/DL    Creatinine 3.99 (H) 0.55 - 1.02 MG/DL    BUN/Creatinine ratio 29 (H) 12 - 20      GFR est AA 14 (L) >60 ml/min/1.73m2    GFR est non-AA 11 (L) >60 ml/min/1.73m2    Calcium 8.7 8.5 - 10.1 MG/DL    Phosphorus 5.7 (H) 2.6 - 4.7 MG/DL    Albumin 3.4 (L) 3.5 - 5.0 g/dL   CBC WITH AUTOMATED DIFF    Collection Time: 04/13/22  3:23 AM   Result Value Ref Range    WBC 5.4 3.6 - 11.0 K/uL    RBC 2.50 (L) 3.80 - 5.20 M/uL    HGB 7.8 (L) 11.5 - 16.0 g/dL    HCT 23.6 (L) 35.0 - 47.0 %    MCV 94.4 80.0 - 99.0 FL    MCH 31.2 26.0 - 34.0 PG    MCHC 33.1 30.0 - 36.5 g/dL    RDW 20.3 (H) 11.5 - 14.5 %    PLATELET 27 (LL) 555 - 400 K/uL    MPV 11.4 8.9 - 12.9 FL    NRBC 0.0 0  WBC    ABSOLUTE NRBC 0.00 0.00 - 0.01 K/uL    NEUTROPHILS 78 (H) 32 - 75 %    LYMPHOCYTES 13 12 - 49 %    MONOCYTES 9 5 - 13 %    EOSINOPHILS 0 0 - 7 %    BASOPHILS 0 0 - 1 %    IMMATURE GRANULOCYTES 0 0.0 - 0.5 %    ABS. NEUTROPHILS 4.2 1.8 - 8.0 K/UL    ABS. LYMPHOCYTES 0.7 (L) 0.8 - 3.5 K/UL    ABS. MONOCYTES 0.5 0.0 - 1.0 K/UL    ABS. EOSINOPHILS 0.0 0.0 - 0.4 K/UL    ABS. BASOPHILS 0.0 0.0 - 0.1 K/UL    ABS. IMM. GRANS. 0.0 0.00 - 0.04 K/UL    DF SMEAR SCANNED      RBC COMMENTS ANISOCYTOSIS  2+        RBC COMMENTS AIRAM CELLS  PRESENT                                           Care Plan discussed with:  Patient     Family      RN      Consulting Physician [address],         I have reviewed the flowsheets. Chart and Pertinent Notes have been reviewed. No change in PMH ,family and social history from Consult note.       Andrew Padilla MD

## 2022-04-14 NOTE — WOUND CARE
WOCN Note:     Follow up wound care visit to re-assess skin tears to arms and sacral wound  Assessed in room 615  Isolation: no    Chart shows:  Patient admitted on 3/5/22 with Cirrhosis  Past Medical History:   Diagnosis Date    Anxiety 10/19/2021    Basal cell carcinoma (BCC) of face 10/19/2021    Chronic kidney disease     \"end stage liver disease\" per son    Cirrhosis of liver not due to alcohol ([facility] [address]     Depression 10/19/2021    Thyroid disease       Assessment:   Alert, Oriented x3  Reports no pain  Mobility: moderate assistance with repositioning and turning  Continence: Incontinent of stool. Beckford catheter  Restraints: no  Last Rodrigo Score: 9  Surface: Foam mattress  Diet: Regular dysphagia, oral nutritional supplements     Wt Readings from Last 2 Encounters:   04/06/22 56.1 kg (123 lb 11.2 oz)   02/17/22 68 kg (149 lb 14.6 oz)     1. POA stage 3 sacral pressure injury  1 x 1 x <0.1 cm  Wound bed pink   no exudate  no odor   defined edges  Periwound intact with blanchable erythema   Treatment: Cleansed with saline, hydrocolloid    2. Skin tear to right forearm proximal  Etiology: skin very fragile and thin  Partial thickness   2.5 x 0.8 x <0.1 cm  Wound bed pink   Small amount serosang exudate  Periwound intact & with multiple purple splotches  Treatment: cleansed with saline, Mepitel one, Petroleum jelly, gauze, stretch gauze roll    3. Skin tear to right elbow  Resolved    4. Skin tear to right forearm distal  Etiology: skin very fragile and thin  Partial thickness   2 x 1.2 x <0.1 cm  Wound bed pink/red   Small amount serosang exudate  Periwound intact & with multiple purple splotches  Treatment: cleansed with saline, Mepitel one, Petroleum jelly, gauze, stretch gauze roll    5. Candidiasis to buttocks has resolved    Wound Recommendations:      Continue   Mepitel One dressing to skin tears to right forearm and elbow. Leave mepitel one dressing in place, Apply Petroleum jelly over mepitel one and cover with gauze and stretch gauze (do not apply any adhesive to skin), daily     1) Cleanse wound to sacrum with wound cleanser or normal saline, pat dry   2) Apply skin barrier wipe to periwound skin and allow to dry   4) Fold  hydrocolloid ( Exuderm Satin) dressing and apply to area   5) Smooth dressing from center outward and hold dressing in place to improve adhesion   Change dressing weekly and prn if becomes soiled or loosens/ edges curl    PI Prevention:  Turn/reposition approximately every 2 hours  Offload heels with pillows at all times in bed. Pad bony prominences   Keep HOB 30 degrees or less to decrease shearing and pressure unless medically contraindicated. If HOB is to be over 30 degrees, raise knees first then [facility] to prevent sliding   Minimize layers of linen/pads under patient to optimize support surface to one flat sheet and one incontinence pad   Specialty bed: Prius ordered via [address] only flat sheet and one incontinence pad. Please call [facility] at [phone] to request  of bed when patient discharged.     Discussed with RN    Transition of Care: Plan to follow weekly and as needed while admitted to hospital.      Romana Hof MSN, RN, [facility]  Certified Wound and Ostomy Nurse  office [phone]  Can be reached through Rick Thibodeaux

## 2022-04-14 NOTE — PROGRESS NOTES
Problem: Falls - Risk of  Goal: *Absence of Falls  Description: Document Carole Almonte Fall Risk and appropriate interventions in the flowsheet.   Outcome: Progressing Towards Goal  Note: Fall Risk Interventions:  Mobility Interventions: Communicate number of staff needed for ambulation/transfer    Mentation Interventions: Adequate sleep, hydration, pain control    Medication Interventions: Evaluate medications/consider consulting pharmacy    Elimination Interventions: Call light in reach    History of Falls Interventions: Consult care management for discharge planning         Problem: Patient Education: Go to Patient Education Activity  Goal: Patient/Family Education  Outcome: Progressing Towards Goal

## 2022-04-14 NOTE — PROGRESS NOTES
SELMA:  1. RUR-21%  2. SNF referrals pending- she will also need auth for discharge. 3. BLS transport. CM participated in IDRs, patient is not stable for discharge per medical team.  The following facilities were chosen by the family:  1. [facility]-patient/family first choice. CM LVM for Carlos Alert in the admissions, [phone].    [address] [place] [address]. Referral to [facility]. [place] [address], [phone]. Referral sent to [facility]. 4. [facility] [phone]. Referral sent in [facility]    5. [facility] LVM on phone. [phone]- referral sent in [facility]  6  . [facility]. [phone]. LVM on phone. CM will follow up tomorrow about acceptance. Patient will need insurance authorization prior to discharge.     Bandar Godfrey [facility]

## 2022-04-14 NOTE — PROGRESS NOTES
Hospitalist Progress Note    NAME: David Hinkle   :  1960   MRN:  [id number]       Assessment / Plan:  Acute blood loss anemia/GI bleed  s/p 4 units of PRBCs   s/p EGD on   Hb 7.8 today, platelet 27, thrombocytopenia likely d/t Cirrhosis  C/w PPI BID   Hepatology following  peripherally     Liver cirrhosis  with portal HTN.  Felt to be secondary to [facility]  Transaminitis/hyperbilirubinemia/coagulopathy/thrombocytopenia  Hepatic Encephalopathy     -Transferred from [facility] with prolonged hospitalization.  Transferred for further work-up for liver transplant eval  -EGD on  [place] noted for distal esophagitis and portal hypertensive gastropathy, no evidence of varices.    History of esophageal varices back in 2021 status post banding at that time.    -Per GI at outlying facility, patient was deemed not to be a candidate for colonoscopy and recommended Cologuard.    s/p MRCP on  which was noted for cirrhosis, portal hypertension, and large ascites.   s/p cardiac stress test which was negative for ischemia, EF of 69%  Continue lactulose, rifaximin, Protonix  Continue ursodiol  Paracentesis  3/25 with 28735 removed  PT/OT following     GREG with metabolic acidosis. persisent  Hyponatremia  -Baseline 0.9 on 2021   -Likely due to [facility]  -Nephrology following, recommendations appreciated. - off bicarb drip on oral bicarb  Creatinine around 4. Discussed with nephrology.  C/w albumin/ midodrine  Hyponatremia secondary to liver cirrhosis, today decreased to 125  C/w free water restriction     Refractory ascites  -Moderate large ascites on US 3/6.  -Ascites said to be refractory to diuretics, HRS limiting use of diuretics  -Ascitic drain that was placed on 3/12 was apparently clogged, flushed and drained significant amount of ascites  -Ascitic fluid lab negative for SBP  Will probably need repeat paracentesis prior to discharge     Recent E. coli UTI  -Treated with Vanco and Zosyn then transitioned to Rocephin, completed 7-day antibiotic course as of 2/17  -Replace Steve with new catheter 3/5 .  Urine culture on 3/5 grew Candida albicans 75,000 CFU. - nephrology desires that steve stay in for now      Hypothyroid  -Continue levothyroxine    chronic anemiacontinue EDWIN per nephrology     Severe malnutrition  --Encourage oral intake   --On regular diet and tolerating     Generalized weakness/chronic wounds. pta  -PT, OT consult  -Wound care following     I spoke to the patient's son. He reports that he has spoken to [address] [place] [address] transplant center and they want to do a doc to doc at [phone]. I told him that I would contact hepatology first to see if they can do this talk to knock. If they are unable then I will do this in the morning. I do not think that the patient at this point would be a candidate for transplant anyways but we can speak with them. Continue current management. Estimated discharge date: >48 hours  Barriers: Creatinine/ Sodium stabilization    Code status: DNR  Prophylaxis: SCD's  Recommended Disposition: SNF/LTC     Subjective:     Chief Complaint / Reason for Physician Visit  Follow up for GREG/ HRS/ Cirrosis  Denies belly pain    Review of Systems:  Symptom Y/N Comments  Symptom Y/N Comments   Fever/Chills n   Chest Pain n    Poor Appetite n   Edema n    Cough n   Abdominal Pain     Sputum    Joint Pain     SOB/ROBINS    Pruritis/Rash     Nausea/vomit    Tolerating PT/OT     Diarrhea    Tolerating Diet     Constipation    Other       Could NOT obtain due to:      Objective:     VITALS:   Last 24hrs VS reviewed since prior progress note.  Most recent are:  Patient Vitals for the past 24 hrs:   Temp Pulse Resp BP SpO2   04/14/22 1414 98.2 °F (36.8 °C) 81 16 113/60 94 %   04/14/22 1235  83  (!) 101/58    04/14/22 0853 98 °F (36.7 °C) 79 16 (!) 94/44 93 %   04/14/22 0155 98.1 °F (36.7 °C) 87 16 109/63 90 %   04/13/22 2204 99.2 °F (37.3 °C) 92 18 118/74 95 % Intake/Output Summary (Last 24 hours) at 4/14/2022 1730  Last data filed at 4/14/2022 1512  Gross per 24 hour   Intake    Output 3125 ml   Net -3125 ml        I had a face to face encounter and independently examined this patient on 4/14/2022, as outlined below:  PHYSICAL EXAM:  General: No acute distress    EENT:  EOMI. Anicteric sclerae. MMM  Resp:  CTA bilaterally, no wheezing or rales. No accessory muscle use  CV:  Regular  rhythm,  No edema  GI:  Soft, Distended d/t ascites  Neurologic:  Alert and oriented X 2, normal speech,   Psych:   Good insight. Not anxious nor agitated  Skin:  No rashes. No jaundice    Reviewed most current lab test results and cultures  YES  Reviewed most current radiology test results   YES  Review and summation of old records today    NO  Reviewed patient's current orders and MAR    YES  PMH/SH reviewed - no change compared to H&P  ________________________________________________________________________  Care Plan discussed with:    Comments   Patient y    Family      RN y    Care Manager y    Consultant                       y Multidiciplinary team rounds were held today with , nursing, pharmacist and clinical coordinator. Patient's plan of care was discussed; medications were reviewed and discharge planning was addressed. ________________________________________________________________________  Total NON critical care TIME: 39  Minutes    Total CRITICAL CARE TIME Spent:   Minutes non procedure based      Comments   >50% of visit spent in counseling and coordination of care     ________________________________________________________________________  Matheus Perkins MD     Procedures: see electronic medical records for all procedures/Xrays and details which were not copied into this note but were reviewed prior to creation of Plan. LABS:  I reviewed today's most current labs and imaging studies.   Pertinent labs include:  Recent Labs 04/14/22  0500 04/13/22  0323 04/12/22  0119   WBC 5.4 5.4 7.2   HGB 7.6* 7.8* 8.8*   HCT 22.7* 23.6* 26.8*   PLT 26* 27* 39*     Recent Labs     04/14/22  0500 04/13/22  0323 04/12/22  0119   * 125* 127*   K 3.5 3.7 3.8   CL 92* 91* 91*   CO2 23 20* 21   GLU 90 103* 146*   * 117* 114*   CREA 4.04* 3.99* 4.00*   CA 9.4 8.7 8.3*   PHOS 5.0* 5.7* 5.8*   ALB 4.6 3.4* 2.8*       Signed: Thai Mijares MD

## 2022-04-14 NOTE — PROGRESS NOTES
Problem: Falls - Risk of  Goal: *Absence of Falls  Description: Document Sruthi Rosales Fall Risk and appropriate interventions in the flowsheet. Outcome: Progressing Towards Goal  Note: Fall Risk Interventions:  Mobility Interventions: Communicate number of staff needed for ambulation/transfer    Mentation Interventions: Adequate sleep, hydration, pain control    Medication Interventions: Evaluate medications/consider consulting pharmacy    Elimination Interventions: Call light in reach    History of Falls Interventions: Consult care management for discharge planning         Problem: Patient Education: Go to Patient Education Activity  Goal: Patient/Family Education  Outcome: Progressing Towards Goal     Problem: Pressure Injury - Risk of  Goal: *Prevention of pressure injury  Description: Document Rodrigo Scale and appropriate interventions in the flowsheet.   Outcome: Progressing Towards Goal  Note: Pressure Injury Interventions:  Sensory Interventions: Assess changes in LOC    Moisture Interventions: Apply protective barrier, creams and emollients    Activity Interventions: Pressure redistribution bed/mattress(bed type)    Mobility Interventions: Float heels    Nutrition Interventions: Document food/fluid/supplement intake    Friction and Shear Interventions: Apply protective barrier, creams and emollients                Problem: Patient Education: Go to Patient Education Activity  Goal: Patient/Family Education  Outcome: Progressing Towards Goal     Problem: Patient Education: Go to Patient Education Activity  Goal: Patient/Family Education  Outcome: Progressing Towards Goal     Problem: Patient Education: Go to Patient Education Activity  Goal: Patient/Family Education  Outcome: Progressing Towards Goal     Problem: Nutrition Deficit  Goal: *Optimize nutritional status  Outcome: Progressing Towards Goal

## 2022-04-14 NOTE — PROGRESS NOTES
[facility]   [address] [facility], [facility] [address], [facility]  Phone: [phone]   [phone]       Nephrology Progress Note  Kostas Martins     1960     [id number]  Date of Admission : 3/5/2022  04/14/22    CC:  Follow up for greg    Assessment and Plan   GREG:  - 2/2 HRS   - Cr aurora post GI bleed and has stabilized around 4  - cont octreotide and midodrine  - d/c albumin infusions  - daily labs and I/Os    Hyponatremia:  - 2/2 cirrhosis  - Na stable today  - cont FR    Hypokalemia:  - replete PRN    Acidosis:  - improving     Hypokalemia:  - repletion prn    Severe anemia:  GI bleed  - On EDIWN TTS  - transfusions per primary team      Cirrhosis, Portal HTN, PBC  - poral gastropathy  - being w/u for [facility] transplant      Hypothyroidism    Malnutrition:  - of TF, oral intake     Interval History:  Seen and examined. Awake, alert. Cr stable. Na better. BP stable, UOP ok. No cp or sob reported. Review of Systems: A comprehensive review of systems was negative except for that written in the HPI.     Current Medications:   Current Facility-Administered Medications   Medication Dose Route Frequency    albumin human 25% (BUMINATE) solution 25 g  25 g IntraVENous Q6H    nystatin (MYCOSTATIN) 100,000 unit/gram cream   Topical TID    sodium bicarbonate tablet 650 mg  650 mg Oral TID    magic mouthwash cpd (without sucralfate)  5 mL Oral TIDAC    0.9% sodium chloride infusion 250 mL  250 mL IntraVENous PRN    pantoprazole (PROTONIX) 40 mg in 0.9% sodium chloride 10 mL injection  40 mg IntraVENous Q12H    sodium chloride (NS) flush 5-40 mL  5-40 mL IntraVENous Q8H    sodium chloride (NS) flush 5-40 mL  5-40 mL IntraVENous PRN    simethicone (MYLICON) 06RG/6.1VK oral drops 80 mg  1.2 mL Oral Multiple    promethazine (PHENERGAN) tablet 12.5 mg  12.5 mg Oral Q6H PRN    epoetin vic-epbx (RETACRIT) injection 10,000 Units  10,000 Units SubCUTAneous Q TUE, THU & SAT    multivitamin, tx-iron-ca-min (THERA-M w/ IRON) tablet 1 Tablet  1 Tablet Oral DAILY    balsam peru-castor oiL (VENELEX) ointment   Topical BID    zinc oxide-cod liver oil (DESITIN) 40 % paste   Topical PRN    sodium chloride (NS) flush 5-40 mL  5-40 mL IntraVENous Q8H    sodium chloride (NS) flush 5-40 mL  5-40 mL IntraVENous PRN    acetaminophen (TYLENOL) tablet 650 mg  650 mg Oral Q6H PRN    Or    acetaminophen (TYLENOL) suppository 650 mg  650 mg Rectal Q6H PRN    polyethylene glycol (MIRALAX) packet 17 g  17 g Oral DAILY PRN    ondansetron (ZOFRAN ODT) tablet 4 mg  4 mg Oral Q8H PRN    Or    ondansetron (ZOFRAN) injection 4 mg  4 mg IntraVENous O4G PRN    folic acid (FOLVITE) tablet 1 mg  1 mg Oral DAILY    levothyroxine (SYNTHROID) tablet 50 mcg  50 mcg Oral 6am    midodrine (PROAMATINE) tablet 15 mg  15 mg Oral TID WITH MEALS    octreotide (SANDOSTATIN) injection 100 mcg  100 mcg IntraVENous TID    rifAXIMin (XIFAXAN) tablet 550 mg  550 mg Oral BID    sertraline (ZOLOFT) tablet 100 mg  100 mg Oral DAILY    thiamine HCL (B-1) tablet 100 mg  100 mg Oral DAILY    ursodioL (ACTIGALL) tablet 500 mg (Patient Supplied)  500 mg Oral Q12H    glucagon (GLUCAGEN) injection 1 mg  1 mg IntraMUSCular PRN    naloxone (NARCAN) injection 0.1 mg  0.1 mg IntraVENous PRN      Allergies   Allergen Reactions    Morphine Other (comments)     Severe HA. ALL narcotics!!!       Objective:  Vitals:    Vitals:    04/13/22 1642 04/13/22 2204 04/14/22 0155 04/14/22 0853   BP: (!) 102/41 118/74 109/63 (!) 94/44   Pulse:  92 87 79   Resp:  18 16 16   Temp:  99.2 °F (37.3 °C) 98.1 °F (36.7 °C) 98 °F (36.7 °C)   SpO2:  95% 90% 93%   Weight:       Height:         Intake and Output:  04/14 0701 - 04/14 1900  In: -   Out: 800 [Urine:800]  04/12 1901 - 04/14 0700  In: -   Out: 2851 [Urine:1175; Drains:300]    Physical Examination:  General: No distress.  confused  HEENT:           Pale and icteric   Neck:  Supple, no mass  Resp:  Lungs CTA B/L  CV:  RRR,  no murmur or rub,no  LE edema  GI:  Soft, NT, + Bowel sounds, + paracentesis drain  Neurologic:  AAO X 3  :  + Beckford     [x]    High complexity decision making was performed  [x]    Patient is at high-risk of decompensation with multiple organ involvement    Lab Data Personally Reviewed: I have reviewed all the pertinent labs, microbiology data and radiology studies during assessment.     Recent Labs     04/14/22  0500 04/13/22  0323 04/12/22  0119   * 125* 127*   K 3.5 3.7 3.8   CL 92* 91* 91*   CO2 23 20* 21   GLU 90 103* 146*   * 117* 114*   CREA 4.04* 3.99* 4.00*   CA 9.4 8.7 8.3*   PHOS 5.0* 5.7* 5.8*   ALB 4.6 3.4* 2.8*     Recent Labs     04/14/22  0500 04/13/22  0323 04/12/22  0119   WBC 5.4 5.4 7.2   HGB 7.6* 7.8* 8.8*   HCT 22.7* 23.6* 26.8*   PLT 26* 27* 39*     No results found for: SDES  Lab Results   Component Value Date/Time    Culture result: NO GROWTH 4 DAYS 03/14/2022 06:48 PM    Culture result: NO GROWTH 4 DAYS 03/12/2022 06:00 PM    Culture result: CANDIDA ALBICANS (A) 03/05/2022 09:22 PM     Recent Results (from the past 24 hour(s))   RENAL FUNCTION PANEL    Collection Time: 04/14/22  5:00 AM   Result Value Ref Range    Sodium 130 (L) 136 - 145 mmol/L    Potassium 3.5 3.5 - 5.1 mmol/L    Chloride 92 (L) 97 - 108 mmol/L    CO2 23 21 - 32 mmol/L    Anion gap 15 5 - 15 mmol/L    Glucose 90 65 - 100 mg/dL     (H) 6 - 20 MG/DL    Creatinine 4.04 (H) 0.55 - 1.02 MG/DL    BUN/Creatinine ratio 30 (H) 12 - 20      GFR est AA 14 (L) >60 ml/min/1.73m2    GFR est non-AA 11 (L) >60 ml/min/1.73m2    Calcium 9.4 8.5 - 10.1 MG/DL    Phosphorus 5.0 (H) 2.6 - 4.7 MG/DL    Albumin 4.6 3.5 - 5.0 g/dL   CBC WITH AUTOMATED DIFF    Collection Time: 04/14/22  5:00 AM   Result Value Ref Range    WBC 5.4 3.6 - 11.0 K/uL    RBC 2.45 (L) 3.80 - 5.20 M/uL    HGB 7.6 (L) 11.5 - 16.0 g/dL    HCT 22.7 (L) 35.0 - 47.0 %    MCV 92.7 80.0 - 99.0 FL    MCH 31.0 26.0 - 34.0 PG    MCHC 33.5 30.0 - 36.5 g/dL    RDW 20.4 (H) 11.5 - 14.5 %    PLATELET 26 (LL) 038 - 400 K/uL    MPV 11.5 8.9 - 12.9 FL    NRBC 0.0 0  WBC    ABSOLUTE NRBC 0.00 0.00 - 0.01 K/uL    NEUTROPHILS 76 (H) 32 - 75 %    LYMPHOCYTES 13 12 - 49 %    MONOCYTES 10 5 - 13 %    EOSINOPHILS 0 0 - 7 %    BASOPHILS 0 0 - 1 %    IMMATURE GRANULOCYTES 1 (H) 0.0 - 0.5 %    ABS. NEUTROPHILS 4.1 1.8 - 8.0 K/UL    ABS. LYMPHOCYTES 0.7 (L) 0.8 - 3.5 K/UL    ABS. MONOCYTES 0.5 0.0 - 1.0 K/UL    ABS. EOSINOPHILS 0.0 0.0 - 0.4 K/UL    ABS. BASOPHILS 0.0 0.0 - 0.1 K/UL    ABS. IMM. GRANS. 0.1 (H) 0.00 - 0.04 K/UL    DF SMEAR SCANNED      RBC COMMENTS ANISOCYTOSIS  2+        RBC COMMENTS OVALOCYTES  PRESENT        RBC COMMENTS AIRAM CELLS  PRESENT                                           Care Plan discussed with:  Patient     Family      RN      Consulting Physician [address] [facility],         I have reviewed the flowsheets. Chart and Pertinent Notes have been reviewed. No change in PMH ,family and social history from Consult note.       Alicia Mortensen MD

## 2022-04-15 NOTE — PROGRESS NOTES
Problem: Falls - Risk of  Goal: *Absence of Falls  Description: Document Cindia Neigh Fall Risk and appropriate interventions in the flowsheet. Outcome: Progressing Towards Goal  Note: Fall Risk Interventions:  Mobility Interventions: Communicate number of staff needed for ambulation/transfer    Mentation Interventions: Adequate sleep, hydration, pain control    Medication Interventions: Evaluate medications/consider consulting pharmacy    Elimination Interventions: Bed/chair exit alarm,Call light in reach    History of Falls Interventions: Consult care management for discharge planning         Problem: Patient Education: Go to Patient Education Activity  Goal: Patient/Family Education  Outcome: Progressing Towards Goal     Problem: Pressure Injury - Risk of  Goal: *Prevention of pressure injury  Description: Document Rodrigo Scale and appropriate interventions in the flowsheet.   Outcome: Progressing Towards Goal  Note: Pressure Injury Interventions:  Sensory Interventions: Avoid rigorous massage over bony prominences,Float heels,Minimize linen layers    Moisture Interventions: Absorbent underpads,Apply protective barrier, creams and emollients    Activity Interventions: Pressure redistribution bed/mattress(bed type)    Mobility Interventions: Float heels,Pressure redistribution bed/mattress (bed type)    Nutrition Interventions: Document food/fluid/supplement intake,Offer support with meals,snacks and hydration    Friction and Shear Interventions: Apply protective barrier, creams and emollients,Minimize layers

## 2022-04-15 NOTE — PROGRESS NOTES
Problem: Falls - Risk of  Goal: *Absence of Falls  Description: Document Duarte Mckoy Fall Risk and appropriate interventions in the flowsheet. Outcome: Progressing Towards Goal  Note: Fall Risk Interventions:  Mobility Interventions: Communicate number of staff needed for ambulation/transfer    Mentation Interventions: Adequate sleep, hydration, pain control,Reorient patient,Room close to nurse's station    Medication Interventions: Evaluate medications/consider consulting pharmacy    Elimination Interventions: Call light in reach,Toileting schedule/hourly rounds    History of Falls Interventions: Consult care management for discharge planning         Problem: Patient Education: Go to Patient Education Activity  Goal: Patient/Family Education  Outcome: Progressing Towards Goal     Problem: Pressure Injury - Risk of  Goal: *Prevention of pressure injury  Description: Document Rodrigo Scale and appropriate interventions in the flowsheet.   Outcome: Progressing Towards Goal  Note: Pressure Injury Interventions:  Sensory Interventions: Assess changes in LOC    Moisture Interventions: Absorbent underpads    Activity Interventions: Pressure redistribution bed/mattress(bed type)    Mobility Interventions: Float heels    Nutrition Interventions: Document food/fluid/supplement intake    Friction and Shear Interventions: Apply protective barrier, creams and emollients                Problem: Patient Education: Go to Patient Education Activity  Goal: Patient/Family Education  Outcome: Progressing Towards Goal     Problem: Patient Education: Go to Patient Education Activity  Goal: Patient/Family Education  Outcome: Progressing Towards Goal     Problem: Patient Education: Go to Patient Education Activity  Goal: Patient/Family Education  Outcome: Progressing Towards Goal     Problem: Nutrition Deficit  Goal: *Optimize nutritional status  Outcome: Progressing Towards Goal

## 2022-04-15 NOTE — PROGRESS NOTES
SELMA:  1. RUR-21%  2. SNF referrals pending-patient will also need insurance auth. 3. BLS transport. 4. Hepatology following. The following facilities were chosen by the family:  1. [facility]-patient/family first choice. CM LVM for J Luis Potter in the admissions, [address] [facility] in [address] [place] [address]. Referral to [facility]. Declined      3. [address] [facility], [phone]. Referral sent to [address] [place] [address] Dr. Millan [phone]. Referral sent in [facility]     5. Katelyn Yates LVM on phone. [phone]- referral faxed to [facility] in the  admission department this morning, [phone]. Sharon contact number is [phone]. Declined. [address] [facility]. [phone]. LVM on phone. Declined. [phone]- JHONATAN spoke with Sharon from [facility], they are unable to accept because of insurance and patient age. They accept patients 58 and older and Medicare primary. JHONATAN spoke with Darcie Balderas at [facility], he requestee referral via email to him- [email or web address]

## 2022-04-15 NOTE — PROGRESS NOTES
Comprehensive Nutrition Assessment    Type and Reason for Visit: Reassess    Nutrition Recommendations/Plan:    1. Continue to encourage PO intakes; continue oral nutrition supplements TID. 2. Obtaing standing scale weight. 3. Continue daily MVI, thiamine, folic acid. Nutrition Assessment:    64 yo female admitted for cirrhosis, AMS.  PMhx: Cirrhosis with ascites s/p paracentesis every week, CKD, +jaundice, esophageal varices s/p banding.      Pt continues to be followed by nutrition services, pt had rapid response called on 3/29 after vomiting blood, had pulled out NG tube on 3/28, was not replaced and pt initiated a PO diet with ONS added. It has been difficult to determine true adequacy of her PO intakes throughout her stay. She reports consuming the 3 supplements added each day, but unable to tell me how much of her meals she is eating. Did voice that she was feeling hungry at the time of my visit. Appears plans for possible liver transplant w/u and per Nephrology may need to initiate HD. AM labs reviewed, Na trending up. No standing scale weights have been taken, and given fluid status (ascites, edema) suspect lean body mass loss greater than scale weights indicating. Her wt is 19# lower than on admission. RD initial assessment 3/7: Weight hx in EMR indicates 17% loss over last 5 months which is significant for time frame.  Pt slightly confused, RN states she is A&O x 2 at baseline.  Pt states she was eating well PTA and that her mom prepares her meals (unsure accuracy of this).  RD notes from recent admission to different hospital indicates very poor to fair PO intakes.  Had multiple ONS ordered. MD ordered pt to be NPO with DHT placed.  Pt initiated tube feedings of Jevity 1.5.        Malnutrition Assessment:  Malnutrition Status:  Severe malnutrition    Context:  Chronic illness     Findings of the 6 clinical characteristics of malnutrition:   Energy Intake:  7 - 75% or less est energy requirements for 1 month or longer  Weight Loss:  7.00 - Greater than 10% over 6 months     Body Fat Loss:  7 - Severe body fat loss, Triceps   Muscle Mass Loss:  7 - Severe muscle mass loss, Thigh (quadriceps)  Fluid Accumulation:  1 - Mild, Ascites,Extremities   Strength:  Not performed     Nutritionally Significant Medications: EPO, folic acid, MVI, Protonix, thiamine, magic mouth wash, synthroid    Estimated Daily Nutrient Needs:  Energy (kcal): 7244-1983 (30-32 kcal/kg); Weight Used for Energy Requirements: Current  Protein (g): 84 (1.5 g/kg); Weight Used for Protein Requirements: Current  Fluid (ml/day): ~1800; Method Used for Fluid Requirements: 1 ml/kcal    Nutrition Related Findings:       BM: 4/15  Edema: 1+ bilateral UE edema, trace bilateral LE  Wounds:  Deep tissue injury (DTI bilateral heels; excoriation and rash to buttock)       Current Nutrition Therapies:   Diet: Soft and bite sized  Supplements: Ensure Enlive TID  Additional Caloric Sources: None    Meal intake:    % Diet Eaten   04/14/22 0956 1 - 25%   04/08/22 1932 1 - 25%   04/08/22 1507 51 - 75%     Supplement Intake: No data found.     Anthropometric Measures:  · Height:  5' 4\" (162.6 cm)  · Current Body Wt:  56.1 kg (123 lb 10.9 oz)   · Admission Body Wt:  144 lb 2.9 oz    · Ideal Body Wt:  120:  103.1 %    · BMI Categories:  Underweight (BMI less than 22) age over 72     Wt Readings from Last 10 Encounters:   04/06/22 56.1 kg (123 lb 11.2 oz)   02/17/22 68 kg (149 lb 14.6 oz)   12/13/21 69.4 kg (153 lb)   10/19/21 82 kg (180 lb 12.4 oz)   10/14/17 81.6 kg (180 lb)       Nutrition Diagnosis:   · Increased nutrient needs related to altered GI function,impaired nutrient utilization as evidenced by intake 26-50%,weight loss    · Unintended weight loss related to inadequate protein-energy intake as evidenced by weight loss greater than or equal to 10% in 6 months      Nutrition Interventions:   Food and/or Nutrient Delivery: Continue oral nutrition supplement,Continue current diet  Nutrition Education and Counseling: No recommendations at this time  Coordination of Nutrition Care: Continue to monitor while inpatient    Goals:   Tolerance of 50-75% of all meals and supplements over the next week       Nutrition Monitoring and Evaluation:   Behavioral-Environmental Outcomes: None identified  Food/Nutrient Intake Outcomes: Food and nutrient intake,Supplement intake  Physical Signs/Symptoms Outcomes: Biochemical data,GI status,Weight    Discharge Planning:    Continue oral nutrition supplement     Iqra Louis RD     Contact via [employer] or ext [phone]

## 2022-04-15 NOTE — PROGRESS NOTES
[facility]   [address] [facility], [facility] [address], [facility]  Phone: [phone]   [phone]       Nephrology Progress Note  Sj Smith     1960     [id number]  Date of Admission : 3/5/2022  04/15/22    CC:  Follow up for greg    Assessment and Plan   GREG:  - 2/2 HRS   - Cr aurora post GI bleed and has stabilized around 4  - cont octreotide and midodrine  - although there is no emergent need for RRT, I believe that it is better for her to be on  dialysis if she has to remain stable for transplant. Will d/w hepatology   - daily labs and I/Os    Hyponatremia:  - 2/2 cirrhosis  - Na stable today  - cont FR    Hypokalemia:  - replete PRN    Acidosis:  - improving     Hypokalemia:  - repletion prn    Severe anemia:  GI bleed  - On EDWIN TTS  - transfusions per primary team      Cirrhosis, Portal HTN, PBC  - poral gastropathy  - being w/u for [facility] transplant      Hypothyroidism    Malnutrition:  - of TF, oral intake     Interval History:  Seen and examined. Awake, alert. No new sx   No cp or sob reported. Review of Systems: A comprehensive review of systems was negative except for that written in the HPI.     Current Medications:   Current Facility-Administered Medications   Medication Dose Route Frequency    nystatin (MYCOSTATIN) 100,000 unit/gram cream   Topical TID    sodium bicarbonate tablet 650 mg  650 mg Oral TID    magic mouthwash cpd (without sucralfate)  5 mL Oral TIDAC    0.9% sodium chloride infusion 250 mL  250 mL IntraVENous PRN    pantoprazole (PROTONIX) 40 mg in 0.9% sodium chloride 10 mL injection  40 mg IntraVENous Q12H    sodium chloride (NS) flush 5-40 mL  5-40 mL IntraVENous Q8H    sodium chloride (NS) flush 5-40 mL  5-40 mL IntraVENous PRN    simethicone (MYLICON) 89NG/9.0RI oral drops 80 mg  1.2 mL Oral Multiple    promethazine (PHENERGAN) tablet 12.5 mg  12.5 mg Oral Q6H PRN    epoetin vic-epbx (RETACRIT) injection 10,000 Units  10,000 Units SubCUTAneous Q TUE, THU & SAT    multivitamin, tx-iron-ca-min (THERA-M w/ IRON) tablet 1 Tablet  1 Tablet Oral DAILY    balsam peru-castor oiL (VENELEX) ointment   Topical BID    zinc oxide-cod liver oil (DESITIN) 40 % paste   Topical PRN    sodium chloride (NS) flush 5-40 mL  5-40 mL IntraVENous Q8H    sodium chloride (NS) flush 5-40 mL  5-40 mL IntraVENous PRN    acetaminophen (TYLENOL) tablet 650 mg  650 mg Oral Q6H PRN    Or    acetaminophen (TYLENOL) suppository 650 mg  650 mg Rectal Q6H PRN    polyethylene glycol (MIRALAX) packet 17 g  17 g Oral DAILY PRN    ondansetron (ZOFRAN ODT) tablet 4 mg  4 mg Oral Q8H PRN    Or    ondansetron (ZOFRAN) injection 4 mg  4 mg IntraVENous T8K PRN    folic acid (FOLVITE) tablet 1 mg  1 mg Oral DAILY    levothyroxine (SYNTHROID) tablet 50 mcg  50 mcg Oral 6am    midodrine (PROAMATINE) tablet 15 mg  15 mg Oral TID WITH MEALS    octreotide (SANDOSTATIN) injection 100 mcg  100 mcg IntraVENous TID    rifAXIMin (XIFAXAN) tablet 550 mg  550 mg Oral BID    sertraline (ZOLOFT) tablet 100 mg  100 mg Oral DAILY    thiamine HCL (B-1) tablet 100 mg  100 mg Oral DAILY    ursodioL (ACTIGALL) tablet 500 mg (Patient Supplied)  500 mg Oral Q12H    glucagon (GLUCAGEN) injection 1 mg  1 mg IntraMUSCular PRN    naloxone (NARCAN) injection 0.1 mg  0.1 mg IntraVENous PRN      Allergies   Allergen Reactions    Morphine Other (comments)     Severe HA. ALL narcotics!!!       Objective:  Vitals:    Vitals:    04/14/22 1817 04/14/22 1936 04/15/22 0227 04/15/22 0917   BP: (!) 105/55 (!) 115/58 109/67 (!) 110/57   Pulse:  82 80 68   Resp:  17 17 18   Temp:  98.9 °F (37.2 °C) 98 °F (36.7 °C) 98.5 °F (36.9 °C)   SpO2:  98%     Weight:       Height:         Intake and Output:  No intake/output data recorded. 04/13 1901 - 04/15 0700  In: -   Out: 9849 [Urine:375; Drains:2450]    Physical Examination:  General: No distress.  confused  HEENT:           Pale and icteric   Neck:  Supple, no mass  Resp:  Lungs CTA B/L  CV:  RRR,  no murmur or rub,no  LE edema  GI:  Soft, NT, + Bowel sounds, + paracentesis drain  Neurologic:  AAO X 3  :  + Beckford     [x]    High complexity decision making was performed  [x]    Patient is at high-risk of decompensation with multiple organ involvement    Lab Data Personally Reviewed: I have reviewed all the pertinent labs, microbiology data and radiology studies during assessment.     Recent Labs     04/15/22  0121 04/14/22  0500 04/13/22  0323   *  132* 130* 125*   K 3.5  3.5 3.5 3.7   CL 94*  94* 92* 91*   CO2 22  25 23 20*   *  106* 90 103*   *  128* 123* 117*   CREA 4.13*  4.16* 4.04* 3.99*   CA 8.9  9.3 9.4 8.7   PHOS 4.4 5.0* 5.7*   ALB 3.9 4.6 3.4*     Recent Labs     04/15/22  0121 04/14/22  0500 04/13/22  0323   WBC 7.3 5.4 5.4   HGB 7.4* 7.6* 7.8*   HCT 22.9* 22.7* 23.6*   PLT 35* 26* 27*     No results found for: SDES  Lab Results   Component Value Date/Time    Culture result: NO GROWTH 4 DAYS 03/14/2022 06:48 PM    Culture result: NO GROWTH 4 DAYS 03/12/2022 06:00 PM    Culture result: CANDIDA ALBICANS (A) 03/05/2022 09:22 PM     Recent Results (from the past 24 hour(s))   RENAL FUNCTION PANEL    Collection Time: 04/15/22  1:21 AM   Result Value Ref Range    Sodium 132 (L) 136 - 145 mmol/L    Potassium 3.5 3.5 - 5.1 mmol/L    Chloride 94 (L) 97 - 108 mmol/L    CO2 22 21 - 32 mmol/L    Anion gap 16 (H) 5 - 15 mmol/L    Glucose 101 (H) 65 - 100 mg/dL     (H) 6 - 20 MG/DL    Creatinine 4.13 (H) 0.55 - 1.02 MG/DL    BUN/Creatinine ratio 29 (H) 12 - 20      GFR est AA 13 (L) >60 ml/min/1.73m2    GFR est non-AA 11 (L) >60 ml/min/1.73m2    Calcium 8.9 8.5 - 10.1 MG/DL    Phosphorus 4.4 2.6 - 4.7 MG/DL    Albumin 3.9 3.5 - 5.0 g/dL   CBC WITH AUTOMATED DIFF    Collection Time: 04/15/22  1:21 AM   Result Value Ref Range    WBC 7.3 3.6 - 11.0 K/uL    RBC 2.41 (L) 3.80 - 5.20 M/uL    HGB 7.4 (L) 11.5 - 16.0 g/dL    HCT 22.9 (L) 35.0 - 47.0 %    MCV 95.0 80.0 - 99.0 FL    MCH 30.7 26.0 - 34.0 PG    MCHC 32.3 30.0 - 36.5 g/dL    RDW 20.5 (H) 11.5 - 14.5 %    PLATELET 35 (LL) 489 - 400 K/uL    MPV 10.5 8.9 - 12.9 FL    NRBC 0.0 0  WBC    ABSOLUTE NRBC 0.00 0.00 - 0.01 K/uL    NEUTROPHILS 78 (H) 32 - 75 %    LYMPHOCYTES 10 (L) 12 - 49 %    MONOCYTES 11 5 - 13 %    EOSINOPHILS 0 0 - 7 %    BASOPHILS 0 0 - 1 %    IMMATURE GRANULOCYTES 1 (H) 0.0 - 0.5 %    ABS. NEUTROPHILS 5.7 1.8 - 8.0 K/UL    ABS. LYMPHOCYTES 0.7 (L) 0.8 - 3.5 K/UL    ABS. MONOCYTES 0.8 0.0 - 1.0 K/UL    ABS. EOSINOPHILS 0.0 0.0 - 0.4 K/UL    ABS. BASOPHILS 0.0 0.0 - 0.1 K/UL    ABS. IMM. GRANS. 0.1 (H) 0.00 - 0.04 K/UL    DF SMEAR SCANNED      RBC COMMENTS ANISOCYTOSIS  2+        RBC COMMENTS MACROCYTOSIS  1+        RBC COMMENTS AIRAM CELLS  PRESENT       METABOLIC PANEL, BASIC    Collection Time: 04/15/22  1:21 AM   Result Value Ref Range    Sodium 132 (L) 136 - 145 mmol/L    Potassium 3.5 3.5 - 5.1 mmol/L    Chloride 94 (L) 97 - 108 mmol/L    CO2 25 21 - 32 mmol/L    Anion gap 13 5 - 15 mmol/L    Glucose 106 (H) 65 - 100 mg/dL     (H) 6 - 20 MG/DL    Creatinine 4.16 (H) 0.55 - 1.02 MG/DL    BUN/Creatinine ratio 31 (H) 12 - 20      GFR est AA 13 (L) >60 ml/min/1.73m2    GFR est non-AA 11 (L) >60 ml/min/1.73m2    Calcium 9.3 8.5 - 10.1 MG/DL                                       Care Plan discussed with:  Patient     Family      RN      Consulting Physician /Specialist        I have reviewed the flowsheets. Chart and Pertinent Notes have been reviewed. No change in PMH ,family and social history from Consult note.       Buddy Katz MD

## 2022-04-15 NOTE — PROGRESS NOTES
Problem: Mobility Impaired (Adult and Pediatric)  Goal: *Acute Goals and Plan of Care (Insert Text)  Description: FUNCTIONAL STATUS PRIOR TO ADMISSION: Patient appears to be an inconsistent historian despite being oriented x 4. Originally stated she primarily stays in bed-later she reported ambulating a few times a day and spending most of her day in a recliner. She consistently reports that son assists her \"with everything\" but does not give more details on what exactly or how much assistance. HOME SUPPORT PRIOR TO ADMISSION: The patient lived with son and her mother per report. Unclear how much assistance she required. Physical Therapy Goals  Weekly re-assessment 4/15/2022  1. Patient will move from supine to sit and sit to supine and roll side to side in bed with stand-by- assistance within 7 day(s). 2.  Patient will transfer from bed to chair and chair to bed with moderate assistance using the least restrictive device within 7 day(s). 3.  Patient will perform sit to stand with minimal assistance x1 within 7 day(s). 4.  Patient will ambulate with moderate assistance for 5 feet with the least restrictive device within 7 day(s). Weekly re-assessment 4/7/2022  1. Patient will move from supine to sit and sit to supine  and roll side to side in bed with moderate assistance within 7 day(s). 2.  Patient will transfer from bed to chair and chair to bed with maximal assistance using the least restrictive device within 7 day(s). 3.  Patient will perform sit to stand with moderate assistance within 7 day(s). 4.  Patient will ambulate with maximal assistance for 5 feet with the least restrictive device within 7 day(s). 5.  Patient will tolerate seated EOB x 10 minutes with min A within 7 days    Reassessed 4/1/2022 s/p after transfer to ICU  1. Patient will move from supine to sit and sit to supine  and roll side to side in bed with maximal assistance within 7 day(s).     2.  Patient will transfer from bed to chair and chair to bed with maximal assistance using the least restrictive device within 7 day(s). 3.  Patient will perform sit to stand with maximal assistance within 7 day(s). 4.  Patient will ambulate with maximal assistance for 5 feet with the least restrictive device within 7 day(s). 5.  Patient will tolerate seated EOB x 10 minutes with min A within 7 days    Re-Assessed 3/21/2022  1. Patient will move from supine to sit and sit to supine , scoot up and down, and roll side to side in bed with minimal assistance/contact guard assist within 7 day(s). 2.  Patient will transfer from bed to chair and chair to bed with minimal assistance/contact guard assist using the least restrictive device within 7 day(s). 3.  Patient will perform sit to stand with minimal assistance/contact guard assist within 7 day(s). 4.  Patient will ambulate with moderate assistance  for 10 feet with the least restrictive device within 7 day(s). Initiated 3/6/2022  1. Patient will move from supine to sit and sit to supine , scoot up and down, and roll side to side in bed with minimal assistance/contact guard assist within 7 day(s). 2.  Patient will transfer from bed to chair and chair to bed with moderate assistance  using the least restrictive device within 7 day(s). 3.  Patient will perform sit to stand with moderate assistance  within 7 day(s). 4.  Patient will ambulate with moderate assistance  for 20 feet with the least restrictive device within 7 day(s). 5.  Patient will ascend/descend 2 stairs with 2 handrail(s) with moderate assistance  within 7 day(s).    Outcome: Progressing Towards Goal     PHYSICAL THERAPY TREATMENT: WEEKLY REASSESSMENT  Patient: Jayne Hassan (23 y.o. female)  Date: 4/15/2022  Primary Diagnosis: [facility] [K74.60]  Procedure(s) (LRB):  ESOPHAGOGASTRODUODENOSCOPY (EGD) (N/A) 17 Days Post-Op   Precautions: Fall,Skin      ASSESSMENT  Patient continues with skilled PT services and is progressing towards goals. Demonstrating significant progress this date and patient was more alert and participative than in previous session. Cognitive status remains with deficits, although improved, and patient requires increased time with all tasks. Able to sit edge of bed for ~15 minutes at an overall supervision level while performing lower extremity exercises and ADLs. Still unable to safely progress to out of bed to chair this date but able to stand briefly with assist x2. Would benefit from sitting edge of bed for ADLs or activity with nursing staff or mobility team a few times a day to help expedite progress towards goals. Acute PT will continue to follow and progress as able. Recommend discharge to SNF for ongoing therapy. Anticipate slow progress. Patient's progression toward goals since last assessment: Met following goals:  Patient will tolerate seated EOB x 10 minutes with min A within 7 days  Patient will move from supine to sit and sit to supine  and roll side to side in bed with moderate assistance within 7 day(s). Goals readdressed above    Current Level of Function Impacting Discharge (mobility/balance): minimal assist x2 bed mobility and sit<>stand. Requires constant support and uses lower extremities against bed for standing briefly    Other factors to consider for discharge: workup [address] transplant, wounds & low platelets         PLAN :  Goals have been updated based on progression since last assessment. Patient continues to benefit from skilled intervention to address the above impairments. Recommendations and Planned Interventions: bed mobility training, transfer training, gait training, therapeutic exercises, neuromuscular re-education, edema management/control, patient and family training/education and therapeutic activities      Frequency/Duration: Patient will be followed by physical therapy:  3 times a week to address goals.     Recommendation for discharge: (in order for the patient to meet his/her long term goals)  Therapy up to 5 days/week in SNF setting    This discharge recommendation:  Has been made in collaboration with the attending provider and/or case management    IF patient discharges home will need the following DME: to be determined (TBD)       SUBJECTIVE:   Patient stated It's Wednesday? Martha Woodson    OBJECTIVE DATA SUMMARY:   HISTORY:    Past Medical History:   Diagnosis Date    Anxiety 10/19/2021    Basal cell carcinoma (BCC) of face 10/19/2021    Chronic kidney disease     \"end stage liver disease\" per son    Cirrhosis of liver not due to alcohol ([facility]     Depression 10/19/2021    Thyroid disease      Past Surgical History:   Procedure Laterality Date    HX  SECTION      x2    HX ORTHOPAEDIC Right     x2       Personal factors and/or comorbidities impacting plan of care: [address] workup    Home Situation  Home Environment: Private residence  # Steps to Enter: 2  Rails to Enter: Yes  Hand Rails : Bilateral  One/Two Story Residence: Two story  Living Alone: No  Support Systems: Parent(s),Child(joe)  Patient Expects to be Discharged to[de-identified] Skilled nursing facility  Current DME Used/Available at Home: [address], bedside  Tub or Shower Type: Shower    EXAMINATION/PRESENTATION/DECISION MAKING:   Critical Behavior:  Neurologic State: Alert,Confused  Orientation Level: Oriented to person,Oriented to place,Disoriented to time,Disoriented to situation (knew hospital but not which hospital)  Cognition: Follows commands  Safety/Judgement: Decreased awareness of environment,Decreased insight into deficits,Decreased awareness of need for assistance,Decreased awareness of need for safety  Hearing:   Auditory  Auditory Impairment: None  Skin:  Sacral wounds per chart not visualized, dressing to bilateral forearms with mild sanguinous drainage (RN aware)    Functional Mobility:  Bed Mobility:  Supine to Sit: Minimum assistance;Assist x2  Sit to Supine: Minimum assistance  Transfers:  Sit to Stand: Minimum assistance;Assist x2  Stand to Sit: Minimum assistance;Assist x2  Balance:   Sitting: Impaired; Intact  Sitting - Static: Good (unsupported)  Sitting - Dynamic: Good (unsupported) (performing ADLs, fair scooting edge of bed)  Standing: Impaired; With support  Standing - Static: Fair;Constant support;Poor (LEs against bed)  Standing - Dynamic : Poor;Constant support (to attempt side steps)    Therapeutic Exercises:   Long arc quads, seated march while sitting edge of bed ~8 reps to fatigue    Pain Rating:  Grimaced in pain with adjustment of gown in bed, otherwise no complaints    Activity Tolerance:   Fair    After treatment patient left in no apparent distress:   Supine in bed and Call bell within reach    COMMUNICATION/EDUCATION:   The patients plan of care was discussed with: Occupational therapist, Registered nurse and Case management. Fall prevention education was provided and the patient/caregiver indicated understanding. and Patient understands intent and goals of therapy, but is neutral about his/her participation.     Thank you for this referral.  Brien Gray, PT   Time Calculation: 32 mins

## 2022-04-15 NOTE — PROGRESS NOTES
Problem: Self Care Deficits Care Plan (Adult)  Goal: *Acute Goals and Plan of Care (Insert Text)  Description: FUNCTIONAL STATUS PRIOR TO ADMISSION: Chart review 2/23/2022 patient modified independent with RW. Son states recently increased assistance to [facility] with functional mobility and sit<>stand; tangential at times during conversation, but aware and able to be redirected. HOME SUPPORT: The patient lived with mother who provided assistance as needed and son lives nearby. 1 step to enter, stays on main level, walk-in-shower, DME: Shower chair, RW, BSC, w/c    Occupational Therapy Goals  Medical Re-Evaluation s/p ICU transfer for GIB and worsening cirrhosis, goals modified below; revised 4/8/2022; Weekly Re-evaluation 4/15/2022  1. Patient will perform at least one grooming task in supported sitting with moderate assistance within 7 day(s). - Met and upgraded to S/U with no sequencing cues  2. Patient will perform bathing from anterior neck to thigh with maximal assistance within 7 day(s). -Met and upgraded to Mod A  3. Patient will perform upper body dressing with moderate assistance within 7 day(s). -Continue  4. Patient will perform rolling in supine for toilet transfers with maximal assistance within 7 day(s). -Met at [facility] and upgraded to pt will perform bed mobility, to/from EOB, with Supervision  6. Patient will complete self-feeding tasks with moderate assistance within 7 day(s). MET 4/8/2022 cutting food with modified independence-Continue  5. Patient will participate in upper extremity therapeutic exercise/activities with moderate assistance for 10 minutes within 7 day(s). -Continue   7. Patient will utilize energy conservation techniques during functional activities with verbal cues within 7 day(s). -Continue    Initiated 3/8/2022, Re-assessed to increase frequency of services on 3/9/2022 due to significant improvement in patient's performance . Reviewed 3/17/2022; Weekly Re-assessment 3/24/2022    1. Patient will perform grooming with minimal assistance/contact guard assist within 7 day(s). -Continue  2. Patient will perform bathing from anterior neck to thigh with minimal assistance/contact guard assist within 7 day(s). -Continue  3. Patient will perform upper body dressing with minimal assistance/contact guard assist within 7 day(s). -Continue  4. Patient will perform toilet transfers with moderate assistance  within 7 day(s). -Continue  5. Patient will perform all aspects of toileting with moderate assistance  within 7 day(s). -Continue  6. Patient will participate in upper extremity therapeutic exercise/activities with minimal assistance/contact guard assist for 10 minutes within 7 day(s). -Continue  7. Patient will utilize energy conservation techniques during functional activities with verbal cues within 7 day(s). -Continue    Outcome: Not Met    OCCUPATIONAL THERAPY TREATMENT/WEEKLY RE-ASSESSMENT  Patient: Sheron Zarate (34 y.o. female)  Date: 4/15/2022  Diagnosis: [facility] [K74.60] Cirrhosis ([facility]  Procedure(s) (LRB):  ESOPHAGOGASTRODUODENOSCOPY (EGD) (N/A) 17 Days Post-Op  Precautions: Fall,Skin  Chart, occupational therapy assessment, plan of care, and goals were reviewed. ASSESSMENT  Patient continues with skilled OT services and is not consistently progressing towards goals. Pt continues to demonstrate confusion with decreased problem solving/sequencing/safety, decreased task initiation, decreased strength/endurance, decreased mobility/balance and decreased activity tolerance which limits functional safety. Pt noted with progressive ADL independence this date, coming to EOB with Min Ax2, completing EOB oral hygiene with S/U and SBA and standing with Min Ax2; see above POC which has been modified to refect pt's progression.   Pt continues to benefit from skilled OT to address functional deficits during acute hospitalization, with reporting therapist believing pt would benefit from SNF rehab upon discharge. Current Level of Function Impacting Discharge (ADLs): Max A LE ADLs    Other factors to consider for discharge: confusion         PLAN :  Goals have been updated based on progression since last assessment. Patient continues to benefit from skilled intervention to address the above impairments. Continue to follow patient 3 times a week to address goals. Recommend with staff: Frequent positional changes, Active ADL engagement    Recommend next OT session: POC progression    Recommendation for discharge: (in order for the patient to meet his/her long term goals)  Therapy up to 5 days/week in SNF setting    This discharge recommendation:  Has been made in collaboration with the attending provider and/or case management    IF patient discharges home will need the following DME: TBD       SUBJECTIVE:   Patient stated Thanks for letting me brush my teeth.     OBJECTIVE DATA SUMMARY:   Cognitive/Behavioral Status:  Neurologic State: Alert;Confused  Orientation Level: Oriented to person;Oriented to place; Disoriented to situation;Disoriented to time  Cognition: Follows commands;Memory loss;Poor safety awareness  Perception: Appears intact  Perseveration: No perseveration noted  Safety/Judgement: Decreased awareness of need for assistance;Decreased awareness of need for safety;Decreased insight into deficits    Functional Mobility and Transfers for ADLs:  Bed Mobility:  Supine to Sit: Minimum assistance;Assist x2  Sit to Supine: Minimum assistance    Transfers:  Sit to Stand: Minimum assistance;Assist x2    Balance:  Sitting: Impaired; Intact  Sitting - Static: Good (unsupported)  Sitting - Dynamic: Good (unsupported) (performing ADLs, fair scooting edge of bed)  Standing: Impaired; With support  Standing - Static: Fair;Constant support;Poor (LEs against bed)  Standing - Dynamic : Poor;Constant support (to attempt side steps)    ADL Intervention:  Grooming  Grooming Assistance: Set-up; Stand-by assistance  Position Performed: Seated edge of bed  Brushing Teeth: Set-up; Stand-by assistance    Lower Body Dressing Assistance  Socks: Total assistance (dependent)  Position Performed: Seated edge of bed    Cognitive Retraining  Safety/Judgement: Decreased awareness of need for assistance;Decreased awareness of need for safety;Decreased insight into deficits    Pain:  No c/o pain    Activity Tolerance:   Poor and requires frequent rest breaks    After treatment patient left in no apparent distress:   Supine in bed, Call bell within reach, Bed / chair alarm activated, and Side rails x 3    COMMUNICATION/COLLABORATION:   The patients plan of care was discussed with: Physical therapist, Registered nurse, and Case management.      Bard Ricky OT  Time Calculation: 32 mins

## 2022-04-15 NOTE — PROGRESS NOTES
Hospitalist Progress Note    NAME: Tito Rogers   :  1960   MRN:  [id number]       Assessment / Plan:  Acute blood loss anemia/GI bleed  s/p 4 units of PRBCs   s/p EGD on   Hb 7.8 today, platelet 27, thrombocytopenia likely d/t Cirrhosis  C/w PPI BID   Hepatology following  peripherally     Liver cirrhosis  with portal HTN.  Felt to be secondary to [facility]  Transaminitis/hyperbilirubinemia/coagulopathy/thrombocytopenia  Hepatic Encephalopathy     -Transferred from [facility] with prolonged hospitalization.  Transferred for further work-up for liver transplant eval  -EGD on  [place] noted for distal esophagitis and portal hypertensive gastropathy, no evidence of varices.    History of esophageal varices back in 2021 status post banding at that time.    -Per GI at outlying facility, patient was deemed not to be a candidate for colonoscopy and recommended Cologuard.    s/p MRCP on  which was noted for cirrhosis, portal hypertension, and large ascites.   s/p cardiac stress test which was negative for ischemia, EF of 69%  Continue lactulose, rifaximin, Protonix  Continue ursodiol  Paracentesis  3/25 with 24763 removed  PT/OT following     GREG with metabolic acidosis. persisent  Hyponatremia  -Baseline 0.9 on 2021   -Likely due to [facility]  -Nephrology following, recommendations appreciated. - off bicarb drip on oral bicarb  Creatinine around 4. Discussed with nephrology.  C/w albumin/ midodrine  Hyponatremia secondary to liver cirrhosis, today decreased to 125  C/w free water restriction     Refractory ascites  -Moderate large ascites on US 3/6.  -Ascites said to be refractory to diuretics, HRS limiting use of diuretics  -Ascitic drain that was placed on 3/12 was apparently clogged, flushed and drained significant amount of ascites  -Ascitic fluid lab negative for SBP  Will probably need repeat paracentesis prior to discharge     Recent E. coli UTI  -Treated with Vanco and Zosyn then transitioned to Rocephin, completed 7-day antibiotic course as of 2/17  -Replace Steve with new catheter 3/5 .  Urine culture on 3/5 grew Candida albicans 75,000 CFU. - nephrology desires that steve stay in for now      Hypothyroid  -Continue levothyroxine    chronic anemiacontinue EDWIN per nephrology     Severe malnutrition  --Encourage oral intake   --On regular diet and tolerating     Generalized weakness/chronic wounds. pta  -PT, OT consult  -Wound care following     We are still pending placement. Patient has been driving a few places and referrals pending in other places. Continue current management. Estimated discharge date: >48 hours  Barriers: Creatinine/ Sodium stabilization    Code status: DNR  Prophylaxis: SCD's  Recommended Disposition: SNF/LTC     Subjective:     Chief Complaint / Reason for Physician Visit  Patient seen and examined for follow-up of GREG, hepatorenal syndrome, cirrhosis, and other. Patient seen and examined earlier today by me. Patient has no acute complaints at this time. Patient is eating at the time of my exam.    Review of Systems:  Symptom Y/N Comments  Symptom Y/N Comments   Fever/Chills n   Chest Pain n    Poor Appetite n   Edema n    Cough n   Abdominal Pain     Sputum    Joint Pain     SOB/ROBINS    Pruritis/Rash     Nausea/vomit    Tolerating PT/OT     Diarrhea    Tolerating Diet     Constipation    Other       Could NOT obtain due to:      Objective:     VITALS:   Last 24hrs VS reviewed since prior progress note.  Most recent are:  Patient Vitals for the past 24 hrs:   Temp Pulse Resp BP SpO2   04/15/22 1358 98.3 °F (36.8 °C) 74 18 104/62 96 %   04/15/22 0917 98.5 °F (36.9 °C) 68 18 (!) 110/57    04/15/22 0227 98 °F (36.7 °C) 80 17 109/67    04/14/22 1936 98.9 °F (37.2 °C) 82 17 (!) 115/58 98 %   04/14/22 1817    (!) 105/55        Intake/Output Summary (Last 24 hours) at 4/15/2022 1449  Last data filed at 4/14/2022 1512  Gross per 24 hour   Intake    Output 1050 ml   Net -1050 ml        I had a face to face encounter and independently examined this patient on 4/15/2022, as outlined below:  PHYSICAL EXAM:  General: No acute distress    EENT:  EOMI. icteric sclerae. MMM  Resp:  CTA bilaterally, no wheezing or rales. No accessory muscle use  CV:  Regular  rhythm,  No edema  GI:  Soft, Distended  Neurologic:  Alert and oriented X 2, normal speech,   Psych:   Good insight. Not anxious nor agitated  Skin:  No rashes. Jaundice    Reviewed most current lab test results and cultures  YES  Reviewed most current radiology test results   YES  Review and summation of old records today    NO  Reviewed patient's current orders and MAR    YES  PMH/SH reviewed - no change compared to H&P  ________________________________________________________________________  Care Plan discussed with:    Comments   Patient y    Family      RN y    Care Manager y    Consultant                       y Multidiciplinary team rounds were held today with , nursing, pharmacist and clinical coordinator. Patient's plan of care was discussed; medications were reviewed and discharge planning was addressed. ________________________________________________________________________  Total NON critical care TIME: 39  Minutes    Total CRITICAL CARE TIME Spent:   Minutes non procedure based      Comments   >50% of visit spent in counseling and coordination of care     ________________________________________________________________________  Poonam Bills MD     Procedures: see electronic medical records for all procedures/Xrays and details which were not copied into this note but were reviewed prior to creation of Plan. LABS:  I reviewed today's most current labs and imaging studies.   Pertinent labs include:  Recent Labs     04/15/22  0121 04/14/22  0500 04/13/22  0323   WBC 7.3 5.4 5.4   HGB 7.4* 7.6* 7.8*   HCT 22.9* 22.7* 23.6*   PLT 35* 26* 27*     Recent Labs     04/15/22  0121 04/14/22  0500 04/13/22  0323   *  132* 130* 125*   K 3.5  3.5 3.5 3.7   CL 94*  94* 92* 91*   CO2 22  25 23 20*   *  106* 90 103*   *  128* 123* 117*   CREA 4.13*  4.16* 4.04* 3.99*   CA 8.9  9.3 9.4 8.7   PHOS 4.4 5.0* 5.7*   ALB 3.9 4.6 3.4*       Signed: Pooja Peterson MD

## 2022-04-15 NOTE — PROGRESS NOTES
[address] [facility], Yomi BRICE, Tray Carmine Jacky, [place]      Doneen Lennox, PA-C Fairy Durand, ACNP-BC     Michelle CALLOWAY John, [facility]   MANISHA Palmer, [facility]       Namita Montemayor [facility] [address]    at [address], [address] Katlyn Pizano  22.    [phone]    FAX: [address] [place] [address] [address], [address]    [phone]    FAX: [phone]       HEPATOLOGY PROGRESS NOTE  The patient is well known to me from a previous visit to my office at THE [facility] in [facility]. She is a 66 yo  female who was found to have chronic liver disease in 2020 and cirrhosis in 7/2021 when she developed ascites. She had variceal bleeding in 11/2021. Serologic evaluation for markers of chronic liver disease was positive for AMA. Cirrhosis is due to [facility].     The patient has developed the following complications of cirrhosis: esophageal varices, variceal bleeding, ascites, edema, hepatic encephalopathy, severe muslce wasting    I saw the patient for the first time in 1/2022. She had CTP score 9 and MELD score 21 at that time. We started liver transplant evaluation testing. She developed confusion and could not be aroused and was hospitalized 3 weeks ago at [facility] in 2/202. During that hospitalization she developed worsening malnutrition, worsening of muscle wasting and a few decubitus ulcers. She has been at [facility] since 3/5. She has been receiving Dobbhoff tube feedings and OT/PT.   She has had dramatic improvement in nutritional status, skin wounds have nearly healed, mental status is clear, she is stronger, can move herself around in bed, she can sit in chair for an hour at a time, can feed herself and has been able to stand with PT assistance. Family conference on 3/27/22. I had a 1 hour meeting with the patient and her son who is POA. Next step is to transfer to rehab for for 4+ weeks to improve ambulation, muscle mass so she could possibly be a LT candidate  In order to do this will need to remove feeding tube. In order to do this she will need to be able to eat sufficient calories to maintain her nutritional status  If she does well with in rehab we may be able to get LT eval testing and eventually on LT list.    Hospital course:  She had severe malnutrition, frailty, muscle weakness upon transfer from [facility]  She recieved tube feedings and had aggressive PT/OT for 3 weeks and started to improve. Then had several episodes of hematemesis and spent some time in ICU. GD showed no varices. Only severe portal gastropathy     Overall condition has remained stagnant since feeding tube removed. She has not gotten PT for 3 days because of low PLTS?????? More lethargeic than when I last saw her a few days ago. Awaiting placement to rehab center close to son's home in [facility]. Son has apparently reached out to [facility] LT program.  I will speak with him and see if he actually wants her transferred. Hepatology Plan:   to find rehab place in [place] [facility] area close to son. Continue to push PO and lots of Ensure every day. SHE NEEDS PT every day. A LOW PLT IS NOT A REASON NOT TO DO PT!!!!!!!!!!!!!!!!!!!!!!!      ASSESSMENT AND PLAN:  Cirrhosis  The diagnosis of cirrhosis is based upon imaging, laboratory studies, complications of cirrhosis. Cirrhosis is secondary to [facility]. Not alcohol as she was initially told.     Serologic testing was positive for AMA, ASMA     The CTP is 10. Child class C. The MELD score is 32.     Based upon the MELD and CTP scores the patient has a mortality of about 50% within the next 90 days until we turn her fraily and weakness around.     Right now is is too weak to survive liver transplantation. She needs aggressive nutritional support and both she and her son are in favor of this with the hopes her situation will improve and she could survive liver transplantation.       Primary Biliary Cholangitis  The diagnosis is based upon serology and an elevation in ALP and positive AMA. A liver biopsy has not been performed. PBC is being treated with ANTONIETA 500 mg BID. CKD-HRS  Scr has now stabilized in the 4.0 mg range. Continue midodrine,     Malnutrition  The patient has severe protein-calorie malnutrtion and muscle wasting. This is due to advanced liver disease and refractory ascites. The patient needs as many calories as she can possibly consume   Without aggressive nutritional support she will not survive. She is alert, oriented and understands the need to consume as many Ensure as possible. There are no plans to replace Dobhoff feeding tube at this time. Frailty/muscle wasting  The patient is very frail and working with OPT/PT. She has made great progress but lost some after her trip to ICU. This will be address by rehab. Skin wounds  Still has several pressure wounds on back, buttocks that appear to be healing but are not healed. Ulcers have to be healed for her to be an LT candidate.     Ascites   Ascites developed for the first time in 7/2021. Being managed by periodic paracentesis. No diuretics can be given with CKD and Scr 3-3.5 mg    Hyponatremia  This is secondary cirrhosis and diuretics  Sna has declined from 134 back down to 129 today. No diuretics.       Screening for Esophageal varices   The patient has esophageal varices with prior bleeding. Varices were banded in 11/2021 and 12/2021. The last EGD to assess for varices was performed in 3/2022. No esophageal varices were present.     Hepatic encephalopathy   Overt HE is well controlled on lactulose and xifaxan.     She has been alert and oriented througtout the hospitalization. Can probably stop lactulose and avoid diarrhea and continue on xifaxan alone. Coagulopathy  INR is in the 1.6 range and stable. Thrombocytopenia   This is secondary to cirrhosis. There is no evidence of overt bleeding. No treatment is required. The platelet count is adequate for the patient to undergo procedures without the need for platelet transfusion or platelet growth factors.     Anemia   This is due to multifactorial causes including portal hypertension with chronic GI blood loss, acute GI blood loss 1 week ago, and bone marrow suppression due to severe malnutrition.   HB now stable in the 9 gm range. Thrombocytopenia   This is secondary to cirrhosis. There is no evidence of overt bleeding. No treatment is required. The platelet count is adequate for the patient to undergo procedures without the need for platelet transfusion or platelet growth factors. PHYSICAL EXAMINATION:  VS: per nursing note  General:  Looks much better. Skin color normal.  Eyes:  Sclera non-icteric. ENT:  No oral lesions. Thyroid normal.  Nodes:  No adenopathy. Skin:  Spider angiomata. Multiple diffuse ecchymosis on arms. Respiratory:  Lungs clear to auscultation. Cardiovascular:  Regular heart rate. Abdomen:  Soft non-tender, NO obvious ascites. Extremities:  No lower extremity edema. Severe muscle wasting of upper and lower extremities. Neurologic:  Alert and oriented. Cranial nerves grossly intact. No asterixis. LABORATORY:  Results for Alexandra Diop (MRN [id number]) as of 4/14/2022 20:15   Ref.  Range 4/12/2022 01:19 4/13/2022 03:23 4/14/2022 05:00   WBC Latest Ref Range: 3.6 - 11.0 K/uL 7.2 5.4 5.4   HGB Latest Ref Range: 11.5 - 16.0 g/dL 8.8 (L) 7.8 (L) 7.6 (L)   PLATELET Latest Ref Range: 150 - 400 K/uL 39 (LL) 27 (LL) 26 (LL)   Sodium Latest Ref Range: 136 - 145 mmol/L 127 (L) 125 (L) 130 (L)   Potassium Latest Ref Range: 3.5 - 5.1 mmol/L 3.8 3.7 3.5   Chloride Latest Ref Range: 97 - 108 mmol/L 91 (L) 91 (L) 92 (L)   CO2 Latest Ref Range: 21 - 32 mmol/L 21 20 (L) 23   Glucose Latest Ref Range: 65 - 100 mg/dL 146 (H) 103 (H) 90   BUN Latest Ref Range: 6 - 20 MG/ (H) 117 (H) 123 (H)   Creatinine Latest Ref Range: 0.55 - 1.02 MG/DL 4.00 (H) 3.99 (H) 4.04 (H)   Albumin Latest Ref Range: 3.5 - 5.0 g/dL 2.8 (L) 3.4 (L) 4.6     RADIOLOGY:  Ultrasound of liver. Echogenic consistent with cirrhosis. No liver mass lesions. No dilated bile ducts. Moderate ascites.         MD Sujata Villanueva[address] [address] [place] [address] [address].  [phone]  [address] [place] [address]

## 2022-04-16 NOTE — PROGRESS NOTES
[address] [facility], MD, [address], [place], [place]      Yani Singer, ISAAC Metz, [facility]-BC     Michelle Lopez, [facility]   MANISHA Zafar, [facility]       Namita Montemayor [place] [address]    at [address], [facility] Katlyn Pizano  22.    [phone]    FAX: [address] [place] [address], [address]    [phone]    FAX: [phone]       HEPATOLOGY PROGRESS NOTE  The patient is well known to me from a previous visit to my office at THE [facility] in [facility]. She is a 66 yo  female who was found to have chronic liver disease in 2020 and cirrhosis in 7/2021 when she developed ascites. She had variceal bleeding in 11/2021. Serologic evaluation for markers of chronic liver disease was positive for AMA. Cirrhosis is due to [facility].     The patient has developed the following complications of cirrhosis: esophageal varices, variceal bleeding, ascites, edema, hepatic encephalopathy, severe muslce wasting    I saw the patient for the first time in 1/2022. She had CTP score 9 and MELD score 21 at that time. We started liver transplant evaluation testing. She developed confusion and could not be aroused and was hospitalized 3 weeks ago at [facility] in 2/202. During that hospitalization she developed worsening malnutrition, worsening of muscle wasting and a few decubitus ulcers. She has been at [facility] since 3/5. She has been receiving Dobbhoff tube feedings and OT/PT.   She has had dramatic improvement in nutritional status, skin wounds have nearly healed, mental status is clear, she is stronger, can move herself around in bed, she can sit in chair for an hour at a time, can feed herself and has been able to stand with PT assistance. Family conference on 3/27/22. I had a 1 hour meeting with the patient and her son who is POA. Next step is to transfer to rehab for for 4+ weeks to improve ambulation, muscle mass so she could possibly be a LT candidate  In order to do this will need to remove feeding tube. In order to do this she will need to be able to eat sufficient calories to maintain her nutritional status  If she does well with rehab we may be able to get LT eval testing and eventually on LT list.    Hospital course:  She had severe malnutrition, frailty, muscle weakness upon transfer from [facility]  She recieved tube feedings and had aggressive PT/OT for 3 weeks and started to improve. Then had several episodes of hematemesis and spent some time in ICU. GD showed no varices. Only severe portal gastropathy     Overall condition has remained stagnant since feeding tube removed. Restarted OT and PT today. More lethargeic than when I last saw her a few days ago. Awaiting placement to rehab center close to son's home in [place]. Spoke to son Eliana Mcnamara about plan. He had reached out to [facility] LT program in January when she was first ill and prior to her coming to see me. They finally got back with him this week. He is happy with care and progress we have made to date. He wants to continue with our current plan. Hepatology Plan:   to find rehab place in [place] area close to son. Continue to push PO and lots of Ensure every day. She needs OT/PT every day. ASSESSMENT AND PLAN:  Cirrhosis  The diagnosis of cirrhosis is based upon imaging, laboratory studies, complications of cirrhosis. Cirrhosis is secondary to [facility]. Not alcohol as she was initially told.     Serologic testing was positive for AMA, ASMA     The CTP is 10. Child class C.   The MELD score is 32.     Based upon the MELD and CTP scores the patient has a mortality of about 50% within the next 90 days until we turn her fraily and weakness around. Right now is is too weak to survive liver transplantation. She needs aggressive nutritional support and both she and her son are in favor of this with the hopes her situation will improve and she could survive liver transplantation.       Primary Biliary Cholangitis  The diagnosis is based upon serology and an elevation in ALP and positive AMA. A liver biopsy has not been performed. PBC is being treated with ANTONIETA 500 mg BID. CKD-HRS  Scr has now stabilized in the 4.0 mg range. Continue midodrine,     Spoke with Dr Ambrose Ying today and renal function. He is concerned that she will do poorly at rehab, need readmission and have to start on dialysis. He questioned if we should start dialysis as now  She has now real indications to start dialysis now vs wait. No fluid overload, NA, K are normal.  She is making urine  Spoke to son about dialysis. He and I prefer to continue as we are without dialysis for now as this would interfere with daily rehab    Malnutrition  The patient has severe protein-calorie malnutrtion and muscle wasting. This is due to advanced liver disease and refractory ascites. The patient needs as many calories as she can possibly consume   Without aggressive nutritional support she will not survive. She is alert, oriented and understands the need to consume as many Ensure as possible. There are no plans to replace Dobhoff feeding tube at this time. Frailty/muscle wasting  The patient is very frail and working with OPT/PT. She has made great progress but lost some after her trip to ICU. This will be address by rehab. Skin wounds  Still has several pressure wounds on back, buttocks that appear to be healing but are not healed. Ulcers have to be healed for her to be an LT candidate.     Ascites   Ascites developed for the first time in 7/2021. Being managed by periodic paracentesis.     No diuretics can be given with CKD and Scr 3-3.5 mg    Hyponatremia  This is secondary cirrhosis and diuretics  Sna has declined from 134 back down to 129 today. No diuretics.       Screening for Esophageal varices   The patient has esophageal varices with prior bleeding. Varices were banded in 11/2021 and 12/2021. The last EGD to assess for varices was performed in 3/2022. No esophageal varices were present.     Hepatic encephalopathy   Overt HE is well controlled on lactulose and xifaxan. She has been alert and oriented througtout the hospitalization. Can probably stop lactulose and avoid diarrhea and continue on xifaxan alone. Coagulopathy  INR is in the 1.6 range and stable. Thrombocytopenia   This is secondary to cirrhosis. There is no evidence of overt bleeding. No treatment is required. The platelet count is adequate for the patient to undergo procedures without the need for platelet transfusion or platelet growth factors.     Anemia   This is due to multifactorial causes including portal hypertension with chronic GI blood loss, acute GI blood loss 1 week ago, and bone marrow suppression due to severe malnutrition.   HB now stable in the 9 gm range. Thrombocytopenia   This is secondary to cirrhosis. There is no evidence of overt bleeding. No treatment is required. The platelet count is adequate for the patient to undergo procedures without the need for platelet transfusion or platelet growth factors. PHYSICAL EXAMINATION:  VS: per nursing note  General:  Looks much better. Skin color normal.  Eyes:  Sclera non-icteric. ENT:  No oral lesions. Thyroid normal.  Nodes:  No adenopathy. Skin:  Spider angiomata. Multiple diffuse ecchymosis on arms. Respiratory:  Lungs clear to auscultation. Cardiovascular:  Regular heart rate. Abdomen:  Soft non-tender, NO obvious ascites. Extremities:  No lower extremity edema. Severe muscle wasting of upper and lower extremities. Neurologic:  Alert and oriented. Cranial nerves grossly intact. No asterixis. LABORATORY:  Results for Edson Solis (MRN [id number]) as of 4/16/2022 04:42   Ref. Range 4/14/2022 05:00 4/15/2022 01:21 4/15/2022 01:21   WBC Latest Ref Range: 3.6 - 11.0 K/uL 5.4  7.3   HGB Latest Ref Range: 11.5 - 16.0 g/dL 7.6 (L)  7.4 (L)   PLATELET Latest Ref Range: 150 - 400 K/uL 26 (LL)  35 (LL)   Sodium Latest Ref Range: 136 - 145 mmol/L 130 (L) 132 (L) 132 (L)   Potassium Latest Ref Range: 3.5 - 5.1 mmol/L 3.5 3.5 3.5   Chloride Latest Ref Range: 97 - 108 mmol/L 92 (L) 94 (L) 94 (L)   CO2 Latest Ref Range: 21 - 32 mmol/L 23 25 22   Glucose Latest Ref Range: 65 - 100 mg/dL 90 106 (H) 101 (H)   BUN Latest Ref Range: 6 - 20 MG/ (H) 128 (H) 120 (H)   Creatinine Latest Ref Range: 0.55 - 1.02 MG/DL 4.04 (H) 4.16 (H) 4.13 (H)   Albumin Latest Ref Range: 3.5 - 5.0 g/dL 4.6  3.9     RADIOLOGY:  Ultrasound of liver. Echogenic consistent with cirrhosis. No liver mass lesions. No dilated bile ducts. Moderate ascites.         Poonam Grayson MD  [facility] 13  [address], [address] [place] [address].  [phone]  [address] [place] [address]

## 2022-04-16 NOTE — PROGRESS NOTES
Problem: Falls - Risk of  Goal: *Absence of Falls  Description: Document Katrina Adams Fall Risk and appropriate interventions in the flowsheet. Outcome: Progressing Towards Goal  Note: Fall Risk Interventions:  Mobility Interventions: Communicate number of staff needed for ambulation/transfer    Mentation Interventions: Bed/chair exit alarm    Medication Interventions: Assess postural VS orthostatic hypotension    Elimination Interventions: Bed/chair exit alarm    History of Falls Interventions: Consult care management for discharge planning         Problem: Pressure Injury - Risk of  Goal: *Prevention of pressure injury  Description: Document Rodrigo Scale and appropriate interventions in the flowsheet.   Outcome: Progressing Towards Goal  Note: Pressure Injury Interventions:  Sensory Interventions: Avoid rigorous massage over bony prominences    Moisture Interventions: Apply protective barrier, creams and emollients    Activity Interventions: Pressure redistribution bed/mattress(bed type)    Mobility Interventions: Assess need for specialty bed    Nutrition Interventions: Discuss nutritional consult with provider    Friction and Shear Interventions: Apply protective barrier, creams and emollients                Problem: Nutrition Deficit  Goal: *Optimize nutritional status  Outcome: Progressing Towards Goal

## 2022-04-16 NOTE — PROGRESS NOTES
Hospitalist Progress Note    NAME: Paige Camacho   :  1960   MRN:  [id number]       Assessment / Plan:  Acute blood loss anemia/GI bleed  s/p 4 units of PRBCs   s/p EGD on   Hb 7.8 today, platelet 27, thrombocytopenia likely d/t Cirrhosis  C/w PPI BID   Hepatology following  peripherally     Liver cirrhosis  with portal HTN.  Felt to be secondary to [facility]  Transaminitis/hyperbilirubinemia/coagulopathy/thrombocytopenia  Hepatic Encephalopathy     -Transferred from [facility] with prolonged hospitalization.  Transferred for further work-up for liver transplant eval  -EGD on  [place] noted for distal esophagitis and portal hypertensive gastropathy, no evidence of varices.    History of esophageal varices back in 2021 status post banding at that time.    -Per GI at outlying facility, patient was deemed not to be a candidate for colonoscopy and recommended Cologuard.    s/p MRCP on  which was noted for cirrhosis, portal hypertension, and large ascites.   s/p cardiac stress test which was negative for ischemia, EF of 69%  Continue lactulose, rifaximin, Protonix  Continue ursodiol  Paracentesis  3/25 with [id number] removed  PT/OT following     GREG with metabolic acidosis. persisent  Hyponatremia  -Baseline 0.9 on 2021   -Likely due to [facility]  -Nephrology following, recommendations appreciated. - off bicarb drip on oral bicarb  Creatinine around 4. Discussed with nephrology.  C/w albumin/ midodrine  Hyponatremia secondary to liver cirrhosis, today decreased to 125  C/w free water restriction     Refractory ascites  -Moderate large ascites on US 3/6.  -Ascites said to be refractory to diuretics, HRS limiting use of diuretics  -Ascitic drain that was placed on 3/12 was apparently clogged, flushed and drained significant amount of ascites  -Ascitic fluid lab negative for SBP  Will probably need repeat paracentesis prior to discharge     Recent E. coli UTI  -Treated with Vanco and Zosyn then transitioned to Rocephin, completed 7-day antibiotic course as of 2/17  -Replace Steve with new catheter 3/5 .  Urine culture on 3/5 grew Candida albicans 75,000 CFU. - nephrology desires that steve stay in for now      Hypothyroid  -Continue levothyroxine    chronic anemiacontinue EDWIN per nephrology     Severe malnutrition  --Encourage oral intake   --On regular diet and tolerating     Generalized weakness/chronic wounds. pta  -PT, OT consult  -Wound care following     Pending placement at rehab. The plan is to place her in a rehab is near to [facility] as possible so she can be closer to her son    Continue current management. Estimated discharge date: >48 hours  Barriers: Creatinine/ Sodium stabilization    Code status: DNR  Prophylaxis: SCD's  Recommended Disposition: SNF/LTC     Subjective:     Chief Complaint / Reason for Physician Visit  Patient seen and examined for follow-up of GREG, hepatorenal syndrome, cirrhosis, and other. Patient seen and examined earlier today by me. No appreciable change in status. She reports feeling okay. Patient was eating at the time of my exam.    Review of Systems:  Symptom Y/N Comments  Symptom Y/N Comments   Fever/Chills n   Chest Pain n    Poor Appetite n   Edema n    Cough n   Abdominal Pain     Sputum    Joint Pain     SOB/ROBINS    Pruritis/Rash     Nausea/vomit    Tolerating PT/OT     Diarrhea    Tolerating Diet     Constipation    Other       Could NOT obtain due to:      Objective:     VITALS:   Last 24hrs VS reviewed since prior progress note.  Most recent are:  Patient Vitals for the past 24 hrs:   Temp Pulse Resp BP SpO2   04/16/22 1326 97.7 °F (36.5 °C) 77 16 (!) 95/44 98 %   04/16/22 0621 98 °F (36.7 °C) 70 15 (!) 107/56 98 %   04/16/22 0302 97.9 °F (36.6 °C) 71 16 (!) 99/46 98 %   04/15/22 2030 98 °F (36.7 °C) 68 18 (!) 102/54 99 %       Intake/Output Summary (Last 24 hours) at 4/16/2022 1707  Last data filed at 4/16/2022 0624  Gross per 24 hour Intake    Output 1850 ml   Net -1850 ml        I had a face to face encounter and independently examined this patient on 4/16/2022, as outlined below:  PHYSICAL EXAM:  General: No acute distress    EENT:  EOMI. icteric sclerae. MMM  Resp:  CTA bilaterally, no wheezing or rales. No accessory muscle use  CV:  Regular  rhythm,  No edema  GI:  Soft, Distended  Neurologic:  Alert and oriented X 2, normal speech,   Psych:   Good insight. Not anxious nor agitated  Skin:  No rashes. Jaundice    Reviewed most current lab test results and cultures  YES  Reviewed most current radiology test results   YES  Review and summation of old records today    NO  Reviewed patient's current orders and MAR    YES  PMH/SH reviewed - no change compared to H&P  ________________________________________________________________________  Care Plan discussed with:    Comments   Patient y    Family      RN y    Care Manager y    Consultant                       y Multidiciplinary team rounds were held today with , nursing, pharmacist and clinical coordinator. Patient's plan of care was discussed; medications were reviewed and discharge planning was addressed. ________________________________________________________________________  Total NON critical care TIME: 39  Minutes    Total CRITICAL CARE TIME Spent:   Minutes non procedure based      Comments   >50% of visit spent in counseling and coordination of care     ________________________________________________________________________  Anita Chavarria MD     Procedures: see electronic medical records for all procedures/Xrays and details which were not copied into this note but were reviewed prior to creation of Plan. LABS:  I reviewed today's most current labs and imaging studies.   Pertinent labs include:  Recent Labs     04/16/22  0141 04/15/22  0121 04/14/22  0500   WBC 5.5 7.3 5.4   HGB 8.3* 7.4* 7.6*   HCT 26.3* 22.9* 22.7*   PLT 36* 35* 26*     Recent Labs 04/16/22  0141 04/15/22  0121 04/14/22  0500   * 132*  132* 130*   K 3.3* 3.5  3.5 3.5   CL 93* 94*  94* 92*   CO2 24 22 25 23   * 101*  106* 90   * 120*  128* 123*   CREA 3.99* 4.13*  4.16* 4.04*   CA 8.8 8.9  9.3 9.4   PHOS 5.0* 4.4 5.0*   ALB 3.5 3.9 4.6       Signed: Bing Mak MD

## 2022-04-16 NOTE — PROGRESS NOTES
Problem: Falls - Risk of  Goal: *Absence of Falls  Description: Document Jesenia Gaxiola Fall Risk and appropriate interventions in the flowsheet. Outcome: Progressing Towards Goal  Note: Fall Risk Interventions:  Mobility Interventions: Communicate number of staff needed for ambulation/transfer    Mentation Interventions: Bed/chair exit alarm,Adequate sleep, hydration, pain control    Medication Interventions: Evaluate medications/consider consulting pharmacy    Elimination Interventions: Call light in reach,Bed/chair exit alarm    History of Falls Interventions: Consult care management for discharge planning         Problem: Patient Education: Go to Patient Education Activity  Goal: Patient/Family Education  Outcome: Progressing Towards Goal     Problem: Pressure Injury - Risk of  Goal: *Prevention of pressure injury  Description: Document Rodrigo Scale and appropriate interventions in the flowsheet.   Outcome: Progressing Towards Goal  Note: Pressure Injury Interventions:  Sensory Interventions: Avoid rigorous massage over bony prominences,Minimize linen layers    Moisture Interventions: Apply protective barrier, creams and emollients,Absorbent underpads    Activity Interventions: Pressure redistribution bed/mattress(bed type)    Mobility Interventions: Float heels,Pressure redistribution bed/mattress (bed type)    Nutrition Interventions: Document food/fluid/supplement intake,Offer support with meals,snacks and hydration    Friction and Shear Interventions: Apply protective barrier, creams and emollients                Problem: Patient Education: Go to Patient Education Activity  Goal: Patient/Family Education  Outcome: Progressing Towards Goal     Problem: Patient Education: Go to Patient Education Activity  Goal: Patient/Family Education  Outcome: Progressing Towards Goal     Problem: Patient Education: Go to Patient Education Activity  Goal: Patient/Family Education  Outcome: Progressing Towards Goal

## 2022-04-17 NOTE — PROGRESS NOTES
Hospitalist Progress Note    NAME: Loralie Mohs   :  1960   MRN:  [id number]       Assessment / Plan:  Acute blood loss anemia/GI bleed  s/p 4 units of PRBCs   s/p EGD on   Hb 7.8 today, platelet 27, thrombocytopenia likely d/t Cirrhosis  C/w PPI BID   Hepatology following  peripherally     Liver cirrhosis  with portal HTN.  Felt to be secondary to [facility]  Transaminitis/hyperbilirubinemia/coagulopathy/thrombocytopenia  Hepatic Encephalopathy     -Transferred from [facility] with prolonged hospitalization.  Transferred for further work-up for liver transplant eval  -EGD on  [place] noted for distal esophagitis and portal hypertensive gastropathy, no evidence of varices.    History of esophageal varices back in 2021 status post banding at that time.    -Per GI at outlying facility, patient was deemed not to be a candidate for colonoscopy and recommended Cologuard.    s/p MRCP on  which was noted for cirrhosis, portal hypertension, and large ascites.   s/p cardiac stress test which was negative for ischemia, EF of 69%  Continue lactulose, rifaximin, Protonix  Continue ursodiol  Paracentesis  3/25 with [id number] removed  PT/OT following and patient has been working with them again    GREG with metabolic acidosis. persisent  Hyponatremia  -Baseline 0.9 on 2021   -Likely due to [facility]  -Nephrology following, recommendations appreciated. - off bicarb drip on oral bicarb  Creatinine around 4. Discussed with nephrology.  C/w albumin/ midodrine  C/w free water restriction  Sodium decreased from 130 yesterday to 126 today  Continue monitor with labs     Refractory ascites  -Moderate large ascites on US 3/6.  -Ascites said to be refractory to diuretics, HRS limiting use of diuretics  -Ascitic drain that was placed on 3/12 was apparently clogged, flushed and drained significant amount of ascites  -Ascitic fluid lab negative for SBP  Will probably need repeat paracentesis prior to discharge     Recent E. coli UTI  -Treated with Vanco and Zosyn then transitioned to Rocephin, completed 7-day antibiotic course as of 2/17  -Replace Steve with new catheter 3/5 .  Urine culture on 3/5 grew Candida albicans 75,000 CFU. - nephrology desires that steve stay in for now      Hypothyroid  -Continue levothyroxine    chronic anemiacontinue EDWIN per nephrology     Severe malnutrition  --Encourage oral intake   --On regular diet and tolerating     Generalized weakness/chronic wounds. pta  -PT, OT consult  -Wound care following     Pending placement at rehab. Continue current management. Estimated discharge date: >48 hours  Barriers: Creatinine/ Sodium stabilization    Code status: DNR  Prophylaxis: SCD's  Recommended Disposition: SNF/LTC     Subjective:     Chief Complaint / Reason for Physician Visit  Patient seen and examined for follow-up of GREG, hepatorenal syndrome, cirrhosis, and other. Patient seen and examined earlier today by me. Patient reports feeling well today. She looks a little bit more energetic. Son is at bedside. Review of Systems:  Symptom Y/N Comments  Symptom Y/N Comments   Fever/Chills n   Chest Pain n    Poor Appetite n   Edema n    Cough n   Abdominal Pain     Sputum    Joint Pain     SOB/ROBINS    Pruritis/Rash     Nausea/vomit    Tolerating PT/OT     Diarrhea    Tolerating Diet     Constipation    Other       Could NOT obtain due to:      Objective:     VITALS:   Last 24hrs VS reviewed since prior progress note.  Most recent are:  Patient Vitals for the past 24 hrs:   Temp Pulse Resp BP SpO2   04/17/22 0830 97.3 °F (36.3 °C) 71 16 (!) 94/40 99 %   04/17/22 0149 97.7 °F (36.5 °C) 70 15 (!) 106/59 99 %   04/16/22 1857 98 °F (36.7 °C) 72 16 (!) 99/50 98 %       Intake/Output Summary (Last 24 hours) at 4/17/2022 1434  Last data filed at 4/16/2022 2129  Gross per 24 hour   Intake    Output 2400 ml   Net -2400 ml        I had a face to face encounter and independently examined this patient on 4/17/2022, as outlined below:  PHYSICAL EXAM:  General: No acute distress    EENT:  EOMI. icteric sclerae. MMM  Resp:  CTA bilaterally, no wheezing or rales. No accessory muscle use  CV:  Regular  rhythm,  No edema  GI:  Soft, Distended  Neurologic:  Alert and oriented X 2, normal speech,   Psych:   Good insight. Not anxious nor agitated  Skin:  No rashes. Jaundiced    Reviewed most current lab test results and cultures  YES  Reviewed most current radiology test results   YES  Review and summation of old records today    NO  Reviewed patient's current orders and MAR    YES  PMH/SH reviewed - no change compared to H&P  ________________________________________________________________________  Care Plan discussed with:    Comments   Patient y    Family      RN y    Care Manager y    Consultant                       y Multidiciplinary team rounds were held today with , nursing, pharmacist and clinical coordinator. Patient's plan of care was discussed; medications were reviewed and discharge planning was addressed. ________________________________________________________________________  Total NON critical care TIME: 39  Minutes    Total CRITICAL CARE TIME Spent:   Minutes non procedure based      Comments   >50% of visit spent in counseling and coordination of care     ________________________________________________________________________  Sabina Tang MD     Procedures: see electronic medical records for all procedures/Xrays and details which were not copied into this note but were reviewed prior to creation of Plan. LABS:  I reviewed today's most current labs and imaging studies.   Pertinent labs include:  Recent Labs     04/17/22  0142 04/16/22  0141 04/15/22  0121   WBC 6.9 5.5 7.3   HGB 9.3* 8.3* 7.4*   HCT 28.4* 26.3* 22.9*   PLT 38* 36* 35*     Recent Labs     04/17/22  1153 04/17/22  0142 04/16/22  0141   * 126* 130*   K 3.6 3.4* 3.3*   CL 88* 87* 93* CO2 23 23 24   * 152* 103*   * 118* 110*   CREA 4.25* 4.04* 3.99*   CA 8.9 8.9 8.8   PHOS 5.9* 5.0* 5.0*   ALB 3.5 3.6 3.5       Signed: Carlos Ugarte MD

## 2022-04-17 NOTE — PROGRESS NOTES
Problem: Falls - Risk of  Goal: *Absence of Falls  Description: Document Mary Beth Bambi Fall Risk and appropriate interventions in the flowsheet. Outcome: Progressing Towards Goal  Note: Fall Risk Interventions:  Mobility Interventions: Communicate number of staff needed for ambulation/transfer    Mentation Interventions: Bed/chair exit alarm    Medication Interventions: Assess postural VS orthostatic hypotension    Elimination Interventions: Call light in reach    History of Falls Interventions: Consult care management for discharge planning         Problem: Pressure Injury - Risk of  Goal: *Prevention of pressure injury  Description: Document Rodrigo Scale and appropriate interventions in the flowsheet.   Outcome: Progressing Towards Goal  Note: Pressure Injury Interventions:  Sensory Interventions: Avoid rigorous massage over bony prominences    Moisture Interventions: Absorbent underpads    Activity Interventions: Assess need for specialty bed    Mobility Interventions: Assess need for specialty bed    Nutrition Interventions: Document food/fluid/supplement intake    Friction and Shear Interventions: Apply protective barrier, creams and emollients                Problem: Nutrition Deficit  Goal: *Optimize nutritional status  Outcome: Progressing Towards Goal

## 2022-04-17 NOTE — PROGRESS NOTES
[address] [facility], MD, [address], [facility], [place]      ISAAC Abraham, [facility]     Michelle CALLOWAY John, [facility]   MANISHA Vieyra, [facility]       Namita Deputado M[address] De [address] 136    at [address], [address] [place] [address], Katlyn  22.    [phone]    FAX: [address] [place] [address] Avenue    [address], [address], [address]    [phone]    FAX: [phone]       HEPATOLOGY PROGRESS NOTE  The patient is well known to me from a previous visit to my office at [facility] in [address] [place] [address]. She is a 64 yo  female who was found to have chronic liver disease in 2020 and cirrhosis in 7/2021 when she developed ascites. She had variceal bleeding in 11/2021. Serologic evaluation for markers of chronic liver disease was positive for AMA. Cirrhosis is due to [facility].     The patient has developed the following complications of cirrhosis: esophageal varices, variceal bleeding, ascites, edema, hepatic encephalopathy, severe muslce wasting    I saw the patient for the first time in 1/2022. She had CTP score 9 and MELD score 21 at that time. We started liver transplant evaluation testing. She developed confusion and could not be aroused and was hospitalized 3 weeks ago at [facility] in 2/202. During that hospitalization she developed worsening malnutrition, worsening of muscle wasting and a few decubitus ulcers. She has been at [facility] since 3/5. She has been receiving Dobbhoff tube feedings and OT/PT.   She has had dramatic improvement in nutritional status, skin wounds have nearly healed, mental status is clear, she is stronger, can move herself around in bed, she can sit in chair for an hour at a time, can feed herself and has been able to stand with PT assistance. Family conference on 3/27/22. I had a 1 hour meeting with the patient and her son who is POA. Next step is to transfer to rehab for for 4+ weeks to improve ambulation, muscle mass so she could possibly be a LT candidate  In order to do this will need to remove feeding tube. In order to do this she will need to be able to eat sufficient calories to maintain her nutritional status  If she does well with rehab we may be able to get LT eval testing and eventually on LT list.    Hospital course:  She had severe malnutrition, frailty, muscle weakness upon transfer from [facility]  She recieved tube feedings and had aggressive PT/OT for 3 weeks and started to improve. Then had several episodes of hematemesis and spent some time in ICU. GD showed no varices. Only severe portal gastropathy     Overall condition has remained stagnant since feeding tube removed. Restarted OT and PT today. More lethargeic than when I last saw her a few days ago. Awaiting placement to rehab center close to son's home in [facility]. Spoke to son Emiliano Sesay about plan. He had reached out to [facility] LT program in January when she was first ill and prior to her coming to see me. They finally got back with him this week. He is happy with care and progress we have made to date. He wants to continue with our current plan. She is in good spirits today. More animated    Hepatology Plan:   to find rehab place in [facility] area close to son. Continue to push PO and lots of Ensure every day. Continue OT/PT every day. Will check INR to calculate MELD and CTP score  Will check FE studies to see if needs more IV FE. Can stop PO FE. She cannot absorb this because of advanced cirrhosis      ASSESSMENT AND PLAN:  Cirrhosis  The diagnosis of cirrhosis is based upon imaging, laboratory studies, complications of cirrhosis. [facility].   Not alcohol as she was initially told.     Serologic testing was positive for AMA, ASMA     The CTP is 10. Child class C. The MELD score is 32.     Based upon the MELD and CTP scores the patient has a mortality of about 50% within the next 90 days until we turn her fraily and weakness around. Right now is is too weak to survive liver transplantation. She needs aggressive nutritional support and both she and her son are in favor of this with the hopes her situation will improve and she could survive liver transplantation.       Primary Biliary Cholangitis  The diagnosis is based upon serology and an elevation in ALP and positive AMA. A liver biopsy has not been performed. PBC is being treated with ANTONIETA 500 mg BID. CKD-HRS  Scr has now stabilized in the 4.0 mg range. Continue midodrine,     Spoke with Dr Luci Vu about renal function. He is concerned that she will do poorly at rehab, need readmission and have to start on dialysis. He questioned if we should start dialysis as now  She has no real indications to start dialysis now vs wait. No fluid overload, NA, K are normal.  She is making urine  Spoke to son about dialysis. He and I prefer to continue as we are without dialysis for now as this would interfere with daily rehab    Malnutrition  The patient has severe protein-calorie malnutrtion and muscle wasting. This is due to advanced liver disease and refractory ascites. The patient needs as many calories as she can possibly consume   Without aggressive nutritional support she will not survive. She is alert, oriented and understands the need to consume as many Ensure as possible. There are no plans to replace Dobhoff feeding tube at this time. Frailty/muscle wasting  The patient is very frail and working with OPT/PT. She has made great progress but lost some after her trip to ICU. This will be address by rehab.     Skin wounds  Still has several pressure wounds on back, buttocks that appear to be healing but are not healed. Ulcers have to be healed for her to be an LT candidate.     Ascites   Ascites developed for the first time in 7/2021. Being managed by periodic paracentesis. No diuretics can be given with CKD and Scr 3-3.5 mg    Hyponatremia  This is secondary cirrhosis and diuretics  Sna has declined from 134 back down to 129 today. No diuretics.       Screening for Esophageal varices   The patient has esophageal varices with prior bleeding. Varices were banded in 11/2021 and 12/2021. The last EGD to assess for varices was performed in 3/2022. No esophageal varices were present.     Hepatic encephalopathy   Overt HE is well controlled on lactulose and xifaxan. She has been alert and oriented througtout the hospitalization. Can probably stop lactulose and avoid diarrhea and continue on xifaxan alone. Coagulopathy  INR is in the 1.6 range and stable. Thrombocytopenia   This is secondary to cirrhosis. There is no evidence of overt bleeding. No treatment is required. The platelet count is adequate for the patient to undergo procedures without the need for platelet transfusion or platelet growth factors.     Anemia   This is due to multifactorial causes including portal hypertension with chronic GI blood loss, acute GI blood loss 1 week ago, and bone marrow suppression due to severe malnutrition.   HB now stable in the 9 gm range. Thrombocytopenia   This is secondary to cirrhosis. There is no evidence of overt bleeding. No treatment is required. The platelet count is adequate for the patient to undergo procedures without the need for platelet transfusion or platelet growth factors. PHYSICAL EXAMINATION:  VS: per nursing note  General:  Looks much better. Skin color normal.  Eyes:  Sclera non-icteric. ENT:  No oral lesions. Thyroid normal.  Nodes:  No adenopathy. Skin:  Spider angiomata. Multiple diffuse ecchymosis on arms. Respiratory:  Lungs clear to auscultation. Cardiovascular:  Regular heart rate. Abdomen:  Soft non-tender, NO obvious ascites. Extremities:  No lower extremity edema. Severe muscle wasting of upper and lower extremities. Neurologic:  Alert and oriented. Cranial nerves grossly intact. No asterixis. LABORATORY:  Results for Cecilia Calhoun (MRN [id number]) as of 4/17/2022 03:30   Ref. Range 4/15/2022 01:21 4/16/2022 01:41   WBC Latest Ref Range: 3.6 - 11.0 K/uL 7.3 5.5   HGB Latest Ref Range: 11.5 - 16.0 g/dL 7.4 (L) 8.3 (L)   PLATELET Latest Ref Range: 150 - 400 K/uL 35 (LL) 36 (LL)   Sodium Latest Ref Range: 136 - 145 mmol/L 132 (L) 130 (L)   Potassium Latest Ref Range: 3.5 - 5.1 mmol/L 3.5 3.3 (L)   Chloride Latest Ref Range: 97 - 108 mmol/L 94 (L) 93 (L)   CO2 Latest Ref Range: 21 - 32 mmol/L 22 24   Glucose Latest Ref Range: 65 - 100 mg/dL 101 (H) 103 (H)   BUN Latest Ref Range: 6 - 20 MG/ (H) 110 (H)   Creatinine Latest Ref Range: 0.55 - 1.02 MG/DL 4.13 (H) 3.99 (H)   Albumin Latest Ref Range: 3.5 - 5.0 g/dL 3.9 3.5     RADIOLOGY:  Ultrasound of liver. Echogenic consistent with cirrhosis. No liver mass lesions. No dilated bile ducts. Moderate ascites.         MD Su[facility]ata [facility] [address] of [address] [facility] 22.  [phone]  [address] [place] [address]

## 2022-04-17 NOTE — PROGRESS NOTES
Problem: Falls - Risk of  Goal: *Absence of Falls  Description: Document Morene Hole Fall Risk and appropriate interventions in the flowsheet.   Outcome: Progressing Towards Goal  Note: Fall Risk Interventions:  Mobility Interventions: Communicate number of staff needed for ambulation/transfer    Mentation Interventions: Adequate sleep, hydration, pain control,Bed/chair exit alarm    Medication Interventions: Evaluate medications/consider consulting pharmacy    Elimination Interventions: Call light in reach,Bed/chair exit alarm    History of Falls Interventions: Consult care management for discharge planning         Problem: Patient Education: Go to Patient Education Activity  Goal: Patient/Family Education  Outcome: Progressing Towards Goal

## 2022-04-18 NOTE — PROGRESS NOTES
[facility]   [address] [facility] [address], [facility]  Phone: [phone]   [id number]       Nephrology Progress Note  Cata Boland     1960     [id number]  Date of Admission : 3/5/2022  04/18/22    CC:  Follow up for greg    Assessment and Plan   GREG:  - 2/2 HRS   - Cr aurora post GI bleed and has stabilized around 4  - cont octreotide and midodrine  - discussed poor renal function with pt`s son - Oswaldo Oh. I informed him that she is very risk for further decompensation requiring dialysis in the immediate future. Son and Hepatology feels that dialysis might impede her rehab. - offered to follow up in office if she can come to office post discharge   - daily labs and I/Os    Hyponatremia:  - 2/2 cirrhosis  - Na stable today  - cont FR    Hypokalemia:  - replete PRN    Severe anemia:  GI bleed  - On EDWIN TTS  - transfusions per primary team      Cirrhosis, Portal HTN, PBC  - poral gastropathy  - being w/u for [facility] transplant      Hypothyroidism    Malnutrition:     Interval History:  Seen and examined. No new sx. D/w hepatology on Friday and reviewed notes from the weekend   Discussed renal issues w/ son this morning     Review of Systems: A comprehensive review of systems was negative except for that written in the HPI.     Current Medications:   Current Facility-Administered Medications   Medication Dose Route Frequency    nystatin (MYCOSTATIN) 100,000 unit/gram cream   Topical TID    sodium bicarbonate tablet 650 mg  650 mg Oral TID    magic mouthwash cpd (without sucralfate)  5 mL Oral TIDAC    0.9% sodium chloride infusion 250 mL  250 mL IntraVENous PRN    pantoprazole (PROTONIX) 40 mg in 0.9% sodium chloride 10 mL injection  40 mg IntraVENous Q12H    sodium chloride (NS) flush 5-40 mL  5-40 mL IntraVENous Q8H    sodium chloride (NS) flush 5-40 mL  5-40 mL IntraVENous PRN    simethicone (MYLICON) 70NU/6.2VJ oral drops 80 mg  1.2 mL Oral Multiple    promethazine (PHENERGAN) tablet 12.5 mg  12.5 mg Oral Q6H PRN    epoetin vic-epbx (RETACRIT) injection 10,000 Units  10,000 Units SubCUTAneous Q TUE, THU & SAT    multivitamin, tx-iron-ca-min (THERA-M w/ IRON) tablet 1 Tablet  1 Tablet Oral DAILY    balsam peru-castor oiL (VENELEX) ointment   Topical BID    zinc oxide-cod liver oil (DESITIN) 40 % paste   Topical PRN    sodium chloride (NS) flush 5-40 mL  5-40 mL IntraVENous Q8H    sodium chloride (NS) flush 5-40 mL  5-40 mL IntraVENous PRN    acetaminophen (TYLENOL) tablet 650 mg  650 mg Oral Q6H PRN    Or    acetaminophen (TYLENOL) suppository 650 mg  650 mg Rectal Q6H PRN    polyethylene glycol (MIRALAX) packet 17 g  17 g Oral DAILY PRN    ondansetron (ZOFRAN ODT) tablet 4 mg  4 mg Oral Q8H PRN    Or    ondansetron (ZOFRAN) injection 4 mg  4 mg IntraVENous Q1B PRN    folic acid (FOLVITE) tablet 1 mg  1 mg Oral DAILY    levothyroxine (SYNTHROID) tablet 50 mcg  50 mcg Oral 6am    midodrine (PROAMATINE) tablet 15 mg  15 mg Oral TID WITH MEALS    octreotide (SANDOSTATIN) injection 100 mcg  100 mcg IntraVENous TID    rifAXIMin (XIFAXAN) tablet 550 mg  550 mg Oral BID    sertraline (ZOLOFT) tablet 100 mg  100 mg Oral DAILY    thiamine HCL (B-1) tablet 100 mg  100 mg Oral DAILY    ursodioL (ACTIGALL) tablet 500 mg (Patient Supplied)  500 mg Oral Q12H    glucagon (GLUCAGEN) injection 1 mg  1 mg IntraMUSCular PRN    naloxone (NARCAN) injection 0.1 mg  0.1 mg IntraVENous PRN      Allergies   Allergen Reactions    Morphine Other (comments)     Severe HA. ALL narcotics!!!       Objective:  Vitals:    Vitals:    04/18/22 0156 04/18/22 0351 04/18/22 0648 04/18/22 0857   BP: (!) 100/37 (!) 104/53 (!) 96/53 121/62   Pulse: 78  75 71   Resp: 18   18   Temp: 97.6 °F (36.4 °C)   97.9 °F (36.6 °C)   SpO2: 98%   99%   Weight:       Height:         Intake and Output:  No intake/output data recorded.   04/16 1901 - 04/18 0700  In: -   Out: 8560 [Urine:775; Drains:2000]    Physical Examination:  General: No distress. confused  HEENT:           Pale and icteric   Neck:  Supple, no mass  Resp:  Lungs CTA B/L  CV:  RRR,  no murmur or rub,no  LE edema  GI:  Soft, NT, + Bowel sounds, + paracentesis drain  Neurologic:  AAO X 3  :  + [place]     [x]    High complexity decision making was performed  [x]    Patient is at high-risk of decompensation with multiple organ involvement    Lab Data Personally Reviewed: I have reviewed all the pertinent labs, microbiology data and radiology studies during assessment.     Recent Labs     04/18/22  0115 04/17/22  1153 04/17/22  0142 04/16/22  0141   * 126* 126* 130*   K 3.4* 3.6 3.4* 3.3*   CL 85* 88* 87* 93*   CO2 24 23 23 24   * 110* 152* 103*   * 121* 118* 110*   CREA 4.31* 4.25* 4.04* 3.99*   CA 8.7 8.9 8.9 8.8   PHOS 5.7* 5.9* 5.0* 5.0*   ALB 3.5 3.5 3.6 3.5   INR 1.8*  --   --   --      Recent Labs     04/18/22  0115 04/17/22 0142 04/16/22 0141   WBC 6.9 6.9 5.5   HGB 9.6* 9.3* 8.3*   HCT 29.1* 28.4* 26.3*   PLT 39* 38* 36*     No results found for: [facility]  Lab Results   Component Value Date/Time    Culture result: NO GROWTH 4 DAYS 03/14/2022 06:48 PM    Culture result: NO GROWTH 4 DAYS 03/12/2022 06:00 PM    Culture result: CANDIDA ALBICANS (A) 03/05/2022 09:22 PM     Recent Results (from the past 24 hour(s))   RENAL FUNCTION PANEL    Collection Time: 04/17/22 11:53 AM   Result Value Ref Range    Sodium 126 (L) 136 - 145 mmol/L    Potassium 3.6 3.5 - 5.1 mmol/L    Chloride 88 (L) 97 - 108 mmol/L    CO2 23 21 - 32 mmol/L    Anion gap 15 5 - 15 mmol/L    Glucose 110 (H) 65 - 100 mg/dL     (H) 6 - 20 MG/DL    Creatinine 4.25 (H) 0.55 - 1.02 MG/DL    BUN/Creatinine ratio 28 (H) 12 - 20      GFR est AA 13 (L) >60 ml/min/1.73m2    GFR est non-AA 11 (L) >60 ml/min/1.73m2    Calcium 8.9 8.5 - 10.1 MG/DL    Phosphorus 5.9 (H) 2.6 - 4.7 MG/DL    Albumin 3.5 3.5 - 5.0 g/dL   RENAL FUNCTION PANEL    Collection Time: 04/18/22  1:15 AM   Result Value Ref Range    Sodium 125 (L) 136 - 145 mmol/L    Potassium 3.4 (L) 3.5 - 5.1 mmol/L    Chloride 85 (L) 97 - 108 mmol/L    CO2 24 21 - 32 mmol/L    Anion gap 16 (H) 5 - 15 mmol/L    Glucose 174 (H) 65 - 100 mg/dL     (H) 6 - 20 MG/DL    Creatinine 4.31 (H) 0.55 - 1.02 MG/DL    BUN/Creatinine ratio 28 (H) 12 - 20      GFR est AA 13 (L) >60 ml/min/1.73m2    GFR est non-AA 10 (L) >60 ml/min/1.73m2    Calcium 8.7 8.5 - 10.1 MG/DL    Phosphorus 5.7 (H) 2.6 - 4.7 MG/DL    Albumin 3.5 3.5 - 5.0 g/dL   CBC W/O DIFF    Collection Time: 04/18/22  1:15 AM   Result Value Ref Range    WBC 6.9 3.6 - 11.0 K/uL    RBC 3.17 (L) 3.80 - 5.20 M/uL    HGB 9.6 (L) 11.5 - 16.0 g/dL    HCT 29.1 (L) 35.0 - 47.0 %    MCV 91.8 80.0 - 99.0 FL    MCH 30.3 26.0 - 34.0 PG    MCHC 33.0 30.0 - 36.5 g/dL    RDW 19.8 (H) 11.5 - 14.5 %    PLATELET 39 (LL) 282 - 400 K/uL    MPV 11.0 8.9 - 12.9 FL    NRBC 0.0 0  WBC    ABSOLUTE NRBC 0.00 0.00 - 0.01 K/uL   PROTHROMBIN TIME + INR    Collection Time: 04/18/22  1:15 AM   Result Value Ref Range    INR 1.8 (H) 0.9 - 1.1      Prothrombin time 18.4 (H) 9.0 - 11.1 sec   FERRITIN    Collection Time: 04/18/22  1:15 AM   Result Value Ref Range    Ferritin 106 26 - 388 NG/ML   IRON PROFILE    Collection Time: 04/18/22  1:15 AM   Result Value Ref Range    Iron 30 (L) 35 - 150 ug/dL    TIBC 110 (L) 250 - 450 ug/dL    Iron % saturation 27 20 - 50 %                                       Care Plan discussed with:  Patient     Family      RN      Consulting Physician [facility],         I have reviewed the flowsheets. Chart and Pertinent Notes have been reviewed. No change in PMH ,family and social history from Consult note.       Guzman Arias MD

## 2022-04-18 NOTE — PROGRESS NOTES
Physical Therapy    Attempted to see patient for follow up PT session, but she declined, stating that she is not feeling up to any activity at all today. Offered multiple options, but she continued to decline. We will follow up tomorrow to attempt again.     Sheridan Varela, PT

## 2022-04-18 NOTE — PROGRESS NOTES
Hospitalist Progress Note    NAME: Emmett Zavaleta   :  1960   MRN:  [id number]       Assessment / Plan:  Acute blood loss anemia/GI bleed  s/p 4 units of PRBCs   s/p EGD on   Hb 7.8 today, platelet 27, thrombocytopenia likely d/t Cirrhosis  C/w PPI BID   Hepatology following  peripherally     Liver cirrhosis  with portal HTN.  Felt to be secondary to [facility]  Transaminitis/hyperbilirubinemia/coagulopathy/thrombocytopenia  Hepatic Encephalopathy     -Transferred from [facility] with prolonged hospitalization.  Transferred for further work-up for liver transplant eval  -EGD on  [place] noted for distal esophagitis and portal hypertensive gastropathy, no evidence of varices.    History of esophageal varices back in 2021 status post banding at that time.    -Per GI at outlying facility, patient was deemed not to be a candidate for colonoscopy and recommended Cologuard.    s/p MRCP on  which was noted for cirrhosis, portal hypertension, and large ascites.   s/p cardiac stress test which was negative for ischemia, EF of 69%  Continue lactulose, rifaximin, Protonix  Continue ursodiol  Paracentesis  3/25 with [id number] removed  PT/OT following and patient has been working with them again    GREG with metabolic acidosis. persisent  Hyponatremia  -Baseline 0.9 on 2021   -Likely due to [facility]  -Nephrology following, recommendations appreciated. - off bicarb drip on oral bicarb  Creatinine around 4. Discussed with nephrology.  C/w albumin/ midodrine  C/w free water restriction  Sodium decreased from 130 yesterday to 126 today  Continue monitor with labs     Refractory ascites  -Moderate large ascites on US 3/6.  -Ascites said to be refractory to diuretics, HRS limiting use of diuretics  -Ascitic drain that was placed on 3/12 was apparently clogged, flushed and drained significant amount of ascites  -Ascitic fluid lab negative for SBP  Will probably need repeat paracentesis prior to discharge     Recent E. coli UTI  -Treated with Vanco and Zosyn then transitioned to Rocephin, completed 7-day antibiotic course as of 2/17  -Replace Steve with new catheter 3/5 .  Urine culture on 3/5 grew Candida albicans 75,000 CFU. - nephrology desires that steve stay in for now      Hypothyroid  -Continue levothyroxine    chronic anemiacontinue EDWIN per nephrology     Severe malnutrition  --Encourage oral intake   --On regular diet and tolerating     Generalized weakness/chronic wounds. pta  -PT, OT consult  -Wound care following     Pending placement at rehab. Continue current management. She is refusing labs and some medications. Patient refused to work with physical therapy today. We will continue current management and continue to work with the patient    Estimated discharge date: >48 hours  Barriers: Creatinine/ Sodium stabilization    Code status: DNR  Prophylaxis: SCD's  Recommended Disposition: SNF/LTC     Subjective:     Chief Complaint / Reason for Physician Visit  Patient seen and examined for follow-up of GREG, hepatorenal syndrome, cirrhosis, and other. Patient seen and examined earlier today by me. Patient reports feeling well today. She looks a little bit more energetic. Son is at bedside. Review of Systems:  Symptom Y/N Comments  Symptom Y/N Comments   Fever/Chills n   Chest Pain n    Poor Appetite n   Edema n    Cough n   Abdominal Pain     Sputum    Joint Pain     SOB/ROBINS    Pruritis/Rash     Nausea/vomit    Tolerating PT/OT     Diarrhea    Tolerating Diet     Constipation    Other       Could NOT obtain due to:      Objective:     VITALS:   Last 24hrs VS reviewed since prior progress note.  Most recent are:  Patient Vitals for the past 24 hrs:   Temp Pulse Resp BP SpO2   04/18/22 1458 98 °F (36.7 °C) 72 18 116/61 97 %   04/18/22 0857 97.9 °F (36.6 °C) 71 18 121/62 99 %   04/18/22 0648  75  (!) 96/53    04/18/22 0351    (!) 104/53    04/18/22 0156 97.6 °F (36.4 °C) 78 18 (!) 100/37 98 %   04/17/22 1936 98 °F (36.7 °C) 73 16 (!) 106/48 100 %       Intake/Output Summary (Last 24 hours) at 4/18/2022 1735  Last data filed at 4/18/2022 1517  Gross per 24 hour   Intake    Output 650 ml   Net -650 ml        I had a face to face encounter and independently examined this patient on 4/18/2022, as outlined below:  PHYSICAL EXAM:  General: No acute distress. Somewhat more confused today  EENT:  EOMI. icteric sclerae. MMM  Resp:  CTA bilaterally, no wheezing or rales. No accessory muscle use  CV:  Regular  rhythm,  No edema  GI:  Soft, Distended  Neurologic:  Alert and oriented X 2, normal speech,   Psych:   Good insight. Not anxious nor agitated  Skin:  No rashes. Jaundiced    Reviewed most current lab test results and cultures  YES  Reviewed most current radiology test results   YES  Review and summation of old records today    NO  Reviewed patient's current orders and MAR    YES  PMH/ reviewed - no change compared to H&P  ________________________________________________________________________  Care Plan discussed with:    Comments   Patient y    Family      RN y    Care Manager y    Consultant                       y Multidiciplinary team rounds were held today with , nursing, pharmacist and clinical coordinator. Patient's plan of care was discussed; medications were reviewed and discharge planning was addressed. ________________________________________________________________________  Total NON critical care TIME: 39  Minutes    Total CRITICAL CARE TIME Spent:   Minutes non procedure based      Comments   >50% of visit spent in counseling and coordination of care     ________________________________________________________________________  Alma Mccurdy MD     Procedures: see electronic medical records for all procedures/Xrays and details which were not copied into this note but were reviewed prior to creation of Plan.       LABS:  I reviewed today's most current labs and imaging studies.   Pertinent labs include:  Recent Labs     04/18/22  0115 04/17/22  0142 04/16/22  0141   WBC 6.9 6.9 5.5   HGB 9.6* 9.3* 8.3*   HCT 29.1* 28.4* 26.3*   PLT 39* 38* 36*     Recent Labs     04/18/22  0115 04/17/22  1153 04/17/22  0142   * 126* 126*   K 3.4* 3.6 3.4*   CL 85* 88* 87*   CO2 24 23 23   * 110* 152*   * 121* 118*   CREA 4.31* 4.25* 4.04*   CA 8.7 8.9 8.9   PHOS 5.7* 5.9* 5.0*   ALB 3.5 3.5 3.6   INR 1.8*  --   --        Signed: Jessica Dykes MD

## 2022-04-19 NOTE — PROGRESS NOTES
Physical Therapy Note    Chart reviewed. Discussed patient with RN. Arrived to patient diagonal and slid down in bed, with activity blanket, lunch had just arrived bedside. Patient declining any mobility with therapy this date including readjusting in bed, sitting edge of bed, etc. Educated patient on importance of mobility and tried to engage patient in discussion of what her goals are for physical therapy but patient not engaged in discussion. Patient reports she is not feeling well. Agreeable for therapist to attempt again next date. Will continue to follow as appropriate.     Maggie Canavan, PT, DPT

## 2022-04-19 NOTE — PROGRESS NOTES
Hospitalist Progress Note    NAME: [facility]   :  1960   MRN:  [id number]       Assessment / Plan:  Acute blood loss anemia/GI bleed  s/p 4 units of PRBCs   s/p EGD on   Hb 7.8 today, platelet 27, thrombocytopenia likely d/t Cirrhosis  C/w PPI BID   Hepatology following  peripherally     Liver cirrhosis  with portal HTN.  Felt to be secondary to [facility]  Transaminitis/hyperbilirubinemia/coagulopathy/thrombocytopenia  Hepatic Encephalopathy     -Transferred from [facility] with prolonged hospitalization.  Transferred for further work-up for liver transplant eval  -EGD on  [place] noted for distal esophagitis and portal hypertensive gastropathy, no evidence of varices.    History of esophageal varices back in 2021 status post banding at that time.    -Per GI at outlying facility, patient was deemed not to be a candidate for colonoscopy and recommended Cologuard.    s/p MRCP on  which was noted for cirrhosis, portal hypertension, and large ascites.   s/p cardiac stress test which was negative for ischemia, EF of 69%  Continue lactulose, rifaximin, Protonix  Continue ursodiol  Paracentesis  3/25 with [id number] removed  PT/OT following and patient has been working with them again  Refusing PT OT the last couple of days    GREG with metabolic acidosis. persisent  Hyponatremia  -Baseline 0.9 on 2021   -Likely due to [facility]  -Nephrology following, recommendations appreciated. - off bicarb drip on oral bicarb  Creatinine around 4. Discussed with nephrology.  C/w albumin/ midodrine  C/w free water restriction  Sodium decreased from 130 yesterday to 126 today  Continue monitor with labs     Refractory ascites  -Moderate large ascites on US 3/6.  -Ascites said to be refractory to diuretics, HRS limiting use of diuretics  -Ascitic drain that was placed on 3/12 was apparently clogged, flushed and drained significant amount of ascites  -Ascitic fluid lab negative for SBP  Will probably need repeat paracentesis prior to discharge     Recent E. coli UTI  -Treated with Vanco and Zosyn then transitioned to Rocephin, completed 7-day antibiotic course as of 2/17  -Replace Steve with new catheter 3/5 .  Urine culture on 3/5 grew Candida albicans 75,000 CFU. - nephrology desires that steve stay in for now      Hypothyroid  -Continue levothyroxine    chronic anemiacontinue EDWIN per nephrology     Severe malnutrition  --Encourage oral intake   --On regular diet and tolerating     Generalized weakness/chronic wounds. pta  -PT, OT consult  -Wound care following     Pending placement at rehab. Continue current management. No BM pathology to discuss plan with family    Will continue    Estimated discharge date: >48 hours  Barriers: Creatinine/ Sodium stabilization    Code status: DNR  Prophylaxis: SCD's  Recommended Disposition: SNF/LTC     Subjective:     Chief Complaint / Reason for Physician Visit  Patient seen and examined for follow-up of GREG, hepatorenal syndrome, cirrhosis, and other. Patient seen and examined earlier today by me. Patient reports feeling well today. She looks a little bit more energetic. Son is at bedside. Review of Systems:  Symptom Y/N Comments  Symptom Y/N Comments   Fever/Chills n   Chest Pain n    Poor Appetite n   Edema n    Cough n   Abdominal Pain     Sputum    Joint Pain     SOB/ROBINS    Pruritis/Rash     Nausea/vomit    Tolerating PT/OT     Diarrhea    Tolerating Diet     Constipation    Other       Could NOT obtain due to:      Objective:     VITALS:   Last 24hrs VS reviewed since prior progress note.  Most recent are:  Patient Vitals for the past 24 hrs:   Temp Pulse Resp BP SpO2   04/19/22 1452 97.1 °F (36.2 °C) 64 16 134/67 99 %   04/19/22 0843 97.3 °F (36.3 °C) 69 16 (!) 115/56 99 %   04/19/22 0211 97.5 °F (36.4 °C) 75 16 (!) 108/53 97 %   04/18/22 2047 97.3 °F (36.3 °C) 72 16 (!) 111/48 99 %       Intake/Output Summary (Last 24 hours) at 4/19/2022 1648  Last data filed at 4/19/2022 1316  Gross per 24 hour   Intake 120 ml   Output 475 ml   Net -355 ml        I had a face to face encounter and independently examined this patient on 4/19/2022, as outlined below:  PHYSICAL EXAM:  General: No acute distress. Somewhat more confused today  EENT:  EOMI. icteric sclerae. MMM  Resp:  CTA bilaterally, no wheezing or rales. No accessory muscle use  CV:  Regular  rhythm,  No edema  GI:  Soft, Distended  Neurologic:  Alert and oriented X 2, normal speech,   Psych:   Good insight. Not anxious nor agitated  Skin:  No rashes. Jaundiced    Reviewed most current lab test results and cultures  YES  Reviewed most current radiology test results   YES  Review and summation of old records today    NO  Reviewed patient's current orders and MAR    YES  PMH/SH reviewed - no change compared to H&P  ________________________________________________________________________  Care Plan discussed with:    Comments   Patient y    Family      RN y    Care Manager y    Consultant                       y Multidiciplinary team rounds were held today with , nursing, pharmacist and clinical coordinator. Patient's plan of care was discussed; medications were reviewed and discharge planning was addressed. ________________________________________________________________________  Total NON critical care TIME: 39  Minutes    Total CRITICAL CARE TIME Spent:   Minutes non procedure based      Comments   >50% of visit spent in counseling and coordination of care     ________________________________________________________________________  Genaro Kelly MD     Procedures: see electronic medical records for all procedures/Xrays and details which were not copied into this note but were reviewed prior to creation of Plan. LABS:  I reviewed today's most current labs and imaging studies.   Pertinent labs include:  Recent Labs     04/19/22  0212 04/18/22  0115 04/17/22  0142   WBC 7.5 6.9 6.9 HGB 9.2* 9.6* 9.3*   HCT 28.0* 29.1* 28.4*   PLT 37* 39* 38*     Recent Labs     04/19/22  0212 04/18/22  0115 04/17/22  1153   * 125* 126*   K 3.2* 3.4* 3.6   CL 83* 85* 88*   CO2 21 24 23   * 174* 110*   * 121* 121*   CREA 4.68* 4.31* 4.25*   CA 8.7 8.7 8.9   PHOS 6.6* 5.7* 5.9*   ALB 3.3* 3.5 3.5   INR  --  1.8*  --        Signed: Sabina Tang MD

## 2022-04-19 NOTE — PROGRESS NOTES
[facility]   [address] [address], [address], [facility]  Phone: [phone]   [phone]       Nephrology Progress Note  Davied Severs     1960     [id number]  Date of Admission : 3/5/2022  04/19/22    CC:  Follow up for greg    Assessment and Plan   GREG:  - 2/2 HRS and worsened by GI bleed  - Cr continues to rise and Na trending down and dialysis is imminent  - D/w risks/ benefits of RRT w/ son yesterday --> dialysis deferred per family and hepatology .  - Start NS at 48 cc/ hr after she gets IV  - would continue w/ octreotide for whatever its worth due to lack of any other options for GREG  - daily labs and I/Os    Hyponatremia:  - 2/2 cirrhosis  - Na worse   - cont FR    Hypokalemia:  - replete PRN    Severe anemia:  GI bleed  - On EDWIN TTS  - transfusions per primary team      Cirrhosis, Portal HTN, PBC  Encephalopathy   - poral gastropathy  - being w/u for [facility] transplant      Hypothyroidism    Malnutrition:     Interval History:  Seen and examined. She is more confused today. Reports stable jerking movements although pt thinks they are the same   More loose stools overnight   Cr and Na worse   D/w son yesterday and hepatology on Friday     Review of Systems: A comprehensive review of systems was negative except for that written in the HPI.     Current Medications:   Current Facility-Administered Medications   Medication Dose Route Frequency    iron sucrose (VENOFER) 200 mg in 0.9% sodium chloride 100 mL IVPB  200 mg IntraVENous Q24H    0.9% sodium chloride infusion  50 mL/hr IntraVENous CONTINUOUS    nystatin (MYCOSTATIN) 100,000 unit/gram cream   Topical TID    sodium bicarbonate tablet 650 mg  650 mg Oral TID    magic mouthwash cpd (without sucralfate)  5 mL Oral TIDAC    0.9% sodium chloride infusion 250 mL  250 mL IntraVENous PRN    pantoprazole (PROTONIX) 40 mg in 0.9% sodium chloride 10 mL injection  40 mg IntraVENous Q12H    sodium chloride (NS) flush 5-40 mL 5-40 mL IntraVENous Q8H    sodium chloride (NS) flush 5-40 mL  5-40 mL IntraVENous PRN    simethicone (MYLICON) 31EC/9.3QI oral drops 80 mg  1.2 mL Oral Multiple    promethazine (PHENERGAN) tablet 12.5 mg  12.5 mg Oral Q6H PRN    epoetin vic-epbx (RETACRIT) injection 10,000 Units  10,000 Units SubCUTAneous Q TUE, THU & SAT    multivitamin, tx-iron-ca-min (THERA-M w/ IRON) tablet 1 Tablet  1 Tablet Oral DAILY    balsam peru-castor oiL (VENELEX) ointment   Topical BID    zinc oxide-cod liver oil (DESITIN) 40 % paste   Topical PRN    sodium chloride (NS) flush 5-40 mL  5-40 mL IntraVENous Q8H    sodium chloride (NS) flush 5-40 mL  5-40 mL IntraVENous PRN    acetaminophen (TYLENOL) tablet 650 mg  650 mg Oral Q6H PRN    Or    acetaminophen (TYLENOL) suppository 650 mg  650 mg Rectal Q6H PRN    polyethylene glycol (MIRALAX) packet 17 g  17 g Oral DAILY PRN    ondansetron (ZOFRAN ODT) tablet 4 mg  4 mg Oral Q8H PRN    Or    ondansetron (ZOFRAN) injection 4 mg  4 mg IntraVENous W9H PRN    folic acid (FOLVITE) tablet 1 mg  1 mg Oral DAILY    levothyroxine (SYNTHROID) tablet 50 mcg  50 mcg Oral 6am    midodrine (PROAMATINE) tablet 15 mg  15 mg Oral TID WITH MEALS    octreotide (SANDOSTATIN) injection 100 mcg  100 mcg IntraVENous TID    rifAXIMin (XIFAXAN) tablet 550 mg  550 mg Oral BID    sertraline (ZOLOFT) tablet 100 mg  100 mg Oral DAILY    thiamine HCL (B-1) tablet 100 mg  100 mg Oral DAILY    ursodioL (ACTIGALL) tablet 500 mg (Patient Supplied)  500 mg Oral Q12H    glucagon (GLUCAGEN) injection 1 mg  1 mg IntraMUSCular PRN    naloxone (NARCAN) injection 0.1 mg  0.1 mg IntraVENous PRN      Allergies   Allergen Reactions    Morphine Other (comments)     Severe HA.  ALL narcotics!!!       Objective:  Vitals:    Vitals:    04/18/22 1458 04/18/22 2047 04/19/22 0211 04/19/22 0843   BP: 116/61 (!) 111/48 (!) 108/53 (!) 115/56   Pulse: 72 72 75 69   Resp: 18 16 16 16   Temp: 98 °F (36.7 °C) 97.3 °F (36.3 °C) 97.5 °F (36.4 °C) 97.3 °F (36.3 °C)   SpO2: 97% 99% 97% 99%   Weight:       Height:         Intake and Output:  04/19 0701 - 04/19 1900  In: 120 [P.O.:120]  Out: 125 [Urine:75; Drains:50]  04/17 1901 - 04/19 0700  In: -   Out: 900 [Urine:625; Drains:275]    Physical Examination:  General: No distress. confused  HEENT:           Pale and icteric   Neck:  Supple, no mass  Resp:  Lungs CTA B/L  CV:  RRR,  no murmur or rub,no  LE edema  GI:  Soft, NT, + Bowel sounds, + paracentesis drain  Neurologic:  AAO X 3  :  + [place]     [x]    High complexity decision making was performed  [x]    Patient is at high-risk of decompensation with multiple organ involvement    Lab Data Personally Reviewed: I have reviewed all the pertinent labs, microbiology data and radiology studies during assessment.     Recent Labs     04/19/22 0212 04/18/22 0115 04/17/22  1153 04/17/22 0142   * 125* 126* 126*   K 3.2* 3.4* 3.6 3.4*   CL 83* 85* 88* 87*   CO2 21 24 23 23   * 174* 110* 152*   * 121* 121* 118*   CREA 4.68* 4.31* 4.25* 4.04*   CA 8.7 8.7 8.9 8.9   PHOS 6.6* 5.7* 5.9* 5.0*   ALB 3.3* 3.5 3.5 3.6   INR  --  1.8*  --   --      Recent Labs     04/19/22 0212 04/18/22  0115 04/17/22 0142   WBC 7.5 6.9 6.9   HGB 9.2* 9.6* 9.3*   HCT 28.0* 29.1* 28.4*   PLT 37* 39* 38*     No results found for: SDES  Lab Results   Component Value Date/Time    Culture result: NO GROWTH 4 DAYS 03/14/2022 06:48 PM    Culture result: NO GROWTH 4 DAYS 03/12/2022 06:00 PM    Culture result: CANDIDA ALBICANS (A) 03/05/2022 09:22 PM     Recent Results (from the past 24 hour(s))   RENAL FUNCTION PANEL    Collection Time: 04/19/22  2:12 AM   Result Value Ref Range    Sodium 122 (L) 136 - 145 mmol/L    Potassium 3.2 (L) 3.5 - 5.1 mmol/L    Chloride 83 (L) 97 - 108 mmol/L    CO2 21 21 - 32 mmol/L    Anion gap 18 (H) 5 - 15 mmol/L    Glucose 173 (H) 65 - 100 mg/dL     (H) 6 - 20 MG/DL    Creatinine 4.68 (H) 0.55 - 1.02 MG/DL BUN/Creatinine ratio 28 (H) 12 - 20      GFR est AA 12 (L) >60 ml/min/1.73m2    GFR est non-AA 9 (L) >60 ml/min/1.73m2    Calcium 8.7 8.5 - 10.1 MG/DL    Phosphorus 6.6 (H) 2.6 - 4.7 MG/DL    Albumin 3.3 (L) 3.5 - 5.0 g/dL   CBC WITH AUTOMATED DIFF    Collection Time: 04/19/22  2:12 AM   Result Value Ref Range    WBC 7.5 3.6 - 11.0 K/uL    RBC 3.03 (L) 3.80 - 5.20 M/uL    HGB 9.2 (L) 11.5 - 16.0 g/dL    HCT 28.0 (L) 35.0 - 47.0 %    MCV 92.4 80.0 - 99.0 FL    MCH 30.4 26.0 - 34.0 PG    MCHC 32.9 30.0 - 36.5 g/dL    RDW 19.7 (H) 11.5 - 14.5 %    PLATELET 37 (LL) 847 - 400 K/uL    MPV 9.9 8.9 - 12.9 FL    NRBC 0.0 0  WBC    ABSOLUTE NRBC 0.00 0.00 - 0.01 K/uL    NEUTROPHILS 80 (H) 32 - 75 %    LYMPHOCYTES 11 (L) 12 - 49 %    MONOCYTES 8 5 - 13 %    EOSINOPHILS 0 0 - 7 %    BASOPHILS 0 0 - 1 %    IMMATURE GRANULOCYTES 1 (H) 0.0 - 0.5 %    ABS. NEUTROPHILS 6.0 1.8 - 8.0 K/UL    ABS. LYMPHOCYTES 0.8 0.8 - 3.5 K/UL    ABS. MONOCYTES 0.6 0.0 - 1.0 K/UL    ABS. EOSINOPHILS 0.0 0.0 - 0.4 K/UL    ABS. BASOPHILS 0.0 0.0 - 0.1 K/UL    ABS. IMM. GRANS. 0.1 (H) 0.00 - 0.04 K/UL    DF SMEAR SCANNED      RBC COMMENTS ANISOCYTOSIS  1+        RBC COMMENTS AIRAM CELLS  PRESENT        RBC COMMENTS OVALOCYTES  PRESENT                                           Care Plan discussed with:  Patient     Family      RN      Consulting Physician [facility],         I have reviewed the flowsheets. Chart and Pertinent Notes have been reviewed. No change in PMH ,family and social history from Consult note.       Sukhdeep Winslow MD

## 2022-04-19 NOTE — PROGRESS NOTES
Problem: Falls - Risk of  Goal: *Absence of Falls  Description: Document Billy Gong Fall Risk and appropriate interventions in the flowsheet. Outcome: Progressing Towards Goal  Note: Fall Risk Interventions:  Mobility Interventions: Bed/chair exit alarm    Mentation Interventions: Adequate sleep, hydration, pain control    Medication Interventions: Evaluate medications/consider consulting pharmacy    Elimination Interventions: Bed/chair exit alarm    History of Falls Interventions: Bed/chair exit alarm         Problem: Pressure Injury - Risk of  Goal: *Prevention of pressure injury  Description: Document Rodrigo Scale and appropriate interventions in the flowsheet.   Outcome: Progressing Towards Goal  Note: Pressure Injury Interventions:  Sensory Interventions: Assess changes in LOC    Moisture Interventions: Absorbent underpads    Activity Interventions: Assess need for specialty bed    Mobility Interventions: Assess need for specialty bed    Nutrition Interventions: Document food/fluid/supplement intake    Friction and Shear Interventions: Apply protective barrier, creams and emollients

## 2022-04-19 NOTE — PROGRESS NOTES
Faxed updated [facility] paperwork for pts son Shon Shanks) to [employer] at [phone], per his request.

## 2022-04-19 NOTE — PROGRESS NOTES
[address] [facility], [place], [address], [facility] [place]      Seferino Angel, PA-C Elenora Crigler, [facility]-BC     Michelle Lopez, [facility]-BC   MANISHA Chicas, [facility]       Namita Montemayor Mello De [address]ahl 136    at [address], [facility] Katlyn Pizano  22.    [phone]    FAX: [address] [place] [address], [address]    [phone]    FAX: [phone]       HEPATOLOGY PROGRESS NOTE  The patient is well known to me from a previous visit to my office at [facility] in [facility]. She is a 64 yo  female who was found to have chronic liver disease in 2020 and cirrhosis in 7/2021 when she developed ascites. She had variceal bleeding in 11/2021. Serologic evaluation for markers of chronic liver disease was positive for AMA. Cirrhosis is due to [facility].     The patient has developed the following complications of cirrhosis: esophageal varices, variceal bleeding, ascites, edema, hepatic encephalopathy, severe muslce wasting    I saw the patient for the first time in 1/2022. She had CTP score 9 and MELD score 21 at that time. We started liver transplant evaluation testing. She developed confusion and could not be aroused and was hospitalized 3 weeks ago at [facility] in 2/202. During that hospitalization she developed worsening malnutrition, worsening of muscle wasting and a few decubitus ulcers. She has been at [facility] since 3/5. She has been receiving Dobbhoff tube feedings and OT/PT.   She has had dramatic improvement in nutritional status, skin wounds have nearly healed, mental status is clear, she is stronger, can move herself around in bed, she can sit in chair for an hour at a time, can feed herself and has been able to stand with PT assistance. Family conference on 3/27/22. I had a 1 hour meeting with the patient and her son who is POA. Next step is to transfer to rehab for for 4+ weeks to improve ambulation, muscle mass so she could possibly be a LT candidate  In order to do this will need to remove feeding tube. In order to do this she will need to be able to eat sufficient calories to maintain her nutritional status  If she does well with rehab we may be able to get LT eval testing and eventually on LT list.    Hospital course:  She had severe malnutrition, frailty, muscle weakness upon transfer from [facility]  She recieved tube feedings and had aggressive PT/OT for 3 weeks and started to improve. Then had several episodes of hematemesis and spent some time in ICU. GD showed no varices. Only severe portal gastropathy     Overall condition has remained stagnant since feeding tube removed. Restarted OT and PT today. More lethargeic than when I last saw her a few days ago. Awaiting placement to rehab center close to son's home in [facility]. Spoke to son Analisa Kennedy about plan. He had reached out to [facility] LT program in January when she was first ill and prior to her coming to see me. They finally got back with him this week. He is happy with care and progress we have made to date. He wants to continue with our current plan. She refused to participate in PT today. Hepatology Plan:   to find rehab place in [facility] area close to son. Continue to push PO and lots of Ensure every day. Continue OT/PT every day. Can stop PO FE. She cannot absorb this because of advanced cirrhosis      ASSESSMENT AND PLAN:  Cirrhosis  The diagnosis of cirrhosis is based upon imaging, laboratory studies, complications of cirrhosis. Cirrhosis is secondary to [facility]. Not alcohol as she was initially told.     Serologic testing was positive for AMA, ASMA     The CTP is 10. Child class C.   The MELD score is 36.     Based upon the MELD and CTP scores the patient has a mortality of about 50% within the next 90 days. She has made remarkable progress over th past 2 months since transferred here. I still think she is too weak to survive liver transplantation. She continue to need aggressive nutritional support and aggressive PT more than she can get here. Both she and her son are in favor of this with the hopes her situation will improve and she could survive liver transplantation.       Primary Biliary Cholangitis  The diagnosis is based upon serology and an elevation in ALP and positive AMA. A liver biopsy has not been performed. PBC is being treated with ANTONIETA 500 mg BID. CKD-HRS  Scr was in the 4-5 mg range. Iproved down to the 3s but now backmup to the 4-5 mg range. This is CKD-HRS. She continues to make urine. She has not required dialysis. Continue midodrine,   I think we can stop octreotide. Spoke with Dr Kriss Frazier about renal function. He is concerned that she will do poorly at rehab, need readmission and have to start on dialysis. He questioned if we should start dialysis as now  She has no real indications to start dialysis now vs wait. No fluid overload, NA, K are normal.  She is making urine  Spoke to son about dialysis. He and I prefer to continue as we are without dialysis for now as this would interfere with daily rehab    Malnutrition  The patient has severe protein-calorie malnutrtion and muscle wasting. This is due to advanced liver disease and refractory ascites. The patient needs as many calories as she can possibly consume   Without aggressive nutritional support she will not survive. She is alert, oriented and understands the need to consume as many Ensure as possible. There are no plans to replace Dobhoff feeding tube at this time. Frailty/muscle wasting  The patient is very frail and working with OPT/PT.     She has made great progress but lost some after her trip to ICU. This will be address by rehab. Skin wounds  Still has several pressure wounds on back, buttocks that continue to heal slowly. Ulcers have to be healed for her to be an LT candidate.     Ascites   Ascites developed for the first time in 7/2021. Ascites has not recurred since last paracentesis and on no diuretics     Hyponatremia  This is secondary cirrhosis and diuretics  Sna has been stable in the mid-120s. She is on No diuretics.       Screening for Esophageal varices   The patient has esophageal varices with prior bleeding. Varices were banded in 11/2021 and 12/2021. The last EGD to assess for varices was performed in 3/2022. No esophageal varices were present.     Hepatic encephalopathy   Overt HE is well controlled on xifaxan. She is not on lactulose, does not need this and should not be started on this because of diarrhea risk. She has been alert and oriented througtout the hospitalization. Coagulopathy  INR is in the 1.8     Thrombocytopenia   This is secondary to cirrhosis. There is no evidence of overt bleeding. No treatment is required. The platelet count is adequate for the patient to undergo procedures without the need for platelet transfusion or platelet growth factors.     Anemia   This is due to multifactorial causes including portal hypertension with chronic GI blood loss, acute GI blood loss 1 week ago, and bone marrow suppression due to severe malnutrition. With improved nutritional status anemia has imrpoved into the 8s. Ferritin 106, FE saturation 27%  Although this is normal for healthy person this is consistent with FE deficiency in patient with cirrhosis. Will give her IV FE x 2 doses whil we wait for rehab placement    Thrombocytopenia   This is secondary to cirrhosis. There is no evidence of overt bleeding. No treatment is required.   The platelet count is adequate for the patient to undergo procedures without the need for platelet transfusion or platelet growth factors. PHYSICAL EXAMINATION:  VS: per nursing note  General:  Looks much better. Skin color normal.  Eyes:  Sclera non-icteric. ENT:  No oral lesions. Thyroid normal.  Nodes:  No adenopathy. Skin:  Spider angiomata. Multiple diffuse ecchymosis on arms. Respiratory:  Lungs clear to auscultation. Cardiovascular:  Regular heart rate. Abdomen:  Soft non-tender, NO obvious ascites. Extremities:  No lower extremity edema. Severe muscle wasting of upper and lower extremities. Neurologic:  Alert and oriented. Cranial nerves grossly intact. No asterixis. LABORATORY:  Results for Tong Ford (MRN [id number]) as of 4/17/2022 03:30   Ref. Range 4/15/2022 01:21 4/16/2022 01:41   WBC Latest Ref Range: 3.6 - 11.0 K/uL 7.3 5.5   HGB Latest Ref Range: 11.5 - 16.0 g/dL 7.4 (L) 8.3 (L)   PLATELET Latest Ref Range: 150 - 400 K/uL 35 (LL) 36 (LL)   Sodium Latest Ref Range: 136 - 145 mmol/L 132 (L) 130 (L)   Potassium Latest Ref Range: 3.5 - 5.1 mmol/L 3.5 3.3 (L)   Chloride Latest Ref Range: 97 - 108 mmol/L 94 (L) 93 (L)   CO2 Latest Ref Range: 21 - 32 mmol/L 22 24   Glucose Latest Ref Range: 65 - 100 mg/dL 101 (H) 103 (H)   BUN Latest Ref Range: 6 - 20 MG/ (H) 110 (H)   Creatinine Latest Ref Range: 0.55 - 1.02 MG/DL 4.13 (H) 3.99 (H)   Albumin Latest Ref Range: 3.5 - 5.0 g/dL 3.9 3.5     RADIOLOGY:  Ultrasound of liver. Echogenic consistent with cirrhosis. No liver mass lesions. No dilated bile ducts. Moderate ascites.         MD Sujata GriggsMedStar [facility] [address] of [address], [address] [facility] 22.  [phone]  [address] [facility]

## 2022-04-20 NOTE — PROGRESS NOTES
Provided support for this pt in [facility] [place] [facility]. Reviewed pt's chart prior to this visit. Facilitated life review to assess potential support needs or coping strategies. Pt was laying on one side of the bed and a various points during this visit became tearful. Pt indicated that the tears were a result of pt's current diagnosis. Pt indicated she is receiving emotional support from family/friends. CH made mention of two childlike drawings that were on pt's camaar. Pt indicated she didn't know who caridad those or what they said, but thought they were pretty. Consistent with previous [facility] note, pt didn't identify with a specific [place] affiliation, but currently isn't a part of any [place], but was very open to prayer.  offered prayer for pt. Assisted pt with wipes as pt seemed challenged with some physical needs during this visit. Assured pt of continued prayers. Abhilash Torre MDiv.  Staff   Request  Support/Spiritual Care Services on [phone])

## 2022-04-20 NOTE — PROGRESS NOTES
Problem: Mobility Impaired (Adult and Pediatric)  Goal: *Acute Goals and Plan of Care (Insert Text)  Description: FUNCTIONAL STATUS PRIOR TO ADMISSION: Patient appears to be an inconsistent historian despite being oriented x 4. Originally stated she primarily stays in bed-later she reported ambulating a few times a day and spending most of her day in a recliner. She consistently reports that son assists her \"with everything\" but does not give more details on what exactly or how much assistance. HOME SUPPORT PRIOR TO ADMISSION: The patient lived with son and her mother per report. Unclear how much assistance she required. Physical Therapy Goals  Weekly re-assessment 4/15/2022  1. Patient will move from supine to sit and sit to supine and roll side to side in bed with stand-by- assistance within 7 day(s). 2.  Patient will transfer from bed to chair and chair to bed with moderate assistance using the least restrictive device within 7 day(s). 3.  Patient will perform sit to stand with minimal assistance x1 within 7 day(s). 4.  Patient will ambulate with moderate assistance for 5 feet with the least restrictive device within 7 day(s). Weekly re-assessment 4/7/2022  1. Patient will move from supine to sit and sit to supine  and roll side to side in bed with moderate assistance within 7 day(s). 2.  Patient will transfer from bed to chair and chair to bed with maximal assistance using the least restrictive device within 7 day(s). 3.  Patient will perform sit to stand with moderate assistance within 7 day(s). 4.  Patient will ambulate with maximal assistance for 5 feet with the least restrictive device within 7 day(s). 5.  Patient will tolerate seated EOB x 10 minutes with min A within 7 days    Reassessed 4/1/2022 s/p after transfer to ICU  1. Patient will move from supine to sit and sit to supine  and roll side to side in bed with maximal assistance within 7 day(s).     2.  Patient will transfer from bed to chair and chair to bed with maximal assistance using the least restrictive device within 7 day(s). 3.  Patient will perform sit to stand with maximal assistance within 7 day(s). 4.  Patient will ambulate with maximal assistance for 5 feet with the least restrictive device within 7 day(s). 5.  Patient will tolerate seated EOB x 10 minutes with min A within 7 days    Re-Assessed 3/21/2022  1. Patient will move from supine to sit and sit to supine , scoot up and down, and roll side to side in bed with minimal assistance/contact guard assist within 7 day(s). 2.  Patient will transfer from bed to chair and chair to bed with minimal assistance/contact guard assist using the least restrictive device within 7 day(s). 3.  Patient will perform sit to stand with minimal assistance/contact guard assist within 7 day(s). 4.  Patient will ambulate with moderate assistance  for 10 feet with the least restrictive device within 7 day(s). Initiated 3/6/2022  1. Patient will move from supine to sit and sit to supine , scoot up and down, and roll side to side in bed with minimal assistance/contact guard assist within 7 day(s). 2.  Patient will transfer from bed to chair and chair to bed with moderate assistance  using the least restrictive device within 7 day(s). 3.  Patient will perform sit to stand with moderate assistance  within 7 day(s). 4.  Patient will ambulate with moderate assistance  for 20 feet with the least restrictive device within 7 day(s). 5.  Patient will ascend/descend 2 stairs with 2 handrail(s) with moderate assistance  within 7 day(s).    4/20/2022 1438 by Sofía Rios, PT  Outcome: Progressing Towards Goal  4/20/2022 1154 by Sofía Rios, PT  Outcome: Progressing Towards Goal     PHYSICAL THERAPY TREATMENT  Patient: David Hinkle (37 y.o. female)  Date: 4/20/2022  Diagnosis: Cirrhosis ([facility] [K74.60] Cirrhosis ([facility]  Procedure(s) (LRB):  ESOPHAGOGASTRODUODENOSCOPY (EGD) (N/A) 22 Days Post-Op  Precautions: Fall,Skin  Chart, physical therapy assessment, plan of care and goals were reviewed. ASSESSMENT  Patient continues with skilled PT services and is slowly progressing towards goals. Returned to assist patient back to bed. Remains well below baseline and most limited by impaired activity tolerance/endurance, general weakness, impaired balance, and pain. Prognosis is fair and plan is for discharge to SNF. Acute PT will continue to follow and progress as able. Current Level of Function Impacting Discharge (mobility/balance): maximal assist supine to sit and stand pivot bed to chair     Other factors to consider for discharge: being worked up for [facility] transplant, palliative on board now         PLAN :  Patient continues to benefit from skilled intervention to address the above impairments. Continue treatment per established plan of care. to address goals. Recommendation for discharge: (in order for the patient to meet his/her long term goals)  Therapy up to 5 days/week in SNF setting    This discharge recommendation:  Has not yet been discussed the attending provider and/or case management    IF patient discharges home will need the following DME: to be determined (TBD)       SUBJECTIVE:   Patient stated [facility].     OBJECTIVE DATA SUMMARY:   Critical Behavior:  Neurologic State: Alert  Orientation Level: Oriented to place,Oriented to person,Disoriented to time,Disoriented to situation  Cognition: Follows commands  Safety/Judgement: Decreased awareness of need for assistance,Decreased awareness of need for safety,Decreased insight into deficits  Functional Mobility Training:  Bed Mobility:  Sit to Supine: Maximum assistance  Transfers:  Sit to Stand: Moderate assistance;Assist x2  Stand to Sit: Moderate assistance  Bed to Chair: Maximum assistance  Balance:  Sitting: Intact; Without support  Standing: Impaired  Standing - Static: Fair;Constant support  Standing - Dynamic : Poor;Constant support    Pain Rating:  Did not interfere with session    Activity Tolerance:   Fair    After treatment patient left in no apparent distress:   Supine in bed, Call bell within reach, Bed / chair alarm activated, and Caregiver / family present    COMMUNICATION/COLLABORATION:   The patients plan of care was discussed with: Occupational therapist and Registered nurse.      Annika Carballo, PT   Time Calculation: 15 mins

## 2022-04-20 NOTE — PROGRESS NOTES
Problem: Self Care Deficits Care Plan (Adult)  Goal: *Acute Goals and Plan of Care (Insert Text)  Description: FUNCTIONAL STATUS PRIOR TO ADMISSION: Chart review 2/23/2022 patient modified independent with RW. Son states recently increased assistance to [facility] with functional mobility and sit<>stand; tangential at times during conversation, but aware and able to be redirected. HOME SUPPORT: The patient lived with mother who provided assistance as needed and son lives nearby. 1 step to enter, stays on main level, walk-in-shower, DME: Shower chair, RW, BSC, w/c    Occupational Therapy Goals  Medical Re-Evaluation s/p ICU transfer for GIB and worsening cirrhosis, goals modified below; revised 4/8/2022; Weekly Re-evaluation 4/15/2022  1. Patient will perform at least one grooming task in supported sitting with moderate assistance within 7 day(s). - Met and upgraded to S/U with no sequencing cues  2. Patient will perform bathing from anterior neck to thigh with maximal assistance within 7 day(s). -Met and upgraded to Mod A  3. Patient will perform upper body dressing with moderate assistance within 7 day(s). -Continue  4. Patient will perform rolling in supine for toilet transfers with maximal assistance within 7 day(s). -Met at [facility] and upgraded to pt will perform bed mobility, to/from EOB, with Supervision  6. Patient will complete self-feeding tasks with moderate assistance within 7 day(s). MET 4/8/2022 cutting food with modified independence-Continue  5. Patient will participate in upper extremity therapeutic exercise/activities with moderate assistance for 10 minutes within 7 day(s). -Continue   7. Patient will utilize energy conservation techniques during functional activities with verbal cues within 7 day(s). -Continue    Initiated 3/8/2022, Re-assessed to increase frequency of services on 3/9/2022 due to significant improvement in patient's performance . Reviewed 3/17/2022; Weekly Re-assessment 3/24/2022    1. Patient will perform grooming with minimal assistance/contact guard assist within 7 day(s). -Continue  2. Patient will perform bathing from anterior neck to thigh with minimal assistance/contact guard assist within 7 day(s). -Continue  3. Patient will perform upper body dressing with minimal assistance/contact guard assist within 7 day(s). -Continue  4. Patient will perform toilet transfers with moderate assistance  within 7 day(s). -Continue  5. Patient will perform all aspects of toileting with moderate assistance  within 7 day(s). -Continue  6. Patient will participate in upper extremity therapeutic exercise/activities with minimal assistance/contact guard assist for 10 minutes within 7 day(s). -Continue  7. Patient will utilize energy conservation techniques during functional activities with verbal cues within 7 day(s). -Continue    Outcome: Progressing Towards Goal   OCCUPATIONAL THERAPY TREATMENT  Patient: Jose Manuel Rojas (89 y.o. female)  Date: 4/20/2022  Diagnosis: Cirrhosis ([facility] [K74.60] [facility]  Procedure(s) (LRB):  ESOPHAGOGASTRODUODENOSCOPY (EGD) (N/A) 22 Days Post-Op  Precautions: Fall,Skin  Chart, occupational therapy assessment, plan of care, and goals were reviewed. ASSESSMENT  Patient continues with skilled OT services and is progressing towards goals. Patient participated very well in EOB activity today- tolerating 15-20 in unsupported sitting while maintaining good balance during ADL. Patient somewhat fatigued during subsequent attempts in stand/transfer. Max A (plus 2nd person for safety) required for stand pivot transfer bed < > chair. Patient still well below baseline and continues to be limited by general weakness, poor standing tolerance during ADL tasks and related mobility. Rehab services are recommended at discharge    Current Level of Function Impacting Discharge (ADLs): feeding and grooming with set up, seated.  Bathing/dressing UB- mod Assist, LB max assist, toileting max-total assist (has indwelling urinary catheter). Max A for stand pivot transfer     Other factors to consider for discharge: lengthy hospitalization. Complex medical history          PLAN :  Patient continues to benefit from skilled intervention to address the above impairments. Continue treatment per established plan of care to address goals. Recommend with staff: bed in chair position or in chair for meals (2 person recommended for transfers), encourage ADL participation     Recommend next OT session: BSC transfer training    Recommendation for discharge: (in order for the patient to meet his/her long term goals)  Therapy up to 5 days/week in SNF setting    This discharge recommendation:  Has been made in collaboration with the attending provider and/or case management    IF patient discharges home will need the following DME: TBD       SUBJECTIVE:   Patient pleasant and cooperative     OBJECTIVE DATA SUMMARY:   Cognitive/Behavioral Status:  Neurologic State: Alert  Orientation Level: Oriented to place;Oriented to person;Disoriented to time;Disoriented to situation  Cognition: Follows commands             Functional Mobility and Transfers for ADLs:  Bed Mobility:  Supine to Sit: Maximum assistance  Sit to Supine: Maximum assistance    Transfers:  Sit to Stand: Moderate assistance;Assist x2     Bed to Chair: Maximum assistance    Balance:  Sitting: Intact; Without support  Standing: Impaired  Standing - Static: Fair;Constant support  Standing - Dynamic : Poor;Constant support    ADL Intervention:  Feeding  Feeding Assistance: Set-up; Supervision    Grooming  Grooming Assistance: Supervision  Position Performed: Seated edge of bed  Brushing/Combing Hair: Supervision  Cues: Verbal cues provided            Lower Body Dressing Assistance  Socks:  Total assistance (dependent)         Pain:  None indicated     Activity Tolerance:   Fair and requires rest breaks    After treatment patient left in no apparent distress: Sitting in chair, Call bell within reach, Bed / chair alarm activated, and assisted patient back to bed later in day     COMMUNICATION/COLLABORATION:   The patients plan of care was discussed with: Physical therapist, Registered nurse, and Case management.      Naomie Elise OT  Time Calculation: 45 mins

## 2022-04-20 NOTE — PROGRESS NOTES
Hospitalist Progress Note    NAME: Yuly Rodriguez   :  1960   MRN:  [id number]       Assessment / Plan:  Acute blood loss anemia/GI bleed  s/p 4 units of PRBCs   s/p EGD on   Hb 7.8 today, platelet 27, thrombocytopenia likely d/t Cirrhosis  C/w PPI BID   Hepatology following  peripherally     Liver cirrhosis  with portal HTN.  Felt to be secondary to [facility]  Transaminitis/hyperbilirubinemia/coagulopathy/thrombocytopenia  Hepatic Encephalopathy     -Transferred from [facility] with prolonged hospitalization.  Transferred for further work-up for liver transplant eval  -EGD on  [facility] noted for distal esophagitis and portal hypertensive gastropathy, no evidence of varices.    History of esophageal varices back in 2021 status post banding at that time.    -Per GI at outlying facility, patient was deemed not to be a candidate for colonoscopy and recommended Cologuard.    s/p MRCP on  which was noted for cirrhosis, portal hypertension, and large ascites.   s/p cardiac stress test which was negative for ischemia, EF of 69%  Continue lactulose, rifaximin, Protonix  Continue ursodiol  Paracentesis  3/25 with 52101 removed  PT/OT following and patient has been working with them again  Refusing PT OT the last couple of days    GREG with metabolic acidosis. persisent  Hyponatremia  -Baseline 0.9 on 2021   -Likely due to [facility]  -Nephrology following, recommendations appreciated. - off bicarb drip on oral bicarb  Creatinine around 4. Discussed with nephrology.  C/w albumin/ midodrine  C/w free water restriction  Sodium decreased from 130 yesterday to 126 today  Continue monitor with labs     Refractory ascites  -Moderate large ascites on US 3/6.  -Ascites said to be refractory to diuretics, HRS limiting use of diuretics  -Ascitic drain that was placed on 3/12 was apparently clogged, flushed and drained significant amount of ascites  -Ascitic fluid lab negative for SBP  Will probably need repeat paracentesis prior to discharge     Recent E. coli UTI  -Treated with Vanco and Zosyn then transitioned to Rocephin, completed 7-day antibiotic course as of 2/17  -Replace Steve with new catheter 3/5 .  Urine culture on 3/5 grew Candida albicans 75,000 CFU. - nephrology desires that steve stay in for now      Hypothyroid  -Continue levothyroxine    chronic anemiacontinue EDWIN per nephrology     Severe malnutrition  --Encourage oral intake   --On regular diet and tolerating     Generalized weakness/chronic wounds. pta  -PT, OT consult  -Wound care following     Pending placement at rehab. Pending further conversation with hepatology and plan with family. Palliative care is on board and will be following. Will continue    Estimated discharge date: >48 hours  Barriers: Creatinine/ Sodium stabilization    Code status: DNR  Prophylaxis: SCD's  Recommended Disposition: SNF/LTC     Subjective:     Chief Complaint / Reason for Physician Visit  Patient seen and examined for follow-up of GREG, hepatorenal syndrome, cirrhosis, and other. Patient seen and examined earlier today by me. Patient reports not feeling too good today. No confusion. Patient has been refusing medications and PT. Review of Systems:  Symptom Y/N Comments  Symptom Y/N Comments   Fever/Chills n   Chest Pain n    Poor Appetite n   Edema n    Cough n   Abdominal Pain     Sputum    Joint Pain     SOB/ROBINS    Pruritis/Rash     Nausea/vomit    Tolerating PT/OT     Diarrhea    Tolerating Diet     Constipation    Other       Could NOT obtain due to:      Objective:     VITALS:   Last 24hrs VS reviewed since prior progress note.  Most recent are:  Patient Vitals for the past 24 hrs:   Temp Pulse Resp BP SpO2   04/20/22 1433 97.2 °F (36.2 °C) 72 16 126/64 100 %   04/20/22 1129    120/62    04/20/22 0817 97.5 °F (36.4 °C) 83 16 (!) 124/54 98 %   04/20/22 0201 97.8 °F (36.6 °C) 77 16 (!) 118/54 98 %   04/19/22 1859 97.5 °F (36.4 °C) 70 16 (!) 112/56 96 %       Intake/Output Summary (Last 24 hours) at 4/20/2022 1849  Last data filed at 4/20/2022 8339  Gross per 24 hour   Intake 527 ml   Output 475 ml   Net 52 ml        I had a face to face encounter and independently examined this patient on 4/20/2022, as outlined below:  PHYSICAL EXAM:  General: No acute distress. Somewhat more confused today  EENT:  EOMI. icteric sclerae. MMM  Resp:  CTA bilaterally, no wheezing or rales. No accessory muscle use  CV:  Regular  rhythm,  No edema  GI:  Soft, Distended  Neurologic:  Alert and oriented X 2, normal speech,   Psych:   Good insight. Not anxious nor agitated  Skin:  No rashes. Jaundiced    Reviewed most current lab test results and cultures  YES  Reviewed most current radiology test results   YES  Review and summation of old records today    NO  Reviewed patient's current orders and MAR    YES  PMH/ reviewed - no change compared to H&P  ________________________________________________________________________  Care Plan discussed with:    Comments   Patient y    Family      RN y    Care Manager y    Consultant                       y Multidiciplinary team rounds were held today with , nursing, pharmacist and clinical coordinator. Patient's plan of care was discussed; medications were reviewed and discharge planning was addressed. ________________________________________________________________________  Total NON critical care TIME: 39  Minutes    Total CRITICAL CARE TIME Spent:   Minutes non procedure based      Comments   >50% of visit spent in counseling and coordination of care     ________________________________________________________________________  Genaro Kelly MD     Procedures: see electronic medical records for all procedures/Xrays and details which were not copied into this note but were reviewed prior to creation of Plan. LABS:  I reviewed today's most current labs and imaging studies.   Pertinent labs include:  Recent Labs     04/20/22 0135 04/19/22 0212 04/18/22  0115   WBC 8.2 7.5 6.9   HGB 9.2* 9.2* 9.6*   HCT 27.4* 28.0* 29.1*   PLT 38* 37* 39*     Recent Labs     04/20/22 0135 04/19/22 0212 04/18/22 0115   * 122* 125*   K 3.3* 3.2* 3.4*   CL 83* 83* 85*   CO2 20* 21 24   GLU 80 173* 174*   * 130* 121*   CREA 4.50* 4.68* 4.31*   CA 8.6 8.7 8.7   PHOS 5.9* 6.6* 5.7*   ALB 3.1*  3.1* 3.3* 3.5   TBILI 5.4*  --   --    ALT 18  --   --    INR  --   --  1.8*       Signed: Pooja Peterson MD

## 2022-04-20 NOTE — PROGRESS NOTES
SELMA:  1. RUR-21%  2. SNF possible, palliative consult. 2. Hepatology following. CM received voicemail from [place] [facility] and rehab, they are unable to accept patient at this time due to medical condition. CM has not heard from [facility] in [place] or [facility]. CM aware of Hepatology note regarding [facility] conference call. CM to continue to follow for discharge needs.     [facility]

## 2022-04-20 NOTE — CONSULTS
Palliative Medicine Consult  Star: [phone]    Patient Name: Cata Boland  YOB: 1960    Date of Initial Consult: 4/20/22  Reason for Consult: Care decisions   Requesting Provider: Faizan Banda  Primary Care Physician: Kia Gonzalez MD     SUMMARY:   Cata Boladn is a 65 y.o. 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 Underwent EGD 3/29/22, requiring 4 units PRBC thus far. Worsening renal function thought to be from hepatorenal syndrome. Nephrology has brought up dialysis, although hepatology feels this can wait. Goals are to f/u with transplant center. Current medical issues leading to Palliative Medicine involvement include: care decisions. Social: Pt has been  for ~ 2 years from [place]. 2 sons- Oswaldo Oh and Edith Astorgaraquel. From the [facility]. PALLIATIVE DIAGNOSES:   1. Fatigue   2. Lethargy   3. Goals of care and advance care planning      PLAN:   1. Consulted for extra support as Dr Marquis Brooks is in communication with [facility] and other transplant centers. The goal is to get stronger and to have work up for liver transplant. 2. Along w/ Cecilia Shown LCSW meet w/ pt who is lethargic, but awake and do not notice significant confusion. She is clear that she is  from her  and that she would trust son Oswaldo Oh to make decisions on her behalf if she was unable. She does not have an AMD.  3. Speak to son Oswaldo Oh who is well informed and involved in pt's care- before admission he was the one who would take her to all doctor's appointments. He feels that pt and family understand just how sick patient is and that \"if one more organ system fails, transplant will be off the table. \" Family under the impression that if pt gets to the point where needs dialysis this will make it harder for her to be placed on transplant list, although if it came down to her really needing dialysis they would be open to it- but would need to talk to patient in more detail first.   4. Will try and do first part of AMD but if cannot- he states that pt's /his father would defer to him and his brother Mary Anne Delacruz- all communicate well. 5. Following w/ you.   6. Initial consult note routed to primary continuity provider and/or primary health care team members  7. Communicated plan of care with: Palliative IDT, [facility] Team incl Dr Ghada Nolan / TREATMENT PREFERENCES:     GOALS OF CARE:  Patient/Health Care Proxy Stated Goals: Prolong life    TREATMENT PREFERENCES:   Code Status: DNR    Patient and family's personal goals include: to be eval for liver transplant       Advance Care Planning:  [x] The [facility] Interdisciplinary Team has updated the ACP Navigator with Health Care Decision Maker and Patient Capacity      Primary Decision Maker: Louann Necessary - [phone]    Primary Decision Maker: Fidel Cabot  Spouse - [phone]  Advance Care Planning 2/14/2022   Patient's Healthcare Decision Maker is: -   Confirm Advance Directive None   Patient Would Like to Complete Advance Directive -       Medical Interventions: Limited additional interventions       Other:    As far as possible, the palliative care team has discussed with patient / health care proxy about goals of care / treatment preferences for patient. HISTORY:     History obtained from: Pt, chart, staff, family     CHIEF COMPLAINT: fatigue     HPI/SUBJECTIVE:    The patient is:   [x] Verbal and participatory  [] Non-participatory due to: Pt awake, fatigued but able to answer questions about her family. Very weak.     Clinical Pain Assessment (nonverbal scale for severity on nonverbal patients):   Clinical Pain Assessment  Severity: 0 Activity (Movement): Lying quietly, normal position    Duration: for how long has pt been experiencing pain (e.g., 2 days, 1 month, years)  Frequency: how often pain is an issue (e.g., several times per day, once every few days, constant)     FUNCTIONAL ASSESSMENT:     Palliative Performance Scale (PPS):  PPS: 50       PSYCHOSOCIAL/SPIRITUAL SCREENING:     Palliative IDT has assessed this patient for cultural preferences / practices and a referral made as appropriate to needs (Cultural Services, Patient Advocacy, Ethics, etc.)    Any spiritual / Quaker concerns:  [] Yes /  [x] No   If \"Yes\" to discuss with pastoral care during IDT     Does caregiver feel burdened by caring for their loved one:   [] Yes /  [x] No /  [] No Caregiver Present    If \"Yes\" to discuss with social work during IDT    Anticipatory grief assessment:   [x] Normal  / [] Maladaptive     If \"Maladaptive\" to discuss with social work during IDT    ESAS Anxiety:      ESAS Depression:       Cannot obtain due to patient factors     REVIEW OF SYSTEMS:     Positive and pertinent negative findings in ROS are noted above in HPI. The following systems were [x] reviewed / [] unable to be reviewed as noted in HPI  Other findings are noted below. Systems: constitutional, ears/nose/mouth/throat, respiratory, gastrointestinal, genitourinary, musculoskeletal, integumentary, neurologic, psychiatric, endocrine. Positive findings noted below. Modified ESAS Completed by: provider   Fatigue: 8 Drowsiness: 2     Pain: 0           Dyspnea: 0           Stool Occurrence(s): 1        PHYSICAL EXAM:     From RN flowsheet:  Wt Readings from Last 3 Encounters:   04/06/22 123 lb 11.2 oz (56.1 kg)   02/17/22 149 lb 14.6 oz (68 kg)   12/13/21 153 lb (69.4 kg)     Blood pressure 120/62, pulse 83, temperature 97.5 °F (36.4 °C), resp. rate 16, height 5' 4\" (1.626 m), weight 123 lb 11.2 oz (56.1 kg), SpO2 98 %, not currently breastfeeding.     Pain Scale 1: Numeric (0 - 10)  Pain Intensity 1: 0  Pain Onset 1: chronic  Pain Location 1: Abdomen,Back  Pain Orientation 1: Anterior  Pain Description 1: Aching  Pain Intervention(s) 1: Repositioned  Last bowel movement, if known:     Constitutional: awake, alert, lethargic  Eyes: pupils equal, anicteric  ENMT: no nasal discharge, moist mucous membranes  Respiratory: breathing not labored  Skin: warm, dry, thin, bruising   Neurologic: following commands, moving all extremities  Psychiatric:calm        HISTORY:     Principal Problem:    Cirrhosis ([facility] [facility] (2022)      Past Medical History:   Diagnosis Date    Anxiety 10/19/2021    Basal cell carcinoma (BCC) of face 10/19/2021    Chronic kidney disease     \"end stage liver disease\" per son    Cirrhosis of liver not due to alcohol ([facility]     Depression 10/19/2021    Thyroid disease       Past Surgical History:   Procedure Laterality Date    HX  SECTION      x2    HX ORTHOPAEDIC Right     x2      Family History   Problem Relation Age of Onset    Melanoma Brother       History reviewed, no pertinent family history. Social History     Tobacco Use    Smoking status: Former Smoker     Quit date:      Years since quittin.3    Smokeless tobacco: Never Used   Substance Use Topics    Alcohol use: Not Currently     Comment: History of alcohol use     Allergies   Allergen Reactions    Morphine Other (comments)     Severe HA.  ALL narcotics!!!      Current Facility-Administered Medications   Medication Dose Route Frequency    3% sodium chloride (*HIGH ALERT*CONCENTRATED IV*) infusion  30 mL/hr IntraVENous CONTINUOUS    nystatin (MYCOSTATIN) 100,000 unit/gram cream   Topical TID    sodium bicarbonate tablet 650 mg  650 mg Oral TID    magic mouthwash cpd (without sucralfate)  5 mL Oral TIDAC    simethicone (MYLICON) 50BK/7.4EW oral drops 80 mg  1.2 mL Oral Multiple    promethazine (PHENERGAN) tablet 12.5 mg  12.5 mg Oral Q6H PRN    epoetin vic-epbx (RETACRIT) injection 10,000 Units  10,000 Units SubCUTAneous Q TUE, THU & SAT    multivitamin, tx-iron-ca-min (THERA-M w/ IRON) tablet 1 Tablet  1 Tablet Oral DAILY    balsam peru-castor oiL (VENELEX) ointment   Topical BID    zinc oxide-cod liver oil (DESITIN) 40 % paste   Topical PRN    acetaminophen (TYLENOL) tablet 650 mg  650 mg Oral Q6H PRN    Or    acetaminophen (TYLENOL) suppository 650 mg  650 mg Rectal Q6H PRN    polyethylene glycol (MIRALAX) packet 17 g  17 g Oral DAILY PRN    ondansetron (ZOFRAN ODT) tablet 4 mg  4 mg Oral Q8H PRN    Or    ondansetron (ZOFRAN) injection 4 mg  4 mg IntraVENous D7R PRN    folic acid (FOLVITE) tablet 1 mg  1 mg Oral DAILY    levothyroxine (SYNTHROID) tablet 50 mcg  50 mcg Oral 6am    midodrine (PROAMATINE) tablet 15 mg  15 mg Oral TID WITH MEALS    octreotide (SANDOSTATIN) injection 100 mcg  100 mcg IntraVENous TID    rifAXIMin (XIFAXAN) tablet 550 mg  550 mg Oral BID    sertraline (ZOLOFT) tablet 100 mg  100 mg Oral DAILY    thiamine HCL (B-1) tablet 100 mg  100 mg Oral DAILY    ursodioL (ACTIGALL) tablet 500 mg (Patient Supplied)  500 mg Oral Q12H    glucagon (GLUCAGEN) injection 1 mg  1 mg IntraMUSCular PRN    naloxone (NARCAN) injection 0.1 mg  0.1 mg IntraVENous PRN          LAB AND IMAGING FINDINGS:     Lab Results   Component Value Date/Time    WBC 8.2 04/20/2022 01:35 AM    HGB 9.2 (L) 04/20/2022 01:35 AM    PLATELET 38 (LL) 55/14/5718 01:35 AM     Lab Results   Component Value Date/Time    Sodium 120 (L) 04/20/2022 01:35 AM    Potassium 3.3 (L) 04/20/2022 01:35 AM    Chloride 83 (L) 04/20/2022 01:35 AM    CO2 20 (L) 04/20/2022 01:35 AM     (H) 04/20/2022 01:35 AM    Creatinine 4.50 (H) 04/20/2022 01:35 AM    Calcium 8.6 04/20/2022 01:35 AM    Magnesium 2.6 (H) 04/11/2022 12:20 AM    Phosphorus 5.9 (H) 04/20/2022 01:35 AM      Lab Results   Component Value Date/Time    Alk.  phosphatase 117 04/20/2022 01:35 AM    Protein, total 5.2 (L) 04/20/2022 01:35 AM Albumin 3.1 (L) 04/20/2022 01:35 AM    Albumin 3.1 (L) 04/20/2022 01:35 AM    Globulin 2.1 04/20/2022 01:35 AM     Lab Results   Component Value Date/Time    INR 1.8 (H) 04/18/2022 01:15 AM    Prothrombin time 18.4 (H) 04/18/2022 01:15 AM    aPTT 34.8 11/11/2021 12:01 AM      Lab Results   Component Value Date/Time    Iron 30 (L) 04/18/2022 01:15 AM    TIBC 110 (L) 04/18/2022 01:15 AM    Iron % saturation 27 04/18/2022 01:15 AM    Ferritin 106 04/18/2022 01:15 AM      Lab Results   Component Value Date/Time    pH 7.332 (L) 11/17/2021 04:55 AM    PCO2 27.4 (LL) 11/17/2021 04:55 AM    PO2 65.0 (L) 11/17/2021 04:55 AM     No components found for: GLPOC   No results found for: CPK, CKMB             Total time: 70 min   Counseling / coordination time, spent as noted above: 50 min   > 50% counseling / coordination?: yes    Prolonged service was provided for  []30 min   []75 min in face to face time in the presence of the patient, spent as noted above. Time Start:   Time End:   Note: this can only be billed with 80952 (initial) or 83111 (follow up). If multiple start / stop times, list each separately.

## 2022-04-20 NOTE — PROGRESS NOTES
Problem: Mobility Impaired (Adult and Pediatric)  Goal: *Acute Goals and Plan of Care (Insert Text)  Description: FUNCTIONAL STATUS PRIOR TO ADMISSION: Patient appears to be an inconsistent historian despite being oriented x 4. Originally stated she primarily stays in bed-later she reported ambulating a few times a day and spending most of her day in a recliner. She consistently reports that son assists her \"with everything\" but does not give more details on what exactly or how much assistance. HOME SUPPORT PRIOR TO ADMISSION: The patient lived with son and her mother per report. Unclear how much assistance she required. Physical Therapy Goals  Weekly re-assessment 4/15/2022  1. Patient will move from supine to sit and sit to supine and roll side to side in bed with stand-by- assistance within 7 day(s). 2.  Patient will transfer from bed to chair and chair to bed with moderate assistance using the least restrictive device within 7 day(s). 3.  Patient will perform sit to stand with minimal assistance x1 within 7 day(s). 4.  Patient will ambulate with moderate assistance for 5 feet with the least restrictive device within 7 day(s). Weekly re-assessment 4/7/2022  1. Patient will move from supine to sit and sit to supine  and roll side to side in bed with moderate assistance within 7 day(s). 2.  Patient will transfer from bed to chair and chair to bed with maximal assistance using the least restrictive device within 7 day(s). 3.  Patient will perform sit to stand with moderate assistance within 7 day(s). 4.  Patient will ambulate with maximal assistance for 5 feet with the least restrictive device within 7 day(s). 5.  Patient will tolerate seated EOB x 10 minutes with min A within 7 days    Reassessed 4/1/2022 s/p after transfer to ICU  1. Patient will move from supine to sit and sit to supine  and roll side to side in bed with maximal assistance within 7 day(s).     2.  Patient will transfer from bed to chair and chair to bed with maximal assistance using the least restrictive device within 7 day(s). 3.  Patient will perform sit to stand with maximal assistance within 7 day(s). 4.  Patient will ambulate with maximal assistance for 5 feet with the least restrictive device within 7 day(s). 5.  Patient will tolerate seated EOB x 10 minutes with min A within 7 days    Re-Assessed 3/21/2022  1. Patient will move from supine to sit and sit to supine , scoot up and down, and roll side to side in bed with minimal assistance/contact guard assist within 7 day(s). 2.  Patient will transfer from bed to chair and chair to bed with minimal assistance/contact guard assist using the least restrictive device within 7 day(s). 3.  Patient will perform sit to stand with minimal assistance/contact guard assist within 7 day(s). 4.  Patient will ambulate with moderate assistance  for 10 feet with the least restrictive device within 7 day(s). Initiated 3/6/2022  1. Patient will move from supine to sit and sit to supine , scoot up and down, and roll side to side in bed with minimal assistance/contact guard assist within 7 day(s). 2.  Patient will transfer from bed to chair and chair to bed with moderate assistance  using the least restrictive device within 7 day(s). 3.  Patient will perform sit to stand with moderate assistance  within 7 day(s). 4.  Patient will ambulate with moderate assistance  for 20 feet with the least restrictive device within 7 day(s). 5.  Patient will ascend/descend 2 stairs with 2 handrail(s) with moderate assistance  within 7 day(s).    Outcome: Progressing Towards Goal     PHYSICAL THERAPY TREATMENT  Patient: Tiot Rogers (11 y.o. female)  Date: 4/20/2022  Diagnosis: Cirrhosis ([facility] [address] [K74.60] Cirrhosis ([facility] [address]  Procedure(s) (LRB):  ESOPHAGOGASTRODUODENOSCOPY (EGD) (N/A) 22 Days Post-Op  Precautions: Fall,Skin  Chart, physical therapy assessment, plan of care and goals were reviewed. ASSESSMENT  Patient continues with skilled PT services and is slowly progressing towards goals. Able to to sit edge of bed while performing ADLs and progress to out of bed to chair with maximal assist via stand-pivot this date. Remains well below baseline and most limited by impaired activity tolerance/endurance, general weakness, impaired balance, and pain. Prognosis is fair and plan is for discharge to SNF. Acute PT will continue to follow and progress as able. Current Level of Function Impacting Discharge (mobility/balance): maximal assist supine to sit and stand pivot bed to chair    Other factors to consider for discharge: being worked up for [facility] transplant, palliative on board now         PLAN :  Patient continues to benefit from skilled intervention to address the above impairments. Continue treatment per established plan of care. to address goals. Recommendation for discharge: (in order for the patient to meet his/her long term goals)  Therapy up to 5 days/week in SNF setting    This discharge recommendation:  Has not yet been discussed the attending provider and/or case management    IF patient discharges home will need the following DME: to be determined (TBD)       SUBJECTIVE:   Patient stated I guess.     OBJECTIVE DATA SUMMARY:   Critical Behavior:  Neurologic State: Alert  Orientation Level: Oriented to place,Oriented to person,Disoriented to time,Disoriented to situation  Cognition: Follows commands  Safety/Judgement: Decreased awareness of need for assistance,Decreased awareness of need for safety,Decreased insight into deficits  Functional Mobility Training:  AM  Bed Mobility:  Supine to Sit: Maximum assistance   Transfers:  Sit to Stand: Moderate assistance;Assist x2  Stand to Sit: Moderate assistance  Bed to Chair: Maximum assistance  Balance:  Sitting: Intact; Without support  Standing: Impaired  Standing - Static: Fair;Constant support  Standing - Dynamic : Poor;Constant support     Therapeutic Exercises:   Seated march sitting edge of bed, seated tolerance sitting edge of bed working on ADLs  Pain Rating:  Endorses R shoulder pain with bed mobility    Activity Tolerance:   Fair    After treatment patient left in no apparent distress:   Sitting in chair, Call bell within reach, Bed / chair alarm activated, and OT bedside to complete further ADLs    COMMUNICATION/COLLABORATION:   The patients plan of care was discussed with: Occupational therapist and Registered nurse.      Mortimer Dustman, PT   Time Calculation: 39 mins

## 2022-04-20 NOTE — PROGRESS NOTES
[facility]   [address] [address], [facility] [address], [facility]  Phone: [phone]   [phone]       Nephrology Progress Note  Mandy Lopez     1960     [id number]  Date of Admission : 3/5/2022  04/20/22    CC:  Follow up for greg    Assessment and Plan   GREG:  - 2/2 HRS and worsened by GI bleed  - Cr continues to rise and Na trending down and dialysis is imminent  - D/w risks/ benefits of RRT w/ son -> dialysis deferred per family and hepatology . - would continue w/ octreotide for whatever its worth  - daily labs and I/Os    Hyponatremia:  - 2/2 cirrhosis  - Na worse   - ordered 3% saline but unable to give it on current unit     Hypokalemia:  - replete PRN    Severe anemia:  GI bleed  - On EDWIN TTS  - transfusions per primary team      Cirrhosis, Portal HTN, PBC  Encephalopathy   - poral gastropathy  - being w/u for [facility] transplant      Hypothyroidism    Malnutrition:     Interval History:  Seen and examined. Confused and refusing meds this morning   Na worse  BUN CR about the same   Noted plans to d/w [facility] transplant team     Review of Systems: Review of systems not obtained due to patient factors.     Current Medications:   Current Facility-Administered Medications   Medication Dose Route Frequency    3% sodium chloride (*HIGH ALERT*CONCENTRATED IV*) infusion  30 mL/hr IntraVENous CONTINUOUS    nystatin (MYCOSTATIN) 100,000 unit/gram cream   Topical TID    sodium bicarbonate tablet 650 mg  650 mg Oral TID    magic mouthwash cpd (without sucralfate)  5 mL Oral TIDAC    simethicone (MYLICON) 16FD/5.8VL oral drops 80 mg  1.2 mL Oral Multiple    promethazine (PHENERGAN) tablet 12.5 mg  12.5 mg Oral Q6H PRN    epoetin vic-epbx (RETACRIT) injection 10,000 Units  10,000 Units SubCUTAneous Q TUE, THU & SAT    multivitamin, tx-iron-ca-min (THERA-M w/ IRON) tablet 1 Tablet  1 Tablet Oral DAILY    balsam peru-castor oiL (VENELEX) ointment   Topical BID    zinc oxide-cod liver oil (DESITIN) 40 % paste   Topical PRN    acetaminophen (TYLENOL) tablet 650 mg  650 mg Oral Q6H PRN    Or    acetaminophen (TYLENOL) suppository 650 mg  650 mg Rectal Q6H PRN    polyethylene glycol (MIRALAX) packet 17 g  17 g Oral DAILY PRN    ondansetron (ZOFRAN ODT) tablet 4 mg  4 mg Oral Q8H PRN    Or    ondansetron (ZOFRAN) injection 4 mg  4 mg IntraVENous N5S PRN    folic acid (FOLVITE) tablet 1 mg  1 mg Oral DAILY    levothyroxine (SYNTHROID) tablet 50 mcg  50 mcg Oral 6am    midodrine (PROAMATINE) tablet 15 mg  15 mg Oral TID WITH MEALS    octreotide (SANDOSTATIN) injection 100 mcg  100 mcg IntraVENous TID    rifAXIMin (XIFAXAN) tablet 550 mg  550 mg Oral BID    sertraline (ZOLOFT) tablet 100 mg  100 mg Oral DAILY    thiamine HCL (B-1) tablet 100 mg  100 mg Oral DAILY    ursodioL (ACTIGALL) tablet 500 mg (Patient Supplied)  500 mg Oral Q12H    glucagon (GLUCAGEN) injection 1 mg  1 mg IntraMUSCular PRN    naloxone (NARCAN) injection 0.1 mg  0.1 mg IntraVENous PRN      Allergies   Allergen Reactions    Morphine Other (comments)     Severe HA. ALL narcotics!!!       Objective:  Vitals:    Vitals:    04/19/22 1452 04/19/22 1859 04/20/22 0201 04/20/22 0817   BP: 134/67 (!) 112/56 (!) 118/54 (!) 124/54   Pulse: 64 70 77 83   Resp: 16 16 16 16   Temp: 97.1 °F (36.2 °C) 97.5 °F (36.4 °C) 97.8 °F (36.6 °C) 97.5 °F (36.4 °C)   SpO2: 99% 96% 98% 98%   Weight:       Height:         Intake and Output:  04/20 0701 - 04/20 1900  In: -   Out: 175 [Urine:175]  04/18 1901 - 04/20 0700  In: 647 [P.O.:120; I.V.:527]  Out: 650 [Urine:500; Drains:150]    Physical Examination:  General: No distress.  confused  HEENT:           Pale and icteric   Neck:  Supple, no mass  Resp:  Lungs CTA B/L  CV:  RRR,  no murmur or rub,no  LE edema  GI:  Soft, NT, + Bowel sounds, + paracentesis drain  Neurologic:  AAO X 3  :  + Beckford     [x]    High complexity decision making was performed  [x]    Patient is at high-risk of decompensation with multiple organ involvement    Lab Data Personally Reviewed: I have reviewed all the pertinent labs, microbiology data and radiology studies during assessment.     Recent Labs     04/20/22 0135 04/19/22 0212 04/18/22  0115 04/17/22  1153   * 122* 125* 126*   K 3.3* 3.2* 3.4* 3.6   CL 83* 83* 85* 88*   CO2 20* 21 24 23   GLU 80 173* 174* 110*   * 130* 121* 121*   CREA 4.50* 4.68* 4.31* 4.25*   CA 8.6 8.7 8.7 8.9   PHOS 5.9* 6.6* 5.7* 5.9*   ALB 3.1*  3.1* 3.3* 3.5 3.5   ALT 18  --   --   --    INR  --   --  1.8*  --      Recent Labs     04/20/22 0135 04/19/22 0212 04/18/22  0115   WBC 8.2 7.5 6.9   HGB 9.2* 9.2* 9.6*   HCT 27.4* 28.0* 29.1*   PLT 38* 37* 39*     No results found for: SDES  Lab Results   Component Value Date/Time    Culture result: NO GROWTH 4 DAYS 03/14/2022 06:48 PM    Culture result: NO GROWTH 4 DAYS 03/12/2022 06:00 PM    Culture result: CANDIDA ALBICANS (A) 03/05/2022 09:22 PM     Recent Results (from the past 24 hour(s))   RENAL FUNCTION PANEL    Collection Time: 04/20/22  1:35 AM   Result Value Ref Range    Sodium 120 (L) 136 - 145 mmol/L    Potassium 3.3 (L) 3.5 - 5.1 mmol/L    Chloride 83 (L) 97 - 108 mmol/L    CO2 20 (L) 21 - 32 mmol/L    Anion gap 17 (H) 5 - 15 mmol/L    Glucose 80 65 - 100 mg/dL     (H) 6 - 20 MG/DL    Creatinine 4.50 (H) 0.55 - 1.02 MG/DL    BUN/Creatinine ratio 29 (H) 12 - 20      GFR est AA 12 (L) >60 ml/min/1.73m2    GFR est non-AA 10 (L) >60 ml/min/1.73m2    Calcium 8.6 8.5 - 10.1 MG/DL    Phosphorus 5.9 (H) 2.6 - 4.7 MG/DL    Albumin 3.1 (L) 3.5 - 5.0 g/dL   CBC WITH AUTOMATED DIFF    Collection Time: 04/20/22  1:35 AM   Result Value Ref Range    WBC 8.2 3.6 - 11.0 K/uL    RBC 3.02 (L) 3.80 - 5.20 M/uL    HGB 9.2 (L) 11.5 - 16.0 g/dL    HCT 27.4 (L) 35.0 - 47.0 %    MCV 90.7 80.0 - 99.0 FL    MCH 30.5 26.0 - 34.0 PG    MCHC 33.6 30.0 - 36.5 g/dL    RDW 19.5 (H) 11.5 - 14.5 %    PLATELET 38 (LL) 243 - 400 K/uL    MPV 10.9 8.9 - 12.9 FL    NRBC 0.0 0  WBC    ABSOLUTE NRBC 0.00 0.00 - 0.01 K/uL    NEUTROPHILS 75 32 - 75 %    LYMPHOCYTES 9 (L) 12 - 49 %    MONOCYTES 15 (H) 5 - 13 %    EOSINOPHILS 0 0 - 7 %    BASOPHILS 0 0 - 1 %    IMMATURE GRANULOCYTES 1 (H) 0.0 - 0.5 %    ABS. NEUTROPHILS 6.2 1.8 - 8.0 K/UL    ABS. LYMPHOCYTES 0.7 (L) 0.8 - 3.5 K/UL    ABS. MONOCYTES 1.2 (H) 0.0 - 1.0 K/UL    ABS. EOSINOPHILS 0.0 0.0 - 0.4 K/UL    ABS. BASOPHILS 0.0 0.0 - 0.1 K/UL    ABS. IMM. GRANS. 0.1 (H) 0.00 - 0.04 K/UL    DF SMEAR SCANNED      RBC COMMENTS ANISOCYTOSIS  2+        RBC COMMENTS POLYCHROMASIA  1+        RBC COMMENTS SCHISTOCYTES  1+        RBC COMMENTS AIRAM CELLS  PRESENT       HEPATIC FUNCTION PANEL    Collection Time: 04/20/22  1:35 AM   Result Value Ref Range    Protein, total 5.2 (L) 6.4 - 8.2 g/dL    Albumin 3.1 (L) 3.5 - 5.0 g/dL    Globulin 2.1 2.0 - 4.0 g/dL    A-G Ratio 1.5 1.1 - 2.2      Bilirubin, total 5.4 (H) 0.2 - 1.0 MG/DL    Bilirubin, direct 3.0 (H) 0.0 - 0.2 MG/DL    Alk. phosphatase 117 45 - 117 U/L    AST (SGOT) 30 15 - 37 U/L    ALT (SGPT) 18 12 - 78 U/L                                       Care Plan discussed with:  Patient     Family      RN      Consulting Physician [facility],         I have reviewed the flowsheets. Chart and Pertinent Notes have been reviewed. No change in PMH ,family and social history from Consult note.       Roselee Duverney, MD

## 2022-04-20 NOTE — PROGRESS NOTES
Problem: Falls - Risk of  Goal: *Absence of Falls  Description: Document Angelina Fisherjelena Fall Risk and appropriate interventions in the flowsheet. Outcome: Progressing Towards Goal  Note: Fall Risk Interventions:  Mobility Interventions: Bed/chair exit alarm    Mentation Interventions: Adequate sleep, hydration, pain control    Medication Interventions: Bed/chair exit alarm    Elimination Interventions: Bed/chair exit alarm,Call light in reach    History of Falls Interventions: Bed/chair exit alarm         Problem: Pressure Injury - Risk of  Goal: *Prevention of pressure injury  Description: Document Rodrigo Scale and appropriate interventions in the flowsheet.   Outcome: Progressing Towards Goal  Note: Pressure Injury Interventions:  Sensory Interventions: Assess changes in LOC    Moisture Interventions: Absorbent underpads    Activity Interventions: Assess need for specialty bed    Mobility Interventions: Assess need for specialty bed    Nutrition Interventions: Document food/fluid/supplement intake    Friction and Shear Interventions: Apply protective barrier, creams and emollients                Problem: Nutrition Deficit  Goal: *Optimize nutritional status  Outcome: Progressing Towards Goal

## 2022-04-20 NOTE — WOUND CARE
WOCN Note:     Follow up wound care visit to re-assess skin tears to arms and sacral wound  Assessed in room 615  Isolation: no    Chart shows:  Patient admitted on 3/5/22 with Cirrhosis  Past Medical History:   Diagnosis Date    Anxiety 10/19/2021    Basal cell carcinoma (BCC) of face 10/19/2021    Chronic kidney disease     \"end stage liver disease\" per son    Cirrhosis of liver not due to alcohol ([address]     Depression 10/19/2021    Thyroid disease       Assessment:   Lethargic and confused   Reports no pain  Mobility: moderate assistance with repositioning and turning  Continence: Incontinent of stool. Beckford catheter  Restraints: no  Last Rodrigo Score: 10  Surface: Prius mattress  Diet: Regular dysphagia, oral nutritional supplements     Wt Readings from Last 2 Encounters:   04/06/22 56.1 kg (123 lb 11.2 oz)   02/17/22 68 kg (149 lb 14.6 oz)     1. POA stage 3 sacral pressure injury  0.3 x 0.2 x <0.1 cm  Wound bed pink   no exudate  no odor   defined edges  Periwound intact with blanchable erythema   Treatment: Cleansed with saline, hydrocolloid    2. Skin tear to right forearm proximal  Etiology: skin very fragile and thin  Partial thickness   1.8 x 0.7 x <0.1 cm  Wound bed pink   Small amount serosang exudate  Periwound intact & with multiple purple splotches  Treatment: cleansed with saline, Mepitel one, Petroleum jelly, gauze, stretch gauze roll    3. Skin tear to right elbow  Resolved    4. Skin tear to right forearm distal  resolved    5. Candidiasis to buttocks has resolved    Wound Recommendations:      Continue   Mepitel One dressing to skin tears to right forearm.  Leave mepitel one dressing in place, Apply Petroleum jelly over mepitel one and cover with gauze and stretch gauze (do not apply any adhesive to skin), daily     1) Cleanse wound to sacrum with wound cleanser or normal saline, pat dry   2) Apply skin barrier wipe to periwound skin and allow to dry   4) Fold  hydrocolloid ( Exuderm Satin) dressing and apply to area   5) Smooth dressing from center outward and hold dressing in place to improve adhesion   Change dressing weekly and prn if becomes soiled or loosens/ edges curl    PI Prevention:  Turn/reposition approximately every 2 hours  Offload heels with pillows at all times in bed. Pad bony prominences   Keep HOB 30 degrees or less to decrease shearing and pressure unless medically contraindicated. If HOB is to be over 30 degrees, raise knees first then [facility] to prevent sliding   Minimize layers of linen/pads under patient to optimize support surface to one flat sheet and one incontinence pad   Specialty bed: Prius ordered via [address] only flat sheet and one incontinence pad. Please call [employer] at [phone] to request  of bed when patient discharged.     Discussed with RN, Rajani Gross    Transition of Care: Plan to follow weekly and as needed while admitted to hospital.      Stefanie Mason MSN, RN, [facility]  Certified Wound and Ostomy Nurse  office [phone]  Can be reached through [address] [place] [address]

## 2022-04-20 NOTE — PROGRESS NOTES
Palliative Medicine  [place]: [phone]  [facility]: [phone] ([phone])    The Palliative Medicine SW met with the patient at bedside along with Dr. Alexx Victor, noted Dr. Arpita Morel highly involved in patient's care, per notes, will be discussing case with [facility]-     The patient is drowsy today during encounter, she is able to answer simple questions- she shares that she has two sons, Hudson Kent and Mariana Perdue, and that she is legally  to [place] but she is . She reports that she is doing okay today- appears lethargic during our visit. We discussed completing AMD, patient has verbalized during admission that her son Hudson Kent is the first point of contact, we discuss completion given that she is still legally - our team will follow-up with the patient's son and offer AMD when patient is more awake/feels up to completing. Introduction to our team today as added layer of support, will follow along with you in the patient's care- will plan to reach out to son today, see Dr. Alexx Victor' note for additional information. 13:21 p.m.- Attempt made to further meet with patient to evaluate for AMD, patient receiving nursing care at this time- will follow-up tomorrow with patient regarding completing AMD, she verbally tells our team she trusts [facility] to help make medical decisions.      Thank you for including Palliative Medicine in the care of [place], [place]  [phone]

## 2022-04-20 NOTE — PROGRESS NOTES
Problem: Falls - Risk of  Goal: *Absence of Falls  Description: Document Dany Powers Fall Risk and appropriate interventions in the flowsheet. Outcome: Progressing Towards Goal  Note: Fall Risk Interventions:  Mobility Interventions: Bed/chair exit alarm,Communicate number of staff needed for ambulation/transfer,OT consult for ADLs,Patient to call before getting OOB,PT Consult for mobility concerns,PT Consult for assist device competence    Mentation Interventions: Adequate sleep, hydration, pain control,Bed/chair exit alarm,Door open when patient unattended,Evaluate medications/consider consulting pharmacy,More frequent rounding,Reorient patient,Room close to nurse's station,Toileting rounds,Update white board    Medication Interventions: Bed/chair exit alarm,Evaluate medications/consider consulting pharmacy,Patient to call before getting OOB,Teach patient to arise slowly    Elimination Interventions: Bed/chair exit alarm,Call light in reach,Patient to call for help with toileting needs,Toileting schedule/hourly rounds (steve in place)    History of Falls Interventions: Bed/chair exit alarm         Problem: Patient Education: Go to Patient Education Activity  Goal: Patient/Family Education  Outcome: Progressing Towards Goal     Problem: Pressure Injury - Risk of  Goal: *Prevention of pressure injury  Description: Document Rodrigo Scale and appropriate interventions in the flowsheet. Outcome: Progressing Towards Goal  Note: Pressure Injury Interventions:  Sensory Interventions: Assess changes in LOC,Assess need for specialty bed,Avoid rigorous massage over bony prominences,Check visual cues for pain,Float heels,Keep linens dry and wrinkle-free,Minimize linen layers,Pressure redistribution bed/mattress (bed type),Turn and reposition approx.  every two hours (pillows and wedges if needed)    Moisture Interventions: Absorbent underpads,Apply protective barrier, creams and emollients,Check for incontinence Q2 hours and as needed,Internal/External urinary devices,Maintain skin hydration (lotion/cream),Minimize layers,Moisture barrier,Offer toileting Q_hr    Activity Interventions: Assess need for specialty bed,Pressure redistribution bed/mattress(bed type),PT/OT evaluation    Mobility Interventions: Assess need for specialty bed,Float heels,HOB 30 degrees or less,Pressure redistribution bed/mattress (bed type),PT/OT evaluation,Turn and reposition approx.  every two hours(pillow and wedges)    Nutrition Interventions: Document food/fluid/supplement intake,Discuss nutritional consult with provider,Offer support with meals,snacks and hydration    Friction and Shear Interventions: Apply protective barrier, creams and emollients,HOB 30 degrees or less,Lift sheet,Minimize layers                Problem: Patient Education: Go to Patient Education Activity  Goal: Patient/Family Education  Outcome: Progressing Towards Goal     Problem: Patient Education: Go to Patient Education Activity  Goal: Patient/Family Education  Outcome: Progressing Towards Goal     Problem: Patient Education: Go to Patient Education Activity  Goal: Patient/Family Education  Outcome: Progressing Towards Goal     Problem: Nutrition Deficit  Goal: *Optimize nutritional status  Outcome: Progressing Towards Goal

## 2022-04-20 NOTE — PROGRESS NOTES
[facility], [place], [address], [facility], [place]      ISAAC Mccarthy, [facility]     Michelle oLpez, [facility]   MANISHA Ruiz, [facility]       Namita Montemayor [facility] 136    at [address], [address] [place] [address], Katlyn  22.    [phone]    FAX: [address] [place] [address] Avenue    [address], [address]    [phone]    FAX: [phone]       HEPATOLOGY PROGRESS NOTE  The patient is well known to me from a previous visit to my office at [facility] in [facility]. She is a 66 yo  female who was found to have chronic liver disease in 2020 and cirrhosis in 7/2021 when she developed ascites. She had variceal bleeding in 11/2021. Serologic evaluation for markers of chronic liver disease was positive for AMA. Cirrhosis is due to [facility].     The patient has developed the following complications of cirrhosis: esophageal varices, variceal bleeding, ascites, edema, hepatic encephalopathy, severe muslce wasting    I saw the patient for the first time in 1/2022. She had CTP score 9 and MELD score 21 at that time. We started liver transplant evaluation testing. She developed confusion and could not be aroused and was hospitalized 3 weeks ago at [facility] in 2/202. During that hospitalization she developed worsening malnutrition, worsening of muscle wasting and a few decubitus ulcers. She has been at [facility] since 3/5. She received Dobbhoff tube feedings and OT/PT.   She had dramatic improvement in nutritional status, skin wounds have nearly healed, mental status is clear, she is stronger, can move herself around in bed, she can sit in chair for an hour at a time, can feed herself and has been able to take some steps with PT assistance. Family conference on 3/27/22. I had a 1 hour meeting with the patient and her son who is POA. Next step is to transfer to rehab for for 4+ weeks to improve ambulation, muscle mass so she could possibly be a LT candidate  In order to do this will need to remove feeding tube. In order to do this she will need to be able to eat sufficient calories to maintain her nutritional status  If she does well with rehab we may be able to get LT eval testing and eventually on LT list.    Hospital course:  She had severe malnutrition, frailty, muscle weakness upon transfer from [facility]  She recieved tube feedings and had aggressive PT/OT for 3 weeks and started to improve. Then had several episodes of hematemesis and spent some time in ICU. GD showed no varices. Only severe portal gastropathy     Overall condition has remained stagnant since feeding tube removed. Continue to receive OT and PT but some days she refuses to participate. More lethargeic than when I last saw her a few days ago. We have been waiting for placement to rehab center close to son's home in [place]. Hepatology Plan:  I am told all rehab centers  has reached out to feel she requires to much medical care is poor rehab candidate and have not accepted her   We will have to make decision regarding her ability to be LT candidate. Will discuss her case on confernence call with [facility] tomorrow      ASSESSMENT AND PLAN:  Cirrhosis  The diagnosis of cirrhosis is based upon imaging, laboratory studies, complications of cirrhosis. [facility]. Not alcohol as she was initially told.     Serologic testing was positive for AMA, ASMA     The CTP is 10. Child class C. The MELD score is 36.     Based upon the MELD and CTP scores the patient has a mortality of about 50% within the next 90 days. She has made remarkable progress over th past 2 months since transferred here.   I still think she is too weak to survive liver transplantation. She continue to need aggressive nutritional support and aggressive PT more than she can get here. Both she and her son are in favor of this with the hopes her situation will improve and she could survive liver transplantation.       Primary Biliary Cholangitis  The diagnosis is based upon serology and an elevation in ALP and positive AMA. A liver biopsy has not been performed. PBC is being treated with ANTONIETA 500 mg BID. CKD-HRS  Scr was in the 4-5 mg range. Iproved down to the 3s but now backmup to the 4-5 mg range. This is CKD-HRS. She continues to make urine. She has not required dialysis. Continue midodrine,   I think we can stop octreotide. Spoke with Dr Randi Kearns about renal function. He is concerned that she will do poorly at rehab, need readmission and have to start on dialysis. He questioned if we should start dialysis as now  She has no real indications to start dialysis now vs wait. No fluid overload, NA, K are normal.  She is making urine  Spoke to son about dialysis. He and I prefer to continue as we are without dialysis for now as this would interfere with daily rehab    Malnutrition  The patient has severe protein-calorie malnutrtion and muscle wasting. This is due to advanced liver disease and refractory ascites. The patient needs as many calories as she can possibly consume   Without aggressive nutritional support she will not survive. She is alert, oriented and understands the need to consume as many Ensure as possible. There are no plans to replace Dobhoff feeding tube at this time. Frailty/muscle wasting  The patient is very frail and working with OPT/PT. She has made great progress but lost some after her trip to ICU. This will be address by rehab. Skin wounds  Still has several pressure wounds on back, buttocks that continue to heal slowly.     Ulcers have to be healed for her to be an LT candidate.     Ascites   Ascites developed for the first time in 7/2021. Ascites has not recurred since last paracentesis and on no diuretics     Hyponatremia  This is secondary cirrhosis and diuretics  Sna has been stable in the mid-120s. She is on No diuretics.       Screening for Esophageal varices   The patient has esophageal varices with prior bleeding. Varices were banded in 11/2021 and 12/2021. The last EGD to assess for varices was performed in 3/2022. No esophageal varices were present.     Hepatic encephalopathy   Overt HE is well controlled on xifaxan. She is not on lactulose, does not need this and should not be started on this because of diarrhea risk. She has been alert and oriented througtout the hospitalization. Coagulopathy  INR is in the 1.8     Thrombocytopenia   This is secondary to cirrhosis. There is no evidence of overt bleeding. No treatment is required. The platelet count is adequate for the patient to undergo procedures without the need for platelet transfusion or platelet growth factors.     Anemia   This is due to multifactorial causes including portal hypertension with chronic GI blood loss, acute GI blood loss 1 week ago, and bone marrow suppression due to severe malnutrition. With improved nutritional status anemia has imrpoved into the 8s. Ferritin 106, FE saturation 27%  Although this is normal for healthy person this is consistent with FE deficiency in patient with cirrhosis. Will give her IV FE x 2 doses whil we wait for rehab placement    Thrombocytopenia   This is secondary to cirrhosis. There is no evidence of overt bleeding. No treatment is required. The platelet count is adequate for the patient to undergo procedures without the need for platelet transfusion or platelet growth factors. PHYSICAL EXAMINATION:  VS: per nursing note  General:  Looks much better. Skin color normal.  Eyes:  Sclera non-icteric. ENT:  No oral lesions.   Thyroid normal.  Nodes:  No adenopathy. Skin:  Spider angiomata. Multiple diffuse ecchymosis on arms. Respiratory:  Lungs clear to auscultation. Cardiovascular:  Regular heart rate. Abdomen:  Soft non-tender, NO obvious ascites. Extremities:  No lower extremity edema. Severe muscle wasting of upper and lower extremities. Neurologic:  Alert and oriented. Cranial nerves grossly intact. No asterixis. LABORATORY:  Results for Amish Crandall (MRN [id number]) as of 4/20/2022 04:46   Ref. Range 4/18/2022 01:15 4/19/2022 02:12   WBC Latest Ref Range: 3.6 - 11.0 K/uL 6.9 7.5   HGB Latest Ref Range: 11.5 - 16.0 g/dL 9.6 (L) 9.2 (L)   PLATELET Latest Ref Range: 150 - 400 K/uL 39 (LL) 37 (LL)   INR Latest Ref Range: 0.9 - 1.1   1.8 (H)    Sodium Latest Ref Range: 136 - 145 mmol/L 125 (L) 122 (L)   Potassium Latest Ref Range: 3.5 - 5.1 mmol/L 3.4 (L) 3.2 (L)   Chloride Latest Ref Range: 97 - 108 mmol/L 85 (L) 83 (L)   CO2 Latest Ref Range: 21 - 32 mmol/L 24 21   Glucose Latest Ref Range: 65 - 100 mg/dL 174 (H) 173 (H)   BUN Latest Ref Range: 6 - 20 MG/ (H) 130 (H)   Creatinine Latest Ref Range: 0.55 - 1.02 MG/DL 4.31 (H) 4.68 (H)   Albumin Latest Ref Range: 3.5 - 5.0 g/dL 3.5 3.3 (L)     RADIOLOGY:  Ultrasound of liver. Echogenic consistent with cirrhosis. No liver mass lesions. No dilated bile ducts. Moderate ascites.         Cameron Rees MD  [facility] 13  [address], [address] [place] [address] 22.  [phone]  [address] [place] [address]

## 2022-04-21 NOTE — PROGRESS NOTES
Palliative Medicine  [place]: [phone]  [facility]: [phone])      The Palliative Medicine SW and Dr. Tanya Mendoza met with the patient at bedside- SW following up regarding AMD completion, however, today the patient is very drowsy- she opens her eyes when talked to, smiles, however, she was unable to provide meaningful answers/responses to our questions today- she nodded her head that she remembered our team from introduction yesterday, but was unable to elaborate on what we talked about and/or how she was feeling today. The patient verbally told our team yesterday that she would trust her son Garett Cruz to help make medical decisions, unable to complete AMD today based upon presentation/mental status. Palliative Medicine will follow along with you, await further input from Hepatology- noted plans for Hepatology to speak with [facility].        Thank you for including Palliative Medicine in the care of [place], [place]  [phone]

## 2022-04-21 NOTE — TELEPHONE ENCOUNTER
Dr. gNuyen [facility] hepatology fellow) called to let us know their recommendation is transfer pt to [address] [place] [address]. [facility] does not usually admit pts outside of [facility] system; however, Dr. Michele Marshall has confirmed the facility is willing to consider admittance. Dr. Evans Baker note will need to include reason for referral (goal is for pt to become a [facility] pt after rehabbing appropriately). I have spoken with Dr. Linda Null and he agrees with plan. I let Dr. Michele Marshall know we'll be initiating transfer. Dr. Linda Null will discuss with pt when he sees her this afternoon. I called Charlotte Wilson and asked for a callback from the  to discuss transfer. [facility] contact: [address]  Phone: [phone]  Fax: [phone]    , please send demographics, insurance information, PT/OT notes, and progress note from Dr. Linda Null that indicates reason for referral.     Referral can be sent through [employer] [place] or faxed to the facility. Update - 1:30 pm:     Spoke with Chelsey Gonzalez CM and reviewed the above.

## 2022-04-21 NOTE — PROGRESS NOTES
Problem: Falls - Risk of  Goal: *Absence of Falls  Description: Document Haydee La Fall Risk and appropriate interventions in the flowsheet. Outcome: Progressing Towards Goal  Note: Fall Risk Interventions:  Mobility Interventions: Bed/chair exit alarm,Communicate number of staff needed for ambulation/transfer,OT consult for ADLs,PT Consult for mobility concerns,Patient to call before getting OOB,PT Consult for assist device competence,Utilize walker, cane, or other assistive device    Mentation Interventions: Adequate sleep, hydration, pain control,Bed/chair exit alarm,Door open when patient unattended,Evaluate medications/consider consulting pharmacy,More frequent rounding,Reorient patient,Room close to nurse's station,Toileting rounds,Update white board    Medication Interventions: Bed/chair exit alarm,Evaluate medications/consider consulting pharmacy,Patient to call before getting OOB,Teach patient to arise slowly    Elimination Interventions: Bed/chair exit alarm,Call light in reach,Patient to call for help with toileting needs,Toileting schedule/hourly rounds (steve in place)    History of Falls Interventions: Bed/chair exit alarm,Consult care management for discharge planning,Door open when patient unattended,Evaluate medications/consider consulting pharmacy,Room close to nurse's station         Problem: Patient Education: Go to Patient Education Activity  Goal: Patient/Family Education  Outcome: Progressing Towards Goal     Problem: Pressure Injury - Risk of  Goal: *Prevention of pressure injury  Description: Document Rodrigo Scale and appropriate interventions in the flowsheet.   Outcome: Progressing Towards Goal  Note: Pressure Injury Interventions:  Sensory Interventions: Assess changes in LOC,Assess need for specialty bed,Avoid rigorous massage over bony prominences,Check visual cues for pain,Float heels,Keep linens dry and wrinkle-free,Maintain/enhance activity level,Minimize linen layers,Pressure redistribution bed/mattress (bed type),Turn and reposition approx. every two hours (pillows and wedges if needed)    Moisture Interventions: Absorbent underpads,Apply protective barrier, creams and emollients,Check for incontinence Q2 hours and as needed,Internal/External urinary devices,Maintain skin hydration (lotion/cream),Minimize layers,Moisture barrier,Offer toileting Q_hr    Activity Interventions: Increase time out of bed,Pressure redistribution bed/mattress(bed type),PT/OT evaluation,Assess need for specialty bed    Mobility Interventions: Assess need for specialty bed,Float heels,HOB 30 degrees or less,Pressure redistribution bed/mattress (bed type),PT/OT evaluation,Turn and reposition approx.  every two hours(pillow and wedges)    Nutrition Interventions: Document food/fluid/supplement intake,Offer support with meals,snacks and hydration    Friction and Shear Interventions: Apply protective barrier, creams and emollients,HOB 30 degrees or less,Lift sheet,Minimize layers                Problem: Patient Education: Go to Patient Education Activity  Goal: Patient/Family Education  Outcome: Progressing Towards Goal     Problem: Patient Education: Go to Patient Education Activity  Goal: Patient/Family Education  Outcome: Progressing Towards Goal     Problem: Patient Education: Go to Patient Education Activity  Goal: Patient/Family Education  Outcome: Progressing Towards Goal     Problem: Nutrition Deficit  Goal: *Optimize nutritional status  Outcome: Progressing Towards Goal

## 2022-04-21 NOTE — PROGRESS NOTES
[facility]   [address] [facility], [facility] [address], [facility]  Phone: [phone]   [phone]       Nephrology Progress Note  Dayne Marquez     1960     [id number]  Date of Admission : 3/5/2022  04/21/22    CC:  Follow up for greg    Assessment and Plan   GREG:  - 2/2 HRS and worsened by GI bleed  - Cr continues to rise and Na trending down and dialysis is imminent  - D/w risks/ benefits of RRT w/ son -> dialysis deferred per family and hepatology . - would continue w/ octreotide for whatever its worth  - daily labs and I/Os    Hyponatremia:  - 2/2 cirrhosis  - Na at 122    Hypokalemia:  - replete PRN    Severe anemia:  GI bleed  - On EDWIN TTS  - transfusions per primary team      Cirrhosis, Portal HTN, PBC  Encephalopathy   - poral gastropathy  - being w/u for [facility] transplant      Hypothyroidism    Malnutrition:     Interval History:  Seen and examined. remains confused   D/w hepatology : case reviewed w/ [facility] transplant and planning to transfer to [address] [place] [address] for rehab for eventual transplant     Review of Systems: Review of systems not obtained due to patient factors.     Current Medications:   Current Facility-Administered Medications   Medication Dose Route Frequency    nystatin (MYCOSTATIN) 100,000 unit/gram cream   Topical TID    sodium bicarbonate tablet 650 mg  650 mg Oral TID    magic mouthwash cpd (without sucralfate)  5 mL Oral TIDAC    simethicone (MYLICON) 93KQ/4.6LN oral drops 80 mg  1.2 mL Oral Multiple    promethazine (PHENERGAN) tablet 12.5 mg  12.5 mg Oral Q6H PRN    epoetin vic-epbx (RETACRIT) injection 10,000 Units  10,000 Units SubCUTAneous Q TUE, THU & SAT    multivitamin, tx-iron-ca-min (THERA-M w/ IRON) tablet 1 Tablet  1 Tablet Oral DAILY    balsam peru-castor oiL (VENELEX) ointment   Topical BID    zinc oxide-cod liver oil (DESITIN) 40 % paste   Topical PRN    acetaminophen (TYLENOL) tablet 650 mg  650 mg Oral Q6H PRN    Or    acetaminophen (TYLENOL) suppository 650 mg  650 mg Rectal Q6H PRN    polyethylene glycol (MIRALAX) packet 17 g  17 g Oral DAILY PRN    ondansetron (ZOFRAN ODT) tablet 4 mg  4 mg Oral Q8H PRN    Or    ondansetron (ZOFRAN) injection 4 mg  4 mg IntraVENous M7O PRN    folic acid (FOLVITE) tablet 1 mg  1 mg Oral DAILY    levothyroxine (SYNTHROID) tablet 50 mcg  50 mcg Oral 6am    midodrine (PROAMATINE) tablet 15 mg  15 mg Oral TID WITH MEALS    octreotide (SANDOSTATIN) injection 100 mcg  100 mcg IntraVENous TID    rifAXIMin (XIFAXAN) tablet 550 mg  550 mg Oral BID    sertraline (ZOLOFT) tablet 100 mg  100 mg Oral DAILY    thiamine HCL (B-1) tablet 100 mg  100 mg Oral DAILY    ursodioL (ACTIGALL) tablet 500 mg (Patient Supplied)  500 mg Oral Q12H    glucagon (GLUCAGEN) injection 1 mg  1 mg IntraMUSCular PRN    naloxone (NARCAN) injection 0.1 mg  0.1 mg IntraVENous PRN      Allergies   Allergen Reactions    Morphine Other (comments)     Severe HA. ALL narcotics!!!       Objective:  Vitals:    Vitals:    04/20/22 2301 04/21/22 0200 04/21/22 0619 04/21/22 0835   BP: (!) 106/57 (!) 107/37 (!) 107/49 (!) 95/53   Pulse: 96 91 89 86   Resp:  18 16 16   Temp:  98.3 °F (36.8 °C) 97.9 °F (36.6 °C) 98.3 °F (36.8 °C)   SpO2:  98% 98% 97%   Weight:       Height:         Intake and Output:  No intake/output data recorded. 04/19 1901 - 04/21 0700  In: 527 [I.V.:527]  Out: 475 [Urine:475]    Physical Examination:  General: No distress. confused  HEENT:           Pale and icteric   Neck:  Supple, no mass  Resp:  Lungs CTA B/L  CV:  RRR,  no murmur or rub,no  LE edema  GI:  Soft, NT, + Bowel sounds, + paracentesis drain  Neurologic:  AAO X 3  :  + [place]     [x]    High complexity decision making was performed  [x]    Patient is at high-risk of decompensation with multiple organ involvement    Lab Data Personally Reviewed: I have reviewed all the pertinent labs, microbiology data and radiology studies during assessment.     Recent Labs 04/21/22  0141 04/20/22 0135 04/19/22 0212   * 120* 122*   K 3.3* 3.3* 3.2*   CL 84* 83* 83*   CO2 19* 20* 21   * 80 173*   * 129* 130*   CREA 5.03* 4.50* 4.68*   CA 8.9 8.6 8.7   PHOS 6.2* 5.9* 6.6*   ALB 3.2* 3.1*  3.1* 3.3*   ALT  --  18  --    INR 1.9*  --   --      Recent Labs     04/20/22 0135 04/19/22 0212   WBC 8.2 7.5   HGB 9.2* 9.2*   HCT 27.4* 28.0*   PLT 38* 37*     No results found for: SDES  Lab Results   Component Value Date/Time    Culture result: NO GROWTH 4 DAYS 03/14/2022 06:48 PM    Culture result: NO GROWTH 4 DAYS 03/12/2022 06:00 PM    Culture result: CANDIDA ALBICANS (A) 03/05/2022 09:22 PM     Recent Results (from the past 24 hour(s))   RENAL FUNCTION PANEL    Collection Time: 04/21/22  1:41 AM   Result Value Ref Range    Sodium 122 (L) 136 - 145 mmol/L    Potassium 3.3 (L) 3.5 - 5.1 mmol/L    Chloride 84 (L) 97 - 108 mmol/L    CO2 19 (L) 21 - 32 mmol/L    Anion gap 19 (H) 5 - 15 mmol/L    Glucose 142 (H) 65 - 100 mg/dL     (H) 6 - 20 MG/DL    Creatinine 5.03 (H) 0.55 - 1.02 MG/DL    BUN/Creatinine ratio 27 (H) 12 - 20      GFR est AA 11 (L) >60 ml/min/1.73m2    GFR est non-AA 9 (L) >60 ml/min/1.73m2    Calcium 8.9 8.5 - 10.1 MG/DL    Phosphorus 6.2 (H) 2.6 - 4.7 MG/DL    Albumin 3.2 (L) 3.5 - 5.0 g/dL   PROTHROMBIN TIME + INR    Collection Time: 04/21/22  1:41 AM   Result Value Ref Range    INR 1.9 (H) 0.9 - 1.1      Prothrombin time 19.1 (H) 9.0 - 11.1 sec   SAMPLES BEING HELD    Collection Time: 04/21/22  1:41 AM   Result Value Ref Range    SAMPLES BEING HELD 1LAV     COMMENT        Add-on orders for these samples will be processed based on acceptable specimen integrity and analyte stability, which may vary by analyte. Care Plan discussed with:  Patient     Family      RN      Consulting Physician [facility]        I have reviewed the flowsheets. Chart and Pertinent Notes have been reviewed.    No change in PMH ,family and social history from Consult note.       Joya Snyder MD

## 2022-04-21 NOTE — PROGRESS NOTES
SELMA:  RUR: 21%    Disposition:  SNF/Rehab    Referrals have been sent to facilities  I patient's locality (from [facility] to [facility]. No approvals at this time. Denials have been based on Facility: not accepting payor source, age requirement, care needs unable to be met, for [place], [address] [facility], [facility], . Marek Mccrary, [place]. UPDATE:  1:36p  CM returned call to [facility] (Dr. Artie Dandy office [phone]). Hepatology office has been in communication with Cesario Selby MD [address] [facility] Fellow re next steps to possible transition to [facility] at [address] [place] [address] ([facility])      The Transition of Care Plan Is:   Referral to [facility] ([address] at [phone] / fax [phone] to further advance patient's rehab needs for possible transition  to [facility] for consideration for transplant. [facility]). Message left on  to have call returned to confirm facility still receives referrals via [address] Pending [facility] to [facility],Ground Floor. CM spoke with patient/son Johnny Loaiza). Agreement given and referral being sent to [facility]) via [employer]. CM continuing to follow.     [facility], [address]

## 2022-04-21 NOTE — PROGRESS NOTES
[facility]  Hospitalist Group                                                                                          Hospitalist Progress Note  Amber Blas   Answering service: [phone] from in house phone        Date of Service:  2022  NAME:  Jessica Pham  :  1960  MRN:  [id number]      Admission Summary:   Admission history:  \"Rajani Butler is a 65 y.o. female with history of liver cirrhosis with portal hypertension,recently diagnosed primary biliary cholangitis, recurrent ascites requiring paracentesis approximately every 7 to 10 days who presents here transferred from [address] [place] [address] secondary to decompensated liver cirrhosis, worsening renal insufficiency, progressive weakness currently undergoing work-up for possible liver transplant. The patient of note initially presented to [address] [place] [address] on 2022 secondary to hepatic encephalopathy and progressive weakness. \"    Interval history / Subjective: Follow up renal failure. Patient seen and examined. Patient known to be from earlier this admission. Son at bedside. Patient with acute abdominal pain, new. Located upper quadrant to left side. Assessment & Plan:     Decompensated liver cirrhosis, with portal hypertension. Felt to be secondary to [facility]  -Transferred from [address] [place] [address] with prolonged hospitalization. Transferred for further work-up for liver transplant eval  -EGD on  [place] noted for distal esophagitis and portal hypertensive gastropathy, no evidence of varices.    -history of esophageal varices back in 2021 status post banding at that time. -s/p MRCP was done on  which was noted for cirrhosis, portal hypertension, and large ascites.    -s/p cardiac stress test which was negative for ischemia, EF of 69%  -EGD 3/29 blood and clots in the stomach, no evidence of bleeding   -Continue lactulose, rifaximin, octreotide, Protonix p.o.  -Closely monitor stool output frequency  -Continue ursodiol    Acute pain: unclear etiology  -add on hepatic function test, check lactic acid, lipase  -trial small dose dilaudid      GREG with metabolic acidosis- worsening  -nephrology following. At risk for HD  -continue sodium bicarbonate     Acute blood loss anemia:   -previously required admission to ICU due to coffee ground emesis and urgent EGD. Intubated for procedure, now extubated      History of hepatic encephalopathy with refractory ascites  -requiring multiple paracentesis. Most recent 4/11/2022    Transaminitis/hyperbilirubinemia/coagulopathy/thrombocytopenia  -Secondary to decompensated liver cirrhosis. Monitor with repeat labs in a.m. check NH3     Chronic normocytic anemia  -Possible anemia of CKD  -H&H stable. No active bleeding. Continue to monitor     Hyponatremia  -Suspect secondary to underlying liver cirrhosis. Nephrology following      Recent E. coli UTI  -s/p treatment      Hypothyroid  -Continue levothyroxine     Generalized weakness/chronic wounds. pta  -PT, OT consult  -Wound care consulted    Hepatology discussed with [facility] and recommends transfer to [facility] long-term acute care. Case management has submitted referral     Code status: DNR  Prophylaxis: holding due to thrombocytopenia  Care Plan discussed with: patient, son, nurse, case management  Anticipated Disposition: [place] [facility] Problems  Date Reviewed: 2/17/2022          Codes Class Noted POA    * (Principal) Cirrhosis ([facility] [address] ICD-10-CM: K74.60  ICD-9-CM: 571.5  2/8/2022 Unknown                Review of Systems:   Negative unless stated above      Vital Signs:    Last 24hrs VS reviewed since prior progress note.  Most recent are:  Visit Vitals  BP (!) 95/53 (BP 1 Location: Left arm, BP Patient Position: At rest)   Pulse 86   Temp 98.3 °F (36.8 °C)   Resp 16   Ht 5' 4\" (1.626 m)   Wt 56.1 kg (123 lb 11.2 oz)   SpO2 97%   Breastfeeding No   BMI 21.23 kg/m²         Intake/Output Summary (Last 24 hours) at 4/21/2022 1410  Last data filed at 4/20/2022 1822  Gross per 24 hour   Intake    Output 125 ml   Net -125 ml        Physical Examination:     I had a face to face encounter with this patient and independently examined them on 4/21/2022 as outlined below:          Constitutional:  Mild acute distress, cooperative, appears in acute pain    ENT:  Oral mucosa moist, oropharynx benign. Resp:  CTA bilaterally. No wheezing/rhonchi/rales. No accessory muscle use. CV:  Regular rhythm, normal rate, no murmurs, gallops, rubs    GI:  Soft, mildly distended, tender upper quadrant and left side, paracentesis in place left lower quadrant, normoactive bowel sounds non tender. Musculoskeletal:  No edema, warm, 2+ pulses throughout    Neurologic:  Moves all extremities  Skin: Scattered ecchymosis            Data Review:    Review and/or order of clinical lab test  Review and/or order of tests in the radiology section of CPT  Review and/or order of tests in the medicine section of CPT      Labs:     Recent Labs     04/20/22 0135 04/19/22 0212   WBC 8.2 7.5   HGB 9.2* 9.2*   HCT 27.4* 28.0*   PLT 38* 37*     Recent Labs     04/21/22 0141 04/20/22 0135 04/19/22 0212   * 120* 122*   K 3.3* 3.3* 3.2*   CL 84* 83* 83*   CO2 19* 20* 21   * 129* 130*   CREA 5.03* 4.50* 4.68*   * 80 173*   CA 8.9 8.6 8.7   PHOS 6.2* 5.9* 6.6*     Recent Labs     04/21/22 0141 04/20/22 0135 04/19/22 0212   ALT  --  18  --    AP  --  117  --    TBILI  --  5.4*  --    TP  --  5.2*  --    ALB 3.2* 3.1*  3.1* 3.3*   GLOB  --  2.1  --      Recent Labs     04/21/22 0141   INR 1.9*   PTP 19.1*      No results for input(s): FE, TIBC, PSAT, FERR in the last 72 hours. Lab Results   Component Value Date/Time    Folate 38.3 (H) 10/20/2021 07:53 AM    RBC FOLATE 878 02/14/2022 01:27 AM      No results for input(s): PH, PCO2, PO2 in the last 72 hours. No results for input(s): CPK, CKNDX, TROIQ in the last 72 hours.     No lab exists for component: CPKMB  Lab Results   Component Value Date/Time    Cholesterol, total 122 01/25/2022 09:50 AM    HDL Cholesterol 40 01/25/2022 09:50 AM    LDL, calculated 67.2 01/25/2022 09:50 AM    Triglyceride 74 01/25/2022 09:50 AM    CHOL/HDL Ratio 3.1 01/25/2022 09:50 AM     Lab Results   Component Value Date/Time    Glucose (POC) 128 (H) 03/15/2022 06:06 AM    Glucose (POC) 123 (H) 03/15/2022 12:14 AM    Glucose (POC) 114 (H) 02/23/2022 05:42 PM    Glucose (POC) 127 (H) 02/23/2022 12:04 PM    Glucose (POC) 122 (H) 02/23/2022 08:18 AM     Lab Results   Component Value Date/Time    Color DARK YELLOW 03/05/2022 09:22 PM    Appearance TURBID (A) 03/05/2022 09:22 PM    Specific gravity 1.014 03/05/2022 09:22 PM    Specific gravity 1.010 02/11/2022 07:20 PM    pH (UA) 5.5 03/05/2022 09:22 PM    Protein 100 (A) 03/05/2022 09:22 PM    Glucose Negative 03/05/2022 09:22 PM    Ketone Negative 03/05/2022 09:22 PM    Bilirubin NEGATIVE 02/11/2022 07:20 PM    Urobilinogen 0.2 03/05/2022 09:22 PM    Nitrites Negative 03/05/2022 09:22 PM    Leukocyte Esterase LARGE (A) 03/05/2022 09:22 PM    Epithelial cells FEW 03/05/2022 09:22 PM    Bacteria 1+ (A) 03/05/2022 09:22 PM    WBC >100 (H) 03/05/2022 09:22 PM    RBC  03/05/2022 09:22 PM         Medications Reviewed:     Current Facility-Administered Medications   Medication Dose Route Frequency    HYDROmorphone (DILAUDID) injection 0.2 mg  0.2 mg IntraVENous Q4H PRN    nystatin (MYCOSTATIN) 100,000 unit/gram cream   Topical TID    sodium bicarbonate tablet 650 mg  650 mg Oral TID    magic mouthwash cpd (without sucralfate)  5 mL Oral TIDAC    simethicone (MYLICON) 25FS/8.7RE oral drops 80 mg  1.2 mL Oral Multiple    promethazine (PHENERGAN) tablet 12.5 mg  12.5 mg Oral Q6H PRN    epoetin vic-epbx (RETACRIT) injection 10,000 Units  10,000 Units SubCUTAneous Q TUE, THU & SAT    multivitamin, tx-iron-ca-min (THERA-M w/ IRON) tablet 1 Tablet  1 Tablet Oral DAILY  balsam peru-castor oiL (VENELEX) ointment   Topical BID    zinc oxide-cod liver oil (DESITIN) 40 % paste   Topical PRN    acetaminophen (TYLENOL) tablet 650 mg  650 mg Oral Q6H PRN    Or    acetaminophen (TYLENOL) suppository 650 mg  650 mg Rectal Q6H PRN    polyethylene glycol (MIRALAX) packet 17 g  17 g Oral DAILY PRN    ondansetron (ZOFRAN ODT) tablet 4 mg  4 mg Oral Q8H PRN    Or    ondansetron (ZOFRAN) injection 4 mg  4 mg IntraVENous U3C PRN    folic acid (FOLVITE) tablet 1 mg  1 mg Oral DAILY    levothyroxine (SYNTHROID) tablet 50 mcg  50 mcg Oral 6am    midodrine (PROAMATINE) tablet 15 mg  15 mg Oral TID WITH MEALS    octreotide (SANDOSTATIN) injection 100 mcg  100 mcg IntraVENous TID    rifAXIMin (XIFAXAN) tablet 550 mg  550 mg Oral BID    sertraline (ZOLOFT) tablet 100 mg  100 mg Oral DAILY    thiamine HCL (B-1) tablet 100 mg  100 mg Oral DAILY    ursodioL (ACTIGALL) tablet 500 mg (Patient Supplied)  500 mg Oral Q12H    glucagon (GLUCAGEN) injection 1 mg  1 mg IntraMUSCular PRN    naloxone (NARCAN) injection 0.1 mg  0.1 mg IntraVENous PRN     ______________________________________________________________________  EXPECTED LENGTH OF STAY: 4d 16h  ACTUAL LENGTH OF STAY:          [id number] [address] Isela Dugan DO

## 2022-04-22 NOTE — PROGRESS NOTES
[facility], [place], [address], [facility], [place]      Monique Drought, ISAAC Bella, [facility]     Michelle CALLOWAY [facility]   MANISHA Wilson, [facility]       Namita Montemayor [facility] [address]    at [address], [address] Katlyn Pizano  22.    [phone]    FAX: [address] [place] [address] Avenue    [address], [address]    [phone]    FAX: [phone]       HEPATOLOGY PROGRESS NOTE  The patient is well known to me from a previous visit to my office at [facility] in [address] [place] [address]. She is a 64 yo  female who was found to have chronic liver disease in 2020 and cirrhosis in 7/2021 when she developed ascites. She had variceal bleeding in 11/2021. Serologic evaluation for markers of chronic liver disease was positive for AMA. Cirrhosis is due to [facility].     The patient has developed the following complications of cirrhosis: esophageal varices, variceal bleeding, ascites, edema, hepatic encephalopathy, severe muslce wasting    I saw the patient for the first time in 1/2022. She had CTP score 9 and MELD score 21 at that time. We started liver transplant evaluation testing. She developed confusion and could not be aroused and was hospitalized 3 weeks ago at [facility] in 2/202. During that hospitalization she developed worsening malnutrition, worsening of muscle wasting and a few decubitus ulcers. She has been at [facility] since 3/5. She received Dobbhoff tube feedings and OT/PT.   She had dramatic improvement in nutritional status, skin wounds have nearly healed, mental status is clear, she is stronger, can move herself around in bed, she can sit in chair for an hour at a time, can feed herself and has been able to take some steps with PT assistance. Family conference on 3/27/22. I had a 1 hour meeting with the patient and her son who is POA. Next step is to transfer to rehab for for 4+ weeks to improve ambulation, muscle mass so she could possibly be a LT candidate  In order to do this will need to remove feeding tube. In order to do this she will need to be able to eat sufficient calories to maintain her nutritional status  If she does well with rehab we may be able to get LT eval testing and eventually on LT list.    Hospital course:  She had severe malnutrition, frailty, muscle weakness upon transfer from [facility]  She recieved tube feedings and had aggressive PT/OT for 3 weeks and started to improve. Then had several episodes of hematemesis and spent some time in ICU. GD showed no varices. Only severe portal gastropathy     Overall condition has remained stagnant since feeding tube removed. Continue to receive OT and PT but some days she refuses to participate. She remains talkative and cooperative. We have been waiting for placement to rehab center close to son's home in [facility]. All SNF/rehab places have refused in [place] to take her on citing her medical conditions are too severe and they do not think she would toelrate aggressive rehab. I have discussed situation with my colleagues at [facility]. They think they can make arrangements to transfer her to [facility] adjacent to [facility] where they can keep close eye on her and agree with my assessment that she could potentially be a LT candidate if gets stronger with rehab. I have spoken with son Byron Huizar who is in agreement with rehab in [address]:  Correct Sna   should reach out to [facility] at [facility]. ASSESSMENT AND PLAN:  Cirrhosis  The diagnosis of cirrhosis is based upon imaging, laboratory studies, complications of cirrhosis. Cirrhosis is secondary to [facility].   Not alcohol as she was initially told.     Serologic testing was positive for AMA, ASMA     The CTP is 10. Child class C. The MELD score is 36.     Based upon the MELD and CTP scores the patient has a mortality of about 50% within the next 90 days. She has made remarkable progress over th past 2 months since transferred here. I still think she is too weak to survive liver transplantation. She continue to need aggressive nutritional support and aggressive PT more than she can get here. Both she and her son are in favor of this with the hopes her situation will improve and she could survive liver transplantation.       Primary Biliary Cholangitis  The diagnosis is based upon serology and an elevation in ALP and positive AMA. A liver biopsy has not been performed. PBC is being treated with ANTONIETA 500 mg BID. CKD-HRS  Scr was in the 4-5 mg range. Iproved down to the 3s but now backmup to the 4-5 mg range. This is CKD-HRS. She continues to make urine. She has not required dialysis. Continue midodrine,   I think we can stop octreotide. Spoke with Dr Geovanna Littlejohn about renal function. He is concerned that she will do poorly at rehab, need readmission and have to start on dialysis. He questioned if we should start dialysis as now  She has no real indications to start dialysis now vs wait. No fluid overload, NA, K are normal.  She is making urine  Spoke to son about dialysis. He and I prefer to continue as we are without dialysis for now as this would interfere with daily rehab    Malnutrition  The patient has severe protein-calorie malnutrtion and muscle wasting. This is due to advanced liver disease and refractory ascites. The patient needs as many calories as she can possibly consume   Without aggressive nutritional support she will not survive. She is alert, oriented and understands the need to consume as many Ensure as possible. There are no plans to replace Dobhoff feeding tube at this time.       Frailty/muscle wasting  The patient is very frail and working with OPT/PT. She has made great progress but lost some after her trip to ICU. This will be address by rehab. Skin wounds  Still has several pressure wounds on back, buttocks that continue to heal slowly. Ulcers have to be healed for her to be an LT candidate.     Ascites   Ascites developed for the first time in 7/2021. Ascites has not recurred since last paracentesis and on no diuretics     Hyponatremia  This is secondary cirrhosis and diuretics  Sna has been stable in the mid-120s. She is on No diuretics.       Screening for Esophageal varices   The patient has esophageal varices with prior bleeding. Varices were banded in 11/2021 and 12/2021. The last EGD to assess for varices was performed in 3/2022. No esophageal varices were present.     Hepatic encephalopathy   Overt HE is well controlled on xifaxan. She is not on lactulose, does not need this and should not be started on this because of diarrhea risk. She has been alert and oriented througtout the hospitalization. Coagulopathy  INR is in the 1.8     Thrombocytopenia   This is secondary to cirrhosis. There is no evidence of overt bleeding. No treatment is required. The platelet count is adequate for the patient to undergo procedures without the need for platelet transfusion or platelet growth factors.     Anemia   This is due to multifactorial causes including portal hypertension with chronic GI blood loss, acute GI blood loss 1 week ago, and bone marrow suppression due to severe malnutrition. With improved nutritional status anemia has imrpoved into the 8s. Ferritin 106, FE saturation 27%  Although this is normal for healthy person this is consistent with FE deficiency in patient with cirrhosis. Will give her IV FE x 2 doses whil we wait for rehab placement    Thrombocytopenia   This is secondary to cirrhosis. There is no evidence of overt bleeding. No treatment is required.   The platelet count is adequate for the patient to undergo procedures without the need for platelet transfusion or platelet growth factors. PHYSICAL EXAMINATION:  VS: per nursing note  General:  Looks much better. Skin color normal.  Eyes:  Sclera non-icteric. ENT:  No oral lesions. Thyroid normal.  Nodes:  No adenopathy. Skin:  Spider angiomata. Multiple diffuse ecchymosis on arms. Respiratory:  Lungs clear to auscultation. Cardiovascular:  Regular heart rate. Abdomen:  Soft non-tender, NO obvious ascites. Extremities:  No lower extremity edema. Severe muscle wasting of upper and lower extremities. Neurologic:  Alert and oriented. Cranial nerves grossly intact. No asterixis. LABORATORY:  Results for Cecilia Calhoun (MRN [id number]) as of 4/22/2022 05:50   Ref. Range 4/20/2022 01:35 4/21/2022 01:41   WBC Latest Ref Range: 3.6 - 11.0 K/uL 8.2    HGB Latest Ref Range: 11.5 - 16.0 g/dL 9.2 (L)    PLATELET Latest Ref Range: 150 - 400 K/uL 38 (LL)    INR Latest Ref Range: 0.9 - 1.1    1.9 (H)   Sodium Latest Ref Range: 136 - 145 mmol/L 120 (L) 122 (L)   Potassium Latest Ref Range: 3.5 - 5.1 mmol/L 3.3 (L) 3.3 (L)   Chloride Latest Ref Range: 97 - 108 mmol/L 83 (L) 84 (L)   CO2 Latest Ref Range: 21 - 32 mmol/L 20 (L) 19 (L)   Glucose Latest Ref Range: 65 - 100 mg/dL 80 142 (H)   BUN Latest Ref Range: 6 - 20 MG/ (H) 134 (H)   Creatinine Latest Ref Range: 0.55 - 1.02 MG/DL 4.50 (H) 5.03 (H)   Bilirubin, total Latest Ref Range: 0.2 - 1.0 MG/DL 5.4 (H) 5.3 (H)   Albumin Latest Ref Range: 3.5 - 5.0 g/dL 3.1 (L) 3.2 (L)   ALT Latest Ref Range: 12 - 78 U/L 18 21   AST Latest Ref Range: 15 - 37 U/L 30 43 (H)   Alk. phosphatase Latest Ref Range: 45 - 117 U/L 117 144 (H)     RADIOLOGY:  Ultrasound of liver. Echogenic consistent with cirrhosis. No liver mass lesions. No dilated bile ducts. Moderate ascites.         Leatha Lima MD  [facility] of [address] [place] [address] [address] Damaris Milligan, Katlyn  22.  [facility]

## 2022-04-22 NOTE — PROGRESS NOTES
Problem: Falls - Risk of  Goal: *Absence of Falls  Description: Document Nat Campos Fall Risk and appropriate interventions in the flowsheet.   4/22/2022 1809 by Rosio Serna RN  Outcome: Progressing Towards Goal  Note: Fall Risk Interventions:  Mobility Interventions: Bed/chair exit alarm,Communicate number of staff needed for ambulation/transfer,PT Consult for mobility concerns,PT Consult for assist device competence    Mentation Interventions: Bed/chair exit alarm,Door open when patient unattended,Adequate sleep, hydration, pain control,Evaluate medications/consider consulting pharmacy    Medication Interventions: Bed/chair exit alarm,Evaluate medications/consider consulting pharmacy    Elimination Interventions: Bed/chair exit alarm,Call light in reach,Toileting schedule/hourly rounds    History of Falls Interventions: Bed/chair exit alarm,Door open when patient unattended,Evaluate medications/consider consulting pharmacy,Room close to nurse's station      4/22/2022 1755 by Rosio Serna RN  Outcome: Not Progressing Towards Goal  Note: Fall Risk Interventions:  Mobility Interventions: Bed/chair exit alarm,Communicate number of staff needed for ambulation/transfer,PT Consult for mobility concerns,PT Consult for assist device competence    Mentation Interventions: Bed/chair exit alarm,Door open when patient unattended,Adequate sleep, hydration, pain control,Evaluate medications/consider consulting pharmacy    Medication Interventions: Bed/chair exit alarm,Evaluate medications/consider consulting pharmacy    Elimination Interventions: Bed/chair exit alarm,Call light in reach,Toileting schedule/hourly rounds    History of Falls Interventions: Bed/chair exit alarm,Door open when patient unattended,Evaluate medications/consider consulting pharmacy,Room close to nurse's station         Problem: Patient Education: Go to Patient Education Activity  Goal: Patient/Family Education  4/22/2022 1809 by Rosio Serna RN  Outcome: Progressing Towards Goal  4/22/2022 1755 by Tomeka Fletcher RN  Outcome: Progressing Towards Goal     Problem: Pressure Injury - Risk of  Goal: *Prevention of pressure injury  Description: Document Rodrigo Scale and appropriate interventions in the flowsheet.   4/22/2022 1809 by Tomeka Fletcher RN  Outcome: Progressing Towards Goal  Note: Pressure Injury Interventions:  Sensory Interventions: Assess changes in LOC,Assess need for specialty bed,Avoid rigorous massage over bony prominences,Float heels    Moisture Interventions: Absorbent underpads,Apply protective barrier, creams and emollients,Assess need for specialty bed,Check for incontinence Q2 hours and as needed,Contain wound drainage,Limit adult briefs    Activity Interventions: Assess need for specialty bed,Increase time out of bed,Pressure redistribution bed/mattress(bed type)    Mobility Interventions: Assess need for specialty bed,Chair cushion,Float heels,HOB 30 degrees or less    Nutrition Interventions: Offer support with meals,snacks and hydration,Document food/fluid/supplement intake    Friction and Shear Interventions: Apply protective barrier, creams and emollients,Feet elevated on foot rest,Foam dressings/transparent film/skin sealants,Lift sheet,HOB 30 degrees or less,Minimize layers             4/22/2022 1755 by Tomeka Fletcher RN  Outcome: Not Progressing Towards Goal  Note: Pressure Injury Interventions:  Sensory Interventions: Assess changes in LOC,Assess need for specialty bed,Avoid rigorous massage over bony prominences,Float heels    Moisture Interventions: Absorbent underpads,Apply protective barrier, creams and emollients,Assess need for specialty bed,Check for incontinence Q2 hours and as needed,Contain wound drainage,Limit adult briefs    Activity Interventions: Assess need for specialty bed,Increase time out of bed,Pressure redistribution bed/mattress(bed type)    Mobility Interventions: Assess need for specialty bed,Chair cushion,Float heels,HOB 30 degrees or less    Nutrition Interventions: Offer support with meals,snacks and hydration,Document food/fluid/supplement intake    Friction and Shear Interventions: Apply protective barrier, creams and emollients,Feet elevated on foot rest,Foam dressings/transparent film/skin sealants,Lift sheet,HOB 30 degrees or less,Minimize layers                Problem: Patient Education: Go to Patient Education Activity  Goal: Patient/Family Education  4/22/2022 1809 by Olegario Fabian RN  Outcome: Progressing Towards Goal  4/22/2022 1755 by Olegario Fabian, RN  Outcome: Not Progressing Towards Goal

## 2022-04-22 NOTE — PROGRESS NOTES
[facility]  Hospitalist Group                                                                                          Hospitalist Progress Note  Amber Ricardo DO Mireya  Answering service: [phone] from in house phone        Date of Service:  2022  NAME:  Brenda Kumar  :  1960  MRN:  [id number]      Admission Summary:   Admission history:  \"Rajani Lopez is a 65 y.o. female with history of liver cirrhosis with portal hypertension,recently diagnosed primary biliary cholangitis, recurrent ascites requiring paracentesis approximately every 7 to 10 days who presents here transferred from [address] [place] [address] secondary to decompensated liver cirrhosis, worsening renal insufficiency, progressive weakness currently undergoing work-up for possible liver transplant. The patient of note initially presented to [address] [place] [address] on 2022 secondary to hepatic encephalopathy and progressive weakness. \"    Interval history / Subjective: Follow up renal failure. Patient seen and examined. Patient more drowsy today. Denies pain. Creatinine increased from day prior. Assessment & Plan:     Decompensated liver cirrhosis, with portal hypertension. Felt to be secondary to [facility]  -Transferred from [address] [place] [address] with prolonged hospitalization. Transferred for further work-up for liver transplant eval  -EGD on  [place] noted for distal esophagitis and portal hypertensive gastropathy, no evidence of varices   -history of esophageal varices back in 2021 status post banding at that time.     -s/p MRCP was done on  which was noted for cirrhosis, portal hypertension, and large ascites  -s/p cardiac stress test which was negative for ischemia, EF of 69%  -EGD 3/29 blood and clots in the stomach, no evidence of bleeding   -Continue lactulose, rifaximin, octreotide, Protonix p.o.  -Closely monitor stool output frequency  -Continue ursodiol    Acute pain:   -unclear etiology   -resolved post dilaudid 0.2 mg, will hold further doses due to cognition     GREG with metabolic acidosis- worsening  -nephrology following. At risk for HD  -continue sodium bicarbonate     Acute blood loss anemia:   -previously required admission to ICU due to coffee ground emesis and urgent EGD. Intubated for procedure, now extubated      History of hepatic encephalopathy with refractory ascites  -requiring multiple paracentesis. Most recent 4/11/2022    Transaminitis/hyperbilirubinemia/coagulopathy/thrombocytopenia  -Secondary to decompensated liver cirrhosis. Monitor with repeat labs in a.m. check NH3     Chronic normocytic anemia  -Possible anemia of CKD  -H&H stable. No active bleeding. Continue to monitor     Hyponatremia  -Suspect secondary to underlying liver cirrhosis. Nephrology following      Recent E. coli UTI  -s/p treatment      Hypothyroid  -Continue levothyroxine     Generalized weakness/chronic wounds. pta  -PT, OT consult  -Wound care consulted       Code status: DNR  Prophylaxis: holding due to thrombocytopenia  Care Plan discussed with: patient,  nurse, case management  Anticipated Disposition: unclear disposition      Hospital Problems  Date Reviewed: 2/17/2022          Codes Class Noted POA    * (Principal) Cirrhosis ([facility] [address] ICD-10-CM: K74.60  ICD-9-CM: 571.5  2/8/2022 Unknown                Review of Systems:   Negative unless stated above      Vital Signs:    Last 24hrs VS reviewed since prior progress note.  Most recent are:  Visit Vitals  BP (!) 91/42 (BP 1 Location: Right arm, BP Patient Position: At rest)   Pulse 74   Temp 97.2 °F (36.2 °C)   Resp 16   Ht 5' 4\" (1.626 m)   Wt 56.1 kg (123 lb 11.2 oz)   SpO2 96%   Breastfeeding No   BMI 21.23 kg/m²         Intake/Output Summary (Last 24 hours) at 4/22/2022 1239  Last data filed at 4/22/2022 5586  Gross per 24 hour   Intake 0 ml   Output 200 ml   Net -200 ml        Physical Examination:     I had a face to face encounter with this patient and independently examined them on 4/22/2022 as outlined below:          Constitutional:  no acute distress, cooperative, appears in acute pain    ENT:  Oral mucosa moist, oropharynx benign. Resp:  CTA bilaterally. No wheezing/rhonchi/rales. No accessory muscle use. CV:  Regular rhythm, normal rate, no murmurs, gallops, rubs    GI:  Soft, mildly distended, non tender, paracentesis in place left lower quadrant, normoactive bowel sounds non tender. Musculoskeletal:  No edema, warm, 2+ pulses throughout    Neurologic:  Moves all extremities  Skin: Scattered ecchymosis            Data Review:    Review and/or order of clinical lab test  Review and/or order of tests in the radiology section of CPT  Review and/or order of tests in the medicine section of CPT      Labs:     Recent Labs     04/22/22  0019 04/20/22 0135   WBC 14.6* 8.2   HGB 9.6* 9.2*   HCT 29.7* 27.4*   PLT 34* 38*     Recent Labs     04/22/22  0019 04/21/22 0141 04/20/22 0135   * 122* 120*   K 3.9 3.3* 3.3*   CL 85* 84* 83*   CO2 19* 19* 20*   * 134* 129*   CREA 5.65* 5.03* 4.50*   GLU 98 142* 80   CA 8.9 8.9 8.6   PHOS 9.2* 6.2* 5.9*     Recent Labs     04/22/22  0019 04/21/22  1345 04/21/22  0141 04/20/22 0135 04/20/22 0135   ALT  --  21  --   --  18   AP  --  144*  --   --  117   TBILI  --  5.3*  --   --  5.4*   TP  --  5.3*  --   --  5.2*   ALB 3.1* 3.5 3.2*   < > 3.1*  3.1*   GLOB  --  1.8*  --   --  2.1   LPSE  --  423*  --   --   --     < > = values in this interval not displayed. Recent Labs     04/21/22 0141   INR 1.9*   PTP 19.1*      No results for input(s): FE, TIBC, PSAT, FERR in the last 72 hours. Lab Results   Component Value Date/Time    Folate 38.3 (H) 10/20/2021 07:53 AM    RBC FOLATE 878 02/14/2022 01:27 AM      No results for input(s): PH, PCO2, PO2 in the last 72 hours. No results for input(s): CPK, CKNDX, TROIQ in the last 72 hours.     No lab exists for component: CPKMB  Lab Results Component Value Date/Time    Cholesterol, total 122 01/25/2022 09:50 AM    HDL Cholesterol 40 01/25/2022 09:50 AM    LDL, calculated 67.2 01/25/2022 09:50 AM    Triglyceride 74 01/25/2022 09:50 AM    CHOL/HDL Ratio 3.1 01/25/2022 09:50 AM     Lab Results   Component Value Date/Time    Glucose (POC) 128 (H) 03/15/2022 06:06 AM    Glucose (POC) 123 (H) 03/15/2022 12:14 AM    Glucose (POC) 114 (H) 02/23/2022 05:42 PM    Glucose (POC) 127 (H) 02/23/2022 12:04 PM    Glucose (POC) 122 (H) 02/23/2022 08:18 AM     Lab Results   Component Value Date/Time    Color DARK YELLOW 03/05/2022 09:22 PM    Appearance TURBID (A) 03/05/2022 09:22 PM    Specific gravity 1.014 03/05/2022 09:22 PM    Specific gravity 1.010 02/11/2022 07:20 PM    pH (UA) 5.5 03/05/2022 09:22 PM    Protein 100 (A) 03/05/2022 09:22 PM    Glucose Negative 03/05/2022 09:22 PM    Ketone Negative 03/05/2022 09:22 PM    Bilirubin NEGATIVE 02/11/2022 07:20 PM    Urobilinogen 0.2 03/05/2022 09:22 PM    Nitrites Negative 03/05/2022 09:22 PM    Leukocyte Esterase LARGE (A) 03/05/2022 09:22 PM    Epithelial cells FEW 03/05/2022 09:22 PM    Bacteria 1+ (A) 03/05/2022 09:22 PM    WBC >100 (H) 03/05/2022 09:22 PM    RBC  03/05/2022 09:22 PM         Medications Reviewed:     Current Facility-Administered Medications   Medication Dose Route Frequency    nystatin (MYCOSTATIN) 100,000 unit/gram cream   Topical TID    sodium bicarbonate tablet 650 mg  650 mg Oral TID    magic mouthwash cpd (without sucralfate)  5 mL Oral TIDAC    simethicone (MYLICON) 25QW/9.4ZA oral drops 80 mg  1.2 mL Oral Multiple    promethazine (PHENERGAN) tablet 12.5 mg  12.5 mg Oral Q6H PRN    epoetin vic-epbx (RETACRIT) injection 10,000 Units  10,000 Units SubCUTAneous Q TUE, THU & SAT    multivitamin, tx-iron-ca-min (THERA-M w/ IRON) tablet 1 Tablet  1 Tablet Oral DAILY    balsam peru-castor oiL (VENELEX) ointment   Topical BID    zinc oxide-cod liver oil (DESITIN) 40 % paste Topical PRN    acetaminophen (TYLENOL) tablet 650 mg  650 mg Oral Q6H PRN    Or    acetaminophen (TYLENOL) suppository 650 mg  650 mg Rectal Q6H PRN    polyethylene glycol (MIRALAX) packet 17 g  17 g Oral DAILY PRN    ondansetron (ZOFRAN ODT) tablet 4 mg  4 mg Oral Q8H PRN    Or    ondansetron (ZOFRAN) injection 4 mg  4 mg IntraVENous C2M PRN    folic acid (FOLVITE) tablet 1 mg  1 mg Oral DAILY    levothyroxine (SYNTHROID) tablet 50 mcg  50 mcg Oral 6am    midodrine (PROAMATINE) tablet 15 mg  15 mg Oral TID WITH MEALS    octreotide (SANDOSTATIN) injection 100 mcg  100 mcg IntraVENous TID    rifAXIMin (XIFAXAN) tablet 550 mg  550 mg Oral BID    sertraline (ZOLOFT) tablet 100 mg  100 mg Oral DAILY    thiamine HCL (B-1) tablet 100 mg  100 mg Oral DAILY    ursodioL (ACTIGALL) tablet 500 mg (Patient Supplied)  500 mg Oral Q12H    glucagon (GLUCAGEN) injection 1 mg  1 mg IntraMUSCular PRN    naloxone (NARCAN) injection 0.1 mg  0.1 mg IntraVENous PRN     ______________________________________________________________________  EXPECTED LENGTH OF STAY: 4d 16h  ACTUAL LENGTH OF STAY:          [address] [place] [address],

## 2022-04-22 NOTE — INTERDISCIPLINARY ROUNDS
Spoke with Dr. Tracy Belcher in person on unit in regards to trending lactics, as of 4/21/22 1434 Lactic 3.3. She states it is expected for this patient to have an elevated lactic in r/t cirrhosis, but was checked to make sure it wasn't overly elevated. Per Dr. Tracy Belcher, no need to trend lactics at this time.     RRT will continue to follow

## 2022-04-22 NOTE — PROGRESS NOTES
Pt is laying in bed with even and unlabored respirations on room air. No complaints or signs of pain at this time. No distress noted. Pt had a low bp in the morning. Dr. Rupinder Christine was made aware via perfect serve and there were no new orders placed. Pts wound dressings to BUE were changed during the shift. The hydrocolloid on the pts sacrum remains intact. CHG was done for pts steve. Pt had a minimal intake of food throughout the shift. RN and the tech went into the room and helped feed the pt for each meal. Plan of care is to continue monitoring the pts labs. All safety precautions are in place. Bed in low position and locked. Call bell within reach.     Problem: Patient Education: Go to Patient Education Activity  Goal: Patient/Family Education  Outcome: Progressing Towards Goal

## 2022-04-22 NOTE — PROGRESS NOTES
Occupational Therapy: Patient approached earlier today for OT session however patient had bleeding areas on L arm that needed to be addressed by nurse. Deferred OT and unfortunately unable to follow up this afternoon.     Shae Jackson, OTR/L

## 2022-04-22 NOTE — PROGRESS NOTES
Comprehensive Nutrition Assessment    Type and Reason for Visit: Reassess    Nutrition Recommendations/Plan:      1. Aware that pt is eating little to no po, not drinking supplements. Pt may not be a good candidate for aggressive nutrition support, BUT if this is desired, suggest the below:   --- Nepro working up to goal of 45 ml/hr continuous (can start at 35 ml/hr x 6 hrs, then increase to 45 ml/hr)   --- Water flushes per Renal, but would give at least 90 ml water flush every 4 hrs if pt can medically tolerate    2. The above will provide:  1780 kcals, 80 gm pro, 1260 ml of free water      Nutrition Assessment:    64 yo female admitted for cirrhosis, AMS.  PMhx: Cirrhosis with ascites s/p paracentesis every week, CKD, +jaundice, esophageal varices s/p banding.      Pt continues to be followed by nutrition services, pt had rapid response called on 3/29 after vomiting blood, had pulled out NG tube on 3/28, was not replaced and pt initiated a PO diet with ONS added. It has been difficult to determine true adequacy of her PO intakes throughout her stay. She reports consuming the 3 supplements added each day, but unable to tell me how much of her meals she is eating. Did voice that she was feeling hungry at the time of my visit. Appears plans for possible liver transplant w/u and per Nephrology may need to initiate HD. AM labs reviewed, Na trending up. No standing scale weights have been taken, and given fluid status (ascites, edema) suspect lean body mass loss greater than scale weights indicating. Her wt is 19# lower than on admission.     RD initial assessment 3/7: Weight hx in EMR indicates 17% loss over last 5 months which is significant for time frame.  Pt slightly confused, RN states she is A&O x 2 at baseline.  Pt states she was eating well PTA and that her mom prepares her meals (unsure accuracy of this).  RD notes from recent admission to different hospital indicates very poor to fair PO intakes. Nick Miles multiple ONS ordered. MD ordered pt to be NPO with DHT placed.  Pt initiated tube feedings of Jevity 1.5.     4/22: Most recent notes and labs reviewed, aware of pt's poor mental status/alertness, she is not eating/drinking much of anything, per Renal dialysis is \"imminent. \"  In the event that the team/pt/family agree to tube feedings, please see above recommendations. MD also notes that he will talk to pt's son about dialysis, possible Hospice. RD will continue to follow. Malnutrition Assessment:  Malnutrition Status:  Severe malnutrition    Context:  Chronic illness     Findings of the 6 clinical characteristics of malnutrition:   Energy Intake:  7 - 75% or less est energy requirements for 1 month or longer  Weight Loss:  7.00 - Greater than 10% over 6 months     Body Fat Loss:  7 - Severe body fat loss, Triceps   Muscle Mass Loss:  7 - Severe muscle mass loss, Thigh (quadriceps)  Fluid Accumulation:  1 - Mild, Ascites,Extremities   Strength:  Not performed     Nutritionally Significant Medications: EPO, folic acid, MVI, Protonix, thiamine, magic mouth wash, synthroid    Estimated Daily Nutrient Needs:  Energy (kcal): 0594-9758 (30-32 kcal/kg); Weight Used for Energy Requirements: Current  Protein (g): 84 (1.5 g/kg);  Weight Used for Protein Requirements: Current  Fluid (ml/day): ~1800; Method Used for Fluid Requirements: 1 ml/kcal    Nutrition Related Findings:       BM: 4/21  No data recorded    Wounds:  Deep tissue injury (DTI bilateral heels; excoriation and rash to buttock)       Current Nutrition Therapies:   Diet: Soft and bite sized  Supplements: Ensure Enlive TID  Additional Caloric Sources: None    Meal intake:    % Diet Eaten   04/14/22 0956 1 - 25%   04/08/22 1932 1 - 25%   04/08/22 1507 51 - 75%     Supplement Intake:   Patient Vitals for the past 168 hrs:   Supplement intake %   04/19/22 0918 0%       Anthropometric Measures:  · Height:  5' 4\" (162.6 cm)  · Current Body Wt:  56.1 kg (123 lb 10.9 oz)   · Admission Body Wt:  144 lb 2.9 oz    · Ideal Body Wt:  120:  103.1 %    · BMI Categories:  Underweight (BMI less than 22) age over 72     Wt Readings from Last 10 Encounters:   04/06/22 56.1 kg (123 lb 11.2 oz)   02/17/22 68 kg (149 lb 14.6 oz)   12/13/21 69.4 kg (153 lb)   10/19/21 82 kg (180 lb 12.4 oz)   10/14/17 81.6 kg (180 lb)       Nutrition Diagnosis:   · Increased nutrient needs related to altered GI function,impaired nutrient utilization as evidenced by intake 26-50%,weight loss    · Unintended weight loss related to inadequate protein-energy intake as evidenced by weight loss greater than or equal to 10% in 6 months      Nutrition Interventions:   Food and/or Nutrient Delivery: Continue oral nutrition supplement,Continue current diet  Nutrition Education and Counseling: No recommendations at this time  Coordination of Nutrition Care: Continue to monitor while inpatient    Goals:   Tolerance of 50-75% of all meals and supplements over the next week       Nutrition Monitoring and Evaluation:   Behavioral-Environmental Outcomes: None identified  Food/Nutrient Intake Outcomes: Food and nutrient intake,Supplement intake  Physical Signs/Symptoms Outcomes: Biochemical data,GI status,Weight    Discharge Planning:    Continue oral nutrition supplement     Larissa Dacosta RD, CSP    Contact via Perfect Serve

## 2022-04-22 NOTE — PROGRESS NOTES
SELMA:  RUR: 21%     Disposition:   IPR and To Be Determined    Possible change in disposition plan due to decline in condition    Referral sent and call placed to [facility]. CM has received no response. CM called facility today and left message to have call return. No response from facility today after multiple calls. Barrier:  Creatinine elevated, generalized weakness, pain mgmt, GREG with metabolic acidosis worsening. CM continuing to follow.     Jaylon Major, [facility], [address]

## 2022-04-22 NOTE — PROGRESS NOTES
[facility]   [address] [facility], [facility], [facility] [place] [facility] [place]  Phone: [phone]   [phone]       Nephrology Progress Note  Davied Severs     1960     [id number]  Date of Admission : 3/5/2022  04/22/22    CC:  Follow up for greg    Assessment and Plan   GREG:  - 2/2 HRS and worsened by GI bleed  - Cr continues to rise and Na trending down and dialysis is imminent  - D/w risks/ benefits of RRT w/ son -> dialysis deferred per family and hepatology . - would continue w/ octreotide for now   - daily labs and I/Os    Hyponatremia:  - 2/2 cirrhosis  -Low but relatively stable    Hypokalemia:  - replete PRN    Severe anemia:  GI bleed  - On EDWIN TTS  - transfusions per primary team      Cirrhosis, Portal HTN, PBC  Encephalopathy   - poral gastropathy  - being w/u for [facility] transplant      Hypothyroidism    Malnutrition:     Interval History:  Seen and examined. She is minimally interactive. Renal function continues to worsen. Her p.o. intake remains poor. She has not been participating with physical therapy. She is requiring assistance for feeds and intake is very poor. I have discussed worsening renal function with hepatology again today. Dr. Crystal Hines is planning on discussing dialysis as well as hospice with the son today. Currently there are no beds available at [facility] rehab in [place]. Review of Systems: Review of systems not obtained due to patient factors.     Current Medications:   Current Facility-Administered Medications   Medication Dose Route Frequency    HYDROmorphone (DILAUDID) injection 0.2 mg  0.2 mg IntraVENous Q4H PRN    nystatin (MYCOSTATIN) 100,000 unit/gram cream   Topical TID    sodium bicarbonate tablet 650 mg  650 mg Oral TID    magic mouthwash cpd (without sucralfate)  5 mL Oral TIDAC    simethicone (MYLICON) 23NT/8.3LF oral drops 80 mg  1.2 mL Oral Multiple    promethazine (PHENERGAN) tablet 12.5 mg  12.5 mg Oral Q6H PRN    epoetin vic-epbx (RETACRIT) injection 10,000 Units  10,000 Units SubCUTAneous Q TUE, THU & SAT    multivitamin, tx-iron-ca-min (THERA-M w/ IRON) tablet 1 Tablet  1 Tablet Oral DAILY    balsam peru-castor oiL (VENELEX) ointment   Topical BID    zinc oxide-cod liver oil (DESITIN) 40 % paste   Topical PRN    acetaminophen (TYLENOL) tablet 650 mg  650 mg Oral Q6H PRN    Or    acetaminophen (TYLENOL) suppository 650 mg  650 mg Rectal Q6H PRN    polyethylene glycol (MIRALAX) packet 17 g  17 g Oral DAILY PRN    ondansetron (ZOFRAN ODT) tablet 4 mg  4 mg Oral Q8H PRN    Or    ondansetron (ZOFRAN) injection 4 mg  4 mg IntraVENous D2K PRN    folic acid (FOLVITE) tablet 1 mg  1 mg Oral DAILY    levothyroxine (SYNTHROID) tablet 50 mcg  50 mcg Oral 6am    midodrine (PROAMATINE) tablet 15 mg  15 mg Oral TID WITH MEALS    octreotide (SANDOSTATIN) injection 100 mcg  100 mcg IntraVENous TID    rifAXIMin (XIFAXAN) tablet 550 mg  550 mg Oral BID    sertraline (ZOLOFT) tablet 100 mg  100 mg Oral DAILY    thiamine HCL (B-1) tablet 100 mg  100 mg Oral DAILY    ursodioL (ACTIGALL) tablet 500 mg (Patient Supplied)  500 mg Oral Q12H    glucagon (GLUCAGEN) injection 1 mg  1 mg IntraMUSCular PRN    naloxone (NARCAN) injection 0.1 mg  0.1 mg IntraVENous PRN      Allergies   Allergen Reactions    Morphine Other (comments)     Severe HA. ALL narcotics!!!       Objective:  Vitals:    Vitals:    04/21/22 1434 04/21/22 2111 04/22/22 0146 04/22/22 0810   BP: (!) 92/48 (!) 95/44 (!) 101/48 (!) 91/42   Pulse: 76 69 80 74   Resp: 16 16 16 16   Temp: 97.5 °F (36.4 °C) 97.4 °F (36.3 °C) 97.4 °F (36.3 °C) 97.2 °F (36.2 °C)   SpO2: 97% 98% 94% 96%   Weight:       Height:         Intake and Output:  No intake/output data recorded. 04/20 1901 - 04/22 0700  In: -   Out: 200 [Urine:200]    Physical Examination:  General: No distress.  confused  HEENT:           Pale and icteric   Neck:  Supple, no mass  Resp:  Lungs CTA B/L  CV:  RRR,  no murmur or rub,no  LE edema  GI:  Soft, NT, + Bs  Neurologic:  Confused, unable to assess  :  + [place]     [x]    High complexity decision making was performed  [x]    Patient is at high-risk of decompensation with multiple organ involvement    Lab Data Personally Reviewed: I have reviewed all the pertinent labs, microbiology data and radiology studies during assessment. Recent Labs     04/22/22  0019 04/21/22  1345 04/21/22  0141 04/20/22  0135   *  --  122* 120*   K 3.9  --  3.3* 3.3*   CL 85*  --  84* 83*   CO2 19*  --  19* 20*   GLU 98  --  142* 80   *  --  134* 129*   CREA 5.65*  --  5.03* 4.50*   CA 8.9  --  8.9 8.6   PHOS 9.2*  --  6.2* 5.9*   ALB 3.1* 3.5 3.2* 3.1*  3.1*   ALT  --  21  --  18   INR  --   --  1.9*  --      Recent Labs     04/22/22  0019 04/20/22  0135   WBC 14.6* 8.2   HGB 9.6* 9.2*   HCT 29.7* 27.4*   PLT 34* 38*     No results found for: SDES  Lab Results   Component Value Date/Time    Culture result: NO GROWTH 4 DAYS 03/14/2022 06:48 PM    Culture result: NO GROWTH 4 DAYS 03/12/2022 06:00 PM    Culture result: CANDIDA ALBICANS (A) 03/05/2022 09:22 PM     Recent Results (from the past 24 hour(s))   LIPASE    Collection Time: 04/21/22  1:45 PM   Result Value Ref Range    Lipase 423 (H) 73 - 393 U/L   HEPATIC FUNCTION PANEL    Collection Time: 04/21/22  1:45 PM   Result Value Ref Range    Protein, total 5.3 (L) 6.4 - 8.2 g/dL    Albumin 3.5 3.5 - 5.0 g/dL    Globulin 1.8 (L) 2.0 - 4.0 g/dL    A-G Ratio 1.9 1.1 - 2.2      Bilirubin, total 5.3 (H) 0.2 - 1.0 MG/DL    Bilirubin, direct 2.9 (H) 0.0 - 0.2 MG/DL    Alk.  phosphatase 144 (H) 45 - 117 U/L    AST (SGOT) 43 (H) 15 - 37 U/L    ALT (SGPT) 21 12 - 78 U/L   LACTIC ACID    Collection Time: 04/21/22  2:34 PM   Result Value Ref Range    Lactic acid 3.3 (HH) 0.4 - 2.0 MMOL/L   RENAL FUNCTION PANEL    Collection Time: 04/22/22 12:19 AM   Result Value Ref Range    Sodium 123 (L) 136 - 145 mmol/L    Potassium 3.9 3.5 - 5.1 mmol/L    Chloride 85 (L) 97 - 108 mmol/L    CO2 19 (L) 21 - 32 mmol/L    Anion gap 19 (H) 5 - 15 mmol/L    Glucose 98 65 - 100 mg/dL     (H) 6 - 20 MG/DL    Creatinine 5.65 (H) 0.55 - 1.02 MG/DL    BUN/Creatinine ratio 25 (H) 12 - 20      GFR est AA 9 (L) >60 ml/min/1.73m2    GFR est non-AA 8 (L) >60 ml/min/1.73m2    Calcium 8.9 8.5 - 10.1 MG/DL    Phosphorus 9.2 (H) 2.6 - 4.7 MG/DL    Albumin 3.1 (L) 3.5 - 5.0 g/dL   CBC W/O DIFF    Collection Time: 04/22/22 12:19 AM   Result Value Ref Range    WBC 14.6 (H) 3.6 - 11.0 K/uL    RBC 3.15 (L) 3.80 - 5.20 M/uL    HGB 9.6 (L) 11.5 - 16.0 g/dL    HCT 29.7 (L) 35.0 - 47.0 %    MCV 94.3 80.0 - 99.0 FL    MCH 30.5 26.0 - 34.0 PG    MCHC 32.3 30.0 - 36.5 g/dL    RDW 19.6 (H) 11.5 - 14.5 %    PLATELET 34 (LL) 708 - 400 K/uL    NRBC 0.0 0  WBC    ABSOLUTE NRBC 0.00 0.00 - 0.01 K/uL                                       Care Plan discussed with:  Patient     Family      RN      Consulting Physician /Specialist        I have reviewed the flowsheets. Chart and Pertinent Notes have been reviewed. No change in PMH ,family and social history from Consult note.       Nathalie Samuel MD

## 2022-04-23 NOTE — PROGRESS NOTES
[facility] [facility]  Hospitalist Group                                                                                          Hospitalist Progress Note  Amber Blackburn DO  Answering service: [phone] OR 36 from in house phone        Date of Service:  2022  NAME:  Yuly Rodriguez  :  1960  MRN:  [id number]      Admission Summary:   Admission history:  \"Rajani López Service is a 65 y.o. female with history of liver cirrhosis with portal hypertension,recently diagnosed primary biliary cholangitis, recurrent ascites requiring paracentesis approximately every 7 to 10 days who presents here transferred from [address] [place] [address] secondary to decompensated liver cirrhosis, worsening renal insufficiency, progressive weakness currently undergoing work-up for possible liver transplant. The patient of note initially presented to [address] [place] [address] on 2022 secondary to hepatic encephalopathy and progressive weakness. \"    Interval history / Subjective: Follow up renal failure. Patient seen and examined. Creatinine elevated. Patient unable to take medications this AM. Had vomiting. Family has elected for hospice and case management is making arrangements for possible home with hospice. Minimal output in paracentesis drain and removed at bedside with RN. Rapid COVID test has come back positive (unclear infection time). Additional PCR has been sent to confirm. Assessment & Plan:       Transition to hospice/comfort care  -control/treat pain  -prn symptomatic management  -appreciate hospice input     Rapid COVID test:  -patient asymptomatic. She has been in the hospital for several days. Unclear this is an active vs prior infection. Will confirm the + result with PCR test.     Decompensated liver cirrhosis, with portal hypertension. Felt to be secondary to [facility]  -Transferred from [address] [place] [address] with prolonged hospitalization.   Transferred for further work-up for liver transplant eval  -EGD on 2/17 [place] noted for distal esophagitis and portal hypertensive gastropathy, no evidence of varices   -history of esophageal varices back in November 2021 status post banding at that time. -s/p MRCP was done on 2/18 which was noted for cirrhosis, portal hypertension, and large ascites  -s/p cardiac stress test which was negative for ischemia, EF of 69%  -EGD 3/29 blood and clots in the stomach, no evidence of bleeding   -Continue lactulose, rifaximin, octreotide, Protonix p.o.  -Closely monitor stool output frequency  -Continue ursodiol     GREG with metabolic acidosis- worsening  -nephrology following. At risk for HD    Acute blood loss anemia:   -previously required admission to ICU due to coffee ground emesis and urgent EGD. Intubated for procedure, now extubated      History of hepatic encephalopathy with refractory ascites  -requiring multiple paracentesis. Most recent 4/11/2022, discontinued 4/23    Transaminitis/hyperbilirubinemia/coagulopathy/thrombocytopenia  -Secondary to decompensated liver cirrhosis. Monitor with repeat labs in a.m. check NH3     Chronic normocytic anemia  -Possible anemia of CKD  -H&H stable. No active bleeding. Continue to monitor     Hyponatremia  -Suspect secondary to underlying liver cirrhosis. Nephrology following      Recent E. coli UTI  -s/p treatment      Hypothyroid  -Continue levothyroxine     Generalized weakness/chronic wounds. pta  -PT, OT consult  -Wound care consulted       Code status: DNR  Prophylaxis: holding due to thrombocytopenia  Care Plan discussed with: patient, nurse, case management  Anticipated Disposition: hospice, attempting home if able      Hospital Problems  Date Reviewed: 2/17/2022          Codes Class Noted POA    * (Principal) Cirrhosis ([facility] [address] ICD-10-CM: K74.60  ICD-9-CM: 571.5  2/8/2022 Unknown                Review of Systems:   Negative unless stated above      Vital Signs:    Last 24hrs VS reviewed since prior progress note.  Most recent are:  Visit Vitals  BP (!) 87/47 (BP 1 Location: Right upper arm, BP Patient Position: At rest)   Pulse (!) 102   Temp 97.9 °F (36.6 °C)   Resp 18   Ht 5' 4\" (1.626 m)   Wt 56.1 kg (123 lb 11.2 oz)   SpO2 97%   Breastfeeding No   BMI 21.23 kg/m²         Intake/Output Summary (Last 24 hours) at 4/23/2022 1542  Last data filed at 4/22/2022 1806  Gross per 24 hour   Intake    Output 80 ml   Net -80 ml        Physical Examination:     I had a face to face encounter with this patient and independently examined them on 4/23/2022 as outlined below:          Constitutional:  no acute distress, cooperative, appears in acute pain    ENT:  Oral mucosa moist, oropharynx benign. Resp:  CTA bilaterally. No wheezing/rhonchi/rales. No accessory muscle use. CV:  Regular rhythm, normal rate, no murmurs, gallops, rubs    GI:  Soft, mildly distended, non tender, paracentesis in place left lower quadrant, normoactive bowel sounds non tender.     Musculoskeletal:  No edema, warm, 2+ pulses throughout    Neurologic:  Moves all extremities  Skin: Scattered ecchymosis            Data Review:    Review and/or order of clinical lab test  Review and/or order of tests in the radiology section of CPT  Review and/or order of tests in the medicine section of CPT      Labs:     Recent Labs     04/23/22  0308 04/22/22  0019   WBC 11.6* 14.6*   HGB 10.1* 9.6*   HCT 31.3* 29.7*   PLT 56* 34*     Recent Labs     04/23/22 0308 04/22/22  0019 04/21/22  0141   * 123* 122*   K 4.0 3.9 3.3*   CL 82* 85* 84*   CO2 19* 19* 19*   * 141* 134*   CREA 6.71* 5.65* 5.03*   GLU 49* 98 142*   CA 9.7 8.9 8.9   PHOS 10.7* 9.2* 6.2*     Recent Labs     04/23/22  0308 04/22/22  0019 04/21/22  1345   ALT  --   --  21   AP  --   --  144*   TBILI  --   --  5.3*   TP  --   --  5.3*   ALB 3.2* 3.1* 3.5   GLOB  --   --  1.8*   LPSE  --   --  423*     Recent Labs     04/21/22  0141   INR 1.9*   PTP 19.1*      No results for input(s): FE, TIBC, PSAT, FERR in the last 72 hours. Lab Results   Component Value Date/Time    Folate 38.3 (H) 10/20/2021 07:53 AM    RBC FOLATE 878 02/14/2022 01:27 AM      No results for input(s): PH, PCO2, PO2 in the last 72 hours. No results for input(s): CPK, CKNDX, TROIQ in the last 72 hours.     No lab exists for component: CPKMB  Lab Results   Component Value Date/Time    Cholesterol, total 122 01/25/2022 09:50 AM    HDL Cholesterol 40 01/25/2022 09:50 AM    LDL, calculated 67.2 01/25/2022 09:50 AM    Triglyceride 74 01/25/2022 09:50 AM    CHOL/HDL Ratio 3.1 01/25/2022 09:50 AM     Lab Results   Component Value Date/Time    Glucose (POC) 73 04/23/2022 06:06 AM    Glucose (POC) 128 (H) 03/15/2022 06:06 AM    Glucose (POC) 123 (H) 03/15/2022 12:14 AM    Glucose (POC) 114 (H) 02/23/2022 05:42 PM    Glucose (POC) 127 (H) 02/23/2022 12:04 PM     Lab Results   Component Value Date/Time    Color DARK YELLOW 03/05/2022 09:22 PM    Appearance TURBID (A) 03/05/2022 09:22 PM    Specific gravity 1.014 03/05/2022 09:22 PM    Specific gravity 1.010 02/11/2022 07:20 PM    pH (UA) 5.5 03/05/2022 09:22 PM    Protein 100 (A) 03/05/2022 09:22 PM    Glucose Negative 03/05/2022 09:22 PM    Ketone Negative 03/05/2022 09:22 PM    Bilirubin NEGATIVE 02/11/2022 07:20 PM    Urobilinogen 0.2 03/05/2022 09:22 PM    Nitrites Negative 03/05/2022 09:22 PM    Leukocyte Esterase LARGE (A) 03/05/2022 09:22 PM    Epithelial cells FEW 03/05/2022 09:22 PM    Bacteria 1+ (A) 03/05/2022 09:22 PM    WBC >100 (H) 03/05/2022 09:22 PM    RBC  03/05/2022 09:22 PM         Medications Reviewed:     Current Facility-Administered Medications   Medication Dose Route Frequency    HYDROmorphone (DILAUDID) injection 0.2 mg  0.2 mg IntraVENous Q3H PRN    nystatin (MYCOSTATIN) 100,000 unit/gram cream   Topical TID    sodium bicarbonate tablet 650 mg  650 mg Oral TID    magic mouthwash cpd (without sucralfate)  5 mL Oral TIDAC    simethicone (MYLICON) 42JS/3.1DP oral drops 80 mg 1.2 mL Oral Multiple    promethazine (PHENERGAN) tablet 12.5 mg  12.5 mg Oral Q6H PRN    epoetin vic-epbx (RETACRIT) injection 10,000 Units  10,000 Units SubCUTAneous Q TUE, THU & SAT    multivitamin, tx-iron-ca-min (THERA-M w/ IRON) tablet 1 Tablet  1 Tablet Oral DAILY    balsam peru-castor oiL (VENELEX) ointment   Topical BID    zinc oxide-cod liver oil (DESITIN) 40 % paste   Topical PRN    acetaminophen (TYLENOL) tablet 650 mg  650 mg Oral Q6H PRN    Or    acetaminophen (TYLENOL) suppository 650 mg  650 mg Rectal Q6H PRN    polyethylene glycol (MIRALAX) packet 17 g  17 g Oral DAILY PRN    ondansetron (ZOFRAN ODT) tablet 4 mg  4 mg Oral Q8H PRN    Or    ondansetron (ZOFRAN) injection 4 mg  4 mg IntraVENous H1T PRN    folic acid (FOLVITE) tablet 1 mg  1 mg Oral DAILY    levothyroxine (SYNTHROID) tablet 50 mcg  50 mcg Oral 6am    midodrine (PROAMATINE) tablet 15 mg  15 mg Oral TID WITH MEALS    octreotide (SANDOSTATIN) injection 100 mcg  100 mcg IntraVENous TID    rifAXIMin (XIFAXAN) tablet 550 mg  550 mg Oral BID    sertraline (ZOLOFT) tablet 100 mg  100 mg Oral DAILY    thiamine HCL (B-1) tablet 100 mg  100 mg Oral DAILY    ursodioL (ACTIGALL) tablet 500 mg (Patient Supplied)  500 mg Oral Q12H    glucagon (GLUCAGEN) injection 1 mg  1 mg IntraMUSCular PRN    naloxone (NARCAN) injection 0.1 mg  0.1 mg IntraVENous PRN     ______________________________________________________________________  EXPECTED LENGTH OF STAY: 4d 16h  ACTUAL LENGTH OF STAY:          5 Wilda Miles,  Detail Level: Detailed Was A Bandage Applied: Yes Punch Size In Mm: 4 Biopsy Type: DIF (perilesional) Anesthesia Type: 1% lidocaine with epinephrine Anesthesia Volume In Cc (Will Not Render If 0): 0.5 Additional Anesthesia Volume In Cc (Will Not Render If 0): 0 Hemostasis: None Epidermal Sutures: 4-0 Ethilon Wound Care: Petrolatum Dressing: bandage Suture Removal: 14 days Patient Will Remove Sutures At Home?: No Consent: Written consent was obtained and risks were reviewed including but not limited to scarring, infection, bleeding, scabbing, incomplete removal, nerve damage and allergy to anesthesia. Post-Care Instructions: I reviewed with the patient in detail post-care instructions. Patient is to keep the biopsy site dry overnight, and then apply bacitracin twice daily until healed. Patient may apply hydrogen peroxide soaks to remove any crusting. Home Suture Removal Text: Patient was provided a home suture removal kit and will remove their sutures at home.  If they have any questions or difficulties they will call the office. Notification Instructions: Patient will be notified of biopsy results. However, patient instructed to call the office if not contacted within 2 weeks. Billing Type: Third-Party Bill

## 2022-04-23 NOTE — PROGRESS NOTES
Transition of Care    RUR: 20%    Received a call from hospice that the family is wanting to discharge Mrs. Peters to home with hospice. [facility] has accepted Mrs. Peters for admission. The family is in the room with Mrs. Peters. [facility] was called. ([phone]) [facility] can open the Ms. Peters's case on Sunday. They would deliver the equipment tomorrow morning. [facility] requested clinicals be faxed to her at [phone]. She will need an order for home hospice for evaluation and treatment. Clinicals were faxed to [facility]. Mr. Tasha Lujan and Jordi Mendoza were seen. They requested that Ms. Peters be discharged today. They were informed that hospice cannot start until tomorrow. They agreed that they still want discharge today. If Mrs. Peters transitions during the transportation, she will continue to be transported to the home. [facility] was called. A request for ambulance transportation was made. [facility] is to call back with the time of discharge. [facility] called back. She needs the comfort medication called into [facility] on [address]. Their number is [phone]. Mrs. Peters's son is going to  the medications. 1:30PM.  [facility] was called. Their administration needed to approve the long-distance transport. It is still being reviewed. Ms. Evelio Romano (Mother) was informed. [facility] was called with [facility]. She was informed that clinicals were faxed. 3:30PM  [facility] was called. Their administration was still reviewing. Case Management Administration was called to get permission for Hospital to Home transport. It was granted with the condition to see if they family can contribute to cover the cost.      [facility]. The cost of the ambulance would be $1,049.25. It is a 109 miles trip. Jordi Mendoza was seen. He called his father. They agreed to cover the cost of the ambulance.   Jun Eddy was given the phone number for [facility]. [facility] called. They were informed that the family was paying for the transport. They were going to call Mr. Risa Denver. They will  Ms. Peters between 4:30pm and 5:00pm.      [facility] was called. April was informed that Ms. Risa Denver was discharging at 5:00pm.  She had received the clinicals that were faxed. The equipment should be delivered this evening.   Signed By: Brooks Patrick LCSW     April 23, 2022              Signed By: Brooks Patrick LCSW     April 23, 2022

## 2022-04-23 NOTE — HOSPICE
[facility]  [phone]    Patient Name: Mandy Lopez  YOB: 1960  Age: 65 y.o. [address] RN Note:  Hospice consult noted. Chart reviewed. Plan of care discussed with patients nurse & care manager. In to meet with son and spouse. Discussed Hospice philosophy, general plan of care, levels of care, services and on call procedures. Family information packet provided & reviewed with family. They are wanting to take her home to [place] with hospice. Both are aware she could pass in route and willing to take that risk. They have spoken with [facility] in [place] who is willing to accept her. I spoke with Gigi Oviedo, and he will come up and meet with them to facilitate transport home for hospice. Thank you for the opportunity to be of service to this patient.   Pedro Luis Moreno RN  Clinical Nurse Liaison   [address]  (F)[phone] (B) [phone]

## 2022-04-23 NOTE — PROGRESS NOTES
Pt is laying in bed with even and unlabored respirations on room air. No signs of pain at this time. No distress noted. AVS was given to pts son and explained. All questions were answered. IV and paracentesis drain have been removed. All belongings have been collected and returned to the pt. Transport has come to take the pt home. All safety precautions are in place. Problem: Falls - Risk of  Goal: *Absence of Falls  Description: Document Aubree Matthews Fall Risk and appropriate interventions in the flowsheet. Outcome: Progressing Towards Goal  Note: Fall Risk Interventions:  Mobility Interventions: Communicate number of staff needed for ambulation/transfer,Bed/chair exit alarm    Mentation Interventions: Bed/chair exit alarm,Adequate sleep, hydration, pain control,Door open when patient unattended,Evaluate medications/consider consulting pharmacy    Medication Interventions: Bed/chair exit alarm,Evaluate medications/consider consulting pharmacy    Elimination Interventions: Call light in reach,Bed/chair exit alarm,Patient to call for help with toileting needs,Toileting schedule/hourly rounds    History of Falls Interventions: Bed/chair exit alarm,Door open when patient unattended,Evaluate medications/consider consulting pharmacy,Room close to nurse's station         Problem: Patient Education: Go to Patient Education Activity  Goal: Patient/Family Education  Outcome: Progressing Towards Goal     Problem: Pressure Injury - Risk of  Goal: *Prevention of pressure injury  Description: Document Rodrigo Scale and appropriate interventions in the flowsheet.   Outcome: Progressing Towards Goal  Note: Pressure Injury Interventions:  Sensory Interventions: Assess changes in LOC,Assess need for specialty bed,Avoid rigorous massage over bony prominences,Chair cushion    Moisture Interventions: Absorbent underpads,Apply protective barrier, creams and emollients,Assess need for specialty bed,Check for incontinence Q2 hours and as needed    Activity Interventions: Assess need for specialty bed,Chair cushion,Pressure redistribution bed/mattress(bed type)    Mobility Interventions: Assess need for specialty bed,Chair cushion,Float heels,Pressure redistribution bed/mattress (bed type),HOB 30 degrees or less    Nutrition Interventions: Offer support with meals,snacks and hydration    Friction and Shear Interventions: Foam dressings/transparent film/skin sealants,HOB 30 degrees or less,Apply protective barrier, creams and emollients,Feet elevated on foot rest                Problem: Patient Education: Go to Patient Education Activity  Goal: Patient/Family Education  Outcome: Progressing Towards Goal     Problem: Patient Education: Go to Patient Education Activity  Goal: Patient/Family Education  Outcome: Progressing Towards Goal     Problem: Patient Education: Go to Patient Education Activity  Goal: Patient/Family Education  Outcome: Progressing Towards Goal     Problem: Nutrition Deficit  Goal: *Optimize nutritional status  Outcome: Progressing Towards Goal     Problem: Airway Clearance - Ineffective  Goal: Achieve or maintain patent airway  Outcome: Progressing Towards Goal     Problem: Gas Exchange - Impaired  Goal: Absence of hypoxia  Outcome: Progressing Towards Goal  Goal: Promote optimal lung function  Outcome: Progressing Towards Goal     Problem: Breathing Pattern - Ineffective  Goal: Ability to achieve and maintain a regular respiratory rate  Outcome: Progressing Towards Goal     Problem:  Body Temperature -  Risk of, Imbalanced  Goal: Ability to maintain a body temperature within defined limits  Outcome: Progressing Towards Goal  Goal: Will regain or maintain usual level of consciousness  Outcome: Progressing Towards Goal  Goal: Complications related to the disease process, condition or treatment will be avoided or minimized  Outcome: Progressing Towards Goal     Problem: Isolation Precautions - Risk of Spread of Infection  Goal: Prevent transmission of infectious organism to others  Outcome: Progressing Towards Goal     Problem: Nutrition Deficits  Goal: Optimize nutrtional status  Outcome: Progressing Towards Goal     Problem: Risk for Fluid Volume Deficit  Goal: Maintain normal heart rhythm  Outcome: Progressing Towards Goal  Goal: Maintain absence of muscle cramping  Outcome: Progressing Towards Goal  Goal: Maintain normal serum potassium, sodium, calcium, phosphorus, and pH  Outcome: Progressing Towards Goal     Problem: Loneliness or Risk for Loneliness  Goal: Demonstrate positive use of time alone when socialization is not possible  Outcome: Progressing Towards Goal     Problem: Fatigue  Goal: Verbalize increase energy and improved vitality  Outcome: Progressing Towards Goal     Problem: Patient Education: Go to Patient Education Activity  Goal: Patient/Family Education  Outcome: Progressing Towards Goal

## 2022-04-23 NOTE — PROGRESS NOTES
[address] [facility], Yomi BRICE, [place], [place]      ISAAC Overton, [facility]     Michelle Lopez, [facility]   MANISHA Prather, [facility]       Namita Montemayor Mello De [address]    at [address], [address] [place] [address], [place]  22.    [phone]    FAX: [address] [place] [address] Avenue    [facility]    [address], [address], [address]    [phone]    FAX: [phone]       HEPATOLOGY PROGRESS NOTE  The patient is well known to me from a previous visit to my office at THE [facility] in [facility]. She is a 64 yo  female who was found to have chronic liver disease in 2020 and cirrhosis in 7/2021 when she developed ascites. She had variceal bleeding in 11/2021. Serologic evaluation for markers of chronic liver disease was positive for AMA. Cirrhosis is due to [facility].     The patient has developed the following complications of cirrhosis: esophageal varices, variceal bleeding, ascites, edema, hepatic encephalopathy, severe muslce wasting    I saw the patient for the first time in 1/2022. She had CTP score 9 and MELD score 21 at that time. We started liver transplant evaluation testing. She developed confusion and could not be aroused and was hospitalized 3 weeks ago at [facility] in 2/202. During that hospitalization she developed worsening malnutrition, worsening of muscle wasting and a few decubitus ulcers. She has been at [facility] since 3/5. She received Dobbhoff tube feedings and OT/PT.   She had dramatic improvement in nutritional status, skin wounds have nearly healed, mental status is clear, she is stronger, can move herself around in bed, she can sit in chair for an hour at a time, can feed herself and has been able to take some steps with PT assistance. Family conference on 3/27/22. I had a 1 hour meeting with the patient and her son who is POA. Next step is to transfer to rehab for for 4+ weeks to improve ambulation, muscle mass so she could possibly be a LT candidate  In order to do this will need to remove feeding tube. In order to do this she will need to be able to eat sufficient calories to maintain her nutritional status  If she does well with rehab we may be able to get LT eval testing and eventually on LT list.    Hospital course:  She had severe malnutrition, frailty, muscle weakness upon transfer from [facility]  She recieved tube feedings and had aggressive PT/OT for 3 weeks and started to improve. Then had several episodes of hematemesis and spent some time in ICU. GD showed no varices. Only severe portal gastropathy     Overall condition has remained stagnant since feeding tube removed. Continue to receive OT and PT but some days she refuses to participate. She remains talkative and cooperative. We have been waiting for placement to rehab center close to son's home in [facility]. All SNF/rehab places have refused in [place] to take her on citing her medical conditions are too severe and they do not think she would toelrate aggressive rehab. I have discussed situation with my colleagues at [facility]. They think they can make arrangements to transfer her to [facility] adjacent to [facility] where they can keep close eye on her and agree with my assessment that she could potentially be a LT candidate if gets stronger with rehab. She has not done well past 3 days. She has had a poor appetite and eaten almost nothing  She has not participated in PT. Skin is more frail and tearing  Scr has been going up because of poor oral intake and dehydration    Hepatology Plan:  She is deteriorating and has lost most of the gains made in the past 2 months. She will never be able to get strong enough to make it to LT.   Only option is transition to home hospice  I have discussed this with son Garett Cruz. He agrees that her condition is worse and understands she cannot survive. Agrees with plan to transition to hospice      ASSESSMENT AND PLAN:  Cirrhosis  The diagnosis of cirrhosis is based upon imaging, laboratory studies, complications of cirrhosis. Cirrhosis is secondary to [facility]. Not alcohol as she was initially told.     Serologic testing was positive for AMA, ASMA     The CTP is 10. Child class C. The MELD score is 36.     Based upon the MELD and CTP scores the patient has a mortality of about 50% within the next 90 days. She has made remarkable progress over th past 2 months since transferred here. I still think she is too weak to survive liver transplantation. She continue to need aggressive nutritional support and aggressive PT more than she can get here. Both she and her son are in favor of this with the hopes her situation will improve and she could survive liver transplantation.       Primary Biliary Cholangitis  The diagnosis is based upon serology and an elevation in ALP and positive AMA. A liver biopsy has not been performed. PBC is being treated with ANTONIETA 500 mg BID. CKD-HRS  Scr was in the 4-5 mg range. Improved down to the 3s but now back up to the 5+ and climbing. This is CKD-HRS. She continues to make urine. She has not required dialysis. Continue midodrine,   I think we can stop octreotide. Spoke with Dr Everett Jules about renal function. He is concerned that she will do poorly at rehab, need readmission and have to start on dialysis. He questioned if we should start dialysis as now  She has no real indications to start dialysis now vs wait. No fluid overload, NA, K are normal.  She is making urine  Spoke to son about dialysis.   He and I prefer to continue as we are without dialysis for now as this would interfere with daily rehab    Malnutrition  The patient has severe protein-calorie malnutrtion and muscle wasting. This is due to advanced liver disease and refractory ascites. The patient needs as many calories as she can possibly consume   Without aggressive nutritional support she will not survive. She is alert, oriented and understands the need to consume as many Ensure as possible. There are no plans to replace Dobhoff feeding tube at this time. Frailty/muscle wasting  The patient is very frail and working with OPT/PT. She has made great progress but lost some after her trip to ICU. This will be address by rehab. Skin wounds  Still has several pressure wounds on back, buttocks that continue to heal slowly. Ulcers have to be healed for her to be an LT candidate.     Ascites   Ascites developed for the first time in 7/2021. Ascites has not recurred since last paracentesis and on no diuretics     Hyponatremia  This is secondary cirrhosis and diuretics  Sna has been stable in the mid-120s. She is on No diuretics.       Screening for Esophageal varices   The patient has esophageal varices with prior bleeding. Varices were banded in 11/2021 and 12/2021. The last EGD to assess for varices was performed in 3/2022. No esophageal varices were present.     Hepatic encephalopathy   Overt HE is well controlled on xifaxan. She is not on lactulose, does not need this and should not be started on this because of diarrhea risk. She has been alert and oriented througtout the hospitalization. Coagulopathy  INR is in the 1.8     Thrombocytopenia   This is secondary to cirrhosis. There is no evidence of overt bleeding. No treatment is required.   The platelet count is adequate for the patient to undergo procedures without the need for platelet transfusion or platelet growth factors.     Anemia   This is due to multifactorial causes including portal hypertension with chronic GI blood loss, acute GI blood loss 1 week ago, and bone marrow suppression due to severe malnutrition. With improved nutritional status anemia has imrpoved into the 8s. Ferritin 106, FE saturation 27%  Although this is normal for healthy person this is consistent with FE deficiency in patient with cirrhosis. Will give her IV FE x 2 doses whil we wait for rehab placement    Thrombocytopenia   This is secondary to cirrhosis. There is no evidence of overt bleeding. No treatment is required. The platelet count is adequate for the patient to undergo procedures without the need for platelet transfusion or platelet growth factors. PHYSICAL EXAMINATION:  VS: per nursing note  General:  Looks much better. Skin color normal.  Eyes:  Sclera non-icteric. ENT:  No oral lesions. Thyroid normal.  Nodes:  No adenopathy. Skin:  Spider angiomata. Multiple diffuse ecchymosis on arms. Respiratory:  Lungs clear to auscultation. Cardiovascular:  Regular heart rate. Abdomen:  Soft non-tender, NO obvious ascites. Extremities:  No lower extremity edema. Severe muscle wasting of upper and lower extremities. Neurologic:  Alert and oriented. Cranial nerves grossly intact. No asterixis. LABORATORY:  Results for Corina Quintero (MRN [id number]) as of 4/23/2022 07:13   Ref.  Range 4/20/2022 01:35 4/21/2022 01:41 4/22/2022 00:19   WBC Latest Ref Range: 3.6 - 11.0 K/uL 8.2  14.6 (H)   HGB Latest Ref Range: 11.5 - 16.0 g/dL 9.2 (L)  9.6 (L)   PLATELET Latest Ref Range: 150 - 400 K/uL 38 (LL)  34 (LL)   INR Latest Ref Range: 0.9 - 1.1    1.9 (H)    Sodium Latest Ref Range: 136 - 145 mmol/L 120 (L) 122 (L) 123 (L)   Potassium Latest Ref Range: 3.5 - 5.1 mmol/L 3.3 (L) 3.3 (L) 3.9   Chloride Latest Ref Range: 97 - 108 mmol/L 83 (L) 84 (L) 85 (L)   CO2 Latest Ref Range: 21 - 32 mmol/L 20 (L) 19 (L) 19 (L)   Glucose Latest Ref Range: 65 - 100 mg/dL 80 142 (H) 98   BUN Latest Ref Range: 6 - 20 MG/ (H) 134 (H) 141 (H)   Creatinine Latest Ref Range: 0.55 - 1.02 MG/DL 4.50 (H) 5.03 (H) 5.65 (H) Bilirubin, total Latest Ref Range: 0.2 - 1.0 MG/DL 5.4 (H) 5.3 (H)    Albumin Latest Ref Range: 3.5 - 5.0 g/dL 3.1 (L) 3.2 (L) 3.1 (L)   ALT Latest Ref Range: 12 - 78 U/L 18 21    AST Latest Ref Range: 15 - 37 U/L 30 43 (H)    Alk. phosphatase Latest Ref Range: 45 - 117 U/L 117 144 (H)      RADIOLOGY:  Ultrasound of liver. Echogenic consistent with cirrhosis. No liver mass lesions. No dilated bile ducts. Moderate ascites.         MD Sujata SalgadoLevindale [facility] 13  [address], [address] [place] [address].  [phone]  [address] [place] [address]

## 2022-04-23 NOTE — PROGRESS NOTES
Transition of Care    RUR: 20%    Received a call from hospice that the family is wanting to discharge Mrs. Peters to home with hospice. [facility] Mrs. Peters for admission. The family is in the room with Mrs. Peters. [facility] ([phone]) Zay Mejia can open the Ms. Peters's case on Sunday. They would deliver the equipment tomorrow morning. Zay Mejia requested clinicals be faxed to her at [phone]. She will need an order for home hospice for evaluation and treatment. [facility]. Mr. Randall Rojas and Sean Flores were seen. They requested that Ms. Peters be discharged today. They were informed that hospice cannot start until tomorrow. They agreed that they still want discharge today. If Mrs. Peters transitions during the transportation, she will continue to be transported to the home. [facility] was called. A request for ambulance transportation was made. [facility] is to call back with the time of discharge. Zay Mejia called back. She needs the comfort medication called into [facility] on [address]. Their number is [phone]. Mrs. Peters's son is going to  the medications. 1:30PM.  [facility] was called. Their administration needed to approve the long-distance transport. It is still being reviewed. Mr. Randall Rojas and Sean Flores were informed. Zay Mejia was called with [facility]. She was informed that clinicals were faxed.     Signed By: Janna Silva LCSW     April 23, 2022

## 2022-04-23 NOTE — PROGRESS NOTES
Problem: Falls - Risk of  Goal: *Absence of Falls  Description: Document Darrion Rivers Fall Risk and appropriate interventions in the flowsheet. Outcome: Progressing Towards Goal  Note: Fall Risk Interventions:  Mobility Interventions: Bed/chair exit alarm    Mentation Interventions: Bed/chair exit alarm    Medication Interventions: Bed/chair exit alarm    Elimination Interventions: Bed/chair exit alarm,Call light in reach    History of Falls Interventions: Bed/chair exit alarm,Door open when patient unattended,Evaluate medications/consider consulting pharmacy,Room close to nurse's station         Problem: Patient Education: Go to Patient Education Activity  Goal: Patient/Family Education  Outcome: Progressing Towards Goal     Problem: Pressure Injury - Risk of  Goal: *Prevention of pressure injury  Description: Document Rodrigo Scale and appropriate interventions in the flowsheet.   Outcome: Progressing Towards Goal  Note: Pressure Injury Interventions:  Sensory Interventions: Assess changes in LOC    Moisture Interventions: Absorbent underpads    Activity Interventions: Assess need for specialty bed    Mobility Interventions: Assess need for specialty bed    Nutrition Interventions: Offer support with meals,snacks and hydration    Friction and Shear Interventions: Apply protective barrier, creams and emollients,Feet elevated on foot rest,Foam dressings/transparent film/skin sealants,Lift sheet,HOB 30 degrees or less,Minimize layers                Problem: Patient Education: Go to Patient Education Activity  Goal: Patient/Family Education  Outcome: Progressing Towards Goal     Problem: Patient Education: Go to Patient Education Activity  Goal: Patient/Family Education  Outcome: Progressing Towards Goal     Problem: Patient Education: Go to Patient Education Activity  Goal: Patient/Family Education  Outcome: Progressing Towards Goal     Problem: Nutrition Deficit  Goal: *Optimize nutritional status  Outcome: Progressing Towards Goal

## 2022-04-27 ENCOUNTER — TELEPHONE (OUTPATIENT)
Dept: HEMATOLOGY | Age: 62
End: 2022-04-27

## 2022-05-03 ENCOUNTER — PATIENT MESSAGE (OUTPATIENT)
Dept: HEMATOLOGY | Age: 62
End: 2022-05-03

## 2022-05-05 NOTE — TELEPHONE ENCOUNTER
From: Tenzin Jeffery  To: Gurjit Julio MD  Sent: 5/3/2022 1:52 PM EDT  Subject: Transplant List    Dr. Sangeetha Acevedo,     I have submitted a critical care illness claim related to her liver failure to the insurance company. I need the documentation that states her liver failure put her in need of a transplant but she ultimately was too sick to undergo that procedure. Can you point me in the right direction, or send me the notes that reflect such? I'm not entirely sure who initially recommended she get a liver transplant nor where that fact is documented.

## 2022-05-05 NOTE — TELEPHONE ENCOUNTER
5/3/2022 -     2 pm: Pts  called asking how to obtain medical records for insurance. I suggested he submit a request online at [email or web address].

## 2022-05-05 NOTE — DISCHARGE SUMMARY
Discharge Summary       PATIENT ID: Glenna Merino  MRN: [id number]   YOB: 1960    DATE OF ADMISSION: 3/5/2022  2:14 PM    DATE OF DISCHARGE: 4/23/2022   PRIMARY CARE PROVIDER: Riya Bose MD     ATTENDING PHYSICIAN: Nory Tovar DO   DISCHARGING PROVIDER: Nory Tovar DO    To contact this individual call [phone] and ask the  to page. If unavailable ask to be transferred the Adult Hospitalist Department. CONSULTATIONS: IP CONSULT TO NEPHROLOGY  IP CONSULT TO HEPATOLOGY    PROCEDURES/SURGERIES: Procedure(s):  ESOPHAGOGASTRODUODENOSCOPY (EGD)      ADMISSION SUMMARY AND HOSPITAL COURSE:   Admission history:  \"Rajani Peters is a 65 y.o. female with history of liver cirrhosis with portal hypertension,recently diagnosed primary biliary cholangitis, recurrent ascites requiring paracentesis approximately every 7 to 10 days who presents here transferred from [address] [place] [address] secondary to decompensated liver cirrhosis, worsening renal insufficiency, progressive weakness currently undergoing work-up for possible liver transplant.  The patient of note initially presented to [address] [place] [address] on 2/11/2022 secondary to hepatic encephalopathy and progressive weakness. \"       DISCHARGE DIAGNOSES / PLAN:           Transition to hospice/comfort care. Discharge to home  -control/treat pain  -prn symptomatic management  -appreciate hospice input      + COVID test- PCR and rapid   -patient asymptomatic. She has been in the hospital for several days.  Unclear this is an active vs prior infection     Decompensated liver cirrhosis, with portal hypertension.  Felt to be secondary to [facility]  -Transferred from [address] [place] [address] with prolonged hospitalization.  Transferred for further work-up for liver transplant eval  -EGD on 2/17 [place] noted for distal esophagitis and portal hypertensive gastropathy, no evidence of varices   -history of esophageal varices back in November 2021 status post banding at that time.    -s/p MRCP was done on 2/18 which was noted for cirrhosis, portal hypertension, and large ascites  -s/p cardiac stress test which was negative for ischemia, EF of 69%  -EGD 3/29 blood and clots in the stomach, no evidence of bleeding   -Continue lactulose, rifaximin, octreotide, Protonix p.o.  -Closely monitor stool output frequency  -Continue ursodiol    GREG with metabolic acidosis- worsening  -nephrology following. Needs HD. Deferred      Acute blood loss anemia:   -previously required admission to ICU due to coffee ground emesis and urgent EGD. Intubated for procedure, now extubated      History of hepatic encephalopathy with refractory ascites  -requiring multiple paracentesis. Most recent 4/11/2022, discontinued 4/23     Transaminitis/hyperbilirubinemia/coagulopathy/thrombocytopenia  -Secondary to decompensated liver cirrhosis.  Monitor with repeat labs in a.m. check NH3     Chronic normocytic anemia  -Possible anemia of CKD  -H&H stable.  No active bleeding.  Continue to monitor     Hyponatremia  -Suspect secondary to underlying liver cirrhosis.  Nephrology following      Recent E. coli UTI  -s/p treatment      Hypothyroid  -Continue levothyroxine     Generalized weakness/chronic wounds. pta  -PT, OT consult  -Wound care consulted          NOTIFY YOUR PHYSICIAN FOR ANY OF THE FOLLOWING:   Fever over 101 degrees for 24 hours. Chest pain, shortness of breath, fever, chills, nausea, vomiting, diarrhea, change in mentation, falling, weakness, bleeding. Severe pain or pain not relieved by medications, as well as any other signs or symptoms that you may have questions about. DISCHARGE MEDICATIONS:  Discharge Medication List as of 4/23/2022  4:52 PM      CONTINUE these medications which have CHANGED    Details   morphine (ROXANOL) 100 mg/5 mL (20 mg/mL) concentrated solution Take 0.25 mL by mouth every four (4) hours as needed for Pain for up to 5 days.  Max Daily Amount: 30 mg., Normal, Disp-30 mL, R-0         CONTINUE these medications which have NOT CHANGED    Details   octreotide (SANDOSTATIN) 100 mcg/mL injection 1 mL by IntraVENous route three (3) times daily. , No Print, Disp-1 mL, R-0      levothyroxine (SYNTHROID) 50 mcg tablet TAKE 1 TABLET BY MOUTH DAILY BEFORE AND BREAKFAST, Normal, Disp-90 Tablet, R-3**Patient requests 90 days supply**      ursodioL (ANTONIETA Forte) 500 mg tablet Take 1 Tablet by mouth two (2) times a day., Normal, Disp-60 Tablet, R-3      folic acid (FOLVITE) 1 mg tablet Take 1 Tablet by mouth daily. , Normal, Disp-100 Tablet, R-1      sertraline (Zoloft) 100 mg tablet Take 1 Tablet by mouth daily. , Normal, Disp-30 Tablet, R-5      thiamine HCL (B-1) 100 mg tablet Take 1 Tablet by mouth daily. , Normal, Disp-100 Tablet, R-1         STOP taking these medications       midodrine (PROAMATINE) 5 mg tablet Comments:   Reason for Stopping:         rifAXIMin (XIFAXAN) 550 mg tablet Comments:   Reason for Stopping:         lactulose (CHRONULAC) 10 gram/15 mL solution Comments:   Reason for Stopping:         pantoprazole (PROTONIX) 40 mg tablet Comments:   Reason for Stopping:         potassium chloride (KAON 10%) 20 mEq/15 mL solution Comments:   Reason for Stopping:         sodium bicarbonate infusion Comments:   Reason for Stopping:         ondansetron (ZOFRAN ODT) 8 mg disintegrating tablet Comments:   Reason for Stopping:         midodrine (PROAMATINE) 10 mg tablet Comments:   Reason for Stopping:         ascorbic acid, vitamin C, (VITAMIN C) 250 mg tablet Comments:   Reason for Stopping:         bumetanide (BUMEX) 1 mg tablet Comments:   Reason for Stopping:         ferrous gluconate 324 mg (38 mg iron) tablet Comments:   Reason for Stopping:         magnesium oxide (MAG-OX) 400 mg tablet Comments:   Reason for Stopping:         spironolactone (ALDACTONE) 100 mg tablet Comments:   Reason for Stopping:               DISPOSITION:    Home With:   OT  PT  HH  RN       Long term SNF/Inpatient Rehab    Independent/assisted living   x Hospice    Other:       PATIENT CONDITION AT DISCHARGE:     Functional status   x Poor     Deconditioned     Independent      Cognition     Lucid    x Forgetful     Dementia      Catheters/lines (plus indication)   x [place]     PICC     PEG     None      Code status     Full code    x DNR      Constitutional:  no acute distress, cooperative, appears in acute pain    ENT:  Oral mucosa moist, oropharynx benign. Resp:  CTA bilaterally. No wheezing/rhonchi/rales. No accessory muscle use. CV:  Regular rhythm, normal rate, no murmurs, gallops, rubs    GI:  Soft, mildly distended, non tender, paracentesis in place left lower quadrant, normoactive bowel sounds non tender.     Musculoskeletal:  No edema, warm, 2+ pulses throughout    Neurologic:  Moves all extremities  Skin: Scattered ecchymosis                                 CHRONIC MEDICAL DIAGNOSES:  Problem List as of 4/23/2022 Date Reviewed: 2/17/2022          Codes Class Noted - Resolved    Iron deficiency anemia due to chronic blood loss (Chronic) ICD-10-CM: D50.0  ICD-9-CM: 280.0  11/10/2021 - Present    Overview Signed 11/10/2021  3:00 PM by Linda Snyder MD     10/20/21 F3 26 TIBC 312  8% sat             Hypothyroidism ICD-10-CM: E03.9  ICD-9-CM: 244.9  11/10/2021 - Present    Overview Signed 11/10/2021  3:52 PM by Linda Snyder MD     10/20/21 TSH 5.27             Hepatic encephalopathy ([facility] ICD-10-CM: K72.90  ICD-9-CM: 572.2  2/11/2022 - Present        Acute hyperkalemia ICD-10-CM: E87.5  ICD-9-CM: 276.7  2/11/2022 - Present        Acute renal failure (ARF) (HCC) ICD-10-CM: N17.9  ICD-9-CM: 584.9  2/11/2022 - Present        Increased ammonia level ICD-10-CM: R79.89  ICD-9-CM: 790.6  2/11/2022 - Present        Altered mental status, unspecified ICD-10-CM: R41.82  ICD-9-CM: 780.97  2/11/2022 - Present        * (Principal) Cirrhosis ([facility] ICD-10-CM: K74.60  ICD-9-CM: 571.5  2/8/2022 - Present Primary biliary cholangitis (HCC) ICD-10-CM: K74.3  ICD-9-CM: 571.6  2/8/2022 - Present        Ascites ICD-10-CM: R18.8  ICD-9-CM: 789.59  1/25/2022 - Present        Esophageal varices (HCC) ICD-10-CM: I85.00  ICD-9-CM: 456.1  11/6/2021 - Present    Overview Signed 11/14/2021  8:02 AM by Alex Branch MD     MELD 22, DF 34             Anxiety ICD-10-CM: F41.9  ICD-9-CM: 300.00  10/19/2021 - Present        Basal cell carcinoma (BCC) of face ICD-10-CM: C44.310  ICD-9-CM: 173.31  10/19/2021 - Present        Depression ICD-10-CM: F32. A  ICD-9-CM: 123  10/19/2021 - Present        Anasarca ICD-10-CM: R60.1  ICD-9-CM: 782.3  10/19/2021 - Present        Thrombocytopenia ([address] ICD-10-CM: D69.6  ICD-9-CM: 287.5  10/19/2021 - Present        Essential hypertension ICD-10-CM: I10  ICD-9-CM: 401.9  10/18/2021 - Present    Overview Signed 10/19/2021  1:34 AM by Lalita Castillo PA-C     Increase dose of Toprolol to BID for better BP control. Follow up in three months with LIYAH Gallegos for bp check.                    Greater than 30 minutes were spent with the patient on counseling and coordination of care    Signed:   Juliet Ospina DO  5/5/2022  7:25 AM
